# Patient Record
Sex: FEMALE | Race: BLACK OR AFRICAN AMERICAN | NOT HISPANIC OR LATINO | URBAN - METROPOLITAN AREA
[De-identification: names, ages, dates, MRNs, and addresses within clinical notes are randomized per-mention and may not be internally consistent; named-entity substitution may affect disease eponyms.]

---

## 2017-04-01 ENCOUNTER — INPATIENT (INPATIENT)
Facility: HOSPITAL | Age: 57
LOS: 4 days | Discharge: HOME | DRG: 192 | End: 2017-04-06
Attending: INTERNAL MEDICINE | Admitting: INTERNAL MEDICINE
Payer: COMMERCIAL

## 2017-04-01 VITALS
TEMPERATURE: 98 F | DIASTOLIC BLOOD PRESSURE: 74 MMHG | HEART RATE: 108 BPM | RESPIRATION RATE: 24 BRPM | OXYGEN SATURATION: 98 % | WEIGHT: 201.94 LBS | SYSTOLIC BLOOD PRESSURE: 114 MMHG

## 2017-04-01 DIAGNOSIS — Z41.8 ENCOUNTER FOR OTHER PROCEDURES FOR PURPOSES OTHER THAN REMEDYING HEALTH STATE: ICD-10-CM

## 2017-04-01 DIAGNOSIS — R63.8 OTHER SYMPTOMS AND SIGNS CONCERNING FOOD AND FLUID INTAKE: ICD-10-CM

## 2017-04-01 DIAGNOSIS — E04.1 NONTOXIC SINGLE THYROID NODULE: ICD-10-CM

## 2017-04-01 DIAGNOSIS — Z90.49 ACQUIRED ABSENCE OF OTHER SPECIFIED PARTS OF DIGESTIVE TRACT: Chronic | ICD-10-CM

## 2017-04-01 DIAGNOSIS — C34.90 MALIGNANT NEOPLASM OF UNSPECIFIED PART OF UNSPECIFIED BRONCHUS OR LUNG: ICD-10-CM

## 2017-04-01 DIAGNOSIS — Z41.9 ENCOUNTER FOR PROCEDURE FOR PURPOSES OTHER THAN REMEDYING HEALTH STATE, UNSPECIFIED: Chronic | ICD-10-CM

## 2017-04-01 DIAGNOSIS — J44.1 CHRONIC OBSTRUCTIVE PULMONARY DISEASE WITH (ACUTE) EXACERBATION: ICD-10-CM

## 2017-04-01 LAB
ALBUMIN SERPL ELPH-MCNC: 3.9 G/DL — SIGNIFICANT CHANGE UP (ref 3.4–5)
ALP SERPL-CCNC: 86 U/L — SIGNIFICANT CHANGE UP (ref 40–120)
ALT FLD-CCNC: 26 U/L — SIGNIFICANT CHANGE UP (ref 12–42)
ANION GAP SERPL CALC-SCNC: 8 MMOL/L — LOW (ref 9–16)
APTT BLD: 28 SEC — SIGNIFICANT CHANGE UP (ref 27.5–37.4)
AST SERPL-CCNC: 10 U/L — LOW (ref 15–37)
BASE EXCESS BLDV CALC-SCNC: 0.5 MMOL/L — SIGNIFICANT CHANGE UP
BASOPHILS NFR BLD AUTO: 0.1 % — SIGNIFICANT CHANGE UP (ref 0–2)
BILIRUB SERPL-MCNC: 0.2 MG/DL — SIGNIFICANT CHANGE UP (ref 0.2–1.2)
BUN SERPL-MCNC: 19 MG/DL — SIGNIFICANT CHANGE UP (ref 7–23)
CALCIUM SERPL-MCNC: 9 MG/DL — SIGNIFICANT CHANGE UP (ref 8.5–10.5)
CHLORIDE SERPL-SCNC: 106 MMOL/L — SIGNIFICANT CHANGE UP (ref 96–108)
CK MB CFR SERPL CALC: <1 NG/ML — SIGNIFICANT CHANGE UP (ref 0.5–3.6)
CK SERPL-CCNC: 79 U/L — SIGNIFICANT CHANGE UP (ref 26–192)
CO2 SERPL-SCNC: 26 MMOL/L — SIGNIFICANT CHANGE UP (ref 22–31)
CREAT SERPL-MCNC: 1.1 MG/DL — SIGNIFICANT CHANGE UP (ref 0.5–1.3)
D DIMER BLD IA.RAPID-MCNC: 646 NG/ML DDU — HIGH
EOSINOPHIL NFR BLD AUTO: 0.1 % — SIGNIFICANT CHANGE UP (ref 0–6)
GAS PNL BLDV: SIGNIFICANT CHANGE UP
GLUCOSE SERPL-MCNC: 90 MG/DL — SIGNIFICANT CHANGE UP (ref 70–99)
HCO3 BLDV-SCNC: 26 MMOL/L — SIGNIFICANT CHANGE UP (ref 20–27)
HCT VFR BLD CALC: 36.8 % — SIGNIFICANT CHANGE UP (ref 34.5–45)
HGB BLD-MCNC: 12.9 G/DL — SIGNIFICANT CHANGE UP (ref 11.5–15.5)
INR BLD: 0.98 — SIGNIFICANT CHANGE UP (ref 0.88–1.16)
LACTATE SERPL-SCNC: 1.1 MMOL/L — SIGNIFICANT CHANGE UP (ref 0.5–2)
LYMPHOCYTES # BLD AUTO: 17.3 % — SIGNIFICANT CHANGE UP (ref 13–44)
MCHC RBC-ENTMCNC: 30.8 PG — SIGNIFICANT CHANGE UP (ref 27–34)
MCHC RBC-ENTMCNC: 35.1 G/DL — SIGNIFICANT CHANGE UP (ref 32–36)
MCV RBC AUTO: 87.8 FL — SIGNIFICANT CHANGE UP (ref 80–100)
MONOCYTES NFR BLD AUTO: 7.2 % — SIGNIFICANT CHANGE UP (ref 2–14)
NEUTROPHILS NFR BLD AUTO: 75.3 % — SIGNIFICANT CHANGE UP (ref 43–77)
PCO2 BLDV: 43 MMHG — SIGNIFICANT CHANGE UP (ref 41–51)
PH BLDV: 7.39 — SIGNIFICANT CHANGE UP (ref 7.32–7.43)
PLATELET # BLD AUTO: 279 K/UL — SIGNIFICANT CHANGE UP (ref 150–400)
PO2 BLDV: 28 MMHG — SIGNIFICANT CHANGE UP
POTASSIUM SERPL-MCNC: 3.8 MMOL/L — SIGNIFICANT CHANGE UP (ref 3.5–5.3)
POTASSIUM SERPL-SCNC: 3.8 MMOL/L — SIGNIFICANT CHANGE UP (ref 3.5–5.3)
PROT SERPL-MCNC: 8.2 G/DL — SIGNIFICANT CHANGE UP (ref 6.4–8.2)
PROTHROM AB SERPL-ACNC: 10.9 SEC — SIGNIFICANT CHANGE UP (ref 9.8–12.7)
RAPID RVP RESULT: SIGNIFICANT CHANGE UP
RBC # BLD: 4.19 M/UL — SIGNIFICANT CHANGE UP (ref 3.8–5.2)
RBC # FLD: 17.6 % — HIGH (ref 10.3–16.9)
SAO2 % BLDV: 50 % — SIGNIFICANT CHANGE UP
SODIUM SERPL-SCNC: 140 MMOL/L — SIGNIFICANT CHANGE UP (ref 135–145)
TROPONIN I SERPL-MCNC: <0.015 NG/ML — SIGNIFICANT CHANGE UP (ref 0.01–0.04)
WBC # BLD: 8 K/UL — SIGNIFICANT CHANGE UP (ref 3.8–10.5)
WBC # FLD AUTO: 8 K/UL — SIGNIFICANT CHANGE UP (ref 3.8–10.5)

## 2017-04-01 PROCEDURE — 93010 ELECTROCARDIOGRAM REPORT: CPT

## 2017-04-01 PROCEDURE — 99285 EMERGENCY DEPT VISIT HI MDM: CPT | Mod: 25

## 2017-04-01 PROCEDURE — 71010: CPT | Mod: 26

## 2017-04-01 PROCEDURE — 71275 CT ANGIOGRAPHY CHEST: CPT | Mod: 26

## 2017-04-01 RX ORDER — ACETAMINOPHEN 500 MG
650 TABLET ORAL EVERY 6 HOURS
Qty: 0 | Refills: 0 | Status: DISCONTINUED | OUTPATIENT
Start: 2017-04-01 | End: 2017-04-06

## 2017-04-01 RX ORDER — IPRATROPIUM/ALBUTEROL SULFATE 18-103MCG
3 AEROSOL WITH ADAPTER (GRAM) INHALATION ONCE
Qty: 0 | Refills: 0 | Status: COMPLETED | OUTPATIENT
Start: 2017-04-01 | End: 2017-04-01

## 2017-04-01 RX ORDER — FLUTICASONE PROPIONATE AND SALMETEROL 50; 250 UG/1; UG/1
1 POWDER ORAL; RESPIRATORY (INHALATION)
Qty: 0 | Refills: 0 | Status: DISCONTINUED | OUTPATIENT
Start: 2017-04-01 | End: 2017-04-06

## 2017-04-01 RX ORDER — IPRATROPIUM/ALBUTEROL SULFATE 18-103MCG
3 AEROSOL WITH ADAPTER (GRAM) INHALATION EVERY 6 HOURS
Qty: 0 | Refills: 0 | Status: DISCONTINUED | OUTPATIENT
Start: 2017-04-01 | End: 2017-04-06

## 2017-04-01 RX ORDER — IPRATROPIUM/ALBUTEROL SULFATE 18-103MCG
3 AEROSOL WITH ADAPTER (GRAM) INHALATION EVERY 4 HOURS
Qty: 0 | Refills: 0 | Status: DISCONTINUED | OUTPATIENT
Start: 2017-04-01 | End: 2017-04-01

## 2017-04-01 RX ORDER — SODIUM CHLORIDE 9 MG/ML
1000 INJECTION INTRAMUSCULAR; INTRAVENOUS; SUBCUTANEOUS ONCE
Qty: 0 | Refills: 0 | Status: COMPLETED | OUTPATIENT
Start: 2017-04-01 | End: 2017-04-01

## 2017-04-01 RX ORDER — INSULIN LISPRO 100/ML
VIAL (ML) SUBCUTANEOUS
Qty: 0 | Refills: 0 | Status: DISCONTINUED | OUTPATIENT
Start: 2017-04-01 | End: 2017-04-06

## 2017-04-01 RX ORDER — AZITHROMYCIN 500 MG/1
250 TABLET, FILM COATED ORAL DAILY
Qty: 0 | Refills: 0 | Status: COMPLETED | OUTPATIENT
Start: 2017-04-02 | End: 2017-04-03

## 2017-04-01 RX ORDER — DOCUSATE SODIUM 100 MG
100 CAPSULE ORAL THREE TIMES A DAY
Qty: 0 | Refills: 0 | Status: DISCONTINUED | OUTPATIENT
Start: 2017-04-01 | End: 2017-04-06

## 2017-04-01 RX ORDER — ALBUTEROL 90 UG/1
2.5 AEROSOL, METERED ORAL ONCE
Qty: 0 | Refills: 0 | Status: COMPLETED | OUTPATIENT
Start: 2017-04-01 | End: 2017-04-01

## 2017-04-01 RX ORDER — HEPARIN SODIUM 5000 [USP'U]/ML
5000 INJECTION INTRAVENOUS; SUBCUTANEOUS EVERY 8 HOURS
Qty: 0 | Refills: 0 | Status: DISCONTINUED | OUTPATIENT
Start: 2017-04-01 | End: 2017-04-06

## 2017-04-01 RX ADMIN — Medication 2: at 21:48

## 2017-04-01 RX ADMIN — SODIUM CHLORIDE 1000 MILLILITER(S): 9 INJECTION INTRAMUSCULAR; INTRAVENOUS; SUBCUTANEOUS at 15:02

## 2017-04-01 RX ADMIN — HEPARIN SODIUM 5000 UNIT(S): 5000 INJECTION INTRAVENOUS; SUBCUTANEOUS at 21:48

## 2017-04-01 RX ADMIN — Medication 3 MILLILITER(S): at 13:23

## 2017-04-01 RX ADMIN — Medication 100 MILLIGRAM(S): at 21:48

## 2017-04-01 RX ADMIN — ALBUTEROL 2.5 MILLIGRAM(S): 90 AEROSOL, METERED ORAL at 16:05

## 2017-04-01 RX ADMIN — Medication 3 MILLILITER(S): at 21:48

## 2017-04-01 RX ADMIN — Medication 125 MILLIGRAM(S): at 13:22

## 2017-04-01 NOTE — H&P ADULT - PROBLEM SELECTOR PLAN 5
no IVF  Regular diet   Monitor / replete lytes PRN    Full code      Dispo: pending clinical improvement

## 2017-04-01 NOTE — H&P ADULT - PROBLEM SELECTOR PLAN 3
CTA chest with incidental finding of multinodular thyroid. Endocrine ROS negative    - TSH  - thyroid US

## 2017-04-01 NOTE — ED PROVIDER NOTE - PROGRESS NOTE DETAILS
discussed w Dr. Wallace, who advised Dr. Mckinnon covering, discussed final results w Dr. Mckinnon who will admit, advised continuing solumedrol 60 q 6 ,

## 2017-04-01 NOTE — ED PROVIDER NOTE - OBJECTIVE STATEMENT
shortness of breath  57 yo with shortness of breath, Patient with ho of lobectomy for rt lung cancer and recently ended chemo. pt reports several days of worsening SOB, cough, productive of yellow sputum, no fevers, yesterday placed on z pack and pred, took 20mg of pred prior to arrival, ho of COPD as well, former smoker, no ho of thrombosis, denies leg edema or leg pain.  SOB worse with exertion, new CP under L breast.

## 2017-04-01 NOTE — H&P ADULT - NSHPLABSRESULTS_GEN_ALL_CORE
EXAM:  CT ANGIO CHEST PE PROTOCOL IC                          PROCEDURE DATE:  04/01/2017    Quantity of Contrast in Vial in ml: 120 Contrast Used: Optiray 350  Quantity of Contrast Wasted in ml: 0           INTERPRETATION:  CTA (CT angiography) ofthe CHEST dated 4/1/2017 2:57 PM    INDICATION: Shortness of breath. History of right lung resection.   Elevated d-dimer. History of lung cancer. Assess for pulmonary embolism.    TECHNIQUE: CT angiography of the chest was performed during bolus   injection of intravenous contrast.  Post-processing including the   production of axial, coronal and sagittal multiplanar reformatted images   and axial and coronal maximum intensity projections (MIPs) was performed.    PRIOR STUDY: PET/CT from 10/17/2016. CTA chest from 10/14/2016. Multiple   additional imaging studies dating back to 6/22/2016.    FINDINGS: No pulmonary embolism.     The heart is normal in size.  No pericardial effusion is seen. Trivial   calcified plaque aorta.    No mediastinal, hilar or axillary lymphadenopathy is seen.    There is again a multinodular thyroid, with dominant 2.4 cm nodule right   lobe.    There has been interval right lower lobectomy and removal of the   previously seen right lower lobe mass. There is again advanced   centrilobular emphysema. No change 0.4 cm pleural-based nodule posterior   right upper lobe. No pleural effusion.    Limited evaluation of the upper abdomen demonstrates no abnormality.     Evaluation of the osseous structures demonstrates mild degenerative   changes.      IMPRESSION:   1. Since 10/17/2016, there has been right lower lobectomy.    2. No evidence pulmonary embolism.    3. Advanced emphysema.    4. Multinodular thyroid with dominant 2.4 cm nodule right lobe. Recommend   ultrasound follow-up.    5. No change 0.4 cm nodule right upper lobe.

## 2017-04-01 NOTE — ED ADULT TRIAGE NOTE - CHIEF COMPLAINT QUOTE
c/o of sudden shortness of breath while walking, pt has hx of copd, lobectomy to right side, reoprts pain to right chest wall

## 2017-04-01 NOTE — H&P ADULT - PROBLEM SELECTOR PLAN 1
SOB likely 2/2 COPD exacerbation. Patient a former smoker (quit in Oct when diagnosed with Lung Ca). Unclear trigger, however, patient on 5 day Z-Pack for productive cough that was given to her outpatient by Dr. Wallace. SOB, cough exacerbated this AM while walking > 4 blocks. s/p 3 Duoneb, 1 Albuterol, Solumedrol 125 in ED with improvement in symptoms. Patient not requiring supplemental O2, speaking in full sentences, able to ambulate. Peak flow in .   - c/w IV Solumedrol 60mg q6h, and consider transition to PO tomorrow   - c/w home Advair  - Duonebs q6h standing   - complete 2 more days of azithromycin   - daily peak flows SOB likely 2/2 COPD exacerbation. Patient a former smoker (quit in Oct when diagnosed with Lung Ca). Unclear trigger, however, patient on 5 day Z-Pack for productive cough that was given to her outpatient by Dr. Wallace. SOB, cough exacerbated this AM while walking > 4 blocks. s/p 3 Duoneb, 1 Albuterol, Solumedrol 125 in ED with improvement in symptoms. Patient not requiring supplemental O2, speaking in full sentences, able to ambulate. Peak flow in .   - c/w IV Solumedrol 60mg q6h, and consider transition to PO tomorrow   - c/w home Advair  - Duonebs q6h standing   - f/u RVP    - complete 2 more days of azithromycin   - daily peak flows

## 2017-04-01 NOTE — ED ADULT NURSE NOTE - PMH
Adenocarcinoma of lung    COPD (chronic obstructive pulmonary disease)    Dyspnea    MRSA (methicillin resistant Staphylococcus aureus)  history of

## 2017-04-01 NOTE — H&P ADULT - HISTORY OF PRESENT ILLNESS
56 year old women, former smoker, with a pmhx of COPD, non-small cell Lung Ca s/p partial right lung resection (Oct 2016) currently on chemo (last session in March) presents with shortness of breath since this morning. Patient states that she was previously in an ED in [place] with "breathing problems" on wednesday (03/39) and discharged on a prednisone taper. The following day patient went to see Dr. Wallace in his office for complaints of a productive cough for which he started her on a Z-pack. Patient felt well up until this morning when she was walking approximately 4 blocks to [place] when she had a sudden exacerbation of coughing episodes. Patient states this episode started suddenly, associated with difficulty breathing, for which she took a cab to [facility] ED. Patient states her cough is productive, expectorating yellow sputum. She states she has had episodes like these in the past and the symptoms were similar. Patient is compliant with her medications at home which include Advair, and Duonebs as needed. Patient believes this episode was triggered by her sons 2 dogs and cats, which she's been around most recently. Patient denies any recent fevers, chills, travel, sick contacts, headaches, chest pain, abdominal pain, N/V/D, bowel or urinary changes.     In the ED, VS: temp: 97.8, HR: 108, /74, RR: 24, O2: 98%. Patient given 3 Duoneb treatments, 1 Albuterol treatment, and IV Solumedrol 125mg. Labs unremarkable except for a minimally elevated D-Dimer. CTA chest negative for PE, however, multinodular thyroid seen.     Patient to be admitted to [facility] 56 year old women, former smoker, with a pmhx of COPD, non-small cell Lung Ca s/p partial right lung resection (Oct 2016) currently on chemo (last session in March) presents with shortness of breath since this morning. Patient states that she was previously in an ED in [place] with "breathing problems" on wednesday (03/39) and discharged on a prednisone taper. The following day patient went to see Dr. Wallace in his office for complaints of a productive cough for which he started her on a Z-pack. Patient felt well up until this morning when she was walking approximately 4 blocks to [place] when she had a sudden exacerbation of coughing episodes. Patient states this episode started suddenly, associated with difficulty breathing, for which she took a cab to [facility] ED. Patient states her cough is productive, expectorating yellow sputum. She states she has had episodes like these in the past and the symptoms were similar. Patient is compliant with her medications at home which include Advair, and Duonebs as needed. Patient believes this episode was triggered by her sons 2 dogs and cats, which she's been around most recently. Patient has a peak flow monitor at home, says its usually around the 250 range. Patient denies any recent fevers, chills, travel, sick contacts, headaches, chest pain, abdominal pain, N/V/D, bowel or urinary changes.     In the ED, VS: temp: 97.8, HR: 108, /74, RR: 24, O2: 98%. Patient given 3 Duoneb treatments, 1 Albuterol treatment, and IV Solumedrol 125mg. Labs unremarkable except for a minimally elevated D-Dimer. CTA chest negative for PE, however, multinodular thyroid seen.     Patient to be admitted to [facility]

## 2017-04-01 NOTE — ED ADULT NURSE NOTE - OBJECTIVE STATEMENT
pt to ER w/ report of episode of SOB today while walking and experience of R-sided CP.  Hx lung CA w/ R lobectomy Oct. 2016.  Pt reports being sick for one week w/ URI, productive cough w/ yellow phlegm, denies f/c/n/v/abd pain. 12 lead ekg done and shown to ER attending physician.  Pt maintained on CM. Breathing unlabored, skin warm and dry. Will continue to monitor. pt to ER w/ report of episode of SOB today while walking and experience of R-sided CP.  Hx lung CA w/ R lobectomy Oct. 2016.  Pt reports being sick for one week w/ URI, productive cough w/ yellow phlegm, denies f/c/n/v/abd pain. 12 lead ekg done and shown to ER attending physician.  Some work of breathing noted. Wheezing noted albino on auscultation.  Pt maintained on CM.  IV access established, labs drawn and sent. Skin warm and dry. Will continue to monitor.

## 2017-04-01 NOTE — H&P ADULT - ASSESSMENT
56 year old F, former smoker, with a pmhx of COPD, non-small cell Lung Ca s/p partial right lung resection (OCt 2016) currently on chemo (last session in March) presents with shortness of breath since this morning, found to be in COPD exacerbation

## 2017-04-01 NOTE — H&P ADULT - NSHPPHYSICALEXAM_GEN_ALL_CORE
PHYSICAL EXAM:    Constitutional: No respiratory distress, speaking in full sentences   Head: NC/AT  Eyes: PERRL, EOMI, clear conjunctiva  ENT: no nasal discharge; uvula midline, no oropharyngeal erythema or exudates; MMM  Neck: supple; no JVD or thyromegaly  Respiratory: Poor air movement with minimal expiratory wheezing   Cardiac: +S1/S2; Tachycardic; no M/R/G  Gastrointestinal: soft, NT/ND; no rebound or guarding; +BSx4  Genitourinary: normal external genitalia  Back: spine midline, no bony tenderness or step-offs; no CVAT B/L  Extremities: WWP, no clubbing or cyanosis; no peripheral edema  Musculoskeletal: NROM x4; no joint swelling, tenderness or erythema  Vascular: 2+ radial, femoral, DP/PT pulses B/L  Dermatologic: skin warm, dry and intact; no rashes, wounds, or scars  Lymphatic: no submandibular or cervical LAD  Neurologic: AAOx3; CNII-XII grossly intact; no focal deficits

## 2017-04-01 NOTE — ED PROVIDER NOTE - MUSCULOSKELETAL, MLM
Spine appears normal, range of motion is not limited, no muscle or joint tenderness, no leg edema, no calf tenderness

## 2017-04-01 NOTE — H&P ADULT - PROBLEM SELECTOR PLAN 2
Non-small cell Lung CA. Diagnosed in Oct 2016. s/p partial right lung resection. Follows with Dr. Gonsalves for chemotherapy treatments. Last one in March 2016. Received 4 treatments total.   - Collateral with Dr. Gonsalves. ensure patient gets appointment at discharge  - Patient still with pain from surgery, takes oxycodone 30 PRN at home.   - c/w Tylenol PRN for moderate pain, Percocet for severe pain

## 2017-04-01 NOTE — ED PROVIDER NOTE - MEDICAL DECISION MAKING DETAILS
57 yo with lung CA w resection, now SOB / cough, wheezing on exam, tachypnea and SOB, + small effusion Rt side, possible mild infiltrate, elevated d dimer even w age adjustment, Plan to CT to r/o PE, also to eval for pna, will need admission. will discuss w Dr. Wallace, paged at this time.

## 2017-04-02 DIAGNOSIS — R10.11 RIGHT UPPER QUADRANT PAIN: ICD-10-CM

## 2017-04-02 DIAGNOSIS — A49.02 METHICILLIN RESISTANT STAPHYLOCOCCUS AUREUS INFECTION, UNSPECIFIED SITE: ICD-10-CM

## 2017-04-02 DIAGNOSIS — R05 COUGH: ICD-10-CM

## 2017-04-02 DIAGNOSIS — G43.809 OTHER MIGRAINE, NOT INTRACTABLE, WITHOUT STATUS MIGRAINOSUS: ICD-10-CM

## 2017-04-02 DIAGNOSIS — C34.91 MALIGNANT NEOPLASM OF UNSPECIFIED PART OF RIGHT BRONCHUS OR LUNG: ICD-10-CM

## 2017-04-02 DIAGNOSIS — G43.009 MIGRAINE WITHOUT AURA, NOT INTRACTABLE, WITHOUT STATUS MIGRAINOSUS: ICD-10-CM

## 2017-04-02 DIAGNOSIS — R91.1 SOLITARY PULMONARY NODULE: ICD-10-CM

## 2017-04-02 LAB
ANION GAP SERPL CALC-SCNC: 9 MMOL/L — SIGNIFICANT CHANGE UP (ref 9–16)
BUN SERPL-MCNC: 17 MG/DL — SIGNIFICANT CHANGE UP (ref 7–23)
CALCIUM SERPL-MCNC: 8.7 MG/DL — SIGNIFICANT CHANGE UP (ref 8.5–10.5)
CHLORIDE SERPL-SCNC: 104 MMOL/L — SIGNIFICANT CHANGE UP (ref 96–108)
CO2 SERPL-SCNC: 24 MMOL/L — SIGNIFICANT CHANGE UP (ref 22–31)
CREAT SERPL-MCNC: 0.94 MG/DL — SIGNIFICANT CHANGE UP (ref 0.5–1.3)
GLUCOSE SERPL-MCNC: 149 MG/DL — HIGH (ref 70–99)
HBA1C BLD-MCNC: 6.6 % — HIGH (ref 4.8–5.6)
HCT VFR BLD CALC: 35.2 % — SIGNIFICANT CHANGE UP (ref 34.5–45)
HGB BLD-MCNC: 12 G/DL — SIGNIFICANT CHANGE UP (ref 11.5–15.5)
MAGNESIUM SERPL-MCNC: 2 MG/DL — SIGNIFICANT CHANGE UP (ref 1.6–2.4)
MCHC RBC-ENTMCNC: 30.2 PG — SIGNIFICANT CHANGE UP (ref 27–34)
MCHC RBC-ENTMCNC: 34.1 G/DL — SIGNIFICANT CHANGE UP (ref 32–36)
MCV RBC AUTO: 88.7 FL — SIGNIFICANT CHANGE UP (ref 80–100)
PLATELET # BLD AUTO: 283 K/UL — SIGNIFICANT CHANGE UP (ref 150–400)
POTASSIUM SERPL-MCNC: 4.1 MMOL/L — SIGNIFICANT CHANGE UP (ref 3.5–5.3)
POTASSIUM SERPL-SCNC: 4.1 MMOL/L — SIGNIFICANT CHANGE UP (ref 3.5–5.3)
RBC # BLD: 3.97 M/UL — SIGNIFICANT CHANGE UP (ref 3.8–5.2)
RBC # FLD: 17.6 % — HIGH (ref 10.3–16.9)
SODIUM SERPL-SCNC: 137 MMOL/L — SIGNIFICANT CHANGE UP (ref 135–145)
T4 FREE SERPL-MCNC: 0.8 NG/DL — SIGNIFICANT CHANGE UP (ref 0.7–1.48)
TSH SERPL-MCNC: 0.17 UIU/ML — LOW (ref 0.35–4.94)
WBC # BLD: 7.6 K/UL — SIGNIFICANT CHANGE UP (ref 3.8–10.5)
WBC # FLD AUTO: 7.6 K/UL — SIGNIFICANT CHANGE UP (ref 3.8–10.5)

## 2017-04-02 PROCEDURE — 76536 US EXAM OF HEAD AND NECK: CPT | Mod: 26

## 2017-04-02 RX ORDER — PANTOPRAZOLE SODIUM 20 MG/1
40 TABLET, DELAYED RELEASE ORAL
Qty: 0 | Refills: 0 | Status: DISCONTINUED | OUTPATIENT
Start: 2017-04-03 | End: 2017-04-06

## 2017-04-02 RX ORDER — PANTOPRAZOLE SODIUM 20 MG/1
40 TABLET, DELAYED RELEASE ORAL ONCE
Qty: 0 | Refills: 0 | Status: COMPLETED | OUTPATIENT
Start: 2017-04-02 | End: 2017-04-02

## 2017-04-02 RX ORDER — FOLIC ACID 0.8 MG
1 TABLET ORAL DAILY
Qty: 0 | Refills: 0 | Status: DISCONTINUED | OUTPATIENT
Start: 2017-04-02 | End: 2017-04-06

## 2017-04-02 RX ORDER — FOLIC ACID 0.8 MG
0.4 TABLET ORAL DAILY
Qty: 0 | Refills: 0 | Status: DISCONTINUED | OUTPATIENT
Start: 2017-04-02 | End: 2017-04-02

## 2017-04-02 RX ORDER — METOCLOPRAMIDE HCL 10 MG
10 TABLET ORAL ONCE
Qty: 0 | Refills: 0 | Status: DISCONTINUED | OUTPATIENT
Start: 2017-04-02 | End: 2017-04-02

## 2017-04-02 RX ADMIN — FLUTICASONE PROPIONATE AND SALMETEROL 1 DOSE(S): 50; 250 POWDER ORAL; RESPIRATORY (INHALATION) at 10:01

## 2017-04-02 RX ADMIN — Medication 3 MILLILITER(S): at 05:48

## 2017-04-02 RX ADMIN — Medication 3 MILLILITER(S): at 17:18

## 2017-04-02 RX ADMIN — Medication 60 MILLIGRAM(S): at 11:16

## 2017-04-02 RX ADMIN — Medication 1 MILLIGRAM(S): at 15:18

## 2017-04-02 RX ADMIN — HEPARIN SODIUM 5000 UNIT(S): 5000 INJECTION INTRAVENOUS; SUBCUTANEOUS at 05:48

## 2017-04-02 RX ADMIN — AZITHROMYCIN 250 MILLIGRAM(S): 500 TABLET, FILM COATED ORAL at 11:15

## 2017-04-02 RX ADMIN — Medication 650 MILLIGRAM(S): at 15:17

## 2017-04-02 RX ADMIN — Medication 100 MILLIGRAM(S): at 05:48

## 2017-04-02 RX ADMIN — Medication 100 MILLIGRAM(S): at 14:21

## 2017-04-02 RX ADMIN — Medication 100 MILLIGRAM(S): at 21:36

## 2017-04-02 RX ADMIN — FLUTICASONE PROPIONATE AND SALMETEROL 1 DOSE(S): 50; 250 POWDER ORAL; RESPIRATORY (INHALATION) at 21:36

## 2017-04-02 RX ADMIN — Medication 60 MILLIGRAM(S): at 00:55

## 2017-04-02 RX ADMIN — Medication 3 MILLILITER(S): at 10:00

## 2017-04-02 RX ADMIN — Medication 60 MILLIGRAM(S): at 05:50

## 2017-04-02 RX ADMIN — HEPARIN SODIUM 5000 UNIT(S): 5000 INJECTION INTRAVENOUS; SUBCUTANEOUS at 14:21

## 2017-04-02 RX ADMIN — FLUTICASONE PROPIONATE AND SALMETEROL 1 DOSE(S): 50; 250 POWDER ORAL; RESPIRATORY (INHALATION) at 00:34

## 2017-04-02 RX ADMIN — HEPARIN SODIUM 5000 UNIT(S): 5000 INJECTION INTRAVENOUS; SUBCUTANEOUS at 21:36

## 2017-04-02 RX ADMIN — Medication 3 MILLILITER(S): at 21:36

## 2017-04-02 RX ADMIN — Medication 650 MILLIGRAM(S): at 16:17

## 2017-04-02 RX ADMIN — Medication 4: at 17:59

## 2017-04-02 RX ADMIN — Medication 60 MILLIGRAM(S): at 17:18

## 2017-04-02 NOTE — PROGRESS NOTE ADULT - PROBLEM SELECTOR PLAN 4
Pt c/o headache this AM, no red flags, likely migraine. Pt had not tried any tylenol.  - c/w tylenol, percocet prn. If she tries these and they don’t work, can order IV Reglan 10mg x 1 (QTc 447 on 4/1)

## 2017-04-02 NOTE — PROGRESS NOTE ADULT - SUBJECTIVE AND OBJECTIVE BOX
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 Developed severe cough attacks and took a cab to the ER.  Yellowish expectoration for several days. Started azithromycin on Thursday.    PAST MEDICAL & SURGICAL HISTORY:  Dyspnea  MRSA (methicillin resistant Staphylococcus aureus): history of  Adenocarcinoma of lung  COPD (chronic obstructive pulmonary disease)  Asthma  Pulmonary nodules  Surgery, elective: lung biopsy  History of appendectomy  No significant past surgical history    FAMILY HISTORY:  fa  lung cancer, 70s  mo  80, liver cancer,     SOCIAL HISTORY: lives alone, quit smoking 2016    REVIEW OF SYSTEMS:  Constitutional: +weight change, -weight loss, -fever, -chills, -fatigue, +night sweats  Eyes: -discharge, -eye pain, -visual change  DRY EYES  ENT:  -hearing difficulty, +tinnitus, -vertigo, -sinus pain, -throat pain, -epistaxis, -dysphagia, -hoarseness  Neck: + LEFT pain FROM FALL, -stiffness, -neck swelling  Respiratory: +cough, +wheezing, -hemoptysis      Cardiovascular: -chest pain, -dysrhythmia, -palpitations, -dizziness   Gastrointestinal: -abdominal pain, -nausea, -vomiting, -hematemesis, -diarrhea, -constipation, -melena  Genitourinary: -dysuria, -frequency, -hematuria, -incontinence      Neurologic: +headache, -memory loss, -loss of strength, -numbness, -tremor   MIGRAINES  Skin: -itching, -burning, -rash, -lesions   Lymphatic: -enlarged lymph nodes  Endocrine: -hair loss, +temperature intolerance         Musculoskeletal: +back pain, -joint pain, -extremity pain  Psychiatric: -visual change, -auditory change, -depression, -anxiety, -suicidal  Sleep: +disorder, +insomnia, -sleep deprivation  Heme/Lymph: -easy bruising, -bleeding gums            Allergy and Immunologic: -hives, -eczema    Vital Signs Last 24 Hrs  T(C): 36.5, Max: 36.7 ( @ 19:46)  T(F): 97.7, Max: 98 ( @ 19:46)  HR: 93 (80 - 108)  BP: 113/75 (102/67 - 136/80)  BP(mean): --  RR: 18 (17 - 24)  SpO2: 98% (95% - 100%)    I&O's Detail    PHYSICAL EXAM:  In bed, headache, cough and dyspnea  Well nourished, well developed, uncomfortable, - acute distress; vital signs are monitored  Eyes: PERRLA, EOMI, -conjunctivitis, -scleritis   Head: no focal deficit, normocephalic,  no trauma  ENMT: moist tongue, no thrush, -nasal discharge, -hoarseness, normal hearing, +cough, -hemoptysis, trachea midline  Neck: supple, - lymphadenopathy,  -masses, -JVD  Respiratory: bilateral diminished breath sounds, + EXPIRATORY wheezing, +rhonchi, -rales, -crackles  Chest: -accessory muscle use, -paradoxical breathing  Cardiovascular: regular rate and sinus rhythm, S1 S2 normal, -S3, -S4, -murmur, -gallop, -rub  Gastrointestinal: RIGHT SIDED PAIN AND BURNING, soft, nontender, nondistended, normal bowel sounds, no hepatosplenomegaly  Genitourinary: -flank pain, -dysuria  Extremities: +clubbing, -cyanosis, -edema    Vascular: peripheral pulses palpable 2+ bilaterally  Neurological: alert, oriented x 3, no focal deficit, -tremor   Skin: warm, dry, -erythema, iv site intact  Lymph nodes; no cervical, supraclavicular or axillary adenopathy  Psychiatric: cooperative, appropriate mood    MEDICATIONS  (STANDING):  docusate sodium 100milliGRAM(s) Oral three times a day  fluticasone / salmeterol 250-50 MICROgram(s) Diskus 1Dose(s) Inhalation two times a day  heparin  Injectable 5000Unit(s) SubCutaneous every 8 hours  insulin lispro (HumaLOG) corrective regimen sliding scale  SubCutaneous Before meals and at bedtime  methylPREDNISolone sodium succinate Injectable 60milliGRAM(s) IV Push every 6 hours  azithromycin   Tablet 250milliGRAM(s) Oral daily  ALBUTerol/ipratropium for Nebulization 3milliLiter(s) Nebulizer every 6 hours    MEDICATIONS  (PRN):  oxyCODONE  5 mG/acetaminophen 325 mG 1Tablet(s) Oral every 6 hours PRN Severe Pain (7 - 10)  acetaminophen   Tablet. 650milliGRAM(s) Oral every 6 hours PRN Moderate Pain (4 - 6)    Allergies  penicillin (Angioedema)  Intolerances    LABS:                        12.0   7.6   )-----------( 283      ( 2017 06:31 )             35.2     2017 06:30    137    |  104    |  17     ----------------------------<  149    4.1     |  24     |  0.94     Ca    8.7        2017 06:30  Mg     2.0       2017 06:30  TPro  8.2    /  Alb  3.9    /  TBili  0.2    /  DBili  x      /  AST  10     /  ALT  26     /  AlkPhos  86     2017 13:26  PT/INR - ( 2017 13:26 )   PT: 10.9 sec;   INR: 0.98     PTT - ( 2017 13:26 )  PTT:28.0 sec    +DVT prophylaxis  5000 q8  +Sleep  I COULD NOT SLEEP  +Nutrition goals ORAL  -Pain RIGHT ABDOMINAL  -Decubital ulcer  +GI prophylaxis (PPV, coagulopathy, Hx)  +Aspiration prophylaxis (45 degrees)  +Sedation/analgesia stopped   +ID (phos, CH, mupi, SB)  -Delirium  +Cardiac  +Prevention  +Education  +Medication reviewed (drug-drug interactions, PDA)  Medical devices    Discussed with ER MD, PGY, RN    RADIOLOGY & ADDITIONAL STUDIES:      EXAM:  CT ANGIO CHEST PE PROTOCOL IC                          PROCEDURE DATE:  2017    Quantity of Contrast in Vial in ml: 120 Contrast Used: Optiray 350  Quantity of Contrast Wasted in ml: 0           INTERPRETATION:  CTA (CT angiography) ofthe CHEST dated 2017 2:57 PM    INDICATION: Shortness of breath. History of right lung resection.   Elevated d-dimer. History of lung cancer. Assess for pulmonary embolism.    TECHNIQUE: CT angiography of the chest was performed during bolus   injection of intravenous contrast.  Post-processing including the   production of axial, coronal and sagittal multiplanar reformatted images   and axial and coronal maximum intensity projections (MIPs) was performed.    PRIOR STUDY: PET/CT from 10/17/2016. CTA chest from 10/14/2016. Multiple   additional imaging studies dating back to 2016.    FINDINGS: No pulmonary embolism.     The heart is normal in size.  No pericardial effusion is seen. Trivial   calcified plaque aorta.    No mediastinal, hilar or axillary lymphadenopathy is seen.    There is again a multinodular thyroid, with dominant 2.4 cm nodule right   lobe.    There has been interval right lower lobectomy and removal of the   previously seen right lower lobe mass. There is again advanced   centrilobular emphysema. No change 0.4 cm pleural-based nodule posterior   right upper lobe. No pleural effusion.    Limited evaluation of the upper abdomen demonstrates no abnormality.     Evaluation of the osseous structures demonstrates mild degenerative   changes.      IMPRESSION:   1. Since 10/17/2016, there has been right lower lobectomy.    2. No evidence pulmonary embolism.    3. Advanced emphysema.    4. Multinodular thyroid with dominant 2.4 cm nodule right lobe. Recommend   ultrasound follow-up.    5. No change 0.4 cm nodule right upper lobe.

## 2017-04-02 NOTE — PROGRESS NOTE ADULT - PROBLEM SELECTOR PLAN 3
CTA chest with incidental finding of R multinodular thyroid of 2.4cm. Endocrine ROS negative    - TSH low (0.167) but free T4 wnl (0.8)  - went for thyroid US today, f/up results  - Monitor for thyroid storm and consider endo c/s if this occurs

## 2017-04-02 NOTE — PROGRESS NOTE ADULT - SUBJECTIVE AND OBJECTIVE BOX
PGY-1 Medicine Progress Note    Overnight events: No acute events.    Subjective: Pt seen and examined at bedside this morning. Pt states her cough and SOB is improving. Denies fever. Admits to 6/10 frontal headache, though denies waking up at night due to headache. Endorses occasional sweats in last few weeks, occasional palpitations but denies wt loss (has had wt gain). Also c/o nonspecific abd cramping.    T(C): 36.7, Max: 36.7 (04-02 @ 16:35)  HR: 95 (80 - 95)  BP: 117/79 (102/67 - 117/79)  RR: 17 (17 - 18)  SpO2: 96% (96% - 98%)  Wt(kg): --  CAPILLARY BLOOD GLUCOSE  209 (02 Apr 2017 17:08)  126 (02 Apr 2017 14:19)  162 (02 Apr 2017 11:35)  139 (02 Apr 2017 08:05)  192 (01 Apr 2017 21:35)    Daily     Daily   I&O's Summary    EXAM:  Constitutional: Appears comfortable, not in respiratory distress, speaking in full sentences   Head: NC/AT  Eyes: PERRL, EOMI, clear conjunctiva  ENT: no nasal discharge; uvula midline, no oropharyngeal erythema or exudates; MMM  Neck: supple; no JVD, + Right thyroid nodule (nontender, firm, ~2cm)  Respiratory: Mild b/l expiratory wheezes, good air movement, no accessory musc use  Cardiac: +S1/S2; Tachycardic; no M/R/G  Gastrointestinal: soft, NT/ND; no rebound or guarding; +BSx4  Extremities: WWP, no clubbing or cyanosis; no peripheral edema  Vascular: 2+ radial, femoral, DP/PT pulses B/L  Dermatologic: skin warm, dry and intact; no rashes, wounds, or scars  Lymphatic: no submandibular or cervical LAD  Neurologic: AAOx3; CNII-XII grossly intact; no focal deficits    MEDICATIONS  (STANDING):  docusate sodium 100milliGRAM(s) Oral three times a day  fluticasone / salmeterol 250-50 MICROgram(s) Diskus 1Dose(s) Inhalation two times a day  heparin  Injectable 5000Unit(s) SubCutaneous every 8 hours  insulin lispro (HumaLOG) corrective regimen sliding scale  SubCutaneous Before meals and at bedtime  methylPREDNISolone sodium succinate Injectable 60milliGRAM(s) IV Push every 6 hours  azithromycin   Tablet 250milliGRAM(s) Oral daily  ALBUTerol/ipratropium for Nebulization 3milliLiter(s) Nebulizer every 6 hours  folic acid 1milliGRAM(s) Oral daily    MEDICATIONS  (PRN):  oxyCODONE  5 mG/acetaminophen 325 mG 1Tablet(s) Oral every 6 hours PRN Severe Pain (7 - 10)  acetaminophen   Tablet. 650milliGRAM(s) Oral every 6 hours PRN Moderate Pain (4 - 6)    Allergies    penicillin (Angioedema)    Intolerances    Labs: reviewed.                          12.0   7.6   )-----------( 283      ( 02 Apr 2017 06:31 )             35.2     02 Apr 2017 06:30    137    |  104    |  17     ----------------------------<  149    4.1     |  24     |  0.94     Ca    8.7        02 Apr 2017 06:30  Mg     2.0       02 Apr 2017 06:30    TPro  8.2    /  Alb  3.9    /  TBili  0.2    /  DBili  x      /  AST  10     /  ALT  26     /  AlkPhos  86     01 Apr 2017 13:26    PT/INR - ( 01 Apr 2017 13:26 )   PT: 10.9 sec;   INR: 0.98          PTT - ( 01 Apr 2017 13:26 )  PTT:28.0 sec      Radiology and additional tests: reviewed.

## 2017-04-02 NOTE — PROGRESS NOTE ADULT - PROBLEM SELECTOR PLAN 1
SOB likely 2/2 COPD exacerbation. Patient a former smoker (quit in Oct when diagnosed with Lung Ca). Unclear trigger, however, patient on 5 day Z-Pack for productive cough that was given to her outpatient by Dr. Wallace. SOB, cough exacerbated this AM while walking > 4 blocks. s/p 3 Duoneb, 1 Albuterol, Solumedrol 125 in ED with improvement in symptoms. Patient not requiring supplemental O2, speaking in full sentences, able to ambulate. Peak flow in . Today 220. Baseline 300 at home.  - c/w IV Solumedrol 60mg q6h, and consider transition to PO tomorrow   - c/w ISS and added PPI for stress ulcer ppx  - c/w home Advair  - Duonebs q6h standing   - f/u RVP    - complete 1 more day of azithromycin   - daily peak flows - today 220, baseline 300

## 2017-04-03 LAB
ANION GAP SERPL CALC-SCNC: 6 MMOL/L — LOW (ref 9–16)
BUN SERPL-MCNC: 22 MG/DL — SIGNIFICANT CHANGE UP (ref 7–23)
CALCIUM SERPL-MCNC: 9.2 MG/DL — SIGNIFICANT CHANGE UP (ref 8.5–10.5)
CHLORIDE SERPL-SCNC: 106 MMOL/L — SIGNIFICANT CHANGE UP (ref 96–108)
CO2 SERPL-SCNC: 28 MMOL/L — SIGNIFICANT CHANGE UP (ref 22–31)
CREAT SERPL-MCNC: 0.91 MG/DL — SIGNIFICANT CHANGE UP (ref 0.5–1.3)
GLUCOSE SERPL-MCNC: 164 MG/DL — HIGH (ref 70–99)
MAGNESIUM SERPL-MCNC: 2.2 MG/DL — SIGNIFICANT CHANGE UP (ref 1.6–2.4)
POTASSIUM SERPL-MCNC: 4.3 MMOL/L — SIGNIFICANT CHANGE UP (ref 3.5–5.3)
POTASSIUM SERPL-SCNC: 4.3 MMOL/L — SIGNIFICANT CHANGE UP (ref 3.5–5.3)
SODIUM SERPL-SCNC: 140 MMOL/L — SIGNIFICANT CHANGE UP (ref 135–145)

## 2017-04-03 RX ADMIN — HEPARIN SODIUM 5000 UNIT(S): 5000 INJECTION INTRAVENOUS; SUBCUTANEOUS at 05:21

## 2017-04-03 RX ADMIN — Medication 650 MILLIGRAM(S): at 13:01

## 2017-04-03 RX ADMIN — Medication 100 MILLIGRAM(S): at 21:33

## 2017-04-03 RX ADMIN — Medication 60 MILLIGRAM(S): at 21:35

## 2017-04-03 RX ADMIN — FLUTICASONE PROPIONATE AND SALMETEROL 1 DOSE(S): 50; 250 POWDER ORAL; RESPIRATORY (INHALATION) at 10:19

## 2017-04-03 RX ADMIN — Medication 60 MILLIGRAM(S): at 05:21

## 2017-04-03 RX ADMIN — HEPARIN SODIUM 5000 UNIT(S): 5000 INJECTION INTRAVENOUS; SUBCUTANEOUS at 21:44

## 2017-04-03 RX ADMIN — Medication 3 MILLILITER(S): at 21:32

## 2017-04-03 RX ADMIN — HEPARIN SODIUM 5000 UNIT(S): 5000 INJECTION INTRAVENOUS; SUBCUTANEOUS at 14:32

## 2017-04-03 RX ADMIN — Medication 60 MILLIGRAM(S): at 00:10

## 2017-04-03 RX ADMIN — PANTOPRAZOLE SODIUM 40 MILLIGRAM(S): 20 TABLET, DELAYED RELEASE ORAL at 07:11

## 2017-04-03 RX ADMIN — Medication 650 MILLIGRAM(S): at 00:10

## 2017-04-03 RX ADMIN — Medication 650 MILLIGRAM(S): at 14:00

## 2017-04-03 RX ADMIN — Medication 100 MILLIGRAM(S): at 05:22

## 2017-04-03 RX ADMIN — Medication 3 MILLILITER(S): at 05:21

## 2017-04-03 RX ADMIN — Medication 3 MILLILITER(S): at 16:49

## 2017-04-03 RX ADMIN — Medication 1 MILLIGRAM(S): at 11:50

## 2017-04-03 RX ADMIN — Medication 3 MILLILITER(S): at 10:19

## 2017-04-03 RX ADMIN — Medication 100 MILLIGRAM(S): at 14:33

## 2017-04-03 RX ADMIN — Medication 650 MILLIGRAM(S): at 01:30

## 2017-04-03 RX ADMIN — AZITHROMYCIN 250 MILLIGRAM(S): 500 TABLET, FILM COATED ORAL at 11:50

## 2017-04-03 RX ADMIN — FLUTICASONE PROPIONATE AND SALMETEROL 1 DOSE(S): 50; 250 POWDER ORAL; RESPIRATORY (INHALATION) at 21:32

## 2017-04-03 RX ADMIN — Medication 60 MILLIGRAM(S): at 14:33

## 2017-04-03 NOTE — PROGRESS NOTE ADULT - PROBLEM SELECTOR PLAN 1
SOB likely 2/2 COPD exacerbation. Patient a former smoker (quit in Oct when diagnosed with Lung Ca). Unclear trigger, however, patient on 5 day Z-Pack for productive cough that was given to her outpatient by Dr. Wallace. SOB, cough exacerbated this AM while walking > 4 blocks. s/p 3 Duoneb, 1 Albuterol, Solumedrol 125 in ED with improvement in symptoms. Patient not requiring supplemental O2, speaking in full sentences, able to ambulate. Peak flow in . Today 280. Baseline 300 at home.  - tapered IV Solumedrol 60mg q6h to q8h, and consider transition to PO tomorrow   - c/w ISS and added PPI for stress ulcer ppx  - c/w home Advair  - Duonebs q6h standing   - f/u RVP    - complete 1 more day of azithromycin   - daily peak flows - today 280, baseline 300

## 2017-04-03 NOTE — PROGRESS NOTE ADULT - SUBJECTIVE AND OBJECTIVE BOX
PGY-1 Medicine Progress Note    Overnight events: No acute events.    Subjective: Pt seen and examined at bedside this morning. Pt states she no longer has SOB or cough, but c/o migraine, though denies waking up at night due to headache. ROS otherwise negative.    Vital Signs Last 24 Hrs  T(C): 36.6, Max: 36.7 (04-03 @ 05:46)  T(F): 97.8, Max: 98.1 (04-03 @ 05:46)  HR: 82 (80 - 86)  BP: 112/70 (112/70 - 124/87)  BP(mean): --  RR: 18 (18 - 19)  SpO2: 95% (95% - 99%)    CAPILLARY BLOOD GLUCOSE  123 (03 Apr 2017 22:13)    EXAM:  Constitutional: Appears comfortable, not in respiratory distress, speaking in full sentences   Head: NC/AT  Eyes: PERRL, EOMI, clear conjunctiva  ENT: no nasal discharge; uvula midline, no oropharyngeal erythema or exudates; MMM  Neck: supple; no JVD, + Right thyroid nodule (nontender, firm, ~2cm)  Respiratory: Mild b/l expiratory wheezes, good air movement, no accessory musc use  Cardiac: +S1/S2; Tachycardic; no M/R/G  Gastrointestinal: soft, NT/ND; no rebound or guarding; +BSx4  Extremities: WWP, no clubbing or cyanosis; no peripheral edema  Vascular: 2+ radial, femoral, DP/PT pulses B/L  Dermatologic: skin warm, dry and intact; no rashes, wounds, or scars  Lymphatic: no submandibular or cervical LAD  Neurologic: AAOx3; CNII-XII grossly intact; no focal deficits    MEDICATIONS  (STANDING):  docusate sodium 100milliGRAM(s) Oral three times a day  fluticasone / salmeterol 250-50 MICROgram(s) Diskus 1Dose(s) Inhalation two times a day  heparin  Injectable 5000Unit(s) SubCutaneous every 8 hours  insulin lispro (HumaLOG) corrective regimen sliding scale  SubCutaneous Before meals and at bedtime  ALBUTerol/ipratropium for Nebulization 3milliLiter(s) Nebulizer every 6 hours  folic acid 1milliGRAM(s) Oral daily  pantoprazole    Tablet 40milliGRAM(s) Oral before breakfast  methylPREDNISolone sodium succinate Injectable 60milliGRAM(s) IV Push every 8 hours    MEDICATIONS  (PRN):  oxyCODONE  5 mG/acetaminophen 325 mG 1Tablet(s) Oral every 6 hours PRN Severe Pain (7 - 10)  acetaminophen   Tablet. 650milliGRAM(s) Oral every 6 hours PRN Moderate Pain (4 - 6)    Allergies    penicillin (Angioedema)    Intolerances    Labs: reviewed.                          12.0   7.6   )-----------( 283      ( 02 Apr 2017 06:31 )             35.2     03 Apr 2017 06:23    140    |  106    |  22     ----------------------------<  164    4.3     |  28     |  0.91     Ca    9.2        03 Apr 2017 06:23  Mg     2.2       03 Apr 2017 06:23            Radiology and additional tests: reviewed.

## 2017-04-03 NOTE — PROGRESS NOTE ADULT - PROBLEM SELECTOR PLAN 3
CTA chest with incidental finding of R multinodular thyroid of 2.4cm. Endocrine ROS negative    - TSH low (0.167) but free T4 wnl (0.8)  - went for thyroid US today, one 2.8cm nodule to be followed with FNA, 3 other nodules to be followed in 12 months w/ repeat U/S  - Monitor for thyroid storm and consider endo c/s if this occurs

## 2017-04-04 ENCOUNTER — TRANSCRIPTION ENCOUNTER (OUTPATIENT)
Age: 57
End: 2017-04-04

## 2017-04-04 LAB
ANION GAP SERPL CALC-SCNC: 7 MMOL/L — LOW (ref 9–16)
BUN SERPL-MCNC: 23 MG/DL — SIGNIFICANT CHANGE UP (ref 7–23)
CALCIUM SERPL-MCNC: 8.7 MG/DL — SIGNIFICANT CHANGE UP (ref 8.5–10.5)
CHLORIDE SERPL-SCNC: 104 MMOL/L — SIGNIFICANT CHANGE UP (ref 96–108)
CO2 SERPL-SCNC: 26 MMOL/L — SIGNIFICANT CHANGE UP (ref 22–31)
CREAT SERPL-MCNC: 0.83 MG/DL — SIGNIFICANT CHANGE UP (ref 0.5–1.3)
GLUCOSE SERPL-MCNC: 145 MG/DL — HIGH (ref 70–99)
MAGNESIUM SERPL-MCNC: 2.3 MG/DL — SIGNIFICANT CHANGE UP (ref 1.6–2.4)
POTASSIUM SERPL-MCNC: 4.3 MMOL/L — SIGNIFICANT CHANGE UP (ref 3.5–5.3)
POTASSIUM SERPL-SCNC: 4.3 MMOL/L — SIGNIFICANT CHANGE UP (ref 3.5–5.3)
SODIUM SERPL-SCNC: 137 MMOL/L — SIGNIFICANT CHANGE UP (ref 135–145)

## 2017-04-04 PROCEDURE — 93010 ELECTROCARDIOGRAM REPORT: CPT

## 2017-04-04 RX ORDER — POLYETHYLENE GLYCOL 3350 17 G/17G
17 POWDER, FOR SOLUTION ORAL DAILY
Qty: 0 | Refills: 0 | Status: DISCONTINUED | OUTPATIENT
Start: 2017-04-04 | End: 2017-04-06

## 2017-04-04 RX ORDER — METOCLOPRAMIDE HCL 10 MG
10 TABLET ORAL DAILY
Qty: 0 | Refills: 0 | Status: DISCONTINUED | OUTPATIENT
Start: 2017-04-04 | End: 2017-04-06

## 2017-04-04 RX ADMIN — FLUTICASONE PROPIONATE AND SALMETEROL 1 DOSE(S): 50; 250 POWDER ORAL; RESPIRATORY (INHALATION) at 10:39

## 2017-04-04 RX ADMIN — PANTOPRAZOLE SODIUM 40 MILLIGRAM(S): 20 TABLET, DELAYED RELEASE ORAL at 06:33

## 2017-04-04 RX ADMIN — Medication 60 MILLIGRAM(S): at 21:48

## 2017-04-04 RX ADMIN — Medication 2: at 23:21

## 2017-04-04 RX ADMIN — Medication 60 MILLIGRAM(S): at 05:32

## 2017-04-04 RX ADMIN — FLUTICASONE PROPIONATE AND SALMETEROL 1 DOSE(S): 50; 250 POWDER ORAL; RESPIRATORY (INHALATION) at 21:49

## 2017-04-04 RX ADMIN — Medication 1 MILLIGRAM(S): at 12:27

## 2017-04-04 RX ADMIN — POLYETHYLENE GLYCOL 3350 17 GRAM(S): 17 POWDER, FOR SOLUTION ORAL at 19:15

## 2017-04-04 RX ADMIN — Medication 3 MILLILITER(S): at 10:39

## 2017-04-04 RX ADMIN — Medication 3 MILLILITER(S): at 21:48

## 2017-04-04 RX ADMIN — Medication 60 MILLIGRAM(S): at 13:58

## 2017-04-04 RX ADMIN — Medication 100 MILLIGRAM(S): at 13:58

## 2017-04-04 RX ADMIN — Medication 3 MILLILITER(S): at 17:30

## 2017-04-04 RX ADMIN — HEPARIN SODIUM 5000 UNIT(S): 5000 INJECTION INTRAVENOUS; SUBCUTANEOUS at 05:33

## 2017-04-04 RX ADMIN — Medication 100 MILLIGRAM(S): at 05:32

## 2017-04-04 RX ADMIN — Medication 3 MILLILITER(S): at 04:40

## 2017-04-04 RX ADMIN — HEPARIN SODIUM 5000 UNIT(S): 5000 INJECTION INTRAVENOUS; SUBCUTANEOUS at 21:48

## 2017-04-04 RX ADMIN — Medication 100 MILLIGRAM(S): at 21:47

## 2017-04-04 RX ADMIN — HEPARIN SODIUM 5000 UNIT(S): 5000 INJECTION INTRAVENOUS; SUBCUTANEOUS at 13:58

## 2017-04-04 NOTE — DISCHARGE NOTE ADULT - CARE PLAN
Principal Discharge DX:	COPD exacerbation  Goal:	Discharge home.  Instructions for follow-up, activity and diet:	You were treated with nebulizers and intravenous steroids which improved your breathing. You likely had a worsening of your COPD. Please continue to abstain from smoking, as you quit last June in 2016 which is impressive. Please keep it up. You are already significantly decreasing your risk of worsening your COPD or developing a new lung cancer in addition to the one that is being treated. You are also lowering your risk of developing heart disease and many other medical conditions.  Secondary Diagnosis:	Thyroid nodule  Instructions for follow-up, activity and diet:	You had an ultrasound of your thyroid that showed no hyperactive nodules, but did show a 2.8cm nodule that needs to be followed up with FNA (fine needle aspiration). The ultrasound also showed 3 other nodules that need to be followed up in 12 months with a repeat thyroid ultrasound.  Secondary Diagnosis:	Adenocarcinoma of right lung  Instructions for follow-up, activity and diet:	You completed adjuvant chemotherapy with Dr. Peterson in 3/2017. You had partial right lung resection with Dr. Gil in 10/2016. Please follow up with these doctors for further management. Principal Discharge DX:	COPD exacerbation  Goal:	Discharge home.  Instructions for follow-up, activity and diet:	You were treated with nebulizers and intravenous steroids which improved your breathing. You likely had a worsening of your COPD. Please continue to abstain from smoking, as you quit last June in 2016 which is impressive. Please keep it up. You are already significantly decreasing your risk of worsening your COPD or developing a new lung cancer in addition to the one that is being treated. You are also lowering your risk of developing heart disease and many other medical conditions.  Secondary Diagnosis:	Thyroid nodule  Instructions for follow-up, activity and diet:	You had an ultrasound of your thyroid that showed no hyperactive nodules, but did show a 2.8cm nodule that needs to be followed up with FNA (fine needle aspiration). The ultrasound also showed 3 other nodules that need to be followed up in 12 months with a repeat thyroid ultrasound. Please followup with Endocrinology within a month. [phone]  [address] [place] [address]  Secondary Diagnosis:	Adenocarcinoma of right lung  Instructions for follow-up, activity and diet:	You completed adjuvant chemotherapy with Dr. Peterson in 3/2017. You had partial right lung resection with Dr. Gil in 10/2016. Please follow up with these doctors for further management. Principal Discharge DX:	COPD exacerbation  Goal:	Discharge home.  Instructions for follow-up, activity and diet:	You were treated with nebulizers and intravenous steroids which improved your breathing. You likely had a worsening of your COPD. Please continue to abstain from smoking, as you quit last June in 2016 which is impressive. Please keep it up. You are already significantly decreasing your risk of worsening your COPD or developing a new lung cancer in addition to the one that is being treated. You are also lowering your risk of developing heart disease and many other medical conditions.  Secondary Diagnosis:	Thyroid nodule  Instructions for follow-up, activity and diet:	You had an ultrasound of your thyroid that showed no hyperactive nodules, but did show a 2.8cm nodule that needs to be followed up with FNA (fine needle aspiration). The ultrasound also showed 3 other nodules that need to be followed up in 12 months with a repeat thyroid ultrasound. Please followup with Endocrinology within a month. [phone]  [address] [place] [address]  Secondary Diagnosis:	Adenocarcinoma of right lung  Instructions for follow-up, activity and diet:	You completed adjuvant chemotherapy with Dr. Peterson in 3/2017. You had partial right lung resection with Dr. Gil in 10/2016. Please follow up with these doctors for further management.

## 2017-04-04 NOTE — PROGRESS NOTE ADULT - SUBJECTIVE AND OBJECTIVE BOX
CC/ HPI Patient is a 56y old  Female with chronic obstructive pulmonary disease, non-small cell lung CA status post right lower lobectomy Oct 2016, some improvement in dyspnea, cough and wheezing.    PAST MEDICAL & SURGICAL HISTORY:  MRSA (methicillin resistant Staphylococcus aureus)  COPD (chronic obstructive pulmonary disease)  Pulmonary nodules  Surgery, elective: lung biopsy  History of appendectomy    SOCHX:  + tobacco,  -  alcohol    FMHX: FA/MO  - contributory     ROS reviewed below with positive findings marked (+) :  GEN:  fever, chills ENT: tracheostomy,   epistaxis,  sinusitis COR: CAD, CHF,  HTN, dysrhythmia PUL: +COPD, ILD, asthma, pneumonia GI: PEG, dysphagia, hemorrhage, other DOUGLAS: kidney disease, electrolyte disorder HEM:  anemia, thrombus, coagulopathy, +cancer ENDO:  thyroid disease, diabetes mellitus CNS:  dementia, stroke, seizure, PSY:  depression, anxiety, other     MEDICATIONS  (STANDING):  docusate sodium 100milliGRAM(s) Oral three times a day  fluticasone / salmeterol 250-50 MICROgram(s) Diskus 1Dose(s) Inhalation two times a day  heparin  Injectable 5000Unit(s) SubCutaneous every 8 hours  insulin lispro (HumaLOG) corrective regimen sliding scale  SubCutaneous Before meals and at bedtime  ALBUTerol/ipratropium for Nebulization 3milliLiter(s) Nebulizer every 6 hours  folic acid 1milliGRAM(s) Oral daily  pantoprazole    Tablet 40milliGRAM(s) Oral before breakfast  methylPREDNISolone sodium succinate Injectable 60milliGRAM(s) IV Push every 8 hours    MEDICATIONS  (PRN):  oxyCODONE  5 mG/acetaminophen 325 mG 1Tablet(s) Oral every 6 hours PRN Severe Pain (7 - 10)  acetaminophen   Tablet. 650milliGRAM(s) Oral every 6 hours PRN Moderate Pain (4 - 6)  metoclopramide Injectable 10milliGRAM(s) IV Push daily PRN severe migraine (7-10)      Vital Signs Last 24 Hrs  T(C): 36.2, Max: 36.6 (04-03 @ 21:30)  T(F): 97.1, Max: 97.8 (04-03 @ 21:30)  HR: 76 (76 - 86)  BP: 111/64 (111/64 - 124/87)  BP(mean): --  RR: 16 (16 - 19)  SpO2: 94% (94% - 99%)    GENERAL:         comfortable,  - distress.  HEENT:            - trauma,  - icterus,  - injection,  - nasal discharge.  NECK:              - jugular venous distention, - thyromegaly.  LYMPH:           - lymphadenopathy, - masses.  RESP:               - rales,   - rhonchi,  + wheezes.   COR:                S1S2 RRR  - murmurs,  - gallops,  - rubs.  ABD:                bowel sounds,   soft, - tender, - distended, - organomegaly.  EXT/MSC:         - cyanosis,   - edema.    NEURO:             alert,   responds to stimuli.    04 Apr 2017 05:56    137    |  104    |  23     ----------------------------<  145    4.3     |  26     |  0.83     Ca    8.7        04 Apr 2017 05:56  Mg     2.3       04 Apr 2017 05:56      CT Chest (4/1)  1. Since 10/17/2016, there has been right lower lobectomy.  2. No evidence pulmonary embolism.  3. Advanced emphysema.  4. Multinodular thyroid with dominant 2.4 cm nodule right lobe. Recommend ultrasound follow-up.  5. No change 0.4 cm nodule right upper lobe.      ASSESSMENT/PLAN      1) Chronic obstructive pulmonary disease  2) Lung cancer status post right lower lobectomy  3) Pulmonary nodules  4) Thyroid nodule  4) Atelectasis  b  Bedside spirometry  Bronchodilators:  Atrovent/ albuterol q 4 – 6 hours as needed  Corticosteroids: Soulmedrol  ID/Antibiotics: Zithromax  Cardiac/HTN: BP stable  GI: Rx/ prophylaxis c PPI/H2B  Heme: Rx/VT prophylaxis c SQH  Discussed with medical team.

## 2017-04-04 NOTE — DISCHARGE NOTE ADULT - CARE PROVIDER_API CALL
Vignesh Wallace), Critical Care Medicine; Pulmonary Disease  [address]  [place] [address]  Phone: [phone]  Fax: [phone]    Jean Gil), Surgery; Thoracic Surgery  [address] [address]  Phone: [phone]  Fax: [phone]    Calos Peterson), Hematology; Medical Oncology  [address]  [place] [address]  Phone: [phone]  Fax: [phone] Vignesh Wallace), Critical Care Medicine; Pulmonary Disease  [address]  [place] [address]  Phone: [phone]  Fax: [phone]    Jean Gil), Surgery; Thoracic Surgery  [address] [address]  Phone: [phone]  Fax: [phone]    Calos Peterson), Hematology; Medical Oncology  [address]  [place] [address]  Phone: [phone]  Fax: [phone]

## 2017-04-04 NOTE — DISCHARGE NOTE ADULT - NS AS ACTIVITY OBS
Walking-Outdoors allowed/Stairs allowed/Sex allowed/Walking-Indoors allowed/Bathing allowed/Return to Work/School allowed/Showering allowed/Driving allowed

## 2017-04-04 NOTE — DISCHARGE NOTE ADULT - MEDICATION SUMMARY - MEDICATIONS TO STOP TAKING
I will STOP taking the medications listed below when I get home from the hospital:    Deltasone 10 mg oral tablet  -- 3  tab(s) by mouth once a day for 2 days, 2 tabs once a day for days, 1 tab onces a day for 2 days. start 10/28 -11/2/16

## 2017-04-04 NOTE — DISCHARGE NOTE ADULT - PATIENT PORTAL LINK FT
“You can access the Follow[employer] Patient Portal, offered by [facility], by registering with the following website: [email or web address]”

## 2017-04-04 NOTE — CHART NOTE - NSCHARTNOTEFT_GEN_A_CORE
57 y/o woman, former smoker (quit 6/2016 – 30 pack years), w/ pmhx of COPD, non-small cell Lung Ca s/p partial right lung resection (Oct 2016 with Dr. Gil) s/p completion of adjuvant chemotx in 3/2017 (with Dr. Peterson), presented on 4/1 to [facility] after experiencing SOB during a 4-block walk to [employer] w/ intractable coughing w/ yellow sputum (no cough at baseline), nonresponsive to albuterol rescue inhaler. Had recent COPD exacerbation on 3/29 at an OSH ED when she was discharged on a prednisone taper, and prescribed Z-Mike by Dr. Wallace the day afterwards. Baseline , was about 200 when she arrived to the ED with tachycardia, tachypnea, but saturated well on room air. Treated with duonebs and IV solumedrol, RVP negative, CT PE protocol negative for PE but showed incidental multinodular thyroid. Went for thyroid u/s which showed no hyperactive nodules, but a 2.8cm nodule that warrants FNA, 3 other nodules that need f/up in 12 mos. PEF slowly improved throughout hospital course, IV steroids tapered slowly. Treated for migraines with IV reglan as needed, and constipation treated with Miralax. Once patient st able for discharge, appointments to be made for close follow up with Dr. Wallace (pulmonary), Dr. Gil (CT surgery), and Dr. Peterson (heme/onc).

## 2017-04-04 NOTE — DISCHARGE NOTE ADULT - CARE PROVIDERS DIRECT ADDRESSES
,DirectAddress_Unknown,[email or web address],DirectAddress_Unknown,DirectAddress_Unknown

## 2017-04-04 NOTE — DISCHARGE NOTE ADULT - MEDICATION SUMMARY - MEDICATIONS TO TAKE
I will START or STAY ON the medications listed below when I get home from the hospital:    Tylenol 325 mg oral tablet  -- 2 tab(s) by mouth every 6 hours, As needed, Mild Pain (1 - 3)  -- Indication: For Pain    oxyCODONE 30 mg oral tablet  -- 1 tab(s) by mouth every 8 hours, As needed, Severe Pain (7 - 10) MDD:3  -- Indication: For Pain    Advair Diskus 500 mcg-50 mcg inhalation powder  -- 1 puff(s) inhaled 2 times a day  -- Indication: For COPD exacerbation    Spiriva 18 mcg inhalation capsule  -- 1 cap(s) inhaled once a day  -- Indication: For COPD exacerbation    docusate sodium 100 mg oral capsule  -- 1 cap(s) by mouth 3 times a day  -- Indication: For COnstipation    senna oral tablet  -- 2 tab(s) by mouth once a day (at bedtime)  -- Indication: For COnstipation    ipratropium 42 mcg/inh (0.06%) nasal spray  -- 2 spray(s) into nose 4 times a day  -- Indication: For Nasal congestion    folic acid 1 mg oral tablet  -- 1 tab(s) by mouth once a day  -- Indication: For health maintenance    Vitamin D3 2000 intl units oral capsule  -- 1 cap(s) by mouth once a day  -- Indication: For low vitamin D I will START or STAY ON the medications listed below when I get home from the hospital:    predniSONE 10 mg oral tablet  -- Taper  4 tab daily (40mg) x3d  3 tab daily x 3d  2 tab daily x 3d  1 tab daily x 3d  -- It is very important that you take or use this exactly as directed.  Do not skip doses or discontinue unless directed by your doctor.  Obtain medical advice before taking any non-prescription drugs as some may affect the action of this medication.  Take with food or milk.    -- Indication: For COPD exacerbation    oxyCODONE 30 mg oral tablet  -- 1 tab(s) by mouth every 8 hours, As needed, Severe Pain (7 - 10) MDD:3  -- Indication: For Pain    Tylenol 325 mg oral tablet  -- 2 tab(s) by mouth every 6 hours, As needed, Mild Pain (1 - 3)  -- Indication: For Pain    Advair Diskus 500 mcg-50 mcg inhalation powder  -- 1 puff(s) inhaled 2 times a day  -- Indication: For COPD exacerbation    Spiriva 18 mcg inhalation capsule  -- 1 cap(s) inhaled once a day  -- Indication: For COPD exacerbation    docusate sodium 100 mg oral capsule  -- 1 cap(s) by mouth 3 times a day  -- Indication: For COnstipation    senna oral tablet  -- 2 tab(s) by mouth once a day (at bedtime)  -- Indication: For COnstipation    ipratropium 42 mcg/inh (0.06%) nasal spray  -- 2 spray(s) into nose 4 times a day  -- Indication: For Nasal congestion    folic acid 1 mg oral tablet  -- 1 tab(s) by mouth once a day  -- Indication: For health maintenance    Vitamin D3 2000 intl units oral capsule  -- 1 cap(s) by mouth once a day  -- Indication: For low vitamin D

## 2017-04-04 NOTE — PROGRESS NOTE ADULT - SUBJECTIVE AND OBJECTIVE BOX
Progress note    VICTOR HUGO KEITH  MRN-[id number]    Interval history: 56 y.o woman, well known to me, diagnosed with stage IIIA (T3,N1) non small cell carcinoma (adenocarcinoma in 10/2016. we treated her with adjuvant chemotherapy-Cisplatin/Alimta protocol. She completed adjuvant chemotherapy in 3/2017. Now admitted with COPD exacerbation.    ROS:  No chest pain, moderate SOB.  Intermittent cough. No nausea/vomiting. Mild fatigue, no  headaches/dizziness.  No without weight loss.  No abdominal pain/diarrhea/constipation.  No melena or hematochezia.    No dysuria/hematuria.  No history of easy bruising/bleeding.  No gingival bleeding or epistaxis.  No leg pain or leg swelling.    ROS is otherwise negative.    PMH/PSH:  PAST MEDICAL & SURGICAL HISTORY:  Dyspnea  MRSA (methicillin resistant Staphylococcus aureus): history of  Adenocarcinoma of lung  COPD (chronic obstructive pulmonary disease)  Asthma  Pulmonary nodules  Surgery, elective: lung biopsy  History of appendectomy  No significant past surgical history      Medications:  MEDICATIONS  (STANDING):  docusate sodium 100milliGRAM(s) Oral three times a day  fluticasone / salmeterol 250-50 MICROgram(s) Diskus 1Dose(s) Inhalation two times a day  heparin  Injectable 5000Unit(s) SubCutaneous every 8 hours  insulin lispro (HumaLOG) corrective regimen sliding scale  SubCutaneous Before meals and at bedtime  ALBUTerol/ipratropium for Nebulization 3milliLiter(s) Nebulizer every 6 hours  folic acid 1milliGRAM(s) Oral daily  pantoprazole    Tablet 40milliGRAM(s) Oral before breakfast  methylPREDNISolone sodium succinate Injectable 60milliGRAM(s) IV Push every 8 hours    MEDICATIONS  (PRN):  oxyCODONE  5 mG/acetaminophen 325 mG 1Tablet(s) Oral every 6 hours PRN Severe Pain (7 - 10)  acetaminophen   Tablet. 650milliGRAM(s) Oral every 6 hours PRN Moderate Pain (4 - 6)  metoclopramide Injectable 10milliGRAM(s) IV Push daily PRN severe migraine (7-10)    heparin  Injectable 5000Unit(s) SubCutaneous every 8 hours    Allergies    penicillin (Angioedema)    Exam:  Vital Signs Last 24 Hrs  T(C): 36.2, Max: 36.6 (04-03 @ 21:30)  T(F): 97.1, Max: 97.8 (04-03 @ 21:30)  HR: 76 (76 - 86)  BP: 111/64 (111/64 - 124/87)  BP(mean): --  RR: 16 (16 - 19)  SpO2: 94% (94% - 99%)  HEENT: pink mucosae, anicteric sclerae  No palpable peripheral lymphadenopathy  COR: regular rhytm, rate, no murmurs, rubs or gallops  PULMO: prolonged expiratory phase  ABD: soft, no palpable masses, no splenomegaly  EXT: no edema  NEURO: intact  SKIN: no lesions on visible skin    Labs:        04 Apr 2017 05:56    137    |  104    |  23     ----------------------------<  145    4.3     |  26     |  0.83     Ca    8.7        04 Apr 2017 05:56  Mg     2.3       04 Apr 2017 05:56        Pertinent imaging studies: reviewed    Assessment:  Stage IIIA (T3,N1) NSCLC (adenocarcinoma)    Plan:  Clinically better  On tx for COPD exacerbation  Completed adjuvant chemotherapy in 3/2017  Will schedule routine fup as outpatient    Thank you    Calos Peterson MD  [phone]

## 2017-04-04 NOTE — PROGRESS NOTE ADULT - PROBLEM SELECTOR PLAN 2
Non-small cell Lung CA. Diagnosed in Oct 2016. s/p partial right lung resection w/ Dr. Gil. Follows with Dr. Gonsalves for chemotherapy treatments. Last one in March 2016 - completed adjuvant tx. Received 4 treatments total.   - F/up collateral with Dr. Gonsalves - saw pt today. ensure patient gets appointment at discharge  - F/up Dr. Gil to see the patient (pt had outpatient appt on 4/4) in-house  - Patient still with pain from surgery, takes oxycodone 30 PRN at home.   - c/w Tylenol PRN for moderate pain, Percocet for severe pain

## 2017-04-04 NOTE — PROGRESS NOTE ADULT - PROBLEM SELECTOR PLAN 4
Pt c/o headache lasting > 4 hours, no red flags, likely migraine. Not improving with Tylenol or Percocet.  - c/w tylenol, percocet prn. Started IV Reglan 10mg daily PRN (QTc 447 on 4/1) and trend QTc

## 2017-04-04 NOTE — PROGRESS NOTE ADULT - PROBLEM SELECTOR PLAN 3
CTA chest with incidental finding of R multinodular thyroid of 2.4cm. Endocrine ROS negative    - TSH low (0.167) but free T4 wnl (0.8)  - thyroid US showed one 2.8cm nodule to be followed with FNA, 3 other nodules to be followed in 12 months w/ repeat U/S  - Monitor for thyroid storm and consider endo c/s if this occurs

## 2017-04-04 NOTE — DISCHARGE NOTE ADULT - ADDITIONAL INSTRUCTIONS
Dr. Wallace - Appointment set for Wednesday, April 12, 2017 at 2p  Dr. AYO Peterson - Appointment set for Friday, April 14, 2017 at 1:15p  Dr. Hamilton/Clayton - Appointment set for Thursday, April 13, 2017 at 11am (Dr. Hamitlon is out next week, but Dr. Arnold is available, if pt. wishes to see Dr. Hamilton instead, please call the office to reschedule.

## 2017-04-04 NOTE — PROGRESS NOTE ADULT - PROBLEM SELECTOR PLAN 1
SOB likely 2/2 COPD exacerbation. Patient a former smoker (quit in 6/2016). Unclear trigger, however, patient on 5 day Z-Pack for productive cough that was given to her outpatient by Dr. Wallace.  - Peak flow in . Yesterday 280, today 225. Baseline 300 at home.  - Continue IV Solumedrol 60mg q8h, and consider transition to PO tomorrow   - c/w ISS and added PPI for stress ulcer ppx  - c/w home Advair  - Duonebs q6h standing   - f/u RVP    - daily peak flows - today 225, baseline 300

## 2017-04-04 NOTE — DISCHARGE NOTE ADULT - HOSPITAL COURSE
57 y/o woman, former smoker (quit 6/2016 – 30 pack years), w/ pmhx of COPD, non-small cell Lung Ca s/p partial right lung resection (Oct 2016 with Dr. Gil) s/p completion of adjuvant chemotx in 3/2017 (with Dr. Peterson), presented on 4/1 to [facility] after experiencing SOB during a 4-block walk to [place] w/ intractable coughing w/ yellow sputum (no cough at baseline), nonresponsive to albuterol rescue inhaler. Had recent COPD exacerbation on 3/29 at an OSH ED when she was discharged on a prednisone taper, and prescribed Z-Mike by Dr. Wallace the day afterwards. Baseline , was about 200 when she arrived to the ED with tachycardia, tachypnea, but saturated well on room air. Treated with duonebs and IV solumedrol, RVP negative, CT PE protocol negative for PE but showed incidental multinodular thyroid. Went for thyroid u/s which showed no hyperactive nodules, but a 2.8cm nodule that warrants FNA, 3 other nodules that need f/up in 12 mos. PEF slowly improved throughout hospital course, IV steroids tapered slowly. Treated for migraines with IV reglan as needed, and constipation treated with Miralax. On ___, patient clinically improved and stable for discharge home, to follow up with Dr. Wallace (pulmonary), Dr. Gil (CT surgery), and Dr. Peterson (heme/onc). 57 y/o woman, former smoker (quit 6/2016 – 30 pack years), w/ pmhx of COPD, non-small cell Lung Ca s/p partial right lung resection (Oct 2016 with Dr. Gil) s/p completion of adjuvant chemotx in 3/2017 (with Dr. Peterson), presented on 4/1 to [facility] after experiencing SOB during a 4-block walk to [place] w/ intractable coughing w/ yellow sputum (no cough at baseline), nonresponsive to albuterol rescue inhaler. Had recent COPD exacerbation on 3/29 at an OSH ED when she was discharged on a prednisone taper, and prescribed Z-Mike by Dr. Wallace the day afterwards. Baseline , was about 200 when she arrived to the ED with tachycardia, tachypnea, but saturated well on room air. Treated with duonebs and IV solumedrol, RVP negative, CT PE protocol negative for PE but showed incidental multinodular thyroid. Went for thyroid u/s which showed no hyperactive nodules, but a 2.8cm nodule that warrants FNA, 3 other nodules that need f/up in 12 mos. PEF slowly improved throughout hospital course, IV steroids tapered slowly. Treated for migraines with IV reglan as needed, and constipation treated with Miralax. The patient clinically improved and is stable for discharge home, to follow up with Dr. Wallace (pulmonary), Dr. Gil (CT surgery), and Dr. Peterson (heme/onc).

## 2017-04-04 NOTE — DISCHARGE NOTE ADULT - PLAN OF CARE
Discharge home. You completed adjuvant chemotherapy with Dr. Peterson in 3/2017. You had partial right lung resection with Dr. Gil in 10/2016. Please follow up with these doctors for further management. You had an ultrasound of your thyroid that showed no hyperactive nodules, but did show a 2.8cm nodule that needs to be followed up with FNA (fine needle aspiration). The ultrasound also showed 3 other nodules that need to be followed up in 12 months with a repeat thyroid ultrasound. You were treated with nebulizers and intravenous steroids which improved your breathing. You likely had a worsening of your COPD. Please continue to abstain from smoking, as you quit last June in 2016 which is impressive. Please keep it up. You are already significantly decreasing your risk of worsening your COPD or developing a new lung cancer in addition to the one that is being treated. You are also lowering your risk of developing heart disease and many other medical conditions. You had an ultrasound of your thyroid that showed no hyperactive nodules, but did show a 2.8cm nodule that needs to be followed up with FNA (fine needle aspiration). The ultrasound also showed 3 other nodules that need to be followed up in 12 months with a repeat thyroid ultrasound. Please followup with Endocrinology within a month. [phone]  [address] [place] [id number]

## 2017-04-04 NOTE — PROGRESS NOTE ADULT - SUBJECTIVE AND OBJECTIVE BOX
PGY-1 Medicine Progress Note    Overnight events: No acute events.    Subjective: Pt seen and examined at bedside this morning. Pt states she has no cough right now, but has more SOB than yesterday. States she has been constipated for several days. Still c/o migraine, though denies associated nausea/vomiting or nighttime awakenings. Denies fever, chest pain, rest of ROS negative.    Vital Signs Last 24 Hrs  T(C): 35.6, Max: 36.2 (04-04 @ 04:52)  T(F): 96, Max: 97.1 (04-04 @ 04:52)  HR: 87 (76 - 87)  BP: 118/71 (106/67 - 118/71)  BP(mean): --  RR: 18 (16 - 19)  SpO2: 96% (94% - 97%)    CAPILLARY BLOOD GLUCOSE  130 (04 Apr 2017 17:31)  115 (04 Apr 2017 12:13)  141 (04 Apr 2017 07:20)  123 (03 Apr 2017 22:13)    EXAM:  Constitutional: Appears comfortable, not in respiratory distress, speaking in full sentences   Head: NC/AT  Eyes: PERRL, EOMI, clear conjunctiva  ENT: no nasal discharge; uvula midline, no oropharyngeal erythema or exudates; MMM  Neck: supple; no JVD, + Right thyroid nodule (nontender, firm, ~2cm)  Respiratory: Mild b/l expiratory wheezes, good air movement, no accessory musc use  Cardiac: +S1/S2; Tachycardic; no M/R/G  Gastrointestinal: soft, NT/ND; no rebound or guarding; +BSx4  Extremities: WWP, no clubbing or cyanosis; no peripheral edema  Vascular: 2+ radial, femoral, DP/PT pulses B/L  Dermatologic: skin warm, dry and intact; no rashes, wounds, or scars  Lymphatic: no submandibular or cervical LAD  Neurologic: AAOx3; CNII-XII grossly intact; no focal deficits    MEDICATIONS  (STANDING):  docusate sodium 100milliGRAM(s) Oral three times a day  fluticasone / salmeterol 250-50 MICROgram(s) Diskus 1Dose(s) Inhalation two times a day  heparin  Injectable 5000Unit(s) SubCutaneous every 8 hours  insulin lispro (HumaLOG) corrective regimen sliding scale  SubCutaneous Before meals and at bedtime  ALBUTerol/ipratropium for Nebulization 3milliLiter(s) Nebulizer every 6 hours  folic acid 1milliGRAM(s) Oral daily  pantoprazole    Tablet 40milliGRAM(s) Oral before breakfast  methylPREDNISolone sodium succinate Injectable 60milliGRAM(s) IV Push every 8 hours  polyethylene glycol 3350 17Gram(s) Oral daily    MEDICATIONS  (PRN):  oxyCODONE  5 mG/acetaminophen 325 mG 1Tablet(s) Oral every 6 hours PRN Severe Pain (7 - 10)  acetaminophen   Tablet. 650milliGRAM(s) Oral every 6 hours PRN Moderate Pain (4 - 6)  metoclopramide Injectable 10milliGRAM(s) IV Push daily PRN severe migraine (7-10)    Allergies    penicillin (Angioedema)    Intolerances    Labs: reviewed.      04 Apr 2017 05:56    137    |  104    |  23     ----------------------------<  145    4.3     |  26     |  0.83     Ca    8.7        04 Apr 2017 05:56  Mg     2.3       04 Apr 2017 05:56            Radiology and additional tests: reviewed.

## 2017-04-05 LAB
ANION GAP SERPL CALC-SCNC: 6 MMOL/L — LOW (ref 9–16)
BUN SERPL-MCNC: 19 MG/DL — SIGNIFICANT CHANGE UP (ref 7–23)
CALCIUM SERPL-MCNC: 8.8 MG/DL — SIGNIFICANT CHANGE UP (ref 8.5–10.5)
CHLORIDE SERPL-SCNC: 103 MMOL/L — SIGNIFICANT CHANGE UP (ref 96–108)
CO2 SERPL-SCNC: 28 MMOL/L — SIGNIFICANT CHANGE UP (ref 22–31)
CREAT SERPL-MCNC: 0.8 MG/DL — SIGNIFICANT CHANGE UP (ref 0.5–1.3)
GLUCOSE SERPL-MCNC: 125 MG/DL — HIGH (ref 70–99)
HCT VFR BLD CALC: 37.6 % — SIGNIFICANT CHANGE UP (ref 34.5–45)
HGB BLD-MCNC: 12.5 G/DL — SIGNIFICANT CHANGE UP (ref 11.5–15.5)
MAGNESIUM SERPL-MCNC: 2.5 MG/DL — HIGH (ref 1.6–2.4)
MCHC RBC-ENTMCNC: 29.7 PG — SIGNIFICANT CHANGE UP (ref 27–34)
MCHC RBC-ENTMCNC: 33.2 G/DL — SIGNIFICANT CHANGE UP (ref 32–36)
MCV RBC AUTO: 89.3 FL — SIGNIFICANT CHANGE UP (ref 80–100)
PLATELET # BLD AUTO: 299 K/UL — SIGNIFICANT CHANGE UP (ref 150–400)
POTASSIUM SERPL-MCNC: 4.2 MMOL/L — SIGNIFICANT CHANGE UP (ref 3.5–5.3)
POTASSIUM SERPL-SCNC: 4.2 MMOL/L — SIGNIFICANT CHANGE UP (ref 3.5–5.3)
RBC # BLD: 4.21 M/UL — SIGNIFICANT CHANGE UP (ref 3.8–5.2)
RBC # FLD: 18.3 % — HIGH (ref 10.3–16.9)
SODIUM SERPL-SCNC: 137 MMOL/L — SIGNIFICANT CHANGE UP (ref 135–145)
WBC # BLD: 10.5 K/UL — SIGNIFICANT CHANGE UP (ref 3.8–10.5)
WBC # FLD AUTO: 10.5 K/UL — SIGNIFICANT CHANGE UP (ref 3.8–10.5)

## 2017-04-05 PROCEDURE — 93010 ELECTROCARDIOGRAM REPORT: CPT

## 2017-04-05 RX ORDER — FOLIC ACID 0.8 MG
1 TABLET ORAL
Qty: 0 | Refills: 0 | COMMUNITY
Start: 2017-04-05

## 2017-04-05 RX ADMIN — HEPARIN SODIUM 5000 UNIT(S): 5000 INJECTION INTRAVENOUS; SUBCUTANEOUS at 05:41

## 2017-04-05 RX ADMIN — Medication 100 MILLIGRAM(S): at 21:29

## 2017-04-05 RX ADMIN — Medication 650 MILLIGRAM(S): at 16:50

## 2017-04-05 RX ADMIN — Medication 100 MILLIGRAM(S): at 15:04

## 2017-04-05 RX ADMIN — POLYETHYLENE GLYCOL 3350 17 GRAM(S): 17 POWDER, FOR SOLUTION ORAL at 11:42

## 2017-04-05 RX ADMIN — Medication 60 MILLIGRAM(S): at 21:29

## 2017-04-05 RX ADMIN — Medication 3 MILLILITER(S): at 05:40

## 2017-04-05 RX ADMIN — Medication 650 MILLIGRAM(S): at 15:04

## 2017-04-05 RX ADMIN — Medication 100 MILLIGRAM(S): at 05:41

## 2017-04-05 RX ADMIN — HEPARIN SODIUM 5000 UNIT(S): 5000 INJECTION INTRAVENOUS; SUBCUTANEOUS at 15:04

## 2017-04-05 RX ADMIN — Medication 1 MILLIGRAM(S): at 11:42

## 2017-04-05 RX ADMIN — Medication 60 MILLIGRAM(S): at 15:03

## 2017-04-05 RX ADMIN — FLUTICASONE PROPIONATE AND SALMETEROL 1 DOSE(S): 50; 250 POWDER ORAL; RESPIRATORY (INHALATION) at 09:35

## 2017-04-05 RX ADMIN — Medication 60 MILLIGRAM(S): at 05:40

## 2017-04-05 RX ADMIN — FLUTICASONE PROPIONATE AND SALMETEROL 1 DOSE(S): 50; 250 POWDER ORAL; RESPIRATORY (INHALATION) at 21:30

## 2017-04-05 RX ADMIN — Medication 3 MILLILITER(S): at 09:35

## 2017-04-05 RX ADMIN — PANTOPRAZOLE SODIUM 40 MILLIGRAM(S): 20 TABLET, DELAYED RELEASE ORAL at 05:40

## 2017-04-05 RX ADMIN — Medication 3 MILLILITER(S): at 21:29

## 2017-04-05 NOTE — PROGRESS NOTE ADULT - PROBLEM SELECTOR PLAN 4
Pt c/o headache lasting > 4 hours, no red flags, likely migraine. Not improving with Tylenol or Percocet    - tylenol, percocet prn. Started IV Reglan 10mg daily PRN (QTc 447 on 4/1) and trend QTc

## 2017-04-05 NOTE — PROGRESS NOTE ADULT - PROBLEM SELECTOR PLAN 3
CTA chest with incidental finding of R multinodular thyroid of 2.4cm. Endocrine ROS negative; TSH low (0.167) but free T4 wnl (0.8); thyroid US showed one 2.8cm nodule to be followed with FNA, 3 other nodules to be followed in 12 months w/ repeat U/S    - f/u outpatient for FNA

## 2017-04-05 NOTE — PROGRESS NOTE ADULT - PROBLEM SELECTOR PLAN 1
SOB likely 2/2 COPD exacerbation. Patient a former smoker (quit in 6/2016). Unclear trigger, however, patient on 5 day Z-Pack for productive cough that was given to her outpatient by Dr. Wallace; Peak flow in . Baseline 300 at home    - Continue IV Solumedrol 60mg q8h, and consider transition to PO today  - c/w ISS and added PPI for stress ulcer ppx  - home Advair  - Duonebs q6h standing   - daily peak flows - baseline 300

## 2017-04-05 NOTE — PROGRESS NOTE ADULT - SUBJECTIVE AND OBJECTIVE BOX
CC/ HPI Patient is a 56y old  Female with chronic obstructive pulmonary disease, non-small cell lung CA status post right lower lobectomy Oct 2016, still with dypnea, cough and wheezing.    PAST MEDICAL & SURGICAL HISTORY:  MRSA (methicillin resistant Staphylococcus aureus)  COPD (chronic obstructive pulmonary disease)  Pulmonary nodules  Surgery, elective: lung biopsy  History of appendectomy    SOCHX:  + tobacco,  -  alcohol    FMHX: FA/MO  - contributory     ROS reviewed below with positive findings marked (+) :  GEN:  fever, chills ENT: tracheostomy,   epistaxis,  sinusitis COR: CAD, CHF,  HTN, dysrhythmia PUL: +COPD, ILD, asthma, pneumonia GI: PEG, dysphagia, hemorrhage, other DOUGLAS: kidney disease, electrolyte disorder HEM:  anemia, thrombus, coagulopathy, +cancer ENDO:  thyroid disease, diabetes mellitus CNS:  dementia, stroke, seizure, PSY:  depression, anxiety, other        MEDICATIONS  (STANDING):  docusate sodium 100milliGRAM(s) Oral three times a day  fluticasone / salmeterol 250-50 MICROgram(s) Diskus 1Dose(s) Inhalation two times a day  heparin  Injectable 5000Unit(s) SubCutaneous every 8 hours  insulin lispro (HumaLOG) corrective regimen sliding scale  SubCutaneous Before meals and at bedtime  ALBUTerol/ipratropium for Nebulization 3milliLiter(s) Nebulizer every 6 hours  folic acid 1milliGRAM(s) Oral daily  pantoprazole    Tablet 40milliGRAM(s) Oral before breakfast  methylPREDNISolone sodium succinate Injectable 60milliGRAM(s) IV Push every 8 hours  polyethylene glycol 3350 17Gram(s) Oral daily    MEDICATIONS  (PRN):  oxyCODONE  5 mG/acetaminophen 325 mG 1Tablet(s) Oral every 6 hours PRN Severe Pain (7 - 10)  acetaminophen   Tablet. 650milliGRAM(s) Oral every 6 hours PRN Moderate Pain (4 - 6)  metoclopramide Injectable 10milliGRAM(s) IV Push daily PRN severe migraine (7-10)      Vital Signs Last 24 Hrs  T(C): 36.3, Max: 36.3 (04-05 @ 06:43)  T(F): 97.4, Max: 97.4 (04-05 @ 06:43)  HR: 84 (84 - 87)  BP: 105/74 (105/74 - 118/71)  BP(mean): --  RR: 17 (17 - 18)  SpO2: 96% (95% - 96%)    GENERAL:         comfortable,  - distress.  HEENT:            - trauma,  - icterus,  - injection,  - nasal discharge.  NECK:              - jugular venous distention, - thyromegaly.  LYMPH:           - lymphadenopathy, - masses.  RESP:              - rales,   - rhonchi,   + wheezes.   COR:                S1S2 RRR  - murmurs,  - gallops,  - rubs.  ABD:                 bowel sounds,   soft, - tender, - distended, - organomegaly.  EXT/MSC:         - cyanosis,  + edema.    NEURO:             alert,   responds to stimuli.                          12.5   10.5  )-----------( 299      ( 05 Apr 2017 06:45 )             37.6     04-05    137  |  103  |  19  ----------------------------<  125<H>  4.2   |  28  |  0.80    Ca    8.8      05 Apr 2017 06:43  Mg     2.5     04-05        CT Chest (4/1)  1. Since 10/17/2016, there has been right lower lobectomy.  2. No evidence pulmonary embolism.  3. Advanced emphysema.  4. Multinodular thyroid with dominant 2.4 cm nodule right lobe. Recommend ultrasound follow-up.  5. No change 0.4 cm nodule right upper lobe.      ASSESSMENT/PLAN      1) Chronic obstructive pulmonary disease  2) Lung cancer status post right lower lobectomy  3) Pulmonary nodules  4) Thyroid nodule  4) Atelectasis  b  Bedside spirometry  Bronchodilators:  Atrovent/ albuterol q 4 – 6 hours as needed  Corticosteroids: Soulmedrol  ID/Antibiotics: Zithromax  Cardiac/HTN: BP stable  GI: Rx/ prophylaxis c PPI/H2B  Heme: Rx/VT prophylaxis c SQH  Discussed with medical team.

## 2017-04-05 NOTE — PROGRESS NOTE ADULT - SUBJECTIVE AND OBJECTIVE BOX
INTERVAL HPI/OVERNIGHT EVENTS:    VITAL SIGNS:  Vital Signs Last 24 Hrs  T(C): 36.3, Max: 36.3 (04-05 @ 06:43)  T(F): 97.4, Max: 97.4 (04-05 @ 06:43)  HR: 84 (78 - 87)  BP: 105/74 (105/74 - 118/71)  BP(mean): --  RR: 17 (17 - 19)  SpO2: 96% (95% - 97%)    PHYSICAL EXAM:  Constitutional: Appears comfortable, not in respiratory distress, speaking in full sentences   Head: NC/AT  Eyes: PERRL, EOMI, clear conjunctiva  ENT: no nasal discharge; uvula midline, no oropharyngeal erythema or exudates; MMM  Neck: supple; no JVD, + Right thyroid nodule (nontender, firm, ~2cm)  Respiratory: Mild b/l expiratory wheezes, good air movement, no accessory musc use  Cardiac: +S1/S2; Tachycardic; no M/R/G  Gastrointestinal: soft, NT/ND; no rebound or guarding; +BSx4  Extremities: WWP, no clubbing or cyanosis; no peripheral edema  Vascular: 2+ radial, femoral, DP/PT pulses B/L  Dermatologic: skin warm, dry and intact; no rashes, wounds, or scars  Lymphatic: no submandibular or cervical LAD  Neurologic: AAOx3; CNII-XII grossly intact; no focal deficits    MEDICATIONS  (STANDING):  docusate sodium 100milliGRAM(s) Oral three times a day  fluticasone / salmeterol 250-50 MICROgram(s) Diskus 1Dose(s) Inhalation two times a day  heparin  Injectable 5000Unit(s) SubCutaneous every 8 hours  insulin lispro (HumaLOG) corrective regimen sliding scale  SubCutaneous Before meals and at bedtime  ALBUTerol/ipratropium for Nebulization 3milliLiter(s) Nebulizer every 6 hours  folic acid 1milliGRAM(s) Oral daily  pantoprazole    Tablet 40milliGRAM(s) Oral before breakfast  methylPREDNISolone sodium succinate Injectable 60milliGRAM(s) IV Push every 8 hours  polyethylene glycol 3350 17Gram(s) Oral daily    MEDICATIONS  (PRN):  oxyCODONE  5 mG/acetaminophen 325 mG 1Tablet(s) Oral every 6 hours PRN Severe Pain (7 - 10)  acetaminophen   Tablet. 650milliGRAM(s) Oral every 6 hours PRN Moderate Pain (4 - 6)  metoclopramide Injectable 10milliGRAM(s) IV Push daily PRN severe migraine (7-10)      Allergies    penicillin (Angioedema)    Intolerances        LABS:                        12.5   10.5  )-----------( 299      ( 05 Apr 2017 06:45 )             37.6     05 Apr 2017 06:43    137    |  103    |  19     ----------------------------<  125    4.2     |  28     |  0.80     Ca    8.8        05 Apr 2017 06:43  Mg     2.5       05 Apr 2017 06:43            RADIOLOGY & ADDITIONAL TESTS: INTERVAL HPI/OVERNIGHT EVENTS:    VITAL SIGNS:  Vital Signs Last 24 Hrs  T(C): 36.3, Max: 36.3 (04-05 @ 06:43)  T(F): 97.4, Max: 97.4 (04-05 @ 06:43)  HR: 84 (78 - 87)  BP: 105/74 (105/74 - 118/71)  BP(mean): --  RR: 17 (17 - 19)  SpO2: 96% (95% - 97%), 94% on room air while ambulating multiple laps    PHYSICAL EXAM:  Constitutional: Appears comfortable, not in respiratory distress, speaking in full sentences   Head: NC/AT  Eyes: PERRL, EOMI, clear conjunctiva  ENT: no nasal discharge; uvula midline, no oropharyngeal erythema or exudates; MMM  Neck: supple; no JVD, + Right thyroid nodule (nontender, firm, ~2cm)  Respiratory: Mild b/l expiratory wheezes, good air movement, no accessory musc use  Cardiac: +S1/S2; Tachycardic; no M/R/G  Gastrointestinal: soft, NT/ND; no rebound or guarding; +BSx4  Extremities: WWP, no clubbing or cyanosis; no peripheral edema  Vascular: 2+ radial, femoral, DP/PT pulses B/L  Dermatologic: skin warm, dry and intact; no rashes, wounds, or scars  Lymphatic: no submandibular or cervical LAD  Neurologic: AAOx3; CNII-XII grossly intact; no focal deficits    MEDICATIONS  (STANDING):  docusate sodium 100milliGRAM(s) Oral three times a day  fluticasone / salmeterol 250-50 MICROgram(s) Diskus 1Dose(s) Inhalation two times a day  heparin  Injectable 5000Unit(s) SubCutaneous every 8 hours  insulin lispro (HumaLOG) corrective regimen sliding scale  SubCutaneous Before meals and at bedtime  ALBUTerol/ipratropium for Nebulization 3milliLiter(s) Nebulizer every 6 hours  folic acid 1milliGRAM(s) Oral daily  pantoprazole    Tablet 40milliGRAM(s) Oral before breakfast  methylPREDNISolone sodium succinate Injectable 60milliGRAM(s) IV Push every 8 hours  polyethylene glycol 3350 17Gram(s) Oral daily    MEDICATIONS  (PRN):  oxyCODONE  5 mG/acetaminophen 325 mG 1Tablet(s) Oral every 6 hours PRN Severe Pain (7 - 10)  acetaminophen   Tablet. 650milliGRAM(s) Oral every 6 hours PRN Moderate Pain (4 - 6)  metoclopramide Injectable 10milliGRAM(s) IV Push daily PRN severe migraine (7-10)      Allergies    penicillin (Angioedema)    Intolerances        LABS:                        12.5   10.5  )-----------( 299      ( 05 Apr 2017 06:45 )             37.6     05 Apr 2017 06:43    137    |  103    |  19     ----------------------------<  125    4.2     |  28     |  0.80     Ca    8.8        05 Apr 2017 06:43  Mg     2.5       05 Apr 2017 06:43            RADIOLOGY & ADDITIONAL TESTS:

## 2017-04-05 NOTE — PROGRESS NOTE ADULT - PROBLEM SELECTOR PLAN 2
Non-small cell Lung CA. Diagnosed in Oct 2016. s/p partial right lung resection w/ Dr. Gil. Follows with Dr. Gonsalves for chemotherapy treatments. Last one in March 2016 - completed adjuvant tx. Received 4 treatments total    - F/up Dr. Gonsalves - outpatient followup  - F/up Dr. Gil  (pt had outpatient appt on 4/4)  - Tylenol PRN for moderate pain, Percocet for severe pain

## 2017-04-06 ENCOUNTER — APPOINTMENT (OUTPATIENT)
Dept: THORACIC SURGERY | Facility: CLINIC | Age: 57
End: 2017-04-06

## 2017-04-06 VITALS
DIASTOLIC BLOOD PRESSURE: 75 MMHG | SYSTOLIC BLOOD PRESSURE: 107 MMHG | RESPIRATION RATE: 18 BRPM | TEMPERATURE: 98 F | HEART RATE: 85 BPM | OXYGEN SATURATION: 98 %

## 2017-04-06 LAB
ANION GAP SERPL CALC-SCNC: 7 MMOL/L — LOW (ref 9–16)
BUN SERPL-MCNC: 27 MG/DL — HIGH (ref 7–23)
CALCIUM SERPL-MCNC: 9 MG/DL — SIGNIFICANT CHANGE UP (ref 8.5–10.5)
CHLORIDE SERPL-SCNC: 103 MMOL/L — SIGNIFICANT CHANGE UP (ref 96–108)
CO2 SERPL-SCNC: 28 MMOL/L — SIGNIFICANT CHANGE UP (ref 22–31)
CREAT SERPL-MCNC: 0.98 MG/DL — SIGNIFICANT CHANGE UP (ref 0.5–1.3)
CULTURE RESULTS: SIGNIFICANT CHANGE UP
CULTURE RESULTS: SIGNIFICANT CHANGE UP
GLUCOSE SERPL-MCNC: 120 MG/DL — HIGH (ref 70–99)
HCT VFR BLD CALC: 37.2 % — SIGNIFICANT CHANGE UP (ref 34.5–45)
HGB BLD-MCNC: 12.6 G/DL — SIGNIFICANT CHANGE UP (ref 11.5–15.5)
MAGNESIUM SERPL-MCNC: 2.4 MG/DL — SIGNIFICANT CHANGE UP (ref 1.6–2.4)
MCHC RBC-ENTMCNC: 30.4 PG — SIGNIFICANT CHANGE UP (ref 27–34)
MCHC RBC-ENTMCNC: 33.9 G/DL — SIGNIFICANT CHANGE UP (ref 32–36)
MCV RBC AUTO: 89.6 FL — SIGNIFICANT CHANGE UP (ref 80–100)
PHOSPHATE SERPL-MCNC: 3.4 MG/DL — SIGNIFICANT CHANGE UP (ref 2.5–4.5)
PLATELET # BLD AUTO: 301 K/UL — SIGNIFICANT CHANGE UP (ref 150–400)
POTASSIUM SERPL-MCNC: 4.6 MMOL/L — SIGNIFICANT CHANGE UP (ref 3.5–5.3)
POTASSIUM SERPL-SCNC: 4.6 MMOL/L — SIGNIFICANT CHANGE UP (ref 3.5–5.3)
RBC # BLD: 4.15 M/UL — SIGNIFICANT CHANGE UP (ref 3.8–5.2)
RBC # FLD: 18.1 % — HIGH (ref 10.3–16.9)
SODIUM SERPL-SCNC: 138 MMOL/L — SIGNIFICANT CHANGE UP (ref 135–145)
SPECIMEN SOURCE: SIGNIFICANT CHANGE UP
SPECIMEN SOURCE: SIGNIFICANT CHANGE UP
WBC # BLD: 12.5 K/UL — HIGH (ref 3.8–10.5)
WBC # FLD AUTO: 12.5 K/UL — HIGH (ref 3.8–10.5)

## 2017-04-06 PROCEDURE — 83036 HEMOGLOBIN GLYCOSYLATED A1C: CPT

## 2017-04-06 PROCEDURE — 94640 AIRWAY INHALATION TREATMENT: CPT

## 2017-04-06 PROCEDURE — 87581 M.PNEUMON DNA AMP PROBE: CPT

## 2017-04-06 PROCEDURE — 99285 EMERGENCY DEPT VISIT HI MDM: CPT | Mod: 25

## 2017-04-06 PROCEDURE — 82553 CREATINE MB FRACTION: CPT

## 2017-04-06 PROCEDURE — 80053 COMPREHEN METABOLIC PANEL: CPT

## 2017-04-06 PROCEDURE — 85379 FIBRIN DEGRADATION QUANT: CPT

## 2017-04-06 PROCEDURE — 76536 US EXAM OF HEAD AND NECK: CPT

## 2017-04-06 PROCEDURE — 71045 X-RAY EXAM CHEST 1 VIEW: CPT

## 2017-04-06 PROCEDURE — 84439 ASSAY OF FREE THYROXINE: CPT

## 2017-04-06 PROCEDURE — 87040 BLOOD CULTURE FOR BACTERIA: CPT

## 2017-04-06 PROCEDURE — 82550 ASSAY OF CK (CPK): CPT

## 2017-04-06 PROCEDURE — 83735 ASSAY OF MAGNESIUM: CPT

## 2017-04-06 PROCEDURE — 87798 DETECT AGENT NOS DNA AMP: CPT

## 2017-04-06 PROCEDURE — 85610 PROTHROMBIN TIME: CPT

## 2017-04-06 PROCEDURE — 80048 BASIC METABOLIC PNL TOTAL CA: CPT

## 2017-04-06 PROCEDURE — 96374 THER/PROPH/DIAG INJ IV PUSH: CPT | Mod: XU

## 2017-04-06 PROCEDURE — 84443 ASSAY THYROID STIM HORMONE: CPT

## 2017-04-06 PROCEDURE — 87633 RESP VIRUS 12-25 TARGETS: CPT

## 2017-04-06 PROCEDURE — 87486 CHLMYD PNEUM DNA AMP PROBE: CPT

## 2017-04-06 PROCEDURE — 85027 COMPLETE CBC AUTOMATED: CPT

## 2017-04-06 PROCEDURE — 85025 COMPLETE CBC W/AUTO DIFF WBC: CPT

## 2017-04-06 PROCEDURE — 93005 ELECTROCARDIOGRAM TRACING: CPT

## 2017-04-06 PROCEDURE — 84484 ASSAY OF TROPONIN QUANT: CPT

## 2017-04-06 PROCEDURE — 85730 THROMBOPLASTIN TIME PARTIAL: CPT

## 2017-04-06 PROCEDURE — 36415 COLL VENOUS BLD VENIPUNCTURE: CPT

## 2017-04-06 PROCEDURE — 82803 BLOOD GASES ANY COMBINATION: CPT

## 2017-04-06 PROCEDURE — 83605 ASSAY OF LACTIC ACID: CPT

## 2017-04-06 PROCEDURE — 84100 ASSAY OF PHOSPHORUS: CPT

## 2017-04-06 PROCEDURE — 71275 CT ANGIOGRAPHY CHEST: CPT

## 2017-04-06 RX ADMIN — Medication 100 MILLIGRAM(S): at 13:10

## 2017-04-06 RX ADMIN — Medication 60 MILLIGRAM(S): at 05:20

## 2017-04-06 RX ADMIN — Medication 60 MILLIGRAM(S): at 13:10

## 2017-04-06 RX ADMIN — Medication 3 MILLILITER(S): at 05:21

## 2017-04-06 RX ADMIN — Medication 3 MILLILITER(S): at 11:41

## 2017-04-06 RX ADMIN — Medication 1 MILLIGRAM(S): at 11:41

## 2017-04-06 RX ADMIN — POLYETHYLENE GLYCOL 3350 17 GRAM(S): 17 POWDER, FOR SOLUTION ORAL at 11:41

## 2017-04-06 RX ADMIN — FLUTICASONE PROPIONATE AND SALMETEROL 1 DOSE(S): 50; 250 POWDER ORAL; RESPIRATORY (INHALATION) at 11:42

## 2017-04-06 NOTE — PROGRESS NOTE ADULT - SUBJECTIVE AND OBJECTIVE BOX
INTERVAL HPI/OVERNIGHT EVENTS: no events, patient breathing well today    SUBJECTIVE: Patient seen and examined at bedside.     ROS:  CV: Denies chest pain  Resp: Denies SOB  GI: Denies abdominal pain, constipation, diarrhea, nausea, vomiting  : Denies dysuria  ID: Denies fevers, chills  MSK: Denies joint pain     OBJECTIVE:    VITAL SIGNS:  ICU Vital Signs Last 24 Hrs  T(C): 36.5, Max: 37.1 (04-05 @ 16:29)  T(F): 97.7, Max: 98.8 (04-05 @ 16:29)  HR: 85 (85 - 94)  BP: 113/79 (109/63 - 121/85)  BP(mean): --  ABP: --  ABP(mean): --  RR: 17 (16 - 18)  SpO2: 98% (94% - 99%)        CAPILLARY BLOOD GLUCOSE  111 (06 Apr 2017 07:28)      PHYSICAL EXAM:  Constitutional: Appears comfortable, not in respiratory distress, speaking in full sentences   Head: NC/AT  Eyes: PERRL, EOMI, clear conjunctiva  ENT: no nasal discharge; uvula midline, no oropharyngeal erythema or exudates; MMM  Neck: supple; no JVD, + Right thyroid nodule (nontender, firm, ~2cm)  Respiratory: no wheezes, good air movement, no accessory musc use  Cardiac: +S1/S2; Tachycardic; no M/R/G  Gastrointestinal: soft, NT/ND; no rebound or guarding; +BSx4  Extremities: WWP, no clubbing or cyanosis; no peripheral edema  Vascular: 2+ radial, femoral, DP/PT pulses B/L  Dermatologic: skin warm, dry and intact; no rashes, wounds, or scars  Lymphatic: no submandibular or cervical LAD  Neurologic: AAOx3; CNII-XII grossly intact; no focal deficits    MEDICATIONS:  MEDICATIONS  (STANDING):  docusate sodium 100milliGRAM(s) Oral three times a day  fluticasone / salmeterol 250-50 MICROgram(s) Diskus 1Dose(s) Inhalation two times a day  heparin  Injectable 5000Unit(s) SubCutaneous every 8 hours  insulin lispro (HumaLOG) corrective regimen sliding scale  SubCutaneous Before meals and at bedtime  ALBUTerol/ipratropium for Nebulization 3milliLiter(s) Nebulizer every 6 hours  folic acid 1milliGRAM(s) Oral daily  pantoprazole    Tablet 40milliGRAM(s) Oral before breakfast  methylPREDNISolone sodium succinate Injectable 60milliGRAM(s) IV Push every 8 hours  polyethylene glycol 3350 17Gram(s) Oral daily    MEDICATIONS  (PRN):  oxyCODONE  5 mG/acetaminophen 325 mG 1Tablet(s) Oral every 6 hours PRN Severe Pain (7 - 10)  acetaminophen   Tablet. 650milliGRAM(s) Oral every 6 hours PRN Moderate Pain (4 - 6)  metoclopramide Injectable 10milliGRAM(s) IV Push daily PRN severe migraine (7-10)      ALLERGIES:  Allergies    penicillin (Angioedema)    Intolerances        LABS:                        12.6   12.5  )-----------( 301      ( 06 Apr 2017 06:13 )             37.2     04-06    138  |  103  |  27<H>  ----------------------------<  120<H>  4.6   |  28  |  0.98    Ca    9.0      06 Apr 2017 06:13  Phos  3.4     04-06  Mg     2.4     04-06            RADIOLOGY & ADDITIONAL TESTS: Reviewed.

## 2017-04-06 NOTE — PROGRESS NOTE ADULT - PROBLEM SELECTOR PLAN 3
CTA chest with incidental finding of R multinodular thyroid of 2.4cm. Endocrine ROS negative; TSH low (0.167) but free T4 wnl (0.8); thyroid US showed one 2.8cm nodule to be followed with FNA, 3 other nodules to be followed in 12 months w/ repeat U/S    - f/u outpatient for FNA results

## 2017-04-06 NOTE — PROGRESS NOTE ADULT - ASSESSMENT
55 y/o woman, former smoker (quit 6/2016 – 30 pack years), w/ pmhx of COPD, non-small cell Lung Ca s/p partial right lung resection (Oct 2016 with Dr. Gil) s/p completion of adjuvant chemotx in 3/2017 (with Dr. Peterson), presents with shortness of breath since AM of admission while walking outside, found to be in COPD exacerbation, found to have incidental thyroid nodule requiring further workup, currently not in respiratory distress, improving on IV steroids, HD stable.
56 year old F, former smoker (quit 6/2016, 30 PYs), with a pmhx of COPD, non-small cell Lung Ca s/p partial right lung resection (Oct 2016) currently on chemo (last session in 3/2016) presents with shortness of breath since AM of admission while walking outside, found to be in COPD exacerbation, currently not in respiratory distress but still w/ wheezing, HD stable.
56 year old F, former smoker (quit 6/2016, 30 PYs), with a pmhx of COPD, non-small cell Lung Ca s/p partial right lung resection (Oct 2016) currently on chemo (last session in 3/2016) presents with shortness of breath since AM of admission while walking outside, found to be in COPD exacerbation, currently not in respiratory distress, improving on IV steroids, HD stable.
57 y/o woman, former smoker (quit 6/2016 – 30 pack years), w/ pmhx of COPD, non-small cell Lung Ca s/p partial right lung resection (Oct 2016 with Dr. Gil) s/p completion of adjuvant chemotx in 3/2017 (with Dr. Peterson), presents with shortness of breath since AM of admission while walking outside, found to be in COPD exacerbation, found to have incidental thyroid nodule requiring further workup, currently not in respiratory distress, improving on IV steroids, HD stable.
57 y/o woman, former smoker (quit 6/2016 – 30 pack years), w/ pmhx of COPD, non-small cell Lung Ca s/p partial right lung resection (Oct 2016 with Dr. Gil) s/p completion of adjuvant chemotx in 3/2017 (with Dr. Peterson), presents with shortness of breath since AM of admission while walking outside, found to be in COPD exacerbation, found to have incidental thyroid nodule requiring further workup, currently not in respiratory distress, improving on IV steroids, HD stable.
iv steroids

## 2017-04-06 NOTE — PROGRESS NOTE ADULT - PROBLEM SELECTOR PROBLEM 6
Nutrition, metabolism, and development symptoms
Cough

## 2017-04-06 NOTE — PROGRESS NOTE ADULT - PROBLEM SELECTOR PROBLEM 5
Need for prophylactic measure
Other type of nonintractable migraine

## 2017-04-06 NOTE — PROGRESS NOTE ADULT - PROBLEM SELECTOR PROBLEM 1
COPD exacerbation

## 2017-04-06 NOTE — PROGRESS NOTE ADULT - PROBLEM SELECTOR PLAN 6
no IVF  Regular diet, folic acid daily  Monitor / replete lytes PRN    Dispo: Monitor on RMF pending clinical improvement.  Full code
probably from COPD exacerbation
no IVF  Regular diet, folic acid daily  Monitor / replete lytes PRN    Dispo: Monitor on RMF pending clinical improvement.  Full code

## 2017-04-06 NOTE — PROGRESS NOTE ADULT - PROBLEM SELECTOR PROBLEM 4
Migraine without aura and without status migrainosus, not intractable
MRSA (methicillin resistant Staphylococcus aureus)

## 2017-04-06 NOTE — PROGRESS NOTE ADULT - PROBLEM SELECTOR PLAN 1
SOB likely 2/2 COPD exacerbation. Patient a former smoker (quit in 6/2016). Unclear trigger, however, patient on 5 day Z-Pack for productive cough that was given to her outpatient by Dr. Wallace; Peak flow in . Baseline 300 at home    - Continue IV Solumedrol 60mg q8h, and consider transition to PO today  - c/w ISS and added PPI for stress ulcer ppx  - home Advair  - Duonebs q6h standing   - daily peak flows - baseline 300, 290 today

## 2017-04-06 NOTE — PROGRESS NOTE ADULT - PROBLEM SELECTOR PROBLEM 2
Adenocarcinoma of lung
Pulmonary nodule

## 2017-04-06 NOTE — PROGRESS NOTE ADULT - PROVIDER SPECIALTY LIST ADULT
Heme/Onc
Internal Medicine
Pulmonology

## 2017-04-06 NOTE — PROGRESS NOTE ADULT - PROBLEM SELECTOR PROBLEM 3
Thyroid nodule
Adenocarcinoma of right lung

## 2017-04-06 NOTE — PROGRESS NOTE ADULT - SUBJECTIVE AND OBJECTIVE BOX
Progress Note Adult-Pulmonology Attending [Charted Location: [facility] 07UR [id number] 02] [Authored: 05-Apr-2017 10:20] - for Visit: [id number], Complete, Entered, Signed in Full, General    Progress Note:   · Provider Specialty	Pulmonology	      · Subjective and Objective: 	  CC/ HPI Patient is a 56y old  Female with chronic obstructive pulmonary disease, non-small cell lung CA status post right lower lobectomy Oct 2016, still with dypnea, cough and wheezing.    PAST MEDICAL & SURGICAL HISTORY:  MRSA (methicillin resistant Staphylococcus aureus)  COPD (chronic obstructive pulmonary disease)  Pulmonary nodules  Surgery, elective: lung biopsy  History of appendectomy    SOCHX:  + tobacco,  -  alcohol    FMHX: FA/MO  - contributory     ROS reviewed below with positive findings marked (+) :  GEN:  fever, chills ENT: tracheostomy,   epistaxis,  sinusitis COR: CAD, CHF,  HTN, dysrhythmia PUL: +COPD, ILD, asthma, pneumonia GI: PEG, dysphagia, hemorrhage, other DOUGLAS: kidney disease, electrolyte disorder HEM:  anemia, thrombus, coagulopathy, +cancer ENDO:  thyroid disease, diabetes mellitus CNS:  dementia, stroke, seizure, PSY:  depression, anxiety, other        MEDICATIONS  (STANDING):  docusate sodium 100milliGRAM(s) Oral three times a day  fluticasone / salmeterol 250-50 MICROgram(s) Diskus 1Dose(s) Inhalation two times a day  heparin  Injectable 5000Unit(s) SubCutaneous every 8 hours  insulin lispro (HumaLOG) corrective regimen sliding scale  SubCutaneous Before meals and at bedtime  ALBUTerol/ipratropium for Nebulization 3milliLiter(s) Nebulizer every 6 hours  folic acid 1milliGRAM(s) Oral daily  pantoprazole    Tablet 40milliGRAM(s) Oral before breakfast  methylPREDNISolone sodium succinate Injectable 60milliGRAM(s) IV Push every 8 hours  polyethylene glycol 3350 17Gram(s) Oral daily    MEDICATIONS  (PRN):  oxyCODONE  5 mG/acetaminophen 325 mG 1Tablet(s) Oral every 6 hours PRN Severe Pain (7 - 10)  acetaminophen   Tablet. 650milliGRAM(s) Oral every 6 hours PRN Moderate Pain (4 - 6)  metoclopramide Injectable 10milliGRAM(s) IV Push daily PRN severe migraine (7-10)      Vital Signs Last 24 Hrs  T(C): 36.5, Max: 37.1 (04-05 @ 16:29)  T(F): 97.7, Max: 98.8 (04-05 @ 16:29)  HR: 85 (85 - 94)  BP: 113/79 (109/63 - 121/85)  BP(mean): --  RR: 17 (16 - 18)  SpO2: 98% (94% - 99%)    GENERAL:         comfortable,  - distress.  HEENT:            - trauma,  - icterus,  - injection,  - nasal discharge.  NECK:              - jugular venous distention, - thyromegaly.  LYMPH:           - lymphadenopathy, - masses.  RESP:              + clear,   - rales,   - rhonchi,   - wheezes.   COR:                S1S2 RRR  - murmurs,  - gallops,  - rubs.  ABD:                bowel sounds,   soft, - tender, - distended, - organomegaly.  EXT/MSC:         - cyanosis,  - clubbing,  - edema.    NEURO:             alert,   responds to stimuli.                          12.6   12.5  )-----------( 301      ( 06 Apr 2017 06:13 )             37.2     04-06    138  |  103  |  27<H>  ----------------------------<  120<H>  4.6   |  28  |  0.98    Ca    9.0      06 Apr 2017 06:13  Phos  3.4     04-06  Mg     2.4     04-06        CT Chest (4/1)  1. Since 10/17/2016, there has been right lower lobectomy.  2. No evidence pulmonary embolism.  3. Advanced emphysema.  4. Multinodular thyroid with dominant 2.4 cm nodule right lobe. Recommend ultrasound follow-up.  5. No change 0.4 cm nodule right upper lobe.      ASSESSMENT/PLAN      1) Chronic obstructive pulmonary disease  2) Lung cancer status post right lower lobectomy  3) Pulmonary nodules  4) Thyroid nodule  4) Atelectasis  b  FNA of  thyroid nodule  Bronchodilators:  Atrovent/ albuterol q 4 – 6 hours as needed  Advair/Spiriva  Corticosteroids: prednisone taper  ID/Antibiotics: status post Zithromax  Cardiac/HTN: BP stable  GI: Rx/ prophylaxis c PPI/H2B  Discussed with medical team.

## 2017-04-10 DIAGNOSIS — J44.1 CHRONIC OBSTRUCTIVE PULMONARY DISEASE WITH (ACUTE) EXACERBATION: ICD-10-CM

## 2017-04-10 DIAGNOSIS — G43.909 MIGRAINE, UNSPECIFIED, NOT INTRACTABLE, WITHOUT STATUS MIGRAINOSUS: ICD-10-CM

## 2017-04-10 DIAGNOSIS — Z88.0 ALLERGY STATUS TO PENICILLIN: ICD-10-CM

## 2017-04-10 DIAGNOSIS — Z85.118 PERSONAL HISTORY OF OTHER MALIGNANT NEOPLASM OF BRONCHUS AND LUNG: ICD-10-CM

## 2017-04-10 DIAGNOSIS — K59.00 CONSTIPATION, UNSPECIFIED: ICD-10-CM

## 2017-04-10 DIAGNOSIS — E04.1 NONTOXIC SINGLE THYROID NODULE: ICD-10-CM

## 2017-04-10 DIAGNOSIS — Z87.891 PERSONAL HISTORY OF NICOTINE DEPENDENCE: ICD-10-CM

## 2017-04-11 DIAGNOSIS — Z79.52 LONG TERM (CURRENT) USE OF SYSTEMIC STEROIDS: ICD-10-CM

## 2017-04-11 DIAGNOSIS — Z79.51 LONG TERM (CURRENT) USE OF INHALED STEROIDS: ICD-10-CM

## 2017-04-13 ENCOUNTER — APPOINTMENT (OUTPATIENT)
Dept: THORACIC SURGERY | Facility: CLINIC | Age: 57
End: 2017-04-13

## 2017-04-18 ENCOUNTER — RESULT REVIEW (OUTPATIENT)
Age: 57
End: 2017-04-18

## 2017-04-19 ENCOUNTER — OUTPATIENT (OUTPATIENT)
Dept: OUTPATIENT SERVICES | Facility: HOSPITAL | Age: 57
LOS: 1 days | End: 2017-04-19
Payer: COMMERCIAL

## 2017-04-19 DIAGNOSIS — Z90.49 ACQUIRED ABSENCE OF OTHER SPECIFIED PARTS OF DIGESTIVE TRACT: Chronic | ICD-10-CM

## 2017-04-19 DIAGNOSIS — Z41.9 ENCOUNTER FOR PROCEDURE FOR PURPOSES OTHER THAN REMEDYING HEALTH STATE, UNSPECIFIED: Chronic | ICD-10-CM

## 2017-04-19 PROCEDURE — 76942 ECHO GUIDE FOR BIOPSY: CPT | Mod: 26

## 2017-04-19 PROCEDURE — 10022: CPT

## 2017-04-19 PROCEDURE — 76942 ECHO GUIDE FOR BIOPSY: CPT

## 2017-04-19 PROCEDURE — 88173 CYTOPATH EVAL FNA REPORT: CPT

## 2017-04-20 LAB — NON-GYNECOLOGICAL CYTOLOGY STUDY: SIGNIFICANT CHANGE UP

## 2017-05-11 ENCOUNTER — APPOINTMENT (OUTPATIENT)
Dept: THORACIC SURGERY | Facility: CLINIC | Age: 57
End: 2017-05-11

## 2017-05-11 ENCOUNTER — NON-APPOINTMENT (OUTPATIENT)
Age: 57
End: 2017-05-11

## 2017-05-11 VITALS
OXYGEN SATURATION: 98 % | TEMPERATURE: 98 F | HEART RATE: 110 BPM | SYSTOLIC BLOOD PRESSURE: 104 MMHG | DIASTOLIC BLOOD PRESSURE: 69 MMHG | BODY MASS INDEX: 35.36 KG/M2 | RESPIRATION RATE: 22 BRPM | WEIGHT: 206 LBS

## 2017-05-29 ENCOUNTER — FORM ENCOUNTER (OUTPATIENT)
Age: 57
End: 2017-05-29

## 2017-05-30 ENCOUNTER — OUTPATIENT (OUTPATIENT)
Dept: OUTPATIENT SERVICES | Facility: HOSPITAL | Age: 57
LOS: 1 days | End: 2017-05-30
Payer: COMMERCIAL

## 2017-05-30 DIAGNOSIS — Z41.9 ENCOUNTER FOR PROCEDURE FOR PURPOSES OTHER THAN REMEDYING HEALTH STATE, UNSPECIFIED: Chronic | ICD-10-CM

## 2017-05-30 DIAGNOSIS — Z90.49 ACQUIRED ABSENCE OF OTHER SPECIFIED PARTS OF DIGESTIVE TRACT: Chronic | ICD-10-CM

## 2017-05-30 PROCEDURE — 71250 CT THORAX DX C-: CPT

## 2017-05-30 PROCEDURE — 71250 CT THORAX DX C-: CPT | Mod: 26

## 2017-06-06 ENCOUNTER — APPOINTMENT (OUTPATIENT)
Dept: ENDOCRINOLOGY | Facility: CLINIC | Age: 57
End: 2017-06-06

## 2017-06-06 VITALS
WEIGHT: 208.25 LBS | DIASTOLIC BLOOD PRESSURE: 76 MMHG | BODY MASS INDEX: 35.12 KG/M2 | HEIGHT: 64.5 IN | HEART RATE: 104 BPM | SYSTOLIC BLOOD PRESSURE: 106 MMHG

## 2017-06-08 ENCOUNTER — APPOINTMENT (OUTPATIENT)
Dept: THORACIC SURGERY | Facility: CLINIC | Age: 57
End: 2017-06-08

## 2017-06-08 ENCOUNTER — APPOINTMENT (OUTPATIENT)
Dept: HEART AND VASCULAR | Facility: CLINIC | Age: 57
End: 2017-06-08

## 2017-06-08 VITALS
OXYGEN SATURATION: 98 % | BODY MASS INDEX: 35.15 KG/M2 | TEMPERATURE: 98 F | DIASTOLIC BLOOD PRESSURE: 71 MMHG | RESPIRATION RATE: 20 BRPM | HEART RATE: 99 BPM | WEIGHT: 208 LBS | SYSTOLIC BLOOD PRESSURE: 109 MMHG

## 2017-06-08 VITALS
TEMPERATURE: 98.5 F | RESPIRATION RATE: 12 BRPM | HEART RATE: 85 BPM | BODY MASS INDEX: 35.15 KG/M2 | OXYGEN SATURATION: 96 % | WEIGHT: 208 LBS | SYSTOLIC BLOOD PRESSURE: 110 MMHG | DIASTOLIC BLOOD PRESSURE: 70 MMHG

## 2017-06-08 DIAGNOSIS — Z00.00 ENCOUNTER FOR GENERAL ADULT MEDICAL EXAMINATION W/OUT ABNORMAL FINDINGS: ICD-10-CM

## 2017-06-08 RX ORDER — CLARITHROMYCIN 500 MG/1
500 TABLET, FILM COATED ORAL
Refills: 0 | Status: DISCONTINUED | COMMUNITY
Start: 2017-05-11 | End: 2017-06-08

## 2017-06-12 ENCOUNTER — APPOINTMENT (OUTPATIENT)
Dept: OTOLARYNGOLOGY | Facility: CLINIC | Age: 57
End: 2017-06-12

## 2017-06-13 LAB
ALBUMIN SERPL ELPH-MCNC: 4.7 G/DL
ALP BLD-CCNC: 97 U/L
ALT SERPL-CCNC: 15 U/L
ANION GAP SERPL CALC-SCNC: 19 MMOL/L
APTT BLD: 34 SEC
AST SERPL-CCNC: 18 U/L
BASOPHILS # BLD AUTO: 0.01 K/UL
BASOPHILS NFR BLD AUTO: 0.2 %
BILIRUB SERPL-MCNC: 0.2 MG/DL
BUN SERPL-MCNC: 14 MG/DL
CALCIUM SERPL-MCNC: 10.2 MG/DL
CHLORIDE SERPL-SCNC: 100 MMOL/L
CO2 SERPL-SCNC: 22 MMOL/L
CREAT SERPL-MCNC: 1.04 MG/DL
EOSINOPHIL # BLD AUTO: 0.26 K/UL
EOSINOPHIL NFR BLD AUTO: 4.2 %
GLUCOSE SERPL-MCNC: 101 MG/DL
HCT VFR BLD CALC: 45.4 %
HGB BLD-MCNC: 14.2 G/DL
IMM GRANULOCYTES NFR BLD AUTO: 0 %
INR PPP: 1.01 RATIO
LYMPHOCYTES # BLD AUTO: 1.88 K/UL
LYMPHOCYTES NFR BLD AUTO: 30 %
MAN DIFF?: NORMAL
MCHC RBC-ENTMCNC: 30.9 PG
MCHC RBC-ENTMCNC: 31.3 GM/DL
MCV RBC AUTO: 98.9 FL
MONOCYTES # BLD AUTO: 0.31 K/UL
MONOCYTES NFR BLD AUTO: 5 %
NEUTROPHILS # BLD AUTO: 3.8 K/UL
NEUTROPHILS NFR BLD AUTO: 60.6 %
PLATELET # BLD AUTO: 281 K/UL
POTASSIUM SERPL-SCNC: 4.6 MMOL/L
PROT SERPL-MCNC: 7.9 G/DL
PT BLD: 11.4 SEC
RBC # BLD: 4.59 M/UL
RBC # FLD: 12.7 %
SODIUM SERPL-SCNC: 141 MMOL/L
WBC # FLD AUTO: 6.26 K/UL

## 2017-07-03 ENCOUNTER — APPOINTMENT (OUTPATIENT)
Dept: HEART AND VASCULAR | Facility: CLINIC | Age: 57
End: 2017-07-03

## 2017-07-03 VITALS
HEART RATE: 101 BPM | TEMPERATURE: 97.8 F | HEIGHT: 64.5 IN | SYSTOLIC BLOOD PRESSURE: 100 MMHG | WEIGHT: 212.04 LBS | BODY MASS INDEX: 35.76 KG/M2 | DIASTOLIC BLOOD PRESSURE: 74 MMHG | OXYGEN SATURATION: 97 % | RESPIRATION RATE: 12 BRPM

## 2017-07-14 LAB
T4 FREE SERPL-MCNC: 1.2 NG/DL
TSH SERPL-ACNC: 1.23 UIU/ML

## 2017-07-17 ENCOUNTER — APPOINTMENT (OUTPATIENT)
Dept: OTOLARYNGOLOGY | Facility: CLINIC | Age: 57
End: 2017-07-17

## 2017-07-31 ENCOUNTER — APPOINTMENT (OUTPATIENT)
Dept: ENDOCRINOLOGY | Facility: CLINIC | Age: 57
End: 2017-07-31
Payer: COMMERCIAL

## 2017-07-31 VITALS
HEART RATE: 90 BPM | SYSTOLIC BLOOD PRESSURE: 102 MMHG | HEIGHT: 64 IN | BODY MASS INDEX: 36.37 KG/M2 | DIASTOLIC BLOOD PRESSURE: 73 MMHG | WEIGHT: 213 LBS

## 2017-07-31 PROCEDURE — 99214 OFFICE O/P EST MOD 30 MIN: CPT

## 2017-08-14 NOTE — PROGRESS NOTE ADULT - SUBJECTIVE AND OBJECTIVE BOX
CC/ HPI Patient is a 56y old  Female with chronic obstructive pulmonary disease, non-small cell lung CA status post right lower lobectomy Oct 2016, admitted with progressive dyspnea, cough and wheezing.    PAST MEDICAL & SURGICAL HISTORY:  MRSA (methicillin resistant Staphylococcus aureus)  COPD (chronic obstructive pulmonary disease)  Pulmonary nodules  Surgery, elective: lung biopsy  History of appendectomy    SOCHX:  + tobacco,  -  alcohol    FMHX: FA/MO  - contributory     ROS reviewed below with positive findings marked (+) :  GEN:  fever, chills ENT: tracheostomy,   epistaxis,  sinusitis COR: CAD, CHF,  HTN, dysrhythmia PUL: +COPD, ILD, asthma, pneumonia GI: PEG, dysphagia, hemorrhage, other DOUGLAS: kidney disease, electrolyte disorder HEM:  anemia, thrombus, coagulopathy, +cancer ENDO:  thyroid disease, diabetes mellitus CNS:  dementia, stroke, seizure, PSY:  depression, anxiety, other     MEDICATIONS  (STANDING):  docusate sodium 100milliGRAM(s) Oral three times a day  fluticasone / salmeterol 250-50 MICROgram(s) Diskus 1Dose(s) Inhalation two times a day  heparin  Injectable 5000Unit(s) SubCutaneous every 8 hours  insulin lispro (HumaLOG) corrective regimen sliding scale  SubCutaneous Before meals and at bedtime  methylPREDNISolone sodium succinate Injectable 60milliGRAM(s) IV Push every 6 hours  azithromycin   Tablet 250milliGRAM(s) Oral daily  ALBUTerol/ipratropium for Nebulization 3milliLiter(s) Nebulizer every 6 hours  folic acid 1milliGRAM(s) Oral daily  pantoprazole    Tablet 40milliGRAM(s) Oral before breakfast    MEDICATIONS  (PRN):  oxyCODONE  5 mG/acetaminophen 325 mG 1Tablet(s) Oral every 6 hours PRN Severe Pain (7 - 10)  acetaminophen   Tablet. 650milliGRAM(s) Oral every 6 hours PRN Moderate Pain (4 - 6)      Vital Signs Last 24 Hrs  T(C): 36.7, Max: 36.7 (04-02 @ 16:35)  T(F): 98.1, Max: 98.1 (04-03 @ 05:46)  HR: 84 (84 - 95)  BP: 117/78 (117/78 - 118/78)  BP(mean): --  RR: 18 (17 - 18)  SpO2: 99% (96% - 99%)    GENERAL:         comfortable,  - distress.  HEENT:            - trauma,  - icterus,  - injection,  - nasal discharge.  NECK:              - jugular venous distention, - thyromegaly.  LYMPH:           - lymphadenopathy, - masses.  RESP:              - rales,   - rhonchi,   + wheezes.   COR:                S1S2 RRR  - murmurs,  - gallops,  - rubs.  ABD:                bowel sounds,   soft, - tender, - distended, - organomegaly.  EXT/MSC:         - cyanosis,  + clubbing,  - edema.    NEURO:             alert,   responds to stimuli.                        12.0   7.6   )-----------( 283      ( 02 Apr 2017 06:31 )             35.2       CT Chest (4/1)  1. Since 10/17/2016, there has been right lower lobectomy.  2. No evidence pulmonary embolism.  3. Advanced emphysema.  4. Multinodular thyroid with dominant 2.4 cm nodule right lobe. Recommend ultrasound follow-up.  5. No change 0.4 cm nodule right upper lobe.      ASSESSMENT/PLAN      1) Chronic obstructive pulmonary disease  2) Lung cancer status post right lower lobectomy  3) Pulmonary nodules  4) Thyroid nodule  4) Atelectasis  b  Bedside spirometry  Bronchodilators:  Atrovent/ albuterol q 4 – 6 hours as needed  Corticosteroids: Soulmedrol  ID/Antibiotics: Zithromax  Cardiac/HTN: BP stable  GI: Rx/ prophylaxis c PPI/H2B  Heme: Rx/VT prophylaxis c SQH  Discuss with medical team. Depression

## 2017-09-06 ENCOUNTER — FORM ENCOUNTER (OUTPATIENT)
Age: 57
End: 2017-09-06

## 2017-09-07 ENCOUNTER — OUTPATIENT (OUTPATIENT)
Dept: OUTPATIENT SERVICES | Facility: HOSPITAL | Age: 57
LOS: 1 days | End: 2017-09-07
Payer: COMMERCIAL

## 2017-09-07 ENCOUNTER — APPOINTMENT (OUTPATIENT)
Dept: THORACIC SURGERY | Facility: CLINIC | Age: 57
End: 2017-09-07
Payer: COMMERCIAL

## 2017-09-07 VITALS
RESPIRATION RATE: 18 BRPM | BODY MASS INDEX: 36.73 KG/M2 | HEART RATE: 87 BPM | WEIGHT: 214 LBS | SYSTOLIC BLOOD PRESSURE: 112 MMHG | DIASTOLIC BLOOD PRESSURE: 69 MMHG | OXYGEN SATURATION: 98 %

## 2017-09-07 DIAGNOSIS — Z90.49 ACQUIRED ABSENCE OF OTHER SPECIFIED PARTS OF DIGESTIVE TRACT: Chronic | ICD-10-CM

## 2017-09-07 DIAGNOSIS — R06.02 SHORTNESS OF BREATH: ICD-10-CM

## 2017-09-07 DIAGNOSIS — J45.909 UNSPECIFIED ASTHMA, UNCOMPLICATED: ICD-10-CM

## 2017-09-07 DIAGNOSIS — Z41.9 ENCOUNTER FOR PROCEDURE FOR PURPOSES OTHER THAN REMEDYING HEALTH STATE, UNSPECIFIED: Chronic | ICD-10-CM

## 2017-09-07 DIAGNOSIS — Z09 ENCOUNTER FOR FOLLOW-UP EXAMINATION AFTER COMPLETED TREATMENT FOR CONDITIONS OTHER THAN MALIGNANT NEOPLASM: ICD-10-CM

## 2017-09-07 PROCEDURE — 99214 OFFICE O/P EST MOD 30 MIN: CPT

## 2017-09-07 PROCEDURE — 71020: CPT | Mod: 26

## 2017-09-07 PROCEDURE — 71046 X-RAY EXAM CHEST 2 VIEWS: CPT

## 2017-10-18 VITALS
TEMPERATURE: 98 F | HEART RATE: 82 BPM | DIASTOLIC BLOOD PRESSURE: 70 MMHG | OXYGEN SATURATION: 100 % | SYSTOLIC BLOOD PRESSURE: 134 MMHG | RESPIRATION RATE: 20 BRPM

## 2017-10-18 PROCEDURE — 99285 EMERGENCY DEPT VISIT HI MDM: CPT | Mod: 25

## 2017-10-18 PROCEDURE — 93010 ELECTROCARDIOGRAM REPORT: CPT

## 2017-10-18 RX ORDER — SODIUM CHLORIDE 9 MG/ML
1000 INJECTION INTRAMUSCULAR; INTRAVENOUS; SUBCUTANEOUS ONCE
Qty: 0 | Refills: 0 | Status: COMPLETED | OUTPATIENT
Start: 2017-10-18 | End: 2017-10-18

## 2017-10-18 RX ORDER — METOCLOPRAMIDE HCL 10 MG
10 TABLET ORAL ONCE
Qty: 0 | Refills: 0 | Status: COMPLETED | OUTPATIENT
Start: 2017-10-18 | End: 2017-10-18

## 2017-10-18 RX ORDER — MORPHINE SULFATE 50 MG/1
4 CAPSULE, EXTENDED RELEASE ORAL ONCE
Qty: 0 | Refills: 0 | Status: DISCONTINUED | OUTPATIENT
Start: 2017-10-18 | End: 2017-10-18

## 2017-10-18 RX ORDER — LEVETIRACETAM 250 MG/1
750 TABLET, FILM COATED ORAL ONCE
Qty: 0 | Refills: 0 | Status: COMPLETED | OUTPATIENT
Start: 2017-10-18 | End: 2017-10-18

## 2017-10-18 RX ADMIN — MORPHINE SULFATE 4 MILLIGRAM(S): 50 CAPSULE, EXTENDED RELEASE ORAL at 23:49

## 2017-10-18 RX ADMIN — SODIUM CHLORIDE 1000 MILLILITER(S): 9 INJECTION INTRAMUSCULAR; INTRAVENOUS; SUBCUTANEOUS at 23:49

## 2017-10-18 RX ADMIN — LEVETIRACETAM 750 MILLIGRAM(S): 250 TABLET, FILM COATED ORAL at 23:49

## 2017-10-18 RX ADMIN — Medication 104 MILLIGRAM(S): at 23:49

## 2017-10-18 NOTE — ED PROVIDER NOTE - MEDICAL DECISION MAKING DETAILS
L sided HA, s/p recent metastatic lesion removal, no focal neuro deficits, nausea  -check labs, ivf, reglan, morphine, night dose of keppra, CT head

## 2017-10-18 NOTE — ED PROVIDER NOTE - PROGRESS NOTE DETAILS
HA slightly improved. no bleeding on CT scan.  pt's family states pt is to see Dr. Owen for radiation.  family and pt states [facility] states they felt she was not being appropriately treated and cared for.  will admit for  and SW intervention

## 2017-10-18 NOTE — ED PROVIDER NOTE - OBJECTIVE STATEMENT
57F hx lung cancer (s/p lung resection), now with brain met (s/p L parietooccipital mass resection 10/4), COPD, seizures, c/o L sided HA. pt states headache started tonight.  +nausea. no vomiting. pt was discharged earlier today from [facility] to [facility].  pt states she did not receive her night medication.  had c/o SOB to ambulance and was given one nebulizer treatment. pt states she is now not SOB. no chest pain, no cough. no fevers.

## 2017-10-18 NOTE — ED ADULT TRIAGE NOTE - CHIEF COMPLAINT QUOTE
Patient complaining of SOB and wheezing after being discharged from [facility] and transferred to [facility].  Patient reports she did not want to be there. A,A,0X3 Received 1 albuterol in route no acute distress noted.

## 2017-10-18 NOTE — ED PROVIDER NOTE - CARE PLAN
Principal Discharge DX:	Acute intractable headache, unspecified headache type  Secondary Diagnosis:	Primary malignant neoplasm of lung metastatic to other site, unspecified laterality

## 2017-10-19 ENCOUNTER — TRANSCRIPTION ENCOUNTER (OUTPATIENT)
Age: 57
End: 2017-10-19

## 2017-10-19 ENCOUNTER — INPATIENT (INPATIENT)
Facility: HOSPITAL | Age: 57
LOS: 0 days | Discharge: HOME | DRG: 54 | End: 2017-10-20
Attending: INTERNAL MEDICINE | Admitting: INTERNAL MEDICINE
Payer: COMMERCIAL
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
## 2017-10-19 DIAGNOSIS — R51 HEADACHE: ICD-10-CM

## 2017-10-19 DIAGNOSIS — Z29.9 ENCOUNTER FOR PROPHYLACTIC MEASURES, UNSPECIFIED: ICD-10-CM

## 2017-10-19 DIAGNOSIS — R63.8 OTHER SYMPTOMS AND SIGNS CONCERNING FOOD AND FLUID INTAKE: ICD-10-CM

## 2017-10-19 DIAGNOSIS — G40.909 EPILEPSY, UNSPECIFIED, NOT INTRACTABLE, WITHOUT STATUS EPILEPTICUS: ICD-10-CM

## 2017-10-19 DIAGNOSIS — G93.6 CEREBRAL EDEMA: ICD-10-CM

## 2017-10-19 DIAGNOSIS — J44.9 CHRONIC OBSTRUCTIVE PULMONARY DISEASE, UNSPECIFIED: ICD-10-CM

## 2017-10-19 DIAGNOSIS — Z98.890 OTHER SPECIFIED POSTPROCEDURAL STATES: Chronic | ICD-10-CM

## 2017-10-19 DIAGNOSIS — Z41.9 ENCOUNTER FOR PROCEDURE FOR PURPOSES OTHER THAN REMEDYING HEALTH STATE, UNSPECIFIED: Chronic | ICD-10-CM

## 2017-10-19 DIAGNOSIS — D72.829 ELEVATED WHITE BLOOD CELL COUNT, UNSPECIFIED: ICD-10-CM

## 2017-10-19 DIAGNOSIS — Z90.49 ACQUIRED ABSENCE OF OTHER SPECIFIED PARTS OF DIGESTIVE TRACT: Chronic | ICD-10-CM

## 2017-10-19 DIAGNOSIS — C78.00 SECONDARY MALIGNANT NEOPLASM OF UNSPECIFIED LUNG: ICD-10-CM

## 2017-10-19 LAB
ALBUMIN SERPL ELPH-MCNC: 3.9 G/DL — SIGNIFICANT CHANGE UP (ref 3.3–5)
ALP SERPL-CCNC: 71 U/L — SIGNIFICANT CHANGE UP (ref 40–120)
ALT FLD-CCNC: 34 U/L — SIGNIFICANT CHANGE UP (ref 10–45)
ANION GAP SERPL CALC-SCNC: 12 MMOL/L — SIGNIFICANT CHANGE UP (ref 5–17)
APTT BLD: 24.1 SEC — LOW (ref 27.5–37.4)
AST SERPL-CCNC: 27 U/L — SIGNIFICANT CHANGE UP (ref 10–40)
BASOPHILS NFR BLD AUTO: 0 % — SIGNIFICANT CHANGE UP (ref 0–2)
BILIRUB SERPL-MCNC: 0.2 MG/DL — SIGNIFICANT CHANGE UP (ref 0.2–1.2)
BUN SERPL-MCNC: 18 MG/DL — SIGNIFICANT CHANGE UP (ref 7–23)
CALCIUM SERPL-MCNC: 9.6 MG/DL — SIGNIFICANT CHANGE UP (ref 8.4–10.5)
CHLORIDE SERPL-SCNC: 95 MMOL/L — LOW (ref 96–108)
CO2 SERPL-SCNC: 30 MMOL/L — SIGNIFICANT CHANGE UP (ref 22–31)
CREAT SERPL-MCNC: 0.86 MG/DL — SIGNIFICANT CHANGE UP (ref 0.5–1.3)
EOSINOPHIL NFR BLD AUTO: 0.1 % — SIGNIFICANT CHANGE UP (ref 0–6)
GLUCOSE SERPL-MCNC: 107 MG/DL — HIGH (ref 70–99)
HCT VFR BLD CALC: 40.7 % — SIGNIFICANT CHANGE UP (ref 34.5–45)
HGB BLD-MCNC: 13.7 G/DL — SIGNIFICANT CHANGE UP (ref 11.5–15.5)
INR BLD: 0.99 — SIGNIFICANT CHANGE UP (ref 0.88–1.16)
LYMPHOCYTES # BLD AUTO: 15.8 % — SIGNIFICANT CHANGE UP (ref 13–44)
MAGNESIUM SERPL-MCNC: 2.3 MG/DL — SIGNIFICANT CHANGE UP (ref 1.6–2.6)
MCHC RBC-ENTMCNC: 29.3 PG — SIGNIFICANT CHANGE UP (ref 27–34)
MCHC RBC-ENTMCNC: 33.7 G/DL — SIGNIFICANT CHANGE UP (ref 32–36)
MCV RBC AUTO: 87 FL — SIGNIFICANT CHANGE UP (ref 80–100)
MONOCYTES NFR BLD AUTO: 5.3 % — SIGNIFICANT CHANGE UP (ref 2–14)
NEUTROPHILS NFR BLD AUTO: 78.8 % — HIGH (ref 43–77)
PLATELET # BLD AUTO: 286 K/UL — SIGNIFICANT CHANGE UP (ref 150–400)
POTASSIUM SERPL-MCNC: 5.3 MMOL/L — SIGNIFICANT CHANGE UP (ref 3.5–5.3)
POTASSIUM SERPL-SCNC: 5.3 MMOL/L — SIGNIFICANT CHANGE UP (ref 3.5–5.3)
PROT SERPL-MCNC: 7.6 G/DL — SIGNIFICANT CHANGE UP (ref 6–8.3)
PROTHROM AB SERPL-ACNC: 11 SEC — SIGNIFICANT CHANGE UP (ref 9.8–12.7)
RBC # BLD: 4.68 M/UL — SIGNIFICANT CHANGE UP (ref 3.8–5.2)
RBC # FLD: 14.7 % — SIGNIFICANT CHANGE UP (ref 10.3–16.9)
SODIUM SERPL-SCNC: 137 MMOL/L — SIGNIFICANT CHANGE UP (ref 135–145)
WBC # BLD: 13.3 K/UL — HIGH (ref 3.8–10.5)
WBC # FLD AUTO: 13.3 K/UL — HIGH (ref 3.8–10.5)

## 2017-10-19 PROCEDURE — 70450 CT HEAD/BRAIN W/O DYE: CPT | Mod: 26

## 2017-10-19 PROCEDURE — 99222 1ST HOSP IP/OBS MODERATE 55: CPT

## 2017-10-19 PROCEDURE — 70552 MRI BRAIN STEM W/DYE: CPT | Mod: 26

## 2017-10-19 RX ORDER — OXYCODONE AND ACETAMINOPHEN 5; 325 MG/1; MG/1
2 TABLET ORAL EVERY 4 HOURS
Qty: 0 | Refills: 0 | Status: DISCONTINUED | OUTPATIENT
Start: 2017-10-19 | End: 2017-10-20

## 2017-10-19 RX ORDER — ENOXAPARIN SODIUM 100 MG/ML
40 INJECTION SUBCUTANEOUS EVERY 24 HOURS
Qty: 0 | Refills: 0 | Status: DISCONTINUED | OUTPATIENT
Start: 2017-10-19 | End: 2017-10-20

## 2017-10-19 RX ORDER — BUDESONIDE AND FORMOTEROL FUMARATE DIHYDRATE 160; 4.5 UG/1; UG/1
2 AEROSOL RESPIRATORY (INHALATION)
Qty: 0 | Refills: 0 | Status: DISCONTINUED | OUTPATIENT
Start: 2017-10-19 | End: 2017-10-20

## 2017-10-19 RX ORDER — PANTOPRAZOLE SODIUM 20 MG/1
40 TABLET, DELAYED RELEASE ORAL ONCE
Qty: 0 | Refills: 0 | Status: COMPLETED | OUTPATIENT
Start: 2017-10-19 | End: 2017-10-19

## 2017-10-19 RX ORDER — FOLIC ACID 0.8 MG
1 TABLET ORAL DAILY
Qty: 0 | Refills: 0 | Status: DISCONTINUED | OUTPATIENT
Start: 2017-10-19 | End: 2017-10-20

## 2017-10-19 RX ORDER — IPRATROPIUM/ALBUTEROL SULFATE 18-103MCG
3 AEROSOL WITH ADAPTER (GRAM) INHALATION EVERY 4 HOURS
Qty: 0 | Refills: 0 | Status: DISCONTINUED | OUTPATIENT
Start: 2017-10-19 | End: 2017-10-20

## 2017-10-19 RX ORDER — LEVETIRACETAM 250 MG/1
750 TABLET, FILM COATED ORAL
Qty: 0 | Refills: 0 | Status: DISCONTINUED | OUTPATIENT
Start: 2017-10-19 | End: 2017-10-20

## 2017-10-19 RX ORDER — DOCUSATE SODIUM 100 MG
100 CAPSULE ORAL DAILY
Qty: 0 | Refills: 0 | Status: DISCONTINUED | OUTPATIENT
Start: 2017-10-19 | End: 2017-10-20

## 2017-10-19 RX ORDER — DEXAMETHASONE 0.5 MG/5ML
1 ELIXIR ORAL DAILY
Qty: 0 | Refills: 0 | Status: DISCONTINUED | OUTPATIENT
Start: 2017-10-19 | End: 2017-10-20

## 2017-10-19 RX ORDER — MORPHINE SULFATE 50 MG/1
4 CAPSULE, EXTENDED RELEASE ORAL ONCE
Qty: 0 | Refills: 0 | Status: DISCONTINUED | OUTPATIENT
Start: 2017-10-19 | End: 2017-10-19

## 2017-10-19 RX ORDER — KETOROLAC TROMETHAMINE 30 MG/ML
30 SYRINGE (ML) INJECTION EVERY 6 HOURS
Qty: 0 | Refills: 0 | Status: DISCONTINUED | OUTPATIENT
Start: 2017-10-19 | End: 2017-10-20

## 2017-10-19 RX ADMIN — BUDESONIDE AND FORMOTEROL FUMARATE DIHYDRATE 2 PUFF(S): 160; 4.5 AEROSOL RESPIRATORY (INHALATION) at 22:34

## 2017-10-19 RX ADMIN — LEVETIRACETAM 750 MILLIGRAM(S): 250 TABLET, FILM COATED ORAL at 09:41

## 2017-10-19 RX ADMIN — Medication 30 MILLIGRAM(S): at 16:27

## 2017-10-19 RX ADMIN — Medication 30 MILLIGRAM(S): at 22:48

## 2017-10-19 RX ADMIN — OXYCODONE AND ACETAMINOPHEN 2 TABLET(S): 5; 325 TABLET ORAL at 20:28

## 2017-10-19 RX ADMIN — Medication 3 MILLILITER(S): at 09:41

## 2017-10-19 RX ADMIN — BUDESONIDE AND FORMOTEROL FUMARATE DIHYDRATE 2 PUFF(S): 160; 4.5 AEROSOL RESPIRATORY (INHALATION) at 09:42

## 2017-10-19 RX ADMIN — Medication 3 MILLILITER(S): at 06:30

## 2017-10-19 RX ADMIN — OXYCODONE AND ACETAMINOPHEN 2 TABLET(S): 5; 325 TABLET ORAL at 15:45

## 2017-10-19 RX ADMIN — Medication 30 MILLIGRAM(S): at 16:42

## 2017-10-19 RX ADMIN — OXYCODONE AND ACETAMINOPHEN 2 TABLET(S): 5; 325 TABLET ORAL at 14:45

## 2017-10-19 RX ADMIN — PANTOPRAZOLE SODIUM 40 MILLIGRAM(S): 20 TABLET, DELAYED RELEASE ORAL at 16:27

## 2017-10-19 RX ADMIN — Medication 1 MILLIGRAM(S): at 09:42

## 2017-10-19 RX ADMIN — Medication 30 MILLIGRAM(S): at 22:33

## 2017-10-19 RX ADMIN — ENOXAPARIN SODIUM 40 MILLIGRAM(S): 100 INJECTION SUBCUTANEOUS at 09:41

## 2017-10-19 RX ADMIN — OXYCODONE AND ACETAMINOPHEN 2 TABLET(S): 5; 325 TABLET ORAL at 19:58

## 2017-10-19 RX ADMIN — Medication 1 MILLIGRAM(S): at 12:45

## 2017-10-19 RX ADMIN — MORPHINE SULFATE 4 MILLIGRAM(S): 50 CAPSULE, EXTENDED RELEASE ORAL at 00:19

## 2017-10-19 RX ADMIN — LEVETIRACETAM 750 MILLIGRAM(S): 250 TABLET, FILM COATED ORAL at 18:06

## 2017-10-19 RX ADMIN — MORPHINE SULFATE 4 MILLIGRAM(S): 50 CAPSULE, EXTENDED RELEASE ORAL at 01:36

## 2017-10-19 NOTE — H&P ADULT - PROBLEM SELECTOR PLAN 5
Likely 2/2 brain metastases; reconciled for keppra on discharge from [facility]  - home keppra 750mg PO BID; already received dose overnight in ED  - seizure precautions  - consider neuroSX consult in AM pending pt disposition

## 2017-10-19 NOTE — H&P ADULT - ASSESSMENT
This is a 58yo F w/ PMH NSCLC (s/p RLL lobectomy, RML wedge resection by Dr. Pisano 1yr ago) w/ recently dx brain mets (s/p L parietooccipital mass resection 10/4/17 at [facility]), COPD, recent discharge to rehab presenting with intractable HA.

## 2017-10-19 NOTE — ED ADULT NURSE NOTE - CHIEF COMPLAINT QUOTE
Patient complaining of SOB and wheezing after being discharged from [facility] and transferred to [facility].  Patient reports she did not want to be there. A,A,0X3 Received 1 albuterol in route no acute distress noted.

## 2017-10-19 NOTE — PROGRESS NOTE ADULT - PROBLEM SELECTOR PLAN 3
Likely reactive in the setting of decadron usage at home (13.3)  - f/u 10AM CBC, continue to monitor  - low suspicion for acute infection and not meeting other SIRS criteria but will continue to monitor

## 2017-10-19 NOTE — H&P ADULT - PROBLEM SELECTOR PLAN 3
Likely reactive in the setting of decadron usage at home  - monitor CBC (next @ 1000hrs)  - low suspicion for acute infection and not meeting other SIRS criteria but will continue to monitor

## 2017-10-19 NOTE — H&P ADULT - NSHPLABSRESULTS_GEN_ALL_CORE
.  LABS:                         13.7   13.3  )-----------( 286      ( 19 Oct 2017 00:01 )             40.7     10-19    137  |  95<L>  |  18  ----------------------------<  107<H>  5.3   |  30  |  0.86    Ca    9.6      19 Oct 2017 00:04  Mg     2.3     10-19    TPro  7.6  /  Alb  3.9  /  TBili  0.2  /  DBili  x   /  AST  27  /  ALT  34  /  AlkPhos  71  10-19    PT/INR - ( 19 Oct 2017 00:01 )   PT: 11.0 sec;   INR: 0.99          PTT - ( 19 Oct 2017 00:01 )  PTT:24.1 sec    EKG: NSR - no ST/T changes; rate 69    RADIOLOGY & ADDITIONAL TESTS: Reviewed.    < from: CT Head No Cont (10.19.17 @ 00:13) >    EXAM:  CT BRAIN                          PROCEDURE DATE:  10/19/2017      INTERPRETATION:    HISTORY: 57-year-old female with headache status post left mass removal.     TECHNIQUE: Head CT was performed without intravenous contrast. Axial,   sagittal, and coronal images were obtained.    COMPARISON: CT head 5/18/2011    FINDINGS:   There is a high left parietal craniotomy, new since 2011. There is a 1.4   x 1.2 x 1.7 cm hypodense structure in the left parieto-occipital with   surrounding vasogenic edema. There is effacement of the overlying sulci.   No midline shift or herniation is appreciated.    The size and configuration of the ventricles and sulci are otherwise   within normal limits for a person of this age. No acute transcortical   infarction or hemorrhage is identified. No extra-axial fluid collection,   herniation, or subfalcine shift is visualized.     The visualized paranasal sinuses and mastoid air cells are clear.     IMPRESSION:   1.  Status post left parietal craniotomy. 1.7 cm hypodense structure in   the underlying left parieto-occipital region with surrounding vasogenic   edema but without midline shift or herniation. This could represent a   resection cavity with associated postsurgical changes versus   residual/recurrent tumor. Comparison with more recent outside imaging is   recommended to assess for change.  2.  No acute intracranial hemorrhage or acute transcortical infarction.    "Thank you for the opportunity to participate in the care of this   patient."    SERGIO TURCIOS M.D., RADIOLOGY RESIDENT  This document has been electronically signed.  VANI LONG M.D., ATTENDING RADIOLOGIST  This document has been electronically signed. Oct 19 2017  2:27AM

## 2017-10-19 NOTE — PROGRESS NOTE ADULT - PROBLEM SELECTOR PLAN 5
Likely 2/2 brain metastases; reconciled for keppra on discharge from [facility]  - home keppra 750mg PO BID  - seizure precautions  - neurosurgery on consult; appreciate recs  - pt is denying history of seizures this AM

## 2017-10-19 NOTE — H&P ADULT - PROBLEM SELECTOR PLAN 4
Not in acute exacerbation  - home advair interchange to symbicort 160/4.5mcg inhaled BID  - duonebs q4h  - incentive spirometry  - PT, OOB to chair

## 2017-10-19 NOTE — DISCHARGE NOTE ADULT - ADDITIONAL INSTRUCTIONS
Please follow-up with your neurosurgery team at [facility] regarding further management of your headache, and your primary care physician, Dr. De Dios within 1-2 weeks. Please follow-up with your neurosurgery team at [facility] regarding further management of your headache, and your primary care physician, Dr. De Dios within 1-2 weeks.  You have an appointment with your Pain management Doctor on wednesday.   You have an appointment with Dr. Owen to schedule gammaknife as well.

## 2017-10-19 NOTE — H&P ADULT - NSHPPHYSICALEXAM_GEN_ALL_CORE
.  VITAL SIGNS:  T(C): 37.2 (10-19-17 @ 00:21), Max: 37.2 (10-19-17 @ 00:21)  T(F): 98.9 (10-19-17 @ 00:21), Max: 98.9 (10-19-17 @ 00:21)  HR: 64 (10-19-17 @ 00:21) (64 - 82)  BP: 111/75 (10-19-17 @ 00:21) (111/75 - 134/70)  BP(mean): --  RR: 19 (10-19-17 @ 00:21) (19 - 20)  SpO2: 98% (10-19-17 @ 00:21) (98% - 100%)  Wt(kg): --    PHYSICAL EXAM:    Constitutional: WDWN resting comfortably in bed; NAD  Head: NC/AT  Eyes: PERRL, EOMI, anicteric sclera  ENT: no nasal discharge; uvula midline, no oropharyngeal erythema or exudates; MMM  Neck: supple; no JVD or thyromegaly  Respiratory: CTA B/L; no W/R/R, no retractions  Cardiac: +S1/S2; RRR; no M/R/G; PMI non-displaced  Gastrointestinal: abdomen soft, NT/ND; no rebound or guarding; +BSx4  Genitourinary: normal external genitalia  Back: spine midline, no bony tenderness or step-offs; no CVAT B/L  Extremities: WWP, no clubbing or cyanosis; no peripheral edema  Musculoskeletal: NROM x4; no joint swelling, tenderness or erythema  Vascular: 2+ radial, femoral, DP/PT pulses B/L  Dermatologic: skin warm, dry and intact; no rashes, wounds, or scars  Lymphatic: no submandibular or cervical LAD  Neurologic: AAOx3; CNII-XII grossly intact; no focal deficits  Psychiatric: affect and characteristics of appearance, verbalizations, behaviors are appropriate .  VITAL SIGNS:  T(C): 37.2 (10-19-17 @ 00:21), Max: 37.2 (10-19-17 @ 00:21)  T(F): 98.9 (10-19-17 @ 00:21), Max: 98.9 (10-19-17 @ 00:21)  HR: 64 (10-19-17 @ 00:21) (64 - 82)  BP: 111/75 (10-19-17 @ 00:21) (111/75 - 134/70)  BP(mean): --  RR: 19 (10-19-17 @ 00:21) (19 - 20)  SpO2: 98% (10-19-17 @ 00:21) (98% - 100%)  Wt(kg): --    PHYSICAL EXAM:    Constitutional: obese WDWN female resting comfortably in bed; NAD  Head: L craniotomy scar C/D/I with mild/appropriate post-surgical TTP overlying  Eyes: PERRL, EOMI, anicteric sclera  ENT: no nasal discharge; uvula midline, no oropharyngeal erythema or exudates; MMM  Neck: supple; no JVD  Respiratory: decreased BS on RML/RLL w/ scant anterior wheezes; no retractions or increased WOB, conversive of full sentences  Cardiac: +S1/S2; RRR; no M/R/G  Gastrointestinal: abdomen obese and soft, NT/ND; no rebound or guarding; +BSx4  Back: no CVAT B/L  Extremities: WWP, no clubbing or cyanosis; no peripheral edema  Musculoskeletal: moving all 4 extremities; no joint swelling, tenderness or erythema  Vascular: 2+ radial, DP/PT pulses B/L  Dermatologic: skin warm, dry and intact; craniotomy scar as above, well healed scars overlying R chest wall  Neurologic: AAOx2; no focal deficits  Psychiatric: somnolent but pleasant and conversive, somewhat tangental during interview

## 2017-10-19 NOTE — DISCHARGE NOTE ADULT - HOSPITAL COURSE
58yo F w/ PMH NSCLC (s/p RLL lobectomy, RML wedge resection by Dr. Pisano 1yr ago) w/ recently dx brain mets (s/p L parietooccipital mass resection 10/4/17 at [facility]), COPD, recent discharge to rehab presenting with intractable HA. Patient received morphine and percocet for pain control, and neurosurgery was contacted to r/o parietoccipital mass resection complications and for pain management. Neurology was consulted, recommended brain MRI which revealed***. On the day of discharge, the patient was seen and examined. Symptoms improved. Vital signs are stable. Labs and imaging reviewed. Patient is medically optimized and hemodynamically stable. Return precautions discussed, medication teach back done, and importance of physician followup emphasized. The patient verbalized understanding. 56yo F w/ PMH NSCLC (s/p RLL lobectomy, RML wedge resection by Dr. Pisano 1yr ago) w/ recently dx brain mets (s/p L parietooccipital mass resection 10[place]17 [place] [facility] COPD, recent discharge to rehab presenting with intractable HA. Patient received morphine and percocet for pain control, and neurosurgery was contacted to r/o parietoccipital mass resection complications and for pain management. Neurology was consulted, recommended brain MRI which did not reveal any new/acute pathologic findings. On the day of discharge, the patient was seen and examined. Symptoms improved. Vital signs are stable. Labs and imaging reviewed. Patient is medically optimized and hemodynamically stable. Return precautions discussed, medication teach back done, and importance of physician followup emphasized. The patient verbalized understanding. 58yo F w/ PMH NSCLC (s/p RLL lobectomy, RML wedge resection by Dr. Pisano 1yr ago) w/ recently dx brain mets (s/p L parietooccipital mass resection 10/4/17 at [facility] COPD, recent discharge to rehab presenting with intractable HA. Patient received morphine and percocet for pain control, and neurosurgery was contacted to r/o parietoccipital mass resection complications and for pain management. Neurology was consulted, recommended brain MRI which did not reveal any new/acute pathologic findings. On the day of discharge, the patient was seen and examined. Symptoms improved. Vital signs are stable. Labs and imaging reviewed. Patient is medically optimized and hemodynamically stable. Return precautions discussed, medication teach back done, and importance of physician followup emphasized. The patient verbalized understanding. COMPLETES-NOW completed by nursing and senior resident.

## 2017-10-19 NOTE — DISCHARGE NOTE ADULT - PATIENT PORTAL LINK FT
“You can access the Follow[employer] Patient Portal, offered by [facility], by registering with the following website: [email or web address]”

## 2017-10-19 NOTE — PROGRESS NOTE ADULT - PROBLEM SELECTOR PLAN 2
NSCLC dx 6/2016 s/p RLL lobectomy, RML wedge resection w/ Dr. Pisano; known to Srikanth Hathaway + Rodrigo as well and pt requesting to see them  - s/p adjuvant chemotherapy completion in 3/2017  - c/w home decadron 1mg PO daily  - c/w home keppra 750mg PO BID for seizure ppx  - consider palliative care consult

## 2017-10-19 NOTE — PROGRESS NOTE ADULT - PROBLEM SELECTOR PLAN 1
Pt w/ similar complaints 2 weeks ago when presenting to [facility] and is now recently s/p L craniotomy and parietooccipital mass resection which, in conjunction with her metastases is likely the etiology of her pain; HA pain was improved after morphine but patient does not want more morphine and just wants to rest  - percocet 10/650 PO q4h PRN severe pain (pt reconciled for oxy ER 30mg TID PRN at home)  - CT head reassuring and neuro exam reassuring thus far  - neurosurgery on consult for pain recs, evaluation of headache. Appreciate recs  - pending MRI head w/ contrast

## 2017-10-19 NOTE — H&P ADULT - NSHPSOCIALHISTORY_GEN_ALL_CORE
.  Tobacco use: former 30py smoker quit in 2016  EtOH use: denies  Illicit drug use: denies    Living situation: arrives from [place]

## 2017-10-19 NOTE — H&P ADULT - HISTORY OF PRESENT ILLNESS
This is a 58yo F limited historian w/ PMH NSCLC (s/p RLL lobectomy, RML wedge resection by Dr. Pisano 1yr ago) w/ recently dx brain mets (s/p L parietooccipital mass resection 10/4/17 at [facility]), COPD, seizures (likely 2/2 brain mets) who is presenting with L sided HA. Some of hx obtained from outside records, prior charts, and ED providers given pt is limited historian and tangential. She was recently admitted to [facility] after developing headaches and generally feeling unwell w/ vomiting, AMS two weeks ago where she underwent craniotomy and L parietooccipital mass resection for metastasis presumably from her NSCLC. Her post-op course was uneventful per [facility] records and was subsequently discharged to [facility] about 1 week ago. There is discrepancy b/t records and pt account as she says she was discharged yesterday to rehab. Nevertheless, she developed pain at rehab and felt she was not receiving adequate care, said she wanted to go back to the hospital and was BIBEMS to [facility] ED. EMS records reveal pt c/o SOB and was given nebs in transit however both ED provider and patient deny having SOB at any time.    Currently her only complaint is intractable HA localized to left parietal region. Denies F/C, photophobia, blurry vision, dizziness, hearing changes, seizures, CP, palpitations, SOB, cough, abd pain, N/V/D, dysuria, or weakness. She says she cannot go home with pain like this and wants her doctors here to see her.    ED VS: T afebrile -134/70-75 P 64-82 R 19-20 SpO2 % on RA  ED adm: 1L NS bolus x1, morphine 4+4 = 8mg total IVP, reglan 10mg IVP x1, keppra 750mg IVPB x1

## 2017-10-19 NOTE — DISCHARGE NOTE ADULT - PLAN OF CARE
Please continue to take your inhalers as prescribed. Continue pain management. Please continue to take your steroid medication as prescribed, and percocet for pain as needed. Please follow up with your primary care physician and neurosurgery team for further management of your headaches. Please continue to follow up with your outpatient oncologist regarding further management of your lung cancer.

## 2017-10-19 NOTE — PHYSICAL THERAPY INITIAL EVALUATION ADULT - ADDITIONAL COMMENTS
Patient lives in elevator apartment, no steps to enter. Patient denies DME, home health assistance, or history of falls.

## 2017-10-19 NOTE — PHYSICAL THERAPY INITIAL EVALUATION ADULT - PERTINENT HX OF CURRENT PROBLEM, REHAB EVAL
58yo F limited historian w/ PMH NSCLC (s/p RLL lobectomy, RML wedge resection by Dr. Pisano 1yr ago) w/ recently dx brain mets (s/p L parietooccipital mass resection 10/4/17 at [facility] [place], [place]), COPD, seizures )who is presenting with L sided HA. She was recently admitted to [facility] [place] [place] after developing headaches and vomiting, AMS two weeks ago where she underwent craniotomy and L parietooccipital mass resection for metastasis.

## 2017-10-19 NOTE — H&P ADULT - PMH
Adenocarcinoma of lung    COPD (chronic obstructive pulmonary disease)    MRSA (methicillin resistant Staphylococcus aureus)  history of

## 2017-10-19 NOTE — H&P ADULT - PROBLEM SELECTOR PLAN 1
Pt w/ similar complaints 2 weeks ago when presenting to [facility] [place] and is now recently s/p L craniotomy and parietooccipital mass resection which, in conjunction with her metastases is likely the etiology of her pain; HA pain was improved after morphine but patient does not want more morphine and just wants to rest  - percocet 10/650 PO q4h PRN severe pain (pt reconciled for oxy ER 30mg TID PRN at home)  - will consider toradol + reglan + IV tylenol cocktail if pt does not want morphine or her home oxy for pain control  - CT head reassuring and neuro exam reassuring thus far

## 2017-10-19 NOTE — ED ADULT NURSE NOTE - OBJECTIVE STATEMENT
58 y/o female with hx of Lung CA, COPD, s/p brain tumor removal (oct 5, 2017) arrived to [facility] ER reporting headache and difficulty breathing starting today. Pt was recently transferred to [facility] and verbalized that proper care was not provided. Upon assessment, abdomen soft, lung fields WNL. breathing is equal and unlabored, pulses palpable, pupils are equal and reactive to light. Pt denies chest pain, dizziness, blurred vision, slurred speech, nausea, vomiting, diarrhea, fever, chills, LOC, recent travel, recent injury, and palpitations. As per family, patient has been recently more confused. Care in progress.

## 2017-10-19 NOTE — H&P ADULT - PROBLEM SELECTOR PLAN 6
.  F: no IVF indicated at current time  E: replete lytes PRN to maintain K>4, Mg>2  N: regular diet  - folate 1mg PO daily

## 2017-10-19 NOTE — CONSULT NOTE ADULT - ASSESSMENT
56yo F with PMHx NSCLC (s/p RLL lobectomy, RML wedge resection by Dr. Pisano 1yr ago) w/ recently dx brain mets (s/p L parietooccipital mass resection 10/4/17 at [facility] [place]), COPD, recent discharge to rehab, presenting with headache, neurologically stable    PLAN:  -Neuro checks  -No neurosurgical intervention indicated   -Continue home keppra and decadron (f/u with neurosurgeon at [facility] [place])  -Pain management  -Continue care per medicine team  -D/w Dr. Rico

## 2017-10-19 NOTE — CONSULT NOTE ADULT - ATTENDING COMMENTS
Patient seen and examined with housestaff. MRI reviewed and has post operative residual versus recurrence of metastatic tumor. Patient will need post-operative adjuvant Gamma Knife radiation electively. Dr. Owen from radiation oncology and I will arrange for this to be performed as an outpatient.

## 2017-10-19 NOTE — H&P ADULT - NSHPOUTPATIENTPROVIDERS_GEN_ALL_CORE
Dr. Maritza De Dios (PMD), Dr. Hathaway/Rodrigo (Pulm), Dr. Gonsalves (Heme/Onc), Dr. Owen (Rad/Onc), Dr. Pisano (CTSx)

## 2017-10-19 NOTE — PROGRESS NOTE ADULT - PROBLEM SELECTOR PLAN 7
DVT PPx: SQL    GI PPx: colace 100mg PO daily    CODE: FULL    DISPO: admit to regional medicine  - SW/CM consult in AM for discharge planning  - pt left [facility] AMA last night after discharge from [facility] [place], per case management

## 2017-10-19 NOTE — DISCHARGE NOTE ADULT - MEDICATION SUMMARY - MEDICATIONS TO TAKE
I will START or STAY ON the medications listed below when I get home from the hospital:    Decadron 0.5 mg oral tablet  -- 2 tab(s) by mouth once a day  -- Indication: For Metastatic lung carcinoma to brain    oxyCODONE 30 mg oral tablet  -- 1 tab(s) by mouth every 8 hours, As needed, Severe Pain (7 - 10) MDD:3  -- Indication: For Headache, pain due to surgery    Keppra 750 mg oral tablet  -- 1 tab(s) by mouth 2 times a day  -- Indication: For Seizure disorder    Advair Diskus 500 mcg-50 mcg inhalation powder  -- 1 puff(s) inhaled 2 times a day  -- Indication: For COPD (chronic obstructive pulmonary disease)    albuterol 2.5 mg/3 mL (0.083%) inhalation solution  -- 3 milliliter(s) inhaled every 6 hours  -- Indication: For COPD (chronic obstructive pulmonary disease)    docusate sodium 100 mg oral tablet  -- 1 tab(s) by mouth once a day  -- Indication: For COnstipation    folic acid 1 mg oral tablet  -- 1 tab(s) by mouth once a day  -- Indication: For Seizure disorder I will START or STAY ON the medications listed below when I get home from the hospital:    Decadron 0.5 mg oral tablet  -- 2 tab(s) by mouth once a day  -- Indication: For ACUTE INTRACTABLE HEADACHE, UNSPECIFIED HEADACHE    oxyCODONE 30 mg oral tablet  -- 1 tab(s) by mouth every 8 hours, As needed, Severe Pain (7 - 10) MDD:3  -- Indication: For Headache, pain due to surgery    Keppra 750 mg oral tablet  -- 1 tab(s) by mouth 2 times a day  -- Indication: For Seizure disorder    albuterol 2.5 mg/3 mL (0.083%) inhalation solution  -- 3 milliliter(s) inhaled every 6 hours  -- Indication: For COPD (chronic obstructive pulmonary disease)    Advair Diskus 500 mcg-50 mcg inhalation powder  -- 1 puff(s) inhaled 2 times a day  -- Indication: For COPD (chronic obstructive pulmonary disease)    docusate sodium 100 mg oral tablet  -- 1 tab(s) by mouth once a day  -- Indication: For Nutrition, metabolism, and development symptoms    folic acid 1 mg oral tablet  -- 1 tab(s) by mouth once a day  -- Indication: For Nutrition, metabolism, and development symptoms

## 2017-10-19 NOTE — DISCHARGE NOTE ADULT - CARE PROVIDER_API CALL
Vignesh Wallace), Critical Care Medicine; Pulmonary Disease  [address]  [place] [address]  Phone: [phone]  Fax: [phone]    Calos Peterson), Hematology; Medical Oncology  [address]  [address]  [place] [address]  Phone: [phone]  Fax: [phone]    Jean Gil (MD), Surgery; Thoracic Surgery  [address]  [address]  [place] [address]  Phone: [phone]  Fax: [phone]    Juice Owen), Radiation Oncology  [address] [address]  Phone: [phone]  Fax: [phone]

## 2017-10-19 NOTE — H&P ADULT - PROBLEM SELECTOR PLAN 2
NSCLC dx 6/2016 s/p RLL lobectomy, RML wedge resection w/ Dr. Pisano; known to Srikanth Hathaway + Rodrigo as well and pt requesting to see them  - consult Dr. Wallace or Rivas in AM  - home decadron 1mg PO daily  - home keppra 750mg PO BID  - consider palliative care consult in AM

## 2017-10-19 NOTE — PROGRESS NOTE ADULT - PROBLEM SELECTOR PLAN 6
F: no IVF indicated at current time  E: replete lytes PRN to maintain K>4, Mg>2  N: regular diet  - folate 1mg PO daily

## 2017-10-19 NOTE — DISCHARGE NOTE ADULT - CARE PROVIDERS DIRECT ADDRESSES
,DirectAddress_Unknown,DirectAddress_Unknown,[email or web address]

## 2017-10-19 NOTE — H&P ADULT - PROBLEM SELECTOR PLAN 7
.  DVT PPx: SQL    GI PPx: colace 100mg PO daily    CODE: FULL    DISPO: admit to regional medicine  - SW/CM consult in AM for discharge planning

## 2017-10-20 VITALS
HEART RATE: 76 BPM | SYSTOLIC BLOOD PRESSURE: 101 MMHG | TEMPERATURE: 98 F | RESPIRATION RATE: 19 BRPM | DIASTOLIC BLOOD PRESSURE: 70 MMHG | OXYGEN SATURATION: 99 %

## 2017-10-20 LAB
ANION GAP SERPL CALC-SCNC: 11 MMOL/L — SIGNIFICANT CHANGE UP (ref 5–17)
BUN SERPL-MCNC: 20 MG/DL — SIGNIFICANT CHANGE UP (ref 7–23)
CALCIUM SERPL-MCNC: 8.6 MG/DL — SIGNIFICANT CHANGE UP (ref 8.4–10.5)
CHLORIDE SERPL-SCNC: 103 MMOL/L — SIGNIFICANT CHANGE UP (ref 96–108)
CO2 SERPL-SCNC: 25 MMOL/L — SIGNIFICANT CHANGE UP (ref 22–31)
CREAT SERPL-MCNC: 0.82 MG/DL — SIGNIFICANT CHANGE UP (ref 0.5–1.3)
GLUCOSE SERPL-MCNC: 99 MG/DL — SIGNIFICANT CHANGE UP (ref 70–99)
HCT VFR BLD CALC: 37.7 % — SIGNIFICANT CHANGE UP (ref 34.5–45)
HGB BLD-MCNC: 12.4 G/DL — SIGNIFICANT CHANGE UP (ref 11.5–15.5)
MAGNESIUM SERPL-MCNC: 2.2 MG/DL — SIGNIFICANT CHANGE UP (ref 1.6–2.6)
MCHC RBC-ENTMCNC: 28.7 PG — SIGNIFICANT CHANGE UP (ref 27–34)
MCHC RBC-ENTMCNC: 32.9 G/DL — SIGNIFICANT CHANGE UP (ref 32–36)
MCV RBC AUTO: 87.3 FL — SIGNIFICANT CHANGE UP (ref 80–100)
PLATELET # BLD AUTO: 221 K/UL — SIGNIFICANT CHANGE UP (ref 150–400)
POTASSIUM SERPL-MCNC: 4.1 MMOL/L — SIGNIFICANT CHANGE UP (ref 3.5–5.3)
POTASSIUM SERPL-SCNC: 4.1 MMOL/L — SIGNIFICANT CHANGE UP (ref 3.5–5.3)
RBC # BLD: 4.32 M/UL — SIGNIFICANT CHANGE UP (ref 3.8–5.2)
RBC # FLD: 15.1 % — SIGNIFICANT CHANGE UP (ref 10.3–16.9)
SODIUM SERPL-SCNC: 139 MMOL/L — SIGNIFICANT CHANGE UP (ref 135–145)
WBC # BLD: 9.1 K/UL — SIGNIFICANT CHANGE UP (ref 3.8–10.5)
WBC # FLD AUTO: 9.1 K/UL — SIGNIFICANT CHANGE UP (ref 3.8–10.5)

## 2017-10-20 PROCEDURE — 80053 COMPREHEN METABOLIC PANEL: CPT

## 2017-10-20 PROCEDURE — 70552 MRI BRAIN STEM W/DYE: CPT

## 2017-10-20 PROCEDURE — 83735 ASSAY OF MAGNESIUM: CPT

## 2017-10-20 PROCEDURE — 97161 PT EVAL LOW COMPLEX 20 MIN: CPT

## 2017-10-20 PROCEDURE — 96375 TX/PRO/DX INJ NEW DRUG ADDON: CPT

## 2017-10-20 PROCEDURE — G0378: CPT

## 2017-10-20 PROCEDURE — 85730 THROMBOPLASTIN TIME PARTIAL: CPT

## 2017-10-20 PROCEDURE — 85610 PROTHROMBIN TIME: CPT

## 2017-10-20 PROCEDURE — 85027 COMPLETE CBC AUTOMATED: CPT

## 2017-10-20 PROCEDURE — 96376 TX/PRO/DX INJ SAME DRUG ADON: CPT

## 2017-10-20 PROCEDURE — 94640 AIRWAY INHALATION TREATMENT: CPT

## 2017-10-20 PROCEDURE — 80048 BASIC METABOLIC PNL TOTAL CA: CPT

## 2017-10-20 PROCEDURE — A9585: CPT

## 2017-10-20 PROCEDURE — 36415 COLL VENOUS BLD VENIPUNCTURE: CPT

## 2017-10-20 PROCEDURE — 93005 ELECTROCARDIOGRAM TRACING: CPT

## 2017-10-20 PROCEDURE — 96374 THER/PROPH/DIAG INJ IV PUSH: CPT

## 2017-10-20 PROCEDURE — 70450 CT HEAD/BRAIN W/O DYE: CPT

## 2017-10-20 PROCEDURE — 99285 EMERGENCY DEPT VISIT HI MDM: CPT | Mod: 25

## 2017-10-20 PROCEDURE — 85025 COMPLETE CBC W/AUTO DIFF WBC: CPT

## 2017-10-20 RX ORDER — FOLIC ACID 0.8 MG
1 TABLET ORAL
Qty: 30 | Refills: 0 | OUTPATIENT
Start: 2017-10-20 | End: 2017-11-19

## 2017-10-20 RX ORDER — DOCUSATE SODIUM 100 MG
1 CAPSULE ORAL
Qty: 30 | Refills: 0 | OUTPATIENT
Start: 2017-10-20 | End: 2017-11-19

## 2017-10-20 RX ORDER — DEXAMETHASONE 0.5 MG/5ML
2 ELIXIR ORAL
Qty: 0 | Refills: 0 | COMMUNITY

## 2017-10-20 RX ORDER — ALBUTEROL 90 UG/1
3 AEROSOL, METERED ORAL
Qty: 0 | Refills: 0 | COMMUNITY

## 2017-10-20 RX ORDER — LEVETIRACETAM 250 MG/1
1 TABLET, FILM COATED ORAL
Qty: 0 | Refills: 0 | COMMUNITY

## 2017-10-20 RX ORDER — DEXAMETHASONE 0.5 MG/5ML
2 ELIXIR ORAL
Qty: 60 | Refills: 0 | OUTPATIENT
Start: 2017-10-20 | End: 2017-11-19

## 2017-10-20 RX ORDER — LEVETIRACETAM 250 MG/1
1 TABLET, FILM COATED ORAL
Qty: 60 | Refills: 0 | OUTPATIENT
Start: 2017-10-20 | End: 2017-11-19

## 2017-10-20 RX ORDER — SIMETHICONE 80 MG/1
80 TABLET, CHEWABLE ORAL
Qty: 0 | Refills: 0 | Status: DISCONTINUED | OUTPATIENT
Start: 2017-10-20 | End: 2017-10-20

## 2017-10-20 RX ORDER — DOCUSATE SODIUM 100 MG
1 CAPSULE ORAL
Qty: 0 | Refills: 0 | COMMUNITY

## 2017-10-20 RX ORDER — ALBUTEROL 90 UG/1
3 AEROSOL, METERED ORAL
Qty: 90 | Refills: 0 | OUTPATIENT
Start: 2017-10-20 | End: 2017-11-19

## 2017-10-20 RX ORDER — FLUTICASONE PROPIONATE AND SALMETEROL 50; 250 UG/1; UG/1
1 POWDER ORAL; RESPIRATORY (INHALATION)
Qty: 2 | Refills: 0 | OUTPATIENT
Start: 2017-10-20 | End: 2017-11-19

## 2017-10-20 RX ADMIN — ENOXAPARIN SODIUM 40 MILLIGRAM(S): 100 INJECTION SUBCUTANEOUS at 09:29

## 2017-10-20 RX ADMIN — BUDESONIDE AND FORMOTEROL FUMARATE DIHYDRATE 2 PUFF(S): 160; 4.5 AEROSOL RESPIRATORY (INHALATION) at 09:29

## 2017-10-20 RX ADMIN — Medication 1 MILLIGRAM(S): at 07:24

## 2017-10-20 RX ADMIN — Medication 1 MILLIGRAM(S): at 11:22

## 2017-10-20 RX ADMIN — SIMETHICONE 80 MILLIGRAM(S): 80 TABLET, CHEWABLE ORAL at 06:22

## 2017-10-20 RX ADMIN — LEVETIRACETAM 750 MILLIGRAM(S): 250 TABLET, FILM COATED ORAL at 07:25

## 2017-10-20 RX ADMIN — Medication 3 MILLILITER(S): at 09:28

## 2017-10-20 RX ADMIN — Medication 3 MILLILITER(S): at 06:22

## 2017-10-20 RX ADMIN — Medication 100 MILLIGRAM(S): at 11:22

## 2017-10-20 NOTE — PROGRESS NOTE ADULT - SUBJECTIVE AND OBJECTIVE BOX
Hem/Onc Progress Note for Dr. Peterson    HISTORY OF PRESENT ILLNESS:   56yo F w/PMHx NSCLC (s/p RLL lobectomy, RML wedge resection by Dr. Pisano 1yr ago) w/ recently dx brain mets (s/p L parietooccipital mass resection 10/4/17 at [facility]), COPD, seizures (likely 2/2 brain mets) who is presenting with L sided, incision site headache persistent for 2 weeks. .     PAST MEDICAL & SURGICAL HISTORY:  MRSA (methicillin resistant Staphylococcus aureus): history of  Adenocarcinoma of lung  COPD (chronic obstructive pulmonary disease)  H/O brain surgery  Surgery, elective: lung biopsy  History of appendectomy    FAMILY HISTORY:  No pertinent family history in first degree relatives    SOCIAL HISTORY:  Tobacco use: former 30py smoker quit in 2016  EtOH use: denies  Illicit drug use: denies    Allergies    penicillin (Angioedema)    Intolerances    REVIEW OF SYSTEMS  See HPI    MEDICATIONS:    ketorolac   Injectable 30 milliGRAM(s) IV Push every 6 hours PRN  levETIRAcetam 750 milliGRAM(s) Oral two times a day  oxyCODONE    5 mG/acetaminophen 325 mG 2 Tablet(s) Oral every 4 hours PRN    enoxaparin Injectable 40 milliGRAM(s) SubCutaneous every 24 hours      ALBUTerol/ipratropium for Nebulization 3 milliLiter(s) Nebulizer every 4 hours  buDESOnide 160 MICROgram(s)/formoterol 4.5 MICROgram(s) Inhaler 2 Puff(s) Inhalation two times a day  dexamethasone     Tablet 1 milliGRAM(s) Oral daily  docusate sodium 100 milliGRAM(s) Oral daily      folic acid 1 milliGRAM(s) Oral daily      Vital Signs Last 24 Hrs  T(C): 36.1 (19 Oct 2017 14:45), Max: 37.2 (19 Oct 2017 00:21)  T(F): 97 (19 Oct 2017 14:45), Max: 98.9 (19 Oct 2017 00:21)  HR: 70 (19 Oct 2017 14:45) (64 - 82)  BP: 102/65 (19 Oct 2017 14:45) (99/68 - 134/70)  BP(mean): --  RR: 18 (19 Oct 2017 14:45) (18 - 20)  SpO2: 98% (19 Oct 2017 14:45) (98% - 100%)    PHYSICAL EXAM:  Constitutional: NAD, resting comfortably  Gastrointestinal: Abdomen soft NT/ND  Neurological: AAOX3, FC, speech coherent  CNII-XII: EOM intact, PERRL, face symmetric, tongue midline  Scalp: Left parietal incision site C/D/I, healing well with absorbable sutures in place    LABS:                        13.7   13.3  )-----------( 286      ( 19 Oct 2017 00:01 )             40.7     10-19    137  |  95<L>  |  18  ----------------------------<  107<H>  5.3   |  30  |  0.86    Ca    9.6      19 Oct 2017 00:04  Mg     2.3     10-19    TPro  7.6  /  Alb  3.9  /  TBili  0.2  /  DBili  x   /  AST  27  /  ALT  34  /  AlkPhos  71  10-19    PT/INR - ( 19 Oct 2017 00:01 )   PT: 11.0 sec;   INR: 0.99          PTT - ( 19 Oct 2017 00:01 )  PTT:24.1 sec    CULTURES:    RADIOLOGY & ADDITIONAL STUDIES:  [facility] 10/19/2017:  1.  Status post left parietal craniotomy. 1.7 cm hypodense structure in   the underlying left parieto-occipital region with surrounding vasogenic   edema but without midline shift or herniation. This could represent a   resection cavity with associated postsurgical changes versus   residual/recurrent tumor. Comparison with more recent outside imaging is   recommended to assess for change.  2.  No acute intracranial hemorrhage or acute transcortical infarction.
This is a 58yo F limited historian w/ PMH NSCLC (s/p RLL lobectomy, RML wedge resection by Dr. Pisano 1yr ago) w/ recently dx brain mets (s/p L parietooccipital mass resection 10/4/17 at [facility]), COPD, seizures (likely 2/2 brain mets) who is presenting with L sided HA. Some of hx obtained from outside records, prior charts, and ED providers given pt is limited historian and tangential. She was recently admitted to [facility] after developing headaches and generally feeling unwell w/ vomiting, AMS two weeks ago where she underwent craniotomy and L parietooccipital mass resection for metastasis presumably from her NSCLC. Her post-op course was uneventful per [facility] records and was subsequently discharged to [facility] about 1 week ago. There is discrepancy b/t records and pt account as she says she was discharged yesterday to rehab. Nevertheless, she developed pain at rehab and felt she was not receiving adequate care, said she wanted to go back to the hospital and was BIBEMS to [facility] ED. EMS records reveal pt c/o SOB and was given nebs in transit however both ED provider and patient deny having SOB at any time.    Currently her only complaint is intractable HA localized to left parietal region. Denies F/C, photophobia, blurry vision, dizziness, hearing changes, seizures, CP, palpitations, SOB, cough, abd pain, N/V/D, dysuria, or weakness. She says she cannot go home with pain like this and wants her doctors here to see her.	  MEDICATIONS:      ALBUTerol/ipratropium for Nebulization 3 milliLiter(s) Nebulizer every 4 hours  buDESOnide 160 MICROgram(s)/formoterol 4.5 MICROgram(s) Inhaler 2 Puff(s) Inhalation two times a day    ketorolac   Injectable 30 milliGRAM(s) IV Push every 6 hours PRN  levETIRAcetam 750 milliGRAM(s) Oral two times a day  oxyCODONE    5 mG/acetaminophen 325 mG 2 Tablet(s) Oral every 4 hours PRN    docusate sodium 100 milliGRAM(s) Oral daily  simethicone 80 milliGRAM(s) Chew two times a day PRN    dexamethasone     Tablet 1 milliGRAM(s) Oral daily    enoxaparin Injectable 40 milliGRAM(s) SubCutaneous every 24 hours  folic acid 1 milliGRAM(s) Oral daily      Complaint:     Otherwise 12 point review of systems is normal.    ICU Vital Signs Last 24 Hrs  T(C): 36.8 (20 Oct 2017 08:49), Max: 37.2 (19 Oct 2017 20:45)  T(F): 98.3 (20 Oct 2017 08:49), Max: 98.9 (19 Oct 2017 20:45)  HR: 76 (20 Oct 2017 08:49) (66 - 80)  BP: 101/70 (20 Oct 2017 08:49) (96/61 - 106/68)  BP(mean): --  ABP: --  ABP(mean): --  RR: 19 (20 Oct 2017 08:49) (17 - 19)  SpO2: 99% (20 Oct 2017 08:49) (96% - 99%)        ECG:      CHEST X RAY    CT    MRI    MRA    CT ANGIO    CAROTID DUPLEX    DUPLEX     Echocardiogram    Catheterization:    Stress Test:     LABS:	 	  CARDIAC MARKERS:                              12.4   9.1   )-----------( 221      ( 20 Oct 2017 08:09 )             37.7     10-20    139  |  103  |  20  ----------------------------<  99  4.1   |  25  |  0.82    Ca    8.6      20 Oct 2017 08:09  Mg     2.2     10-20    TPro  7.6  /  Alb  3.9  /  TBili  0.2  /  DBili  x   /  AST  27  /  ALT  34  /  AlkPhos  71  10-19        ASSESSMENT/PLAN: 	    	Please follow-up with your neurosurgery team at [facility] regarding further management of your headache, and your primary care physician, Dr. De Dios within 1-2 weeks. You have an appointment with your Pain management Doctor on wednesday.  You have an appointment with Dr. Owen to schedule gammaknife as well.	    Principal Discharge DX:	Acute intractable headache, unspecified headache type  Goal:	Continue pain management.  Instructions for follow-up, activity and diet:	Please continue to take your steroid medication as prescribed, and percocet for pain as needed. Please follow up with your primary care physician and neurosurgery team for further management of your headaches.  Secondary Diagnosis:	Metastatic lung carcinoma  Instructions for follow-up, activity and diet:	Please continue to follow up with your outpatient oncologist regarding further management of your lung cancer.  Secondary Diagnosis:	COPD (chronic obstructive pulmonary disease)  Instructions for follow-up, activity and diet:	Please continue to take your inhalers as prescribed.
CC/ HPI Patient is a 57 y old female with chronic obstructive pulmonary disease, non-small cell lung CA status post right lower lobectomy Oct 2016, L parietooccipital mass resection 10/4/17, recent discharge to rehab presenting with intractable HA, denies respiratory complaint.      PAST MEDICAL & SURGICAL HISTORY:  MRSA (methicillin resistant Staphylococcus aureus)  COPD (chronic obstructive pulmonary disease)  Pulmonary nodules  Surgery, elective: lung biopsy  History of appendectomy    SOCHX:  + tobacco,  -  alcohol    FMHX: FA/MO  - contributory     ROS reviewed below with positive findings marked (+) :  GEN:  fever, chills ENT: tracheostomy,   epistaxis,  sinusitis COR: CAD, CHF,  HTN, dysrhythmia PUL: +COPD, ILD, asthma, pneumonia GI: PEG, dysphagia, hemorrhage, other DOUGLAS: kidney disease, electrolyte disorder HEM:  anemia, thrombus, coagulopathy, +cancer ENDO:  thyroid disease, diabetes mellitus CNS:  dementia, stroke, seizure, PSY:  depression, anxiety, other        MEDICATIONS  (STANDING):  ALBUTerol/ipratropium for Nebulization 3 milliLiter(s) Nebulizer every 4 hours  buDESOnide 160 MICROgram(s)/formoterol 4.5 MICROgram(s) Inhaler 2 Puff(s) Inhalation two times a day  dexamethasone     Tablet 1 milliGRAM(s) Oral daily  docusate sodium 100 milliGRAM(s) Oral daily  enoxaparin Injectable 40 milliGRAM(s) SubCutaneous every 24 hours  folic acid 1 milliGRAM(s) Oral daily  levETIRAcetam 750 milliGRAM(s) Oral two times a day    MEDICATIONS  (PRN):  ketorolac   Injectable 30 milliGRAM(s) IV Push every 6 hours PRN Severe Pain (7 - 10)  oxyCODONE    5 mG/acetaminophen 325 mG 2 Tablet(s) Oral every 4 hours PRN Severe Pain (7 - 10)  simethicone 80 milliGRAM(s) Chew two times a day PRN Gas      Vital Signs Last 24 Hrs  T(C): 36.8 (20 Oct 2017 08:49), Max: 37.2 (19 Oct 2017 20:45)  T(F): 98.3 (20 Oct 2017 08:49), Max: 98.9 (19 Oct 2017 20:45)  HR: 76 (20 Oct 2017 08:49) (66 - 80)  BP: 101/70 (20 Oct 2017 08:49) (96/61 - 141/90)  BP(mean): --  RR: 19 (20 Oct 2017 08:49) (17 - 19)  SpO2: 99% (20 Oct 2017 08:49) (96% - 99%)    GENERAL:         comfortable,  - distress.  HEENT:            - icterus,  - injection,  - nasal discharge.  NECK:              - jugular venous distention, - thyromegaly.  LYMPH:           - lymphadenopathy, - masses.  RESP:              + clear,   - rales,   - rhonchi,   - wheezes.   COR:                S1S2 RRR  - murmurs,  - gallops,  - rubs.  ABD:                bowel sounds,   soft, - tender, - distended, - organomegaly.  EXT/MSC:         - cyanosis    NEURO:             alert,   responds to stimuli.                          12.4   9.1   )-----------( 221      ( 20 Oct 2017 08:09 )             37.7       X ray Chest (09.07.17)   Postoperative changes in the right lung with blunting of the costophrenic angle.      ASSESSMENT/PLAN      1) Headache post brain metastasectomy  2) Lung cancer status post right lower lobectomy  3) Chronic obstructive pulmonary disease      Pain management  Seizure prophylaxis  Neuro consultation  Bronchodilators:  Atrovent/ albuterol q 4 – 6 hours as needed  Budesonide/formoterol BID  Corticosteroids: decadron  Cardiac/HTN: BP stable  GI: Rx/ prophylaxis c PPI/H2B  Discussed with medical team.
INTERVAL HPI/OVERNIGHT EVENTS:  No acute events overnight.    SUBJECTIVE: Patient seen and examined at bedside. Continues to have left sided headache radiating down the neck, described as a soreness, with photophobia. Denies dizziness, nausea, vomiting. Denies history of seizures.     ROS:  CV: Denies chest pain  Resp: Denies SOB  GI: Denies abdominal pain, constipation, diarrhea, nausea, vomiting  : Denies dysuria  ID: Denies fevers, chills  MSK: Denies joint pain     OBJECTIVE:    VITAL SIGNS:  ICU Vital Signs Last 24 Hrs  T(C): 36.4 (19 Oct 2017 09:50), Max: 37.2 (19 Oct 2017 00:21)  T(F): 97.6 (19 Oct 2017 09:50), Max: 98.9 (19 Oct 2017 00:21)  HR: 76 (19 Oct 2017 09:50) (64 - 82)  BP: 113/68 (19 Oct 2017 09:50) (99/68 - 134/70)  BP(mean): --  ABP: --  ABP(mean): --  RR: 18 (19 Oct 2017 09:50) (18 - 20)  SpO2: 99% (19 Oct 2017 09:50) (98% - 100%)      PHYSICAL EXAM:  General: NAD, sleeping in bed  HEENT: NC/AT; PERRL, clear conjunctiva, EOMI, MMM. L parietal scalp with dressing in place, some hemorrhagic crust  Neck: supple, no JVD or LAD  Respiratory: CTAB, no rales/rhonchi/crackles, wheezing  Cardiovascular: +S1/S2; RRR, no murmurs/rubs/clicks/gallops  Abdomen: soft, NT/ND; +BS x4, no HSM, no rebound/guarding  Extremities: WWP, 2+ peripheral pulses b/l; no LE edema  Neurological: AOX3, CN 2-12 intact, strength equal and 5/5 b/l UE and LE    MEDICATIONS:  MEDICATIONS  (STANDING):  ALBUTerol/ipratropium for Nebulization 3 milliLiter(s) Nebulizer every 4 hours  buDESOnide 160 MICROgram(s)/formoterol 4.5 MICROgram(s) Inhaler 2 Puff(s) Inhalation two times a day  dexamethasone     Tablet 1 milliGRAM(s) Oral daily  docusate sodium 100 milliGRAM(s) Oral daily  enoxaparin Injectable 40 milliGRAM(s) SubCutaneous every 24 hours  folic acid 1 milliGRAM(s) Oral daily  levETIRAcetam 750 milliGRAM(s) Oral two times a day    MEDICATIONS  (PRN):  oxyCODONE    5 mG/acetaminophen 325 mG 2 Tablet(s) Oral every 4 hours PRN Severe Pain (7 - 10)      ALLERGIES:  Allergies    penicillin (Angioedema)    Intolerances        LABS:                        13.7   13.3  )-----------( 286      ( 19 Oct 2017 00:01 )             40.7     10-19    137  |  95<L>  |  18  ----------------------------<  107<H>  5.3   |  30  |  0.86    Ca    9.6      19 Oct 2017 00:04  Mg     2.3     10-19    TPro  7.6  /  Alb  3.9  /  TBili  0.2  /  DBili  x   /  AST  27  /  ALT  34  /  AlkPhos  71  10-19    PT/INR - ( 19 Oct 2017 00:01 )   PT: 11.0 sec;   INR: 0.99          PTT - ( 19 Oct 2017 00:01 )  PTT:24.1 sec      RADIOLOGY & ADDITIONAL TESTS: Reviewed.
Interventional, Pulmonary, Critical, Chest Special Procedures.    Pt was seen and fully examined by myself.     Time spent with patient in minutes:77    Patient is a 57y old  Female who presents with a chief complaint of severe HA (19 Oct 2017 15:45)The patient requested that I see her.  Events leading to this admission reviewed. The patient no pain free, comfortable and eupneic on RA. Good inspiratory effort.    HPI:  This is a 56yo F limited historian w/ PMH NSCLC (s/p RLL lobectomy, RML wedge resection by Dr. Pisano 1yr ago) w/ recently dx brain mets (s/p L parietooccipital mass resection 10/4/17 at [facility]), COPD, seizures (likely 2/2 brain mets) who is presenting with L sided HA. Some of hx obtained from outside records, prior charts, and ED providers given pt is limited historian and tangential. She was recently admitted to [facility] after developing headaches and generally feeling unwell w/ vomiting, AMS two weeks ago where she underwent craniotomy and L parietooccipital mass resection for metastasis presumably from her NSCLC. Her post-op course was uneventful per [facility] records and was subsequently discharged to [facility] about 1 week ago. There is discrepancy b/t records and pt account as she says she was discharged yesterday to rehab. Nevertheless, she developed pain at rehab and felt she was not receiving adequate care, said she wanted to go back to the hospital and was BIBEMS to [facility] ED. EMS records reveal pt c/o SOB and was given nebs in transit however both ED provider and patient deny having SOB at any time.    Currently her only complaint is intractable HA localized to left parietal region. Denies F/C, photophobia, blurry vision, dizziness, hearing changes, seizures, CP, palpitations, SOB, cough, abd pain, N/V/D, dysuria, or weakness. She says she cannot go home with pain like this and wants her doctors here to see her.    ED VS: T afebrile -134/70-75 P 64-82 R 19-20 SpO2 % on RA  ED adm: 1L NS bolus x1, morphine 4+4 = 8mg total IVP, reglan 10mg IVP x1, keppra 750mg IVPB x1 (19 Oct 2017 03:30)    REVIEW OF SYSTEMS:  Constitutional: No fever, weight loss, chills + fatigue  Eyes: No eye pain, visual disturbances, or discharge  ENMT:  No difficulty hearing, tinnitus, vertigo; No sinus or throat pain. No epistaxis, dysphagia, dysphonia, hoarseness or odynophagia  Neck: No pain, stiffness or neck swelling.  No masses or deformities. Post surgical changes noted.  Respiratory: + cough, non wheezing, chills or hemoptysis  + COPD  - ILD   - PE   - ASTHMA     - PNEUMONIA  Cardiovascular: No chest pain, dysrhythmia, palpitations, dizziness or edema   + COPD     - CAD   - CHF   - HTN  Gastrointestinal: No abdominal or epigastric pain. No nausea, vomiting or hematemesis; No diarrhea or constipation. No melena or hematochezia. No dysphagia or Icterus.          Genitourinary: No dysuria, frequency, hematuria or incontinence   - CKD/RAINE      - ESRD  Neurological: Severe headaches, now resolved headaches, some memory loss, no loss of strength, numbness or tremors      -DEMENTIA     - STROKE    - SEIZURE  Skin: No itching, burning, rashes or lesions   Lymph Nodes: No enlarged glands  Endocrine: No heat or cold intolerance; No hair loss       - DM     - THYROID DISORDER  Musculoskeletal: No joint pain or swelling; No muscle, back or extremity pain  Psychiatric: No depression, anxiety, mood swings or difficulty sleeping  Heme/Lymph: No easy bruising or bleeding gums         - ANEMIA     + lung CANCER   -COAGULOPATHY  Allergy and Immunologic: No hives or eczema    PAST MEDICAL & SURGICAL HISTORY:  MRSA (methicillin resistant Staphylococcus aureus): history of  Adenocarcinoma of lung  COPD (chronic obstructive pulmonary disease)  H/O brain surgery  Surgery, elective: lung biopsy  History of appendectomy    FAMILY HISTORY:  No pertinent family history in first degree relatives    SOCIAL HISTORY:      + Tobacco     - ETOH    Allergies    penicillin (Angioedema)    Intolerances      Vital Signs Last 24 Hrs  T(C): 36.8 (20 Oct 2017 08:49), Max: 37.2 (19 Oct 2017 20:45)  T(F): 98.3 (20 Oct 2017 08:49), Max: 98.9 (19 Oct 2017 20:45)  HR: 76 (20 Oct 2017 08:49) (66 - 80)  BP: 101/70 (20 Oct 2017 08:49) (96/61 - 141/90)  BP(mean): --  RR: 19 (20 Oct 2017 08:49) (17 - 19)  SpO2: 99% (20 Oct 2017 08:49) (96% - 99%)      PHYSICAL EXAM:  Comfortable, no distress  Eyes: PERRL, EOM intact; conjunctiva and sclera clear  Head: Normocephalic; s/p neurosurgery   ENMT: No nasal discharge, hoarseness, +cough   Neck: Supple; non tender; no masses or deformities.    No JVD  Respiratory: CTA,  - WHEEZING   - RHONCHI  - RALES  + CRACKLES.  Diminished breath sounds  BILATERAL  RIGHT > LEFT base  Cardiovascular: Regular rate and rhythm. S1 and S2 Normal; No murmurs, gallops or rubs     - PPM/AICD  Gastrointestinal: Soft non-tender, non-distended; Normal bowel sounds; No hepatosplenomegaly.     -PEG    -  GT   - MILLAN  Genitourinary: No costovertebral angle tenderness. No dysuria  Extremities: AROM, No clubbing, cyanosis or edema    Vascular: Peripheral pulses palpable 2+ bilaterally  Neurological: Alert and anxious   Skin: Warm and dry. No obvious rash  Lymph Nodes: No acute cervical or supraclavicular adenopathy  Psychiatric: Cooperative and appropriate mood    DEVICES:  - DENTURES   +IV R / L     - ETUBE   -TRACH   -CTUBE  R / L      LABS:                          12.4   9.1   )-----------( 221      ( 20 Oct 2017 08:09 )             37.7     10-20    139  |  103  |  20  ----------------------------<  99  4.1   |  25  |  0.82    Ca    8.6      20 Oct 2017 08:09  Mg     2.2     10-20    TPro  7.6  /  Alb  3.9  /  TBili  0.2  /  DBili  x   /  AST  27  /  ALT  34  /  AlkPhos  71  10-19    PT/INR - ( 19 Oct 2017 00:01 )   PT: 11.0 sec;   INR: 0.99          PTT - ( 19 Oct 2017 00:01 )  PTT:24.1 sec  < from: Xray Chest 2 Views PA/Lat (09.07.17 @ 11:39) >    EXAM:  XR CHEST PA - LAT                          PROCEDURE DATE:  09/07/2017                     INTERPRETATION:  PA/Lateral Chest X-Ray dated 9/7/2017.    Indication: Surveillance.    Prior: Frontal chest x-ray dated 4/1/2017.    Findings:  PA and lateral views of the chest are submitted. Cardiac size is normal.    The pulmonary vasculature is within normal limits. There is volume loss   in the right lung compatible with previous lobectomy. Mild blunting the   costophrenic angle is present.. There is no focal opacity or   consolidation. There is no left pleural effusion.  There is no   pneumothorax. Degenerative changes of the spine are noted.    Impression: Postoperative changes in the right lung with blunting of the   costophrenic angle    < end of copied text >  < from: MRI Head w/Cont (10.19.17 @ 13:48) >    EXAM:  MR BRAIN WCONTRAST                          PROCEDURE DATE:  10/19/2017    Quantity of Contrast in Vial in ml: 7.5 Contrast Used: Gadovist  Quantity of Contrast Wasted in ml: 0           INTERPRETATION:    Clinical history: Status post left parietal occipital mass resection with   headache.    Technique: MRI of the brain was performed utilizing sagittal and axial   T1, axial T2, axial gradient echo, axial and coronal FLAIR and diffusion   imaging. Following the ministration of intravenous gadolinium, sagittal   axial and coronal T1 MP rage reformatted images were obtained.    Comparison is made to a prior MRI performed on 8/16/2016.    Findings: The patient is status post left posterior parietal craniotomy.   There is an approximately 2 cm wide x 2 cm AP x 2.5 cm craniocaudal   acute/subacute hemorrhage within the left posterior parietal lobe at the   resection site. Following the ministration of contrast there is   enhancement surrounding this hematoma and extending along the posterior   resection tract site. Medially this lesion is abutting the superior   interhemispheric fissure. Anteriorly and laterally there is nodular   extension of enhancement into the lateral aspect of the left splenium of   the corpus callosum measuring approximately 1.9 cm x 1 cm (sagittal T1   post MP rage image #[id number] series 801). There is moderate surrounding T2 and   FLAIR signal hyperintensity. There is peripheral diffusion hyperintensity   with ADC map dropout consistent with mild devitalization surrounding the   left posterior parietal surgical site.       There is a left pontine chronic lacunar infarction. There are several   scattered punctate foci of T2 and Flair signal hyperintensities within   the white matter that are nonspecific. There is no evidence of   mass-effect or midline shift. There is no evidence of  extra-axial fluid   collection. The ventricles are of normal size and caliber.  Again noted   is a filling defect within the left transverse and sigmoid sinus,   unchanged. Evaluation of the intracranial vascular flow-voids appear   within normal limits.    There is a 2 mm punctate focus of FLAIR signal hyperintensity in the left   dionicio consistent with a chronic lacunar infarction, unchanged from the   prior study.    There is enlargement of the sella measuring approximately 1.6 cm in AP   diameter with flattening of the pituitary gland consistent with an empty   or partially empty sella.    There is a tiny right and a small left maxillary polyp versus retention   cyst. Visualized paranasal sinuses and bilateral mastoid air cells are   clear.    Impression: Status post left posterior parietal craniotomy with a 2.5 cm   subacute hematoma at the resection site within the left posterior   parietal lobe. There is peripheral and nodular enhancement surrounding   the hematoma and extending along the tract site. Recommend follow-up MRI   with contrast to assess for underlying lesion.    Nonspecific several scattered punctate foci of T2 and Flair signal   hyperintensities within the white matter; findings may be seen in patient   with migraine headaches, vasculitis, microvascular disease, demyelinating   disease, Lyme disease and viral illness.  No evidence of acute infarction.    Chronic filling defect seen in the left sigmoid and transverse sinus,   unchanged.    < end of copied text >  RADIOLOGY & ADDITIONAL STUDIES (The following images were personally reviewed):
This is a 56yo F limited historian w/ PMH NSCLC (s/p RLL lobectomy, RML wedge resection by Dr. Pisano 1yr ago) w/ recently dx brain mets (s/p L parietooccipital mass resection 10/4/17 at [facility]), COPD, seizures (likely 2/2 brain mets) who is presenting with L sided HA. Some of hx obtained from outside records, prior charts, and ED providers given pt is limited historian and tangential. She was recently admitted to [facility] after developing headaches and generally feeling unwell w/ vomiting, AMS two weeks ago where she underwent craniotomy and L parietooccipital mass resection for metastasis presumably from her NSCLC. Her post-op course was uneventful per [facility] records and was subsequently discharged to [facility] about 1 week ago. There is discrepancy b/t records and pt account as she says she was discharged yesterday to rehab. Nevertheless, she developed pain at rehab and felt she was not receiving adequate care, said she wanted to go back to the hospital and was BIBEMS to [facility] ED. EMS records reveal pt c/o SOB and was given nebs in transit however both ED provider and patient deny having SOB at any time.    Currently her only complaint is intractable HA localized to left parietal region. Denies F/C, photophobia, blurry vision, dizziness, hearing changes, seizures, CP, palpitations, SOB, cough, abd pain, N/V/D, dysuria, or weakness. She says she cannot go home with pain like this and wants her doctors here to see her.    ED VS: T afebrile -134/70-75 P 64-82 R 19-20 SpO2 % on RA  ED adm: 1L NS bolus x1, morphine 4+4 = 8mg total IVP, reglan 10mg IVP x1, keppra 750mg IVPB x1This is a 56yo F limited historian w/ PMH NSCLC (s/p RLL lobectomy, RML wedge resection by Dr. Pisano 1yr ago) w/ recently dx brain mets (s/p L parietooccipital mass resection 10/4/17 at [facility]), COPD, seizures (likely 2/2 brain mets) who is presenting with L sided HA. Some of hx obtained from outside records, prior charts, and ED providers given pt is limited historian and tangential. She was recently admitted to [facility] after developing headaches and generally feeling unwell w/ vomiting, AMS two weeks ago where she underwent craniotomy and L parietooccipital mass resection for metastasis presumably from her NSCLC. Her post-op course was uneventful per [facility] records and was subsequently discharged to [facility] about 1 week ago. There is discrepancy b/t records and pt account as she says she was discharged yesterday to rehab. Nevertheless, she developed pain at rehab and felt she was not receiving adequate care, said she wanted to go back to the hospital and was BIBEMS to [facility] ED. EMS records reveal pt c/o SOB and was given nebs in transit however both ED provider and patient deny having SOB at any time.    Currently her only complaint is intractable HA localized to left parietal region. Denies F/C, photophobia, blurry vision, dizziness, hearing changes, seizures, CP, palpitations, SOB, cough, abd pain, N/V/D, dysuria, or weakness. She says she cannot go home with pain like this and wants her doctors here to see her.    ED VS: T afebrile -134/70-75 P 64-82 R 19-20 SpO2 % on RA  ED adm: 1L NS bolus x1, morphine 4+4 = 8mg total IVP, reglan 10mg IVP x1, keppra 750mg IVPB x1        	  MEDICATIONS:      ALBUTerol/ipratropium for Nebulization 3 milliLiter(s) Nebulizer every 4 hours  buDESOnide 160 MICROgram(s)/formoterol 4.5 MICROgram(s) Inhaler 2 Puff(s) Inhalation two times a day    ketorolac   Injectable 30 milliGRAM(s) IV Push every 6 hours PRN  levETIRAcetam 750 milliGRAM(s) Oral two times a day  oxyCODONE    5 mG/acetaminophen 325 mG 2 Tablet(s) Oral every 4 hours PRN    docusate sodium 100 milliGRAM(s) Oral daily    dexamethasone     Tablet 1 milliGRAM(s) Oral daily    enoxaparin Injectable 40 milliGRAM(s) SubCutaneous every 24 hours  folic acid 1 milliGRAM(s) Oral daily  ICU Vital Signs Last 24 Hrs T(C): 36.8 (19 Oct 2017 17:04), Max: 37.2 (19 Oct 2017 00:21) T(F): 98.2 (19 Oct 2017 17:04), Max: 98.9 (19 Oct 2017 00:21) HR: 73 (19 Oct 2017 17:04) (64 - 82) BP: 141/90 (19 Oct 2017 17:04) (99/68 - 141/90) BP(mean): -- ABP: -- ABP(mean): -- RR: 18 (19 Oct 2017 17:04) (18 - 20) SpO2: 97% (19 Oct 2017 17:04) (97% - 100%)   PHYSICAL EXAM:   Constitutional: obese WDWN female resting comfortably in bed; NAD  Head: L craniotomy scar C/D/I with mild/appropriate post-surgical TTP overlying  Eyes: PERRL, EOMI, anicteric sclera  ENT: no nasal discharge; uvula midline, no oropharyngeal erythema or exudates; MMM  Neck: supple; no JVD  Respiratory: decreased BS on RML/RLL w/ scant anterior wheezes; no retractions or increased WOB, conversive of full sentences  Cardiac: +S1/S2; RRR; no M/R/G  Gastrointestinal: abdomen obese and soft, NT/ND; no rebound or guarding; +BSx4  Back: no CVAT B/L  Extremities: WWP, no clubbing or cyanosis; no peripheral edema  Musculoskeletal: moving all 4 extremities; no joint swelling, tenderness or erythema  Vascular: 2+ radial, DP/PT pulses B/L  Dermatologic: skin warm, dry and intact; craniotomy scar as above, well healed scars overlying R chest wall  Neurologic: AAOx2; no focal deficits Psychiatric: somnolent but pleasant and conversive, somewhat tangental during interview	      Complaint:     Otherwise 12 point review of systems is normal.  ECG:      CHEST X RAY    CT  < from: CT Head No Cont (10.19.17 @ 00:13) >        INTERPRETATION:    HISTORY: 57-year-old female with headache status post left mass removal.     TECHNIQUE: Head CT was performed without intravenous contrast. Axial,   sagittal, and coronal images were obtained.    COMPARISON: CT head 5/18/2011    FINDINGS:   There is a high left parietal craniotomy, new since 2011. There is a 1.4   x 1.2 x 1.7 cm hypodense structure in the left parieto-occipital with   surrounding vasogenic edema. There is effacement of the overlying sulci.   No midline shift or herniation is appreciated.    The size and configuration of the ventricles and sulci are otherwise   within normal limits for a person of this age. No acute transcortical   infarction or hemorrhage is identified. No extra-axial fluid collection,   herniation, or subfalcine shift is visualized.     The visualized paranasal sinuses and mastoid air cells are clear.       IMPRESSION:   1.  Status post left parietal craniotomy. 1.7 cm hypodense structure in   the underlying left parieto-occipital region with surrounding vasogenic   edema but without midline shift or herniation. This could represent a   resection cavity with associated postsurgical changes versus   residual/recurrent tumor. Comparison with more recent outside imaging is   recommended to assess for change.  2.  No acute intracranial hemorrhage or acute transcortical infarction.          MRI    MRA    CT ANGIO    CAROTID DUPLEX    DUPLEX     Echocardiogram    Catheterization:    Stress Test:     LABS:	 	  CARDIAC MARKERS:                              13.7   13.3  )-----------( 286      ( 19 Oct 2017 00:01 )             40.7     10-19    137  |  95<L>  |  18  ----------------------------<  107<H>  5.3   |  30  |  0.86    Ca    9.6      19 Oct 2017 00:04  Mg     2.3     10-19    TPro  7.6  /  Alb  3.9  /  TBili  0.2  /  DBili  x   /  AST  27  /  ALT  34  /  AlkPhos  71  10-19              ASSESSMENT/PLAN: 	  This is a 56yo F w/ PMH NSCLC (s/p RLL lobectomy, RML wedge resection by Dr. Pisano 1yr ago) w/ recently dx brain mets (s/p L parietooccipital mass resection 10/4/17 at [facility] [place] [facility]), COPD, recent discharge to rehab presenting with intractable HA.    Problem/Plan - 1:  ·  Problem: Acute intractable headache, unspecified headache type.  Plan: Plan:  Pt w/ similar complaints 2 weeks ago when presenting to [facility] and is now recently s/p L craniotomy and parietooccipital mass resection which, in conjunction with her metastases is likely the etiology of her pain; HA pain was improved after morphine but patient does not want more morphine and just wants to rest  - percocet 10/650 PO q4h PRN severe pain (pt reconciled for oxy ER 30mg TID PRN at home)  - will consider toradol + reglan + IV tylenol cocktail if pt does not want morphine or her home oxy for pain control  - CT head reassuring and neuro exam reassuring thus far.     Problem/Plan - 2:  ·  Problem: Metastatic lung carcinoma.  Plan: Plan:  NSCLC dx 6/2016 s/p RLL lobectomy, RML wedge resection w/ Dr. Pisano; known to Srikanth Hathaway + Rodrigo as well and pt requesting to see them  - consult Dr. Wallace or Rivas in AM  - home decadron 1mg PO daily  - home keppra 750mg PO BID  Problem/Plan - 3:  ·  Problem: Leukocytosis. COPD (chronic obstructive pulmonary disease)  Plan: Plan:  Likely reactive in the setting of decadron usage at home  - monitor CBC (next @ 1000hrs)  - low suspicion for acute infection and not meeting other SIRS criteria but will continue to monitor.     Problem/Plan - 4:  ·  Problem: COPD (chronic obstructive pulmonary disease). Seizure disorder  Plan: Plan:  Not in acute exacerbation  - home advair interchange to symbicort 160/4.5mcg inhaled BID  - duonebs q4h  - incentive spirometry  - PT, OOB to chair.     Problem/Plan - 5:  ·  Problem: Seizure disorder. Nutrition, metabolism, and development symptoms  Plan: Plan:  Likely 2/2 brain metastases; reconciled for keppra on discharge from [facility]  - home keppra 750mg PO BID; already received dose overnight in ED  - seizure precautions  - consider neuro consult in AM pending pt disposition.
Time seen: 8:00 am    CC/ HPI Patient is a 57 y old female with chronic obstructive pulmonary disease, non-small cell lung CA status post right lower lobectomy Oct 2016, L parietooccipital mass resection 10/4/17, recent discharge to rehab presenting with intractable HA, denies respiratory complaint.      PAST MEDICAL & SURGICAL HISTORY:  MRSA (methicillin resistant Staphylococcus aureus)  COPD (chronic obstructive pulmonary disease)  Pulmonary nodules  Surgery, elective: lung biopsy  History of appendectomy    SOCHX:  + tobacco,  -  alcohol    FMHX: FA/MO  - contributory     ROS reviewed below with positive findings marked (+) :  GEN:  fever, chills ENT: tracheostomy,   epistaxis,  sinusitis COR: CAD, CHF,  HTN, dysrhythmia PUL: +COPD, ILD, asthma, pneumonia GI: PEG, dysphagia, hemorrhage, other DOUGLAS: kidney disease, electrolyte disorder HEM:  anemia, thrombus, coagulopathy, +cancer ENDO:  thyroid disease, diabetes mellitus CNS:  dementia, stroke, seizure, PSY:  depression, anxiety, other       MEDICATIONS  (STANDING):  ALBUTerol/ipratropium for Nebulization 3 milliLiter(s) Nebulizer every 4 hours  buDESOnide 160 MICROgram(s)/formoterol 4.5 MICROgram(s) Inhaler 2 Puff(s) Inhalation two times a day  dexamethasone     Tablet 1 milliGRAM(s) Oral daily  docusate sodium 100 milliGRAM(s) Oral daily  enoxaparin Injectable 40 milliGRAM(s) SubCutaneous every 24 hours  folic acid 1 milliGRAM(s) Oral daily  levETIRAcetam 750 milliGRAM(s) Oral two times a day    MEDICATIONS  (PRN):  ketorolac   Injectable 30 milliGRAM(s) IV Push every 6 hours PRN Severe Pain (7 - 10)  oxyCODONE    5 mG/acetaminophen 325 mG 2 Tablet(s) Oral every 4 hours PRN Severe Pain (7 - 10)      Vital Signs Last 24 Hrs  T(C): 37.2 (19 Oct 2017 20:45), Max: 37.2 (19 Oct 2017 00:21)  T(F): 98.9 (19 Oct 2017 20:45), Max: 98.9 (19 Oct 2017 00:21)  HR: 76 (19 Oct 2017 20:45) (64 - 82)  BP: 96/61 (19 Oct 2017 20:45) (96/61 - 141/90)  RR: 17 (19 Oct 2017 20:45) (17 - 20)  SpO2: 97% (19 Oct 2017 20:45) (97% - 100%)    GENERAL:         comfortable,  - distress.  HEENT:             - icterus,  - injection,  - nasal discharge.  NECK:              - jugular venous distention, - thyromegaly.  LYMPH:           - lymphadenopathy, - masses.  RESP:              + clear,   - rales,   - rhonchi,   - wheezes.   COR:                S1S2 RRR  - murmurs,  - gallops,  - rubs.  ABD:                bowel sounds,   soft, - tender, - distended, - organomegaly.  EXT/MSC:         - cyanosis,  - edema.    NEURO:             alert,   responds to stimuli.                          13.7   13.3  )-----------( 286      ( 19 Oct 2017 00:01 )             40.7       X ray Chest (09.07.17)   Postoperative changes in the right lung with blunting of the costophrenic angle.      ASSESSMENT/PLAN      1) Headache post brain metastasectomy  2) Lung cancer status post right lower lobectomy  3) Chronic obstructive pulmonary disease      Pain management  Seizure prophylaxis  Neuro consultation  Bronchodilators:  Atrovent/ albuterol q 4 – 6 hours as needed  Budesonide/formoterol BID  Corticosteroids: decadron  Cardiac/HTN: BP stable  GI: Rx/ prophylaxis c PPI/H2B  Discussed with medical team.

## 2017-10-20 NOTE — PROGRESS NOTE ADULT - ASSESSMENT
Stage IV NSCLC NSCLC (s/p RLL lobectomy, RML wedge resection 2016 w/ recently dx brain mets (s/p L parietooccipital mass resection 10/4/17 [place] [facility]  presenting with L sided, incision site headache       -MRI with 2.5cm subacute hematoma in the surgical bed  -continue seizure medications  -await neuro and neurosurg input  -consider holding lovenox and using mechanical compression devices for DVT prophylaxis  -may need rad onc consultation  -pain management    thank you!    Rosa Golden MD for Dr. Calos Peterson
ASSESSMENT/PLAN  1) Headache post brain metastasectomy  2) Lung cancer status post right lower partial RML  lobectomy  3) Chronic obstructive pulmonary disease stable  Pain management  Seizure prophylaxis  Neuro follow up  Bronchodilators:  Atrovent/ albuterol q 4 – 6 hours as needed  Budesonide/formoterol BID  Corticosteroids: decadron to continue  Cardiac/HTN: BP stable  GI: Rx/ prophylaxis c PPI/H2B  Discussed with radiation oncology regarding timing of radiation, pt to follow as o/p   Oncology feedback  appreciated.  Discussed with managing team, follow up o/p c 
This is a 56yo F w/ PMH NSCLC (s/p RLL lobectomy, RML wedge resection by Dr. Pisano 1yr ago) w/ recently dx brain mets (s/p L parietooccipital mass resection 10/4/17 at [facility] [place] COPD, recent discharge to rehab presenting with intractable HA.

## 2017-10-20 NOTE — CONSULT NOTE ADULT - SUBJECTIVE AND OBJECTIVE BOX
NEUROSURGERY PAIN MANAGEMENT CONSULT NOTE   OVERNIGHT EVENTS:  - Ed admit for HA residual     ASSESSMENT/RECOMMENDATIONS:   S[place] L parietooccipital [place] [facility] [place]  - Pt actually reporting less frequent use of pain medications in rehab, states that pain is sig. diminished since op. Gives inconsistent reports. Reporting burining pain in L temporoparietal region. Tender to touch. Associated isolated photophobia in L eye since surgery. Otherwise states pain from surgery has decreased daily, was well alleviated in rehab with Oxycodone. Denies recollection of dosing    RECOMMEND:  - No neuropathics for now, pt denies severity of pain increasing, daily it has decreased, minimize pill load and iatrogenic risk for AMS (given pt hx), no muscle relaxants indicated for now.   - Would recommend continuing pain regimen during rehab stay, Percocet 5mg q4h PRN for Mod Pain, Percocet 10mg q6h PRN for Severe Pain. No need for uptitration or IVP medications.  - IV Tylenol if pt c/o throbbing pulsating quality of HA, reported intermittent throbbing in eye.  - Recommend neurology for planning on decreasing Keppra dosing outpatient. Pt denies seizure like activity    HPI:  This is a 58yo F limited historian w/ PMH NSCLC (s/p RLL lobectomy, RML wedge resection by Dr. Pisano 1yr ago) w/ recently dx brain mets (s/p L parietooccipital [place] [facility] COPD, seizures (likely 2/2 brain mets) who is presenting with L sided HA. Some of hx obtained from outside records, prior charts, and ED providers given pt is limited historian and tangential. She was recently [place] [facility] [place] developing headaches and generally feeling unwell w/ vomiting, AMS two weeks ago where she underwent craniotomy and L parietooccipital mass resection for metastasis presumably from her NSCLC. Her post-op course was uneventful [place] [facility] [place] and was subsequently discharged to [facility] about 1 week ago. There is discrepancy b/t records and pt account as she says she was discharged yesterday to rehab. Nevertheless, she developed pain at rehab and felt she was not receiving adequate care, said she wanted to go back to the hospital and was BIBEMS to [facility] ED. EMS records reveal pt c/o SOB and was given nebs in transit however both ED provider and patient deny having SOB at any time.    Currently her only complaint is intractable HA localized to left parietal region. Denies F/C, photophobia, blurry vision, dizziness, hearing changes, seizures, CP, palpitations, SOB, cough, abd pain, N/V/D, dysuria, or weakness. She says she cannot go home with pain like this and wants her doctors here to see her.    ED VS: T afebrile -134/70-75 P 64-82 R 19-20 SpO2 % on RA  ED adm: 1L NS bolus x1, morphine 4+4 = 8mg total IVP, reglan 10mg IVP x1, keppra 750mg IVPB x1 (19 Oct 2017 03:30)      PAST MEDICAL & SURGICAL HISTORY:  MRSA (methicillin resistant Staphylococcus aureus): history of  Adenocarcinoma of lung  COPD (chronic obstructive pulmonary disease)  H/O brain surgery  Surgery, elective: lung biopsy  History of appendectomy      FAMILY HISTORY:  No pertinent family history in first degree relatives      Allergies    penicillin (Angioedema)    Intolerances        PAIN MEDICATIONS:  levETIRAcetam 750 milliGRAM(s) Oral two times a day  oxyCODONE    5 mG/acetaminophen 325 mG 2 Tablet(s) Oral every 4 hours PRN    Heme:  enoxaparin Injectable 40 milliGRAM(s) SubCutaneous every 24 hours    Antibiotics:    Cardiovascular:    GI:  docusate sodium 100 milliGRAM(s) Oral daily    Endocrine:  dexamethasone     Tablet 1 milliGRAM(s) Oral daily    All Other Medications:  folic acid 1 milliGRAM(s) Oral daily      Vital Signs Last 24 Hrs  T(C): 36.1 (19 Oct 2017 14:45), Max: 37.2 (19 Oct 2017 00:21)  T(F): 97 (19 Oct 2017 14:45), Max: 98.9 (19 Oct 2017 00:21)  HR: 70 (19 Oct 2017 14:45) (64 - 82)  BP: 102/65 (19 Oct 2017 14:45) (99/68 - 134/70)  BP(mean): --  RR: 18 (19 Oct 2017 14:45) (18 - 20)  SpO2: 98% (19 Oct 2017 14:45) (98% - 100%)    LABS:                        13.7   13.3  )-----------( 286      ( 19 Oct 2017 00:01 )             40.7     10-19    137  |  95<L>  |  18  ----------------------------<  107<H>  5.3   |  30  |  0.86    Ca    9.6      19 Oct 2017 00:04  Mg     2.3     10-19    TPro  7.6  /  Alb  3.9  /  TBili  0.2  /  DBili  x   /  AST  27  /  ALT  34  /  AlkPhos  71  10-19    PT/INR - ( 19 Oct 2017 00:01 )   PT: 11.0 sec;   INR: 0.99          PTT - ( 19 Oct 2017 00:01 )  PTT:24.1 sec      REVIEW OF SYSTEMS:  CONSTITUTIONAL: No fever or fatigue O/N. Denies chills  EYES: Reports pinpoint eye pain. Reports some throbbing.   ENMT:  No difficulty hearing. No throat pain  NECK: No pinpoint neck pain or stiffness. Neg Brudzinski.   RESPIRATORY: No cough, wheezing; No shortness of breath  CARDIOVASCULAR: No chest pain, palpitations.   GASTROINTESTINAL: Pt reports passing gas. No bowel movements or SE of constipation from pain med use. No abdominal or epigastric pain. No nausea, vomiting.   GENITOURINARY: No dysuria, frequency, or incontinence.   NEUROLOGICAL: Regaining strength. Residual, but improving headaches, Alt sensation in L side of head.. No dizziness or lightheadedness with pain medications.     FUNCTIONAL ASSESSMENT:  PAIN SCORE AT REST:    4/10     SCALE USED: (1-10 VNRS)  PAIN SCORE WITH ACTIVITY:    5/10     SCALE USED: (1-10 VNRS)    PAIN ASSESSMENT:  Pt c/o mod HA at site of incision L sided occipital. Pt reports associated eye pain, deep and throbbing with associated photophobia since surgery. States pain has burning sensation/quality. States that pain is mod-well alleviated with Percocet. States daily improving, much less than since surgery.    PHYSICAL EXAM  GENERAL: NAD  HEAD:  Atraumatic, Normocephalic  NECK: Supple  NERVOUS SYSTEM:    Alert & Oriented X3, Good concentration;   Cranial nerves grossly intact, PERRL,   Motor exam:         [X] Upper extremity            Bi(c5)  WE(c6)  EE(c7)   FF(c8)                                                R         5/5        5/5        5/5       5/5                                               L          5/5        5/5        5/5       5/5         [X] Lower extremity          HF(l2)   KE(l3)    TA(l4)   EHL(l5)  GS(s1)                                                 R        5/5        5/5        5/5       5/5         5/5                                               L         5/5        5/5       5/5       5/5          5/5                                                      [X] warm well perfused; capillary refill <3 seconds   Sensation intact to LT in UE/LE in 3 dermatomes  Cervical: No facet tenderness. Negative Spurlings sign. Negative Fischer's sign.   EXTREMITIES:  2+ Peripheral Pulses, No clubbing, cyanosis, or edema  SKIN: No rashes or lesions. Incision C/D/I sutures BOB.
Patient is a 57y old  Female who presents with a chief complaint of severe HA (19 Oct 2017 15:45)        HPI:  This is a 56yo F limited historian w/ PMH NSCLC (s/p RLL lobectomy, RML wedge resection by Dr. Pisano 1yr ago) w/ recently dx brain mets (s/p L parietooccipital mass [place] 10/4/17 [place] [facility] [place] COPD, seizures (likely 2/2 brain mets) who is presenting with L sided HA. Some of hx obtained from outside records, prior charts, and ED providers given pt is limited historian and tangential. She was recently admitted to [facility] after developing headaches and generally feeling unwell w/ vomiting, AMS two weeks ago where she underwent craniotomy and L parietooccipital mass resection for metastasis presumably from her NSCLC. Her post-op course was uneventful per [facility] records and was subsequently discharged to [facility] about 1 week ago. There is discrepancy b/t records and pt account as she says she was discharged yesterday to rehab. Nevertheless, she developed pain at rehab and felt she was not receiving adequate care, said she wanted to go back to the hospital and was BIBEMS to [facility] ED. EMS records reveal pt c/o SOB and was given nebs in transit however both ED provider and patient deny having SOB at any time.    Currently her only complaint is intractable HA localized to left parietal region. Denies F/C, photophobia, blurry vision, dizziness, hearing changes, seizures, CP, palpitations, SOB, cough, abd pain, N/V/D, dysuria, or weakness. She says she cannot go home with pain like this and wants her doctors here to see her.    ED VS: T afebrile -134/70-75 P 64-82 R 19-20 SpO2 % on RA  ED adm: 1L NS bolus x1, morphine 4+4 = 8mg total IVP, reglan 10mg IVP x1, keppra 750mg IVPB x1 (19 Oct 2017 03:30)    Mild left sided headaches - no new numbness in the UE/ LE   No visual symptoms - vertigo- no history of seizures          Allergies  penicillin (Angioedema)      Health Issues  ACUTE INTRACTABLE HEADACHE, UNSPECIFIED HEADACHE  No h/o HF  No pertinent family history in first degree relatives  Handoff  MEWS Score  Dyspnea  MRSA (methicillin resistant Staphylococcus aureus)  Adenocarcinoma of lung  COPD (chronic obstructive pulmonary disease)  Asthma  Pulmonary nodules  Acute intractable headache, unspecified headache type  Acute intractable headache, unspecified headache type  Leukocytosis  Prophylactic measure  Nutrition, metabolism, and development symptoms  Seizure disorder  COPD (chronic obstructive pulmonary disease)  Metastatic lung carcinoma  Acute intractable headache, unspecified headache type  H/O brain surgery  Surgery, elective  History of appendectomy  No significant past surgical history  SHORTNESS OF BREATH  90+  COPD (chronic obstructive pulmonary disease)  Metastatic lung carcinoma  Primary malignant neoplasm of lung metastatic to other site, unspecified laterality        FAMILY HISTORY:  No pertinent family history in first degree relatives      MEDICATIONS  (STANDING):  ALBUTerol/ipratropium for Nebulization 3 milliLiter(s) Nebulizer every 4 hours  buDESOnide 160 MICROgram(s)/formoterol 4.5 MICROgram(s) Inhaler 2 Puff(s) Inhalation two times a day  dexamethasone     Tablet 1 milliGRAM(s) Oral daily  docusate sodium 100 milliGRAM(s) Oral daily  enoxaparin Injectable 40 milliGRAM(s) SubCutaneous every 24 hours  folic acid 1 milliGRAM(s) Oral daily  levETIRAcetam 750 milliGRAM(s) Oral two times a day    MEDICATIONS  (PRN):  ketorolac   Injectable 30 milliGRAM(s) IV Push every 6 hours PRN Severe Pain (7 - 10)  oxyCODONE    5 mG/acetaminophen 325 mG 2 Tablet(s) Oral every 4 hours PRN Severe Pain (7 - 10)  simethicone 80 milliGRAM(s) Chew two times a day PRN Gas      PAST MEDICAL & SURGICAL HISTORY:  MRSA (methicillin resistant Staphylococcus aureus): history of  Adenocarcinoma of lung  COPD (chronic obstructive pulmonary disease)  H/O brain surgery  Surgery, elective: lung biopsy  History of appendectomy      Labs                          12.4   9.1   )-----------( 221      ( 20 Oct 2017 08:09 )             37.7     10-20    139  |  103  |  20  ----------------------------<  99  4.1   |  25  |  0.82    Ca    8.6      20 Oct 2017 08:09  Mg     2.2     10-20    TPro  7.6  /  Alb  3.9  /  TBili  0.2  /  DBili  x   /  AST  27  /  ALT  34  /  AlkPhos  71  10-19      Radiology:    Physical Exam    MENTAL STATUS  -Level of Consciousness- awake    Orientation- person, place time  Language- aphasia/ dysarthria  Memory- recent and remote      Cranial Nerve 1- 12  Pupils- equal and reactive  Eye movements- full EOm  Facial - no asymmetry   Lower CN-nl    Gait and Station- no foot drop    MOTOR  Upper- no drift  Lower- no foot drop    Reflexes- decreased    Sensation- no sensory level    Cerebellar- no tremors    vascular -    Assessment- Left brain mets and lacunes     Plan Keppra, Steroids, Neuro surg - Dr Rico- Spoke to Dr Wallace
Consult Note     Patient is a 57y old female with a past medical and surgical history of adenocarcinoma of the lung( she completed radiation treatment for NSCLC October 2016). She underwent craniotomy with left parietooccipital mass resection 10/4/17  for metastasis presumably from her NSCLC at [facility].  Her post-op course was uneventful and she was subsequently discharged to [facility] about one week ago. She also has a history seizures (possibly secondary to brain mets), COPD and  appendectomy.    Her relevant history dates back to October 3 when she reports not "feeling right," lightheadedness and vomiting. She called her son and told him that she "was not feeling right" who then called the ambulance. She reports passing out and waking up in the emergency room complaining of left sided headache. Ct head10/19/17 revealed 1.7 cm hypodense structure in the underlying left parieto-occipital region with surrounding vasogenic edema without midline shift, herniation, hemorrhage or herniation,   MRI of the brain w/contrast 10/19/17 showed 2.5 cm subacute hematoma at the resection site within the left posterior parietal lobe.      Today she feels well without complaints of headache.
HISTORY OF PRESENT ILLNESS:   56yo F w/PMHx NSCLC (s/p RLL lobectomy, RML wedge resection by Dr. Pisano 1yr ago) w/ recently dx brain mets (s/p L parietooccipital mass resection 10[place]4/17 at [facility] [place] COPD, seizures (likely 2/2 brain mets) who is presenting with L sided, incision site headache persistent for 2 weeks. Some of hx obtained from outside records, prior charts, and ED providers given Pt is limited historian and tangential. She was recently admitted to [facility] [place] after developing headaches and generally feeling unwell with vomiting. Pt states she was AMS two weeks ago where an MRI revealed a left parietal mass  and Pt reports she underwent craniotomy and L parietooccipital mass resection for metastasis presumably from her NSCLC. Her post-op course was uneventful per [facility] records and was subsequently discharged to [facility] about 1 week ago. Pt states she developed a headache at rehab and felt she was not receiving adequate care, said she wanted to go back to the hospital and was BIBEMS to [facility] ED. EMS records reveal Pt c/o SOB and was given nebs in transit however both ED provider and patient denies Pt having SOB at any time. Pt currently denies fevers, chills, SOB, CP, acute changes in vision, photophobia, hearing changes, seizures, N/V/D, dysuria, or acute weakness/loss of sensation of extremities.     PAST MEDICAL & SURGICAL HISTORY:  MRSA (methicillin resistant Staphylococcus aureus): history of  Adenocarcinoma of lung  COPD (chronic obstructive pulmonary disease)  H/O brain surgery  Surgery, elective: lung biopsy  History of appendectomy    FAMILY HISTORY:  No pertinent family history in first degree relatives    SOCIAL HISTORY:  Tobacco use: former 30py smoker quit in 2016  EtOH use: denies  Illicit drug use: denies    Allergies    penicillin (Angioedema)    Intolerances    REVIEW OF SYSTEMS  See HPI    MEDICATIONS:  Antibiotics:    Neuro:  ketorolac   Injectable 30 milliGRAM(s) IV Push every 6 hours PRN  levETIRAcetam 750 milliGRAM(s) Oral two times a day  oxyCODONE    5 mG/acetaminophen 325 mG 2 Tablet(s) Oral every 4 hours PRN    Anticoagulation:  enoxaparin Injectable 40 milliGRAM(s) SubCutaneous every 24 hours    OTHER:  ALBUTerol/ipratropium for Nebulization 3 milliLiter(s) Nebulizer every 4 hours  buDESOnide 160 MICROgram(s)/formoterol 4.5 MICROgram(s) Inhaler 2 Puff(s) Inhalation two times a day  dexamethasone     Tablet 1 milliGRAM(s) Oral daily  docusate sodium 100 milliGRAM(s) Oral daily    IVF:  folic acid 1 milliGRAM(s) Oral daily      Vital Signs Last 24 Hrs  T(C): 36.1 (19 Oct 2017 14:45), Max: 37.2 (19 Oct 2017 00:21)  T(F): 97 (19 Oct 2017 14:45), Max: 98.9 (19 Oct 2017 00:21)  HR: 70 (19 Oct 2017 14:45) (64 - 82)  BP: 102/65 (19 Oct 2017 14:45) (99/68 - 134/70)  BP(mean): --  RR: 18 (19 Oct 2017 14:45) (18 - 20)  SpO2: 98% (19 Oct 2017 14:45) (98% - 100%)    PHYSICAL EXAM:  Constitutional: NAD, resting comfortably  Neck: supple, no JVD  Back: no CVAT B/L  Respiratory: decreased BS on RML/RLL w/ scant anterior wheezes  Cardiovascular: +S1, S2, RRR  Gastrointestinal: Abdomen soft NT/ND  Neurological: AAOX3, FC, speech coherent  CNII-XII: EOM intact, PERRL, face symmetric, tongue midline  Motor: MAEx4 5/5 UE and LE B/L, neg protonator drift, neg dysmetria  Scalp: Left parietal incision site C/D/I, healing well with absorbable sutures in place    LABS:                        13.7   13.3  )-----------( 286      ( 19 Oct 2017 00:01 )             40.7     10-19    137  |  95<L>  |  18  ----------------------------<  107<H>  5.3   |  30  |  0.86    Ca    9.6      19 Oct 2017 00:04  Mg     2.3     10-19    TPro  7.6  /  Alb  3.9  /  TBili  0.2  /  DBili  x   /  AST  27  /  ALT  34  /  AlkPhos  71  10-19    PT/INR - ( 19 Oct 2017 00:01 )   PT: 11.0 sec;   INR: 0.99          PTT - ( 19 Oct 2017 00:01 )  PTT:24.1 sec    CULTURES:    RADIOLOGY & ADDITIONAL STUDIES:  [facility] 10/19/2017:  1.  Status post left parietal craniotomy. 1.7 cm hypodense structure in   the underlying left parieto-occipital region with surrounding vasogenic   edema but without midline shift or herniation. This could represent a   resection cavity with associated postsurgical changes versus   residual/recurrent tumor. Comparison with more recent outside imaging is   recommended to assess for change.  2.  No acute intracranial hemorrhage or acute transcortical infarction.

## 2017-10-20 NOTE — CONSULT NOTE ADULT - ASSESSMENT
Patient is a 57y old female with a past medical and surgical history of adenocarcinoma of the lung( she completed radiation treatment for NSCLC October 2016).   S/P craniotomy with left parietooccipital mass resection 10/4/17      Plan  Patient will f/u for an outpatient consult to Radiation Medicine Dept. on 10/26/2017

## 2017-10-20 NOTE — CONSULT NOTE ADULT - CONSULT REASON
Curative Radiation Therapy  post left parietooccipital mass resection 10/4/17
Headache, s/p craniotomy
Brain mets
S/p L parietooccipital mass resection 10/4/17 [place] [facility], c/o HAs

## 2017-10-23 RX ORDER — LEVETIRACETAM 250 MG/1
1 TABLET, FILM COATED ORAL
Qty: 60 | Refills: 0 | OUTPATIENT
Start: 2017-10-23 | End: 2017-11-22

## 2017-10-25 DIAGNOSIS — Z87.891 PERSONAL HISTORY OF NICOTINE DEPENDENCE: ICD-10-CM

## 2017-10-25 DIAGNOSIS — Z88.0 ALLERGY STATUS TO PENICILLIN: ICD-10-CM

## 2017-10-25 DIAGNOSIS — G40.909 EPILEPSY, UNSPECIFIED, NOT INTRACTABLE, WITHOUT STATUS EPILEPTICUS: ICD-10-CM

## 2017-10-25 DIAGNOSIS — G44.89 OTHER HEADACHE SYNDROME: ICD-10-CM

## 2017-10-25 DIAGNOSIS — Z86.14 PERSONAL HISTORY OF METHICILLIN RESISTANT STAPHYLOCOCCUS AUREUS INFECTION: ICD-10-CM

## 2017-10-25 DIAGNOSIS — C79.31 SECONDARY MALIGNANT NEOPLASM OF BRAIN: ICD-10-CM

## 2017-10-25 DIAGNOSIS — Z85.118 PERSONAL HISTORY OF OTHER MALIGNANT NEOPLASM OF BRONCHUS AND LUNG: ICD-10-CM

## 2017-10-25 DIAGNOSIS — D72.829 ELEVATED WHITE BLOOD CELL COUNT, UNSPECIFIED: ICD-10-CM

## 2017-10-25 DIAGNOSIS — J44.9 CHRONIC OBSTRUCTIVE PULMONARY DISEASE, UNSPECIFIED: ICD-10-CM

## 2017-10-25 DIAGNOSIS — I61.1 NONTRAUMATIC INTRACEREBRAL HEMORRHAGE IN HEMISPHERE, CORTICAL: ICD-10-CM

## 2017-10-26 ENCOUNTER — APPOINTMENT (OUTPATIENT)
Dept: RADIATION ONCOLOGY | Facility: CLINIC | Age: 57
End: 2017-10-26
Payer: COMMERCIAL

## 2017-10-26 VITALS
BODY MASS INDEX: 36.31 KG/M2 | OXYGEN SATURATION: 98 % | WEIGHT: 211.56 LBS | DIASTOLIC BLOOD PRESSURE: 72 MMHG | SYSTOLIC BLOOD PRESSURE: 104 MMHG | HEART RATE: 92 BPM

## 2017-10-26 DIAGNOSIS — G97.61 POSTPROCEDURAL HEMATOMA OF A NERVOUS SYSTEM ORGAN OR STRUCTURE FOLLOWING A NERVOUS SYSTEM PROCEDURE: ICD-10-CM

## 2017-10-26 DIAGNOSIS — Z79.52 LONG TERM (CURRENT) USE OF SYSTEMIC STEROIDS: ICD-10-CM
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
## 2017-10-26 DIAGNOSIS — Z79.51 LONG TERM (CURRENT) USE OF INHALED STEROIDS: ICD-10-CM

## 2017-10-26 DIAGNOSIS — Z90.2 ACQUIRED ABSENCE OF LUNG [PART OF]: ICD-10-CM

## 2017-10-26 DIAGNOSIS — T38.0X5A ADVERSE EFFECT OF GLUCOCORTICOIDS AND SYNTHETIC ANALOGUES, INITIAL ENCOUNTER: ICD-10-CM

## 2017-10-26 DIAGNOSIS — G40.509 EPILEPTIC SEIZURES RELATED TO EXTERNAL CAUSES, NOT INTRACTABLE, WITHOUT STATUS EPILEPTICUS: ICD-10-CM

## 2017-10-26 DIAGNOSIS — R40.2411 GLASGOW COMA SCALE SCORE 13-15, IN THE FIELD [EMT OR AMBULANCE]: ICD-10-CM

## 2017-10-26 DIAGNOSIS — Z79.891 LONG TERM (CURRENT) USE OF OPIATE ANALGESIC: ICD-10-CM

## 2017-10-26 PROCEDURE — 77263 THER RADIOLOGY TX PLNG CPLX: CPT

## 2017-10-26 PROCEDURE — 99245 OFF/OP CONSLTJ NEW/EST HI 55: CPT | Mod: GC,25

## 2017-10-26 RX ORDER — LEVETIRACETAM 500 MG/1
500 TABLET, FILM COATED ORAL
Refills: 0 | Status: DISCONTINUED | COMMUNITY
Start: 2017-10-26 | End: 2017-10-26

## 2017-11-01 ENCOUNTER — APPOINTMENT (OUTPATIENT)
Dept: NEUROLOGY | Facility: CLINIC | Age: 57
End: 2017-11-01
Payer: COMMERCIAL

## 2017-11-01 VITALS
OXYGEN SATURATION: 97 % | SYSTOLIC BLOOD PRESSURE: 110 MMHG | BODY MASS INDEX: 35.85 KG/M2 | DIASTOLIC BLOOD PRESSURE: 78 MMHG | WEIGHT: 210 LBS | HEART RATE: 89 BPM | HEIGHT: 64 IN | TEMPERATURE: 97.7 F

## 2017-11-01 PROCEDURE — 99205 OFFICE O/P NEW HI 60 MIN: CPT

## 2017-11-01 PROCEDURE — 77334 RADIATION TREATMENT AID(S): CPT | Mod: 26

## 2017-11-01 PROCEDURE — 77290 THER RAD SIMULAJ FIELD CPLX: CPT | Mod: 26

## 2017-11-06 PROCEDURE — 77295 3-D RADIOTHERAPY PLAN: CPT | Mod: 26

## 2017-11-06 PROCEDURE — 77334 RADIATION TREATMENT AID(S): CPT | Mod: 26

## 2017-11-06 PROCEDURE — 77300 RADIATION THERAPY DOSE PLAN: CPT | Mod: 26

## 2017-11-14 PROCEDURE — 77280 THER RAD SIMULAJ FIELD SMPL: CPT | Mod: 26

## 2017-11-20 ENCOUNTER — RX RENEWAL (OUTPATIENT)
Age: 57
End: 2017-11-20

## 2017-11-20 VITALS — OXYGEN SATURATION: 94 % | SYSTOLIC BLOOD PRESSURE: 118 MMHG | HEART RATE: 83 BPM | DIASTOLIC BLOOD PRESSURE: 79 MMHG

## 2017-11-20 PROCEDURE — 77435 SBRT MANAGEMENT: CPT

## 2017-11-29 ENCOUNTER — APPOINTMENT (OUTPATIENT)
Dept: NEUROLOGY | Facility: CLINIC | Age: 57
End: 2017-11-29
Payer: COMMERCIAL

## 2017-11-29 VITALS
WEIGHT: 215 LBS | OXYGEN SATURATION: 93 % | DIASTOLIC BLOOD PRESSURE: 85 MMHG | SYSTOLIC BLOOD PRESSURE: 121 MMHG | HEIGHT: 64 IN | BODY MASS INDEX: 36.7 KG/M2 | HEART RATE: 81 BPM

## 2017-11-29 PROCEDURE — 99214 OFFICE O/P EST MOD 30 MIN: CPT

## 2017-11-29 RX ORDER — DEXAMETHASONE 2 MG/1
2 TABLET ORAL TWICE DAILY
Qty: 14 | Refills: 0 | Status: DISCONTINUED | COMMUNITY
Start: 2017-11-20 | End: 2017-11-29

## 2017-11-30 ENCOUNTER — OUTPATIENT (OUTPATIENT)
Dept: OUTPATIENT SERVICES | Facility: HOSPITAL | Age: 57
LOS: 1 days | End: 2017-11-30
Payer: COMMERCIAL

## 2017-11-30 DIAGNOSIS — Z98.890 OTHER SPECIFIED POSTPROCEDURAL STATES: Chronic | ICD-10-CM

## 2017-11-30 DIAGNOSIS — Z90.49 ACQUIRED ABSENCE OF OTHER SPECIFIED PARTS OF DIGESTIVE TRACT: Chronic | ICD-10-CM

## 2017-11-30 DIAGNOSIS — Z41.9 ENCOUNTER FOR PROCEDURE FOR PURPOSES OTHER THAN REMEDYING HEALTH STATE, UNSPECIFIED: Chronic | ICD-10-CM

## 2017-11-30 LAB — GLUCOSE BLDC GLUCOMTR-MCNC: 89 MG/DL — SIGNIFICANT CHANGE UP (ref 70–99)

## 2017-11-30 PROCEDURE — A9552: CPT

## 2017-11-30 PROCEDURE — 82962 GLUCOSE BLOOD TEST: CPT

## 2017-11-30 PROCEDURE — 78815 PET IMAGE W/CT SKULL-THIGH: CPT

## 2017-11-30 PROCEDURE — 78815 PET IMAGE W/CT SKULL-THIGH: CPT | Mod: 26

## 2017-12-05 ENCOUNTER — APPOINTMENT (OUTPATIENT)
Dept: NEUROLOGY | Facility: CLINIC | Age: 57
End: 2017-12-05
Payer: COMMERCIAL

## 2017-12-05 PROCEDURE — 95816 EEG AWAKE AND DROWSY: CPT

## 2017-12-06 PROCEDURE — 95953: CPT

## 2017-12-07 ENCOUNTER — APPOINTMENT (OUTPATIENT)
Dept: NEUROLOGY | Facility: CLINIC | Age: 57
End: 2017-12-07

## 2017-12-07 PROCEDURE — 95953: CPT

## 2017-12-20 ENCOUNTER — APPOINTMENT (OUTPATIENT)
Dept: RADIATION ONCOLOGY | Facility: CLINIC | Age: 57
End: 2017-12-20

## 2017-12-21 ENCOUNTER — OTHER (OUTPATIENT)
Age: 57
End: 2017-12-21

## 2018-01-02 ENCOUNTER — FORM ENCOUNTER (OUTPATIENT)
Age: 58
End: 2018-01-02

## 2018-01-03 ENCOUNTER — APPOINTMENT (OUTPATIENT)
Dept: RADIATION ONCOLOGY | Facility: CLINIC | Age: 58
End: 2018-01-03
Payer: COMMERCIAL

## 2018-01-03 ENCOUNTER — OUTPATIENT (OUTPATIENT)
Dept: OUTPATIENT SERVICES | Facility: HOSPITAL | Age: 58
LOS: 1 days | End: 2018-01-03
Payer: COMMERCIAL

## 2018-01-03 VITALS
HEART RATE: 117 BPM | DIASTOLIC BLOOD PRESSURE: 78 MMHG | BODY MASS INDEX: 39.21 KG/M2 | SYSTOLIC BLOOD PRESSURE: 114 MMHG | OXYGEN SATURATION: 96 % | WEIGHT: 228.44 LBS

## 2018-01-03 DIAGNOSIS — Z90.49 ACQUIRED ABSENCE OF OTHER SPECIFIED PARTS OF DIGESTIVE TRACT: Chronic | ICD-10-CM

## 2018-01-03 DIAGNOSIS — Z41.9 ENCOUNTER FOR PROCEDURE FOR PURPOSES OTHER THAN REMEDYING HEALTH STATE, UNSPECIFIED: Chronic | ICD-10-CM

## 2018-01-03 DIAGNOSIS — Z98.890 OTHER SPECIFIED POSTPROCEDURAL STATES: Chronic | ICD-10-CM

## 2018-01-03 PROBLEM — G93.9 BRAIN MASS: Status: RESOLVED | Noted: 2017-10-26 | Resolved: 2018-01-03

## 2018-01-03 PROCEDURE — 70553 MRI BRAIN STEM W/O & W/DYE: CPT

## 2018-01-03 PROCEDURE — A9585: CPT

## 2018-01-03 PROCEDURE — 99024 POSTOP FOLLOW-UP VISIT: CPT

## 2018-01-03 PROCEDURE — 70553 MRI BRAIN STEM W/O & W/DYE: CPT | Mod: 26

## 2018-01-03 RX ORDER — LEVETIRACETAM 750 MG/1
750 TABLET, FILM COATED ORAL TWICE DAILY
Qty: 60 | Refills: 2 | Status: DISCONTINUED | COMMUNITY
Start: 2017-10-26 | End: 2018-01-03

## 2018-01-03 RX ORDER — DEXAMETHASONE 1 MG/1
1 TABLET ORAL DAILY
Refills: 0 | Status: DISCONTINUED | COMMUNITY
Start: 2017-10-26 | End: 2018-01-03

## 2018-01-03 RX ORDER — DEXAMETHASONE 2 MG/1
2 TABLET ORAL TWICE DAILY
Qty: 14 | Refills: 0 | Status: DISCONTINUED | COMMUNITY
Start: 2017-10-26 | End: 2018-01-03

## 2018-01-04 ENCOUNTER — APPOINTMENT (OUTPATIENT)
Dept: THORACIC SURGERY | Facility: CLINIC | Age: 58
End: 2018-01-04

## 2018-01-04 DIAGNOSIS — G93.9 DISORDER OF BRAIN, UNSPECIFIED: ICD-10-CM

## 2018-01-05 PROCEDURE — 77263 THER RADIOLOGY TX PLNG CPLX: CPT

## 2018-01-08 ENCOUNTER — APPOINTMENT (OUTPATIENT)
Dept: NEUROSURGERY | Facility: CLINIC | Age: 58
End: 2018-01-08
Payer: COMMERCIAL

## 2018-01-08 VITALS
HEART RATE: 97 BPM | WEIGHT: 224 LBS | SYSTOLIC BLOOD PRESSURE: 109 MMHG | DIASTOLIC BLOOD PRESSURE: 78 MMHG | BODY MASS INDEX: 38.24 KG/M2 | HEIGHT: 64 IN

## 2018-01-08 PROCEDURE — 99214 OFFICE O/P EST MOD 30 MIN: CPT

## 2018-01-09 LAB
ALBUMIN SERPL ELPH-MCNC: 4 G/DL
ALP BLD-CCNC: 70 U/L
ALT SERPL-CCNC: 17 U/L
ANION GAP SERPL CALC-SCNC: 15 MMOL/L
AST SERPL-CCNC: 20 U/L
BILIRUB SERPL-MCNC: 0.2 MG/DL
BUN SERPL-MCNC: 11 MG/DL
CALCIUM SERPL-MCNC: 9.8 MG/DL
CHLORIDE SERPL-SCNC: 100 MMOL/L
CO2 SERPL-SCNC: 27 MMOL/L
CREAT SERPL-MCNC: 0.87 MG/DL
GLUCOSE SERPL-MCNC: 96 MG/DL
POTASSIUM SERPL-SCNC: 4 MMOL/L
PROT SERPL-MCNC: 7 G/DL
SODIUM SERPL-SCNC: 142 MMOL/L

## 2018-01-17 ENCOUNTER — OUTPATIENT (OUTPATIENT)
Dept: OUTPATIENT SERVICES | Facility: HOSPITAL | Age: 58
LOS: 1 days | Discharge: HOME | End: 2018-01-17
Payer: COMMERCIAL

## 2018-01-17 DIAGNOSIS — Z98.890 OTHER SPECIFIED POSTPROCEDURAL STATES: Chronic | ICD-10-CM

## 2018-01-17 DIAGNOSIS — Z90.49 ACQUIRED ABSENCE OF OTHER SPECIFIED PARTS OF DIGESTIVE TRACT: Chronic | ICD-10-CM

## 2018-01-17 DIAGNOSIS — Z41.9 ENCOUNTER FOR PROCEDURE FOR PURPOSES OTHER THAN REMEDYING HEALTH STATE, UNSPECIFIED: Chronic | ICD-10-CM

## 2018-01-18 ENCOUNTER — OUTPATIENT (OUTPATIENT)
Dept: OUTPATIENT SERVICES | Facility: HOSPITAL | Age: 58
LOS: 1 days | End: 2018-01-18
Payer: COMMERCIAL

## 2018-01-18 ENCOUNTER — APPOINTMENT (OUTPATIENT)
Dept: MRI IMAGING | Facility: IMAGING CENTER | Age: 58
End: 2018-01-18

## 2018-01-18 ENCOUNTER — APPOINTMENT (OUTPATIENT)
Dept: NEUROSURGERY | Facility: HOSPITAL | Age: 58
End: 2018-01-18
Payer: COMMERCIAL

## 2018-01-18 DIAGNOSIS — C34.32 MALIGNANT NEOPLASM OF LOWER LOBE, LEFT BRONCHUS OR LUNG: ICD-10-CM

## 2018-01-18 DIAGNOSIS — Z41.9 ENCOUNTER FOR PROCEDURE FOR PURPOSES OTHER THAN REMEDYING HEALTH STATE, UNSPECIFIED: Chronic | ICD-10-CM

## 2018-01-18 DIAGNOSIS — Z98.890 OTHER SPECIFIED POSTPROCEDURAL STATES: Chronic | ICD-10-CM

## 2018-01-18 DIAGNOSIS — C79.31 SECONDARY MALIGNANT NEOPLASM OF BRAIN: ICD-10-CM

## 2018-01-18 DIAGNOSIS — Z90.49 ACQUIRED ABSENCE OF OTHER SPECIFIED PARTS OF DIGESTIVE TRACT: Chronic | ICD-10-CM

## 2018-01-18 PROCEDURE — 77334 RADIATION TREATMENT AID(S): CPT | Mod: 26

## 2018-01-18 PROCEDURE — 61800 APPLY SRS HEADFRAME ADD-ON: CPT

## 2018-01-18 PROCEDURE — 61796 SRS CRANIAL LESION SIMPLE: CPT

## 2018-01-18 PROCEDURE — 76498 UNLISTED MR PROCEDURE: CPT

## 2018-01-18 PROCEDURE — 77300 RADIATION THERAPY DOSE PLAN: CPT | Mod: 26

## 2018-01-18 PROCEDURE — 77295 3-D RADIOTHERAPY PLAN: CPT | Mod: 26

## 2018-01-18 PROCEDURE — A9585: CPT

## 2018-01-18 PROCEDURE — 77432 STEREOTACTIC RADIATION TRMT: CPT

## 2018-01-25 ENCOUNTER — APPOINTMENT (OUTPATIENT)
Dept: THORACIC SURGERY | Facility: CLINIC | Age: 58
End: 2018-01-25

## 2018-01-25 ENCOUNTER — APPOINTMENT (OUTPATIENT)
Dept: THORACIC SURGERY | Facility: CLINIC | Age: 58
End: 2018-01-25
Payer: COMMERCIAL

## 2018-01-25 VITALS
OXYGEN SATURATION: 99 % | RESPIRATION RATE: 22 BRPM | DIASTOLIC BLOOD PRESSURE: 76 MMHG | SYSTOLIC BLOOD PRESSURE: 116 MMHG | HEART RATE: 102 BPM | TEMPERATURE: 98.1 F

## 2018-01-25 DIAGNOSIS — C34.90 MALIGNANT NEOPLASM OF UNSPECIFIED PART OF UNSPECIFIED BRONCHUS OR LUNG: ICD-10-CM

## 2018-01-25 PROCEDURE — 99214 OFFICE O/P EST MOD 30 MIN: CPT

## 2018-01-30 ENCOUNTER — APPOINTMENT (OUTPATIENT)
Dept: ENDOCRINOLOGY | Facility: CLINIC | Age: 58
End: 2018-01-30
Payer: COMMERCIAL

## 2018-01-30 VITALS
SYSTOLIC BLOOD PRESSURE: 112 MMHG | DIASTOLIC BLOOD PRESSURE: 78 MMHG | HEIGHT: 64 IN | HEART RATE: 111 BPM | BODY MASS INDEX: 36.7 KG/M2 | WEIGHT: 215 LBS

## 2018-01-30 PROCEDURE — 99214 OFFICE O/P EST MOD 30 MIN: CPT | Mod: 25

## 2018-01-30 PROCEDURE — 36415 COLL VENOUS BLD VENIPUNCTURE: CPT

## 2018-01-31 ENCOUNTER — APPOINTMENT (OUTPATIENT)
Dept: NEUROLOGY | Facility: CLINIC | Age: 58
End: 2018-01-31
Payer: COMMERCIAL

## 2018-01-31 VITALS
TEMPERATURE: 98.5 F | SYSTOLIC BLOOD PRESSURE: 111 MMHG | DIASTOLIC BLOOD PRESSURE: 78 MMHG | OXYGEN SATURATION: 90 % | WEIGHT: 225 LBS | HEIGHT: 65 IN | HEART RATE: 67 BPM | BODY MASS INDEX: 37.49 KG/M2

## 2018-01-31 PROCEDURE — 99214 OFFICE O/P EST MOD 30 MIN: CPT

## 2018-02-12 ENCOUNTER — APPOINTMENT (OUTPATIENT)
Dept: NEUROSURGERY | Facility: CLINIC | Age: 58
End: 2018-02-12
Payer: COMMERCIAL

## 2018-02-12 ENCOUNTER — FORM ENCOUNTER (OUTPATIENT)
Age: 58
End: 2018-02-12

## 2018-02-12 VITALS
SYSTOLIC BLOOD PRESSURE: 98 MMHG | HEART RATE: 91 BPM | OXYGEN SATURATION: 94 % | WEIGHT: 227 LBS | DIASTOLIC BLOOD PRESSURE: 67 MMHG | BODY MASS INDEX: 37.82 KG/M2 | HEIGHT: 65 IN

## 2018-02-12 PROCEDURE — 99024 POSTOP FOLLOW-UP VISIT: CPT

## 2018-02-13 ENCOUNTER — APPOINTMENT (OUTPATIENT)
Dept: ULTRASOUND IMAGING | Facility: HOSPITAL | Age: 58
End: 2018-02-13

## 2018-02-13 ENCOUNTER — APPOINTMENT (OUTPATIENT)
Dept: RADIATION ONCOLOGY | Facility: CLINIC | Age: 58
End: 2018-02-13
Payer: COMMERCIAL

## 2018-02-13 ENCOUNTER — OUTPATIENT (OUTPATIENT)
Dept: OUTPATIENT SERVICES | Facility: HOSPITAL | Age: 58
LOS: 1 days | End: 2018-02-13
Payer: COMMERCIAL

## 2018-02-13 VITALS
OXYGEN SATURATION: 96 % | BODY MASS INDEX: 38.35 KG/M2 | SYSTOLIC BLOOD PRESSURE: 101 MMHG | DIASTOLIC BLOOD PRESSURE: 74 MMHG | WEIGHT: 230.44 LBS | HEART RATE: 98 BPM

## 2018-02-13 DIAGNOSIS — Z41.9 ENCOUNTER FOR PROCEDURE FOR PURPOSES OTHER THAN REMEDYING HEALTH STATE, UNSPECIFIED: Chronic | ICD-10-CM

## 2018-02-13 DIAGNOSIS — Z90.49 ACQUIRED ABSENCE OF OTHER SPECIFIED PARTS OF DIGESTIVE TRACT: Chronic | ICD-10-CM

## 2018-02-13 DIAGNOSIS — Z98.890 OTHER SPECIFIED POSTPROCEDURAL STATES: Chronic | ICD-10-CM

## 2018-02-13 PROCEDURE — 99024 POSTOP FOLLOW-UP VISIT: CPT

## 2018-02-13 PROCEDURE — 76536 US EXAM OF HEAD AND NECK: CPT | Mod: 26

## 2018-02-13 PROCEDURE — 76536 US EXAM OF HEAD AND NECK: CPT

## 2018-02-13 RX ORDER — GABAPENTIN 300 MG/1
300 CAPSULE ORAL
Qty: 30 | Refills: 3 | Status: DISCONTINUED | COMMUNITY
Start: 2018-01-31 | End: 2018-02-13

## 2018-02-13 RX ORDER — LORAZEPAM 1 MG/1
1 TABLET ORAL DAILY
Qty: 15 | Refills: 0 | Status: DISCONTINUED | COMMUNITY
Start: 2017-10-26 | End: 2018-02-13

## 2018-02-16 ENCOUNTER — APPOINTMENT (OUTPATIENT)
Dept: OPHTHALMOLOGY | Facility: CLINIC | Age: 58
End: 2018-02-16
Payer: COMMERCIAL

## 2018-02-16 DIAGNOSIS — H43.393 OTHER VITREOUS OPACITIES, BILATERAL: ICD-10-CM

## 2018-02-16 PROCEDURE — 99204 OFFICE O/P NEW MOD 45 MIN: CPT

## 2018-02-16 PROCEDURE — 92134 CPTRZ OPH DX IMG PST SGM RTA: CPT

## 2018-02-16 PROCEDURE — 92083 EXTENDED VISUAL FIELD XM: CPT

## 2018-02-27 ENCOUNTER — OUTPATIENT (OUTPATIENT)
Dept: OUTPATIENT SERVICES | Facility: HOSPITAL | Age: 58
LOS: 1 days | End: 2018-02-27
Payer: COMMERCIAL

## 2018-02-27 DIAGNOSIS — Z98.890 OTHER SPECIFIED POSTPROCEDURAL STATES: Chronic | ICD-10-CM

## 2018-02-27 DIAGNOSIS — Z90.49 ACQUIRED ABSENCE OF OTHER SPECIFIED PARTS OF DIGESTIVE TRACT: Chronic | ICD-10-CM

## 2018-02-27 DIAGNOSIS — Z41.9 ENCOUNTER FOR PROCEDURE FOR PURPOSES OTHER THAN REMEDYING HEALTH STATE, UNSPECIFIED: Chronic | ICD-10-CM

## 2018-02-27 LAB — GLUCOSE BLDC GLUCOMTR-MCNC: 95 MG/DL — SIGNIFICANT CHANGE UP (ref 70–99)

## 2018-02-27 PROCEDURE — 82962 GLUCOSE BLOOD TEST: CPT

## 2018-02-27 PROCEDURE — A9552: CPT

## 2018-02-27 PROCEDURE — 78815 PET IMAGE W/CT SKULL-THIGH: CPT

## 2018-02-27 PROCEDURE — 78815 PET IMAGE W/CT SKULL-THIGH: CPT | Mod: 26

## 2018-03-13 PROBLEM — Z08 ENCOUNTER FOR FOLLOW-UP SURVEILLANCE OF LUNG CANCER: Status: ACTIVE | Noted: 2018-03-13

## 2018-03-15 ENCOUNTER — APPOINTMENT (OUTPATIENT)
Dept: THORACIC SURGERY | Facility: CLINIC | Age: 58
End: 2018-03-15

## 2018-03-15 ENCOUNTER — APPOINTMENT (OUTPATIENT)
Dept: THORACIC SURGERY | Facility: CLINIC | Age: 58
End: 2018-03-15
Payer: COMMERCIAL

## 2018-03-15 VITALS
HEART RATE: 107 BPM | WEIGHT: 226 LBS | RESPIRATION RATE: 22 BRPM | TEMPERATURE: 98.6 F | DIASTOLIC BLOOD PRESSURE: 76 MMHG | OXYGEN SATURATION: 97 % | SYSTOLIC BLOOD PRESSURE: 116 MMHG | BODY MASS INDEX: 37.61 KG/M2

## 2018-03-15 DIAGNOSIS — Z08 ENCOUNTER FOR FOLLOW-UP EXAMINATION AFTER COMPLETED TREATMENT FOR MALIGNANT NEOPLASM: ICD-10-CM

## 2018-03-15 DIAGNOSIS — Z98.890 OTHER SPECIFIED POSTPROCEDURAL STATES: ICD-10-CM

## 2018-03-15 DIAGNOSIS — Z85.118 ENCOUNTER FOR FOLLOW-UP EXAMINATION AFTER COMPLETED TREATMENT FOR MALIGNANT NEOPLASM: ICD-10-CM

## 2018-03-15 PROCEDURE — 99214 OFFICE O/P EST MOD 30 MIN: CPT

## 2018-03-21 ENCOUNTER — APPOINTMENT (OUTPATIENT)
Dept: MRI IMAGING | Facility: HOSPITAL | Age: 58
End: 2018-03-21

## 2018-03-22 ENCOUNTER — APPOINTMENT (OUTPATIENT)
Dept: RADIATION ONCOLOGY | Facility: CLINIC | Age: 58
End: 2018-03-22

## 2018-04-02 ENCOUNTER — APPOINTMENT (OUTPATIENT)
Dept: MRI IMAGING | Facility: HOSPITAL | Age: 58
End: 2018-04-02

## 2018-04-07 ENCOUNTER — INPATIENT (INPATIENT)
Facility: HOSPITAL | Age: 58
LOS: 2 days | Discharge: HOME | DRG: 194 | End: 2018-04-10
Attending: INTERNAL MEDICINE | Admitting: INTERNAL MEDICINE
Payer: COMMERCIAL

## 2018-04-07 VITALS
SYSTOLIC BLOOD PRESSURE: 147 MMHG | OXYGEN SATURATION: 94 % | RESPIRATION RATE: 22 BRPM | HEART RATE: 140 BPM | WEIGHT: 225.09 LBS | DIASTOLIC BLOOD PRESSURE: 89 MMHG | TEMPERATURE: 100 F

## 2018-04-07 DIAGNOSIS — Z98.890 OTHER SPECIFIED POSTPROCEDURAL STATES: Chronic | ICD-10-CM

## 2018-04-07 DIAGNOSIS — Z41.9 ENCOUNTER FOR PROCEDURE FOR PURPOSES OTHER THAN REMEDYING HEALTH STATE, UNSPECIFIED: Chronic | ICD-10-CM

## 2018-04-07 DIAGNOSIS — Z90.49 ACQUIRED ABSENCE OF OTHER SPECIFIED PARTS OF DIGESTIVE TRACT: Chronic | ICD-10-CM

## 2018-04-07 LAB
ALBUMIN SERPL ELPH-MCNC: 4.6 G/DL — SIGNIFICANT CHANGE UP (ref 3.3–5)
ALP SERPL-CCNC: 84 U/L — SIGNIFICANT CHANGE UP (ref 40–120)
ALT FLD-CCNC: 12 U/L — SIGNIFICANT CHANGE UP (ref 10–45)
ANION GAP SERPL CALC-SCNC: 12 MMOL/L — SIGNIFICANT CHANGE UP (ref 5–17)
AST SERPL-CCNC: 15 U/L — SIGNIFICANT CHANGE UP (ref 10–40)
BASOPHILS NFR BLD AUTO: 0.1 % — SIGNIFICANT CHANGE UP (ref 0–2)
BILIRUB SERPL-MCNC: 0.2 MG/DL — SIGNIFICANT CHANGE UP (ref 0.2–1.2)
BUN SERPL-MCNC: 16 MG/DL — SIGNIFICANT CHANGE UP (ref 7–23)
CALCIUM SERPL-MCNC: 9.8 MG/DL — SIGNIFICANT CHANGE UP (ref 8.4–10.5)
CHLORIDE SERPL-SCNC: 97 MMOL/L — SIGNIFICANT CHANGE UP (ref 96–108)
CK MB CFR SERPL CALC: <1 NG/ML — SIGNIFICANT CHANGE UP (ref 0–6.7)
CO2 SERPL-SCNC: 28 MMOL/L — SIGNIFICANT CHANGE UP (ref 22–31)
CREAT SERPL-MCNC: 0.95 MG/DL — SIGNIFICANT CHANGE UP (ref 0.5–1.3)
D DIMER BLD IA.RAPID-MCNC: 630 NG/ML DDU — HIGH
EOSINOPHIL NFR BLD AUTO: 0.1 % — SIGNIFICANT CHANGE UP (ref 0–6)
FLUAV H1 2009 PAND RNA SPEC QL NAA+PROBE: DETECTED
GLUCOSE SERPL-MCNC: 101 MG/DL — HIGH (ref 70–99)
HCT VFR BLD CALC: 45.2 % — HIGH (ref 34.5–45)
HGB BLD-MCNC: 14.3 G/DL — SIGNIFICANT CHANGE UP (ref 11.5–15.5)
LYMPHOCYTES # BLD AUTO: 14.5 % — SIGNIFICANT CHANGE UP (ref 13–44)
MCHC RBC-ENTMCNC: 27.6 PG — SIGNIFICANT CHANGE UP (ref 27–34)
MCHC RBC-ENTMCNC: 31.6 G/DL — LOW (ref 32–36)
MCV RBC AUTO: 87.1 FL — SIGNIFICANT CHANGE UP (ref 80–100)
MONOCYTES NFR BLD AUTO: 12.2 % — SIGNIFICANT CHANGE UP (ref 2–14)
NEUTROPHILS NFR BLD AUTO: 73.1 % — SIGNIFICANT CHANGE UP (ref 43–77)
PLATELET # BLD AUTO: 323 K/UL — SIGNIFICANT CHANGE UP (ref 150–400)
POTASSIUM SERPL-MCNC: 4 MMOL/L — SIGNIFICANT CHANGE UP (ref 3.5–5.3)
POTASSIUM SERPL-SCNC: 4 MMOL/L — SIGNIFICANT CHANGE UP (ref 3.5–5.3)
PROT SERPL-MCNC: 8.4 G/DL — HIGH (ref 6–8.3)
RAPID RVP RESULT: DETECTED
RBC # BLD: 5.19 M/UL — SIGNIFICANT CHANGE UP (ref 3.8–5.2)
RBC # FLD: 14.6 % — SIGNIFICANT CHANGE UP (ref 10.3–16.9)
SODIUM SERPL-SCNC: 137 MMOL/L — SIGNIFICANT CHANGE UP (ref 135–145)
TROPONIN T SERPL-MCNC: <0.01 NG/ML — SIGNIFICANT CHANGE UP (ref 0–0.01)
WBC # BLD: 6.9 K/UL — SIGNIFICANT CHANGE UP (ref 3.8–10.5)
WBC # FLD AUTO: 6.9 K/UL — SIGNIFICANT CHANGE UP (ref 3.8–10.5)

## 2018-04-07 PROCEDURE — 71046 X-RAY EXAM CHEST 2 VIEWS: CPT | Mod: 26

## 2018-04-07 PROCEDURE — 99223 1ST HOSP IP/OBS HIGH 75: CPT | Mod: GC

## 2018-04-07 PROCEDURE — 93010 ELECTROCARDIOGRAM REPORT: CPT | Mod: 76

## 2018-04-07 PROCEDURE — 99285 EMERGENCY DEPT VISIT HI MDM: CPT | Mod: 25

## 2018-04-07 PROCEDURE — 71275 CT ANGIOGRAPHY CHEST: CPT | Mod: 26

## 2018-04-07 RX ORDER — ACETAMINOPHEN 500 MG
1000 TABLET ORAL ONCE
Qty: 0 | Refills: 0 | Status: COMPLETED | OUTPATIENT
Start: 2018-04-07 | End: 2018-04-07

## 2018-04-07 RX ORDER — OXYCODONE HYDROCHLORIDE 5 MG/1
10 TABLET ORAL EVERY 8 HOURS
Qty: 0 | Refills: 0 | Status: DISCONTINUED | OUTPATIENT
Start: 2018-04-07 | End: 2018-04-07

## 2018-04-07 RX ORDER — ACETAMINOPHEN 500 MG
650 TABLET ORAL EVERY 6 HOURS
Qty: 0 | Refills: 0 | Status: DISCONTINUED | OUTPATIENT
Start: 2018-04-07 | End: 2018-04-07

## 2018-04-07 RX ORDER — MAGNESIUM SULFATE 500 MG/ML
1 VIAL (ML) INJECTION ONCE
Qty: 0 | Refills: 0 | Status: COMPLETED | OUTPATIENT
Start: 2018-04-07 | End: 2018-04-07

## 2018-04-07 RX ORDER — BUDESONIDE AND FORMOTEROL FUMARATE DIHYDRATE 160; 4.5 UG/1; UG/1
2 AEROSOL RESPIRATORY (INHALATION)
Qty: 0 | Refills: 0 | Status: DISCONTINUED | OUTPATIENT
Start: 2018-04-07 | End: 2018-04-10

## 2018-04-07 RX ORDER — SODIUM CHLORIDE 9 MG/ML
1000 INJECTION INTRAMUSCULAR; INTRAVENOUS; SUBCUTANEOUS ONCE
Qty: 0 | Refills: 0 | Status: COMPLETED | OUTPATIENT
Start: 2018-04-07 | End: 2018-04-07

## 2018-04-07 RX ORDER — ACETAMINOPHEN 500 MG
650 TABLET ORAL EVERY 6 HOURS
Qty: 0 | Refills: 0 | Status: DISCONTINUED | OUTPATIENT
Start: 2018-04-08 | End: 2018-04-10

## 2018-04-07 RX ORDER — IPRATROPIUM/ALBUTEROL SULFATE 18-103MCG
3 AEROSOL WITH ADAPTER (GRAM) INHALATION EVERY 4 HOURS
Qty: 0 | Refills: 0 | Status: DISCONTINUED | OUTPATIENT
Start: 2018-04-07 | End: 2018-04-10

## 2018-04-07 RX ORDER — TRAMADOL HYDROCHLORIDE 50 MG/1
50 TABLET ORAL ONCE
Qty: 0 | Refills: 0 | Status: DISCONTINUED | OUTPATIENT
Start: 2018-04-07 | End: 2018-04-07

## 2018-04-07 RX ORDER — HEPARIN SODIUM 5000 [USP'U]/ML
5000 INJECTION INTRAVENOUS; SUBCUTANEOUS EVERY 8 HOURS
Qty: 0 | Refills: 0 | Status: DISCONTINUED | OUTPATIENT
Start: 2018-04-07 | End: 2018-04-09

## 2018-04-07 RX ORDER — SIMETHICONE 80 MG/1
80 TABLET, CHEWABLE ORAL
Qty: 0 | Refills: 0 | Status: DISCONTINUED | OUTPATIENT
Start: 2018-04-07 | End: 2018-04-07

## 2018-04-07 RX ORDER — OXYCODONE HYDROCHLORIDE 5 MG/1
10 TABLET ORAL EVERY 8 HOURS
Qty: 0 | Refills: 0 | Status: DISCONTINUED | OUTPATIENT
Start: 2018-04-08 | End: 2018-04-10

## 2018-04-07 RX ORDER — ALBUTEROL 90 UG/1
2.5 AEROSOL, METERED ORAL ONCE
Qty: 0 | Refills: 0 | Status: COMPLETED | OUTPATIENT
Start: 2018-04-07 | End: 2018-04-07

## 2018-04-07 RX ORDER — IPRATROPIUM/ALBUTEROL SULFATE 18-103MCG
3 AEROSOL WITH ADAPTER (GRAM) INHALATION
Qty: 0 | Refills: 0 | Status: COMPLETED | OUTPATIENT
Start: 2018-04-07 | End: 2018-04-07

## 2018-04-07 RX ORDER — KETOROLAC TROMETHAMINE 30 MG/ML
30 SYRINGE (ML) INJECTION ONCE
Qty: 0 | Refills: 0 | Status: DISCONTINUED | OUTPATIENT
Start: 2018-04-07 | End: 2018-04-07

## 2018-04-07 RX ADMIN — Medication 200 MILLIGRAM(S): at 21:22

## 2018-04-07 RX ADMIN — ALBUTEROL 2.5 MILLIGRAM(S): 90 AEROSOL, METERED ORAL at 09:15

## 2018-04-07 RX ADMIN — Medication 125 MILLIGRAM(S): at 07:52

## 2018-04-07 RX ADMIN — Medication 100 GRAM(S): at 09:15

## 2018-04-07 RX ADMIN — Medication 3 MILLILITER(S): at 21:23

## 2018-04-07 RX ADMIN — SODIUM CHLORIDE 1000 MILLILITER(S): 9 INJECTION INTRAMUSCULAR; INTRAVENOUS; SUBCUTANEOUS at 07:52

## 2018-04-07 RX ADMIN — Medication 1000 MILLIGRAM(S): at 12:32

## 2018-04-07 RX ADMIN — SODIUM CHLORIDE 1000 MILLILITER(S): 9 INJECTION INTRAMUSCULAR; INTRAVENOUS; SUBCUTANEOUS at 13:22

## 2018-04-07 RX ADMIN — Medication 30 MILLIGRAM(S): at 07:52

## 2018-04-07 RX ADMIN — Medication 3 MILLILITER(S): at 17:57

## 2018-04-07 RX ADMIN — Medication 650 MILLIGRAM(S): at 18:30

## 2018-04-07 RX ADMIN — Medication 3 MILLILITER(S): at 07:53

## 2018-04-07 RX ADMIN — Medication 400 MILLIGRAM(S): at 12:16

## 2018-04-07 RX ADMIN — Medication 3 MILLILITER(S): at 08:00

## 2018-04-07 RX ADMIN — Medication 650 MILLIGRAM(S): at 19:21

## 2018-04-07 RX ADMIN — Medication 100 MILLIGRAM(S): at 09:15

## 2018-04-07 RX ADMIN — Medication 75 MILLIGRAM(S): at 23:23

## 2018-04-07 RX ADMIN — TRAMADOL HYDROCHLORIDE 50 MILLIGRAM(S): 50 TABLET ORAL at 22:00

## 2018-04-07 RX ADMIN — Medication 3 MILLILITER(S): at 07:50

## 2018-04-07 RX ADMIN — TRAMADOL HYDROCHLORIDE 50 MILLIGRAM(S): 50 TABLET ORAL at 21:24

## 2018-04-07 RX ADMIN — Medication 30 MILLIGRAM(S): at 07:55

## 2018-04-07 NOTE — H&P ADULT - ASSESSMENT
58 y/o F PMHx ex-smoker (10 pack years, quit 2 years ago), COPD (no home O2, never intubated), R lung adenocarcinoma (s/p chemo/rads [id number]) s/p resection (10/24/2016), brain met w/ gamma knife (resected Jan 2018)- with mild difficulty with recall memory, presents with complaints of right sided chest pain, shortness of breath, cough found to be in COPD exacerbation 2/2 influenza. 56 y/o F PMHx ex-smoker (10 pack years, quit 2 years ago), COPD (no home O2, never intubated), lung adenocarcinoma w/ brain met (s/p right lower lobectomy with en bloc middle lobe resection on 10/24/2016 and craniotomy with left parietooccipital mass resection 10/4/17)- with mild difficulty with recall memory, presents with complaints of right sided chest pain, shortness of breath, cough, found to be in COPD exacerbation 2/2 influenza.

## 2018-04-07 NOTE — ED PROVIDER NOTE - ATTENDING CONTRIBUTION TO CARE
56yo F hx of lung ca s/p lobectomy, w/ mets to brain, here w/ cough, SOB and wheezing x3 days. seen at [facility] for asthma, is on steroids. +CP only with cough. No dizzy, lightheadedness. HDS, +expiratory wheezing in all lung fields. cxr clear. d dimer elevated, will rule out PE.

## 2018-04-07 NOTE — H&P ADULT - PROBLEM SELECTOR PLAN 1
Initially with wheezing and tachypnea, did not require BiPAP. Satting 91% RA, 97% on 2L NC. Improving, no wheezing on my exam.  - c/w duonebs q4hrs  - c/w symbicort (takes advair at home) 500/50 bid  - Pt did not remember she was taking Spiriva, ordered for it as found it on outpatient labs- c/w spiriva daily  - Prednisone 40mg daily; Low dose insulin sliding scale while on steroids - Tamiflu 75mg bid, stop after 5 days  - droplet isolation precautions

## 2018-04-07 NOTE — ED PROVIDER NOTE - CARE PLAN
Principal Discharge DX:	Asthma exacerbation with COPD (chronic obstructive pulmonary disease)  Secondary Diagnosis:	COPD (chronic obstructive pulmonary disease) Principal Discharge DX:	COPD (chronic obstructive pulmonary disease)  Secondary Diagnosis:	SOB (shortness of breath)

## 2018-04-07 NOTE — H&P ADULT - NSHPPHYSICALEXAM_GEN_ALL_CORE
VITAL SIGNS:  T(F): 99.1 (04-08-18 @ 02:24)  HR: 90 (04-08-18 @ 02:24)  BP: 105/72 (04-08-18 @ 02:24)  RR: 20 (04-08-18 @ 02:24)  SpO2: 95% (04-08-18 @ 02:24)  Wt(kg): --    PHYSICAL EXAM:    Constitutional: obese, well-appearing, well-developed, pleasant  Head: no swelling no lesions, difficult to assess location of brain surgery 2/2 hair  Eyes: PERRL, EOMI  ENMT: MMM, no oral lesions, no JVD  Respiratory: CTAb/l, mild end expiratory wheezes at upper lobes, good air movement, normal respiratory rate  Cardiovascular: RRR, normal S1/S2, no murmurs/rubs/gallops; tenderness to palpation to under right breast  Gastrointestinal: soft, NTND, bs normal  Extremities: no edema or cyanosis  Vascular: DP 2+ b/l  Neurological: AAOx3, CNII-XII grossly intact. Mild cognitive slowing in terms of recall (takes patient long time to remember names/dates but she usually and eventually can give an accurate one)  Musculoskeletal: no joint swelling VITAL SIGNS:  T(F): 99.1 (04-08-18 @ 02:24)  HR: 90 (04-08-18 @ 02:24)  BP: 105/72 (04-08-18 @ 02:24)  RR: 20 (04-08-18 @ 02:24)  SpO2: 95% (04-08-18 @ 02:24)  Wt(kg): --    PHYSICAL EXAM:    Constitutional: obese, well-appearing, well-developed, pleasant  Head: no swelling no lesions, difficult to assess location of brain surgery 2/2 hair  Eyes: PERRL, EOMI  ENMT: MMM, no oral lesions, no JVD  Respiratory: CTAb/l, mild end expiratory wheezes at upper lobes, good air movement, normal respiratory rate  Cardiovascular: RRR, normal S1/S2, no murmurs/rubs/gallops; tenderness to palpation to under right breast  Gastrointestinal: soft, NTND, bs normal; left groin fullness with palpable lymphadenopathy  Extremities: no edema or cyanosis  Vascular: DP 2+ b/l  Neurological: AAOx3, CNII-XII grossly intact. Mild cognitive slowing in terms of recall (takes patient long time to remember names/dates but she usually and eventually can give an accurate one)  Musculoskeletal: no joint swelling

## 2018-04-07 NOTE — H&P ADULT - HISTORY OF PRESENT ILLNESS
HPI: 58 y/o F PMHx ex-smoker (10 pack years, quit 2 years ago), COPD (no home O2, never intubated), R lung adenocarcinoma (s/p chemo/rads [id number]) s/p resection (10/24/2016), brain met w/ gamma knife (resected Jan 2018)- with mild difficulty with recall memory, presents with complaints of right sided chest pain, shortness of breath, cough. Patient states she hasn't been feeling well for the past week, over the past 3 days developed cough productive of mild amount of yellow phlegm. She also complains about right sided pleuritic chest pain exacerbated by coughing. Pain is sharp, at worse 8/10, and wraps under her right breast towards her back. She denies abdominal pain including epigastric pain. Pt also complains about shortness of breath and has been using her inhaler more at home. Pt also complains about headache, chronic (even before brain met), 9/10, dull/throbbing, wrapping around forehead, not associated with visual symptoms, aura, nausea/vomiting, photophobia/phonophobia. She also sometimes gets a swooshing sound in her ears exacerbated by lying down with her headaches, but she denies that currently. Denies tinnitus. No sick contacts. Pt denies fever/chills, has had poor PO, some nausea, no vomiting. Pt went to Urgent care for her symptoms and was prescribed cough syrup, loratadine prednisone 20mg PO daily. Per patient, she received a course of azithromycin 500mg x7 days from Dr. Wallace. a few weeks ago for similar symptoms.    ED vitals:  Tm 99,  -> 120 -> 116 -> 99, /89 -> /62-71, RR 22, SaO2 94% on RA. Per ED physician exam, pt was in respiratory distress on arrival, anxious. Lungs with diffuse wheezes. Labs with no leukocytosis, normal diff. No cultures or RVP sent. CT angio chest without PE, also without effusions/consolidation.  s/p Hycodan, toradol, tessalon peerles, 2L NS, solumedrol 125, duonebs x3    Patient admitted to COPD exacerbation (now known to be 2/2 Influenza). HPI: 56 y/o F PMHx ex-smoker (10 pack years, quit 2 years ago), COPD (no home O2, never intubated), lung adenocarcinoma w/ brain met (s/p right lower lobectomy with en bloc middle lobe resection on 10/24/2016 and craniotomy with left parietooccipital mass resection 10/4/17)- with mild difficulty with recall memory, presents with complaints of right sided chest pain, shortness of breath, cough. Patient states she hasn't been feeling well for the past week, over the past 3 days developed cough productive of mild amount of yellow phlegm. She also complains about right sided pleuritic chest pain exacerbated by coughing. Pain is sharp, at worse 8/10, and wraps under her right breast towards her back. She denies abdominal pain including epigastric pain. Pt also complains about shortness of breath and has been using her inhaler more at home. Pt also complains about headache, chronic (even before brain met), 9/10, dull/throbbing, wrapping around forehead, not associated with visual symptoms, aura, nausea/vomiting, photophobia/phonophobia. She also sometimes gets a swooshing sound in her ears exacerbated by lying down with her headaches, but she denies that currently. Denies tinnitus. No sick contacts. Pt denies fever/chills, has had poor PO, some nausea, no vomiting. Pt went to Urgent care for her symptoms and was prescribed cough syrup, loratadine prednisone 20mg PO daily. Per patient, she received a course of azithromycin 500mg x7 days from Dr. Wallace. a few weeks ago for similar symptoms.    ED vitals:  Tm 99,  -> 120 -> 116 -> 99, /89 -> /62-71, RR 22, SaO2 94% on RA. Per ED physician exam, pt was in respiratory distress on arrival, anxious. Lungs with diffuse wheezes. Labs with no leukocytosis, normal diff. No cultures or RVP sent. CT angio chest without PE, also without effusions/consolidation.  s/p Hycodan, toradol, tessalon peerles, 2L NS, solumedrol 125, duonebs x3    Patient admitted to COPD exacerbation (now known to be 2/2 Influenza). Per ED sign out, Dr. Rodrigo JONES with hospitalist admission.

## 2018-04-07 NOTE — H&P ADULT - PROBLEM SELECTOR PLAN 5
Chronic, likely tension headache vs. related to hx of brain met and surgical resection. Chronic, likely tension headache vs. related to hx of brain met and surgical resection. reports seeing a neurologist, rxd gabapentin, which pt deferred taking given concern for indication of medication (given for seizure) and to avoid somnolence especially in setting of high dose oxycodone use; c/w tylenol for now, follow up w/ outpatient neurology

## 2018-04-07 NOTE — ED PROVIDER NOTE - PSYCHIATRIC, MLM
Alert and oriented to person, place, time/situation. normal mood and affect. Alert and oriented to person, place, time/situation. anxious mood and affect. hyperventilating then crying - stops when asked to , poorly cooperative

## 2018-04-07 NOTE — H&P ADULT - ATTENDING COMMENTS
patient seen and examined; on med rec pt denies being on keppra or decadron any more     reviewed vs, labs, available radiological reports/ studies, ekg     agree w/ PE findings as above w/ additions/ exceptions/ pertinent findings: awake, alert NAD, mild wheezing on lung exam b/l     1. flu/ COPD exacerbation: on tamiflu; copd treatment as above (prednisone, duonebs, symbicort, spiriva)    rest of plan as above

## 2018-04-07 NOTE — H&P ADULT - PROBLEM SELECTOR PLAN 8
Regular diet.  Euvolemic no need for IVF  Replete lytes prn    Admit to [facility].    Code status discussed. Pt currently FULL CODE. If condition worsens and irreversible she would not want to be intubated or resuscitated. HCP Son Edgar. HSQ q8hrs given Cancer hx improve 2

## 2018-04-07 NOTE — H&P ADULT - PROBLEM SELECTOR PLAN 4
Mildly productive- non-productive sounding on my exam. Coughing fits frequent.  - c/w Robitussin q6hrs prn  - can try Hycodan if not improving much

## 2018-04-07 NOTE — ED ADULT NURSE REASSESSMENT NOTE - NS ED NURSE REASSESS COMMENT FT1
Received patient at the change of shift, patient complaining of increased cough, and right sided chest pain. CHRISTIAN leonard made aware. Repeat EKG in progress.

## 2018-04-07 NOTE — ED PROVIDER NOTE - PRINCIPAL DIAGNOSIS
Asthma exacerbation with COPD (chronic obstructive pulmonary disease) COPD (chronic obstructive pulmonary disease)

## 2018-04-07 NOTE — ED PROVIDER NOTE - RESPIRATORY, MLM
+ expiratory wheezes b/l, + frequent coughing fits, no rales b/l, no rhonchi b/l, speaking in full sentences

## 2018-04-07 NOTE — H&P ADULT - PSH
H/O brain surgery    History of appendectomy    History of lung surgery  right wedge resection  Surgery, elective  lung biopsy

## 2018-04-07 NOTE — ED PROVIDER NOTE - OBJECTIVE STATEMENT
The pt is a 58 y/o F, who presents to ED c/o cough and cp x 3 d. Was seen at [facility] 2 d ago, was given prednisone and told to f/u w/her pmd. Today cough is worse, having coughing fits, cough is dry - occasionally productive w/yellow sputum, having midsternal cp w/cough, 7/10, has not taken any pain meds or cough meds. Hx of lung ca w/mets to brain, tx yr ago; COPD. Denies fevers, chills, sore throat, earache, n/v, dizziness, diaphoresis, leg pain or swelling, recent prolonged travel

## 2018-04-07 NOTE — H&P ADULT - PROBLEM SELECTOR PLAN 2
Musculoskeletal given continuing cough and reproducibility. Also on right side where she had her lung resection- possible inflammation due to surgical changes. Improved s/p Tylenol.  - c/w Tylenol q6hrs prn cough; avoid NSAIDs pt on steroids Initially with wheezing and tachypnea, did not require BiPAP. Satting 91% RA, 97% on 2L NC. Improving, no wheezing on my exam.  - c/w duonebs q4hrs  - c/w symbicort (takes advair at home) 500/50 bid  - Pt did not remember she was taking Spiriva, ordered for it as found it on outpatient labs- c/w spiriva daily  - Prednisone 40mg daily; Low dose insulin sliding scale while on steroids

## 2018-04-07 NOTE — H&P ADULT - PROBLEM SELECTOR PLAN 7
HSQ q8hrs given Cancer hx improve 2 reports work related injury , has been on pain medications, current dose is oxycodone 30mg, rxd 170 pills on 3/6/2018 (istop). pt does not take medications daily and will take at differing intervals; given lethargy from flu will rxd lower dose of oxycodone 10mg q 8 hours prn during hospitalization

## 2018-04-07 NOTE — H&P ADULT - NSHPLABSRESULTS_GEN_ALL_CORE
LABS:                        14.3   6.9   )-----------( 323      ( 07 Apr 2018 07:37 )             45.2     04-07    137  |  97  |  16  ----------------------------<  101<H>  4.0   |  28  |  0.95    Ca    9.8      07 Apr 2018 07:38    TPro  8.4<H>  /  Alb  4.6  /  TBili  0.2  /  DBili  x   /  AST  15  /  ALT  12  /  AlkPhos  84  04-07          RADIOLOGY & ADDITIONAL TESTS:  < from: CT Angio Chest PE Protocol w/ IV Cont (04.07.18 @ 09:43) >    FINDINGS:    Lower neck: Partially visualized thyroid nodules, measuring up to 1.9 cm   right lobe. No lower cervical lymphadenopathy.    Vascular: No central pulmonary embolism. Please note the study is   somewhat limited due to suboptimal intra-arterial pulmonary artery bolus   Mildly dilated main pulmonary, measuring up to 3.4 cm. No aortic aneurysm   or dissection.     Mediastinum: Normal heart size. No pericardial effusion.    Trachea/Central airways: Patent.    Lungs/Pleura: Status post right lower lobectomy. No evidence of local   disease recurrence. No change 4 mm pleural-based right upper lobe nodule   (image 130 series 6). Mild centrilobular emphysema. No change   parenchymal-pleural scarring right middle lobe. No focal consolidation.    Lymph nodes: No lymphadenopathy.    Upper abdomen: Mildly dilated main pancreatic duct, measuring up to 5 mm..    Bones/Soft tissues: No acute abnormality.        < end of copied text >

## 2018-04-07 NOTE — ED ADULT NURSE NOTE - CHPI ED SYMPTOMS NEG
no chills/no dizziness/no vomiting/no decreased eating/drinking/no tingling/no nausea/no numbness/no weakness/no fever

## 2018-04-07 NOTE — ED PROVIDER NOTE - PROGRESS NOTE DETAILS
breath sounds much improved, scant wheezes remain, pt requesting to be admitted - claims "I don't feel safe going home", explained that needs prn cough meds and prn nsaids and nebs but states that is unable to do this at home spoke to radiology - verbal read on cta is neg for pe/dissection; spoke to dr medina - recommending admission under hospitalist (he is not available)

## 2018-04-07 NOTE — ED PROVIDER NOTE - MUSCULOSKELETAL, MLM
Spine appears normal, range of motion is not limited, no muscle or joint tenderness; no LE swelling b/l, neg abhijeet's sign b/l

## 2018-04-07 NOTE — H&P ADULT - PROBLEM SELECTOR PROBLEM 7
Need for prophylactic measure Chronic back pain, unspecified back location, unspecified back pain laterality

## 2018-04-07 NOTE — H&P ADULT - PMH
Adenocarcinoma of lung    Brain metastasis  brain tumor s/p resection  COPD (chronic obstructive pulmonary disease)    MRSA (methicillin resistant Staphylococcus aureus)  history of

## 2018-04-07 NOTE — H&P ADULT - PROBLEM SELECTOR PLAN 9
Regular diet.  Euvolemic no need for IVF  Replete lytes prn    Admit to [facility].    Code status discussed. Pt currently FULL CODE. If condition worsens and irreversible she would not want to be intubated or resuscitated. HCP Son Edgar.

## 2018-04-07 NOTE — H&P ADULT - PROBLEM SELECTOR PLAN 6
With brain met. s/p right lower lobectomy with en bloc middle lobe resection on 10/24/2016 and craniotomy with left parietooccipital mass resection 10/4/17.  - f/u w/ Dr. Barrios recs  - PET scan on 2/27/18 showed that since 11/30/17 there was a subcentimeter hypermetabolic lymph node in the left inguinal region of uncertain significance concern for recurrent of ca

## 2018-04-07 NOTE — H&P ADULT - NSHPSOCIALHISTORY_GEN_ALL_CORE
Lives alone. Has brothers/sisters, has 1 son who is HCP Edgar. Ex smoker 20 years quit 2 years ago, occasional alcohol, no drug use

## 2018-04-07 NOTE — ED PROVIDER NOTE - MEDICAL DECISION MAKING DETAILS
pt w/copd / asthma exacerbation, no resp distress but mild emotional distress (highly anxious - crying - screaming, does stop when asked to but then regresses to same behavior), given nebs and iv steroids and mag+ w/good response, toradol for pain although pt con't to request more pain meds, labs done and elevated ddimer - given hx of ca - cta done and neg pe/dissection, cxr clear, pt refusing discharge, spoke to pmd pt w/copd exacerbation, no resp distress but mild emotional distress (highly anxious - crying - screaming, does stop when asked to but then regresses to same behavior), given nebs and iv steroids and mag+ w/good response, toradol for pain although pt con't to request more pain meds, labs done and elevated ddimer - given hx of ca - cta done and neg pe/dissection, cxr clear, pt refusing discharge, spoke to pmd who is not available this weekend hence suggested that pt be admitted to med service

## 2018-04-07 NOTE — H&P ADULT - PROBLEM SELECTOR PLAN 3
- Tamiflu 75mg bid, stop after 5 days  - droplet isolation precautions Musculoskeletal given continuing cough and reproducibility. Also on right side where she had her lung resection- possible inflammation due to surgical changes. Improved s/p Tylenol.  - c/w Tylenol q6hrs prn cough; avoid NSAIDs pt on steroids

## 2018-04-08 ENCOUNTER — TRANSCRIPTION ENCOUNTER (OUTPATIENT)
Age: 58
End: 2018-04-08

## 2018-04-08 DIAGNOSIS — J11.1 INFLUENZA DUE TO UNIDENTIFIED INFLUENZA VIRUS WITH OTHER RESPIRATORY MANIFESTATIONS: ICD-10-CM

## 2018-04-08 DIAGNOSIS — J44.1 CHRONIC OBSTRUCTIVE PULMONARY DISEASE WITH (ACUTE) EXACERBATION: ICD-10-CM

## 2018-04-08 DIAGNOSIS — Z29.9 ENCOUNTER FOR PROPHYLACTIC MEASURES, UNSPECIFIED: ICD-10-CM

## 2018-04-08 DIAGNOSIS — M54.9 DORSALGIA, UNSPECIFIED: ICD-10-CM

## 2018-04-08 DIAGNOSIS — C34.90 MALIGNANT NEOPLASM OF UNSPECIFIED PART OF UNSPECIFIED BRONCHUS OR LUNG: ICD-10-CM

## 2018-04-08 DIAGNOSIS — R05 COUGH: ICD-10-CM

## 2018-04-08 DIAGNOSIS — R63.8 OTHER SYMPTOMS AND SIGNS CONCERNING FOOD AND FLUID INTAKE: ICD-10-CM

## 2018-04-08 DIAGNOSIS — R51 HEADACHE: ICD-10-CM

## 2018-04-08 DIAGNOSIS — R07.81 PLEURODYNIA: ICD-10-CM

## 2018-04-08 DIAGNOSIS — Z98.890 OTHER SPECIFIED POSTPROCEDURAL STATES: Chronic | ICD-10-CM

## 2018-04-08 DIAGNOSIS — J44.9 CHRONIC OBSTRUCTIVE PULMONARY DISEASE, UNSPECIFIED: ICD-10-CM

## 2018-04-08 LAB
ANION GAP SERPL CALC-SCNC: 14 MMOL/L — SIGNIFICANT CHANGE UP (ref 5–17)
BUN SERPL-MCNC: 18 MG/DL — SIGNIFICANT CHANGE UP (ref 7–23)
CALCIUM SERPL-MCNC: 8.7 MG/DL — SIGNIFICANT CHANGE UP (ref 8.4–10.5)
CHLORIDE SERPL-SCNC: 102 MMOL/L — SIGNIFICANT CHANGE UP (ref 96–108)
CO2 SERPL-SCNC: 24 MMOL/L — SIGNIFICANT CHANGE UP (ref 22–31)
CREAT SERPL-MCNC: 0.89 MG/DL — SIGNIFICANT CHANGE UP (ref 0.5–1.3)
GLUCOSE BLDC GLUCOMTR-MCNC: 110 MG/DL — HIGH (ref 70–99)
GLUCOSE BLDC GLUCOMTR-MCNC: 123 MG/DL — HIGH (ref 70–99)
GLUCOSE BLDC GLUCOMTR-MCNC: 131 MG/DL — HIGH (ref 70–99)
GLUCOSE BLDC GLUCOMTR-MCNC: 153 MG/DL — HIGH (ref 70–99)
GLUCOSE SERPL-MCNC: 107 MG/DL — HIGH (ref 70–99)
HCT VFR BLD CALC: 39.5 % — SIGNIFICANT CHANGE UP (ref 34.5–45)
HCV AB S/CO SERPL IA: 0.04 S/CO — SIGNIFICANT CHANGE UP
HCV AB SERPL-IMP: SIGNIFICANT CHANGE UP
HGB BLD-MCNC: 12.2 G/DL — SIGNIFICANT CHANGE UP (ref 11.5–15.5)
HIV 1+2 AB+HIV1 P24 AG SERPL QL IA: SIGNIFICANT CHANGE UP
MCHC RBC-ENTMCNC: 27.5 PG — SIGNIFICANT CHANGE UP (ref 27–34)
MCHC RBC-ENTMCNC: 30.9 G/DL — LOW (ref 32–36)
MCV RBC AUTO: 89 FL — SIGNIFICANT CHANGE UP (ref 80–100)
PLATELET # BLD AUTO: 275 K/UL — SIGNIFICANT CHANGE UP (ref 150–400)
POTASSIUM SERPL-MCNC: 4.1 MMOL/L — SIGNIFICANT CHANGE UP (ref 3.5–5.3)
POTASSIUM SERPL-SCNC: 4.1 MMOL/L — SIGNIFICANT CHANGE UP (ref 3.5–5.3)
RBC # BLD: 4.44 M/UL — SIGNIFICANT CHANGE UP (ref 3.8–5.2)
RBC # FLD: 14.9 % — SIGNIFICANT CHANGE UP (ref 10.3–16.9)
SODIUM SERPL-SCNC: 140 MMOL/L — SIGNIFICANT CHANGE UP (ref 135–145)
WBC # BLD: 4 K/UL — SIGNIFICANT CHANGE UP (ref 3.8–10.5)
WBC # FLD AUTO: 4 K/UL — SIGNIFICANT CHANGE UP (ref 3.8–10.5)

## 2018-04-08 PROCEDURE — 99233 SBSQ HOSP IP/OBS HIGH 50: CPT

## 2018-04-08 RX ORDER — DEXTROSE 50 % IN WATER 50 %
25 SYRINGE (ML) INTRAVENOUS ONCE
Qty: 0 | Refills: 0 | Status: DISCONTINUED | OUTPATIENT
Start: 2018-04-08 | End: 2018-04-10

## 2018-04-08 RX ORDER — POLYETHYLENE GLYCOL 3350 17 G/17G
17 POWDER, FOR SOLUTION ORAL DAILY
Qty: 0 | Refills: 0 | Status: COMPLETED | OUTPATIENT
Start: 2018-04-08 | End: 2018-04-08

## 2018-04-08 RX ORDER — DEXTROSE 50 % IN WATER 50 %
12.5 SYRINGE (ML) INTRAVENOUS ONCE
Qty: 0 | Refills: 0 | Status: DISCONTINUED | OUTPATIENT
Start: 2018-04-08 | End: 2018-04-10

## 2018-04-08 RX ORDER — INSULIN LISPRO 100/ML
VIAL (ML) SUBCUTANEOUS
Qty: 0 | Refills: 0 | Status: DISCONTINUED | OUTPATIENT
Start: 2018-04-08 | End: 2018-04-10

## 2018-04-08 RX ORDER — GLUCAGON INJECTION, SOLUTION 0.5 MG/.1ML
1 INJECTION, SOLUTION SUBCUTANEOUS ONCE
Qty: 0 | Refills: 0 | Status: DISCONTINUED | OUTPATIENT
Start: 2018-04-08 | End: 2018-04-10

## 2018-04-08 RX ORDER — IPRATROPIUM/ALBUTEROL SULFATE 18-103MCG
3 AEROSOL WITH ADAPTER (GRAM) INHALATION ONCE
Qty: 0 | Refills: 0 | Status: COMPLETED | OUTPATIENT
Start: 2018-04-08 | End: 2018-04-08

## 2018-04-08 RX ORDER — SODIUM CHLORIDE 9 MG/ML
1000 INJECTION, SOLUTION INTRAVENOUS
Qty: 0 | Refills: 0 | Status: DISCONTINUED | OUTPATIENT
Start: 2018-04-08 | End: 2018-04-10

## 2018-04-08 RX ORDER — TIOTROPIUM BROMIDE 18 UG/1
1 CAPSULE ORAL; RESPIRATORY (INHALATION) DAILY
Qty: 0 | Refills: 0 | Status: DISCONTINUED | OUTPATIENT
Start: 2018-04-08 | End: 2018-04-10

## 2018-04-08 RX ORDER — DEXTROSE 50 % IN WATER 50 %
1 SYRINGE (ML) INTRAVENOUS ONCE
Qty: 0 | Refills: 0 | Status: DISCONTINUED | OUTPATIENT
Start: 2018-04-08 | End: 2018-04-10

## 2018-04-08 RX ADMIN — OXYCODONE HYDROCHLORIDE 10 MILLIGRAM(S): 5 TABLET ORAL at 19:40

## 2018-04-08 RX ADMIN — Medication 75 MILLIGRAM(S): at 18:41

## 2018-04-08 RX ADMIN — Medication 3 MILLILITER(S): at 12:39

## 2018-04-08 RX ADMIN — OXYCODONE HYDROCHLORIDE 10 MILLIGRAM(S): 5 TABLET ORAL at 03:00

## 2018-04-08 RX ADMIN — Medication 200 MILLIGRAM(S): at 06:32

## 2018-04-08 RX ADMIN — Medication 3 MILLILITER(S): at 10:36

## 2018-04-08 RX ADMIN — Medication 3 MILLILITER(S): at 06:26

## 2018-04-08 RX ADMIN — OXYCODONE HYDROCHLORIDE 10 MILLIGRAM(S): 5 TABLET ORAL at 11:06

## 2018-04-08 RX ADMIN — Medication 3 MILLILITER(S): at 18:41

## 2018-04-08 RX ADMIN — OXYCODONE HYDROCHLORIDE 10 MILLIGRAM(S): 5 TABLET ORAL at 02:27

## 2018-04-08 RX ADMIN — Medication 3 MILLILITER(S): at 22:55

## 2018-04-08 RX ADMIN — Medication 3 MILLILITER(S): at 15:33

## 2018-04-08 RX ADMIN — BUDESONIDE AND FORMOTEROL FUMARATE DIHYDRATE 2 PUFF(S): 160; 4.5 AEROSOL RESPIRATORY (INHALATION) at 10:36

## 2018-04-08 RX ADMIN — Medication 3 MILLILITER(S): at 02:27

## 2018-04-08 RX ADMIN — Medication 40 MILLIGRAM(S): at 06:26

## 2018-04-08 RX ADMIN — OXYCODONE HYDROCHLORIDE 10 MILLIGRAM(S): 5 TABLET ORAL at 10:36

## 2018-04-08 RX ADMIN — TIOTROPIUM BROMIDE 1 CAPSULE(S): 18 CAPSULE ORAL; RESPIRATORY (INHALATION) at 12:38

## 2018-04-08 RX ADMIN — BUDESONIDE AND FORMOTEROL FUMARATE DIHYDRATE 2 PUFF(S): 160; 4.5 AEROSOL RESPIRATORY (INHALATION) at 18:42

## 2018-04-08 RX ADMIN — OXYCODONE HYDROCHLORIDE 10 MILLIGRAM(S): 5 TABLET ORAL at 20:10

## 2018-04-08 RX ADMIN — HEPARIN SODIUM 5000 UNIT(S): 5000 INJECTION INTRAVENOUS; SUBCUTANEOUS at 22:55

## 2018-04-08 RX ADMIN — Medication 75 MILLIGRAM(S): at 10:36

## 2018-04-08 RX ADMIN — Medication 200 MILLIGRAM(S): at 12:39

## 2018-04-08 NOTE — DISCHARGE NOTE ADULT - CARE PLAN
Principal Discharge DX:	COPD exacerbation  Secondary Diagnosis:	Influenza Principal Discharge DX:	COPD exacerbation  Goal:	follow up within 7 days  Assessment and plan of treatment:	You came in with shortness of breath and were found to have COPD exacerbation due to the flu. You were started on steroids. You should continue steroids until complete. You should continue your inhalers that you use at home. You should continue tamiflu until complete. You should drink plenty of fluids and rest. If you have worsening shortness of breath or new symptoms, return to the ER.   You should follow up with Dr. Wallace within 7 days.  Secondary Diagnosis:	Influenza  Assessment and plan of treatment:	You were found to be flu +. You should continue your course of tamiflu until complete. You should drink plenty of fluids and rest.  Secondary Diagnosis:	Chronic back pain, unspecified back location, unspecified back pain laterality  Assessment and plan of treatment:	continue with home pain regime. Principal Discharge DX:	COPD exacerbation  Goal:	follow up within 7 days  Assessment and plan of treatment:	You came in with shortness of breath and were found to have COPD exacerbation due to the flu. You were started on steroids. You should continue steroids until complete. You should continue your inhalers that you use at home. You should continue tamiflu until complete. You should drink plenty of fluids and rest. If you have worsening shortness of breath or new symptoms, return to the ER.   You should follow up with Dr. Wallace within 7 days.  Secondary Diagnosis:	Influenza  Assessment and plan of treatment:	You were found to be flu +. You should continue your course of tamiflu until complete. You should drink plenty of fluids and rest.  Secondary Diagnosis:	Chronic back pain, unspecified back location, unspecified back pain laterality  Assessment and plan of treatment:	continue with home pain regimen Principal Discharge DX:	COPD exacerbation  Goal:	follow up within 7 days  Assessment and plan of treatment:	You came in with shortness of breath and were found to have COPD exacerbation due to the flu. You were started on steroids. You should continue steroids until complete. You should continue your inhalers that you use at home. You should continue tamiflu until complete. You should drink plenty of fluids and rest. If you have worsening shortness of breath or new symptoms, return to the ER.   You should follow up with Dr. Wallace within 7 days.  Secondary Diagnosis:	Influenza  Assessment and plan of treatment:	You were found to be flu +. You should continue your course of tamiflu until complete. You should drink plenty of fluids and rest.  Secondary Diagnosis:	Chronic back pain, unspecified back location, unspecified back pain laterality  Assessment and plan of treatment:	continue with home pain regimen  Secondary Diagnosis:	Thyroid nodule  Assessment and plan of treatment:	You have a thyroid nodule on your CT scan. You should follow-up as an outpatient.

## 2018-04-08 NOTE — DISCHARGE NOTE ADULT - MEDICATION SUMMARY - MEDICATIONS TO TAKE
I will START or STAY ON the medications listed below when I get home from the hospital:    Nebulizer machine  -- Please provide the patient with a nebulizer machine for inhalational treatments at home  -- Indication: For COPD (chronic obstructive pulmonary disease)    predniSONE 20 mg oral tablet  -- 2 tab(s) by mouth once a day  -- Indication: For COPD (chronic obstructive pulmonary disease)    oxyCODONE 30 mg oral tablet  -- 1 tab(s) by mouth every 8 hours, As needed, Severe Pain (7 - 10) MDD:3  -- Indication: For Pain    oseltamivir 75 mg oral capsule  -- 1 cap(s) by mouth 2 times a day  -- Indication: For Influenza    Advair Diskus 500 mcg-50 mcg inhalation powder  -- 1 puff(s) inhaled 2 times a day  -- Indication: For COPD (chronic obstructive pulmonary disease)    albuterol 90 mcg/inh inhalation aerosol  -- 2 puff(s) inhaled 4 times a day  -- Indication: For COPD (chronic obstructive pulmonary disease)    tiotropium 18 mcg inhalation capsule  -- 1 cap(s) inhaled once a day  -- Indication: For COPD (chronic obstructive pulmonary disease)

## 2018-04-08 NOTE — DISCHARGE NOTE ADULT - CARE PROVIDER_API CALL
Vignesh Wallace), Critical Care Medicine; Pulmonary Disease  [address]  [place] [address]  Phone: [phone]  Fax: [phone]

## 2018-04-08 NOTE — PHYSICAL THERAPY INITIAL EVALUATION ADULT - CRITERIA FOR SKILLED THERAPEUTIC INTERVENTIONS
functional limitations in following categories/impairments found/therapy frequency/anticipated discharge recommendation/rehab potential

## 2018-04-08 NOTE — PROGRESS NOTE ADULT - SUBJECTIVE AND OBJECTIVE BOX
Patient is a 57y old  Female who presents with a chief complaint of COPD exacerbation (08 Apr 2018 09:55)      INTERVAL HPI/OVERNIGHT EVENTS:  Seen by me this morning. Feeling better. +Cough, some SOB.    Review of Systems: 12 point review of systems otherwise negative    MEDICATIONS  (STANDING):  ALBUTerol/ipratropium for Nebulization 3 milliLiter(s) Nebulizer every 4 hours  buDESOnide 160 MICROgram(s)/formoterol 4.5 MICROgram(s) Inhaler 2 Puff(s) Inhalation two times a day  dextrose 5%. 1000 milliLiter(s) (50 mL/Hr) IV Continuous <Continuous>  dextrose 50% Injectable 12.5 Gram(s) IV Push once  dextrose 50% Injectable 25 Gram(s) IV Push once  dextrose 50% Injectable 25 Gram(s) IV Push once  heparin  Injectable 5000 Unit(s) SubCutaneous every 8 hours  insulin lispro (HumaLOG) corrective regimen sliding scale   SubCutaneous Before meals and at bedtime  oseltamivir 75 milliGRAM(s) Oral two times a day  predniSONE   Tablet 40 milliGRAM(s) Oral daily  tiotropium 18 MICROgram(s) Capsule 1 Capsule(s) Inhalation daily    MEDICATIONS  (PRN):  acetaminophen   Tablet. 650 milliGRAM(s) Oral every 6 hours PRN Moderate Pain (4 - 6)  dextrose Gel 1 Dose(s) Oral once PRN Blood Glucose LESS THAN 70 milliGRAM(s)/deciliter  glucagon  Injectable 1 milliGRAM(s) IntraMuscular once PRN Glucose LESS THAN 70 milligrams/deciliter  guaiFENesin    Syrup 200 milliGRAM(s) Oral every 6 hours PRN Cough  oxyCODONE    IR 10 milliGRAM(s) Oral every 8 hours PRN Severe Pain (7 - 10)      Allergies    penicillin (Angioedema)    Intolerances          Vital Signs Last 24 Hrs  T(C): 37 (08 Apr 2018 17:16), Max: 37.3 (08 Apr 2018 02:24)  T(F): 98.6 (08 Apr 2018 17:16), Max: 99.1 (08 Apr 2018 02:24)  HR: 89 (08 Apr 2018 17:16) (83 - 90)  BP: 134/81 (08 Apr 2018 17:16) (105/71 - 134/81)  BP(mean): 99 (08 Apr 2018 17:16) (99 - 99)  RR: 19 (08 Apr 2018 17:16) (19 - 20)  SpO2: 94% (08 Apr 2018 17:16) (93% - 97%)  CAPILLARY BLOOD GLUCOSE      POCT Blood Glucose.: 131 mg/dL (08 Apr 2018 17:40)  POCT Blood Glucose.: 153 mg/dL (08 Apr 2018 12:20)  POCT Blood Glucose.: 123 mg/dL (08 Apr 2018 08:35)        Physical Exam:    Daily     Daily   General:  Well appearing, NAD, not cachetic  HEENT:  Nonicteric, PERRLA  CV:  RRR, no murmur, no JVD  Lungs:  +Rhonchi and +wheezes b/l  Abdomen:  Soft, non-tender, no distended, positive BS, no hepatosplenomegaly  Extremities:  2+ pulses, no c/c, no edema  Skin:  Warm and dry, no rashes  :  No white  Neuro:  AAOx3, non-focal, CN II-XII grossly intact  No Restraints    LABS:                        12.2   4.0   )-----------( 275      ( 08 Apr 2018 08:04 )             39.5     04-08    140  |  102  |  18  ----------------------------<  107<H>  4.1   |  24  |  0.89    Ca    8.7      08 Apr 2018 08:04    TPro  8.4<H>  /  Alb  4.6  /  TBili  0.2  /  DBili  x   /  AST  15  /  ALT  12  /  AlkPhos  84  04-07            RADIOLOGY & ADDITIONAL TESTS:  ------

## 2018-04-08 NOTE — DISCHARGE NOTE ADULT - OTHER SIGNIFICANT FINDINGS
< from: CT Angio Chest PE Protocol w/ IV Cont (04.07.18 @ 09:43) >  IMPRESSION:     No central pulmonary embolism. The study is limited by suboptimal   intra-arterial pulmonary artery bolus.    Status post right lower lower lobectomy without change since 5/30/2017.   No evidence of local disease recurrence or metastases.    Mild dilatation of the pulmonary artery unchanged.    Mildly dilated main pancreatic duct. Clinical and laboratory correlation   and further imaging may be of value to include a pancreatic MRI    1.9 cm right thyroid lobe nodule and correlation with thyroid ultrasound   examination dated 4/2/2017.            < end of copied text >

## 2018-04-08 NOTE — PHYSICAL THERAPY INITIAL EVALUATION ADULT - ASSISTIVE DEVICE FOR STAIR TRANSFER, REHAB EVAL
left rail down/complaints of chest burning during stair negotiation, coughing, SOB, wheezing/right rail up

## 2018-04-08 NOTE — DISCHARGE NOTE ADULT - PLAN OF CARE
follow up within 7 days You came in with shortness of breath and were found to have COPD exacerbation due to the flu. You were started on steroids. You should continue steroids until complete. You should continue your inhalers that you use at home. You should continue tamiflu until complete. You should drink plenty of fluids and rest. If you have worsening shortness of breath or new symptoms, return to the ER.   You should follow up with Dr. Wallace within 7 days. You were found to be flu +. You should continue your course of tamiflu until complete. You should drink plenty of fluids and rest. continue with home pain regime. continue with home pain regimen You have a thyroid nodule on your CT scan. You should follow-up as an outpatient.

## 2018-04-08 NOTE — PHYSICAL THERAPY INITIAL EVALUATION ADULT - GAIT DEVIATIONS NOTED, PT EVAL
increased lateral sway, SOB, wheezing, desat to 88% on room air, tachcyardic up to 118 bpm, slightly unsteady gait/decreased zina/decreased step length

## 2018-04-08 NOTE — PROGRESS NOTE ADULT - PROBLEM SELECTOR PLAN 2
Mild COPD exacerbation due to influenza.  C/w prednisone, nebs ATC and symbicort/spiriva.  Pt desaturated this afternoon with PT to 88% and was tachycardic, will monitor overnight. Oxygen prn.  COPD team to be notified of admission

## 2018-04-08 NOTE — DISCHARGE NOTE ADULT - SECONDARY DIAGNOSIS.
Influenza Chronic back pain, unspecified back location, unspecified back pain laterality Thyroid nodule

## 2018-04-08 NOTE — DISCHARGE NOTE ADULT - PATIENT PORTAL LINK FT
You can access the [facility], offered by [facility], by registering with the following website: [email or web address]

## 2018-04-08 NOTE — DISCHARGE NOTE ADULT - HOSPITAL COURSE
58 y/o F PMHx ex-smoker (10 pack years, quit 2 years ago), COPD (no home O2, never intubated), lung adenocarcinoma w/ brain met (s/p right lower lobectomy with en bloc middle lobe resection on 10/24/2016 and craniotomy with left parietooccipital mass resection 10/4/17)- with mild difficulty with recall memory, presents with complaints of right sided chest pain, shortness of breath, cough, found to be in COPD exacerbation 2/2 influenza. The patient was initiated on tamiflu. The patient was given solumedrol 125mg IV in the ED and was started on prednisone 40mg daily. She was started on symbicort in the place of her home advair. She was started on spiriva and albuterol standing. She required 2L NC, but was weaned off to room air and is saturating well. the patient's respiratory status is improved.     The patient is stable and ready for discharge and should follow-up with Dr. Wallace within 7 days. 58 y/o F PMHx ex-smoker (10 pack years, quit 2 years ago), COPD (no home O2, never intubated), lung adenocarcinoma w/ brain met (s/p right lower lobectomy with en bloc middle lobe resection on 10/24/2016 and craniotomy with left parietooccipital mass resection 10/4/17)- with mild difficulty with recall memory, presents with complaints of right sided chest pain, shortness of breath, cough, found to be in COPD exacerbation 2/2 influenza. The patient underwent CTA which was negative for PE. Troponins were negative. The patient was initiated on tamiflu for RVP + influenza AH3. The patient was given solumedrol 125mg IV in the ED and was started on prednisone 40mg daily. She was started on symbicort in the place of her home advair. She was started on spiriva and albuterol standing. She required 2L NC, but was weaned off to room air and is saturating well. the patient's respiratory status is improved. The patient is able to walk without SOB.     The patient is stable and ready for discharge and should follow-up with Dr. Wallace within 7 days.

## 2018-04-09 LAB
GLUCOSE BLDC GLUCOMTR-MCNC: 110 MG/DL — HIGH (ref 70–99)
GLUCOSE BLDC GLUCOMTR-MCNC: 139 MG/DL — HIGH (ref 70–99)
GLUCOSE BLDC GLUCOMTR-MCNC: 89 MG/DL — SIGNIFICANT CHANGE UP (ref 70–99)
GLUCOSE BLDC GLUCOMTR-MCNC: 97 MG/DL — SIGNIFICANT CHANGE UP (ref 70–99)
HBA1C BLD-MCNC: 6.2 % — HIGH (ref 4–5.6)

## 2018-04-09 PROCEDURE — 94010 BREATHING CAPACITY TEST: CPT | Mod: 26

## 2018-04-09 PROCEDURE — 99233 SBSQ HOSP IP/OBS HIGH 50: CPT | Mod: GC

## 2018-04-09 RX ORDER — ENOXAPARIN SODIUM 100 MG/ML
40 INJECTION SUBCUTANEOUS EVERY 24 HOURS
Qty: 0 | Refills: 0 | Status: DISCONTINUED | OUTPATIENT
Start: 2018-04-09 | End: 2018-04-10

## 2018-04-09 RX ORDER — IPRATROPIUM/ALBUTEROL SULFATE 18-103MCG
3 AEROSOL WITH ADAPTER (GRAM) INHALATION ONCE
Qty: 0 | Refills: 0 | Status: COMPLETED | OUTPATIENT
Start: 2018-04-09 | End: 2018-04-09

## 2018-04-09 RX ORDER — IPRATROPIUM/ALBUTEROL SULFATE 18-103MCG
3 AEROSOL WITH ADAPTER (GRAM) INHALATION ONCE
Qty: 0 | Refills: 0 | Status: DISCONTINUED | OUTPATIENT
Start: 2018-04-09 | End: 2018-04-09

## 2018-04-09 RX ADMIN — OXYCODONE HYDROCHLORIDE 10 MILLIGRAM(S): 5 TABLET ORAL at 19:59

## 2018-04-09 RX ADMIN — ENOXAPARIN SODIUM 40 MILLIGRAM(S): 100 INJECTION SUBCUTANEOUS at 13:30

## 2018-04-09 RX ADMIN — OXYCODONE HYDROCHLORIDE 10 MILLIGRAM(S): 5 TABLET ORAL at 10:31

## 2018-04-09 RX ADMIN — Medication 3 MILLILITER(S): at 18:46

## 2018-04-09 RX ADMIN — HEPARIN SODIUM 5000 UNIT(S): 5000 INJECTION INTRAVENOUS; SUBCUTANEOUS at 06:17

## 2018-04-09 RX ADMIN — OXYCODONE HYDROCHLORIDE 10 MILLIGRAM(S): 5 TABLET ORAL at 19:56

## 2018-04-09 RX ADMIN — Medication 3 MILLILITER(S): at 06:17

## 2018-04-09 RX ADMIN — Medication 75 MILLIGRAM(S): at 06:17

## 2018-04-09 RX ADMIN — OXYCODONE HYDROCHLORIDE 10 MILLIGRAM(S): 5 TABLET ORAL at 11:42

## 2018-04-09 RX ADMIN — Medication 3 MILLILITER(S): at 10:13

## 2018-04-09 RX ADMIN — Medication 3 MILLILITER(S): at 21:56

## 2018-04-09 RX ADMIN — BUDESONIDE AND FORMOTEROL FUMARATE DIHYDRATE 2 PUFF(S): 160; 4.5 AEROSOL RESPIRATORY (INHALATION) at 18:46

## 2018-04-09 RX ADMIN — BUDESONIDE AND FORMOTEROL FUMARATE DIHYDRATE 2 PUFF(S): 160; 4.5 AEROSOL RESPIRATORY (INHALATION) at 06:21

## 2018-04-09 RX ADMIN — Medication 75 MILLIGRAM(S): at 18:46

## 2018-04-09 RX ADMIN — Medication 3 MILLILITER(S): at 13:30

## 2018-04-09 RX ADMIN — Medication 40 MILLIGRAM(S): at 06:17

## 2018-04-09 RX ADMIN — TIOTROPIUM BROMIDE 1 CAPSULE(S): 18 CAPSULE ORAL; RESPIRATORY (INHALATION) at 18:47

## 2018-04-09 NOTE — PROGRESS NOTE ADULT - PROBLEM SELECTOR PLAN 2
Initially with wheezing and tachypnea, did not require BiPAP. Satting 91% RA, 97% on 2L NC. Improving, no wheezing on my exam.  - c/w duonebs q4hrs  - c/w symbicort (takes advair at home) 500/50 bid  - Pt did not remember she was taking Spiriva, ordered for it as found it on outpatient labs- c/w spiriva daily  - Prednisone 40mg daily; Low dose insulin sliding scale while on steroids Influenza + on RVP  - Tamiflu 75mg bid, stop after 5 days  - droplet isolation precautions

## 2018-04-09 NOTE — PROGRESS NOTE ADULT - SUBJECTIVE AND OBJECTIVE BOX
CC/ HPI Patient is a 57 year old female, ex-smoker with chronic obstructive pulmonary disease, status post right lower lobectomy, 2016, craniotomy with left parietooccipital mass resection, 2017, for lung cancer with bran metastasis, admitted with influenza complicated by shortness of breath, cough, and wheezing, chest discomfort, this morning feeling improved.      PAST MEDICAL & SURGICAL HISTORY:  Brain metastasis: brain tumor s/p resection  MRSA (methicillin resistant Staphylococcus aureus): history of  Adenocarcinoma of lung  COPD (chronic obstructive pulmonary disease)  History of lung surgery: right wedge resection  H/O brain surgery  Surgery, elective: lung biopsy  History of appendectomy    SOCHX:   tobacco,  -  alcohol    FMHX: FA/MO  - contributory     ROS reviewed below with positive findings marked (+) :  GEN:  fever, chills ENT: tracheostomy,   epistaxis,  sinusitis COR: CAD, CHF,  HTN, dysrhythmia PUL: +COPD, ILD, asthma, +pneumonia GI: PEG, dysphagia, hemorrhage, other DOUGLAS: kidney disease, electrolyte disorder HEM:  anemia, thrombus, coagulopathy, +cancer ENDO:  thyroid disease, diabetes mellitus CNS:  dementia, stroke, seizure, PSY:  depression, anxiety, other      MEDICATIONS  (STANDING):  ALBUTerol/ipratropium for Nebulization 3 milliLiter(s) Nebulizer every 4 hours  buDESOnide 160 MICROgram(s)/formoterol 4.5 MICROgram(s) Inhaler 2 Puff(s) Inhalation two times a day  heparin  Injectable 5000 Unit(s) SubCutaneous every 8 hours  insulin lispro (HumaLOG) corrective regimen sliding scale   SubCutaneous Before meals and at bedtime  oseltamivir 75 milliGRAM(s) Oral two times a day  predniSONE   Tablet 40 milliGRAM(s) Oral daily  tiotropium 18 MICROgram(s) Capsule 1 Capsule(s) Inhalation daily    MEDICATIONS  (PRN):  acetaminophen   Tablet. 650 milliGRAM(s) Oral every 6 hours PRN Moderate Pain (4 - 6)  dextrose Gel 1 Dose(s) Oral once PRN Blood Glucose LESS THAN 70 milliGRAM(s)/deciliter  glucagon  Injectable 1 milliGRAM(s) IntraMuscular once PRN Glucose LESS THAN 70 milligrams/deciliter  guaiFENesin    Syrup 200 milliGRAM(s) Oral every 6 hours PRN Cough  oxyCODONE    IR 10 milliGRAM(s) Oral every 8 hours PRN Severe Pain (7 - 10)      Vital Signs Last 24 Hrs  T(C): 36.9 (09 Apr 2018 09:02), Max: 37 (08 Apr 2018 17:16)  T(F): 98.4 (09 Apr 2018 09:02), Max: 98.6 (08 Apr 2018 17:16)  HR: 83 (09 Apr 2018 09:02) (81 - 89)  BP: 121/82 (09 Apr 2018 09:02) (108/75 - 134/81)  BP(mean): 99 (08 Apr 2018 17:16) (99 - 99)  RR: 18 (09 Apr 2018 09:02) (18 - 20)  SpO2: 96% (09 Apr 2018 09:02) (93% - 98%)    GENERAL:         comfortable,  - distress.  HEENT:            - trauma,  - icterus,  - injection,  - nasal discharge.  NECK:              - jugular venous distention, - thyromegaly.  LYMPH:           - lymphadenopathy, - masses.  RESP:               + wheezes,   - rales,   - rhonchi.   COR:                S1S2   - gallops,  - rubs.  ABD:                bowel sounds,   soft, - tender, - distended, - organomegaly.  EXT/MSC:         - cyanosis,  - edema.    NEURO:             alert,   responds to stimuli.                          12.2   4.0   )-----------( 275      ( 08 Apr 2018 08:04 )             39.5     04-08    140  |  102  |  18  ----------------------------<  107<H>  4.1   |  24  |  0.89        CT Angio Chest PE Protocol w/ IV Cont (04.07.18)  Status post right lower lobectomy. No evidence of local disease recurrence. No change 4 mm pleural-based right upper lobe nodule. Mild centrilobular emphysema. No change parenchymal-pleural scarring right middle lobe. No focal consolidation.  No central pulmonary embolism.       ASSESSMENT/PLAN    1) Chronic obstructive pulmonary disease  2) Influenza  3) Lung cancer history    Bedside spirometry  Please check SpO2 on ambulation  Bronchodilators:  Atrovent/ albuterol q 4 – 6 hours as needed  Budesonide/formoterol, tiotropium  Corticosteroids: prednisone  ID/Antibiotics: oseltamivir  Cardiac/HTN: satisfactory  GI: Rx/ prophylaxis c PPI/H2B  Heme: Rx/VT prophylaxis  Discussed with medical team

## 2018-04-09 NOTE — PROGRESS NOTE ADULT - ASSESSMENT
PHYSICAL THERAPY - DAILY TREATMENT NOTE    Patient Name: Patricia Weaver        Date: 2017  : 1993   YES Patient  Verified  Visit #:     Insurance: Payor: Pritesh Love / Plan: [address] [place] [address] / Product Type: PPO /      In time: 1110 Out time: 1150   Total Treatment Time: 40     Medicare Time Tracking (below)   Total Timed Codes (min):  40 1:1 Treatment Time:       TREATMENT AREA =  Lumbar spine pain [M54.5]    SUBJECTIVE  Pain Level (on 0 to 10 scale):  6  / 10   Medication Changes/New allergies or changes in medical history, any new surgeries or procedures? NO    If yes, update Summary List   Subjective Functional Status/Changes:  []  No changes reported     Sore in the lower back from the needles earlier this week, but no pain. More pain in the upper back today from ms spasms. OBJECTIVE  25 min Therapeutic Exercise:  [x]  See flow sheet   Rationale:      increase ROM, increase strength and improve coordination to improve the patients ability to perform pain free ADLs. 15 Min Manual Therapy: TPR (B) ts/ls paraspinals. T/S pa mobs. Rationale:      decrease pain, increase ROM, increase tissue extensibility and decrease trigger points to improve patient's ability to perform pain free ADLs. min Patient Education:  YES  Reviewed HEP   []  Progressed/Changed HEP based on: Other Objective/Functional Measures: Therex per flow sheet. Multiple trigger points along (B) ts/ls paraspinals. Post Treatment Pain Level (on 0 to 10) scale:   5  / 10     ASSESSMENT  Assessment/Changes in Function:     Slight relief in symptoms following session.        []  See Progress Note/Recertification   Patient will continue to benefit from skilled PT services to modify and progress therapeutic interventions, address functional mobility deficits, address ROM deficits, address strength deficits, analyze and address soft tissue restrictions, analyze and cue movement
57F with,
patterns, analyze and modify body mechanics/ergonomics and assess and modify postural abnormalities to attain remaining goals. Progress toward goals / Updated goals:    Minimal change in progress toward LTG's with today's session.       PLAN  [x]  Upgrade activities as tolerated YES Continue plan of care   []  Discharge due to :    []  Other:      Therapist: Luis M Deleon PTA    Date: 2/2/2017 Time: 11:48 AM     Future Appointments  Date Time Provider Janey Yates   2/15/2017 11:30 AM Luis M Deleon PTA [facility]   2/16/2017 11:30 AM Mily Marie PT [facility]
56 y/o F PMHx ex-smoker (10 pack years, quit 2 years ago), COPD (no home O2, never intubated), lung adenocarcinoma w/ brain met (s/p right lower lobectomy with en bloc middle lobe resection on 10/24/2016 and craniotomy with left parietooccipital mass resection 10/4/17)- with mild difficulty with recall memory, presents with complaints of right sided chest pain, shortness of breath, cough, found to be in COPD exacerbation 2/2 influenza.
58 y/o woman with COPD exacerbation from influenza.

## 2018-04-09 NOTE — PROGRESS NOTE ADULT - PROBLEM SELECTOR PLAN 1
- Tamiflu 75mg bid, stop after 5 days  - droplet isolation precautions Initially with wheezing and tachypnea, did not require BiPAP. Satting 91% RA, 97% on 2L NC. Now on room air since yesterday. Yesterday desaturated to 88 and became tachycardic to 110s with pleuritic CP when walking with PT. Diffuse wheezes on exam today.   - c/w duonebs q4hrs  - c/w symbicort (takes advair at home) 500/50 bid  - Pt did not remember she was taking Spiriva, ordered for it as found it on outpatient labs- c/w spiriva daily  - Prednisone 40mg daily x 5 days; Low dose insulin sliding scale while on steroids  -CTA negative for PE in ED Initially with wheezing and tachypnea, did not require BiPAP. Satting 91% RA, 97% on 2L NC. Now on room air since yesterday. Yesterday desaturated to 88 and became tachycardic to 110s with pleuritic CP when walking with PT. Diffuse wheezes on exam today.   -SOB could be compounded by lung resection given history  - c/w duonebs q4hrs  - c/w symbicort (takes advair at home) 500/50 bid  - Pt did not remember she was taking Spiriva, ordered for it as found it on outpatient labs- c/w spiriva daily  - Prednisone 40mg daily x 5 days; Low dose insulin sliding scale while on steroids  -CTA negative for PE in ED

## 2018-04-09 NOTE — PROGRESS NOTE ADULT - PROBLEM SELECTOR PLAN 4
c/w Robitussin q6hrs prn
Mildly productive- non-productive sounding on my exam. Coughing fits frequent.  - c/w Robitussin q6hrs prn  - can try Hycodan if not improving much

## 2018-04-09 NOTE — PROGRESS NOTE ADULT - PROBLEM SELECTOR PLAN 6
reports work related injury, has been on pain medications, current dose is oxycodone 30mg, rxd 170 pills on 3/6/2018 (istop)  c/w pain meds prn
With brain met. s/p right lower lobectomy with en bloc middle lobe resection on 10/24/2016 and craniotomy with left parietooccipital mass resection 10/4/17.  - f/u w/ Dr. Barrios recs  - PET scan on 2/27/18 showed that since 11/30/17 there was a subcentimeter hypermetabolic lymph node in the left inguinal region of uncertain significance concern for recurrent of ca

## 2018-04-09 NOTE — PROGRESS NOTE ADULT - SUBJECTIVE AND OBJECTIVE BOX
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 Found to be flu+. She saw Dr Wallace about 10days ago for this same problem and was prescribed a course of prednisone but did not take the medication.       Additional COPD history:    BMI:   Is patient a 30day re-admission? : No   How many exacerbations in past 1year? : 1  Admission Spo2: 91        mode of oxyen:  RA       Fio2:             Smoking status: Former. 10pack years quit 2years ago.  pack years: 10  Vaccines: uptodate with influenza and pneumococcal.  Home oxygen: no  [facility]  Home COPD medications: Advair, albuterol   Participating in pulmonary rehab: Never  Outpatient pulmonologist: Dr Wallace.  mMRC: 2  CAT:   cough:  0 phlegm:0   chest tightness:0   walking 1 flight of stairs:3    activity at home:1  confidence leaving home: 3   sleep: energy: 2  Total CAT: 9      Home Medications:  advair/albuterol      PAST MEDICAL & SURGICAL HISTORY:  Brain metastasis: brain tumor s/p resection  MRSA (methicillin resistant Staphylococcus aureus): history of  Adenocarcinoma of lung  COPD (chronic obstructive pulmonary disease)  History of lung surgery: right wedge resection  H/O brain surgery  Surgery, elective: lung biopsy  History of appendectomy      Family history:  No pertinent family history in first degree relatives      Social history:  former smoker      ROS:  Constitutional: No malaise, fevers, weight loss  ENT: No nasal congestion,   Cardiology: as above  Pulmonary: as above  GI: No nausea, vomiting, diarrhea, abdominal pain  : No dyuria, frequency, hematuria or incontinence  Endo: No diabetes or thyroid disease.   Rheum: No joint pain, no rash  Ext: No leg swelling  Neuro: No headache, some forgetfulness.    VITALS:  T(C): , Max: 37 (04-08-18 @ 17:16)  HR:  (81 - 89)  BP:  (108/75 - 134/81)  ABP: --  RR:  (18 - 20)  SpO2:  (93% - 98%)  Wt(kg): --      04-08-18 @ 07:01  -  04-09-18 @ 07:00  --------------------------------------------------------  IN: 0 mL / OUT: 400 mL / NET: -400 mL      LABS:  Na: 140 (04-08 @ 08:04), 137 (04-07 @ 07:38)  K: 4.1 (04-08 @ 08:04), 4.0 (04-07 @ 07:38)  Cl: 102 (04-08 @ 08:04), 97 (04-07 @ 07:38)  CO2: 24 (04-08 @ 08:04), 28 (04-07 @ 07:38)  BUN: 18 (04-08 @ 08:04), 16 (04-07 @ 07:38)  Cr: 0.89 (04-08 @ 08:04), 0.95 (04-07 @ 07:38)  Glu: 107(04-08 @ 08:04), 101(04-07 @ 07:38)    Hgb: 12.2 (04-08 @ 08:04), 14.3 (04-07 @ 07:37)  Hct: 39.5 (04-08 @ 08:04), 45.2 (04-07 @ 07:37)  WBC: 4.0 (04-08 @ 08:04), 6.9 (04-07 @ 07:37)  Plt: 275 (04-08 @ 08:04), 323 (04-07 @ 07:37)    INR:   PTT:     IMAGING:   Recent imaging studies were reviewed.    MEDICATIONS:  acetaminophen   Tablet. 650 milliGRAM(s) Oral every 6 hours PRN  ALBUTerol/ipratropium for Nebulization 3 milliLiter(s) Nebulizer every 4 hours  buDESOnide 160 MICROgram(s)/formoterol 4.5 MICROgram(s) Inhaler 2 Puff(s) Inhalation two times a day  dextrose 5%. 1000 milliLiter(s) IV Continuous <Continuous>  dextrose 50% Injectable 12.5 Gram(s) IV Push once  dextrose 50% Injectable 25 Gram(s) IV Push once  dextrose 50% Injectable 25 Gram(s) IV Push once  dextrose Gel 1 Dose(s) Oral once PRN  glucagon  Injectable 1 milliGRAM(s) IntraMuscular once PRN  guaiFENesin    Syrup 200 milliGRAM(s) Oral every 6 hours PRN  heparin  Injectable 5000 Unit(s) SubCutaneous every 8 hours  insulin lispro (HumaLOG) corrective regimen sliding scale   SubCutaneous Before meals and at bedtime  oseltamivir 75 milliGRAM(s) Oral two times a day  oxyCODONE    IR 10 milliGRAM(s) Oral every 8 hours PRN  predniSONE   Tablet 40 milliGRAM(s) Oral daily  tiotropium 18 MICROgram(s) Capsule 1 Capsule(s) Inhalation daily      EXAMINATION:  General:  NAD  HEENT: MP: Neck is supple, No JVD  Cardio: s1s2 RRR. No murmurs.   Respiratory: Inspiratory and expiratory wheezing b/l  Abdomen:  soft, NT  Extremities:  no edema  Skin:  warm/dry  Neuro:  awake, alert, oriented x 3, follows commands, moves all extremities

## 2018-04-09 NOTE — PROGRESS NOTE ADULT - PROBLEM SELECTOR PLAN 1
- Continue care as per Dr Wallace.  - COPD bundle initiated, PT ordered.    - probable d/c tomorrow if clinically improved.

## 2018-04-09 NOTE — PROGRESS NOTE ADULT - PROBLEM SELECTOR PLAN 9
Regular diet.  Euvolemic no need for IVF  Replete lytes prn    Admit to [facility].    Code status discussed. Pt currently FULL CODE. If condition worsens and irreversible she would not want to be intubated or resuscitated. HCP Son Edgar. Regular diet.  Euvolemic no need for IVF  Replete lytes prn    Admit to [facility].    Code status discussed. Pt currently FULL CODE. Based on discussion in ED If condition worsens and irreversible she would not want to be intubated or resuscitated. HCP Son Edgar.

## 2018-04-09 NOTE — PROGRESS NOTE ADULT - SUBJECTIVE AND OBJECTIVE BOX
***INCOMPLETE    INTERVAL HPI/OVERNIGHT EVENTS: the patient had no acute events overnight.     VITAL SIGNS:  T(F): 98.4 (04-09-18 @ 09:02)  HR: 83 (04-09-18 @ 09:02)  BP: 121/82 (04-09-18 @ 09:02)  RR: 18 (04-09-18 @ 09:02)  SpO2: 96% (04-09-18 @ 09:02)  Wt(kg): --    PHYSICAL EXAM:    Constitutional: Well developed, obese  General: laying comfortably  ENMT: moist mucous membranes, no mucosal pallor, clear throat, uvula midline  Neck: supple  Respiratory: diffuse wheezing  Cardiovascular: +S1, +S2, no murmurs, rubs or gallops, regular rate and rhythm  Gastrointestinal: abdomen soft, non distended, non tender, +BS  Extremities: no edema, no calf pain to palpation  Vascular: cap refill <3s in all extremities, radial and DP pulses 2+  Neurological: AAO x3  Skin: no rashes    MEDICATIONS  (STANDING):  ALBUTerol/ipratropium for Nebulization 3 milliLiter(s) Nebulizer every 4 hours  ALBUTerol/ipratropium for Nebulization. 3 milliLiter(s) Nebulizer once  buDESOnide 160 MICROgram(s)/formoterol 4.5 MICROgram(s) Inhaler 2 Puff(s) Inhalation two times a day  dextrose 5%. 1000 milliLiter(s) (50 mL/Hr) IV Continuous <Continuous>  dextrose 50% Injectable 12.5 Gram(s) IV Push once  dextrose 50% Injectable 25 Gram(s) IV Push once  dextrose 50% Injectable 25 Gram(s) IV Push once  heparin  Injectable 5000 Unit(s) SubCutaneous every 8 hours  insulin lispro (HumaLOG) corrective regimen sliding scale   SubCutaneous Before meals and at bedtime  oseltamivir 75 milliGRAM(s) Oral two times a day  predniSONE   Tablet 40 milliGRAM(s) Oral daily  tiotropium 18 MICROgram(s) Capsule 1 Capsule(s) Inhalation daily    MEDICATIONS  (PRN):  acetaminophen   Tablet. 650 milliGRAM(s) Oral every 6 hours PRN Moderate Pain (4 - 6)  dextrose Gel 1 Dose(s) Oral once PRN Blood Glucose LESS THAN 70 milliGRAM(s)/deciliter  glucagon  Injectable 1 milliGRAM(s) IntraMuscular once PRN Glucose LESS THAN 70 milligrams/deciliter  guaiFENesin    Syrup 200 milliGRAM(s) Oral every 6 hours PRN Cough  oxyCODONE    IR 10 milliGRAM(s) Oral every 8 hours PRN Severe Pain (7 - 10)      Allergies    penicillin (Angioedema)    Intolerances        LABS:                        12.2   4.0   )-----------( 275      ( 08 Apr 2018 08:04 )             39.5     04-08    140  |  102  |  18  ----------------------------<  107<H>  4.1   |  24  |  0.89    Ca    8.7      08 Apr 2018 08:04            RADIOLOGY & ADDITIONAL TESTS: Reviewed. ***INCOMPLETE    INTERVAL HPI/OVERNIGHT EVENTS: the patient had no acute events overnight.  The patient does not complain of SOB or chest pain    VITAL SIGNS:  T(F): 98.4 (04-09-18 @ 09:02)  HR: 83 (04-09-18 @ 09:02)  BP: 121/82 (04-09-18 @ 09:02)  RR: 18 (04-09-18 @ 09:02)  SpO2: 96% (04-09-18 @ 09:02)  Wt(kg): --    PHYSICAL EXAM:    Constitutional: Well developed, obese  General: laying comfortably  ENMT: moist mucous membranes, no mucosal pallor, clear throat, uvula midline  Neck: supple  Respiratory: diffuse wheezing  Cardiovascular: +S1, +S2, no murmurs, rubs or gallops, regular rate and rhythm  Gastrointestinal: abdomen soft, non distended, non tender, +BS  Extremities: no edema, no calf pain to palpation  Vascular: cap refill <3s in all extremities, radial and DP pulses 2+  Neurological: AAO x3  Skin: no rashes    MEDICATIONS  (STANDING):  ALBUTerol/ipratropium for Nebulization 3 milliLiter(s) Nebulizer every 4 hours  ALBUTerol/ipratropium for Nebulization. 3 milliLiter(s) Nebulizer once  buDESOnide 160 MICROgram(s)/formoterol 4.5 MICROgram(s) Inhaler 2 Puff(s) Inhalation two times a day  dextrose 5%. 1000 milliLiter(s) (50 mL/Hr) IV Continuous <Continuous>  dextrose 50% Injectable 12.5 Gram(s) IV Push once  dextrose 50% Injectable 25 Gram(s) IV Push once  dextrose 50% Injectable 25 Gram(s) IV Push once  heparin  Injectable 5000 Unit(s) SubCutaneous every 8 hours  insulin lispro (HumaLOG) corrective regimen sliding scale   SubCutaneous Before meals and at bedtime  oseltamivir 75 milliGRAM(s) Oral two times a day  predniSONE   Tablet 40 milliGRAM(s) Oral daily  tiotropium 18 MICROgram(s) Capsule 1 Capsule(s) Inhalation daily    MEDICATIONS  (PRN):  acetaminophen   Tablet. 650 milliGRAM(s) Oral every 6 hours PRN Moderate Pain (4 - 6)  dextrose Gel 1 Dose(s) Oral once PRN Blood Glucose LESS THAN 70 milliGRAM(s)/deciliter  glucagon  Injectable 1 milliGRAM(s) IntraMuscular once PRN Glucose LESS THAN 70 milligrams/deciliter  guaiFENesin    Syrup 200 milliGRAM(s) Oral every 6 hours PRN Cough  oxyCODONE    IR 10 milliGRAM(s) Oral every 8 hours PRN Severe Pain (7 - 10)      Allergies    penicillin (Angioedema)    Intolerances        LABS:                        12.2   4.0   )-----------( 275      ( 08 Apr 2018 08:04 )             39.5     04-08    140  |  102  |  18  ----------------------------<  107<H>  4.1   |  24  |  0.89    Ca    8.7      08 Apr 2018 08:04            RADIOLOGY & ADDITIONAL TESTS: Reviewed. INTERVAL HPI/OVERNIGHT EVENTS: the patient had no acute events overnight.  The patient does not complain of SOB or chest pain    VITAL SIGNS:  T(F): 98.4 (04-09-18 @ 09:02)  HR: 83 (04-09-18 @ 09:02)  BP: 121/82 (04-09-18 @ 09:02)  RR: 18 (04-09-18 @ 09:02)  SpO2: 96% (04-09-18 @ 09:02)  Wt(kg): --    PHYSICAL EXAM:    Constitutional: Well developed, obese  General: laying comfortably  ENMT: moist mucous membranes, no mucosal pallor, clear throat, uvula midline  Neck: supple  Respiratory: diffuse wheezing  Cardiovascular: +S1, +S2, no murmurs, rubs or gallops, regular rate and rhythm  Gastrointestinal: abdomen soft, non distended, non tender, +BS  Extremities: no edema, no calf pain to palpation  Vascular: cap refill <3s in all extremities, radial and DP pulses 2+  Neurological: AAO x3  Skin: no rashes    MEDICATIONS  (STANDING):  ALBUTerol/ipratropium for Nebulization 3 milliLiter(s) Nebulizer every 4 hours  ALBUTerol/ipratropium for Nebulization. 3 milliLiter(s) Nebulizer once  buDESOnide 160 MICROgram(s)/formoterol 4.5 MICROgram(s) Inhaler 2 Puff(s) Inhalation two times a day  dextrose 5%. 1000 milliLiter(s) (50 mL/Hr) IV Continuous <Continuous>  dextrose 50% Injectable 12.5 Gram(s) IV Push once  dextrose 50% Injectable 25 Gram(s) IV Push once  dextrose 50% Injectable 25 Gram(s) IV Push once  heparin  Injectable 5000 Unit(s) SubCutaneous every 8 hours  insulin lispro (HumaLOG) corrective regimen sliding scale   SubCutaneous Before meals and at bedtime  oseltamivir 75 milliGRAM(s) Oral two times a day  predniSONE   Tablet 40 milliGRAM(s) Oral daily  tiotropium 18 MICROgram(s) Capsule 1 Capsule(s) Inhalation daily    MEDICATIONS  (PRN):  acetaminophen   Tablet. 650 milliGRAM(s) Oral every 6 hours PRN Moderate Pain (4 - 6)  dextrose Gel 1 Dose(s) Oral once PRN Blood Glucose LESS THAN 70 milliGRAM(s)/deciliter  glucagon  Injectable 1 milliGRAM(s) IntraMuscular once PRN Glucose LESS THAN 70 milligrams/deciliter  guaiFENesin    Syrup 200 milliGRAM(s) Oral every 6 hours PRN Cough  oxyCODONE    IR 10 milliGRAM(s) Oral every 8 hours PRN Severe Pain (7 - 10)      Allergies    penicillin (Angioedema)    Intolerances        LABS:                        12.2   4.0   )-----------( 275      ( 08 Apr 2018 08:04 )             39.5     04-08    140  |  102  |  18  ----------------------------<  107<H>  4.1   |  24  |  0.89    Ca    8.7      08 Apr 2018 08:04            RADIOLOGY & ADDITIONAL TESTS: Reviewed.

## 2018-04-09 NOTE — PROGRESS NOTE ADULT - PROBLEM SELECTOR PLAN 5
With brain met. s/p right lower lobectomy with en bloc middle lobe resection on 10/24/2016 and craniotomy with left parietooccipital mass resection 10/4/17.  - f/u w/ Dr. Barrios recs  - PET scan on 2/27/18 showed that since 11/30/17 there was a subcentimeter hypermetabolic lymph node in the left inguinal region of uncertain significance concern for recurrent of ca
Chronic, likely tension headache vs. related to hx of brain met and surgical resection. reports seeing a neurologist, rxd gabapentin, which pt deferred taking given concern for indication of medication (given for seizure) and to avoid somnolence especially in setting of high dose oxycodone use; c/w tylenol for now, follow up w/ outpatient neurology

## 2018-04-09 NOTE — PROGRESS NOTE ADULT - ATTENDING COMMENTS
Pt of Dr Wallace, seen for COPD bundle.
Anticipate discharge home tomorrow with Pulmonary follow up (Rodrigo).

## 2018-04-09 NOTE — PROGRESS NOTE ADULT - PROBLEM SELECTOR PLAN 7
reports work related injury , has been on pain medications, current dose is oxycodone 30mg, rxd 170 pills on 3/6/2018 (istop). pt does not take medications daily and will take at differing intervals; given lethargy from flu will rxd lower dose of oxycodone 10mg q 8 hours prn during hospitalization

## 2018-04-09 NOTE — PROGRESS NOTE ADULT - PROBLEM SELECTOR PLAN 3
Musculoskeletal given continuing cough and reproducibility. Also on right side where she had her lung resection- possible inflammation due to surgical changes. Improved s/p Tylenol.  - c/w Tylenol q6hrs prn cough; avoid NSAIDs pt on steroids Musculoskeletal given continuing cough and reproducibility. Also on right side where she had her lung resection- possible inflammation due to surgical changes. Improved s/p Tylenol.  - c/w Tylenol q6hrs prn cough; avoid NSAIDs pt on steroids  -PE was ruled out as CTA negative in ED

## 2018-04-10 VITALS
TEMPERATURE: 98 F | DIASTOLIC BLOOD PRESSURE: 70 MMHG | HEART RATE: 89 BPM | SYSTOLIC BLOOD PRESSURE: 102 MMHG | OXYGEN SATURATION: 96 % | RESPIRATION RATE: 18 BRPM

## 2018-04-10 LAB — GLUCOSE BLDC GLUCOMTR-MCNC: 108 MG/DL — HIGH (ref 70–99)

## 2018-04-10 PROCEDURE — 83036 HEMOGLOBIN GLYCOSYLATED A1C: CPT

## 2018-04-10 PROCEDURE — 36415 COLL VENOUS BLD VENIPUNCTURE: CPT

## 2018-04-10 PROCEDURE — 87633 RESP VIRUS 12-25 TARGETS: CPT

## 2018-04-10 PROCEDURE — 85027 COMPLETE CBC AUTOMATED: CPT

## 2018-04-10 PROCEDURE — 82553 CREATINE MB FRACTION: CPT

## 2018-04-10 PROCEDURE — 99239 HOSP IP/OBS DSCHRG MGMT >30: CPT

## 2018-04-10 PROCEDURE — 99285 EMERGENCY DEPT VISIT HI MDM: CPT | Mod: 25

## 2018-04-10 PROCEDURE — 86803 HEPATITIS C AB TEST: CPT

## 2018-04-10 PROCEDURE — 97116 GAIT TRAINING THERAPY: CPT

## 2018-04-10 PROCEDURE — 80053 COMPREHEN METABOLIC PANEL: CPT

## 2018-04-10 PROCEDURE — 97161 PT EVAL LOW COMPLEX 20 MIN: CPT

## 2018-04-10 PROCEDURE — 84484 ASSAY OF TROPONIN QUANT: CPT

## 2018-04-10 PROCEDURE — 87486 CHLMYD PNEUM DNA AMP PROBE: CPT

## 2018-04-10 PROCEDURE — 80048 BASIC METABOLIC PNL TOTAL CA: CPT

## 2018-04-10 PROCEDURE — 94150 VITAL CAPACITY TEST: CPT

## 2018-04-10 PROCEDURE — 96374 THER/PROPH/DIAG INJ IV PUSH: CPT | Mod: XU

## 2018-04-10 PROCEDURE — 85025 COMPLETE CBC W/AUTO DIFF WBC: CPT

## 2018-04-10 PROCEDURE — 71275 CT ANGIOGRAPHY CHEST: CPT

## 2018-04-10 PROCEDURE — 93005 ELECTROCARDIOGRAM TRACING: CPT

## 2018-04-10 PROCEDURE — 87581 M.PNEUMON DNA AMP PROBE: CPT

## 2018-04-10 PROCEDURE — 87798 DETECT AGENT NOS DNA AMP: CPT

## 2018-04-10 PROCEDURE — 71046 X-RAY EXAM CHEST 2 VIEWS: CPT

## 2018-04-10 PROCEDURE — 94640 AIRWAY INHALATION TREATMENT: CPT

## 2018-04-10 PROCEDURE — 87389 HIV-1 AG W/HIV-1&-2 AB AG IA: CPT

## 2018-04-10 PROCEDURE — 82962 GLUCOSE BLOOD TEST: CPT

## 2018-04-10 PROCEDURE — 96375 TX/PRO/DX INJ NEW DRUG ADDON: CPT | Mod: XU

## 2018-04-10 PROCEDURE — 85379 FIBRIN DEGRADATION QUANT: CPT

## 2018-04-10 PROCEDURE — 82550 ASSAY OF CK (CPK): CPT

## 2018-04-10 RX ORDER — ALBUTEROL 90 UG/1
2 AEROSOL, METERED ORAL
Qty: 1 | Refills: 0 | OUTPATIENT
Start: 2018-04-10 | End: 2018-05-09

## 2018-04-10 RX ORDER — TIOTROPIUM BROMIDE 18 UG/1
1 CAPSULE ORAL; RESPIRATORY (INHALATION)
Qty: 1 | Refills: 0 | OUTPATIENT
Start: 2018-04-10 | End: 2018-05-09

## 2018-04-10 RX ORDER — FLUTICASONE PROPIONATE AND SALMETEROL 50; 250 UG/1; UG/1
1 POWDER ORAL; RESPIRATORY (INHALATION)
Qty: 1 | Refills: 0 | OUTPATIENT
Start: 2018-04-10 | End: 2018-05-09

## 2018-04-10 RX ORDER — TIOTROPIUM BROMIDE 18 UG/1
1 CAPSULE ORAL; RESPIRATORY (INHALATION)
Qty: 1 | Refills: 0 | OUTPATIENT
Start: 2018-04-10 | End: 2018-04-23

## 2018-04-10 RX ADMIN — OXYCODONE HYDROCHLORIDE 10 MILLIGRAM(S): 5 TABLET ORAL at 07:40

## 2018-04-10 RX ADMIN — OXYCODONE HYDROCHLORIDE 10 MILLIGRAM(S): 5 TABLET ORAL at 07:18

## 2018-04-10 RX ADMIN — Medication 3 MILLILITER(S): at 06:30

## 2018-04-10 RX ADMIN — Medication 40 MILLIGRAM(S): at 06:31

## 2018-04-10 RX ADMIN — Medication 75 MILLIGRAM(S): at 06:31

## 2018-04-10 RX ADMIN — BUDESONIDE AND FORMOTEROL FUMARATE DIHYDRATE 2 PUFF(S): 160; 4.5 AEROSOL RESPIRATORY (INHALATION) at 07:17

## 2018-04-10 NOTE — PROGRESS NOTE ADULT - SUBJECTIVE AND OBJECTIVE BOX
CC/ HPI Patient is a 57 year old female, ex-smoker with chronic obstructive pulmonary disease, status post right lower lobectomy, 2016, craniotomy with left parietooccipital mass resection, 2017, for lung cancer with bran metastasis, admitted with influenza complicated by shortness of breath, cough, and wheezing, chest discomfort, this morning feeling well for discharge home.      PAST MEDICAL & SURGICAL HISTORY:  Brain metastasis: brain tumor s/p resection  MRSA (methicillin resistant Staphylococcus aureus): history of  Adenocarcinoma of lung  COPD (chronic obstructive pulmonary disease)  History of lung surgery: right wedge resection  H/O brain surgery  Surgery, elective: lung biopsy  History of appendectomy    SOCHX:   tobacco,  -  alcohol    FMHX: FA/MO  - contributory     ROS reviewed below with positive findings marked (+) :  GEN:  fever, chills ENT: tracheostomy,   epistaxis,  sinusitis COR: CAD, CHF,  HTN, dysrhythmia PUL: +COPD, ILD, asthma, +pneumonia GI: PEG, dysphagia, hemorrhage, other DOUGLAS: kidney disease, electrolyte disorder HEM:  anemia, thrombus, coagulopathy, +cancer ENDO:  thyroid disease, diabetes mellitus CNS:  dementia, stroke, seizure, PSY:  depression, anxiety, other        MEDICATIONS  (STANDING):  ALBUTerol/ipratropium for Nebulization 3 milliLiter(s) Nebulizer every 4 hours  buDESOnide 160 MICROgram(s)/formoterol 4.5 MICROgram(s) Inhaler 2 Puff(s) Inhalation two times a day  dextrose 5%. 1000 milliLiter(s) (50 mL/Hr) IV Continuous <Continuous>  dextrose 50% Injectable 12.5 Gram(s) IV Push once  dextrose 50% Injectable 25 Gram(s) IV Push once  dextrose 50% Injectable 25 Gram(s) IV Push once  enoxaparin Injectable 40 milliGRAM(s) SubCutaneous every 24 hours  insulin lispro (HumaLOG) corrective regimen sliding scale   SubCutaneous Before meals and at bedtime  oseltamivir 75 milliGRAM(s) Oral two times a day  predniSONE   Tablet 40 milliGRAM(s) Oral daily  tiotropium 18 MICROgram(s) Capsule 1 Capsule(s) Inhalation daily    MEDICATIONS  (PRN):  acetaminophen   Tablet. 650 milliGRAM(s) Oral every 6 hours PRN Moderate Pain (4 - 6)  dextrose Gel 1 Dose(s) Oral once PRN Blood Glucose LESS THAN 70 milliGRAM(s)/deciliter  glucagon  Injectable 1 milliGRAM(s) IntraMuscular once PRN Glucose LESS THAN 70 milligrams/deciliter  guaiFENesin    Syrup 200 milliGRAM(s) Oral every 6 hours PRN Cough  oxyCODONE    IR 10 milliGRAM(s) Oral every 8 hours PRN Severe Pain (7 - 10)      Vital Signs Last 24 Hrs  T(C): 36.6 (10 Apr 2018 07:12), Max: 37 (09 Apr 2018 20:52)  T(F): 97.8 (10 Apr 2018 07:12), Max: 98.6 (09 Apr 2018 20:52)  HR: 89 (10 Apr 2018 07:12) (83 - 91)  BP: 102/70 (10 Apr 2018 07:12) (102/70 - 128/83)  BP(mean): 98 (09 Apr 2018 16:11) (98 - 98)  RR: 18 (10 Apr 2018 07:12) (18 - 19)  SpO2: 96% (10 Apr 2018 07:12) (95% - 97%)    GENERAL:         comfortable,  - distress.  HEENT:            - trauma,  - icterus,  - injection,  - nasal discharge.  NECK:              - jugular venous distention, - thyromegaly.  LYMPH:           - lymphadenopathy, - masses.  RESP:               + wheezes   - rales,   - rhonch.   COR:                S1S2   - gallops,  - rubs.  ABD:                bowel sounds,   soft, - tender, - distended, - organomegaly.  EXT/MSC:         - cyanosis,  + clubbing.    NEURO:             alert,   responds to stimuli.          CT Angio Chest PE Protocol w/ IV Cont (04.07.18)  Status post right lower lobectomy. No evidence of local disease recurrence. No change 4 mm pleural-based right upper lobe nodule. Mild centrilobular emphysema. No change parenchymal-pleural scarring right middle lobe. No focal consolidation.  No central pulmonary embolism.       ASSESSMENT/PLAN    1) Chronic obstructive pulmonary disease  2) Influenza  3) Lung cancer history      Bronchodilators:  Atrovent/ albuterol q 4 – 6 hours as needed  Budesonide/formoterol, tiotropium  Corticosteroids: prednisone taper  ID/Antibiotics: oseltamivir  Cardiac/HTN: satisfactory  GI: Rx/ prophylaxis c PPI/H2B  Heme: Rx/VT prophylaxis  Discussed with medical team

## 2018-04-12 ENCOUNTER — APPOINTMENT (OUTPATIENT)
Dept: RADIATION ONCOLOGY | Facility: CLINIC | Age: 58
End: 2018-04-12

## 2018-04-13 DIAGNOSIS — Z87.891 PERSONAL HISTORY OF NICOTINE DEPENDENCE: ICD-10-CM

## 2018-04-13 DIAGNOSIS — G89.29 OTHER CHRONIC PAIN: ICD-10-CM

## 2018-04-13 DIAGNOSIS — J10.1 INFLUENZA DUE TO OTHER IDENTIFIED INFLUENZA VIRUS WITH OTHER RESPIRATORY MANIFESTATIONS: ICD-10-CM

## 2018-04-13 DIAGNOSIS — Z85.841 PERSONAL HISTORY OF MALIGNANT NEOPLASM OF BRAIN: ICD-10-CM

## 2018-04-13 DIAGNOSIS — Z79.51 LONG TERM (CURRENT) USE OF INHALED STEROIDS: ICD-10-CM

## 2018-04-13 DIAGNOSIS — Z85.118 PERSONAL HISTORY OF OTHER MALIGNANT NEOPLASM OF BRONCHUS AND LUNG: ICD-10-CM

## 2018-04-13 DIAGNOSIS — G44.229 CHRONIC TENSION-TYPE HEADACHE, NOT INTRACTABLE: ICD-10-CM

## 2018-04-13 DIAGNOSIS — E04.1 NONTOXIC SINGLE THYROID NODULE: ICD-10-CM

## 2018-04-13 DIAGNOSIS — M54.9 DORSALGIA, UNSPECIFIED: ICD-10-CM

## 2018-04-13 DIAGNOSIS — Z79.891 LONG TERM (CURRENT) USE OF OPIATE ANALGESIC: ICD-10-CM

## 2018-04-13 DIAGNOSIS — Z86.14 PERSONAL HISTORY OF METHICILLIN RESISTANT STAPHYLOCOCCUS AUREUS INFECTION: ICD-10-CM

## 2018-04-13 DIAGNOSIS — Z90.2 ACQUIRED ABSENCE OF LUNG [PART OF]: ICD-10-CM

## 2018-04-13 DIAGNOSIS — E66.9 OBESITY, UNSPECIFIED: ICD-10-CM

## 2018-04-13 DIAGNOSIS — J44.1 CHRONIC OBSTRUCTIVE PULMONARY DISEASE WITH (ACUTE) EXACERBATION: ICD-10-CM

## 2018-04-13 DIAGNOSIS — Z88.0 ALLERGY STATUS TO PENICILLIN: ICD-10-CM

## 2018-04-16 ENCOUNTER — APPOINTMENT (OUTPATIENT)
Dept: ENDOCRINOLOGY | Facility: CLINIC | Age: 58
End: 2018-04-16
Payer: COMMERCIAL

## 2018-04-16 VITALS
BODY MASS INDEX: 38.07 KG/M2 | HEART RATE: 89 BPM | WEIGHT: 223 LBS | HEIGHT: 64 IN | SYSTOLIC BLOOD PRESSURE: 104 MMHG | DIASTOLIC BLOOD PRESSURE: 75 MMHG

## 2018-04-16 PROCEDURE — 99214 OFFICE O/P EST MOD 30 MIN: CPT

## 2018-04-19 ENCOUNTER — APPOINTMENT (OUTPATIENT)
Dept: RADIATION ONCOLOGY | Facility: CLINIC | Age: 58
End: 2018-04-19
Payer: COMMERCIAL

## 2018-04-19 VITALS
WEIGHT: 223.4 LBS | DIASTOLIC BLOOD PRESSURE: 77 MMHG | OXYGEN SATURATION: 97 % | SYSTOLIC BLOOD PRESSURE: 113 MMHG | BODY MASS INDEX: 38.35 KG/M2 | HEART RATE: 94 BPM | RESPIRATION RATE: 18 BRPM

## 2018-04-19 PROCEDURE — 99214 OFFICE O/P EST MOD 30 MIN: CPT

## 2018-04-19 RX ORDER — TIOTROPIUM BROMIDE 18 UG/1
18 CAPSULE ORAL; RESPIRATORY (INHALATION)
Refills: 0 | Status: DISCONTINUED | COMMUNITY
Start: 2018-01-03 | End: 2018-04-19

## 2018-04-30 ENCOUNTER — APPOINTMENT (OUTPATIENT)
Dept: NEUROLOGY | Facility: CLINIC | Age: 58
End: 2018-04-30
Payer: COMMERCIAL

## 2018-04-30 PROCEDURE — 96118: CPT

## 2018-04-30 PROCEDURE — 90791 PSYCH DIAGNOSTIC EVALUATION: CPT

## 2018-05-23 ENCOUNTER — APPOINTMENT (OUTPATIENT)
Dept: NEUROLOGY | Facility: CLINIC | Age: 58
End: 2018-05-23
Payer: COMMERCIAL

## 2018-05-23 VITALS
DIASTOLIC BLOOD PRESSURE: 77 MMHG | HEART RATE: 92 BPM | WEIGHT: 220 LBS | OXYGEN SATURATION: 95 % | TEMPERATURE: 98 F | HEIGHT: 64 IN | BODY MASS INDEX: 37.56 KG/M2 | SYSTOLIC BLOOD PRESSURE: 109 MMHG

## 2018-05-23 PROCEDURE — 99214 OFFICE O/P EST MOD 30 MIN: CPT

## 2018-05-23 PROCEDURE — 90837 PSYTX W PT 60 MINUTES: CPT

## 2018-06-12 ENCOUNTER — FORM ENCOUNTER (OUTPATIENT)
Age: 58
End: 2018-06-12

## 2018-06-13 ENCOUNTER — APPOINTMENT (OUTPATIENT)
Dept: CT IMAGING | Facility: HOSPITAL | Age: 58
End: 2018-06-13
Payer: COMMERCIAL

## 2018-06-13 ENCOUNTER — OUTPATIENT (OUTPATIENT)
Dept: OUTPATIENT SERVICES | Facility: HOSPITAL | Age: 58
LOS: 1 days | End: 2018-06-13
Payer: COMMERCIAL

## 2018-06-13 ENCOUNTER — APPOINTMENT (OUTPATIENT)
Dept: MRI IMAGING | Facility: HOSPITAL | Age: 58
End: 2018-06-13
Payer: COMMERCIAL

## 2018-06-13 DIAGNOSIS — Z41.9 ENCOUNTER FOR PROCEDURE FOR PURPOSES OTHER THAN REMEDYING HEALTH STATE, UNSPECIFIED: Chronic | ICD-10-CM

## 2018-06-13 DIAGNOSIS — Z98.890 OTHER SPECIFIED POSTPROCEDURAL STATES: Chronic | ICD-10-CM

## 2018-06-13 DIAGNOSIS — Z90.49 ACQUIRED ABSENCE OF OTHER SPECIFIED PARTS OF DIGESTIVE TRACT: Chronic | ICD-10-CM

## 2018-06-13 PROCEDURE — 71250 CT THORAX DX C-: CPT

## 2018-06-13 PROCEDURE — A9585: CPT

## 2018-06-13 PROCEDURE — 71250 CT THORAX DX C-: CPT | Mod: 26

## 2018-06-13 PROCEDURE — 70553 MRI BRAIN STEM W/O & W/DYE: CPT | Mod: 26

## 2018-06-13 PROCEDURE — 70553 MRI BRAIN STEM W/O & W/DYE: CPT

## 2018-06-19 ENCOUNTER — INPATIENT (INPATIENT)
Facility: HOSPITAL | Age: 58
LOS: 1 days | Discharge: HOME | DRG: 191 | End: 2018-06-21
Attending: INTERNAL MEDICINE | Admitting: INTERNAL MEDICINE
Payer: COMMERCIAL

## 2018-06-19 VITALS
RESPIRATION RATE: 24 BRPM | WEIGHT: 220.02 LBS | SYSTOLIC BLOOD PRESSURE: 134 MMHG | OXYGEN SATURATION: 97 % | TEMPERATURE: 98 F | HEART RATE: 99 BPM | HEIGHT: 64 IN | DIASTOLIC BLOOD PRESSURE: 70 MMHG

## 2018-06-19 DIAGNOSIS — Z90.49 ACQUIRED ABSENCE OF OTHER SPECIFIED PARTS OF DIGESTIVE TRACT: Chronic | ICD-10-CM

## 2018-06-19 DIAGNOSIS — Z98.890 OTHER SPECIFIED POSTPROCEDURAL STATES: Chronic | ICD-10-CM

## 2018-06-19 DIAGNOSIS — R63.8 OTHER SYMPTOMS AND SIGNS CONCERNING FOOD AND FLUID INTAKE: ICD-10-CM

## 2018-06-19 DIAGNOSIS — M54.9 DORSALGIA, UNSPECIFIED: ICD-10-CM

## 2018-06-19 DIAGNOSIS — R07.9 CHEST PAIN, UNSPECIFIED: ICD-10-CM

## 2018-06-19 DIAGNOSIS — J44.1 CHRONIC OBSTRUCTIVE PULMONARY DISEASE WITH (ACUTE) EXACERBATION: ICD-10-CM

## 2018-06-19 DIAGNOSIS — Z29.9 ENCOUNTER FOR PROPHYLACTIC MEASURES, UNSPECIFIED: ICD-10-CM

## 2018-06-19 DIAGNOSIS — C34.90 MALIGNANT NEOPLASM OF UNSPECIFIED PART OF UNSPECIFIED BRONCHUS OR LUNG: ICD-10-CM

## 2018-06-19 DIAGNOSIS — Z41.9 ENCOUNTER FOR PROCEDURE FOR PURPOSES OTHER THAN REMEDYING HEALTH STATE, UNSPECIFIED: Chronic | ICD-10-CM

## 2018-06-19 LAB — RAPID RVP RESULT: SIGNIFICANT CHANGE UP

## 2018-06-19 PROCEDURE — 99285 EMERGENCY DEPT VISIT HI MDM: CPT | Mod: 25

## 2018-06-19 PROCEDURE — 71275 CT ANGIOGRAPHY CHEST: CPT | Mod: 26

## 2018-06-19 PROCEDURE — 93010 ELECTROCARDIOGRAM REPORT: CPT

## 2018-06-19 RX ORDER — KETOROLAC TROMETHAMINE 30 MG/ML
30 SYRINGE (ML) INJECTION ONCE
Qty: 0 | Refills: 0 | Status: DISCONTINUED | OUTPATIENT
Start: 2018-06-19 | End: 2018-06-19

## 2018-06-19 RX ORDER — DEXTROSE 50 % IN WATER 50 %
25 SYRINGE (ML) INTRAVENOUS ONCE
Qty: 0 | Refills: 0 | Status: DISCONTINUED | OUTPATIENT
Start: 2018-06-19 | End: 2018-06-21

## 2018-06-19 RX ORDER — GLUCAGON INJECTION, SOLUTION 0.5 MG/.1ML
1 INJECTION, SOLUTION SUBCUTANEOUS ONCE
Qty: 0 | Refills: 0 | Status: DISCONTINUED | OUTPATIENT
Start: 2018-06-19 | End: 2018-06-21

## 2018-06-19 RX ORDER — DEXTROSE 50 % IN WATER 50 %
15 SYRINGE (ML) INTRAVENOUS ONCE
Qty: 0 | Refills: 0 | Status: DISCONTINUED | OUTPATIENT
Start: 2018-06-19 | End: 2018-06-21

## 2018-06-19 RX ORDER — OXYCODONE HYDROCHLORIDE 5 MG/1
10 TABLET ORAL EVERY 8 HOURS
Qty: 0 | Refills: 0 | Status: DISCONTINUED | OUTPATIENT
Start: 2018-06-19 | End: 2018-06-21

## 2018-06-19 RX ORDER — MAGNESIUM SULFATE 500 MG/ML
2 VIAL (ML) INJECTION ONCE
Qty: 0 | Refills: 0 | Status: COMPLETED | OUTPATIENT
Start: 2018-06-19 | End: 2018-06-19

## 2018-06-19 RX ORDER — DEXTROSE 50 % IN WATER 50 %
12.5 SYRINGE (ML) INTRAVENOUS ONCE
Qty: 0 | Refills: 0 | Status: DISCONTINUED | OUTPATIENT
Start: 2018-06-19 | End: 2018-06-21

## 2018-06-19 RX ORDER — ACETAMINOPHEN 500 MG
975 TABLET ORAL ONCE
Qty: 0 | Refills: 0 | Status: COMPLETED | OUTPATIENT
Start: 2018-06-19 | End: 2018-06-19

## 2018-06-19 RX ORDER — INSULIN LISPRO 100/ML
VIAL (ML) SUBCUTANEOUS
Qty: 0 | Refills: 0 | Status: DISCONTINUED | OUTPATIENT
Start: 2018-06-19 | End: 2018-06-21

## 2018-06-19 RX ORDER — SODIUM CHLORIDE 9 MG/ML
1000 INJECTION, SOLUTION INTRAVENOUS
Qty: 0 | Refills: 0 | Status: DISCONTINUED | OUTPATIENT
Start: 2018-06-19 | End: 2018-06-21

## 2018-06-19 RX ORDER — IPRATROPIUM/ALBUTEROL SULFATE 18-103MCG
3 AEROSOL WITH ADAPTER (GRAM) INHALATION EVERY 4 HOURS
Qty: 0 | Refills: 0 | Status: DISCONTINUED | OUTPATIENT
Start: 2018-06-19 | End: 2018-06-21

## 2018-06-19 RX ORDER — HEPARIN SODIUM 5000 [USP'U]/ML
7500 INJECTION INTRAVENOUS; SUBCUTANEOUS EVERY 8 HOURS
Qty: 0 | Refills: 0 | Status: DISCONTINUED | OUTPATIENT
Start: 2018-06-19 | End: 2018-06-21

## 2018-06-19 RX ORDER — IPRATROPIUM/ALBUTEROL SULFATE 18-103MCG
3 AEROSOL WITH ADAPTER (GRAM) INHALATION
Qty: 0 | Refills: 0 | Status: COMPLETED | OUTPATIENT
Start: 2018-06-19 | End: 2018-06-19

## 2018-06-19 RX ORDER — BUDESONIDE AND FORMOTEROL FUMARATE DIHYDRATE 160; 4.5 UG/1; UG/1
2 AEROSOL RESPIRATORY (INHALATION)
Qty: 0 | Refills: 0 | Status: DISCONTINUED | OUTPATIENT
Start: 2018-06-19 | End: 2018-06-21

## 2018-06-19 RX ADMIN — Medication 125 MILLIGRAM(S): at 16:24

## 2018-06-19 RX ADMIN — Medication 3 MILLILITER(S): at 21:52

## 2018-06-19 RX ADMIN — Medication 975 MILLIGRAM(S): at 18:33

## 2018-06-19 RX ADMIN — Medication 3 MILLILITER(S): at 16:32

## 2018-06-19 RX ADMIN — Medication 3 MILLILITER(S): at 16:24

## 2018-06-19 RX ADMIN — OXYCODONE HYDROCHLORIDE 10 MILLIGRAM(S): 5 TABLET ORAL at 21:52

## 2018-06-19 RX ADMIN — Medication 30 MILLIGRAM(S): at 16:35

## 2018-06-19 RX ADMIN — Medication 3 MILLILITER(S): at 16:25

## 2018-06-19 RX ADMIN — Medication 50 GRAM(S): at 16:24

## 2018-06-19 NOTE — ED ADULT NURSE NOTE - CHIEF COMPLAINT QUOTE
Mammogram order signed   pt c/o left sided chest pain since yesterday. pt also reports sob and cough for about 1 week. denies fever. hx of asthma, took advair with no relief. peak flow= 100.

## 2018-06-19 NOTE — ED ADULT NURSE NOTE - OBJECTIVE STATEMENT
PT came to ED complaining of left sided chest pain x2 days, with SOB, dyspnea and wheezing bilaterally. Pt is currently SOB, PFM at 100.  PT given 1 Duoneb emergently.  PT also has cough.  Pt is alert and oriented x3, complains of left sided chest pain. PT came to ED complaining of left sided chest pain x7days, with SOB, dyspnea and wheezing bilaterally. Pt is currently SOB, PFM at 100.  PT given 1 Duoneb emergently.  PT also has cough.  Pt is alert and oriented x3, complains of left sided chest pain.  Pt complains of pleuritic chest pain and occasional abd pain upon coughing. Denies fever, chills, D/N/V, /GI symptoms.

## 2018-06-19 NOTE — H&P ADULT - HISTORY OF PRESENT ILLNESS
58 y/o F PMHx ex-smoker (10 pack years, quit 2 years ago), COPD (no home O2, never intubated), lung adenocarcinoma w/ brain met (s/p right lower lobectomy with en bloc middle lobe resection on 10/24/2016 and craniotomy with left parietooccipital mass resection 10/4/17) presents with.  Of note patient had similar admission 2 months ago.  In the ED vital signs 134/70, HR 99 RR 24, Temp 98.2, saturating 97% on room air.  She was given 3 rounds of duonebs, magnesium sulfate and a dose of solumedrol 125mg IV push.  Labs were unremarkable and CT PE protocol negative for PE. 58 y/o F PMHx ex-smoker (10 pack years, quit 2 years ago), COPD (no home O2, never intubated), lung adenocarcinoma w/ brain met (s/p right lower lobectomy with en bloc middle lobe resection on 10/24/2016 and craniotomy with left parietooccipital mass resection 10/4/17) presents with chest pain and shortness of breath for about 1 week in duration.  The pain and SOB started on ----------.  Patient went to her PCP and gave her prednisone and a z pack.  She took prednisone 40mg for 4 days and 30mg the final day and completed her cuorse.  The patient did not improve so she decided to come to the ED.  Of note patient had similar admission 2 months ago.  In the ED vital signs 134/70, HR 99 RR 24, Temp 98.2, saturating 97% on room air.  She was given 3 rounds of duonebs, magnesium sulfate and a dose of solumedrol 125mg IV push.  Labs were unremarkable and CT PE protocol negative for PE and EKG was NSR with no ST changes. 57 year old female former smoker (10 pack years, quit 2 years ago), COPD (no home O2, never intubated), lung adenocarcinoma w/ brain met (s/p right lower lobectomy with en bloc middle lobe resection on 10/24/2016 and craniotomy with left parietooccipital mass resection 10/4/17) who presents with one week of coughing, shortness of breath, rhinorrhea for about 1 week in duration.  These symptoms started last Tuesday and did not improve by Friday so she called her pulmonologist Dr. Wallace who started the patient on azithromycin and told her to take prednisone which the patient had leftover from previous illness.  The patient took 40mg for 3 days and 30mg for 1 day and completed the azithromycin and did not improve.  Today the patient started to experience left sided chest pain that is worse with cough and reproducible.  She states that it is sore and 7/10 and constant.  She called Dr. Wallace again who was unavailable to an emergency so she came to the ED.  Of note patient had similar admission 2 months ago.  In the ED vital signs 134/70, HR 99 RR 24, Temp 98.2, saturating 97% on room air.  She was given 3 rounds of duonebs, magnesium sulfate and a dose of solumedrol 125mg IV push.  She received toradol and tylenol for the chest pain.  Labs were unremarkable and CT PE protocol negative for PE and EKG was NSR with no ST changes.  Patient was admitted for COPD exacerbation.

## 2018-06-19 NOTE — H&P ADULT - PROBLEM SELECTOR PLAN 4
not applicable (Male)
-Patient with chronic back pain after falling out of a chair.  Takes oxycodone at home as needed.  Was I stopped in March which showed 170 30mg pills given at that time.  Back pain not severe at this point, will give 10mg q8 PRN.

## 2018-06-19 NOTE — ED PROVIDER NOTE - ATTENDING CONTRIBUTION TO CARE
I have seen the pt and reviewed all pertinent clinical data, and I agree with the documentation/care/plan executed by CHRISTIAN López.

## 2018-06-19 NOTE — H&P ADULT - PROBLEM SELECTOR PLAN 1
-Patient with history of COPD with previous admissions for exacerbations most recently in April.  Patient has 1 week of coughing, shortness of breath, sneezing, rhinorrhea with increased beige sputum production.  Received magnesium sulfate, 125 solumderol and 3 rounds of duonebs in the ED.  CT PE protocol negative.  -Duonebs q 4  -Prednisone 60mg q 6 (sliding scale while on steroids)  -Levaquin 500mg daily (patient has apparent angioedema with PCN)  -Symbicort 2 puffs BID

## 2018-06-19 NOTE — H&P ADULT - NSHPPHYSICALEXAM_GEN_ALL_CORE
.  VITAL SIGNS:  T(C): 36.8 (06-19-18 @ 15:29), Max: 36.8 (06-19-18 @ 15:29)  T(F): 98.2 (06-19-18 @ 15:29), Max: 98.2 (06-19-18 @ 15:29)  HR: 90 (06-19-18 @ 16:39) (90 - 99)  BP: 100/67 (06-19-18 @ 16:39) (100/67 - 134/70)  BP(mean): --  RR: 20 (06-19-18 @ 16:39) (20 - 24)  SpO2: 98% (06-19-18 @ 16:39) (97% - 98%)  Wt(kg): --    PHYSICAL EXAM:    Constitutional: WDWN resting comfortably in bed; NAD  Head: NC/AT  Eyes: PERRL, EOMI, anicteric sclera  ENT: no nasal discharge; uvula midline, no oropharyngeal erythema or exudates; MMM  Neck: supple; no JVD or thyromegaly  Respiratory: CTA B/L; no W/R/R, no retractions  Cardiac: +S1/S2; RRR; no M/R/G; PMI non-displaced  Gastrointestinal: soft, NT/ND; no rebound or guarding; +BSx4  Genitourinary: normal external genitalia  Back: spine midline, no bony tenderness or step-offs; no CVAT B/L  Extremities: WWP, no clubbing or cyanosis; no peripheral edema  Musculoskeletal: NROM x4; no joint swelling, tenderness or erythema  Vascular: 2+ radial, femoral, DP/PT pulses B/L  Dermatologic: skin warm, dry and intact; no rashes, wounds, or scars  Lymphatic: no submandibular or cervical LAD  Neurologic: AAOx3; CNII-XII grossly intact; no focal deficits  Psychiatric: affect and characteristics of appearance, verbalizations, behaviors are appropriate Constitutional: WDWN resting comfortably in bed; NAD  Head: NC/AT  Eyes: PERRL, EOMI, anicteric sclera  ENT: no nasal discharge; uvula midline, no oropharyngeal erythema or exudates; MMM  Neck: supple; no JVD or thyromegaly  Respiratory: Wheezing bilaterally, predominantly in the upper lobes  Cardiac: +S1/S2; RRR; no M/R/G; PMI non-displaced  Gastrointestinal: soft, NT/ND; no rebound or guarding; +BSx4  Extremities: WWP, no clubbing or cyanosis; no peripheral edema  Musculoskeletal: NROM x4; no joint swelling, tenderness or erythema  Vascular: 2+ radial, femoral, DP/PT pulses B/L  Dermatologic: skin warm, dry and intact; no rashes, scar on right lower ribcage  from previous lung surgery  Neurologic: AAOx3; CNII-XII grossly intact; no focal deficits

## 2018-06-19 NOTE — H&P ADULT - NSHPSOCIALHISTORY_GEN_ALL_CORE
Former smoker (10 pack years quit 2 years ago) Former smoker (10 pack years quit 2 years ago), denies alcohol and drug use

## 2018-06-19 NOTE — ED ADULT TRIAGE NOTE - CHIEF COMPLAINT QUOTE
pt c/o left sided chest pain since yesterday. pt also reports sob and cough for about 1 week. denies fever. hx of asthma, took advair with no relief. pt c/o left sided chest pain since yesterday. pt also reports sob and cough for about 1 week. denies fever. hx of asthma, took advair with no relief. peak flow= 100.

## 2018-06-19 NOTE — H&P ADULT - NSHPLABSRESULTS_GEN_ALL_CORE
.  LABS:                         13.7   10.9  )-----------( 338      ( 19 Jun 2018 16:21 )             42.8     06-19    141  |  101  |  16  ----------------------------<  100<H>  4.5   |  28  |  1.09    Ca    9.5      19 Jun 2018 16:21    TPro  7.5  /  Alb  4.2  /  TBili  <0.2  /  DBili  x   /  AST  16  /  ALT  15  /  AlkPhos  68  06-19    PT/INR - ( 19 Jun 2018 16:21 )   PT: 10.9 sec;   INR: 0.98          PTT - ( 19 Jun 2018 16:21 )  PTT:27.9 sec    CARDIAC MARKERS ( 19 Jun 2018 16:21 )  x     / <0.01 ng/mL / 80 U/L / x     / <1.0 ng/mL            RADIOLOGY, EKG & ADDITIONAL TESTS: Reviewed.

## 2018-06-19 NOTE — H&P ADULT - NSHPREVIEWOFSYSTEMS_GEN_ALL_CORE
REVIEW OF SYSTEMS:    CONSTITUTIONAL: No weakness, fevers or chills  EYES/ENT: No visual changes;  No vertigo or throat pain   NECK: No pain or stiffness  RESPIRATORY: No cough, wheezing, hemoptysis; No shortness of breath  CARDIOVASCULAR: No chest pain or palpitations  GASTROINTESTINAL: No abdominal or epigastric pain. No nausea, vomiting, or hematemesis; No diarrhea or constipation. No melena or hematochezia.  GENITOURINARY: No dysuria, frequency or hematuria  NEUROLOGICAL: No numbness or weakness  SKIN: No itching, burning, rashes, or lesions   All other review of systems is negative unless indicated above. CONSTITUTIONAL: No weakness, fevers or chills  EYES/ENT: No visual changes;  No vertigo or throat pain   NECK: No pain or stiffness  RESPIRATORY: No cough, wheezing, hemoptysis; No shortness of breath  CARDIOVASCULAR: No chest pain or palpitations  GASTROINTESTINAL: No abdominal or epigastric pain. No nausea, vomiting, or hematemesis; No diarrhea or constipation. No melena or hematochezia.  GENITOURINARY: No dysuria, frequency or hematuria  NEUROLOGICAL: No numbness or weakness  SKIN: No itching, burning, rashes, or lesions   All other review of systems is negative unless indicated above. CONSTITUTIONAL: No weakness, fevers or chills  EYES/ENT: No visual changes;  No vertigo or throat pain, endorses rhinorrhea and sneezing  NECK: No pain or stiffness  RESPIRATORY: Endorses cough, wheezing, shortness of breath  CARDIOVASCULAR: Endorses L sided chest pain  GASTROINTESTINAL: No abdominal or epigastric pain. No nausea, vomiting, or hematemesis; No diarrhea or constipation. No melena or hematochezia.  GENITOURINARY: No dysuria, frequency or hematuria  NEUROLOGICAL: No numbness or weakness, endorses short term memory loss  SKIN: No itching, burning, rashes, or lesions   All other review of systems is negative unless indicated above.

## 2018-06-19 NOTE — ED PROVIDER NOTE - OBJECTIVE STATEMENT
56 y/o f hx COPD, lung CA with brain mets presents c/o shortness of breath, cough, chest tightness for the past week.  Pt stating she finished 5 days of prednisone and a zpack today, but still feeling tight.  Pt also reporting chest pain which started today on both sides, stating "in my ribs."  Denies fever, chills, n/v/d, recent travel, sick contacts, all other ROS negative.

## 2018-06-19 NOTE — H&P ADULT - ASSESSMENT
6 y/o F PMHx ex-smoker (10 pack years, quit 2 years ago), COPD (no home O2, never intubated), lung adenocarcinoma w/ brain met (s/p right lower lobectomy with en bloc middle lobe resection on 10/24/2016 and craniotomy with left parietooccipital mass resection 10/4/17) presents with.  In the ED vital signs 134/70, HR 99 RR 24, Temp 98.2, saturating 97% on room air.  She was given 3 rounds of duonebs, magnesium sulfate and a dose of solumedrol 125mg IV push.  Labs were unremarkable and CT PE protocol negative for PE. 57 year old female former smoker (10 pack years, quit 2 years ago), COPD (no home O2, never intubated), lung adenocarcinoma w/ brain met (s/p right lower lobectomy with en bloc middle lobe resection on 10/24/2016 and craniotomy with left parietooccipital mass resection 10/4/17) who presents with one week of coughing, shortness of breath, rhinorrhea for about 1 week in duration admitted for COPD exacerbation.

## 2018-06-19 NOTE — ED PROVIDER NOTE - MEDICAL DECISION MAKING DETAILS
56 y/o f hx COPD presents c/o chest tightness, wheezing for the past week; pt finished 5 days of prednisone and zpack as outpatient, with persistent wheezing in ED.  VSS, afebrile, no leukocytosis, cxr neg, given nebs and steroids with slight improvement although pt still feeling sob.  CTA r/o PE given hx lung ca negative.  Will admit for further management.

## 2018-06-19 NOTE — PROGRESS NOTE ADULT - SUBJECTIVE AND OBJECTIVE BOX
Interventional, Pulmonary, Critical, Chest Special Procedures.    Pt was seen and fully examined by myself.     Time spent with patient in minutes:77    Patient is a 57y old  Female who presents with a chief complaint of worsening sob and wheezing sensation for 3 days. The patient ill appearing, appears congested, reporting wheezing sensation, pain free when seen. Pt denies aspiration, denies trauma, denies hemoptysis.  HPI: The patient c h/o to meastatic to brain lung adenoca, h/o Influenza 04/18    REVIEW OF SYSTEMS:  Constitutional: No fever, weight loss, chills + fatigue  Eyes: No eye pain, visual disturbances, or discharge  ENMT:  No difficulty hearing, tinnitus, vertigo; No sinus or throat pain. No epistaxis, dysphagia, +dysphonia, hoarseness   Neck: No pain, stiffness or neck swelling.  No masses or deformities  Respiratory: +cough, wheezing, chills or hemoptysis  + COPD  - ILD   - PE   + ASTHMA     + PNEUMONIA  Cardiovascular: No chest pain, dysrhythmia, palpitations, dizziness or edema   - COPD     - CAD   - CHF   - HTN  Gastrointestinal: No abdominal or epigastric pain. No nausea, vomiting or hematemesis; No diarrhea or constipation. No melena or hematochezia. No dysphagia or Icterus.          Genitourinary: No dysuria, frequency, hematuria or incontinence   - CKD/RAINE      - ESRD  Neurological: No headaches, memory loss, loss of strength, numbness or tremors      -DEMENTIA     - STROKE    - SEIZURE  Skin: No itching, burning, rashes or lesions   Lymph Nodes: No enlarged glands  Endocrine: No heat or cold intolerance; No hair loss       - DM     - THYROID DISORDER  Musculoskeletal: No joint pain or swelling; No muscle, back or extremity pain  Psychiatric: No depression, anxiety, mood swings or difficulty sleeping  Heme/Lymph: No easy bruising or bleeding gums         + ANEMIA      + CANCER   -COAGULOPATHY  Allergy and Immunologic: No hives or eczema    PAST MEDICAL & SURGICAL HISTORY:  Brain metastasis: brain tumor s/p resection  MRSA (methicillin resistant Staphylococcus aureus): history of  Adenocarcinoma of lung  COPD (chronic obstructive pulmonary disease)  History of lung surgery: right wedge resection  H/O brain surgery  Surgery, elective: lung biopsy  History of appendectomy    FAMILY HISTORY:  No pertinent family history in first degree relatives    SOCIAL HISTORY:      - Tobacco     - ETOH    Allergies    penicillin (Angioedema)    Intolerances      Vital Signs Last 24 Hrs  T(C): 36.8 (19 Jun 2018 15:29), Max: 36.8 (19 Jun 2018 15:29)  T(F): 98.2 (19 Jun 2018 15:29), Max: 98.2 (19 Jun 2018 15:29)  HR: 99 (19 Jun 2018 15:29) (99 - 99)  BP: 134/70 (19 Jun 2018 15:29) (134/70 - 134/70)  BP(mean): --  RR: 24 (19 Jun 2018 15:29) (24 - 24)  SpO2: 97% (19 Jun 2018 15:29) (97% - 97%)      PHYSICAL EXAM:  Un Comfortable, no immediate distress  Eyes: PERRL, EOM intact; conjunctiva and sclera clear  Head: Normocephalic;  No Trauma  ENMT: +nasal discharge, +hoarseness, +cough   Neck: Supple; non tender; no masses or deformities.    No JVD  Respiratory: + scattered WHEEZING   + RHONCHI  - RALES  + CRACKLES.  Diminished breath sounds  BILATERAL  RIGHT > LEFT bases  Cardiovascular: Regular rate and rhythm. S1 and S2 Normal; No murmurs, gallops or rubs     - PPM/AICD  Gastrointestinal: Soft non-tender, non-distended; Normal bowel sounds; No hepatosplenomegaly.     -PEG    -  GT   - MILLAN  Genitourinary: No costovertebral angle tenderness. No dysuria  Extremities: AROM, +clubbing, cyanosis or edema    Vascular: Peripheral pulses palpable 2+ bilaterally  Neurological: Alert and responisve to stimuli   Skin: Warm and dry. No obvious rash  Lymph Nodes: No acute cervical or supraclavicular adenopathy  Psychiatric: Cooperative and appropriate mood    DEVICES:  - DENTURES   +IV R / L     - ETUBE   -TRACH   -CTUBE  R / L      LABS:            < from: CT Chest No Cont (06.13.18 @ 18:02) >    EXAM:  CT CHEST                          PROCEDURE DATE:  06/13/2018          INTERPRETATION:  CT of the CHEST without intravenous contrast dated   6/13/2018 6:02 PM    CLINICAL STATEMENT: Surveillance. History of lung cancer.    TECHNIQUE: CT of the chest was performed without intravenous contrast.   Axial, sagittal, and coronal reformatted images as well as axial maximum   intensity projection images were produced and reviewed.     COMPARISON: Chest CTAs from 4/7/2018 and 4/1/2017 (first CT after   surgery). Outside chest CT from 6/22/2016.    FINDINGS:    Lower Neck: Redemonstration of multinodular thyroid; correlate with prior   thyroid ultrasound findings. No lower cervical lymphadenopathy.    Lungs/Pleura/Airways: Status post right lower lobectomy as before. No   pulmonary mass or suspicious nodule. Centrilobular emphysema. No pleural   effusion or pneumothorax. Patent central airways.    Mediastinum: Heart is normal in size. No pericardial effusion. No aortic   aneurysm. Stable mild enlargement of the pulmonary trunk measuring up to   3.4 cm.    Lymph nodes: No lymphadenopathy, although evaluation of the bilateral   srikanth is limited without intravenous contrast.    Upper Abdomen: No significant abnormality.    Bones/Soft tissues: No suspicious osseous lesion.      IMPRESSION:     No evidence of local disease recurrence or metastases. No substantial   change since 4/1/2017.    < end of copied text >    RADIOLOGY & ADDITIONAL STUDIES (The following images were personally reviewed):

## 2018-06-19 NOTE — H&P ADULT - PROBLEM SELECTOR PLAN 3
-Patient with a history of right lung adenocarcinoma w/ brain met (s/p right lower lobectomy with en bloc middle lobe resection on 10/24/2016 and craniotomy with left parietooccipital mass resection 10/4/17).  Follows with Dr. Barrios but not currently receiving chemotherapy or radiation.  Stable, no further workup indicated at this time.

## 2018-06-20 ENCOUNTER — TRANSCRIPTION ENCOUNTER (OUTPATIENT)
Age: 58
End: 2018-06-20

## 2018-06-20 DIAGNOSIS — J45.901 UNSPECIFIED ASTHMA WITH (ACUTE) EXACERBATION: ICD-10-CM

## 2018-06-20 DIAGNOSIS — J43.9 EMPHYSEMA, UNSPECIFIED: ICD-10-CM

## 2018-06-20 DIAGNOSIS — R59.0 LOCALIZED ENLARGED LYMPH NODES: ICD-10-CM

## 2018-06-20 DIAGNOSIS — R05 COUGH: ICD-10-CM

## 2018-06-20 DIAGNOSIS — C34.90 MALIGNANT NEOPLASM OF UNSPECIFIED PART OF UNSPECIFIED BRONCHUS OR LUNG: ICD-10-CM

## 2018-06-20 DIAGNOSIS — C79.31 SECONDARY MALIGNANT NEOPLASM OF BRAIN: ICD-10-CM

## 2018-06-20 DIAGNOSIS — R10.9 UNSPECIFIED ABDOMINAL PAIN: ICD-10-CM

## 2018-06-20 LAB
ANION GAP SERPL CALC-SCNC: 12 MMOL/L — SIGNIFICANT CHANGE UP (ref 5–17)
BUN SERPL-MCNC: 17 MG/DL — SIGNIFICANT CHANGE UP (ref 7–23)
CALCIUM SERPL-MCNC: 9.5 MG/DL — SIGNIFICANT CHANGE UP (ref 8.4–10.5)
CHLORIDE SERPL-SCNC: 99 MMOL/L — SIGNIFICANT CHANGE UP (ref 96–108)
CO2 SERPL-SCNC: 27 MMOL/L — SIGNIFICANT CHANGE UP (ref 22–31)
CREAT SERPL-MCNC: 0.83 MG/DL — SIGNIFICANT CHANGE UP (ref 0.5–1.3)
GLUCOSE BLDC GLUCOMTR-MCNC: 144 MG/DL — HIGH (ref 70–99)
GLUCOSE BLDC GLUCOMTR-MCNC: 181 MG/DL — HIGH (ref 70–99)
GLUCOSE BLDC GLUCOMTR-MCNC: 208 MG/DL — HIGH (ref 70–99)
GLUCOSE BLDC GLUCOMTR-MCNC: 216 MG/DL — HIGH (ref 70–99)
GLUCOSE BLDC GLUCOMTR-MCNC: 218 MG/DL — HIGH (ref 70–99)
GLUCOSE BLDC GLUCOMTR-MCNC: 238 MG/DL — HIGH (ref 70–99)
GLUCOSE SERPL-MCNC: 152 MG/DL — HIGH (ref 70–99)
HCT VFR BLD CALC: 42 % — SIGNIFICANT CHANGE UP (ref 34.5–45)
HGB BLD-MCNC: 13.7 G/DL — SIGNIFICANT CHANGE UP (ref 11.5–15.5)
MAGNESIUM SERPL-MCNC: 2.4 MG/DL — SIGNIFICANT CHANGE UP (ref 1.6–2.6)
MCHC RBC-ENTMCNC: 28.4 PG — SIGNIFICANT CHANGE UP (ref 27–34)
MCHC RBC-ENTMCNC: 32.6 G/DL — SIGNIFICANT CHANGE UP (ref 32–36)
MCV RBC AUTO: 87 FL — SIGNIFICANT CHANGE UP (ref 80–100)
PLATELET # BLD AUTO: 326 K/UL — SIGNIFICANT CHANGE UP (ref 150–400)
POTASSIUM SERPL-MCNC: 4.3 MMOL/L — SIGNIFICANT CHANGE UP (ref 3.5–5.3)
POTASSIUM SERPL-SCNC: 4.3 MMOL/L — SIGNIFICANT CHANGE UP (ref 3.5–5.3)
RBC # BLD: 4.83 M/UL — SIGNIFICANT CHANGE UP (ref 3.8–5.2)
RBC # FLD: 15 % — SIGNIFICANT CHANGE UP (ref 10.3–16.9)
SODIUM SERPL-SCNC: 138 MMOL/L — SIGNIFICANT CHANGE UP (ref 135–145)
WBC # BLD: 10.2 K/UL — SIGNIFICANT CHANGE UP (ref 3.8–10.5)
WBC # FLD AUTO: 10.2 K/UL — SIGNIFICANT CHANGE UP (ref 3.8–10.5)

## 2018-06-20 RX ORDER — HYDROCORTISONE 20 MG
80 TABLET ORAL EVERY 6 HOURS
Qty: 0 | Refills: 0 | Status: DISCONTINUED | OUTPATIENT
Start: 2018-06-20 | End: 2018-06-20

## 2018-06-20 RX ORDER — OXYCODONE HYDROCHLORIDE 5 MG/1
10 TABLET ORAL ONCE
Qty: 0 | Refills: 0 | Status: DISCONTINUED | OUTPATIENT
Start: 2018-06-20 | End: 2018-06-20

## 2018-06-20 RX ORDER — PANTOPRAZOLE SODIUM 20 MG/1
40 TABLET, DELAYED RELEASE ORAL
Qty: 0 | Refills: 0 | Status: DISCONTINUED | OUTPATIENT
Start: 2018-06-20 | End: 2018-06-21

## 2018-06-20 RX ADMIN — Medication 60 MILLIGRAM(S): at 00:37

## 2018-06-20 RX ADMIN — Medication 2: at 22:00

## 2018-06-20 RX ADMIN — PANTOPRAZOLE SODIUM 40 MILLIGRAM(S): 20 TABLET, DELAYED RELEASE ORAL at 07:13

## 2018-06-20 RX ADMIN — HEPARIN SODIUM 7500 UNIT(S): 5000 INJECTION INTRAVENOUS; SUBCUTANEOUS at 00:37

## 2018-06-20 RX ADMIN — HEPARIN SODIUM 7500 UNIT(S): 5000 INJECTION INTRAVENOUS; SUBCUTANEOUS at 22:03

## 2018-06-20 RX ADMIN — Medication 80 MILLIGRAM(S): at 23:23

## 2018-06-20 RX ADMIN — OXYCODONE HYDROCHLORIDE 10 MILLIGRAM(S): 5 TABLET ORAL at 02:11

## 2018-06-20 RX ADMIN — Medication 80 MILLIGRAM(S): at 12:03

## 2018-06-20 RX ADMIN — BUDESONIDE AND FORMOTEROL FUMARATE DIHYDRATE 2 PUFF(S): 160; 4.5 AEROSOL RESPIRATORY (INHALATION) at 00:36

## 2018-06-20 RX ADMIN — Medication 2: at 00:36

## 2018-06-20 RX ADMIN — Medication 1: at 18:34

## 2018-06-20 RX ADMIN — OXYCODONE HYDROCHLORIDE 10 MILLIGRAM(S): 5 TABLET ORAL at 15:45

## 2018-06-20 RX ADMIN — OXYCODONE HYDROCHLORIDE 10 MILLIGRAM(S): 5 TABLET ORAL at 03:00

## 2018-06-20 RX ADMIN — Medication 3 MILLILITER(S): at 00:37

## 2018-06-20 RX ADMIN — Medication 3 MILLILITER(S): at 09:18

## 2018-06-20 RX ADMIN — Medication 3 MILLILITER(S): at 18:29

## 2018-06-20 RX ADMIN — Medication 3 MILLILITER(S): at 21:59

## 2018-06-20 RX ADMIN — OXYCODONE HYDROCHLORIDE 10 MILLIGRAM(S): 5 TABLET ORAL at 15:00

## 2018-06-20 RX ADMIN — Medication 60 MILLIGRAM(S): at 06:22

## 2018-06-20 RX ADMIN — Medication 2: at 12:03

## 2018-06-20 RX ADMIN — OXYCODONE HYDROCHLORIDE 10 MILLIGRAM(S): 5 TABLET ORAL at 23:19

## 2018-06-20 RX ADMIN — BUDESONIDE AND FORMOTEROL FUMARATE DIHYDRATE 2 PUFF(S): 160; 4.5 AEROSOL RESPIRATORY (INHALATION) at 22:03

## 2018-06-20 RX ADMIN — OXYCODONE HYDROCHLORIDE 10 MILLIGRAM(S): 5 TABLET ORAL at 00:02

## 2018-06-20 RX ADMIN — HEPARIN SODIUM 7500 UNIT(S): 5000 INJECTION INTRAVENOUS; SUBCUTANEOUS at 06:23

## 2018-06-20 RX ADMIN — Medication 3 MILLILITER(S): at 06:23

## 2018-06-20 RX ADMIN — Medication 3 MILLILITER(S): at 12:58

## 2018-06-20 RX ADMIN — HEPARIN SODIUM 7500 UNIT(S): 5000 INJECTION INTRAVENOUS; SUBCUTANEOUS at 14:44

## 2018-06-20 RX ADMIN — Medication 80 MILLIGRAM(S): at 15:42

## 2018-06-20 RX ADMIN — BUDESONIDE AND FORMOTEROL FUMARATE DIHYDRATE 2 PUFF(S): 160; 4.5 AEROSOL RESPIRATORY (INHALATION) at 09:18

## 2018-06-20 NOTE — PATIENT PROFILE ADULT. - ARE SIGNIFICANT INDICATORS COMPLETE.
Daljit Rocio [id number] pmd dr Montes De Oca.   81 y/o F w/ hx afib, chf, dm, htn, hld,  empyema s/o decortication/wound vac -right side,LUE dvt who was initially placed in a.c, but then developed a vag bleeding. s/p d&c, no malignancy, Who was dc on a ahumada due to ux retention, pt didnt f/u w/urologist for trial of void. Comes back to the ED w/ pyuria on ux ahumada bag. C/o of suprapubic pain and right sided pain, sharp, intensity 9/10.. Associated w/ nausea and one episode of vomiting. Denies fever, chills,. diaphoresis, cp, sob,. cough, or any further complaints, exam; alert, awake, r-cva tend. also site of decortication and supr p tend, abd soft. lact elev, wbc elev, tachy, admitted sepsis 2/2 uti/chronic indwelling catheter.. started on zosyn. f/u cultures..
Yes

## 2018-06-20 NOTE — DISCHARGE NOTE ADULT - CARE PLAN
Principal Discharge DX:	COPD (chronic obstructive pulmonary disease)  Goal:	Follow up with Dr. Wallace  Secondary Diagnosis:	Chest pain, unspecified type  Assessment and plan of treatment:	On the day you presented to the hospital, you had chest pain  Secondary Diagnosis:	Adenocarcinoma of lung  Secondary Diagnosis:	Back pain  Assessment and plan of treatment:	You have a history of chronic back pain for which you take oxycodone.  Please continue to take oxycodone 30mg every 8 hours as needed and follow up with your primary care physician within one week of discharge. Principal Discharge DX:	COPD (chronic obstructive pulmonary disease)  Goal:	Follow up with Dr. Wallace  Assessment and plan of treatment:	You have a lung condition called chronic obstructive pulmonary disease that makes it hard for you to breathe. It is usually caused by lung damage from years of irritation and inflammation. COPD is a serious condition that gets worse over time. However, there are measure you can take to help you feel better and prevent exacerbations. These measure include smoking cessation, avoiding others who smoke, exercising for at least 20 minutes a day, and getting a flu vaccine every year. When you feel short of breath, take a deep breath through your nose and slowly breath out through your mouth with your lips pursed for twice as long as you inhaled. It is important for you to take your medication as directed since it can prevent future attacks. Please contact 911 or go to the emergency room if you are confused, dizzy, feel faint, feel short of breath, have chest pain, or cough up blood.  Secondary Diagnosis:	Chest pain, unspecified type  Assessment and plan of treatment:	On the day you presented to the hospital, you had chest pain which resolved as it was likely due to COPD exacerbation. Your labs did not show cardiac enzyme leakage and your EKG was not suggestive of an underlying cardiac event.  Secondary Diagnosis:	Adenocarcinoma of lung  Secondary Diagnosis:	Back pain  Assessment and plan of treatment:	You have a history of chronic back pain for which you take oxycodone.  Please continue to take oxycodone 30mg every 8 hours as needed and follow up with your primary care physician within one week of discharge. Principal Discharge DX:	COPD (chronic obstructive pulmonary disease)  Goal:	Follow up with Dr. Wallace. Steroid taper as follows. Starting tomorrow you will take prednisone 50mg (6/22/18), prednisone 40mg (6/23/18), prednisone 30mg (6/24/18), prednisone 20mg (6/25/18), and prednisone 10mg (6/26/18).  Assessment and plan of treatment:	You have a lung condition called chronic obstructive pulmonary disease that makes it hard for you to breathe. It is usually caused by lung damage from years of irritation and inflammation. COPD is a serious condition that gets worse over time. However, there are measure you can take to help you feel better and prevent exacerbations. These measure include smoking cessation, avoiding others who smoke, exercising for at least 20 minutes a day, and getting a flu vaccine every year. When you feel short of breath, take a deep breath through your nose and slowly breath out through your mouth with your lips pursed for twice as long as you inhaled. It is important for you to take your medication as directed since it can prevent future attacks. Please contact 911 or go to the emergency room if you are confused, dizzy, feel faint, feel short of breath, have chest pain, or cough up blood.  Secondary Diagnosis:	Chest pain, unspecified type  Assessment and plan of treatment:	On the day you presented to the hospital, you had chest pain which resolved as it was likely due to COPD exacerbation. Your labs did not show cardiac enzyme leakage and your EKG was not suggestive of an underlying cardiac event.  Secondary Diagnosis:	Adenocarcinoma of lung  Secondary Diagnosis:	Back pain  Assessment and plan of treatment:	You have a history of chronic back pain for which you take oxycodone.  Please continue to take oxycodone 30mg every 8 hours as needed and follow up with your primary care physician within one week of discharge.

## 2018-06-20 NOTE — DISCHARGE NOTE ADULT - OTHER SIGNIFICANT FINDINGS
CT Abdomen and Pelvis w/ Oral Cont and w/ IV Cont (06.21.18 @ 17:25)  The liver is normal in appearance.  There is no dilatation of the intra   or extrahepatic biliary system. Layering high density material within the   gallbladder, possibly sludge or small stones.  The pancreas is normal in   appearance.  No splenic abnormalities are seen.    The adrenal glands are unremarkable. The kidneys are normal in   appearance.        No abdominal aortic aneurysm is seen. No lymphadenopathy is seen.     Evaluation of the bowel demonstrates oral contrast to the level of the   proximal ascending colon. No ascites is seen. Appendix is normal.   Periampullary duodenal diverticulum.    Images of the pelvis demonstrate the uterus and adnexae to be normal in   appearance. The urinary bladder is unremarkable.      Evaluation of the osseous structures demonstrates no suspicious   osteolytic or osteoblastic lesions.. Abdominal wall injection sites.      IMPRESSION:  1.  No evidence of metastatic disease in the abdomen or pelvis.  2.  Layering high density material within the gallbladder, possibly small   stones or sludge.

## 2018-06-20 NOTE — PROGRESS NOTE ADULT - SUBJECTIVE AND OBJECTIVE BOX
CC/ HPI Patient is a 57 year old female, ex-smoker with chronic obstructive pulmonary disease, status post right lower lobectomy, 2016, craniotomy with left parietooccipital mass resection, 2017, for lung cancer with bran metastasis, admitted with shortness of breath, cough, and wheezing, despite outpatient therapy with prednisone, this morning feeling somewhat improved.    PAST MEDICAL & SURGICAL HISTORY:  Brain metastasis: brain tumor s/p resection  MRSA (methicillin resistant Staphylococcus aureus): history of  Adenocarcinoma of lung  COPD (chronic obstructive pulmonary disease)  History of lung surgery: right wedge resection  H/O brain surgery  Surgery, elective: lung biopsy  History of appendectomy    SOCHX:  + tobacco,  -  alcohol    FMHX: FA/MO  - contributory     ROS reviewed below with positive findings marked (+) :  GEN:  fever, chills ENT: tracheostomy,   epistaxis,  sinusitis COR: CAD, CHF,  HTN, dysrhythmia PUL: +COPD, ILD, asthma, +pneumonia GI: PEG, dysphagia, hemorrhage, other DOUGLAS: kidney disease, electrolyte disorder HEM:  anemia, thrombus, coagulopathy, +cancer ENDO:  thyroid disease, diabetes mellitus CNS:  dementia, stroke, seizure, PSY:  depression, anxiety, other      MEDICATIONS  (STANDING):  ALBUTerol/ipratropium for Nebulization 3 milliLiter(s) Nebulizer every 4 hours  buDESOnide 160 MICROgram(s)/formoterol 4.5 MICROgram(s) Inhaler 2 Puff(s) Inhalation two times a day  dextrose 5%. 1000 milliLiter(s) (50 mL/Hr) IV Continuous <Continuous>  dextrose 50% Injectable 12.5 Gram(s) IV Push once  dextrose 50% Injectable 25 Gram(s) IV Push once  dextrose 50% Injectable 25 Gram(s) IV Push once  heparin  Injectable 7500 Unit(s) SubCutaneous every 8 hours  hydrocortisone sodium succinate Injectable 80 milliGRAM(s) IV Push every 6 hours  insulin lispro (HumaLOG) corrective regimen sliding scale   SubCutaneous Before meals and at bedtime  pantoprazole    Tablet 40 milliGRAM(s) Oral before breakfast    MEDICATIONS  (PRN):  dextrose 40% Gel 15 Gram(s) Oral once PRN Blood Glucose LESS THAN 70 milliGRAM(s)/deciliter  glucagon  Injectable 1 milliGRAM(s) IntraMuscular once PRN Glucose LESS THAN 70 milligrams/deciliter  oxyCODONE    IR 10 milliGRAM(s) Oral every 8 hours PRN Severe Pain (7 - 10)      Vital Signs Last 24 Hrs  T(C): 36.3 (20 Jun 2018 14:15), Max: 36.8 (19 Jun 2018 15:29)  T(F): 97.3 (20 Jun 2018 14:15), Max: 98.2 (19 Jun 2018 15:29)  HR: 89 (20 Jun 2018 14:15) (80 - 99)  BP: 120/69 (20 Jun 2018 14:15) (100/67 - 134/70)  BP(mean): --  RR: 18 (20 Jun 2018 14:15) (17 - 24)  SpO2: 97% (20 Jun 2018 14:15) (94% - 98%)    GENERAL:         comfortable,  - distress.  HEENT:            - trauma,  - icterus,  - injection,  - nasal discharge.  NECK:              - jugular venous distention, - thyromegaly.  LYMPH:           - lymphadenopathy, - masses.  RESP:               + wheezes,   - rales,   - rhonchi.   COR:                S1S2   - gallops,  - rubs.  ABD:                bowel sounds,   soft, - tender, - distended, - organomegaly.  EXT/MSC:         - cyanosis,  + clubbing,  - edema.    NEURO:             alert,   responds to stimuli.                          13.7   10.2  )-----------( 326      ( 20 Jun 2018 06:09 )             42.0     06-20    138  |  99  |  17  ----------------------------<  152<H>  4.3   |  27  |  0.83        < from: CT Angio Chest PE Protocol w/ IV Cont (06.19.18 @ 17:36) >   No acute pulmonary embolism is seen. The main pulmonary artery   is again dilated, measuring 3.4  cm.     No thoracic aortic aneurysm. The heart is normal in size. No pericardial   effusion is seen. No mediastinal, hilar or axillary lymphadenopathy is   seen.    Enlarged, multinodular thyroid gland again noted. Largest nodule right   lobe measures 2.0 cm.    No pleural effusions are seen. Again post right lower lobectomy. Moderate   centrilobular emphysema. Scarring right middle lobe. Linear atelectasis   in the lingula. Peripherally located cystic areas again present left   lower lobe.    < end of copied text >        ASSESSMENT/PLAN    1) Chronic obstructive pulmonary disease  2) Lung cancer  3) Atelectasis    Oxygen as needed  Bedside spirometry  Bronchodilators:  Atrovent/ albuterol q 4 – 6 hours as needed  Budesonide/formoterol, tiotropium  Corticosteroids: solumedrol  ID/Antibiotics: Zithromax  Cardiac/HTN: satisfactory  GI: Rx/ prophylaxis c PPI/H2B  Heme: Rx/VT prophylaxis  Discuss with medical team

## 2018-06-20 NOTE — DISCHARGE NOTE ADULT - CARE PROVIDER_API CALL
Vignesh Wallace), Critical Care Medicine; Pulmonary Disease  [address]  [place] [address]  Phone: [phone]  Fax: [phone]    Juan Barrios), Radiation Oncology  [address] [address]  Phone: [phone]  Fax: [phone]

## 2018-06-20 NOTE — DISCHARGE NOTE ADULT - MEDICATION SUMMARY - MEDICATIONS TO TAKE
I will START or STAY ON the medications listed below when I get home from the hospital:    Nebulizer machine  -- Please provide the patient with a nebulizer machine for inhalational treatments at home  -- Indication: For COPD (chronic obstructive pulmonary disease)    predniSONE 50 mg oral tablet  -- 1 tab(s) by mouth once a day   -- It is very important that you take or use this exactly as directed.  Do not skip doses or discontinue unless directed by your doctor.  Obtain medical advice before taking any non-prescription drugs as some may affect the action of this medication.  Take with food or milk.    -- Indication: For COPD (chronic obstructive pulmonary disease)    predniSONE 20 mg oral tablet  -- 4 tab(s) by mouth once a day   -- It is very important that you take or use this exactly as directed.  Do not skip doses or discontinue unless directed by your doctor.  Obtain medical advice before taking any non-prescription drugs as some may affect the action of this medication.  Take with food or milk.    -- Indication: For COPD (chronic obstructive pulmonary disease)    predniSONE 10 mg oral tablet  -- 2 tab(s) by mouth once a day   -- It is very important that you take or use this exactly as directed.  Do not skip doses or discontinue unless directed by your doctor.  Obtain medical advice before taking any non-prescription drugs as some may affect the action of this medication.  Take with food or milk.    -- Indication: For COPD (chronic obstructive pulmonary disease)    oxyCODONE 30 mg oral tablet  -- 1 tab(s) by mouth every 8 hours, As needed, Severe Pain (7 - 10) MDD:3  -- Indication: For pain    Claritin 10 mg oral tablet  -- 1 tab(s) by mouth once a day  -- Indication: For prophylactic measure    Advair Diskus 500 mcg-50 mcg inhalation powder  -- 1 puff(s) inhaled 2 times a day  -- Indication: For COPD (chronic obstructive pulmonary disease)    albuterol 90 mcg/inh inhalation aerosol  -- 2 puff(s) inhaled 4 times a day  -- Indication: For COPD (chronic obstructive pulmonary disease)

## 2018-06-20 NOTE — DISCHARGE NOTE ADULT - PLAN OF CARE
Follow up with Dr. Wallace On the day you presented to the hospital, you had chest pain You have a history of chronic back pain for which you take oxycodone.  Please continue to take oxycodone 30mg every 8 hours as needed and follow up with your primary care physician within one week of discharge. You have a lung condition called chronic obstructive pulmonary disease that makes it hard for you to breathe. It is usually caused by lung damage from years of irritation and inflammation. COPD is a serious condition that gets worse over time. However, there are measure you can take to help you feel better and prevent exacerbations. These measure include smoking cessation, avoiding others who smoke, exercising for at least 20 minutes a day, and getting a flu vaccine every year. When you feel short of breath, take a deep breath through your nose and slowly breath out through your mouth with your lips pursed for twice as long as you inhaled. It is important for you to take your medication as directed since it can prevent future attacks. Please contact 911 or go to the emergency room if you are confused, dizzy, feel faint, feel short of breath, have chest pain, or cough up blood. On the day you presented to the hospital, you had chest pain which resolved as it was likely due to COPD exacerbation. Your labs did not show cardiac enzyme leakage and your EKG was not suggestive of an underlying cardiac event. Follow up with Dr. Wallace. Steroid taper as follows. Starting tomorrow you will take prednisone 50mg (6/22/18), prednisone 40mg (6/23/18), prednisone 30mg (6/24/18), prednisone 20mg (6/25/18), and prednisone 10mg (6/26/18).

## 2018-06-20 NOTE — DISCHARGE NOTE ADULT - HOSPITAL COURSE
57 year old female former smoker (10 pack years, quit 2 years ago), COPD (no home O2, never intubated), lung adenocarcinoma w/ brain met (s/p right lower lobectomy with en bloc middle lobe resection on 10/24/2016 and craniotomy with left parietooccipital mass resection 10/4/17) who presents with one week of coughing, shortness of breath, rhinorrhea for about 1 week in duration admitted fro COPD exacerbation.  She was given 3 rounds of duonebs, magnesium sulfate and a dose of solumedrol 125mg IV push.  She received toradol and tylenol for the chest pain.  Labs were unremarkable and CT PE protocol negative for PE and EKG was NSR with no ST changes.  Patient was initially started on prednisone 60mg q 6 and levaquin as patient endorses angioedema with PCN.  The antibiotics were discontinued the following day and her steroids were switched to solumedrol 80mg q6 as the patient was still wheezing and not moving air well. 57 year old female former smoker (10 pack years, quit 2 years ago), COPD (no home O2, never intubated), lung adenocarcinoma w/ brain met (s/p right lower lobectomy with en bloc middle lobe resection on 10/24/2016 and craniotomy with left parietooccipital mass resection 10/4/17) who presents with one week of coughing, shortness of breath, rhinorrhea for about 1 week in duration admitted fro COPD exacerbation.  She was given 3 rounds of duonebs, magnesium sulfate and a dose of solumedrol 125mg IV push.  She received toradol and tylenol for the chest pain.  Labs were unremarkable and CT PE protocol negative for PE and EKG was NSR with no ST changes.  Patient was initially started on prednisone 60mg q 6 and levaquin as patient endorses angioedema with PCN.  The antibiotics were discontinued the following day and her steroids were switched to solumedrol 80mg q6 as the patient was still wheezing and not moving air well. Overnight on IV Solumedrol, pt's symptoms improved with good air movement of lungs bilaterally. Pt is to be discharged on PO prednisone 40mg daily . CT abd/pelvis performed for abdominal discomfort and for further evaluation of mets. 57 year old female former smoker (10 pack years, quit 2 years ago), COPD (no home O2, never intubated), lung adenocarcinoma w/ brain met (s/p right lower lobectomy with en bloc middle lobe resection on 10/24/2016 and craniotomy with left parietooccipital mass resection 10/4/17) who presents with one week of coughing, shortness of breath, rhinorrhea for about 1 week in duration admitted fro COPD exacerbation.  She was given 3 rounds of duonebs, magnesium sulfate and a dose of solumedrol 125mg IV push.  She received toradol and tylenol for the chest pain.  Labs were unremarkable and CT PE protocol negative for PE and EKG was NSR with no ST changes.  Patient was initially started on prednisone 60mg q 6 and levaquin as patient endorses angioedema with PCN.  The antibiotics were discontinued the following day and her steroids were switched to solumedrol 80mg q6 as the patient was still wheezing and not moving air well. Overnight on IV Solumedrol, pt's symptoms improved with good air movement of lungs bilaterally. Pt is to be discharged on PO prednisone taper: 50 mg, 40mg, 30mg, 20mg, 10mg. CT abd/pelvis performed for abdominal discomfort and for further evaluation of mets; preliminary read showed no mets in the abd/pelvis and sludge in the gallbladder. 57 year old female former smoker (10 pack years, quit 2 years ago), COPD (no home O2, never intubated), lung adenocarcinoma w/ brain met (s/p right lower lobectomy with en bloc middle lobe resection on 10/24/2016 and craniotomy with left parietooccipital mass resection 10/4/17) who presents with one week of coughing, shortness of breath, rhinorrhea for about 1 week in duration admitted fro COPD exacerbation.  She was given 3 rounds of duonebs, magnesium sulfate and a dose of solumedrol 125mg IV push.  She received toradol and tylenol for the chest pain.  Labs were unremarkable and CT PE protocol negative for PE and EKG was NSR with no ST changes.  Patient was initially started on prednisone 60mg q 6 and levaquin as patient endorses angioedema with PCN.  The antibiotics were discontinued the following day and her steroids were switched to solumedrol 80mg q6 as the patient was still wheezing and not moving air well. Overnight on IV Solumedrol, pt's symptoms improved with good air movement of lungs bilaterally. Pt is to be discharged on PO prednisone taper: 50 mg, 40mg, 30mg, 20mg, 10mg. CT abd/pelvis performed for abdominal discomfort and for further evaluation of mets; CT abd/pelvis read showed no mets in the abd/pelvis and sludge in the gallbladder.

## 2018-06-20 NOTE — DISCHARGE NOTE ADULT - MEDICATION SUMMARY - MEDICATIONS TO STOP TAKING
I will STOP taking the medications listed below when I get home from the hospital:    oseltamivir 75 mg oral capsule  -- 1 cap(s) by mouth 2 times a day

## 2018-06-20 NOTE — DISCHARGE NOTE ADULT - PATIENT PORTAL LINK FT
You can access the [facility] Patient Portal, offered by [facility], by registering with the following website: [email or web address]

## 2018-06-20 NOTE — PROGRESS NOTE ADULT - SUBJECTIVE AND OBJECTIVE BOX
[facility]    57 AA retired  with history of lung adenocarcinoma, metastatic to the brain.  RLL lobectomy.  Became very SOB and increasing cough with increasing expectoration.  Whitish phlegm.  Received 125 MP last evening and improved her peak flow to 250.  In the early afternoon decrease to 200.  Right upper quadrant pain is worsening.    PAST MEDICAL & SURGICAL HISTORY:  Brain metastasis: brain tumor s/p resection  MRSA (methicillin resistant Staphylococcus aureus): history of  Adenocarcinoma of lung  COPD (chronic obstructive pulmonary disease)  History of lung surgery: right wedge resection  H/O brain surgery  Surgery, elective: lung biopsy  History of appendectomy    FAMILY HISTORY:  Family history of diabetes mellitus (Mother, Sibling)  Family history of essential hypertension (Mother, Sibling)    SOCIAL HISTORY:  lives at home alone; quit smoking 2 years ago.    REVIEW OF SYSTEMS:  Constitutional: (+) GAINED weight change, (-) fever,  (-) chills, (+) fatigue, (-) night sweats  Eyes: (-) discharge, () eye pain, () visual change  ENT:  (-) hearing difficulty, () vertigo, + TINNITUS, () sinus pain,  () throat pain, () epistaxis, () dysphagia, () hoarseness  Neck: (-) pain, () stiffness, () swelling  Respiratory: (+) cough, () wheezing, () hemoptysis      Cardiovascular: (-) chest pain, ()palpitations, () dizziness   Gastrointestinal: (+) abdominal pain, (-) nausea, (-) vomiting, () hematemesis, (-) diarrhea,  (-) constipation, () melena  Genitourinary:  (-) dysuria, () frequency, () hematuria, () incontinence      Neurologic: (-) headache, () memory loss, () loss of strength, () numbness, () tremor     Skin: (-) itching, () burning, () rash, () lesions   Lymphatic: () enlarged lymph nodes  Endocrine: () hair loss, () temperature intolerance         Musculoskeletal: (+) back pain, (+)  SHOULDER joint pain,  () extremity pain  Psychiatric: () visual change, () auditory change, (-) depression, () anxiety, () suicidal  Sleep: (+) disorder, () insomnia, () sleep deprivation  Heme/Lymph: () easy bruising, () bleeding gums            Allergy and Immunologic: () hives, () eczema    Vital Signs Last 24 Hrs  T(C): 36.3 (20 Jun 2018 14:15), Max: 36.8 (19 Jun 2018 15:29)  T(F): 97.3 (20 Jun 2018 14:15), Max: 98.2 (19 Jun 2018 15:29)  HR: 89 (20 Jun 2018 14:15) (80 - 99)  BP: 120/69 (20 Jun 2018 14:15) (100/67 - 134/70)  BP(mean): --  RR: 18 (20 Jun 2018 14:15) (17 - 24)  SpO2: 97% (20 Jun 2018 14:15) (94% - 98%)    I&O's Detail    PHYSICAL EXAM:  Obese, in bed; pain  Uncomfortable, - acute distress; vital signs are monitored   Eyes: PERRLA, EOMI, -conjunctivitis, -scleritis   Head: no focal deficit, normocephalic,  no trauma  ENMT: moist tongue, no thrush, -nasal discharge, -hoarseness, normal hearing, + cough, -hemoptysis, trachea midline  Neck: supple, - lymphadenopathy,  -masses, -JVD  Respiratory: bilateral diminished breath sounds, + EXP wheezing, + rhonchi, -rales, -crackles  Chest: -accessory muscle use, -paradoxical breathing  Cardiovascular: irregular rate, S1 S2 normal, -S3, -S4, -murmur, -gallop, -rub  Gastrointestinal: soft, nontender, distended, normal bowel sounds, no hepatosplenomegaly  Genitourinary: -flank pain, -dysuria  Extremities: -clubbing, -cyanosis, -edema    Vascular: peripheral pulses palpable 2+ bilaterally  Neurological: alert, oriented x 3, no focal deficit, -tremor   Skin: warm, dry, -erythema, iv site intact  Lymph nodes; no cervical, supraclavicular or axillary adenopathy  Psychiatric: cooperative, appropriate mood    MEDICATIONS  (STANDING):  ALBUTerol/ipratropium for Nebulization 3 milliLiter(s) Nebulizer every 4 hours  buDESOnide 160 MICROgram(s)/formoterol 4.5 MICROgram(s) Inhaler 2 Puff(s) Inhalation two times a day  dextrose 5%. 1000 milliLiter(s) (50 mL/Hr) IV Continuous <Continuous>  dextrose 50% Injectable 12.5 Gram(s) IV Push once  dextrose 50% Injectable 25 Gram(s) IV Push once  dextrose 50% Injectable 25 Gram(s) IV Push once  heparin  Injectable 7500 Unit(s) SubCutaneous every 8 hours  insulin lispro (HumaLOG) corrective regimen sliding scale   SubCutaneous Before meals and at bedtime  methylPREDNISolone sodium succinate Injectable 80 milliGRAM(s) IV Push every 6 hours  pantoprazole    Tablet 40 milliGRAM(s) Oral before breakfast    MEDICATIONS  (PRN):  dextrose 40% Gel 15 Gram(s) Oral once PRN Blood Glucose LESS THAN 70 milliGRAM(s)/deciliter  glucagon  Injectable 1 milliGRAM(s) IntraMuscular once PRN Glucose LESS THAN 70 milligrams/deciliter  oxyCODONE    IR 10 milliGRAM(s) Oral every 8 hours PRN Severe Pain (7 - 10)      Allergies    penicillin (Angioedema)    Intolerances        LABS:                        13.7   10.2  )-----------( 326      ( 20 Jun 2018 06:09 )             42.0     06-20    138  |  99  |  17  ----------------------------<  152<H>  4.3   |  27  |  0.83    Ca    9.5      20 Jun 2018 06:08  Mg     2.4     06-20    TPro  7.5  /  Alb  4.2  /  TBili  <0.2  /  DBili  x   /  AST  16  /  ALT  15  /  AlkPhos  68  06-19    PT/INR - ( 19 Jun 2018 16:21 )   PT: 10.9 sec;   INR: 0.98     PTT - ( 19 Jun 2018 16:21 )  PTT:27.9 sec    +DVT prophylaxis  7500 q8  +Sleep  POOR  +Nutrition goals DIET  -Pain  8/10  -Decubital ulcer  +GI prophylaxis (PPV, coagulopathy, Hx) PPI  +Aspiration prophylaxis (45 degrees)  +Sedation/analgesia stopped once  +ID (phos, CH, mupi, SB)  -Delirium  +Cardiac   +Prevention  +Education  +Medication reviewed (drug-drug interactions, PDA)  Medical devices  Discussed with PGYs,  ER RN, brother, Dr Wallace    RADIOLOGY & ADDITIONAL STUDIES:    reviewed    EXAM:  CT ANGIO CHEST PE PROTOCOL IC                          PROCEDURE DATE:  06/19/2018          INTERPRETATION:  CTA (CT angiography) of the CHEST dated 6/19/2018 5:36 PM    INDICATION: Chest pain. History of lung cancer. Assess for pulmonary   embolism.    TECHNIQUE: CT angiography of the chest was performed during bolus   injection of intravenous contrast.  Post-processing including the   production of axial, coronal and sagittal multiplanar reformatted images   and axial and coronal maximum intensity projections (MIPs) was   performed.100 mls of Optiray 350 was administered intravenously without   complication and 10 mls were discarded.    PRIOR STUDY: CT chest 6/13/2018.    FINDINGS: No acute pulmonary embolism is seen. The main pulmonary artery   is again dilated, measuring 3.4  cm.     No thoracic aortic aneurysm. The heart is normal in size. No pericardial   effusion is seen. No mediastinal, hilar or axillary lymphadenopathy is   seen.    Enlarged, multinodular thyroid gland again noted. Largest nodule right   lobe measures 2.0 cm.    No pleural effusions are seen. Again post right lower lobectomy. Moderate   centrilobular emphysema. Scarring right middle lobe. Linear atelectasis   in the lingula. Peripherally located cystic areas again present left   lower lobe.    Limited evaluation of the upper abdomen demonstrates no abnormality.     Evaluation of the osseous structures demonstrates minimal degenerative   changes of the spine.      IMPRESSION:   1. Since 6/13/2018,there is no evidence of acute pulmonary embolism.    2. Main pulmonary artery again dilated, consistent with pulmonary   hypertension.    3. Multinodular thyroid again noted.    4. Emphysema.    PROCEDURE DATE:  02/27/2018                     INTERPRETATION:  PET-CT Scan    History: Right lung cancer. Restaging to help determine subsequent   treatment strategy.      Procedure: Following at least 4 hour fasting, the patient's blood glucose   was 95 mg/dl.  One hour following the injection of 12 mCi of F-18-FDG,   noncontrast CT images were obtained from the skull base through the   pelvis.  Then, emission PET images were obtained through the same region.    Attenuation corrected images were constructed using the CT scan.  Fused   images of PET and CT were reviewed.  The standard uptake values (SUV)   reported below are maximum values within the region of interest,   expressed in gm/mL.    Comparison: Most recent PET/CT from 11/30/2017 and multiple additional   prior imaging studies dating back to 8/16/2004.    Findings: Images of the lower neck again demonstrate a nodular right lobe   of thyroid. No increased activity within the thyroid. No lymphadenopathy   in the lower neck.    Again status post right lower lobectomy. Mild centrilobular emphysema. No   change discoid atelectasis or scar right middle lobe. No new lung nodule.    No pleural or pericardial effusion.    No thoracic lymphadenopathy.    Liver, gallbladder, spleen, pancreas, adrenal glands, and kidneys   unremarkable.    No ascites in abdomen or pelvis. There is a 0.7 cm left inguinal lymph   node which is hypermetabolic, with SUV of 6.8.    Examination of gastrointestinal tract limited without oral contrast   material. There are colonic diverticula.    Small fat-containing umbilical hernia.    Postoperative changes right ribs. No suspicious bone lesion.    Impression: Since 11/30/2017, there is a subcentimeter hypermetabolic   lymph node in the left inguinal region, of uncertain significance. [facility]    57 AA retired  with history of lung adenocarcinoma, metastatic to the brain.  RLL lobectomy.  Became very SOB and increasing cough with increasing expectoration.  Whitish phlegm.  Received 125 MP last evening and improved her peak flow to 250.  In the early afternoon decrease to 200.  Right upper quadrant pain is worsening.    PAST MEDICAL & SURGICAL HISTORY:  Brain metastasis: brain tumor s/p resection  MRSA (methicillin resistant Staphylococcus aureus): history of  Adenocarcinoma of lung  COPD (chronic obstructive pulmonary disease)  History of lung surgery: right wedge resection  H/O brain surgery  Surgery, elective: lung biopsy  History of appendectomy    FAMILY HISTORY:  Family history of diabetes mellitus (Mother, Sibling)  Family history of essential hypertension (Mother, Sibling)    SOCIAL HISTORY:  lives at home alone; quit smoking 2 years ago.    REVIEW OF SYSTEMS:  Constitutional: (+) GAINED weight change, (-) fever,  (-) chills, (+) fatigue, (-) night sweats  Eyes: (-) discharge, () eye pain, () visual change  ENT:  (-) hearing difficulty, () vertigo, + TINNITUS, () sinus pain,  () throat pain, () epistaxis, () dysphagia, () hoarseness  Neck: (-) pain, () stiffness, () swelling  Respiratory: (+) cough, () wheezing, () hemoptysis      Cardiovascular: (+) chest pain, (+)palpitations, (-) dizziness   Gastrointestinal: (+) abdominal pain, (-) nausea, (-) vomiting, () hematemesis, (-) diarrhea,  (-) constipation, () melena  Genitourinary:  (-) dysuria, () frequency, () hematuria, () incontinence      Neurologic: (-) headache, () memory loss, () loss of strength, () numbness, () tremor     Skin: (-) itching, () burning, () rash, () lesions   Lymphatic: () enlarged lymph nodes  Endocrine: () hair loss, () temperature intolerance         Musculoskeletal: (+) back pain, (+)  SHOULDER joint pain,  () extremity pain  Psychiatric: () visual change, () auditory change, (-) depression, () anxiety, () suicidal  Sleep: (+) disorder, () insomnia, () sleep deprivation  Heme/Lymph: () easy bruising, () bleeding gums            Allergy and Immunologic: () hives, () eczema    Vital Signs Last 24 Hrs  T(C): 36.3 (20 Jun 2018 14:15), Max: 36.8 (19 Jun 2018 15:29)  T(F): 97.3 (20 Jun 2018 14:15), Max: 98.2 (19 Jun 2018 15:29)  HR: 89 (20 Jun 2018 14:15) (80 - 99)  BP: 120/69 (20 Jun 2018 14:15) (100/67 - 134/70)  BP(mean): --  RR: 18 (20 Jun 2018 14:15) (17 - 24)  SpO2: 97% (20 Jun 2018 14:15) (94% - 98%)    I&O's Detail    PHYSICAL EXAM:  Obese, in bed; pain  Uncomfortable, - acute distress; vital signs are monitored   Eyes: PERRLA, EOMI, -conjunctivitis, -scleritis   Head: no focal deficit, normocephalic,  no trauma  ENMT: moist tongue, no thrush, -nasal discharge, -hoarseness, normal hearing, + cough, -hemoptysis, trachea midline  Neck: supple, - lymphadenopathy,  -masses, -JVD  Respiratory: bilateral diminished breath sounds, + EXP wheezing, + rhonchi, -rales, -crackles  Chest: -accessory muscle use, -paradoxical breathing  Cardiovascular: irregular rate, S1 S2 normal, -S3, -S4, -murmur, -gallop, -rub  Gastrointestinal: soft, nontender, distended, normal bowel sounds, no hepatosplenomegaly  Genitourinary: -flank pain, -dysuria  Extremities: -clubbing, -cyanosis, -edema    Vascular: peripheral pulses palpable 2+ bilaterally  Neurological: alert, oriented x 3, no focal deficit, -tremor   Skin: warm, dry, -erythema, iv sites intact  Lymph nodes; no cervical, supraclavicular or axillary adenopathy  Psychiatric: cooperative, appropriate mood    MEDICATIONS  (STANDING):  ALBUTerol/ipratropium for Nebulization 3 milliLiter(s) Nebulizer every 4 hours  buDESOnide 160 MICROgram(s)/formoterol 4.5 MICROgram(s) Inhaler 2 Puff(s) Inhalation two times a day  dextrose 5%. 1000 milliLiter(s) (50 mL/Hr) IV Continuous <Continuous>  dextrose 50% Injectable 12.5 Gram(s) IV Push once  dextrose 50% Injectable 25 Gram(s) IV Push once  dextrose 50% Injectable 25 Gram(s) IV Push once  heparin  Injectable 7500 Unit(s) SubCutaneous every 8 hours  insulin lispro (HumaLOG) corrective regimen sliding scale   SubCutaneous Before meals and at bedtime  methylPREDNISolone sodium succinate Injectable 80 milliGRAM(s) IV Push every 6 hours  pantoprazole    Tablet 40 milliGRAM(s) Oral before breakfast    MEDICATIONS  (PRN):  dextrose 40% Gel 15 Gram(s) Oral once PRN Blood Glucose LESS THAN 70 milliGRAM(s)/deciliter  glucagon  Injectable 1 milliGRAM(s) IntraMuscular once PRN Glucose LESS THAN 70 milligrams/deciliter  oxyCODONE    IR 10 milliGRAM(s) Oral every 8 hours PRN Severe Pain (7 - 10)      Allergies    penicillin (Angioedema)    Intolerances        LABS:                        13.7   10.2  )-----------( 326      ( 20 Jun 2018 06:09 )             42.0     06-20    138  |  99  |  17  ----------------------------<  152<H>  4.3   |  27  |  0.83    Ca    9.5      20 Jun 2018 06:08  Mg     2.4     06-20    TPro  7.5  /  Alb  4.2  /  TBili  <0.2  /  DBili  x   /  AST  16  /  ALT  15  /  AlkPhos  68  06-19    PT/INR - ( 19 Jun 2018 16:21 )   PT: 10.9 sec;   INR: 0.98     PTT - ( 19 Jun 2018 16:21 )  PTT:27.9 sec    +DVT prophylaxis  7500 q8  +Sleep  POOR  +Nutrition goals DIET  -Pain  8/10  -Decubital ulcer  +GI prophylaxis (PPV, coagulopathy, Hx) PPI  +Aspiration prophylaxis (45 degrees)  +Sedation/analgesia stopped once  +ID (phos, CH, mupi, SB)  -Delirium  +Cardiac   +Prevention  +Education  +Medication reviewed (drug-drug interactions, PDA)  Medical devices  Discussed with PGYs,  ER RN, brother, Dr Wallace    RADIOLOGY & ADDITIONAL STUDIES:    reviewed    EXAM:  CT ANGIO CHEST PE PROTOCOL IC                          PROCEDURE DATE:  06/19/2018          INTERPRETATION:  CTA (CT angiography) of the CHEST dated 6/19/2018 5:36 PM    INDICATION: Chest pain. History of lung cancer. Assess for pulmonary   embolism.    TECHNIQUE: CT angiography of the chest was performed during bolus   injection of intravenous contrast.  Post-processing including the   production of axial, coronal and sagittal multiplanar reformatted images   and axial and coronal maximum intensity projections (MIPs) was   performed.100 mls of Optiray 350 was administered intravenously without   complication and 10 mls were discarded.    PRIOR STUDY: CT chest 6/13/2018.    FINDINGS: No acute pulmonary embolism is seen. The main pulmonary artery   is again dilated, measuring 3.4  cm.     No thoracic aortic aneurysm. The heart is normal in size. No pericardial   effusion is seen. No mediastinal, hilar or axillary lymphadenopathy is   seen.    Enlarged, multinodular thyroid gland again noted. Largest nodule right   lobe measures 2.0 cm.    No pleural effusions are seen. Again post right lower lobectomy. Moderate   centrilobular emphysema. Scarring right middle lobe. Linear atelectasis   in the lingula. Peripherally located cystic areas again present left   lower lobe.    Limited evaluation of the upper abdomen demonstrates no abnormality.     Evaluation of the osseous structures demonstrates minimal degenerative   changes of the spine.      IMPRESSION:   1. Since 6/13/2018,there is no evidence of acute pulmonary embolism.    2. Main pulmonary artery again dilated, consistent with pulmonary   hypertension.    3. Multinodular thyroid again noted.    4. Emphysema.    PROCEDURE DATE:  02/27/2018                     INTERPRETATION:  PET-CT Scan    History: Right lung cancer. Restaging to help determine subsequent   treatment strategy.      Procedure: Following at least 4 hour fasting, the patient's blood glucose   was 95 mg/dl.  One hour following the injection of 12 mCi of F-18-FDG,   noncontrast CT images were obtained from the skull base through the   pelvis.  Then, emission PET images were obtained through the same region.    Attenuation corrected images were constructed using the CT scan.  Fused   images of PET and CT were reviewed.  The standard uptake values (SUV)   reported below are maximum values within the region of interest,   expressed in gm/mL.    Comparison: Most recent PET/CT from 11/30/2017 and multiple additional   prior imaging studies dating back to 8/16/2004.    Findings: Images of the lower neck again demonstrate a nodular right lobe   of thyroid. No increased activity within the thyroid. No lymphadenopathy   in the lower neck.    Again status post right lower lobectomy. Mild centrilobular emphysema. No   change discoid atelectasis or scar right middle lobe. No new lung nodule.    No pleural or pericardial effusion.    No thoracic lymphadenopathy.    Liver, gallbladder, spleen, pancreas, adrenal glands, and kidneys   unremarkable.    No ascites in abdomen or pelvis. There is a 0.7 cm left inguinal lymph   node which is hypermetabolic, with SUV of 6.8.    Examination of gastrointestinal tract limited without oral contrast   material. There are colonic diverticula.    Small fat-containing umbilical hernia.    Postoperative changes right ribs. No suspicious bone lesion.    Impression: Since 11/30/2017, there is a subcentimeter hypermetabolic   lymph node in the left inguinal region, of uncertain significance.

## 2018-06-20 NOTE — PROGRESS NOTE ADULT - SUBJECTIVE AND OBJECTIVE BOX
O/N Events:  Patient received one extra dose of oxycodone 10mg and received protonix 40mg daily.  Subjective:  Patient seen and examined at bedside this morning.  Endorses improved respiratory status, less coughing and shortness of breath than when she was admitted.    VITALS  Vital Signs Last 24 Hrs  T(C): 36.3 (20 Jun 2018 08:55), Max: 36.8 (19 Jun 2018 15:29)  T(F): 97.3 (20 Jun 2018 08:55), Max: 98.2 (19 Jun 2018 15:29)  HR: 89 (20 Jun 2018 08:55) (80 - 99)  BP: 113/74 (20 Jun 2018 08:55) (100/67 - 134/70)  BP(mean): --  RR: 18 (20 Jun 2018 08:55) (17 - 24)  SpO2: 95% (20 Jun 2018 08:55) (94% - 98%)    I&O's Summary      CAPILLARY BLOOD GLUCOSE      POCT Blood Glucose.: 216 mg/dL (20 Jun 2018 11:35)  POCT Blood Glucose.: 144 mg/dL (20 Jun 2018 07:36)  POCT Blood Glucose.: 238 mg/dL (20 Jun 2018 00:06)      PHYSICAL EXAM  General: A&Ox 3; NAD  Head: NC/AT;   Eyes: PERRL; EOMI; anicteric sclera  Neck: Supple; no JVD  Respiratory: Diffuse wheeze bilaterally, more in upper lobes  Cardiovascular: Regular rhythm/rate; S1/S2; no gallops or murmurs auscultated  Gastrointestinal: Soft; NTND w/out rebound tenderness or guarding; bowel sounds normal  Extremities: WWP; no edema or cyanosis; radial/pedal pulses palpable  Neurological:  CNII-XII grossly intact; no obvious focal deficits    MEDICATIONS  (STANDING):  ALBUTerol/ipratropium for Nebulization 3 milliLiter(s) Nebulizer every 4 hours  buDESOnide 160 MICROgram(s)/formoterol 4.5 MICROgram(s) Inhaler 2 Puff(s) Inhalation two times a day  dextrose 5%. 1000 milliLiter(s) (50 mL/Hr) IV Continuous <Continuous>  dextrose 50% Injectable 12.5 Gram(s) IV Push once  dextrose 50% Injectable 25 Gram(s) IV Push once  dextrose 50% Injectable 25 Gram(s) IV Push once  heparin  Injectable 7500 Unit(s) SubCutaneous every 8 hours  hydrocortisone sodium succinate Injectable 80 milliGRAM(s) IV Push every 6 hours  insulin lispro (HumaLOG) corrective regimen sliding scale   SubCutaneous Before meals and at bedtime  pantoprazole    Tablet 40 milliGRAM(s) Oral before breakfast    MEDICATIONS  (PRN):  dextrose 40% Gel 15 Gram(s) Oral once PRN Blood Glucose LESS THAN 70 milliGRAM(s)/deciliter  glucagon  Injectable 1 milliGRAM(s) IntraMuscular once PRN Glucose LESS THAN 70 milligrams/deciliter  oxyCODONE    IR 10 milliGRAM(s) Oral every 8 hours PRN Severe Pain (7 - 10)      LABS                        13.7   10.2  )-----------( 326      ( 20 Jun 2018 06:09 )             42.0     06-20    138  |  99  |  17  ----------------------------<  152<H>  4.3   |  27  |  0.83    Ca    9.5      20 Jun 2018 06:08  Mg     2.4     06-20    TPro  7.5  /  Alb  4.2  /  TBili  <0.2  /  DBili  x   /  AST  16  /  ALT  15  /  AlkPhos  68  06-19    LIVER FUNCTIONS - ( 19 Jun 2018 16:21 )  Alb: 4.2 g/dL / Pro: 7.5 g/dL / ALK PHOS: 68 U/L / ALT: 15 U/L / AST: 16 U/L / GGT: x           PT/INR - ( 19 Jun 2018 16:21 )   PT: 10.9 sec;   INR: 0.98          PTT - ( 19 Jun 2018 16:21 )  PTT:27.9 sec    CARDIAC MARKERS ( 19 Jun 2018 16:21 )  x     / <0.01 ng/mL / 80 U/L / x     / <1.0 ng/mL

## 2018-06-21 ENCOUNTER — APPOINTMENT (OUTPATIENT)
Dept: RADIATION ONCOLOGY | Facility: CLINIC | Age: 58
End: 2018-06-21

## 2018-06-21 VITALS
SYSTOLIC BLOOD PRESSURE: 123 MMHG | OXYGEN SATURATION: 96 % | TEMPERATURE: 98 F | RESPIRATION RATE: 18 BRPM | HEART RATE: 82 BPM | DIASTOLIC BLOOD PRESSURE: 71 MMHG

## 2018-06-21 DIAGNOSIS — M25.512 PAIN IN LEFT SHOULDER: ICD-10-CM

## 2018-06-21 LAB
GLUCOSE BLDC GLUCOMTR-MCNC: 136 MG/DL — HIGH (ref 70–99)
GLUCOSE BLDC GLUCOMTR-MCNC: 143 MG/DL — HIGH (ref 70–99)
GLUCOSE BLDC GLUCOMTR-MCNC: 166 MG/DL — HIGH (ref 70–99)

## 2018-06-21 PROCEDURE — 36415 COLL VENOUS BLD VENIPUNCTURE: CPT

## 2018-06-21 PROCEDURE — 87633 RESP VIRUS 12-25 TARGETS: CPT

## 2018-06-21 PROCEDURE — 96375 TX/PRO/DX INJ NEW DRUG ADDON: CPT | Mod: XU

## 2018-06-21 PROCEDURE — 84484 ASSAY OF TROPONIN QUANT: CPT

## 2018-06-21 PROCEDURE — 82553 CREATINE MB FRACTION: CPT

## 2018-06-21 PROCEDURE — 85610 PROTHROMBIN TIME: CPT

## 2018-06-21 PROCEDURE — 74177 CT ABD & PELVIS W/CONTRAST: CPT | Mod: 26

## 2018-06-21 PROCEDURE — 96374 THER/PROPH/DIAG INJ IV PUSH: CPT | Mod: XU

## 2018-06-21 PROCEDURE — 85027 COMPLETE CBC AUTOMATED: CPT

## 2018-06-21 PROCEDURE — 87798 DETECT AGENT NOS DNA AMP: CPT

## 2018-06-21 PROCEDURE — 82962 GLUCOSE BLOOD TEST: CPT

## 2018-06-21 PROCEDURE — 83735 ASSAY OF MAGNESIUM: CPT

## 2018-06-21 PROCEDURE — 85730 THROMBOPLASTIN TIME PARTIAL: CPT

## 2018-06-21 PROCEDURE — 93005 ELECTROCARDIOGRAM TRACING: CPT

## 2018-06-21 PROCEDURE — 85025 COMPLETE CBC W/AUTO DIFF WBC: CPT

## 2018-06-21 PROCEDURE — 80053 COMPREHEN METABOLIC PANEL: CPT

## 2018-06-21 PROCEDURE — 82550 ASSAY OF CK (CPK): CPT

## 2018-06-21 PROCEDURE — 99285 EMERGENCY DEPT VISIT HI MDM: CPT | Mod: 25

## 2018-06-21 PROCEDURE — 87581 M.PNEUMON DNA AMP PROBE: CPT

## 2018-06-21 PROCEDURE — 94640 AIRWAY INHALATION TREATMENT: CPT

## 2018-06-21 PROCEDURE — 87486 CHLMYD PNEUM DNA AMP PROBE: CPT

## 2018-06-21 PROCEDURE — 80048 BASIC METABOLIC PNL TOTAL CA: CPT

## 2018-06-21 PROCEDURE — 71275 CT ANGIOGRAPHY CHEST: CPT

## 2018-06-21 PROCEDURE — 74177 CT ABD & PELVIS W/CONTRAST: CPT

## 2018-06-21 RX ORDER — DIATRIZOATE MEGLUMINE 180 MG/ML
30 INJECTION, SOLUTION INTRAVESICAL ONCE
Qty: 0 | Refills: 0 | Status: COMPLETED | OUTPATIENT
Start: 2018-06-21 | End: 2018-06-21

## 2018-06-21 RX ADMIN — Medication 80 MILLIGRAM(S): at 10:33

## 2018-06-21 RX ADMIN — Medication 3 MILLILITER(S): at 13:57

## 2018-06-21 RX ADMIN — DIATRIZOATE MEGLUMINE 30 MILLILITER(S): 180 INJECTION, SOLUTION INTRAVESICAL at 12:07

## 2018-06-21 RX ADMIN — Medication 3 MILLILITER(S): at 06:43

## 2018-06-21 RX ADMIN — PANTOPRAZOLE SODIUM 40 MILLIGRAM(S): 20 TABLET, DELAYED RELEASE ORAL at 06:39

## 2018-06-21 RX ADMIN — Medication 3 MILLILITER(S): at 08:47

## 2018-06-21 RX ADMIN — Medication 80 MILLIGRAM(S): at 18:05

## 2018-06-21 RX ADMIN — Medication 3 MILLILITER(S): at 00:58

## 2018-06-21 RX ADMIN — OXYCODONE HYDROCHLORIDE 10 MILLIGRAM(S): 5 TABLET ORAL at 07:29

## 2018-06-21 RX ADMIN — Medication 3 MILLILITER(S): at 16:59

## 2018-06-21 RX ADMIN — Medication 80 MILLIGRAM(S): at 06:40

## 2018-06-21 RX ADMIN — BUDESONIDE AND FORMOTEROL FUMARATE DIHYDRATE 2 PUFF(S): 160; 4.5 AEROSOL RESPIRATORY (INHALATION) at 08:47

## 2018-06-21 RX ADMIN — OXYCODONE HYDROCHLORIDE 10 MILLIGRAM(S): 5 TABLET ORAL at 06:42

## 2018-06-21 RX ADMIN — OXYCODONE HYDROCHLORIDE 10 MILLIGRAM(S): 5 TABLET ORAL at 07:27

## 2018-06-21 NOTE — PROGRESS NOTE ADULT - PROBLEM SELECTOR PROBLEM 5
Inguinal lymphadenopathy
Inguinal lymphadenopathy
Need for prophylactic measure
Need for prophylactic measure

## 2018-06-21 NOTE — PROGRESS NOTE ADULT - PROBLEM SELECTOR PLAN 6
resolved
-No IVF indicated  -Will replete to K>4 and Mg>2  -Regular diet  -Full Code  -Dispo RMF
-No IVF indicated  -Will replete to K>4 and Mg>2  -Regular diet  -Full Code  -Dispo RMF

## 2018-06-21 NOTE — PROGRESS NOTE ADULT - PROBLEM SELECTOR PLAN 5
left; in February
left; in February
-HSQ  -No GI prophylaxis indicated
-HSQ  -No GI prophylaxis indicated

## 2018-06-21 NOTE — PROGRESS NOTE ADULT - SUBJECTIVE AND OBJECTIVE BOX
CC: Patient is a 57y old  Female who presents with a chief complaint of COPD exacerbation (20 Jun 2018 16:34)    OVERNIGHT EVENTS: NAEO.    SUBJECTIVE / INTERVAL HPI: Patient seen and examined at bedside. Pt reports improvements in breathing. Denies chest pain or SOB at this time. Endorses persistent abdominal discomfort.    VITAL SIGNS:  Vital Signs Last 24 Hrs  T(C): 36.8 (21 Jun 2018 10:10), Max: 36.8 (21 Jun 2018 10:10)  T(F): 98.2 (21 Jun 2018 10:10), Max: 98.2 (21 Jun 2018 10:10)  HR: 67 (21 Jun 2018 10:10) (67 - 89)  BP: 115/74 (21 Jun 2018 10:10) (113/76 - 125/91)  BP(mean): --  RR: 20 (21 Jun 2018 10:10) (15 - 20)  SpO2: 94% (21 Jun 2018 10:10) (94% - 97%)    PHYSICAL EXAM:    General: pleasant middle-aged female lying in bed, in NAD, on RA  HEENT: NC/AT; EOMI, anicteric sclera; MMM  Neck: supple  Cardiovascular: +S1/S2; RRR  Respiratory: good air movement throughout bilaterally; no W/R/R  Gastrointestinal: soft, NT/ND; +BSx4  Extremities: WWP; no edema, clubbing or cyanosis  Vascular: 2+ radial, DP/PT pulses B/L  Neurological: alert awake oriented; no focal deficits    MEDICATIONS:  MEDICATIONS  (STANDING):  ALBUTerol/ipratropium for Nebulization 3 milliLiter(s) Nebulizer every 4 hours  buDESOnide 160 MICROgram(s)/formoterol 4.5 MICROgram(s) Inhaler 2 Puff(s) Inhalation two times a day  dextrose 5%. 1000 milliLiter(s) (50 mL/Hr) IV Continuous <Continuous>  dextrose 50% Injectable 12.5 Gram(s) IV Push once  dextrose 50% Injectable 25 Gram(s) IV Push once  dextrose 50% Injectable 25 Gram(s) IV Push once  diatrizoate meglumine/diatrizoate sodium. 30 milliLiter(s) Oral once  heparin  Injectable 7500 Unit(s) SubCutaneous every 8 hours  insulin lispro (HumaLOG) corrective regimen sliding scale   SubCutaneous Before meals and at bedtime  methylPREDNISolone sodium succinate Injectable 80 milliGRAM(s) IV Push every 6 hours  pantoprazole    Tablet 40 milliGRAM(s) Oral before breakfast    MEDICATIONS  (PRN):  dextrose 40% Gel 15 Gram(s) Oral once PRN Blood Glucose LESS THAN 70 milliGRAM(s)/deciliter  glucagon  Injectable 1 milliGRAM(s) IntraMuscular once PRN Glucose LESS THAN 70 milligrams/deciliter  oxyCODONE    IR 10 milliGRAM(s) Oral every 8 hours PRN Severe Pain (7 - 10)      ALLERGIES:  Allergies    penicillin (Angioedema)    Intolerances        LABS:                        13.7   10.2  )-----------( 326      ( 20 Jun 2018 06:09 )             42.0     06-20    138  |  99  |  17  ----------------------------<  152<H>  4.3   |  27  |  0.83    Ca    9.5      20 Jun 2018 06:08  Mg     2.4     06-20    TPro  7.5  /  Alb  4.2  /  TBili  <0.2  /  DBili  x   /  AST  16  /  ALT  15  /  AlkPhos  68  06-19    PT/INR - ( 19 Jun 2018 16:21 )   PT: 10.9 sec;   INR: 0.98          PTT - ( 19 Jun 2018 16:21 )  PTT:27.9 sec    CAPILLARY BLOOD GLUCOSE      POCT Blood Glucose.: 143 mg/dL (21 Jun 2018 08:14)      RADIOLOGY & ADDITIONAL TESTS: Reviewed.

## 2018-06-21 NOTE — PROGRESS NOTE ADULT - SUBJECTIVE AND OBJECTIVE BOX
CCM PUD    INTERVAL HPI/OVERNIGHT EVENTS:    peakflow at 220  moderately to severely decreased breathsounds  she failed prednisone on the outside    PAST MEDICAL & SURGICAL HISTORY:  Brain metastasis: brain tumor s/p resection  MRSA (methicillin resistant Staphylococcus aureus): history of  Adenocarcinoma of lung  COPD (chronic obstructive pulmonary disease)  History of lung surgery: right wedge resection  H/O brain surgery  Surgery, elective: lung biopsy  History of appendectomy    FAMILY HISTORY:  Family history of diabetes mellitus (Mother, Sibling)  Family history of essential hypertension (Mother, Sibling)    SOCIAL HISTORY:  10 pack years; lives     REVIEW OF SYSTEMS:  Constitutional: (+) weight change 20 POUNDS, (-) fever,  (-) chills, () fatigue, (-) night sweats  Eyes: (-) discharge, () eye pain, () visual change  ENT:  (-) hearing difficulty, () vertigo, () sinus pain,  () throat pain, () epistaxis, () dysphagia, () hoarseness  Neck: (-) pain, () stiffness, () swelling  Respiratory: (+) cough, (+) wheezing, () hemoptysis      Cardiovascular: (-) chest pain, ()palpitations, () dizziness   Gastrointestinal: (+) abdominal pain, (-) nausea, (-) vomiting, () hematemesis, (-) diarrhea,  (-) constipation, () melena  Genitourinary:  (-) dysuria, () frequency, () hematuria, () incontinence      Neurologic: (+) headache, () memory loss, () loss of strength, () numbness, () tremor     Skin: (-) itching, () burning, (+) rash, () lesions   Lymphatic: () enlarged lymph nodes  Endocrine: () hair loss, () temperature intolerance         Musculoskeletal: () back pain, () joint pain,  () extremity pain  Psychiatric: () visual change, () auditory change, () depression, () anxiety, () suicidal  Sleep: () disorder, () insomnia, () sleep deprivation  Heme/Lymph: () easy bruising, () bleeding gums            Allergy and Immunologic: () hives, () eczema    Vital Signs Last 24 Hrs  T(C): 36.8 (21 Jun 2018 10:10), Max: 36.8 (21 Jun 2018 10:10)  T(F): 98.2 (21 Jun 2018 10:10), Max: 98.2 (21 Jun 2018 10:10)  HR: 67 (21 Jun 2018 10:10) (67 - 89)  BP: 115/74 (21 Jun 2018 10:10) (113/76 - 125/91)  BP(mean): --  RR: 20 (21 Jun 2018 10:10) (15 - 20)  SpO2: 94% (21 Jun 2018 10:10) (94% - 97%) ON RA AT REST    I&O's Detail      PHYSICAL EXAM:  Obese, peak flow 220  uncomfortable, - acute distress; vital signs are monitored  Eyes: PERRLA, EOMI, -conjunctivitis, -scleritis   Head: no focal deficit, normocephalic,  no trauma  ENMT: moist tongue, no thrush, -nasal discharge, -hoarseness, normal hearing, -cough, -hemoptysis, trachea midline  Neck: supple, - lymphadenopathy,  -masses, -JVD  Respiratory: bilateral diminished breath sounds, + wheezing, + rhonchi, -rales, -crackles  Chest: -accessory muscle use, -paradoxical breathing  Cardiovascular: regular rate and sinus rhythm, S1 S2 normal, -S3, -S4, -murmur, -gallop, -rub  Gastrointestinal: soft, nontender, nondistended, normal bowel sounds, no hepatosplenomegaly  Genitourinary: -flank pain, -dysuria  Extremities: -clubbing, -cyanosis, -edema    Vascular: peripheral pulses palpable 2+ bilaterally  Neurological: alert, oriented x 3, o focal deficit, -tremor   Skin: warm, dry, -erythema, iv sites intact  Lymph nodes; no cervical, supraclavicular or axillary adenopathy  Psychiatric: cooperative, appropriate mood    MEDICATIONS  (STANDING):  ALBUTerol/ipratropium for Nebulization 3 milliLiter(s) Nebulizer every 4 hours  buDESOnide 160 MICROgram(s)/formoterol 4.5 MICROgram(s) Inhaler 2 Puff(s) Inhalation two times a day  dextrose 5%. 1000 milliLiter(s) (50 mL/Hr) IV Continuous <Continuous>  dextrose 50% Injectable 12.5 Gram(s) IV Push once  dextrose 50% Injectable 25 Gram(s) IV Push once  dextrose 50% Injectable 25 Gram(s) IV Push once  heparin  Injectable 7500 Unit(s) SubCutaneous every 8 hours  insulin lispro (HumaLOG) corrective regimen sliding scale   SubCutaneous Before meals and at bedtime  methylPREDNISolone sodium succinate Injectable 80 milliGRAM(s) IV Push every 6 hours  pantoprazole    Tablet 40 milliGRAM(s) Oral before breakfast    MEDICATIONS  (PRN):  dextrose 40% Gel 15 Gram(s) Oral once PRN Blood Glucose LESS THAN 70 milliGRAM(s)/deciliter  glucagon  Injectable 1 milliGRAM(s) IntraMuscular once PRN Glucose LESS THAN 70 milligrams/deciliter  oxyCODONE    IR 10 milliGRAM(s) Oral every 8 hours PRN Severe Pain (7 - 10)    Allergies    penicillin (Angioedema)    Intolerances        LABS:                        13.7   10.2  )-----------( 326      ( 20 Jun 2018 06:09 )             42.0     06-20    138  |  99  |  17  ----------------------------<  152<H>  4.3   |  27  |  0.83    Ca    9.5      20 Jun 2018 06:08  Mg     2.4     06-20    TPro  7.5  /  Alb  4.2  /  TBili  <0.2  /  DBili  x   /  AST  16  /  ALT  15  /  AlkPhos  68  06-19  PT/INR - ( 19 Jun 2018 16:21 )   PT: 10.9 sec;   INR: 0.98     PTT - ( 19 Jun 2018 16:21 )  PTT:27.9 sec    +DVT prophylaxis  7500 q8  +Sleep  PRETTY GOOD  +Nutrition goals  ORAL  -Pain  7-8/10 abdominal pain  -Decubital ulcer  +GI prophylaxis (PPV, coagulopathy, Hx)  +Aspiration prophylaxis (45 degrees)  +Sedation/analgesia stopped once  +ID (phos, CH, mupi, SB)  -Delirium  +Cardiac   +Prevention  +Education  +Medication reviewed (drug-drug interactions, PDA)  Medical devices  Discussed with ICU, PGY, CCRN, family    RADIOLOGY & ADDITIONAL STUDIES:        INTERPRETATION:  CTA (CT angiography) of the CHEST dated 6/19/2018 5:36 PM    INDICATION: Chest pain. History of lung cancer. Assess for pulmonary   embolism.    TECHNIQUE: CT angiography of the chest was performed during bolus   injection of intravenous contrast.  Post-processing including the   production of axial, coronal and sagittal multiplanar reformatted images   and axial and coronal maximum intensity projections (MIPs) was   performed.100 mls of Optiray 350 was administered intravenously without   complication and 10 mls were discarded.    PRIOR STUDY: CT chest 6/13/2018.    FINDINGS: No acute pulmonary embolism is seen. The main pulmonary artery   is again dilated, measuring 3.4  cm.     No thoracic aortic aneurysm. The heart is normal in size. No pericardial   effusion is seen. No mediastinal, hilar or axillary lymphadenopathy is   seen.    Enlarged, multinodular thyroid gland again noted. Largest nodule right   lobe measures 2.0 cm.    No pleural effusions are seen. Again post right lower lobectomy. Moderate   centrilobular emphysema. Scarring right middle lobe. Linear atelectasis   in the lingula. Peripherally located cystic areas again present left   lower lobe.    Limited evaluation of the upper abdomen demonstrates no abnormality.     Evaluation of the osseous structures demonstrates minimal degenerative   changes of the spine.      IMPRESSION:   1. Since 6/13/2018,there is no evidence of acute pulmonary embolism.    2. Main pulmonary artery again dilated, consistent with pulmonary   hypertension.    3. Multinodular thyroid again noted.    4. Emphysema.

## 2018-06-21 NOTE — PROGRESS NOTE ADULT - PROBLEM SELECTOR PLAN 1
obtain CT abdomen with iv/oral contrast
obtain CT abdomen with iv/oral contrast
-Patient with history of COPD with previous admissions for exacerbations most recently in April.  Patient has 1 week of coughing, shortness of breath, sneezing, rhinorrhea with increased beige sputum production.  Received magnesium sulfate, 125 solumderol and 3 rounds of duonebs in the ED.  CT PE protocol negative.  -Duonebs q 4  -Solumedrol 80mg q 6 (sliding scale while on steroids)  -D/C abx, not indicated at this time  -Symbicort 2 puffs BID
-Patient with history of COPD with previous admissions for exacerbations most recently in April.  Patient has 1 week of coughing, shortness of breath, sneezing, rhinorrhea with increased beige sputum production.  Received magnesium sulfate, 125 solumderol and 3 rounds of duonebs in the ED.  CT PE protocol negative.  -Duonebs q 4  -Solumedrol 80mg q 6 (sliding scale while on steroids), with plans to transition back to PO on discharge  -D/C abx, not indicated at this time  -Symbicort 2 puffs BID

## 2018-06-21 NOTE — PROGRESS NOTE ADULT - PROBLEM SELECTOR PLAN 2
responsive to high dose short term MP. Failed prednisone. Continue iv
responsive to high dose short term MP. Failed prednisone
-Patient with chest pain today, 7/10, left sided, worse with coughing and reproducible.  Troponin negative and no ischemic changes on EKG.  CT PE protocol negative.  Likely musculoskeletal.  Improved since tylenol and toradol in the ED and patient currently on oxycodone 10mg q 8 for back pain.  Will continue to monitor need for further treatment.
Chest pain resolved.  Troponin negative and no ischemic changes on EKG.  CT PE protocol negative.  Likely musculoskeletal.  Improved since tylenol and toradol in the ED and patient currently on oxycodone 10mg q 8 for back pain.

## 2018-06-21 NOTE — PROGRESS NOTE ADULT - PROBLEM SELECTOR PLAN 3
resected
-Patient with a history of right lung adenocarcinoma w/ brain met (s/p right lower lobectomy with en bloc middle lobe resection on 10/24/2016 and craniotomy with left parietooccipital mass resection 10/4/17).  Follows with Dr. Barrios but not currently receiving chemotherapy or radiation.  Stable, no further workup indicated at this time.
-Patient with a history of right lung adenocarcinoma w/ brain met (s/p right lower lobectomy with en bloc middle lobe resection on 10/24/2016 and craniotomy with left parietooccipital mass resection 10/4/17).  Follows with Dr. Barrios but not currently receiving chemotherapy or radiation.  Stable, no further workup indicated at this time.    #abdominal pain  - CT abd/pelvis to assess for progression of disease

## 2018-06-21 NOTE — PROGRESS NOTE ADULT - PROBLEM SELECTOR PLAN 4
nebulizers
nebulizers
-Patient with chronic back pain after falling out of a chair.  Takes oxycodone at home as needed.  Was I stopped in March which showed 170 30mg pills given at that time.  Back pain not severe at this point, will give 10mg q8 PRN.
-Patient with chronic back pain after falling out of a chair.  Takes oxycodone at home as needed.  Was I stopped in March which showed 170 30mg pills given at that time.  Back pain not severe at this point, will give 10mg q8 PRN.

## 2018-06-21 NOTE — PROGRESS NOTE ADULT - PROBLEM SELECTOR PROBLEM 6
Chest pain, unspecified type
Chest pain, unspecified type
Nutrition, metabolism, and development symptoms
Nutrition, metabolism, and development symptoms

## 2018-06-21 NOTE — PROGRESS NOTE ADULT - ASSESSMENT
ASSESSMENT/PLAN 58y/o female pt c meastatic to brain lung adenoca s/p RLLLobectomy, brain metastatectomy, COPD. Impression of acute tracheobronchitis triggering COPD exacerbation.    1. O2 2LNC humidified at this time  2. Bronchodilators:  Atrovent/ albuterol q 4 – 6 hours as needed  3. Corticosteroids: recommend IV SoluMedrol 40 mg q 8hrs  4. ID/Antibiotics: Recommend starting ABX, obtain RVP, sputum CX  5. Cardiac/HTN: optimize BP  6. GI: Rx/ prophylaxis c PPI/H2B  7. Heme: Rx/VT prophylaxis c SQH/SCD  8. Aspiration precautions  0.Obtain CXR PA/LAT  Discussed with managing team  /Ino taking over mngmnt of this pt from now on
57 year old female former smoker (10 pack years, quit 2 years ago), COPD (no home O2, never intubated), lung adenocarcinoma w/ brain met (s/p right lower lobectomy with en bloc middle lobe resection on 10/24/2016 and craniotomy with left parietooccipital mass resection 10/4/17) who presents with one week of coughing, shortness of breath, rhinorrhea for about 1 week in duration admitted for COPD exacerbation.
57 year old female former smoker (10 pack years, quit 2 years ago), COPD (no home O2, never intubated), lung adenocarcinoma w/ brain met (s/p right lower lobectomy with en bloc middle lobe resection on 10/24/2016 and craniotomy with left parietooccipital mass resection 10/4/17) who presents with one week of coughing, shortness of breath, rhinorrhea for about 1 week in duration admitted for COPD exacerbation.

## 2018-06-21 NOTE — PROGRESS NOTE ADULT - SUBJECTIVE AND OBJECTIVE BOX
CC/ HPI Patient is a 57 year old female, ex-smoker with chronic obstructive pulmonary disease, status post right lower lobectomy, 2016, craniotomy with left parietooccipital mass resection, 2017, for lung cancer with bran metastasis, admitted with shortness of breath, cough, and wheezing, despite outpatient therapy with prednisone, this morning feeling better.    PAST MEDICAL & SURGICAL HISTORY:  Brain metastasis: brain tumor s/p resection  MRSA (methicillin resistant Staphylococcus aureus): history of  Adenocarcinoma of lung  COPD (chronic obstructive pulmonary disease)  History of lung surgery: right wedge resection  H/O brain surgery  Surgery, elective: lung biopsy  History of appendectomy    SOCHX:  + tobacco,  -  alcohol    FMHX: FA/MO  - contributory     ROS reviewed below with positive findings marked (+) :  GEN:  fever, chills ENT: tracheostomy,   epistaxis,  sinusitis COR: CAD, CHF,  HTN, dysrhythmia PUL: +COPD, ILD, asthma, +pneumonia GI: PEG, dysphagia, hemorrhage, other DOUGLAS: kidney disease, electrolyte disorder HEM:  anemia, thrombus, coagulopathy, +cancer ENDO:  thyroid disease, diabetes mellitus CNS:  dementia, stroke, seizure, PSY:  depression, anxiety, other      MEDICATIONS  (STANDING):  ALBUTerol/ipratropium for Nebulization 3 milliLiter(s) Nebulizer every 4 hours  buDESOnide 160 MICROgram(s)/formoterol 4.5 MICROgram(s) Inhaler 2 Puff(s) Inhalation two times a day  dextrose 5%. 1000 milliLiter(s) (50 mL/Hr) IV Continuous <Continuous>  dextrose 50% Injectable 12.5 Gram(s) IV Push once  dextrose 50% Injectable 25 Gram(s) IV Push once  dextrose 50% Injectable 25 Gram(s) IV Push once  heparin  Injectable 7500 Unit(s) SubCutaneous every 8 hours  insulin lispro (HumaLOG) corrective regimen sliding scale   SubCutaneous Before meals and at bedtime  methylPREDNISolone sodium succinate Injectable 80 milliGRAM(s) IV Push every 6 hours  pantoprazole    Tablet 40 milliGRAM(s) Oral before breakfast    MEDICATIONS  (PRN):  dextrose 40% Gel 15 Gram(s) Oral once PRN Blood Glucose LESS THAN 70 milliGRAM(s)/deciliter  glucagon  Injectable 1 milliGRAM(s) IntraMuscular once PRN Glucose LESS THAN 70 milligrams/deciliter  oxyCODONE    IR 10 milliGRAM(s) Oral every 8 hours PRN Severe Pain (7 - 10)      Vital Signs Last 24 Hrs  T(C): 36.7 (21 Jun 2018 16:08), Max: 36.8 (21 Jun 2018 10:10)  T(F): 98.1 (21 Jun 2018 16:08), Max: 98.2 (21 Jun 2018 10:10)  HR: 82 (21 Jun 2018 16:08) (67 - 82)  BP: 123/71 (21 Jun 2018 16:08) (114/78 - 125/91)  BP(mean): --  RR: 18 (21 Jun 2018 16:08) (15 - 20)  SpO2: 96% (21 Jun 2018 16:08) (94% - 97%)    GENERAL:         comfortable,  - distress.  HEENT:            - trauma,  - icterus,  - injection,  - nasal discharge.  NECK:              - jugular venous distention, - thyromegaly.  LYMPH:           - lymphadenopathy, - masses.  RESP:               + clear,   - rales,   - rhonchi,   - wheezes.   COR:                S1S2   - gallops,  - rubs.  ABD:                bowel sounds,   soft, - tender, - distended, - organomegaly.  EXT/MSC:         - cyanosis,  + clubbing,  - edema.    NEURO:             alert,   responds to stimuli.                          13.7   10.2  )-----------( 326      ( 20 Jun 2018 06:09 )             42.0     06-20    138  |  99  |  17  ----------------------------<  152<H>  4.3   |  27  |  0.83    Ca    9.5      20 Jun 2018 06:08  Mg     2.4     06-20          CT Angio Chest PE Protocol w/ IV Cont (06.19.18)  No acute pulmonary embolism is seen. The main pulmonary artery is again dilated, measuring 3.4  cm.   No thoracic aortic aneurysm. The heart is normal in size. No pericardial effusion is seen. No mediastinal, hilar or axillary lymphadenopathy is seen.  Enlarged, multinodular thyroid gland again noted. Largest nodule right lobe measures 2.0 cm.  No pleural effusions are seen. Again post right lower lobectomy. Moderate centrilobular emphysema. Scarring right middle lobe. Linear atelectasis in the lingula. Peripherally located cystic areas again present left lower lobe.          ASSESSMENT/PLAN    1) Chronic obstructive pulmonary disease  2) Lung cancer  3) Atelectasis    Oxygen as needed  Bedside spirometry  Bronchodilators:  Atrovent/ albuterol q 4 – 6 hours as needed  Budesonide/formoterol, tiotropium  Corticosteroids: solumedrol -> prednisone   ID/Antibiotics: status post Zithromax  Cardiac/HTN: satisfactory  GI: Rx/ prophylaxis c PPI/H2B  Heme: Rx/VT prophylaxis  Discussed with Dr. Mckinnon, GI work up ordered.

## 2018-06-26 DIAGNOSIS — J45.901 UNSPECIFIED ASTHMA WITH (ACUTE) EXACERBATION: ICD-10-CM

## 2018-06-26 DIAGNOSIS — Z87.891 PERSONAL HISTORY OF NICOTINE DEPENDENCE: ICD-10-CM

## 2018-06-26 DIAGNOSIS — Z88.0 ALLERGY STATUS TO PENICILLIN: ICD-10-CM

## 2018-06-26 DIAGNOSIS — R59.0 LOCALIZED ENLARGED LYMPH NODES: ICD-10-CM

## 2018-06-26 DIAGNOSIS — Z85.118 PERSONAL HISTORY OF OTHER MALIGNANT NEOPLASM OF BRONCHUS AND LUNG: ICD-10-CM

## 2018-06-26 DIAGNOSIS — Z86.14 PERSONAL HISTORY OF METHICILLIN RESISTANT STAPHYLOCOCCUS AUREUS INFECTION: ICD-10-CM

## 2018-06-26 DIAGNOSIS — R10.11 RIGHT UPPER QUADRANT PAIN: ICD-10-CM

## 2018-06-26 DIAGNOSIS — Z85.841 PERSONAL HISTORY OF MALIGNANT NEOPLASM OF BRAIN: ICD-10-CM

## 2018-06-26 DIAGNOSIS — G89.29 OTHER CHRONIC PAIN: ICD-10-CM

## 2018-06-26 DIAGNOSIS — R07.9 CHEST PAIN, UNSPECIFIED: ICD-10-CM

## 2018-06-26 DIAGNOSIS — J44.1 CHRONIC OBSTRUCTIVE PULMONARY DISEASE WITH (ACUTE) EXACERBATION: ICD-10-CM

## 2018-06-26 DIAGNOSIS — M25.512 PAIN IN LEFT SHOULDER: ICD-10-CM

## 2018-06-26 DIAGNOSIS — J44.0 CHRONIC OBSTRUCTIVE PULMONARY DISEASE WITH (ACUTE) LOWER RESPIRATORY INFECTION: ICD-10-CM

## 2018-06-26 DIAGNOSIS — M54.9 DORSALGIA, UNSPECIFIED: ICD-10-CM

## 2018-06-26 DIAGNOSIS — J20.9 ACUTE BRONCHITIS, UNSPECIFIED: ICD-10-CM

## 2018-06-26 DIAGNOSIS — J98.11 ATELECTASIS: ICD-10-CM

## 2018-06-26 DIAGNOSIS — Z90.2 ACQUIRED ABSENCE OF LUNG [PART OF]: ICD-10-CM

## 2018-07-11 ENCOUNTER — EMERGENCY (EMERGENCY)
Facility: HOSPITAL | Age: 58
LOS: 1 days | Discharge: HOME | End: 2018-07-11
Attending: EMERGENCY MEDICINE | Admitting: EMERGENCY MEDICINE
Payer: COMMERCIAL

## 2018-07-11 VITALS
DIASTOLIC BLOOD PRESSURE: 142 MMHG | OXYGEN SATURATION: 96 % | WEIGHT: 225.09 LBS | RESPIRATION RATE: 18 BRPM | HEART RATE: 110 BPM | SYSTOLIC BLOOD PRESSURE: 171 MMHG | TEMPERATURE: 98 F | HEIGHT: 64 IN

## 2018-07-11 VITALS
RESPIRATION RATE: 17 BRPM | DIASTOLIC BLOOD PRESSURE: 87 MMHG | OXYGEN SATURATION: 99 % | SYSTOLIC BLOOD PRESSURE: 116 MMHG | HEART RATE: 87 BPM | TEMPERATURE: 98 F

## 2018-07-11 DIAGNOSIS — R51 HEADACHE: ICD-10-CM

## 2018-07-11 DIAGNOSIS — J43.9 EMPHYSEMA, UNSPECIFIED: ICD-10-CM

## 2018-07-11 DIAGNOSIS — Z88.0 ALLERGY STATUS TO PENICILLIN: ICD-10-CM

## 2018-07-11 DIAGNOSIS — Z90.49 ACQUIRED ABSENCE OF OTHER SPECIFIED PARTS OF DIGESTIVE TRACT: Chronic | ICD-10-CM

## 2018-07-11 DIAGNOSIS — J44.9 CHRONIC OBSTRUCTIVE PULMONARY DISEASE, UNSPECIFIED: ICD-10-CM

## 2018-07-11 DIAGNOSIS — Z41.9 ENCOUNTER FOR PROCEDURE FOR PURPOSES OTHER THAN REMEDYING HEALTH STATE, UNSPECIFIED: Chronic | ICD-10-CM

## 2018-07-11 DIAGNOSIS — Z79.899 OTHER LONG TERM (CURRENT) DRUG THERAPY: ICD-10-CM

## 2018-07-11 DIAGNOSIS — C34.90 MALIGNANT NEOPLASM OF UNSPECIFIED PART OF UNSPECIFIED BRONCHUS OR LUNG: ICD-10-CM

## 2018-07-11 DIAGNOSIS — Z79.52 LONG TERM (CURRENT) USE OF SYSTEMIC STEROIDS: ICD-10-CM

## 2018-07-11 DIAGNOSIS — C79.31 SECONDARY MALIGNANT NEOPLASM OF BRAIN: ICD-10-CM

## 2018-07-11 DIAGNOSIS — Z98.890 OTHER SPECIFIED POSTPROCEDURAL STATES: Chronic | ICD-10-CM

## 2018-07-11 LAB
ALBUMIN SERPL ELPH-MCNC: 4 G/DL — SIGNIFICANT CHANGE UP (ref 3.3–5)
ALP SERPL-CCNC: 67 U/L — SIGNIFICANT CHANGE UP (ref 40–120)
ALT FLD-CCNC: 20 U/L — SIGNIFICANT CHANGE UP (ref 10–45)
ANION GAP SERPL CALC-SCNC: 13 MMOL/L — SIGNIFICANT CHANGE UP (ref 5–17)
AST SERPL-CCNC: 18 U/L — SIGNIFICANT CHANGE UP (ref 10–40)
BASOPHILS NFR BLD AUTO: 0.3 % — SIGNIFICANT CHANGE UP (ref 0–2)
BILIRUB SERPL-MCNC: 0.3 MG/DL — SIGNIFICANT CHANGE UP (ref 0.2–1.2)
BLD GP AB SCN SERPL QL: NEGATIVE — SIGNIFICANT CHANGE UP
BUN SERPL-MCNC: 12 MG/DL — SIGNIFICANT CHANGE UP (ref 7–23)
CALCIUM SERPL-MCNC: 9.4 MG/DL — SIGNIFICANT CHANGE UP (ref 8.4–10.5)
CHLORIDE SERPL-SCNC: 99 MMOL/L — SIGNIFICANT CHANGE UP (ref 96–108)
CO2 SERPL-SCNC: 26 MMOL/L — SIGNIFICANT CHANGE UP (ref 22–31)
CREAT SERPL-MCNC: 0.82 MG/DL — SIGNIFICANT CHANGE UP (ref 0.5–1.3)
EOSINOPHIL NFR BLD AUTO: 1.9 % — SIGNIFICANT CHANGE UP (ref 0–6)
EXTRA BLUE TOP TUBE: SIGNIFICANT CHANGE UP
EXTRA SST TUBE: SIGNIFICANT CHANGE UP
GLUCOSE SERPL-MCNC: 121 MG/DL — HIGH (ref 70–99)
HCT VFR BLD CALC: 44.2 % — SIGNIFICANT CHANGE UP (ref 34.5–45)
HGB BLD-MCNC: 14.2 G/DL — SIGNIFICANT CHANGE UP (ref 11.5–15.5)
LYMPHOCYTES # BLD AUTO: 30.6 % — SIGNIFICANT CHANGE UP (ref 13–44)
MCHC RBC-ENTMCNC: 28.3 PG — SIGNIFICANT CHANGE UP (ref 27–34)
MCHC RBC-ENTMCNC: 32.1 G/DL — SIGNIFICANT CHANGE UP (ref 32–36)
MCV RBC AUTO: 88 FL — SIGNIFICANT CHANGE UP (ref 80–100)
MONOCYTES NFR BLD AUTO: 7.6 % — SIGNIFICANT CHANGE UP (ref 2–14)
NEUTROPHILS NFR BLD AUTO: 59.6 % — SIGNIFICANT CHANGE UP (ref 43–77)
PLATELET # BLD AUTO: 242 K/UL — SIGNIFICANT CHANGE UP (ref 150–400)
POTASSIUM SERPL-MCNC: 3.7 MMOL/L — SIGNIFICANT CHANGE UP (ref 3.5–5.3)
POTASSIUM SERPL-SCNC: 3.7 MMOL/L — SIGNIFICANT CHANGE UP (ref 3.5–5.3)
PROT SERPL-MCNC: 7.7 G/DL — SIGNIFICANT CHANGE UP (ref 6–8.3)
RBC # BLD: 5.02 M/UL — SIGNIFICANT CHANGE UP (ref 3.8–5.2)
RBC # FLD: 15 % — SIGNIFICANT CHANGE UP (ref 10.3–16.9)
RH IG SCN BLD-IMP: POSITIVE — SIGNIFICANT CHANGE UP
SODIUM SERPL-SCNC: 138 MMOL/L — SIGNIFICANT CHANGE UP (ref 135–145)
WBC # BLD: 7.4 K/UL — SIGNIFICANT CHANGE UP (ref 3.8–10.5)
WBC # FLD AUTO: 7.4 K/UL — SIGNIFICANT CHANGE UP (ref 3.8–10.5)

## 2018-07-11 PROCEDURE — 80053 COMPREHEN METABOLIC PANEL: CPT

## 2018-07-11 PROCEDURE — 70450 CT HEAD/BRAIN W/O DYE: CPT

## 2018-07-11 PROCEDURE — 99285 EMERGENCY DEPT VISIT HI MDM: CPT

## 2018-07-11 PROCEDURE — 85025 COMPLETE CBC W/AUTO DIFF WBC: CPT

## 2018-07-11 PROCEDURE — 96374 THER/PROPH/DIAG INJ IV PUSH: CPT

## 2018-07-11 PROCEDURE — 86901 BLOOD TYPING SEROLOGIC RH(D): CPT

## 2018-07-11 PROCEDURE — 99284 EMERGENCY DEPT VISIT MOD MDM: CPT | Mod: 25

## 2018-07-11 PROCEDURE — 86850 RBC ANTIBODY SCREEN: CPT

## 2018-07-11 PROCEDURE — 70450 CT HEAD/BRAIN W/O DYE: CPT | Mod: 26

## 2018-07-11 PROCEDURE — 96375 TX/PRO/DX INJ NEW DRUG ADDON: CPT

## 2018-07-11 PROCEDURE — 36415 COLL VENOUS BLD VENIPUNCTURE: CPT

## 2018-07-11 PROCEDURE — 86900 BLOOD TYPING SEROLOGIC ABO: CPT

## 2018-07-11 RX ORDER — ONDANSETRON 8 MG/1
4 TABLET, FILM COATED ORAL ONCE
Qty: 0 | Refills: 0 | Status: COMPLETED | OUTPATIENT
Start: 2018-07-11 | End: 2018-07-11

## 2018-07-11 RX ORDER — DIPHENHYDRAMINE HCL 50 MG
25 CAPSULE ORAL ONCE
Qty: 0 | Refills: 0 | Status: COMPLETED | OUTPATIENT
Start: 2018-07-11 | End: 2018-07-11

## 2018-07-11 RX ORDER — SODIUM CHLORIDE 9 MG/ML
1000 INJECTION INTRAMUSCULAR; INTRAVENOUS; SUBCUTANEOUS ONCE
Qty: 0 | Refills: 0 | Status: COMPLETED | OUTPATIENT
Start: 2018-07-11 | End: 2018-07-11

## 2018-07-11 RX ORDER — MORPHINE SULFATE 50 MG/1
4 CAPSULE, EXTENDED RELEASE ORAL ONCE
Qty: 0 | Refills: 0 | Status: DISCONTINUED | OUTPATIENT
Start: 2018-07-11 | End: 2018-07-11

## 2018-07-11 RX ORDER — METOCLOPRAMIDE HCL 10 MG
10 TABLET ORAL ONCE
Qty: 0 | Refills: 0 | Status: COMPLETED | OUTPATIENT
Start: 2018-07-11 | End: 2018-07-11

## 2018-07-11 RX ADMIN — MORPHINE SULFATE 4 MILLIGRAM(S): 50 CAPSULE, EXTENDED RELEASE ORAL at 17:04

## 2018-07-11 RX ADMIN — ONDANSETRON 4 MILLIGRAM(S): 8 TABLET, FILM COATED ORAL at 15:51

## 2018-07-11 RX ADMIN — SODIUM CHLORIDE 1000 MILLILITER(S): 9 INJECTION INTRAMUSCULAR; INTRAVENOUS; SUBCUTANEOUS at 15:51

## 2018-07-11 RX ADMIN — MORPHINE SULFATE 4 MILLIGRAM(S): 50 CAPSULE, EXTENDED RELEASE ORAL at 15:50

## 2018-07-11 RX ADMIN — Medication 10 MILLIGRAM(S): at 16:45

## 2018-07-11 RX ADMIN — Medication 25 MILLIGRAM(S): at 16:45

## 2018-07-11 NOTE — ED ADULT TRIAGE NOTE - CHIEF COMPLAINT QUOTE
Patient c/o severe headache , photophobia , vomiting and nausea since yesterday . Had brain surgery last 02/2018 . Took percocet at 11am today .

## 2018-07-11 NOTE — ED PROVIDER NOTE - OBJECTIVE STATEMENT
56 yo F with hx of met adencanrcinoma of lung to brain stage 4 - s/p gamma knife and chemo 1 year ago --now with clinical cure- with gradual  onset of left sided facial / frontal headache 4 hrs ago  no N/V no neck stiffness, no visual changes no fevers or chills- no weakness of upper or lower extremities- no photophobia- pt states she also has a hx of migraines or tension headaches in the past

## 2018-07-11 NOTE — ED ADULT NURSE NOTE - OBJECTIVE STATEMENT
pt to ER w/ report of sudden onset of HA last night.  Hx gamma knife brain sx in Jan '18.  Pt reports nausea, denies vomiting/cp/f/c/sob.  IV access established, labs drawn and sent. Breathing unlabored, skin warm and dry. No neuro deficits noted.  Will continue to monitor.

## 2018-07-11 NOTE — ED PROVIDER NOTE - MEDICAL DECISION MAKING DETAILS
headache left sided  x 1 day  no nuchal rigidity- no prior hx of migraines  responded to morphine benedryl and reglan  CT  no SAH  -post surgical changes smiling alert and awake - ok for dc 56 yo F with left sided headache over the past 4 hrs-  no nuchal rigidity- no photophobia-  hx of tension migraines/ prior gamma knife surgery/  no SAH on CT  low susp fr SAH or aneurysm - hold on CTA head. neck based on sig clinical improvement in ED-- responded to morphine benedryl and reglan  CT  no SAH  -post surgical changes smiling alert and awake - ok for dc  return prec dw pt including worsening headache N/V or fevers or any neck discomfort

## 2018-07-11 NOTE — PROGRESS NOTE ADULT - SUBJECTIVE AND OBJECTIVE BOX
INTERVAL HPI/OVERNIGHT EVENTS:    PAST MEDICAL & SURGICAL HISTORY:  Brain metastasis: brain tumor s/p resection  MRSA (methicillin resistant Staphylococcus aureus): history of  Adenocarcinoma of lung  COPD (chronic obstructive pulmonary disease)  History of lung surgery: right wedge resection  H/O brain surgery  Surgery, elective: lung biopsy  History of appendectomy      FAMILY HISTORY:  Family history of diabetes mellitus (Mother, Sibling)  Family history of essential hypertension (Mother, Sibling)      SOCIAL HISTORY:    REVIEW OF SYSTEMS:  Constitutional: () weight change, () fever,  () chills, () fatigue, () night sweats  Eyes: () discharge, () eye pain, () visual change  ENT:  () hearing difficulty, () vertigo, () sinus pain,  () throat pain, () epistaxis, () dysphagia, () hoarseness  Neck: () pain, () stiffness, () swelling  Respiratory: () cough, () wheezing, () hemoptysis      Cardiovascular: () chest pain, ()palpitations, () dizziness   Gastrointestinal: () abdominal pain, () nausea, () vomiting, () hematemesis, () diarrhea,  () constipation, () melena  Genitourinary:  () dysuria, () frequency, () hematuria, () incontinence      Neurologic: () headache, () memory loss, () loss of strength, () numbness, () tremor     Skin: () itching, () burning, () rash, () lesions   Lymphatic: () enlarged lymph nodes  Endocrine: () hair loss, () temperature intolerance         Musculoskeletal: () back pain, () joint pain,  () extremity pain  Psychiatric: () visual change, () auditory change, () depression, () anxiety, () suicidal  Sleep: () disorder, () insomnia, () sleep deprivation  Heme/Lymph: () easy bruising, () bleeding gums            Allergy and Immunologic: () hives, () eczema    Vital Signs Last 24 Hrs  T(C): 36.7 (11 Jul 2018 15:11), Max: 36.7 (11 Jul 2018 15:11)  T(F): 98 (11 Jul 2018 15:11), Max: 98 (11 Jul 2018 15:11)  HR: 110 (11 Jul 2018 15:11) (110 - 110)  BP: 171/142 (11 Jul 2018 15:11) (171/142 - 171/142)  BP(mean): --  RR: 18 (11 Jul 2018 15:11) (18 - 18)  SpO2: 96% (11 Jul 2018 15:11) (96% - 96%)            I&O's Detail      PHYSICAL EXAM:    Well nourished, well developed, comfortable, - acute distress; vital signs are monitored continuously  Eyes: PERRLA, EOMI, -conjunctivitis, -scleritis   Head: no focal deficit, normocephalic,  no trauma  ENMT: moist tongue, no thrush, -nasal discharge, -hoarseness, normal hearing, -cough, -hemoptysis, trachea midline  Neck: supple, - lymphadenopathy,  -masses, -JVD  Respiratory: bilateral diminished breath sounds, -wheezing, -rhonchi, -rales, -crackles  Chest: -accessory muscle use, -paradoxical breathing  Cardiovascular: regular rate and sinus rhythm, S1 S2 normal, -S3, -S4, -murmur, -gallop, -rub  Gastrointestinal: soft, nontender, nondistended, normal bowel sounds, no hepatosplenomegaly  Genitourinary: -flank pain, -dysuria  Extremities: -clubbing, -cyanosis, -edema    Vascular: peripheral pulses palpable 2+ bilaterally  Neurological: alert, oriented x 3, no focal deficit, -tremor   Skin: warm, dry, -erythema, iv sites intact  Lymph nodes; no cervical, supraclavicular or axillary adenopathy  Psychiatric: cooperative, appropriate mood      MEDICATIONS  (STANDING):    MEDICATIONS  (PRN):      Allergies    penicillin (Angioedema)    Intolerances        LABS:              +DVT prophylaxis  +Sleep  +Nutrition goals  -Pain  -Decubital ulcer  +GI prophylaxis (PPV, coagulopathy, Hx)  +Aspiration prophylaxis (45 degrees)    Ventilator synchronized  Tracheal cuff pressure    +Sedation/analgesia stopped once  +ID (phos, CH, mupi, SB)  -Delirium  +Cardiac Beta/ACEI-ARB/ASA/statin  +Prevention  +Education  +Medication reviewed (drug-drug interactions, PDA)  Medical devices    Discussed with ICU, PGY, CCRN, family    RADIOLOGY & ADDITIONAL STUDIES: PUD CCM    well known 57 year old AA retired  developed severe headache today.  Left side 9/10.  Not able to tolerate light, covering her eyes  CT     PAST MEDICAL & SURGICAL HISTORY:  Brain metastasis: brain tumor s/p resection  MRSA (methicillin resistant Staphylococcus aureus): history of  Adenocarcinoma of lung  COPD (chronic obstructive pulmonary disease)  History of lung surgery: right wedge resection  H/O brain surgery  Surgery, elective: lung biopsy  History of appendectomy      FAMILY HISTORY:  Family history of diabetes mellitus (Mother, Sibling)  Family history of essential hypertension (Mother, Sibling)      SOCIAL HISTORY:    REVIEW OF SYSTEMS:   Constitutional: () weight change, () fever,  () chills, () fatigue, () night sweats  Eyes: (-) discharge, () eye pain, () visual change  ENT:  (-) hearing difficulty, () vertigo, () sinus pain,  () throat pain, () epistaxis, () dysphagia, () hoarseness  Neck: (-) pain, () stiffness, () swelling  Respiratory: (-) cough, () wheezing, () hemoptysis      Cardiovascular: (-) chest pain, ()palpitations, () dizziness   Gastrointestinal: (-) abdominal pain, () nausea, () vomiting, () hematemesis, () diarrhea,  () constipation, () melena  Genitourinary:  (-) dysuria, () frequency, () hematuria, () incontinence      Neurologic: (-) headache, () memory loss, () loss of strength, () numbness, () tremor     Skin: (-) itching, () burning, () rash, () lesions   Lymphatic: () enlarged lymph nodes  Endocrine: () hair loss, () temperature intolerance         Musculoskeletal: (-) back pain, () joint pain,  () extremity pain  Psychiatric: () visual change, () auditory change, () depression, () anxiety, () suicidal  Sleep: () disorder, () insomnia, () sleep deprivation  Heme/Lymph: () easy bruising, () bleeding gums            Allergy and Immunologic: () hives, () eczema    Vital Signs Last 24 Hrs  T(C): 36.7 (11 Jul 2018 15:11), Max: 36.7 (11 Jul 2018 15:11)  T(F): 98 (11 Jul 2018 15:11), Max: 98 (11 Jul 2018 15:11)  HR: 110 (11 Jul 2018 15:11) (110 - 110)  BP: 171/142 (11 Jul 2018 15:11) (171/142 - 171/142)  BP(mean): --  RR: 18 (11 Jul 2018 15:11) (18 - 18)  SpO2: 96% (11 Jul 2018 15:11) (96% - 96%)    I&O's Detail    PHYSICAL EXAM:  covering her eyes  Well nourished, well developed, comfortable, - acute distress; vital signs are monitored continuously  Eyes: PERRLA, EOMI, -conjunctivitis, -scleritis   Head: no focal deficit, normocephalic,  no trauma  ENMT: moist tongue, no thrush, -nasal discharge, -hoarseness, normal hearing, -cough, -hemoptysis, trachea midline  Neck: supple, - lymphadenopathy,  -masses, -JVD  Respiratory: bilateral diminished breath sounds, -wheezing, -rhonchi, -rales, -crackles  Chest: -accessory muscle use, -paradoxical breathing  Cardiovascular: regular rate and sinus rhythm, S1 S2 normal, -S3, -S4, -murmur, -gallop, -rub  Gastrointestinal: soft, nontender, nondistended, normal bowel sounds, no hepatosplenomegaly  Genitourinary: -flank pain, -dysuria  Extremities: -clubbing, -cyanosis, -edema    Vascular: peripheral pulses palpable 2+ bilaterally  Neurological: alert, oriented x 3, no focal deficit, -tremor   Skin: warm, dry, -erythema, iv sites intact  Lymph nodes; no cervical, supraclavicular or axillary adenopathy  Psychiatric: cooperative, appropriate mood      MEDICATIONS  (STANDING):    MEDICATIONS  (PRN):      Allergies    penicillin (Angioedema)    Intolerances        LABS:  reviewed            +DVT prophylaxis  +Sleep  +Nutrition goals  -Pain  -Decubital ulcer  +GI prophylaxis (PPV, coagulopathy, Hx)  +Aspiration prophylaxis (45 degrees)  +Sedation/analgesia stopped once  +ID (phos, CH, mupi, SB)  -Delirium  +Cardiac   +Prevention  +Education  +Medication reviewed (drug-drug interactions, PDA)  Medical devices    Discussed with ICU, PGY, CCRN, family    RADIOLOGY & ADDITIONAL STUDIES:      PROCEDURE DATE:  07/11/2018          INTERPRETATION:  Axial images were obtained from the skull base to the   cranial vertex without intravenous contrast. Soft tissue and bone   algorithm images were evaluated.    Clinical information: Headache for one day. History of lung cancer with   brain metastases.    The current study is interpreted in conjunction with images from most   recent prior brain imaging study, which is an MRI dated 6/13/2018. Also   reviewed is a prior noncontrast brain CT dated 10/19/2017.    Again evident is left parietal craniotomy with low density changes in the   underlying left parietal and occipital lobes demonstrated to represent a   combination of postsurgical and post radiation changes on the 6/2018 MRI.   No mass effect is evident related to this region. No new areas of   abnormal density are identified within the brain parenchyma. There is no   evidence of acute intracranial hemorrhage, midline shift, hydrocephalus   or extracerebral collection. There is no additional abnormality of the   skull or skull base. Mucosal thickening is noted within bilateral   ethmoidal air cells. The mastoids are clear bilaterally.    IMPRESSION:    Posttreatment changes are identified in the left parieto-occipital   region. No acute findings are evident.

## 2018-07-11 NOTE — ED PROVIDER NOTE - CRANIAL NERVE AND PUPILLARY EXAM
peripheral vision intact/tongue is midline/cranial nerves 2-12 intact/central and peripheral vision intact/extra-ocular movements intact/gag reflex intact/corneal reflex intact/central vision intact

## 2018-08-17 ENCOUNTER — APPOINTMENT (OUTPATIENT)
Dept: HEART AND VASCULAR | Facility: CLINIC | Age: 58
End: 2018-08-17
Payer: COMMERCIAL

## 2018-08-17 VITALS
DIASTOLIC BLOOD PRESSURE: 80 MMHG | BODY MASS INDEX: 38.42 KG/M2 | TEMPERATURE: 97.3 F | HEART RATE: 92 BPM | WEIGHT: 225.02 LBS | SYSTOLIC BLOOD PRESSURE: 96 MMHG | OXYGEN SATURATION: 97 % | RESPIRATION RATE: 12 BRPM | HEIGHT: 64 IN

## 2018-08-17 DIAGNOSIS — I07.1 RHEUMATIC TRICUSPID INSUFFICIENCY: ICD-10-CM

## 2018-08-17 DIAGNOSIS — Z01.810 ENCOUNTER FOR PREPROCEDURAL CARDIOVASCULAR EXAMINATION: ICD-10-CM

## 2018-08-17 DIAGNOSIS — M25.519 PAIN IN UNSPECIFIED SHOULDER: ICD-10-CM

## 2018-08-17 PROCEDURE — 99214 OFFICE O/P EST MOD 30 MIN: CPT | Mod: 25

## 2018-08-17 PROCEDURE — 93306 TTE W/DOPPLER COMPLETE: CPT | Mod: XE

## 2018-08-17 PROCEDURE — 93000 ELECTROCARDIOGRAM COMPLETE: CPT

## 2018-08-17 RX ORDER — FLUTICASONE PROPIONATE AND SALMETEROL 50; 500 UG/1; UG/1
500-50 POWDER RESPIRATORY (INHALATION) TWICE DAILY
Qty: 1 | Refills: 2 | Status: ACTIVE | COMMUNITY
Start: 2018-08-17

## 2018-08-17 RX ORDER — LORAZEPAM 1 MG/1
1 TABLET ORAL
Qty: 1 | Refills: 0 | Status: DISCONTINUED | COMMUNITY
Start: 2018-02-13 | End: 2018-08-17

## 2018-08-17 RX ORDER — GABAPENTIN 300 MG/1
300 CAPSULE ORAL TWICE DAILY
Qty: 60 | Refills: 0 | Status: DISCONTINUED | COMMUNITY
Start: 2018-05-23 | End: 2018-08-17

## 2018-10-04 ENCOUNTER — APPOINTMENT (OUTPATIENT)
Dept: ENDOCRINOLOGY | Facility: CLINIC | Age: 58
End: 2018-10-04
Payer: COMMERCIAL

## 2018-10-04 VITALS
SYSTOLIC BLOOD PRESSURE: 109 MMHG | BODY MASS INDEX: 37.76 KG/M2 | HEART RATE: 100 BPM | WEIGHT: 220 LBS | DIASTOLIC BLOOD PRESSURE: 73 MMHG

## 2018-10-04 PROCEDURE — 99214 OFFICE O/P EST MOD 30 MIN: CPT | Mod: 25

## 2018-10-04 PROCEDURE — 36415 COLL VENOUS BLD VENIPUNCTURE: CPT

## 2018-10-17 ENCOUNTER — FORM ENCOUNTER (OUTPATIENT)
Age: 58
End: 2018-10-17

## 2018-10-18 ENCOUNTER — APPOINTMENT (OUTPATIENT)
Dept: CT IMAGING | Facility: HOSPITAL | Age: 58
End: 2018-10-18
Payer: MEDICARE

## 2018-10-18 ENCOUNTER — OUTPATIENT (OUTPATIENT)
Dept: OUTPATIENT SERVICES | Facility: HOSPITAL | Age: 58
LOS: 1 days | End: 2018-10-18
Payer: COMMERCIAL

## 2018-10-18 ENCOUNTER — APPOINTMENT (OUTPATIENT)
Dept: ULTRASOUND IMAGING | Facility: HOSPITAL | Age: 58
End: 2018-10-18
Payer: MEDICARE

## 2018-10-18 DIAGNOSIS — Z41.9 ENCOUNTER FOR PROCEDURE FOR PURPOSES OTHER THAN REMEDYING HEALTH STATE, UNSPECIFIED: Chronic | ICD-10-CM

## 2018-10-18 DIAGNOSIS — Z98.890 OTHER SPECIFIED POSTPROCEDURAL STATES: Chronic | ICD-10-CM

## 2018-10-18 DIAGNOSIS — Z90.49 ACQUIRED ABSENCE OF OTHER SPECIFIED PARTS OF DIGESTIVE TRACT: Chronic | ICD-10-CM

## 2018-10-18 PROCEDURE — 71250 CT THORAX DX C-: CPT | Mod: 26

## 2018-10-18 PROCEDURE — 76536 US EXAM OF HEAD AND NECK: CPT

## 2018-10-18 PROCEDURE — 76536 US EXAM OF HEAD AND NECK: CPT | Mod: 26

## 2018-10-18 PROCEDURE — 71250 CT THORAX DX C-: CPT

## 2018-10-22 DIAGNOSIS — Z85.118 PERSONAL HISTORY OF OTHER MALIGNANT NEOPLASM OF BRONCHUS AND LUNG: ICD-10-CM

## 2018-10-22 DIAGNOSIS — J43.9 EMPHYSEMA, UNSPECIFIED: ICD-10-CM

## 2018-10-22 DIAGNOSIS — E04.2 NONTOXIC MULTINODULAR GOITER: ICD-10-CM

## 2018-10-22 DIAGNOSIS — R91.1 SOLITARY PULMONARY NODULE: ICD-10-CM

## 2018-10-24 ENCOUNTER — APPOINTMENT (OUTPATIENT)
Dept: NEUROLOGY | Facility: CLINIC | Age: 58
End: 2018-10-24

## 2018-10-29 LAB
25(OH)D3 SERPL-MCNC: 17.5 NG/ML
T4 FREE SERPL-MCNC: 1.3 NG/DL
TSH SERPL-ACNC: 1.36 UIU/ML
TSH SERPL-ACNC: 1.67 UIU/ML

## 2018-10-29 RX ORDER — ERGOCALCIFEROL 1.25 MG/1
1.25 MG CAPSULE ORAL
Qty: 4 | Refills: 2 | Status: ACTIVE | COMMUNITY
Start: 2018-10-29 | End: 1900-01-01

## 2019-01-02 NOTE — DISCHARGE NOTE ADULT - LAUNCH MEDICATION RECONCILIATION
----- Message from Maame Knott at this phone number      [phone]    Medication #1 BLISOVI FE 1/20, 28, 1 mg-20 mcg (21)/75 mg (7) per tablet  Medication #2    Preferred Pharmacy:  [facility]/PHARMACY #[id number] - [address]   <<-----Click here for Discharge Medication Review

## 2019-01-27 ENCOUNTER — INPATIENT (INPATIENT)
Facility: HOSPITAL | Age: 59
LOS: 1 days | Discharge: HOME | DRG: 191 | End: 2019-01-29
Attending: INTERNAL MEDICINE | Admitting: INTERNAL MEDICINE
Payer: MEDICARE

## 2019-01-27 VITALS
HEART RATE: 109 BPM | DIASTOLIC BLOOD PRESSURE: 75 MMHG | RESPIRATION RATE: 22 BRPM | HEIGHT: 65 IN | SYSTOLIC BLOOD PRESSURE: 121 MMHG | OXYGEN SATURATION: 96 % | TEMPERATURE: 98 F | WEIGHT: 220.02 LBS

## 2019-01-27 DIAGNOSIS — Z41.9 ENCOUNTER FOR PROCEDURE FOR PURPOSES OTHER THAN REMEDYING HEALTH STATE, UNSPECIFIED: Chronic | ICD-10-CM

## 2019-01-27 DIAGNOSIS — Z29.9 ENCOUNTER FOR PROPHYLACTIC MEASURES, UNSPECIFIED: ICD-10-CM

## 2019-01-27 DIAGNOSIS — J15.9 UNSPECIFIED BACTERIAL PNEUMONIA: ICD-10-CM

## 2019-01-27 DIAGNOSIS — M54.9 DORSALGIA, UNSPECIFIED: ICD-10-CM

## 2019-01-27 DIAGNOSIS — Z98.890 OTHER SPECIFIED POSTPROCEDURAL STATES: Chronic | ICD-10-CM

## 2019-01-27 DIAGNOSIS — J44.1 CHRONIC OBSTRUCTIVE PULMONARY DISEASE WITH (ACUTE) EXACERBATION: ICD-10-CM

## 2019-01-27 DIAGNOSIS — Z90.49 ACQUIRED ABSENCE OF OTHER SPECIFIED PARTS OF DIGESTIVE TRACT: Chronic | ICD-10-CM

## 2019-01-27 DIAGNOSIS — B97.4 RESPIRATORY SYNCYTIAL VIRUS AS THE CAUSE OF DISEASES CLASSIFIED ELSEWHERE: ICD-10-CM

## 2019-01-27 DIAGNOSIS — R63.8 OTHER SYMPTOMS AND SIGNS CONCERNING FOOD AND FLUID INTAKE: ICD-10-CM

## 2019-01-27 DIAGNOSIS — Z91.89 OTHER SPECIFIED PERSONAL RISK FACTORS, NOT ELSEWHERE CLASSIFIED: ICD-10-CM

## 2019-01-27 LAB
ALBUMIN SERPL ELPH-MCNC: 4.5 G/DL — SIGNIFICANT CHANGE UP (ref 3.3–5)
ALP SERPL-CCNC: 97 U/L — SIGNIFICANT CHANGE UP (ref 40–120)
ALT FLD-CCNC: 13 U/L — SIGNIFICANT CHANGE UP (ref 10–45)
ANION GAP SERPL CALC-SCNC: 13 MMOL/L — SIGNIFICANT CHANGE UP (ref 5–17)
APTT BLD: 33.7 SEC — SIGNIFICANT CHANGE UP (ref 27.5–36.3)
AST SERPL-CCNC: 16 U/L — SIGNIFICANT CHANGE UP (ref 10–40)
BASOPHILS NFR BLD AUTO: 0.2 % — SIGNIFICANT CHANGE UP (ref 0–2)
BILIRUB SERPL-MCNC: 0.3 MG/DL — SIGNIFICANT CHANGE UP (ref 0.2–1.2)
BUN SERPL-MCNC: 14 MG/DL — SIGNIFICANT CHANGE UP (ref 7–23)
CALCIUM SERPL-MCNC: 9.8 MG/DL — SIGNIFICANT CHANGE UP (ref 8.4–10.5)
CHLORIDE SERPL-SCNC: 103 MMOL/L — SIGNIFICANT CHANGE UP (ref 96–108)
CK MB CFR SERPL CALC: <1 NG/ML — SIGNIFICANT CHANGE UP (ref 0–6.7)
CK SERPL-CCNC: 188 U/L — HIGH (ref 25–170)
CO2 SERPL-SCNC: 24 MMOL/L — SIGNIFICANT CHANGE UP (ref 22–31)
CREAT SERPL-MCNC: 0.85 MG/DL — SIGNIFICANT CHANGE UP (ref 0.5–1.3)
D DIMER BLD IA.RAPID-MCNC: 567 NG/ML DDU — HIGH
EOSINOPHIL NFR BLD AUTO: 0.9 % — SIGNIFICANT CHANGE UP (ref 0–6)
GLUCOSE SERPL-MCNC: 139 MG/DL — HIGH (ref 70–99)
HCT VFR BLD CALC: 44.4 % — SIGNIFICANT CHANGE UP (ref 34.5–45)
HGB BLD-MCNC: 14.6 G/DL — SIGNIFICANT CHANGE UP (ref 11.5–15.5)
INR BLD: 1.07 — SIGNIFICANT CHANGE UP (ref 0.88–1.16)
LACTATE SERPL-SCNC: 1.1 MMOL/L — SIGNIFICANT CHANGE UP (ref 0.5–2)
LYMPHOCYTES # BLD AUTO: 8.4 % — LOW (ref 13–44)
MCHC RBC-ENTMCNC: 28.4 PG — SIGNIFICANT CHANGE UP (ref 27–34)
MCHC RBC-ENTMCNC: 32.9 G/DL — SIGNIFICANT CHANGE UP (ref 32–36)
MCV RBC AUTO: 86.4 FL — SIGNIFICANT CHANGE UP (ref 80–100)
MONOCYTES NFR BLD AUTO: 1.6 % — LOW (ref 2–14)
NEUTROPHILS NFR BLD AUTO: 88.9 % — HIGH (ref 43–77)
NT-PROBNP SERPL-SCNC: 22 PG/ML — SIGNIFICANT CHANGE UP (ref 0–300)
PLATELET # BLD AUTO: 267 K/UL — SIGNIFICANT CHANGE UP (ref 150–400)
POTASSIUM SERPL-MCNC: 4.4 MMOL/L — SIGNIFICANT CHANGE UP (ref 3.5–5.3)
POTASSIUM SERPL-SCNC: 4.4 MMOL/L — SIGNIFICANT CHANGE UP (ref 3.5–5.3)
PROT SERPL-MCNC: 8.3 G/DL — SIGNIFICANT CHANGE UP (ref 6–8.3)
PROTHROM AB SERPL-ACNC: 12.1 SEC — SIGNIFICANT CHANGE UP (ref 10–12.9)
RAPID RVP RESULT: DETECTED
RBC # BLD: 5.14 M/UL — SIGNIFICANT CHANGE UP (ref 3.8–5.2)
RBC # FLD: 14.5 % — SIGNIFICANT CHANGE UP (ref 10.3–16.9)
RSV RNA SPEC QL NAA+PROBE: DETECTED
SODIUM SERPL-SCNC: 140 MMOL/L — SIGNIFICANT CHANGE UP (ref 135–145)
TROPONIN T SERPL-MCNC: <0.01 NG/ML — SIGNIFICANT CHANGE UP (ref 0–0.01)
WBC # BLD: 8.9 K/UL — SIGNIFICANT CHANGE UP (ref 3.8–10.5)
WBC # FLD AUTO: 8.9 K/UL — SIGNIFICANT CHANGE UP (ref 3.8–10.5)

## 2019-01-27 PROCEDURE — 99223 1ST HOSP IP/OBS HIGH 75: CPT | Mod: GC

## 2019-01-27 PROCEDURE — 99285 EMERGENCY DEPT VISIT HI MDM: CPT | Mod: 25

## 2019-01-27 PROCEDURE — 99221 1ST HOSP IP/OBS SF/LOW 40: CPT | Mod: GC

## 2019-01-27 PROCEDURE — 71045 X-RAY EXAM CHEST 1 VIEW: CPT | Mod: 26

## 2019-01-27 PROCEDURE — 71275 CT ANGIOGRAPHY CHEST: CPT | Mod: 26

## 2019-01-27 PROCEDURE — 93010 ELECTROCARDIOGRAM REPORT: CPT

## 2019-01-27 RX ORDER — IPRATROPIUM/ALBUTEROL SULFATE 18-103MCG
3 AEROSOL WITH ADAPTER (GRAM) INHALATION ONCE
Qty: 0 | Refills: 0 | Status: COMPLETED | OUTPATIENT
Start: 2019-01-27 | End: 2019-01-27

## 2019-01-27 RX ORDER — OXYCODONE AND ACETAMINOPHEN 5; 325 MG/1; MG/1
1 TABLET ORAL ONCE
Qty: 0 | Refills: 0 | Status: DISCONTINUED | OUTPATIENT
Start: 2019-01-27 | End: 2019-01-27

## 2019-01-27 RX ORDER — SODIUM CHLORIDE 9 MG/ML
1000 INJECTION INTRAMUSCULAR; INTRAVENOUS; SUBCUTANEOUS
Qty: 0 | Refills: 0 | Status: DISCONTINUED | OUTPATIENT
Start: 2019-01-27 | End: 2019-01-27

## 2019-01-27 RX ORDER — ACETAMINOPHEN 500 MG
650 TABLET ORAL ONCE
Qty: 0 | Refills: 0 | Status: COMPLETED | OUTPATIENT
Start: 2019-01-27 | End: 2019-01-27

## 2019-01-27 RX ORDER — HEPARIN SODIUM 5000 [USP'U]/ML
7500 INJECTION INTRAVENOUS; SUBCUTANEOUS EVERY 8 HOURS
Qty: 0 | Refills: 0 | Status: DISCONTINUED | OUTPATIENT
Start: 2019-01-27 | End: 2019-01-29

## 2019-01-27 RX ORDER — KETOROLAC TROMETHAMINE 30 MG/ML
15 SYRINGE (ML) INJECTION ONCE
Qty: 0 | Refills: 0 | Status: DISCONTINUED | OUTPATIENT
Start: 2019-01-28 | End: 2019-01-27

## 2019-01-27 RX ORDER — MAGNESIUM SULFATE 500 MG/ML
2 VIAL (ML) INJECTION ONCE
Qty: 0 | Refills: 0 | Status: COMPLETED | OUTPATIENT
Start: 2019-01-27 | End: 2019-01-27

## 2019-01-27 RX ORDER — AZITHROMYCIN 500 MG/1
250 TABLET, FILM COATED ORAL DAILY
Qty: 0 | Refills: 0 | Status: COMPLETED | OUTPATIENT
Start: 2019-01-28 | End: 2019-01-29

## 2019-01-27 RX ORDER — IPRATROPIUM/ALBUTEROL SULFATE 18-103MCG
3 AEROSOL WITH ADAPTER (GRAM) INHALATION EVERY 4 HOURS
Qty: 0 | Refills: 0 | Status: DISCONTINUED | OUTPATIENT
Start: 2019-01-27 | End: 2019-01-29

## 2019-01-27 RX ORDER — KETOROLAC TROMETHAMINE 30 MG/ML
15 SYRINGE (ML) INJECTION ONCE
Qty: 0 | Refills: 0 | Status: DISCONTINUED | OUTPATIENT
Start: 2019-01-27 | End: 2019-01-27

## 2019-01-27 RX ORDER — IPRATROPIUM/ALBUTEROL SULFATE 18-103MCG
3 AEROSOL WITH ADAPTER (GRAM) INHALATION EVERY 4 HOURS
Qty: 0 | Refills: 0 | Status: DISCONTINUED | OUTPATIENT
Start: 2019-01-27 | End: 2019-01-27

## 2019-01-27 RX ADMIN — OXYCODONE AND ACETAMINOPHEN 1 TABLET(S): 5; 325 TABLET ORAL at 12:45

## 2019-01-27 RX ADMIN — Medication 15 MILLIGRAM(S): at 23:35

## 2019-01-27 RX ADMIN — OXYCODONE AND ACETAMINOPHEN 1 TABLET(S): 5; 325 TABLET ORAL at 14:38

## 2019-01-27 RX ADMIN — Medication 3 MILLILITER(S): at 14:08

## 2019-01-27 RX ADMIN — SODIUM CHLORIDE 125 MILLILITER(S): 9 INJECTION INTRAMUSCULAR; INTRAVENOUS; SUBCUTANEOUS at 14:45

## 2019-01-27 RX ADMIN — Medication 15 MILLIGRAM(S): at 19:39

## 2019-01-27 RX ADMIN — OXYCODONE AND ACETAMINOPHEN 1 TABLET(S): 5; 325 TABLET ORAL at 12:15

## 2019-01-27 RX ADMIN — Medication 3 MILLILITER(S): at 12:20

## 2019-01-27 RX ADMIN — Medication 15 MILLIGRAM(S): at 19:29

## 2019-01-27 RX ADMIN — Medication 3 MILLILITER(S): at 22:18

## 2019-01-27 RX ADMIN — SODIUM CHLORIDE 125 MILLILITER(S): 9 INJECTION INTRAMUSCULAR; INTRAVENOUS; SUBCUTANEOUS at 15:02

## 2019-01-27 RX ADMIN — HEPARIN SODIUM 7500 UNIT(S): 5000 INJECTION INTRAVENOUS; SUBCUTANEOUS at 22:18

## 2019-01-27 RX ADMIN — Medication 3 MILLILITER(S): at 15:24

## 2019-01-27 RX ADMIN — Medication 125 MILLIGRAM(S): at 10:47

## 2019-01-27 RX ADMIN — Medication 60 MILLIGRAM(S): at 14:08

## 2019-01-27 RX ADMIN — Medication 3 MILLILITER(S): at 10:47

## 2019-01-27 RX ADMIN — OXYCODONE AND ACETAMINOPHEN 1 TABLET(S): 5; 325 TABLET ORAL at 14:08

## 2019-01-27 RX ADMIN — Medication 50 GRAM(S): at 10:47

## 2019-01-27 NOTE — H&P ADULT - ATTENDING COMMENTS
patient seen and examined    reviewed data including:  labs, available radiological reports/ studies, ekg, previous admission chart notes and/or imaging      agree w/ PE findings as above w/ additions/ exceptions/ pertinent findings: awake, alert, NAD, on high flow, MMM, perrla, s1s2, decreased breath sounds b/l, but no wheezing b/l ; abdomen soft/ nt/nd; no lower ext edema/ tenderness bl    1. COPD exacerbation in setting of RSV infection: on duonebs and prednisone ; wean off o2 as tolerated. completing azithromycin rxd by pulmonologist prior to admission   2.   3.       rest of plan as above patient seen and examined    reviewed data including:  labs, available radiological reports/ studies, ekg, previous admission chart notes and/or imaging      agree w/ PE findings as above w/ additions/ exceptions/ pertinent findings: awake, alert, NAD, on high flow, MMM, perrla, s1s2, decreased breath sounds b/l, but no wheezing b/l ; abdomen soft/ nt/nd; no lower ext edema/ tenderness bl    1. COPD exacerbation in setting of RSV infection: on duonebs and prednisone ; wean off o2 as tolerated. completing azithromycin rxd by pulmonologist prior to admission       rest of plan as above

## 2019-01-27 NOTE — H&P ADULT - PROBLEM SELECTOR PLAN 3
On oxycodone in past, recently on toradol  - Med recs from Dr. De Dios in the AM  - Toradol 15 mg IV PRN tonight, will re-eval tomorrow On oxycodone in past, recently on toradol  - Med recs from Dr. De Dios in the AM  - Toradol 15 mg IV x1 tonight, will re-eval tomorrow in remission, followup w/ oncology as outpt

## 2019-01-27 NOTE — ED PROVIDER NOTE - MEDICAL DECISION MAKING DETAILS
copd  exacc - no infilt  - await RVP-  low susp for influenza - will admit for nebs / steroids  failed oupt management with zpak, MDI - and steroids

## 2019-01-27 NOTE — H&P ADULT - NSHPSOCIALHISTORY_GEN_ALL_CORE
10 pack year ex-smoker quit 3 years ago, denies alcohol consumption, denies illicit drug use. Not sexually active, but on the occasion she is, uses protection. 1 son (age 40), no longer with . Disabled, used to work at [employer] as a .

## 2019-01-27 NOTE — H&P ADULT - NSHPREVIEWOFSYSTEMS_GEN_ALL_CORE
REVIEW OF SYSTEMS:    CONSTITUTIONAL: Patient denies weakness, fevers or chills  EYES/ENT: Patient denies visual changes;  denies vertigo or throat pain   NECK: Patient denies pain or stiffness  RESPIRATORY: +cough, SOB, tightness, denies hemoptysis  CARDIOVASCULAR: +chest pain, denies palpitations  GASTROINTESTINAL: Patient denies abdominal or epigastric pain, nausea, vomiting, or hematemesis, diarrhea or constipation, melena or hematochezia.  GENITOURINARY: Patient denies dysuria, frequency or hematuria  NEUROLOGICAL: Patient denies numbness or weakness  SKIN: Patient denies itching, burning, rashes, or lesions   All other review of systems is negative unless indicated above.

## 2019-01-27 NOTE — H&P ADULT - PROBLEM SELECTOR PROBLEM 4
Nutrition, metabolism, and development symptoms Obesity with serious comorbidity, unspecified classification, unspecified obesity type

## 2019-01-27 NOTE — CONSULT NOTE ADULT - ATTENDING COMMENTS
Reviewed with Dr. Hinson and Dr. Cantu. Her symptoms improved markedly with bronchodilator treatment (inhaled and steroids). She feels much better and her vital signs are very stable. At this point no need for telemetry.

## 2019-01-27 NOTE — PROGRESS NOTE ADULT - ASSESSMENT
ASSESSMENT/PLAN 59y/o F pt c lung adenocarcinoma c brain mets, s/p resections/. asthma, copd, now c impending respiratory failure, asthma exacerbation, URI, probably forming RLL pneumonia. Concern for Influenza.    1. O2 use 4LNC humidified, progress to HF nasal prongs if desaturates  2. Bronchodilators:  Atrovent/ albuterol q 4 – 6 hours as needed  3. Corticosteroids: SoluMedrol 125mg X 1, follow with 40mg Q 6 hrs.  4. ID/Antibiotics: Levaquin started, ID consult , RVP, sputum CX  5. Cardiac/HTN: optimize BP  6. GI: Rx/ prophylaxis c PPI/H2B  7. Heme: Rx/VT prophylaxis c SQH/SCD/AC  8. Aspiration precautions  9. Obtain CT chest  Discussed with managing team

## 2019-01-27 NOTE — ED ADULT NURSE NOTE - OBJECTIVE STATEMENT
Patient alert and oriented x 3 with history of lung cancer had lobectomy , came in c/o productive cough , headache , back pain , body ache , chest tightness and sob for 4 days , went to other hosp EDR yesterday , zithromax , albuterol neb and prednisone was prescribed with no relief . Denies any fever nor chills .  Seen and examined by Dr. Cantu , medicated as ordered .

## 2019-01-27 NOTE — H&P ADULT - PROBLEM SELECTOR PLAN 4
F: no IVF  E: K>4 Mg>2, monitor and replete as needed  N: DASH Diet  Ppx: Hep SubQ  D: RMF  FULL CODE  on diet and exercise

## 2019-01-27 NOTE — H&P ADULT - PROBLEM SELECTOR PLAN 9
1) PCP Contacted on Admission: (Y/N) --> Name & Phone #: PCP: Maritza De Dios [phone]  Pulmonologist: Vignesh Wallace [phone]  2) Date of Contact with PCP:  3) PCP Contacted at Discharge: (Y/N, N/A): N/A  4) Summary of Handoff Given to PCP: N/A  5) Post-Discharge Appointment Date and Location: N/A

## 2019-01-27 NOTE — H&P ADULT - PROBLEM SELECTOR PLAN 6
1) PCP Contacted on Admission: (Y/N) --> Name & Phone #: PCP: Maritza De Dios [phone]  Pulmonologist: Vignesh Wallace [phone]  2) Date of Contact with PCP:  3) PCP Contacted at Discharge: (Y/N, N/A): N/A  4) Summary of Handoff Given to PCP: N/A  5) Post-Discharge Appointment Date and Location: N/A On oxycodone in past, recently on toradol  - Med recs from Dr. De Dios in the AM  - Toradol 15 mg IV x1 tonight, will re-eval tomorrow

## 2019-01-27 NOTE — ED PROVIDER NOTE - OBJECTIVE STATEMENT
57 yo F with hx of COPD /lung cancer with prior mets to brain, treated in past- no intubations-(now resolved) c/o of 5 day hx of dry cough wheezing on z bri x 4 days without improvement -also mild chest pain this am  no back pain or radiation - no leg swelling or calf tenderness--  no hx of DVT or PE  non -smoker

## 2019-01-27 NOTE — ED PROVIDER NOTE - ENMT, MLM
Airway patent, Nasal mucosa clear. Mouth with normal mucosa. Throat has no vesicles, no oropharyngeal exudates and uvula is midline.
[id number] Comprehensive

## 2019-01-27 NOTE — CONSULT NOTE ADULT - ASSESSMENT
differential diagnosis includes flu, bacterial pneumonia and pulmonary embolism    patient states she gets hives and swelling to penicillin about 5 years ago
57yo F former smoker w/ COPD, lung adenocarcinoma (s/p RLL lobectomy, CTX w/ met to brain s/p RTX-gamma knife via [facility]) presenting with 4 days of cough, SOB, wheezing in the setting of URI-like associated symptoms and costochondritic pain on R that has not improved on azithromycin and low dose prednisone prescribed by outpatient pulmonologist Dr. Wallace; found in ED to be RSV+ on RVP. ICU consulted for disposition recommendations.    #Acute exacerbation of COPD - suspect 2/2 concomitant RSV infection  - respiratory complaints improved w/ HFNC; however would wean/titrate supplemental O2 as patient tolerates  - continue duonebs q4h standing  - s/p solumedrol in ED  - suggest toradol 30mg x1 for costochondritic pain; please have radiology review CT angio results to ensure no pathologic rib fractures given h/o malignancy -- otherwise suggest obtaining rib series to r/o FX    #Disposition - [facility]F    Case d/w CCM attending Dr. Grace and ED attending Dr. Cantu    Thank you for consulting [facility] and allowing us to participate in the care of this patient; please call with any questions.    Grey Hinson, DO PGY-3

## 2019-01-27 NOTE — H&P ADULT - ASSESSMENT
58F w/ PMHx lung adenocarcinoma w/ brain met (s/p lobe resection and parietooccipital resection), lower back pain, and COPD, presents with COPD exacerbation. 58F w/ PMHx lung adenocarcinoma w/ brain met (s/p lobe resection and parietooccipital resection), lower back pain, and COPD, presents with COPD exacerbation from RSV infection

## 2019-01-27 NOTE — CONSULT NOTE ADULT - SUBJECTIVE AND OBJECTIVE BOX
[facility] SERVICE CONSULTATION NOTE**INCOMPLETE    CC:  SOB, chest tightness    HPI:  Patient is a 57yo F former smoker (10 pack years, quit ~3 years ago), COPD (not on home O2; never intubated), lung adenoCA w/ brain met (s/p right lower lobectomy with en bloc middle lobe resection on 10/24/2016 and craniotomy with left parietooccipital mass resection 10/4/17; s/p CTX and gamma-knife radiation to brain met via [facility] 1/2018) who is presenting with about 3-4d of productive cough, SOB, and chest tightness a/w URI sx of rhinorrhea vs. nasal congestion, watery eyes, sore throat that has not been improving on low dose of prednisone and azithromycin in that interval. Patient says her sx began about 4 days ago, was coughing profusely productive of greyish sputum and called her pulmonologist Dr. Wallace who Rxed her azithromycin (3 doses taken thus far) and instructed her to take some of her home prednisone (pt believes 20mg tabs). She says she has not been able to sleep much at all 2/2 sx, namely chest tightness, coughing, and nasal congestion. Her sputum changed to yellow-green about 1-2 days ago and also has been wheezing more which she endorses being characteristic of her prior COPD exacerbations. She also endorses possible sick contact as she was riding [employer] train last weekend and was next to someone who had a cold and was coughing.     She denies F/C, dizziness, HA, palpitations,     ROS:  Otherwise negative, except as specified in HPI.    PAST MEDICAL & SURGICAL HISTORY:  Brain metastasis: brain tumor s/p resection  MRSA (methicillin resistant Staphylococcus aureus): history of  Adenocarcinoma of lung  COPD (chronic obstructive pulmonary disease)  History of lung surgery: right wedge resection  H/O brain surgery  Surgery, elective: lung biopsy  History of appendectomy    FAMILY HISTORY:  Family history of diabetes mellitus (Mother, Sibling)  Family history of essential hypertension (Mother, Sibling)    SH:    ALLERGIES:    MEDICATIONS:    VITAL SIGNS:  ICU Vital Signs Last 24 Hrs  T(C): 36.8 (27 Jan 2019 14:38), Max: 36.9 (27 Jan 2019 10:24)  T(F): 98.2 (27 Jan 2019 14:38), Max: 98.5 (27 Jan 2019 10:24)  HR: 111 (27 Jan 2019 14:38) (104 - 111)  BP: 117/74 (27 Jan 2019 14:38) (117/74 - 124/81)  BP(mean): --  ABP: --  ABP(mean): --  RR: 20 (27 Jan 2019 14:38) (20 - 22)  SpO2: 95% (27 Jan 2019 14:38) (95% - 96%)    CAPILLARY BLOOD GLUCOSE    PHYSICAL EXAM:  Constitutional: resting comfortably in bed, NAD  HEENT: NC/AT; PERRL, anicteric sclera; no oropharyngeal erythema or exudates; MMM  Neck: supple, no appreciable JVD  Respiratory: CTA B/L, no W/R/R; respirations appear non-labored, conversive in full sentences  Cardiovascular: +S1/S2, RRR  Gastrointestinal: abdomen soft, NT/ND  Extremities: WWP; no clubbing, cyanosis or edema  Vascular: 2+ radial, femoral, and DP/PT pulses B/L  Dermatologic: skin normal color and turgor; no visible rashes  Neurological:     LABS:                        14.6   8.9   )-----------( 267      ( 27 Jan 2019 11:03 )             44.4     01-27    140  |  103  |  14  ----------------------------<  139<H>  4.4   |  24  |  0.85    Ca    9.8      27 Jan 2019 11:03    TPro  8.3  /  Alb  4.5  /  TBili  0.3  /  DBili  x   /  AST  16  /  ALT  13  /  AlkPhos  97  01-27    PT/INR - ( 27 Jan 2019 11:03 )   PT: 12.1 sec;   INR: 1.07          PTT - ( 27 Jan 2019 11:03 )  PTT:33.7 sec  Lactate, Blood: 1.1 mmoL/L (01-27-19 @ 11:12)    CARDIAC MARKERS ( 27 Jan 2019 11:03 )  x     / <0.01 ng/mL / 188 U/L / x     / <1.0 ng/mL    EKG: Reviewed.    RADIOLOGY & ADDITIONAL TESTS: Reviewed. [facility] SERVICE CONSULTATION NOTE**INCOMPLETE    CC:  SOB, chest tightness    HPI:  Patient is a 57yo F former smoker (10 pack years, quit ~3 years ago), COPD (not on home O2; never intubated), lung adenoCA w/ brain met (s/p right lower lobectomy with en bloc middle lobe resection on 10/24/2016 and craniotomy with left parietooccipital mass resection 10/4/17; s/p CTX and gamma-knife radiation to brain met via [facility] 1/2018) who is presenting with about 3-4d of productive cough, SOB, and chest tightness a/w URI sx of rhinorrhea vs. nasal congestion, watery eyes, sore throat that has not been improving on low dose of prednisone and azithromycin in that interval. Patient says her sx began about 4 days ago, was coughing profusely productive of greyish sputum and called her pulmonologist Dr. Wallace who Rxed her azithromycin (3 doses taken thus far) and instructed her to take some of her home prednisone (pt believes 20mg tabs). She says she has not been able to sleep much at all 2/2 sx, namely chest tightness, coughing, and nasal congestion. Her sputum changed to yellow-green about 1-2 days ago and also has been wheezing more which she endorses being characteristic of her prior COPD exacerbations. She also endorses possible sick contact as she was riding [employer] last weekend and was next to someone who had a cold and was coughing.     She denies F/C, dizziness, HA, palpitations, abd pain, N/V/D/C, dysuria, hematuria, recent travel, or focal weakness.    ROS:  Otherwise negative, except as specified in HPI.    PAST MEDICAL & SURGICAL HISTORY:  Brain metastasis: brain tumor s/p resection  MRSA (methicillin resistant Staphylococcus aureus): history of  Adenocarcinoma of lung  COPD (chronic obstructive pulmonary disease)  History of lung surgery: right wedge resection  H/O brain surgery  Surgery, elective: lung biopsy  History of appendectomy    FAMILY HISTORY:  Family history of diabetes mellitus (Mother, Sibling)  Family history of essential hypertension (Mother, Sibling)    SH:  Former smoker (quit 3yrs ago; 10py)  Denies drug use    ALLERGIES:  NKDA    MEDICATIONS:  Spiriva  Advair  Albuterol  Oxycodone PRN back pain    VITAL SIGNS:  ICU Vital Signs Last 24 Hrs  T(C): 36.8 (27 Jan 2019 14:38), Max: 36.9 (27 Jan 2019 10:24)  T(F): 98.2 (27 Jan 2019 14:38), Max: 98.5 (27 Jan 2019 10:24)  HR: 111 (27 Jan 2019 14:38) (104 - 111)  BP: 117/74 (27 Jan 2019 14:38) (117/74 - 124/81)  BP(mean): --  ABP: --  ABP(mean): --  RR: 20 (27 Jan 2019 14:38) (20 - 22)  SpO2: 95% (27 Jan 2019 14:38) (95% - 96%)    CAPILLARY BLOOD GLUCOSE    PHYSICAL EXAM:  Constitutional: non-toxic female resting comfortably in bed on HFNC, not appearing in distress  HEENT: NC/AT; PERRL, anicteric sclera; no oropharyngeal erythema or exudates; MMM  Neck: supple, no appreciable JVD  Respiratory: on HFNC; lungs w/ diffuse wheezing best appreciated posteriorly in mid-upper lung fields; diminished bibasilar breath sounds; +cough w/ audible wheezing during exam  Cardiovascular: +S1/S2, tachycardic and regular rhythm  Gastrointestinal: abdomen obese and soft, non-tender; intact BS throughout  Extremities: WWP; no clubbing, cyanosis or edema  Vascular: 2+ radial, DP/PT pulses B/L  Dermatologic: skin normal color and turgor; no visible rashes  Neurological: AAOx3; follows commands, moves all extremities spontaneously    LABS:                        14.6   8.9   )-----------( 267      ( 27 Jan 2019 11:03 )             44.4     01-27    140  |  103  |  14  ----------------------------<  139<H>  4.4   |  24  |  0.85    Ca    9.8      27 Jan 2019 11:03    TPro  8.3  /  Alb  4.5  /  TBili  0.3  /  DBili  x   /  AST  16  /  ALT  13  /  AlkPhos  97  01-27    PT/INR - ( 27 Jan 2019 11:03 )   PT: 12.1 sec;   INR: 1.07          PTT - ( 27 Jan 2019 11:03 )  PTT:33.7 sec  Lactate, Blood: 1.1 mmoL/L (01-27-19 @ 11:12)    CARDIAC MARKERS ( 27 Jan 2019 11:03 )  x     / <0.01 ng/mL / 188 U/L / x     / <1.0 ng/mL    EKG: sinus tachycardia    RADIOLOGY & ADDITIONAL TESTS:  < from: Xray Chest 1 View-PORTABLE IMMEDIATE (01.27.19 @ 11:21) >  EXAM:  XR CHEST PORTABLE IMMED 1V                          PROCEDURE DATE:  01/27/2019          INTERPRETATION:  Clinical History: Asthma    Frontal examination the chest demonstrates the heart to be within normal   limits in transverse diameter. No acute infiltrates. Mild degenerative   changes thoracic spine. Calcification aortic knob.    Impression: No acute infiltrates    "Thank you for the opportunity to participate in the care of this   patient."        DEIRDRE STACK M.D., ATTENDING RADIOLOGIST  This document has been electronically signed. Jan 27 2019 12:21PM        < end of copied text > [facility] SERVICE CONSULTATION NOTE    CC:  SOB, chest tightness    HPI:  Patient is a 57yo F former smoker (10 pack years, quit ~3 years ago), COPD (not on home O2; never intubated), lung adenoCA w/ brain met (s/p right lower lobectomy with en bloc middle lobe resection on 10/24/2016 and craniotomy with left parietooccipital mass resection 10/4/17; s/p CTX and gamma-knife radiation to brain met via [facility] 1/2018) who is presenting with about 3-4d of productive cough, SOB, and chest tightness a/w URI sx of rhinorrhea vs. nasal congestion, watery eyes, sore throat that has not been improving on low dose of prednisone and azithromycin in that interval. Patient says her sx began about 4 days ago, was coughing profusely productive of greyish sputum and called her pulmonologist Dr. Wallace who Rxed her azithromycin (3 doses taken thus far) and instructed her to take some of her home prednisone (pt believes 20mg tabs). She says she has not been able to sleep much at all 2/2 sx, namely chest tightness, coughing, and nasal congestion. Her sputum changed to yellow-green about 1-2 days ago and also has been wheezing more which she endorses being characteristic of her prior COPD exacerbations. She also endorses possible sick contact as she was riding [employer] train last weekend and was next to someone who had a cold and was coughing.     She denies F/C, dizziness, HA, palpitations, abd pain, N/V/D/C, dysuria, hematuria, recent travel, or focal weakness.    ROS:  Otherwise negative, except as specified in HPI.    PAST MEDICAL & SURGICAL HISTORY:  Brain metastasis: brain tumor s/p resection  MRSA (methicillin resistant Staphylococcus aureus): history of  Adenocarcinoma of lung  COPD (chronic obstructive pulmonary disease)  History of lung surgery: right wedge resection  H/O brain surgery  Surgery, elective: lung biopsy  History of appendectomy    FAMILY HISTORY:  Family history of diabetes mellitus (Mother, Sibling)  Family history of essential hypertension (Mother, Sibling)    SH:  Former smoker (quit 3yrs ago; 10py)  Denies drug use    ALLERGIES:  NKDA    MEDICATIONS:  Spiriva  Advair  Albuterol  Oxycodone PRN back pain    VITAL SIGNS:  ICU Vital Signs Last 24 Hrs  T(C): 36.8 (27 Jan 2019 14:38), Max: 36.9 (27 Jan 2019 10:24)  T(F): 98.2 (27 Jan 2019 14:38), Max: 98.5 (27 Jan 2019 10:24)  HR: 111 (27 Jan 2019 14:38) (104 - 111)  BP: 117/74 (27 Jan 2019 14:38) (117/74 - 124/81)  BP(mean): --  ABP: --  ABP(mean): --  RR: 20 (27 Jan 2019 14:38) (20 - 22)  SpO2: 95% (27 Jan 2019 14:38) (95% - 96%)    CAPILLARY BLOOD GLUCOSE    PHYSICAL EXAM:  Constitutional: non-toxic female resting comfortably in bed on HFNC, not appearing in distress  HEENT: NC/AT; PERRL, anicteric sclera; no oropharyngeal erythema or exudates; MMM  Neck: supple, no appreciable JVD  Respiratory: on HFNC; lungs w/ diffuse wheezing best appreciated posteriorly in mid-upper lung fields; diminished bibasilar breath sounds; +cough w/ audible wheezing during exam  Cardiovascular: +S1/S2, tachycardic and regular rhythm  Gastrointestinal: abdomen obese and soft, non-tender; intact BS throughout  Extremities: WWP; no clubbing, cyanosis or edema  Vascular: 2+ radial, DP/PT pulses B/L  Dermatologic: skin normal color and turgor; no visible rashes  Neurological: AAOx3; follows commands, moves all extremities spontaneously    LABS:                        14.6   8.9   )-----------( 267      ( 27 Jan 2019 11:03 )             44.4     01-27    140  |  103  |  14  ----------------------------<  139<H>  4.4   |  24  |  0.85    Ca    9.8      27 Jan 2019 11:03    TPro  8.3  /  Alb  4.5  /  TBili  0.3  /  DBili  x   /  AST  16  /  ALT  13  /  AlkPhos  97  01-27    PT/INR - ( 27 Jan 2019 11:03 )   PT: 12.1 sec;   INR: 1.07          PTT - ( 27 Jan 2019 11:03 )  PTT:33.7 sec  Lactate, Blood: 1.1 mmoL/L (01-27-19 @ 11:12)    CARDIAC MARKERS ( 27 Jan 2019 11:03 )  x     / <0.01 ng/mL / 188 U/L / x     / <1.0 ng/mL    EKG: sinus tachycardia    RADIOLOGY & ADDITIONAL TESTS:  < from: Xray Chest 1 View-PORTABLE IMMEDIATE (01.27.19 @ 11:21) >  EXAM:  XR CHEST PORTABLE IMMED 1V                          PROCEDURE DATE:  01/27/2019          INTERPRETATION:  Clinical History: Asthma    Frontal examination the chest demonstrates the heart to be within normal   limits in transverse diameter. No acute infiltrates. Mild degenerative   changes thoracic spine. Calcification aortic knob.    Impression: No acute infiltrates    "Thank you for the opportunity to participate in the care of this   patient."        DEIRDRE STACK M.D., ATTENDING RADIOLOGIST  This document has been electronically signed. Jan 27 2019 12:21PM        < end of copied text >

## 2019-01-27 NOTE — H&P ADULT - PROBLEM SELECTOR PLAN 7
F: no IVF  E: K>4 Mg>2, monitor and replete as needed  N: DASH Diet  Ppx: Hep SubQ  D: RMF  FULL CODE

## 2019-01-27 NOTE — H&P ADULT - HISTORY OF PRESENT ILLNESS
58F history of lung adenocarcinoma (RLL en bloc middle lobe resection 10/2017; w/ brain met s/p craniotomy w/ L parietooccipital mass resection 10/2017, GKRS at [facility] [place] [facility] 1/2018), former smoker (10 pack years, quit 3 years ago), chronic lower back pain (on percocet 5/325 qdailyPRN), COPD (followed by Dr. Wallace, never intubated, no home O2), presents with 4 days of SOB, "chest tightness", associated w/ productive cough (greyish to yellowish sputum), nasal congestion w/ rhinorrhea and post nasal drip, sore throat. Patient was prescribed azithromycin and prednisone by Dr. Wallace and patient has been taking for past 3 days (3 doses) to minimal improvement. ROS: positive for insomnia, diaphoresis, hot flashes (all 3 attributed to prednisone as per patient), +chest tightness, denies any f/c/n/v, abd pain. Possible sick contact on [employer], adjacent passenger was coughing.    ED vitals: T98.5, , /75, RR22, O2% 97 on RA  ED labs: WBC 8.9 with neutrophil 88.9%, D-dimer 567, glucose 139, , trop <0.01.  ED studies: RSV positive, CXR no acute infiltrates, EKG NSR, CTA PE protocol performed read pending  ED course: solumedrol 60 mg IV, solumedrol 125 mg IV, percocet x2, levaquin 750mg IVPB, magnesium 2 g IVPB, 4 duonebs. ICU consulted, patient deemed appropriate for management of COPD + RSV on regional medical floors. 58F history of lung adenocarcinoma (RLL en bloc middle lobe resection 10/2017; w/ brain met s/p craniotomy w/ L parietooccipital mass resection 10/2017, [facility] at [facility] [place] 1/2018), former smoker (10 pack years, quit 3 years ago), chronic lower back pain (on percocet 5/325 qdailyPRN), COPD (followed by Dr. Wallace, never intubated, no home O2), presents with 4 days of SOB, "chest tightness", associated w/ productive cough (greyish to yellowish sputum), nasal congestion w/ rhinorrhea and post nasal drip, sore throat. Patient was prescribed azithromycin and prednisone by Dr. Wallace and patient has been taking for past 3 days (3 doses) to minimal improvement. ROS: positive for insomnia, diaphoresis, hot flashes (all 3 attributed to prednisone as per patient), +chest tightness, denies any f/c/n/v, abd pain. Possible sick contact on [employer], adjacent passenger was coughing.    ED vitals: T98.5, , /75, RR22, O2% 97 on RA  ED labs: WBC 8.9 with neutrophil 88.9%, D-dimer 567, glucose 139, , trop <0.01.  ED studies: RSV positive, CXR no acute infiltrates, EKG sinus tach, CTA PE protocol performed read pending  ED course: solumedrol 60 mg IV, solumedrol 125 mg IV, percocet x2, levaquin 750mg IVPB, magnesium 2 g IVPB, 4 duonebs. ICU consulted, patient deemed appropriate for management of COPD + RSV on regional medical floors. 58F history of lung adenocarcinoma (RLL en bloc middle lobe resection 10/2016; w/ brain met s/p craniotomy w/ L parietooccipital mass resection 10/2017, GKRS at [facility] 1/2018), former smoker (10 pack years, quit 3 years ago), chronic lower back pain (on percocet 5/325 qdailyPRN), COPD (followed by Dr. Wallace, never intubated, no home O2), presents with 4 days of SOB, "chest tightness", associated w/ productive cough (greyish to yellowish sputum), nasal congestion w/ rhinorrhea and post nasal drip, sore throat. Patient was prescribed azithromycin and prednisone by Dr. Wallace and patient has been taking for past 3 days (3 doses) to minimal improvement. ROS: positive for insomnia, diaphoresis, hot flashes (all 3 attributed to prednisone as per patient), +chest tightness, denies any f/c/n/v, abd pain. Possible sick contact on [employer], adjacent passenger was coughing.    ED vitals: T98.5, , /75, RR22, O2% 97 on RA  ED labs: WBC 8.9 with neutrophil 88.9%, D-dimer 567, glucose 139, , trop <0.01.  ED studies: RSV positive, CXR no acute infiltrates, EKG sinus tach, CTA PE protocol performed read pending  ED course: solumedrol 60 mg IV, solumedrol 125 mg IV, percocet x2, levaquin 750mg IVPB, magnesium 2 g IVPB, 4 duonebs. ICU consulted, patient deemed appropriate for management of COPD + RSV on regional medical floors. 58F history of lung adenocarcinoma (RLL en bloc middle lobe resection 10/2016; w/ brain met s/p craniotomy w/ L parietooccipital mass resection 10/2017, [place] [facility] [place]), former smoker (10 pack years, quit 3 years ago), chronic lower back pain (on percocet 5/325 qdailyPRN), COPD (followed by Dr. Wallace, never intubated, no home O2), presents with 4 days of SOB, "chest tightness", associated w/ productive cough (greyish to yellowish sputum), nasal congestion w/ rhinorrhea and post nasal drip, sore throat. Patient was prescribed azithromycin and prednisone by Dr. Wallace and patient has been taking for past 3 days (3 doses) to minimal improvement. ROS: positive for insomnia, diaphoresis, hot flashes (all 3 attributed to prednisone as per patient), +chest tightness/pain underneath R breast, denies any f/c/n/v, abd pain. Possible sick contact on [employer], adjacent passenger was coughing.    ED vitals: T98.5, , /75, RR22, O2% 97 on RA  ED labs: WBC 8.9 with neutrophil 88.9%, D-dimer 567, glucose 139, , trop <0.01.  ED studies: RSV positive, CXR no acute infiltrates, EKG sinus tach, CTA PE protocol performed read pending  ED course: solumedrol 60 mg IV, solumedrol 125 mg IV, percocet x2, levaquin 750mg IVPB, magnesium 2 g IVPB, 4 duonebs. ICU consulted, patient deemed appropriate for management of COPD + RSV on regional medical floors. 58F history of lung adenocarcinoma (RLL en bloc middle lobe resection 10/2016; w/ brain met s/p craniotomy w/ L parietooccipital mass resection 10/2017, G[facility]RS at [facility] [place] 1/2018), former smoker (10 pack years, quit 3 years ago), chronic lower back pain (on oxycodone 30 mg qdailyPRN), COPD (followed by Dr. Wallace, never intubated, no home O2), presents with 4 days of SOB, "chest tightness", associated w/ productive cough (greyish to yellowish sputum), nasal congestion w/ rhinorrhea and post nasal drip, sore throat. Patient was prescribed azithromycin and prednisone by Dr. Wallace and patient has been taking for past 3 days (3 doses) to minimal improvement. ROS: positive for insomnia, diaphoresis, hot flashes (all 3 attributed to prednisone as per patient), +chest tightness/pain underneath R breast, denies any f/c/n/v, abd pain. Possible sick contact on [employer], adjacent passenger was coughing.    ED vitals: T98.5, , /75, RR22, O2% 97 on RA  ED labs: WBC 8.9 with neutrophil 88.9%, D-dimer 567, glucose 139, , trop <0.01.  ED studies: RSV positive, CXR no acute infiltrates, EKG sinus tach, CTA PE protocol performed read pending  ED course: solumedrol 60 mg IV, solumedrol 125 mg IV, percocet x2, levaquin 750mg IVPB, magnesium 2 g IVPB, 4 duonebs. ICU consulted, patient deemed appropriate for management of COPD + RSV on regional medical floors.

## 2019-01-27 NOTE — H&P ADULT - FAMILY HISTORY
Mother  Still living? Unknown  Family history of essential hypertension, Age at diagnosis: Age Unknown  Family history of diabetes mellitus, Age at diagnosis: Age Unknown     Sibling  Still living? Unknown  Family history of essential hypertension, Age at diagnosis: Age Unknown  Family history of diabetes mellitus, Age at diagnosis: Age Unknown  Family history of uterine cancer, Age at diagnosis: Age Unknown Mother  Still living? Unknown  Family history of essential hypertension, Age at diagnosis: Age Unknown  Family history of diabetes mellitus, Age at diagnosis: Age Unknown  Family history of cancer in mother, Age at diagnosis: Age Unknown     Sibling  Still living? Unknown  Family history of essential hypertension, Age at diagnosis: Age Unknown  Family history of diabetes mellitus, Age at diagnosis: Age Unknown  Family history of uterine cancer, Age at diagnosis: Age Unknown     Father  Still living? Unknown  No family history of cardiovascular disease, Age at diagnosis: Age Unknown

## 2019-01-27 NOTE — ED ADULT TRIAGE NOTE - CHIEF COMPLAINT QUOTE
pt c/o SOB & chest tightness x 4 days. pt has hx of asthma, placed on antibiotics with no relief. pt c/o SOB & chest tightness x 4 days. pt has hx of asthma & lung ca with lobectomy 2 years ago, placed on antibiotics with no relief.

## 2019-01-27 NOTE — ED ADULT NURSE NOTE - CHIEF COMPLAINT QUOTE
pt c/o SOB & chest tightness x 4 days. pt has hx of asthma & lung ca with lobectomy 2 years ago, placed on antibiotics with no relief.

## 2019-01-27 NOTE — CONSULT NOTE ADULT - SUBJECTIVE AND OBJECTIVE BOX
HPI: patient with cancer. Now with cough and right sided chest pain       PAST MEDICAL & SURGICAL HISTORY:  Brain metastasis: brain tumor s/p resection  MRSA (methicillin resistant Staphylococcus aureus): history of  Adenocarcinoma of lung  COPD (chronic obstructive pulmonary disease)  History of lung surgery: right wedge resection  H/O brain surgery  Surgery, elective: lung biopsy  History of appendectomy      REVIEW OF SYSTEMS:    Constitutional: No fever, weight loss or fatigue  Eyes: No eye pain, visual disturbances, or discharge  ENMT:  No difficulty hearing, tinnitus, vertigo; No sinus or throat pain  Neck: No pain or stiffness  Respiratory: cough and right sided chest pain  Cardiovascular: No chest pain, palpitations, shortness of breath, dizziness or leg swelling  Gastrointestinal: No abdominal or epigastric pain. No nausea, vomiting or hematemesis; No diarrhea or constipation. No melena or hematochezia.  Genitourinary: No dysuria, frequency, hematuria or incontinence  Neurological: No headaches, memory loss, loss of strength, numbness or tremors  Skin: No itching, burning, rashes or lesions   Lymph Nodes: No enlarged glands  Endocrine: No heat or cold intolerance; No hair loss  Musculoskeletal: No joint pain or swelling; No muscle, back or extremity pain  Heme/Lymph: No easy bruising or bleeding gums  Allergy and Immunologic: No hives or eczema    MEDICATIONS  (STANDING):    MEDICATIONS  (PRN):          Allergies    penicillin (Angioedema)    Intolerances        SOCIAL HISTORY:    FAMILY HISTORY:  Family history of diabetes mellitus (Mother, Sibling)  Family history of essential hypertension (Mother, Sibling)      Vital Signs Last 24 Hrs  T(C): 36.9 (27 Jan 2019 12:15), Max: 36.9 (27 Jan 2019 10:24)  T(F): 98.4 (27 Jan 2019 12:15), Max: 98.5 (27 Jan 2019 10:24)  HR: 104 (27 Jan 2019 12:15) (104 - 109)  BP: 124/81 (27 Jan 2019 12:15) (121/75 - 124/81)  BP(mean): --  RR: 20 (27 Jan 2019 12:15) (20 - 22)  SpO2: 95% (27 Jan 2019 12:15) (95% - 96%)    PHYSICAL EXAM:    General: Well developed; well nourished; in no acute distress  Eyes: PERRL, EOM intact; conjunctiva and sclera clear  Head: Normocephalic; atraumatic  ENMT: No nasal discharge; airway clear  Neck: Supple; non tender; no masses  Respiratory: coughing  Cardiovascular: Regular rate and rhythm. S1 and S2 Normal; No murmurs, gallops or rubs  Gastrointestinal: Soft non-tender non-distended; Normal bowel sounds; No hepatosplenomegaly  Genitourinary: No costovertebral angle tenderness  Extremities: Normal range of motion, No clubbing, cyanosis or edema  Vascular: Peripheral pulses palpable 2+ bilaterally  Neurological: Alert and oriented x3  Skin: Warm and dry. No acute rash  Lymph Nodes: No acute cervical adenopathy  Musculoskeletal: Normal gait, tone, without deformities    LABS:                        14.6   8.9   )-----------( 267      ( 27 Jan 2019 11:03 )             44.4     01-27    140  |  103  |  14  ----------------------------<  139<H>  4.4   |  24  |  0.85    Ca    9.8      27 Jan 2019 11:03    TPro  8.3  /  Alb  4.5  /  TBili  0.3  /  DBili  x   /  AST  16  /  ALT  13  /  AlkPhos  97  01-27    PT/INR - ( 27 Jan 2019 11:03 )   PT: 12.1 sec;   INR: 1.07          PTT - ( 27 Jan 2019 11:03 )  PTT:33.7 sec      RADIOLOGY & ADDITIONAL STUDIES:

## 2019-01-27 NOTE — H&P ADULT - NSHPLABSRESULTS_GEN_ALL_CORE
LABS:                         14.6   8.9   )-----------( 267      ( 27 Jan 2019 11:03 )             44.4     01-27    140  |  103  |  14  ----------------------------<  139<H>  4.4   |  24  |  0.85    Ca    9.8      27 Jan 2019 11:03    TPro  8.3  /  Alb  4.5  /  TBili  0.3  /  DBili  x   /  AST  16  /  ALT  13  /  AlkPhos  97  01-27    PT/INR - ( 27 Jan 2019 11:03 )   PT: 12.1 sec;   INR: 1.07          PTT - ( 27 Jan 2019 11:03 )  PTT:33.7 sec    CARDIAC MARKERS ( 27 Jan 2019 11:03 )  x     / <0.01 ng/mL / 188 U/L / x     / <1.0 ng/mL      Serum Pro-Brain Natriuretic Peptide: 22 pg/mL (01-27 @ 11:03)    Lactate, Blood: 1.1 mmoL/L (01-27 @ 11:12)      RADIOLOGY, EKG & ADDITIONAL TESTS: CXR no acute infiltrates, EKG NSR LABS:                         14.6   8.9   )-----------( 267      ( 27 Jan 2019 11:03 )             44.4     01-27    140  |  103  |  14  ----------------------------<  139<H>  4.4   |  24  |  0.85    Ca    9.8      27 Jan 2019 11:03    TPro  8.3  /  Alb  4.5  /  TBili  0.3  /  DBili  x   /  AST  16  /  ALT  13  /  AlkPhos  97  01-27    PT/INR - ( 27 Jan 2019 11:03 )   PT: 12.1 sec;   INR: 1.07          PTT - ( 27 Jan 2019 11:03 )  PTT:33.7 sec    CARDIAC MARKERS ( 27 Jan 2019 11:03 )  x     / <0.01 ng/mL / 188 U/L / x     / <1.0 ng/mL      Serum Pro-Brain Natriuretic Peptide: 22 pg/mL (01-27 @ 11:03)    Lactate, Blood: 1.1 mmoL/L (01-27 @ 11:12)      RADIOLOGY, EKG & ADDITIONAL TESTS: CXR no acute infiltrates, EKG sinus tach, CT PE performed read pending

## 2019-01-27 NOTE — ED ADULT NURSE NOTE - NSIMPLEMENTINTERV_GEN_ALL_ED
Implemented All Universal Safety Interventions:  [place] to call system. Call bell, personal items and telephone within reach. Instruct patient to call for assistance. Room bathroom lighting operational. Non-slip footwear when patient is off stretcher. Physically safe environment: no spills, clutter or unnecessary equipment. Stretcher in lowest position, wheels locked, appropriate side rails in place.

## 2019-01-27 NOTE — PROGRESS NOTE ADULT - SUBJECTIVE AND OBJECTIVE BOX
Interventional, Pulmonary, Critical, Chest Special Procedures.Initial ER    Pt was seen and fully examined by myself.     Time spent with patient in minutes:77    Patient is a 58y old  Female who presents with a chief complaint of severe and worsening cough, initially dry, now more productive yellowish sputum, malaise, wheezing sensation. The patient reports worsening on O/P meds including Zithromax. The patient denies any sick contacts, denies truma, denies aspiration.  "It all started as runny nose and sore throat". The patient now ill appearing, bouts of cough triggering anterior R CP. Audible wheezing.  Multiple interventions in ER    REVIEW OF SYSTEMS:  Constitutional: + intermittent  fever, weight loss, chills + fatigue  Eyes: No eye pain, visual disturbances, or discharge  ENMT:  No difficulty hearing, tinnitus, vertigo; + sinus or throat pain. No epistaxis, dysphagia, + dysphonia, hoarseness no odynophagia  Neck: No pain, stiffness or neck swelling.  No masses or deformities  Respiratory: +++cough, wheezing, chills no  hemoptysis  + COPD  - ILD   - PE   + ASTHMA     + PNEUMONIA  Cardiovascular: + anterior CW cough triggered  chest pain, no dysrhythmia, palpitations, dizziness or edema   + COPD     - CAD   - CHF   + HTN  Gastrointestinal: No abdominal or epigastric pain. No nausea, vomiting or hematemesis; No diarrhea or constipation. No melena or hematochezia. No dysphagia or Icterus.          Genitourinary: No dysuria, frequency, hematuria or incontinence   - CKD/RAINE      - ESRD  Neurological: No headaches, memory loss, loss of strength, numbness or tremors      -DEMENTIA     - STROKE    + SEIZURE  Skin: No itching, burning, rashes or lesions   Lymph Nodes: No enlarged glands  Endocrine: No heat or cold intolerance; No hair loss       - DM     - THYROID DISORDER  Musculoskeletal: No joint pain or swelling; No muscle, back or extremity pain  Psychiatric: No depression, anxiety, mood swings or difficulty sleeping  Heme/Lymph: No easy bruising or bleeding gums         + ANEMIA      + CANCER   -COAGULOPATHY  Allergy and Immunologic: No hives or eczema    PAST MEDICAL & SURGICAL HISTORY:  Brain metastasis: brain tumor s/p resection  MRSA (methicillin resistant Staphylococcus aureus): history of  Adenocarcinoma of lung  COPD (chronic obstructive pulmonary disease)  History of lung surgery: right wedge resection  H/O brain surgery  Surgery, elective: lung biopsy  History of appendectomy    FAMILY HISTORY:  Family history of diabetes mellitus (Mother, Sibling)  Family history of essential hypertension (Mother, Sibling)    SOCIAL HISTORY:      - Tobacco     - ETOH    Allergies    penicillin (Angioedema)    Intolerances      Vital Signs Last 24 Hrs  T(C): 36.9 (27 Jan 2019 10:24), Max: 36.9 (27 Jan 2019 10:24)  T(F): 98.5 (27 Jan 2019 10:24), Max: 98.5 (27 Jan 2019 10:24)  HR: 109 (27 Jan 2019 10:24) (109 - 109)  BP: 121/75 (27 Jan 2019 10:24) (121/75 - 121/75)  BP(mean): --  RR: 22 (27 Jan 2019 10:24) (22 - 22)  SpO2: 96% (27 Jan 2019 10:24) (96% - 96%)      PHYSICAL EXAM:  Un Comfortable, cough induced  distress  Eyes: PERRL, EOM intact; conjunctiva and sclera clear  Head: Normocephalic;  No Trauma  ENMT: + nasal discharge, +hoarseness, cough no  hemoptysis.    Neck: Supple; non tender; no masses or deformities.    No JVD  Respiratory: + WHEEZING   + RHONCHI  - RALES  - CRACKLES.  Diminished breath sounds  BILATERAL  RIGHT > LEFT bases  Cardiovascular: Regular rate and rhythm. S1 and S2 Normal; No murmurs, gallops or rubs     - PPM/AICD  Gastrointestinal: Soft non-tender, non-distended; Normal bowel sounds; No hepatosplenomegaly.     -PEG    -  GT   - MILLAN  Genitourinary: No costovertebral angle tenderness. No dysuria  Extremities: AROM, No clubbing, cyanosis or edema    Vascular: Peripheral pulses palpable 2+ bilaterally  Neurological: Alert and anxious, suffering look  Skin: Warm and dry. No obvious rash  Lymph Nodes: No acute cervical or supraclavicular adenopathy  Psychiatric: Cooperative and appropriate mood    DEVICES:  - DENTURES   +IV R / L     - ETUBE   -TRACH   -CTUBE  R / L      LABS:                          14.6   8.9   )-----------( 267      ( 27 Jan 2019 11:03 )             44.4           PT/INR - ( 27 Jan 2019 11:03 )   PT: 12.1 sec;   INR: 1.07          PTT - ( 27 Jan 2019 11:03 )  PTT:33.7 sec  RADIOLOGY & ADDITIONAL STUDIES (The following images were personally reviewed): CXR/CT series

## 2019-01-27 NOTE — H&P ADULT - NSHPPHYSICALEXAM_GEN_ALL_CORE
VITAL SIGNS:  T(C): 36.3 (01-27-19 @ 18:22), Max: 36.9 (01-27-19 @ 10:24)  T(F): 97.3 (01-27-19 @ 18:22), Max: 98.5 (01-27-19 @ 10:24)  HR: 106 (01-27-19 @ 18:22) (101 - 111)  BP: 118/76 (01-27-19 @ 18:22) (114/74 - 124/81)  BP(mean): --  RR: 20 (01-27-19 @ 18:22) (18 - 22)  SpO2: 94% (01-27-19 @ 18:22) (94% - 98%)  Wt(kg): --    PHYSICAL EXAM:    Constitutional: WDWN, lying comfortably in bed, NAD, appears comfortable and non-toxic  HEENT: Nc/At, PERRL, EOMI, clear conjunctiva, MMM  Neck: supple; no JVD  Respiratory: On high flow nasal cannula, diffuse b/l expiratory wheezes, decreased BS at bases b/l, +intermittent cough  Cardiac: +S1/S2, tachycardic, no m/r/g  Gastrointestinal: obese soft, non-tender, non-distended, no rebound/guarding, normoactive bowel sounds x4  Back: spine midline, no bony tenderness or step-offs; no CVAT B/L  Extremities: WWP, no clubbing or cyanosis, no peripheral edema  Vascular: 2+ radial and DP pulses b/l  Dermatologic: skin warm, dry and intact, no rashes, wounds, or scars  Neurologic: AAOx3, CNII-XII grossly intact, no focal deficits, no finger nose dysmetria, 5/5 strength b/l UE and LE, ambulates w/o equipment

## 2019-01-27 NOTE — H&P ADULT - PROBLEM SELECTOR PLAN 1
S/p 3 days of azithromycin and prednisone. Received solumedrol total 185 mg IV.  - C/w duonebs q4h  - Prednisone 40 mg PO q24h  - On HFNC, continue to wean/titrate as patient tolerates  - F/u CTA PE S/p 3 days of azithromycin and prednisone. Received solumedrol total 185 mg IV.  - C/w duonebs q4h  - Prednisone 40 mg PO q24h  - On HFNC, continue to wean/titrate as patient tolerates  - F/u CTA PE  - F/u pulm recs S/p 3 days of azithromycin and prednisone. Received solumedrol total 185 mg IV.  - C/w duonebs q4h  - Prednisone 40 mg PO q24h  - On HFNC, continue to wean/titrate as patient tolerates  - F/u CTA PE  - F/u pulm recs  - C/w Azithromycin 250 mg (2 more doses to complete 5, last dose 1/28)

## 2019-01-28 DIAGNOSIS — I27.20 PULMONARY HYPERTENSION, UNSPECIFIED: ICD-10-CM

## 2019-01-28 DIAGNOSIS — R73.9 HYPERGLYCEMIA, UNSPECIFIED: ICD-10-CM

## 2019-01-28 DIAGNOSIS — C34.91 MALIGNANT NEOPLASM OF UNSPECIFIED PART OF RIGHT BRONCHUS OR LUNG: ICD-10-CM

## 2019-01-28 DIAGNOSIS — R73.03 PREDIABETES: ICD-10-CM

## 2019-01-28 DIAGNOSIS — J44.9 CHRONIC OBSTRUCTIVE PULMONARY DISEASE, UNSPECIFIED: ICD-10-CM

## 2019-01-28 DIAGNOSIS — E66.9 OBESITY, UNSPECIFIED: ICD-10-CM

## 2019-01-28 LAB
ANION GAP SERPL CALC-SCNC: 14 MMOL/L — SIGNIFICANT CHANGE UP (ref 5–17)
BUN SERPL-MCNC: 15 MG/DL — SIGNIFICANT CHANGE UP (ref 7–23)
CALCIUM SERPL-MCNC: 9.3 MG/DL — SIGNIFICANT CHANGE UP (ref 8.4–10.5)
CHLORIDE SERPL-SCNC: 104 MMOL/L — SIGNIFICANT CHANGE UP (ref 96–108)
CO2 SERPL-SCNC: 22 MMOL/L — SIGNIFICANT CHANGE UP (ref 22–31)
CREAT SERPL-MCNC: 0.78 MG/DL — SIGNIFICANT CHANGE UP (ref 0.5–1.3)
GLUCOSE BLDC GLUCOMTR-MCNC: 114 MG/DL — HIGH (ref 70–99)
GLUCOSE BLDC GLUCOMTR-MCNC: 148 MG/DL — HIGH (ref 70–99)
GLUCOSE BLDC GLUCOMTR-MCNC: 168 MG/DL — HIGH (ref 70–99)
GLUCOSE SERPL-MCNC: 186 MG/DL — HIGH (ref 70–99)
HBA1C BLD-MCNC: 6.2 % — HIGH (ref 4–5.6)
HCT VFR BLD CALC: 39.3 % — SIGNIFICANT CHANGE UP (ref 34.5–45)
HGB BLD-MCNC: 12.8 G/DL — SIGNIFICANT CHANGE UP (ref 11.5–15.5)
HIV 1+2 AB+HIV1 P24 AG SERPL QL IA: SIGNIFICANT CHANGE UP
MAGNESIUM SERPL-MCNC: 2.2 MG/DL — SIGNIFICANT CHANGE UP (ref 1.6–2.6)
MCHC RBC-ENTMCNC: 28 PG — SIGNIFICANT CHANGE UP (ref 27–34)
MCHC RBC-ENTMCNC: 32.6 G/DL — SIGNIFICANT CHANGE UP (ref 32–36)
MCV RBC AUTO: 86 FL — SIGNIFICANT CHANGE UP (ref 80–100)
PLATELET # BLD AUTO: 258 K/UL — SIGNIFICANT CHANGE UP (ref 150–400)
POTASSIUM SERPL-MCNC: 4.4 MMOL/L — SIGNIFICANT CHANGE UP (ref 3.5–5.3)
POTASSIUM SERPL-SCNC: 4.4 MMOL/L — SIGNIFICANT CHANGE UP (ref 3.5–5.3)
RBC # BLD: 4.57 M/UL — SIGNIFICANT CHANGE UP (ref 3.8–5.2)
RBC # FLD: 14.6 % — SIGNIFICANT CHANGE UP (ref 10.3–16.9)
SODIUM SERPL-SCNC: 140 MMOL/L — SIGNIFICANT CHANGE UP (ref 135–145)
WBC # BLD: 8.5 K/UL — SIGNIFICANT CHANGE UP (ref 3.8–10.5)
WBC # FLD AUTO: 8.5 K/UL — SIGNIFICANT CHANGE UP (ref 3.8–10.5)

## 2019-01-28 PROCEDURE — 93306 TTE W/DOPPLER COMPLETE: CPT | Mod: 26

## 2019-01-28 PROCEDURE — 99233 SBSQ HOSP IP/OBS HIGH 50: CPT | Mod: GC

## 2019-01-28 RX ORDER — DEXTROSE 50 % IN WATER 50 %
12.5 SYRINGE (ML) INTRAVENOUS ONCE
Qty: 0 | Refills: 0 | Status: DISCONTINUED | OUTPATIENT
Start: 2019-01-28 | End: 2019-01-29

## 2019-01-28 RX ORDER — INSULIN LISPRO 100/ML
VIAL (ML) SUBCUTANEOUS
Qty: 0 | Refills: 0 | Status: DISCONTINUED | OUTPATIENT
Start: 2019-01-28 | End: 2019-01-29

## 2019-01-28 RX ORDER — DEXTROSE 50 % IN WATER 50 %
15 SYRINGE (ML) INTRAVENOUS ONCE
Qty: 0 | Refills: 0 | Status: DISCONTINUED | OUTPATIENT
Start: 2019-01-28 | End: 2019-01-29

## 2019-01-28 RX ORDER — PANTOPRAZOLE SODIUM 20 MG/1
40 TABLET, DELAYED RELEASE ORAL
Qty: 0 | Refills: 0 | Status: DISCONTINUED | OUTPATIENT
Start: 2019-01-28 | End: 2019-01-29

## 2019-01-28 RX ORDER — DEXTROSE 50 % IN WATER 50 %
25 SYRINGE (ML) INTRAVENOUS ONCE
Qty: 0 | Refills: 0 | Status: DISCONTINUED | OUTPATIENT
Start: 2019-01-28 | End: 2019-01-29

## 2019-01-28 RX ORDER — GLUCAGON INJECTION, SOLUTION 0.5 MG/.1ML
1 INJECTION, SOLUTION SUBCUTANEOUS ONCE
Qty: 0 | Refills: 0 | Status: DISCONTINUED | OUTPATIENT
Start: 2019-01-28 | End: 2019-01-29

## 2019-01-28 RX ORDER — BUDESONIDE AND FORMOTEROL FUMARATE DIHYDRATE 160; 4.5 UG/1; UG/1
2 AEROSOL RESPIRATORY (INHALATION)
Qty: 0 | Refills: 0 | Status: DISCONTINUED | OUTPATIENT
Start: 2019-01-28 | End: 2019-01-29

## 2019-01-28 RX ORDER — SODIUM CHLORIDE 9 MG/ML
1000 INJECTION, SOLUTION INTRAVENOUS
Qty: 0 | Refills: 0 | Status: DISCONTINUED | OUTPATIENT
Start: 2019-01-28 | End: 2019-01-29

## 2019-01-28 RX ORDER — ACETAMINOPHEN 500 MG
650 TABLET ORAL EVERY 6 HOURS
Qty: 0 | Refills: 0 | Status: DISCONTINUED | OUTPATIENT
Start: 2019-01-28 | End: 2019-01-29

## 2019-01-28 RX ORDER — LORATADINE 10 MG/1
1 TABLET ORAL
Qty: 0 | Refills: 0 | COMMUNITY

## 2019-01-28 RX ORDER — LIDOCAINE 4 G/100G
1 CREAM TOPICAL DAILY
Qty: 0 | Refills: 0 | Status: DISCONTINUED | OUTPATIENT
Start: 2019-01-28 | End: 2019-01-29

## 2019-01-28 RX ORDER — OXYCODONE HYDROCHLORIDE 5 MG/1
30 TABLET ORAL DAILY
Qty: 0 | Refills: 0 | Status: DISCONTINUED | OUTPATIENT
Start: 2019-01-28 | End: 2019-01-29

## 2019-01-28 RX ADMIN — Medication 3 MILLILITER(S): at 02:14

## 2019-01-28 RX ADMIN — Medication 3 MILLILITER(S): at 12:02

## 2019-01-28 RX ADMIN — PANTOPRAZOLE SODIUM 40 MILLIGRAM(S): 20 TABLET, DELAYED RELEASE ORAL at 10:04

## 2019-01-28 RX ADMIN — Medication 650 MILLIGRAM(S): at 11:51

## 2019-01-28 RX ADMIN — BUDESONIDE AND FORMOTEROL FUMARATE DIHYDRATE 2 PUFF(S): 160; 4.5 AEROSOL RESPIRATORY (INHALATION) at 22:57

## 2019-01-28 RX ADMIN — Medication 3 MILLILITER(S): at 07:05

## 2019-01-28 RX ADMIN — Medication 15 MILLIGRAM(S): at 00:00

## 2019-01-28 RX ADMIN — Medication 650 MILLIGRAM(S): at 22:58

## 2019-01-28 RX ADMIN — HEPARIN SODIUM 7500 UNIT(S): 5000 INJECTION INTRAVENOUS; SUBCUTANEOUS at 07:05

## 2019-01-28 RX ADMIN — LIDOCAINE 1 PATCH: 4 CREAM TOPICAL at 19:52

## 2019-01-28 RX ADMIN — HEPARIN SODIUM 7500 UNIT(S): 5000 INJECTION INTRAVENOUS; SUBCUTANEOUS at 13:49

## 2019-01-28 RX ADMIN — AZITHROMYCIN 250 MILLIGRAM(S): 500 TABLET, FILM COATED ORAL at 12:02

## 2019-01-28 RX ADMIN — Medication 3 MILLILITER(S): at 21:39

## 2019-01-28 RX ADMIN — Medication 40 MILLIGRAM(S): at 07:05

## 2019-01-28 RX ADMIN — Medication 3 MILLILITER(S): at 18:25

## 2019-01-28 RX ADMIN — LIDOCAINE 1 PATCH: 4 CREAM TOPICAL at 18:25

## 2019-01-28 RX ADMIN — Medication 650 MILLIGRAM(S): at 12:50

## 2019-01-28 RX ADMIN — OXYCODONE HYDROCHLORIDE 30 MILLIGRAM(S): 5 TABLET ORAL at 12:02

## 2019-01-28 RX ADMIN — Medication 2: at 13:48

## 2019-01-28 RX ADMIN — OXYCODONE HYDROCHLORIDE 30 MILLIGRAM(S): 5 TABLET ORAL at 12:50

## 2019-01-28 NOTE — PROGRESS NOTE ADULT - PROBLEM SELECTOR PLAN 1
S/p 3 days of azithromycin and prednisone. Received solumedrol total 185 mg IV.  - C/w duonebs q4h  - Prednisone 40 mg PO q24h  - On HFNC, continue to wean/titrate as patient tolerates  - F/u CTA PE  - F/u pulm recs  - C/w Azithromycin 250 mg (2 more doses to complete 5, last dose 1/28) S/p 3 days of azithromycin and prednisone. Received solumedrol total 185 mg IV.  - C/w duonebs q4h  - Prednisone 40 mg PO q24h (last dose 1/29)  - On HFNC, continue to wean/titrate as patient tolerates  - F/u CTA PE  - F/u pulm recs  - C/w Azithromycin 250 mg (2 more doses to complete 5, last dose 1/28) S/p 3 days of azithromycin and prednisone. Received solumedrol total 185 mg IV.  - C/w duonebs q4h  - Prednisone 40 mg PO q24h (last dose 1/31)  - On HFNC, continue to wean/titrate as patient tolerates  - F/u CTA PE  - F/u pulm recs  - C/w Azithromycin 250 mg (2 more doses to complete 5, last dose 1/28)

## 2019-01-28 NOTE — PROGRESS NOTE ADULT - SUBJECTIVE AND OBJECTIVE BOX
CC/ HPI Patient is a 58 year old female, ex-smoker with chronic obstructive pulmonary disease, status post right lower lobectomy, 2016, craniotomy with left parietooccipital mass resection, 2017, for lung cancer with bran metastasis, admitted with shortness of breath, cough, and wheezing, despite outpatient therapy with prednisone, Respiratory Syncitial Virus tracheobronchitis, this morning feeling better.    PAST MEDICAL & SURGICAL HISTORY:  Brain metastasis: brain tumor s/p resection  MRSA (methicillin resistant Staphylococcus aureus): history of  Adenocarcinoma of lung  COPD (chronic obstructive pulmonary disease)  History of lung surgery: right wedge resection  H/O brain surgery  Surgery, elective: lung biopsy  History of appendectomy    SOCHX:  + tobacco,  -  alcohol    FMHX: FA/MO  - contributory     ROS reviewed below with positive findings marked (+) :  GEN:  fever, chills ENT: tracheostomy,   epistaxis,  sinusitis COR: CAD, CHF,  HTN, dysrhythmia PUL: +COPD, ILD, asthma, +pneumonia GI: PEG, dysphagia, hemorrhage, other DOUGLAS: kidney disease, electrolyte disorder HEM:  anemia, thrombus, coagulopathy, +cancer ENDO:  thyroid disease, diabetes mellitus CNS:  dementia, stroke, seizure, PSY:  depression, anxiety, other    MEDICATIONS  (STANDING):  ALBUTerol/ipratropium for Nebulization 3 milliLiter(s) Nebulizer every 4 hours  azithromycin   Tablet 250 milliGRAM(s) Oral daily  dextrose 5%. 1000 milliLiter(s) (50 mL/Hr) IV Continuous <Continuous>  dextrose 50% Injectable 12.5 Gram(s) IV Push once  dextrose 50% Injectable 25 Gram(s) IV Push once  dextrose 50% Injectable 25 Gram(s) IV Push once  heparin  Injectable 7500 Unit(s) SubCutaneous every 8 hours  insulin lispro (HumaLOG) corrective regimen sliding scale   SubCutaneous Before meals and at bedtime  pantoprazole    Tablet 40 milliGRAM(s) Oral before breakfast  predniSONE   Tablet 40 milliGRAM(s) Oral every 24 hours    MEDICATIONS  (PRN):  dextrose 40% Gel 15 Gram(s) Oral once PRN Blood Glucose LESS THAN 70 milliGRAM(s)/deciliter  glucagon  Injectable 1 milliGRAM(s) IntraMuscular once PRN Glucose LESS THAN 70 milligrams/deciliter  guaiFENesin   Syrup  (Sugar-Free) 100 milliGRAM(s) Oral every 6 hours PRN Cough      Vital Signs Last 24 Hrs  T(C): 36.5 (28 Jan 2019 05:49), Max: 36.9 (27 Jan 2019 10:24)  T(F): 97.7 (28 Jan 2019 05:49), Max: 98.5 (27 Jan 2019 10:24)  HR: 92 (28 Jan 2019 05:49) (92 - 111)  BP: 111/74 (28 Jan 2019 05:49) (111/74 - 124/81)  RR: 18 (28 Jan 2019 05:49) (16 - 22)  SpO2: 98% (28 Jan 2019 05:49) (94% - 98%)    GENERAL:         comfortable,  - distress.  HEENT:            - trauma,  - icterus,  - injection,  - nasal discharge.  NECK:              - jugular venous distention, - thyromegaly.  LYMPH:           - lymphadenopathy, - masses.  RESP:              + wheezes,   - rales,   - rhonchi.   COR:                S1S2   - gallops,  - rubs.  ABD:                bowel sounds,   soft, - tender, - distended.  EXT/MSC:       - cyanosis, + clubbing,  - edema.    NEURO:             alert,   responds to stimuli.                          12.8   8.5   )-----------( 258      ( 28 Jan 2019 06:09 )             39.3     01-28    140  |  104  |  15  ----------------------------<  186<H>  4.4   |  22  |  0.78        X-ray Chest  (01.27.19 ) No acute infiltrates    Rapid Respiratory Viral Panel (01.27.19 @ 11:03)    Rapid RVP Result: Detected:     Resp Syncytial Virus (RapRVP): Detected:         ASSESSMENT/PLAN    1) Respiratory Syncitial Virus Infection  2) Chronic obstructive pulmonary disease  3) Atelectasis/ rule out pneumonia    Oxygen as needed  Bedside spirometry  Follow up CT scan of the chest  Bronchodilators:  Atrovent/ albuterol q 4 hours as needed  Corticosteroids: prednisone , consider solumedrol  ID: receiving Zithromax, however, follow up ID recommendations  Cardiac/HTN: satisfactory  GI: Rx/ prophylaxis c PPI/H2B  Heme: Rx/VT prophylaxis  Discussed with Dr. Hathaway

## 2019-01-28 NOTE — PROGRESS NOTE ADULT - PROBLEM SELECTOR PROBLEM 5
Hyperglycemia Prediabetes Obesity with serious comorbidity, unspecified classification, unspecified obesity type

## 2019-01-28 NOTE — PROGRESS NOTE ADULT - PROBLEM SELECTOR PLAN 8
DVT ppx: Heparin 7500u subq8 F: no IVF  E: K>4 Mg>2, monitor and replete as needed  N: DASH Diet + consistent carb  Ppx: Hep SubQ  D: RMF  FULL CODE

## 2019-01-28 NOTE — PROGRESS NOTE ADULT - PROBLEM SELECTOR PLAN 10
1) PCP Contacted on Admission: (Y/N) --> Name & Phone #: PCP: Maritza De Dios [phone]  Pulmonologist: Vignesh Wallace [phone]  2) Date of Contact with PCP:  3) PCP Contacted at Discharge: (Y/N, N/A): Y  4) Summary of Handoff Given to PCP: Y  5) Post-Discharge Appointment Date and Location: Appointment not made because PCP office closed (spoke only w/ PCP cell phone)

## 2019-01-28 NOTE — PROGRESS NOTE ADULT - PROBLEM SELECTOR PLAN 5
mild glucose elevation, will check A1c HbA1c 6.2  - Counseled on diet and exercise  - Consistent carb diet - Counseled on diet and exercise

## 2019-01-28 NOTE — PROGRESS NOTE ADULT - PROBLEM SELECTOR PLAN 7
F: no IVF  E: K>4 Mg>2, monitor and replete as needed  N: DASH Diet  Ppx: Hep SubQ  D: RMF  FULL CODE F: no IVF  E: K>4 Mg>2, monitor and replete as needed  N: DASH Diet + consistent carb  Ppx: Hep SubQ  D: RMF  FULL CODE On chronic opioids, currently be weaned off by PCP (was on a higher dose in the past 180+ oxycodone). S/p 1 dose of IV toradol 15 mg.  - C/w oxycodone 30 mg PO q24hrn

## 2019-01-28 NOTE — PHYSICAL THERAPY INITIAL EVALUATION ADULT - PERTINENT HX OF CURRENT PROBLEM, REHAB EVAL
Pt. is a  58 y.o. female presenting with 4 days of increased SOB and productive cough; currently treated for COPD exacerbation secondary RSV infection.

## 2019-01-28 NOTE — PROGRESS NOTE ADULT - PROBLEM SELECTOR PLAN 9
1) PCP Contacted on Admission: (Y/N) --> Name & Phone #: PCP: Maritza De Dios [phone]  Pulmonologist: iVgnesh Wallace [phone]  2) Date of Contact with PCP:  3) PCP Contacted at Discharge: (Y/N, N/A): N/A  4) Summary of Handoff Given to PCP: N/A  5) Post-Discharge Appointment Date and Location: N/A 1) PCP Contacted on Admission: (Y/N) --> Name & Phone #: PCP: Maritza De Dios [phone]  Pulmonologist: Vignesh Wallace [phone]  2) Date of Contact with PCP:  3) PCP Contacted at Discharge: (Y/N, N/A): Y  4) Summary of Handoff Given to PCP: Y  5) Post-Discharge Appointment Date and Location: Appointment not made because PCP office closed (spoke only w/ PCP cell phone) DVT ppx: Heparin 7500u subq8

## 2019-01-28 NOTE — PROGRESS NOTE ADULT - SUBJECTIVE AND OBJECTIVE BOX
INTERVAL HPI/OVERNIGHT EVENTS:    ANTIBIOTICS    MEDICATIONS  (STANDING):  ALBUTerol/ipratropium for Nebulization 3 milliLiter(s) Nebulizer every 4 hours  azithromycin   Tablet 250 milliGRAM(s) Oral daily  buDESOnide  80 MICROgram(s)/formoterol 4.5 MICROgram(s) Inhaler 2 Puff(s) Inhalation two times a day  dextrose 5%. 1000 milliLiter(s) (50 mL/Hr) IV Continuous <Continuous>  dextrose 50% Injectable 12.5 Gram(s) IV Push once  dextrose 50% Injectable 25 Gram(s) IV Push once  dextrose 50% Injectable 25 Gram(s) IV Push once  heparin  Injectable 7500 Unit(s) SubCutaneous every 8 hours  insulin lispro (HumaLOG) corrective regimen sliding scale   SubCutaneous Before meals and at bedtime  lidocaine   Patch 1 Patch Transdermal daily  pantoprazole    Tablet 40 milliGRAM(s) Oral before breakfast  predniSONE   Tablet 40 milliGRAM(s) Oral every 24 hours    MEDICATIONS  (PRN):  acetaminophen   Tablet .. 650 milliGRAM(s) Oral every 6 hours PRN Moderate Pain (4 - 6)  dextrose 40% Gel 15 Gram(s) Oral once PRN Blood Glucose LESS THAN 70 milliGRAM(s)/deciliter  glucagon  Injectable 1 milliGRAM(s) IntraMuscular once PRN Glucose LESS THAN 70 milligrams/deciliter  guaiFENesin   Syrup  (Sugar-Free) 100 milliGRAM(s) Oral every 6 hours PRN Cough  oxyCODONE    IR 30 milliGRAM(s) Oral daily PRN Severe Pain (7 - 10)      Allergies    penicillin (Angioedema)    Intolerances        REVIEW OF SYSTEMS:    Constitutional: No fever, weight loss or fatigue  Eyes: No eye pain, visual disturbances, or discharge  ENMT:  No difficulty hearing, tinnitus, vertigo; No sinus or throat pain  Neck: No pain or stiffness  Respiratory: less cough and wheezing  Cardiovascular: No chest pain, palpitations, shortness of breath, dizziness or leg swelling  Gastrointestinal: No abdominal or epigastric pain. No nausea, vomiting or hematemesis; No diarrhea or constipation. No melena or hematochezia.  Genitourinary: No dysuria, frequency, hematuria or incontinence  Rectal: No pain, hemorrhoids or incontinence  Neurological: No headaches, memory loss, loss of strength, numbness or tremors  Skin: No itching, burning, rashes or lesions   Lymph Nodes: No enlarged glands  Endocrine: No heat or cold intolerance; No hair loss  Musculoskeletal: No joint pain or swelling; No muscle, back or extremity pain  Heme/Lymph: No easy bruising or bleeding gums  Allergy and Immunologic: No hives or eczema    Vital Signs Last 24 Hrs  T(C): 36.4 (28 Jan 2019 16:01), Max: 36.5 (28 Jan 2019 05:49)  T(F): 97.6 (28 Jan 2019 16:01), Max: 97.7 (28 Jan 2019 05:49)  HR: 84 (28 Jan 2019 16:14) (84 - 94)  BP: 124/77 (28 Jan 2019 16:01) (111/74 - 124/77)  BP(mean): --  RR: 18 (28 Jan 2019 16:01) (16 - 18)  SpO2: 96% (28 Jan 2019 16:14) (96% - 99%)    PHYSICAL EXAM:    General: Well developed; well nourished; in no acute distress  Eyes: PERRL, EOM intact; conjunctiva and sclera clear  Head: Normocephalic; atraumatic  ENMT: No nasal discharge; airway clear  Neck: Supple; non tender; no masses  Respiratory: decreased ronchi  Cardiovascular: Regular rate and rhythm. S1 and S2 Normal; No murmurs, gallops or rubs  Gastrointestinal: Soft non-tender non-distended; Normal bowel sounds; No hepatosplenomegaly  Genitourinary: No costovertebral angle tenderness  Extremities: Normal range of motion, No clubbing, cyanosis or edema  Vascular: Peripheral pulses palpable 2+ bilaterally  Neurological: Alert and oriented x3  Skin: Warm and dry. No acute rash  Lymph Nodes: No acute cervical adenopathy  Musculoskeletal: Normal gait, tone, without deformities    LABS:                        12.8   8.5   )-----------( 258      ( 28 Jan 2019 06:09 )             39.3     01-28    140  |  104  |  15  ----------------------------<  186<H>  4.4   |  22  |  0.78    Ca    9.3      28 Jan 2019 06:09  Mg     2.2     01-28    TPro  8.3  /  Alb  4.5  /  TBili  0.3  /  DBili  x   /  AST  16  /  ALT  13  /  AlkPhos  97  01-27    PT/INR - ( 27 Jan 2019 11:03 )   PT: 12.1 sec;   INR: 1.07          PTT - ( 27 Jan 2019 11:03 )  PTT:33.7 sec        MICROBIOLOGY:  Culture Results:   No growth at 1 day. (01-27 @ 12:51)  Culture Results:   No growth at 1 day. (01-27 @ 12:51)      RADIOLOGY & ADDITIONAL STUDIES:

## 2019-01-28 NOTE — PROGRESS NOTE ADULT - PROBLEM SELECTOR PLAN 3
in remission, followup w/ oncology as outpt In remission, followup w/ oncology as outpt. Seen on CT PE  - F/u TTE Seen on CT PE  - TTE 1/28: LVEF 60-65, LV wall motion normal. Mitral inflow pattern ~impaired LV relaxation, mildly elevated LA pressure. Trace TR. Mild pHTN.

## 2019-01-28 NOTE — PHYSICAL THERAPY INITIAL EVALUATION ADULT - ADDITIONAL COMMENTS
Pt. reports living in an elevator building, with 3-4 steps to enter; denies prior use of assistive device.

## 2019-01-28 NOTE — PROGRESS NOTE ADULT - ASSESSMENT
58F w/ PMHx lung adenocarcinoma w/ brain met (s/p lobe resection and parietooccipital resection), lower back pain, and COPD, presents with COPD exacerbation from RSV infection 58F w/ PMHx lung adenocarcinoma w/ brain met (s/p lobe resection and parietooccipital resection), lower back pain, and COPD, presents with COPD exacerbation from RSV infection.    As for patient's R rib pain below R breast, radiology read of CT PE stated no acute fracture.

## 2019-01-28 NOTE — PROGRESS NOTE ADULT - PROBLEM SELECTOR PROBLEM 4
Obesity with serious comorbidity, unspecified classification, unspecified obesity type Adenocarcinoma of right lung

## 2019-01-28 NOTE — PROGRESS NOTE ADULT - PROBLEM SELECTOR PLAN 6
On oxycodone in past, recently on toradol  - Med recs from Dr. De Dios in the AM  - Toradol 15 mg IV x1 tonight, will re-eval tomorrow On chronic opioids, currently be weaned off by PCP (was on a higher dose in the past 180+ oxycodone). S/p 1 dose of IV toradol 15 mg.  - C/w oxycodone 30 mg PO q24hrn HbA1c 6.2  - Counseled on diet and exercise  - Consistent carb diet

## 2019-01-28 NOTE — PROGRESS NOTE ADULT - SUBJECTIVE AND OBJECTIVE BOX
INTERVAL HPI/OVERNIGHT EVENTS:  Patient was seen and examined at bedside. As per nurse and patient, no o/n events, patient resting comfortably. No complaints at this time. Patient denies: fever, chills, dizziness, weakness, HA, Changes in vision, CP, palpitations, SOB, cough, N/V/D/C, dysuria, changes in bowel movements, LE edema. ROS otherwise negative.    VITAL SIGNS:  T(F): 97.7 (01-28-19 @ 05:49)  HR: 92 (01-28-19 @ 05:49)  BP: 111/74 (01-28-19 @ 05:49)  RR: 18 (01-28-19 @ 05:49)  SpO2: 98% (01-28-19 @ 05:49)  Wt(kg): --    PHYSICAL EXAM:    Constitutional: WDWN, NAD  HEENT: PERRL, EOMI, sclera non-icteric, neck supple, trachea midline, no masses, no JVD, MMM, good dentition  Respiratory: CTA b/l, good air entry b/l, no wheezing, no rhonchi, no rales, without accessory muscle use and no intercostal retractions  Cardiovascular: RRR, normal S1S2, no M/R/G  Gastrointestinal: soft, NTND, no masses palpable, BS normal  Extremities: Warm, well perfused, pulses equal bilateral upper and lower extremities, no edema, no clubbing  Neurological: AAOx3, CN Grossly intact  Skin: Normal temperature, warm, dry    MEDICATIONS  (STANDING):  ALBUTerol/ipratropium for Nebulization 3 milliLiter(s) Nebulizer every 4 hours  azithromycin   Tablet 250 milliGRAM(s) Oral daily  dextrose 5%. 1000 milliLiter(s) (50 mL/Hr) IV Continuous <Continuous>  dextrose 50% Injectable 12.5 Gram(s) IV Push once  dextrose 50% Injectable 25 Gram(s) IV Push once  dextrose 50% Injectable 25 Gram(s) IV Push once  heparin  Injectable 7500 Unit(s) SubCutaneous every 8 hours  insulin lispro (HumaLOG) corrective regimen sliding scale   SubCutaneous Before meals and at bedtime  pantoprazole    Tablet 40 milliGRAM(s) Oral before breakfast  predniSONE   Tablet 40 milliGRAM(s) Oral every 24 hours    MEDICATIONS  (PRN):  dextrose 40% Gel 15 Gram(s) Oral once PRN Blood Glucose LESS THAN 70 milliGRAM(s)/deciliter  glucagon  Injectable 1 milliGRAM(s) IntraMuscular once PRN Glucose LESS THAN 70 milligrams/deciliter  guaiFENesin   Syrup  (Sugar-Free) 100 milliGRAM(s) Oral every 6 hours PRN Cough      Allergies    penicillin (Angioedema)    Intolerances        LABS:                        12.8   8.5   )-----------( 258      ( 28 Jan 2019 06:09 )             39.3     01-28    140  |  104  |  15  ----------------------------<  186<H>  4.4   |  22  |  0.78    Ca    9.3      28 Jan 2019 06:09  Mg     2.2     01-28    TPro  8.3  /  Alb  4.5  /  TBili  0.3  /  DBili  x   /  AST  16  /  ALT  13  /  AlkPhos  97  01-27    PT/INR - ( 27 Jan 2019 11:03 )   PT: 12.1 sec;   INR: 1.07          PTT - ( 27 Jan 2019 11:03 )  PTT:33.7 sec      RADIOLOGY & ADDITIONAL TESTS:  Reviewed INTERVAL HPI/OVERNIGHT EVENTS:  Patient was seen and examined at bedside. Patient feels better this AM, switched patient from HFNC to NC at 6LPM. Complaining of pain below R breast, unchanged from yesterday    VITAL SIGNS:  T(F): 97.7 (01-28-19 @ 05:49)  HR: 92 (01-28-19 @ 05:49)  BP: 111/74 (01-28-19 @ 05:49)  RR: 18 (01-28-19 @ 05:49)  SpO2: 98% (01-28-19 @ 05:49)  Wt(kg): --    PHYSICAL EXAM:  Constitutional: Obese female, resting comfortably in bed  HEENT: PERRL, EOMI, MMM  Respiratory: Diffuse expiratory wheezes (had just finished duoneb tx), no rales/rhonchi  Cardiovascular: RRR, normal S1S2, no M/R/G  Gastrointestinal: soft, NTND, no masses palpable, BS normal   Extremities: Warm, well perfused, pulses equal bilateral upper and lower extremities, no edema, no clubbing  Neurological: AAOx3, CN Grossly intact  Skin: Normal temperature, warm, dry    MEDICATIONS  (STANDING):  ALBUTerol/ipratropium for Nebulization 3 milliLiter(s) Nebulizer every 4 hours  azithromycin   Tablet 250 milliGRAM(s) Oral daily  dextrose 5%. 1000 milliLiter(s) (50 mL/Hr) IV Continuous <Continuous>  dextrose 50% Injectable 12.5 Gram(s) IV Push once  dextrose 50% Injectable 25 Gram(s) IV Push once  dextrose 50% Injectable 25 Gram(s) IV Push once  heparin  Injectable 7500 Unit(s) SubCutaneous every 8 hours  insulin lispro (HumaLOG) corrective regimen sliding scale   SubCutaneous Before meals and at bedtime  pantoprazole    Tablet 40 milliGRAM(s) Oral before breakfast  predniSONE   Tablet 40 milliGRAM(s) Oral every 24 hours    MEDICATIONS  (PRN):  dextrose 40% Gel 15 Gram(s) Oral once PRN Blood Glucose LESS THAN 70 milliGRAM(s)/deciliter  glucagon  Injectable 1 milliGRAM(s) IntraMuscular once PRN Glucose LESS THAN 70 milligrams/deciliter  guaiFENesin   Syrup  (Sugar-Free) 100 milliGRAM(s) Oral every 6 hours PRN Cough      Allergies    penicillin (Angioedema)    Intolerances        LABS:                        12.8   8.5   )-----------( 258      ( 28 Jan 2019 06:09 )             39.3     01-28    140  |  104  |  15  ----------------------------<  186<H>  4.4   |  22  |  0.78    Ca    9.3      28 Jan 2019 06:09  Mg     2.2     01-28    TPro  8.3  /  Alb  4.5  /  TBili  0.3  /  DBili  x   /  AST  16  /  ALT  13  /  AlkPhos  97  01-27    PT/INR - ( 27 Jan 2019 11:03 )   PT: 12.1 sec;   INR: 1.07          PTT - ( 27 Jan 2019 11:03 )  PTT:33.7 sec      RADIOLOGY & ADDITIONAL TESTS: < from: CT Angio Chest PE Protocol w/ IV Cont (01.27.19 @ 17:09) >  IMPRESSION:    Pulmonary arterial hypertension. No tavo central or segmental pulmonary   embolism.    Large ill-defined nodule of the right lobe of thyroid gland for which   sonographic correlation is advised. INTERVAL HPI/OVERNIGHT EVENTS:  Patient was seen and examined at bedside. Patient feels better this AM, switched patient from HFNC to NC at 6LPM. Complaining of pain below R breast, unchanged from yesterday. Remains sharp 7/10 intensity, radiating down R flank to back.    VITAL SIGNS:  T(F): 97.7 (01-28-19 @ 05:49)  HR: 92 (01-28-19 @ 05:49)  BP: 111/74 (01-28-19 @ 05:49)  RR: 18 (01-28-19 @ 05:49)  SpO2: 98% (01-28-19 @ 05:49)  Wt(kg): --    PHYSICAL EXAM:  Constitutional: Obese female, resting comfortably in bed  HEENT: PERRL, EOMI, MMM  Respiratory: Diffuse expiratory wheezes (had just finished duoneb tx), no rales/rhonchi  Cardiovascular: RRR, normal S1S2, no M/R/G  Gastrointestinal: soft, NTND, no masses palpable, BS normal   Extremities: Warm, well perfused, pulses equal bilateral upper and lower extremities, no edema, no clubbing  Neurological: AAOx3, CN Grossly intact  Skin: Normal temperature, warm, dry    MEDICATIONS  (STANDING):  ALBUTerol/ipratropium for Nebulization 3 milliLiter(s) Nebulizer every 4 hours  azithromycin   Tablet 250 milliGRAM(s) Oral daily  dextrose 5%. 1000 milliLiter(s) (50 mL/Hr) IV Continuous <Continuous>  dextrose 50% Injectable 12.5 Gram(s) IV Push once  dextrose 50% Injectable 25 Gram(s) IV Push once  dextrose 50% Injectable 25 Gram(s) IV Push once  heparin  Injectable 7500 Unit(s) SubCutaneous every 8 hours  insulin lispro (HumaLOG) corrective regimen sliding scale   SubCutaneous Before meals and at bedtime  pantoprazole    Tablet 40 milliGRAM(s) Oral before breakfast  predniSONE   Tablet 40 milliGRAM(s) Oral every 24 hours    MEDICATIONS  (PRN):  dextrose 40% Gel 15 Gram(s) Oral once PRN Blood Glucose LESS THAN 70 milliGRAM(s)/deciliter  glucagon  Injectable 1 milliGRAM(s) IntraMuscular once PRN Glucose LESS THAN 70 milligrams/deciliter  guaiFENesin   Syrup  (Sugar-Free) 100 milliGRAM(s) Oral every 6 hours PRN Cough      Allergies    penicillin (Angioedema)    Intolerances        LABS:                        12.8   8.5   )-----------( 258      ( 28 Jan 2019 06:09 )             39.3     01-28    140  |  104  |  15  ----------------------------<  186<H>  4.4   |  22  |  0.78    Ca    9.3      28 Jan 2019 06:09  Mg     2.2     01-28    TPro  8.3  /  Alb  4.5  /  TBili  0.3  /  DBili  x   /  AST  16  /  ALT  13  /  AlkPhos  97  01-27    PT/INR - ( 27 Jan 2019 11:03 )   PT: 12.1 sec;   INR: 1.07          PTT - ( 27 Jan 2019 11:03 )  PTT:33.7 sec      RADIOLOGY & ADDITIONAL TESTS: < from: CT Angio Chest PE Protocol w/ IV Cont (01.27.19 @ 17:09) >  IMPRESSION:    Pulmonary arterial hypertension. No tavo central or segmental pulmonary   embolism.    Large ill-defined nodule of the right lobe of thyroid gland for which   sonographic correlation is advised.

## 2019-01-29 ENCOUNTER — TRANSCRIPTION ENCOUNTER (OUTPATIENT)
Age: 59
End: 2019-01-29

## 2019-01-29 ENCOUNTER — INBOUND DOCUMENT (OUTPATIENT)
Age: 59
End: 2019-01-29

## 2019-01-29 VITALS
SYSTOLIC BLOOD PRESSURE: 115 MMHG | DIASTOLIC BLOOD PRESSURE: 83 MMHG | OXYGEN SATURATION: 95 % | HEART RATE: 85 BPM | RESPIRATION RATE: 16 BRPM | TEMPERATURE: 98 F

## 2019-01-29 DIAGNOSIS — B34.9 VIRAL INFECTION, UNSPECIFIED: ICD-10-CM

## 2019-01-29 LAB
ANION GAP SERPL CALC-SCNC: 12 MMOL/L — SIGNIFICANT CHANGE UP (ref 5–17)
BUN SERPL-MCNC: 20 MG/DL — SIGNIFICANT CHANGE UP (ref 7–23)
CALCIUM SERPL-MCNC: 9.1 MG/DL — SIGNIFICANT CHANGE UP (ref 8.4–10.5)
CHLORIDE SERPL-SCNC: 106 MMOL/L — SIGNIFICANT CHANGE UP (ref 96–108)
CO2 SERPL-SCNC: 25 MMOL/L — SIGNIFICANT CHANGE UP (ref 22–31)
CREAT SERPL-MCNC: 0.95 MG/DL — SIGNIFICANT CHANGE UP (ref 0.5–1.3)
GLUCOSE BLDC GLUCOMTR-MCNC: 103 MG/DL — HIGH (ref 70–99)
GLUCOSE SERPL-MCNC: 101 MG/DL — HIGH (ref 70–99)
HCT VFR BLD CALC: 39.1 % — SIGNIFICANT CHANGE UP (ref 34.5–45)
HGB BLD-MCNC: 12.4 G/DL — SIGNIFICANT CHANGE UP (ref 11.5–15.5)
MAGNESIUM SERPL-MCNC: 2.2 MG/DL — SIGNIFICANT CHANGE UP (ref 1.6–2.6)
MCHC RBC-ENTMCNC: 27.7 PG — SIGNIFICANT CHANGE UP (ref 27–34)
MCHC RBC-ENTMCNC: 31.7 G/DL — LOW (ref 32–36)
MCV RBC AUTO: 87.5 FL — SIGNIFICANT CHANGE UP (ref 80–100)
PLATELET # BLD AUTO: 270 K/UL — SIGNIFICANT CHANGE UP (ref 150–400)
POTASSIUM SERPL-MCNC: 4.3 MMOL/L — SIGNIFICANT CHANGE UP (ref 3.5–5.3)
POTASSIUM SERPL-SCNC: 4.3 MMOL/L — SIGNIFICANT CHANGE UP (ref 3.5–5.3)
RBC # BLD: 4.47 M/UL — SIGNIFICANT CHANGE UP (ref 3.8–5.2)
RBC # FLD: 14.9 % — SIGNIFICANT CHANGE UP (ref 10.3–16.9)
SODIUM SERPL-SCNC: 143 MMOL/L — SIGNIFICANT CHANGE UP (ref 135–145)
WBC # BLD: 10 K/UL — SIGNIFICANT CHANGE UP (ref 3.8–10.5)
WBC # FLD AUTO: 10 K/UL — SIGNIFICANT CHANGE UP (ref 3.8–10.5)

## 2019-01-29 PROCEDURE — 99239 HOSP IP/OBS DSCHRG MGMT >30: CPT

## 2019-01-29 RX ORDER — TIOTROPIUM BROMIDE 18 UG/1
1 CAPSULE ORAL; RESPIRATORY (INHALATION)
Qty: 30 | Refills: 0
Start: 2019-01-29 | End: 2019-02-27

## 2019-01-29 RX ORDER — ALBUTEROL 90 UG/1
2 AEROSOL, METERED ORAL
Qty: 1 | Refills: 0
Start: 2019-01-29 | End: 2019-02-27

## 2019-01-29 RX ORDER — TIOTROPIUM BROMIDE 18 UG/1
1 CAPSULE ORAL; RESPIRATORY (INHALATION)
Qty: 0 | Refills: 0 | DISCHARGE
Start: 2019-01-29 | End: 2019-02-27

## 2019-01-29 RX ORDER — OXYCODONE HYDROCHLORIDE 5 MG/1
1 TABLET ORAL
Qty: 0 | Refills: 0 | DISCHARGE
Start: 2019-01-29

## 2019-01-29 RX ORDER — FLUTICASONE PROPIONATE AND SALMETEROL 50; 250 UG/1; UG/1
1 POWDER ORAL; RESPIRATORY (INHALATION)
Qty: 1 | Refills: 0
Start: 2019-01-29 | End: 2019-02-27

## 2019-01-29 RX ORDER — TIOTROPIUM BROMIDE 18 UG/1
1 CAPSULE ORAL; RESPIRATORY (INHALATION)
Qty: 0 | Refills: 0 | COMMUNITY

## 2019-01-29 RX ADMIN — AZITHROMYCIN 250 MILLIGRAM(S): 500 TABLET, FILM COATED ORAL at 11:48

## 2019-01-29 RX ADMIN — Medication 3 MILLILITER(S): at 08:59

## 2019-01-29 RX ADMIN — OXYCODONE HYDROCHLORIDE 30 MILLIGRAM(S): 5 TABLET ORAL at 09:00

## 2019-01-29 RX ADMIN — PANTOPRAZOLE SODIUM 40 MILLIGRAM(S): 20 TABLET, DELAYED RELEASE ORAL at 06:32

## 2019-01-29 RX ADMIN — Medication 40 MILLIGRAM(S): at 06:32

## 2019-01-29 RX ADMIN — BUDESONIDE AND FORMOTEROL FUMARATE DIHYDRATE 2 PUFF(S): 160; 4.5 AEROSOL RESPIRATORY (INHALATION) at 06:32

## 2019-01-29 RX ADMIN — Medication 3 MILLILITER(S): at 06:32

## 2019-01-29 RX ADMIN — OXYCODONE HYDROCHLORIDE 30 MILLIGRAM(S): 5 TABLET ORAL at 10:00

## 2019-01-29 NOTE — DISCHARGE NOTE ADULT - INSTRUCTIONS
Please continue a healthy and balanced diet that is low in sodium (salt) and cholesterol. Please keep your intake of carbohydrates (breads, pasta, rice) consistent. We recommend healthy or generous servings of fruits/vegetables (high-fiber containing foods).

## 2019-01-29 NOTE — DISCHARGE NOTE ADULT - ADDITIONAL INSTRUCTIONS
Please follow-up with Dr. Wallace on 2/15 at 1:20 PM. Please follow-up with Dr. Nichols, Dr. Reveles, on your pre-scheduled appointments, and follow-up with Dr. De Dios at your convenience.

## 2019-01-29 NOTE — DISCHARGE NOTE ADULT - NS AS ACTIVITY OBS
Walking-Indoors allowed/Bathing allowed/Showering allowed/Walking-Outdoors allowed/Please continue to advance your activity as tolerated, keeping yourself as active as possible, and continue with any strengthening exercises that you may tolerate.

## 2019-01-29 NOTE — DISCHARGE NOTE ADULT - MEDICATION SUMMARY - MEDICATIONS TO STOP TAKING
I will STOP taking the medications listed below when I get home from the hospital:    predniSONE 50 mg oral tablet  -- 1 tab(s) by mouth once a day   -- It is very important that you take or use this exactly as directed.  Do not skip doses or discontinue unless directed by your doctor.  Obtain medical advice before taking any non-prescription drugs as some may affect the action of this medication.  Take with food or milk.    predniSONE 20 mg oral tablet  -- 4 tab(s) by mouth once a day   -- It is very important that you take or use this exactly as directed.  Do not skip doses or discontinue unless directed by your doctor.  Obtain medical advice before taking any non-prescription drugs as some may affect the action of this medication.  Take with food or milk.    predniSONE 10 mg oral tablet  -- 2 tab(s) by mouth once a day   -- It is very important that you take or use this exactly as directed.  Do not skip doses or discontinue unless directed by your doctor.  Obtain medical advice before taking any non-prescription drugs as some may affect the action of this medication.  Take with food or milk.

## 2019-01-29 NOTE — DISCHARGE NOTE ADULT - PLAN OF CARE
Limit disease progression and symptom development You were found to be short of breath, wheezing, coughing, and ultimately required advancement in oxygen delivery therapy from nasal cannula to high flow nasal cannula. You received steroids intravenously in the emergency room, and orally when you arrived upstairs at the regional medical floors. You also received nebulization treatments to help with your breathing, which has significantly improved since your arrival. Please continue to take your home medications of Spiriva, Advair, and Albuterol nebulizations as prescribed. Please follow-up with Dr. Vignesh Wallace on February 15, 2019 at 1:20 PM. You were noted to have a history of lung cancer. Please follow-up with Dr. Reveles at [facility] at your pre-scheduled appointment. Monitor diet and encourage active lifestyle Your hemoglobin A1c, which measures your blood sugars for the last 90 days, was found to be elevated at 6.2. This places you in the pre-diabetic range. Please moderate and keep your intake of carbohydrates. This includes juices, added sweeteners, and soda. Please try to increase your physical activity as tolerated (20-30 minutes every 2 days). Supportive care for viral infection You were noted to be positive for respiratory syncytial virus, which may have contributed to your COPD exacerbation. The treatment is supportive. Please increase your oral intake of fluids, continue your COPD medications, and avoid sick contacts. You had imaging studies, a computed tomographic imaging ("CAT Scan") of the blood vessels in your lungs, as well as an echocardiogram (ultrasound of the heart) during your stay. The cat scan revealed no clots in your lungs, but it noted some increased blood pressures in the blood vessels of your lung, called pulmonary hypertension, as well as a thyroid nodule, which you are to follow-up with your endocrinologist, Dr. Nichols at your pre-scheduled February appointment. The echocardiogram revealed that your heart on the left was ishaan normally (but with impaired relaxation), with an ejection fraction of 60-65% (which is normal), and mild pulmonary hypertension. Please follow-up with your primary care physician, Dr. De Dios. You can call his office and make an appointment at your convenience.

## 2019-01-29 NOTE — DISCHARGE NOTE ADULT - CARE PROVIDERS DIRECT ADDRESSES
,[email or web address],DirectAddress_Unknown,DirectAddress_Unknown,DirectAddress_Unknown

## 2019-01-29 NOTE — DISCHARGE NOTE ADULT - SECONDARY DIAGNOSIS.
Adenocarcinoma of lung Prediabetes RSV (respiratory syncytial virus infection) Pulmonary hypertension

## 2019-01-29 NOTE — DISCHARGE NOTE ADULT - CARE PLAN
Principal Discharge DX:	COPD exacerbation  Goal:	Limit disease progression and symptom development  Assessment and plan of treatment:	You were found to be short of breath, wheezing, coughing, and ultimately required advancement in oxygen delivery therapy from nasal cannula to high flow nasal cannula. You received steroids intravenously in the emergency room, and orally when you arrived upstairs at the regional medical floors. You also received nebulization treatments to help with your breathing, which has significantly improved since your arrival. Please continue to take your home medications of Spiriva, Advair, and Albuterol nebulizations as prescribed. Please follow-up with Dr. Vignesh Wallace on February 15, 2019 at 1:20 PM.  Secondary Diagnosis:	Adenocarcinoma of lung  Goal:	Limit disease progression and symptom development  Assessment and plan of treatment:	You were noted to have a history of lung cancer. Please follow-up with Dr. Reveles at [facility] at your pre-scheduled appointment.  Secondary Diagnosis:	Prediabetes  Goal:	Monitor diet and encourage active lifestyle  Assessment and plan of treatment:	Your hemoglobin A1c, which measures your blood sugars for the last 90 days, was found to be elevated at 6.2. This places you in the pre-diabetic range. Please moderate and keep your intake of carbohydrates. This includes juices, added sweeteners, and soda. Please try to increase your physical activity as tolerated (20-30 minutes every 2 days).  Secondary Diagnosis:	RSV (respiratory syncytial virus infection)  Goal:	Supportive care for viral infection  Assessment and plan of treatment:	You were noted to be positive for respiratory syncytial virus, which may have contributed to your COPD exacerbation. The treatment is supportive. Please increase your oral intake of fluids, continue your COPD medications, and avoid sick contacts.  Secondary Diagnosis:	Pulmonary hypertension  Goal:	Limit disease progression and symptom development  Assessment and plan of treatment:	You had imaging studies, a computed tomographic imaging ("CAT Scan") of the blood vessels in your lungs, as well as an echocardiogram (ultrasound of the heart) during your stay. The cat scan revealed no clots in your lungs, but it noted some increased blood pressures in the blood vessels of your lung, called pulmonary hypertension, as well as a thyroid nodule, which you are to follow-up with your endocrinologist, Dr. Nichols at your pre-scheduled February appointment. The echocardiogram revealed that your heart on the left was ishaan normally (but with impaired relaxation), with an ejection fraction of 60-65% (which is normal), and mild pulmonary hypertension. Please follow-up with your primary care physician, Dr. De Dios. You can call his office and make an appointment at your convenience.

## 2019-01-29 NOTE — PROGRESS NOTE ADULT - SUBJECTIVE AND OBJECTIVE BOX
INTERVAL HPI/OVERNIGHT EVENTS:    ANTIBIOTICS    MEDICATIONS  (STANDING):  ALBUTerol/ipratropium for Nebulization 3 milliLiter(s) Nebulizer every 4 hours  azithromycin   Tablet 250 milliGRAM(s) Oral daily  buDESOnide  80 MICROgram(s)/formoterol 4.5 MICROgram(s) Inhaler 2 Puff(s) Inhalation two times a day  heparin  Injectable 7500 Unit(s) SubCutaneous every 8 hours  lidocaine   Patch 1 Patch Transdermal daily  pantoprazole    Tablet 40 milliGRAM(s) Oral before breakfast    MEDICATIONS  (PRN):  acetaminophen   Tablet .. 650 milliGRAM(s) Oral every 6 hours PRN Moderate Pain (4 - 6)  guaiFENesin   Syrup  (Sugar-Free) 100 milliGRAM(s) Oral every 6 hours PRN Cough  oxyCODONE    IR 30 milliGRAM(s) Oral daily PRN Severe Pain (7 - 10)      Allergies    penicillin (Angioedema)    Intolerances        REVIEW OF SYSTEMS:    Constitutional: No fever, weight loss or fatigue  Eyes: No eye pain, visual disturbances, or discharge  ENMT:  No difficulty hearing, tinnitus, vertigo; No sinus or throat pain  Neck: No pain or stiffness  Respiratory: cough resolved  Cardiovascular: No chest pain, palpitations, shortness of breath, dizziness or leg swelling  Gastrointestinal: No abdominal or epigastric pain. No nausea, vomiting or hematemesis; No diarrhea or constipation. No melena or hematochezia.  Genitourinary: No dysuria, frequency, hematuria or incontinence  Rectal: No pain, hemorrhoids or incontinence  Neurological: No headaches, memory loss, loss of strength, numbness or tremors  Skin: No itching, burning, rashes or lesions   Lymph Nodes: No enlarged glands  Endocrine: No heat or cold intolerance; No hair loss  Musculoskeletal: No joint pain or swelling; No muscle, back or extremity pain  Heme/Lymph: No easy bruising or bleeding gums  Allergy and Immunologic: No hives or eczema    Vital Signs Last 24 Hrs  T(C): 36.9 (29 Jan 2019 08:58), Max: 36.9 (29 Jan 2019 08:58)  T(F): 98.4 (29 Jan 2019 08:58), Max: 98.4 (29 Jan 2019 08:58)  HR: 85 (29 Jan 2019 08:58) (80 - 92)  BP: 115/83 (29 Jan 2019 08:58) (103/68 - 124/77)  BP(mean): --  RR: 16 (29 Jan 2019 08:58) (15 - 18)  SpO2: 95% (29 Jan 2019 08:58) (95% - 98%)    PHYSICAL EXAM:    General: Well developed; well nourished; in no acute distress  Eyes: PERRL, EOM intact; conjunctiva and sclera clear  Head: Normocephalic; atraumatic  ENMT: No nasal discharge; airway clear  Neck: Supple; non tender; no masses  Respiratory: No wheezes, rales or rhonchi  Cardiovascular: Regular rate and rhythm. S1 and S2 Normal; No murmurs, gallops or rubs  Gastrointestinal: Soft non-tender non-distended; Normal bowel sounds; No hepatosplenomegaly  Genitourinary: No costovertebral angle tenderness  Extremities: Normal range of motion, No clubbing, cyanosis or edema  Vascular: Peripheral pulses palpable 2+ bilaterally  Neurological: Alert and oriented x3  Skin: Warm and dry. No acute rash  Lymph Nodes: No acute cervical adenopathy  Musculoskeletal: Normal gait, tone, without deformities    LABS:                        12.4   10.0  )-----------( 270      ( 29 Jan 2019 06:53 )             39.1     01-29    143  |  106  |  20  ----------------------------<  101<H>  4.3   |  25  |  0.95    Ca    9.1      29 Jan 2019 06:52  Mg     2.2     01-29    TPro  8.3  /  Alb  4.5  /  TBili  0.3  /  DBili  x   /  AST  16  /  ALT  13  /  AlkPhos  97  01-27    PT/INR - ( 27 Jan 2019 11:03 )   PT: 12.1 sec;   INR: 1.07          PTT - ( 27 Jan 2019 11:03 )  PTT:33.7 sec        MICROBIOLOGY:  Culture Results:   No growth at 1 day. (01-27 @ 12:51)  Culture Results:   No growth at 1 day. (01-27 @ 12:51)      RADIOLOGY & ADDITIONAL STUDIES:

## 2019-01-29 NOTE — DISCHARGE NOTE ADULT - MEDICATION SUMMARY - MEDICATIONS TO TAKE
I will START or STAY ON the medications listed below when I get home from the hospital:    Nebulizer machine  -- Please provide the patient with a nebulizer machine for inhalational treatments at home  -- Indication: For COPD    predniSONE 20 mg oral tablet  -- 2 tab(s) by mouth once a day   -- It is very important that you take or use this exactly as directed.  Do not skip doses or discontinue unless directed by your doctor.  Obtain medical advice before taking any non-prescription drugs as some may affect the action of this medication.  Take with food or milk.    -- Indication: For COPD    oxyCODONE 30 mg oral tablet  -- 1 tab(s) by mouth once a day, As needed, Severe Pain (7 - 10)  -- Indication: For Chronic back pain    Advair Diskus 500 mcg-50 mcg inhalation powder  -- 1 puff(s) inhaled 2 times a day  -- Indication: For COPD    albuterol 90 mcg/inh inhalation aerosol  -- 2 puff(s) inhaled 4 times a day  -- Indication: For COPD    Spiriva 18 mcg inhalation capsule  -- 1 cap(s) inhaled once a day  -- Indication: For COPD

## 2019-01-29 NOTE — DISCHARGE NOTE ADULT - HOSPITAL COURSE
58F history of lung adenocarcinoma (RLL en bloc middle lobe resection 10/2016; w/ brain met s/p craniotomy w/ L parietooccipital mass resection 10/2017, GKRS at [facility] 1/2018), former smoker (10 pack years, quit 3 years ago), chronic lower back pain (on oxycodone 30 mg qdailyPRN), COPD (followed by Dr. Wallace, never intubated, no home O2), presents with 4 days of SOB, "chest tightness", associated w/ productive cough (greyish to yellowish sputum), nasal congestion w/ rhinorrhea and post nasal drip, sore throat. Was on azithro and prednisone prior to admission (3 days). In the ED patient required high flow nasal cannula in setting of hypoxia and increased work of breathing, received duonebs q4h and 185 mg IV solumedrol (total). CT PE positive for pHTN, no PE. ICU consulted, patient stable for management of COPD exacerbation on RMF. Patient de-escalated from HFNC, to nasal cannula, to room air. Patient stable for discharge, continuing home COPD medications, 2 days of prednisone 40 mg daily, and with follow-up with Dr. Wallace on 2/15/2019 (with additional follow-up with endocrinology of known thyroid nodule, med onc for lung adeno, and PCP). 58F history of lung adenocarcinoma (RLL en bloc middle lobe resection 10/2016; w/ brain met s/p craniotomy w/ L parietooccipital mass resection 10/2017, [place] [facility] [place] former smoker (10 pack years, quit 3 years ago), chronic lower back pain (on oxycodone 30 mg qdailyPRN), COPD (followed by Dr. Wallace, never intubated, no home O2), presents with 4 days of SOB, "chest tightness", associated w/ productive cough (greyish to yellowish sputum), nasal congestion w/ rhinorrhea and post nasal drip, sore throat. Was on azithro and prednisone prior to admission (3 days). In the ED patient required high flow nasal cannula in setting of hypoxia and increased work of breathing, received duonebs q4h and 185 mg IV solumedrol (total). CT PE positive for pHTN, no PE. ICU consulted, patient stable for management of COPD exacerbation on RMF. Patient de-escalated from HFNC, to nasal cannula, to room air. Received prednisone 40 mg daily, duonebs q4, and azithro 250 daily on RMF. TTE for pHTN finding revealed: LVEF 60-65, LV wall motion normal. Mitral inflow pattern ~impaired LV relaxation, mildly elevated LA pressure. Trace TR. Mild pHTN.    Patient stable for discharge, continuing home COPD medications, 2 days of prednisone 40 mg daily, and with follow-up with Dr. Wallace on 2/15/2019 (with additional follow-up with endocrinology of known thyroid nodule, med onc for lung adeno, and PCP).

## 2019-01-29 NOTE — DISCHARGE NOTE ADULT - PATIENT PORTAL LINK FT
You can access the [facility] Patient Portal, offered by [facility], by registering with the following website: [email or web address]

## 2019-01-29 NOTE — DISCHARGE NOTE ADULT - CARE PROVIDER_API CALL
Norris Nichols), EndocrinologyMetabDiabetes  [address]  [address]  [place] [address]  Phone: [phone]  Fax: [phone]    Vignesh Wallace), Critical Care Medicine; Pulmonary Disease  [address]  [place] [address]  Phone: [phone]  Fax: [phone]    Maritza De Dios  [address] [place] [address]  Phone: [phone]  Fax: (   )    -    Da Reveles  [address] [place] [address]  Phone: [phone]  Fax: (   )    -

## 2019-01-29 NOTE — PROGRESS NOTE ADULT - SUBJECTIVE AND OBJECTIVE BOX
CC/ HPI Patient is a 58 year old female, ex-smoker with chronic obstructive pulmonary disease, status post right lower lobectomy, 2016, craniotomy with left parietooccipital mass resection, 2017, for lung cancer with bran metastasis, admitted with shortness of breath, cough, and wheezing, despite outpatient therapy with prednisone, Respiratory Syncitial Virus tracheobronchitis, this morning ambulating without respiratory complaint.    PAST MEDICAL & SURGICAL HISTORY:  Brain metastasis: brain tumor s/p resection  MRSA (methicillin resistant Staphylococcus aureus): history of  Adenocarcinoma of lung  COPD (chronic obstructive pulmonary disease)  History of lung surgery: right wedge resection  H/O brain surgery  Surgery, elective: lung biopsy  History of appendectomy    SOCHX:  + tobacco,  -  alcohol    FMHX: FA/MO  - contributory     ROS reviewed below with positive findings marked (+) :  GEN:  fever, chills ENT: tracheostomy,   epistaxis,  sinusitis COR: CAD, CHF,  HTN, dysrhythmia PUL: +COPD, ILD, asthma, +pneumonia GI: PEG, dysphagia, hemorrhage, other DOUGLAS: kidney disease, electrolyte disorder HEM:  anemia, thrombus, coagulopathy, +cancer ENDO:  thyroid disease, diabetes mellitus CNS:  dementia, stroke, seizure, PSY:  depression, anxiety, other    MEDICATIONS  (STANDING):  ALBUTerol/ipratropium for Nebulization 3 milliLiter(s) Nebulizer every 4 hours  buDESOnide  80 MICROgram(s)/formoterol 4.5 MICROgram(s) Inhaler 2 Puff(s) Inhalation two times a day  heparin  Injectable 7500 Unit(s) SubCutaneous every 8 hours  lidocaine   Patch 1 Patch Transdermal daily  pantoprazole    Tablet 40 milliGRAM(s) Oral before breakfast    MEDICATIONS  (PRN):  acetaminophen   Tablet .. 650 milliGRAM(s) Oral every 6 hours PRN Moderate Pain (4 - 6)  guaiFENesin   Syrup  (Sugar-Free) 100 milliGRAM(s) Oral every 6 hours PRN Cough  oxyCODONE    IR 30 milliGRAM(s) Oral daily PRN Severe Pain (7 - 10)      Vital Signs Last 24 Hrs  T(C): 36.9 (29 Jan 2019 08:58), Max: 36.9 (29 Jan 2019 08:58)  T(F): 98.4 (29 Jan 2019 08:58), Max: 98.4 (29 Jan 2019 08:58)  HR: 85 (29 Jan 2019 08:58) (80 - 90)  BP: 115/83 (29 Jan 2019 08:58) (103/68 - 115/83)  BP(mean): --  RR: 16 (29 Jan 2019 08:58) (15 - 18)  SpO2: 95% (29 Jan 2019 08:58) (95% - 98%)    GENERAL:         comfortable,  - distress.  HEENT:            - trauma,  - icterus,  - injection,  - nasal discharge.  NECK:              - jugular venous distention, - thyromegaly.  LYMPH:           - lymphadenopathy, - masses.  RESP:               + wheezes,   - rales,   - rhonchi,   - wheezes.   COR:                S1S2   - gallops,  - rubs.  ABD:                bowel sounds,   soft, - tender, - distended.  EXT/MSC:         - cyanosis,  - clubbing,  - edema.    NEURO:             alert,   responds to stimuli.                              12.4   10.0  )-----------( 270      ( 29 Jan 2019 06:53 )             39.1         CT Angio Chest PE Protocol w/ IV Cont (01.27.19 )   Pulmonary arterial hypertension. No tavo central or segmental pulmonary embolism.      X-ray Chest  (01.27.19 ) No acute infiltrates    Rapid Respiratory Viral Panel (01.27.19 @ 11:03)    Rapid RVP Result: Detected:     Resp Syncytial Virus (RapRVP): Detected:         ASSESSMENT/PLAN    1) Respiratory Syncitial Virus Infection  2) Chronic obstructive pulmonary disease  3) Atelectasis      Bronchodilators:  Atrovent/ albuterol q 4 hours as needed  Corticosteroids: prednisone taper  Cardiac/HTN: satisfactory  GI: Rx/ prophylaxis c PPI/H2B  Heme: Rx/VT prophylaxis  Discussed with Dr. Hathaway

## 2019-01-29 NOTE — DISCHARGE NOTE ADULT - PROVIDER TOKENS
TOKEN:'[id number]',TOKEN:'[id number]',FREE:[LAST:[Lanre],FIRST:[Maritza],PHONE:[phone]],FAX:[(   )    -],ADDRESS:[[address] [place] [address]]],FREE:[LAST:[Anushka],FIRST:[Da],PHONE:[phone]],FAX:[(   )    -],ADDRESS:[[address] [place] [id number]]]

## 2019-01-31 DIAGNOSIS — Z98.890 OTHER SPECIFIED POSTPROCEDURAL STATES: ICD-10-CM

## 2019-01-31 DIAGNOSIS — Z87.891 PERSONAL HISTORY OF NICOTINE DEPENDENCE: ICD-10-CM

## 2019-01-31 DIAGNOSIS — E66.9 OBESITY, UNSPECIFIED: ICD-10-CM

## 2019-01-31 DIAGNOSIS — M54.5 LOW BACK PAIN: ICD-10-CM

## 2019-01-31 DIAGNOSIS — R73.03 PREDIABETES: ICD-10-CM

## 2019-01-31 DIAGNOSIS — B97.4 RESPIRATORY SYNCYTIAL VIRUS AS THE CAUSE OF DISEASES CLASSIFIED ELSEWHERE: ICD-10-CM

## 2019-01-31 DIAGNOSIS — J20.8 ACUTE BRONCHITIS DUE TO OTHER SPECIFIED ORGANISMS: ICD-10-CM

## 2019-01-31 DIAGNOSIS — Z86.14 PERSONAL HISTORY OF METHICILLIN RESISTANT STAPHYLOCOCCUS AUREUS INFECTION: ICD-10-CM

## 2019-01-31 DIAGNOSIS — J44.1 CHRONIC OBSTRUCTIVE PULMONARY DISEASE WITH (ACUTE) EXACERBATION: ICD-10-CM

## 2019-01-31 DIAGNOSIS — I27.20 PULMONARY HYPERTENSION, UNSPECIFIED: ICD-10-CM

## 2019-01-31 DIAGNOSIS — J98.11 ATELECTASIS: ICD-10-CM

## 2019-01-31 DIAGNOSIS — E04.1 NONTOXIC SINGLE THYROID NODULE: ICD-10-CM

## 2019-01-31 DIAGNOSIS — Z85.118 PERSONAL HISTORY OF OTHER MALIGNANT NEOPLASM OF BRONCHUS AND LUNG: ICD-10-CM

## 2019-01-31 DIAGNOSIS — Z85.841 PERSONAL HISTORY OF MALIGNANT NEOPLASM OF BRAIN: ICD-10-CM

## 2019-01-31 DIAGNOSIS — Z88.0 ALLERGY STATUS TO PENICILLIN: ICD-10-CM

## 2019-02-01 LAB
CULTURE RESULTS: SIGNIFICANT CHANGE UP
CULTURE RESULTS: SIGNIFICANT CHANGE UP
SPECIMEN SOURCE: SIGNIFICANT CHANGE UP
SPECIMEN SOURCE: SIGNIFICANT CHANGE UP

## 2019-02-21 ENCOUNTER — APPOINTMENT (OUTPATIENT)
Dept: ENDOCRINOLOGY | Facility: CLINIC | Age: 59
End: 2019-02-21
Payer: MEDICARE

## 2019-02-21 ENCOUNTER — EMERGENCY (EMERGENCY)
Facility: HOSPITAL | Age: 59
LOS: 1 days | Discharge: HOME | End: 2019-02-21
Attending: EMERGENCY MEDICINE | Admitting: EMERGENCY MEDICINE
Payer: MEDICARE

## 2019-02-21 VITALS
BODY MASS INDEX: 37.9 KG/M2 | HEART RATE: 104 BPM | WEIGHT: 222 LBS | HEIGHT: 64 IN | DIASTOLIC BLOOD PRESSURE: 81 MMHG | SYSTOLIC BLOOD PRESSURE: 115 MMHG

## 2019-02-21 VITALS
HEART RATE: 90 BPM | OXYGEN SATURATION: 96 % | DIASTOLIC BLOOD PRESSURE: 69 MMHG | TEMPERATURE: 98 F | SYSTOLIC BLOOD PRESSURE: 116 MMHG | RESPIRATION RATE: 17 BRPM

## 2019-02-21 VITALS
SYSTOLIC BLOOD PRESSURE: 104 MMHG | HEART RATE: 90 BPM | OXYGEN SATURATION: 100 % | DIASTOLIC BLOOD PRESSURE: 82 MMHG | RESPIRATION RATE: 18 BRPM | TEMPERATURE: 98 F

## 2019-02-21 DIAGNOSIS — Z79.52 LONG TERM (CURRENT) USE OF SYSTEMIC STEROIDS: ICD-10-CM

## 2019-02-21 DIAGNOSIS — Z41.9 ENCOUNTER FOR PROCEDURE FOR PURPOSES OTHER THAN REMEDYING HEALTH STATE, UNSPECIFIED: Chronic | ICD-10-CM

## 2019-02-21 DIAGNOSIS — Z88.0 ALLERGY STATUS TO PENICILLIN: ICD-10-CM

## 2019-02-21 DIAGNOSIS — R06.02 SHORTNESS OF BREATH: ICD-10-CM

## 2019-02-21 DIAGNOSIS — C71.9 MALIGNANT NEOPLASM OF BRAIN, UNSPECIFIED: ICD-10-CM

## 2019-02-21 DIAGNOSIS — J44.9 CHRONIC OBSTRUCTIVE PULMONARY DISEASE, UNSPECIFIED: ICD-10-CM

## 2019-02-21 DIAGNOSIS — Z98.890 OTHER SPECIFIED POSTPROCEDURAL STATES: Chronic | ICD-10-CM

## 2019-02-21 DIAGNOSIS — Z90.49 ACQUIRED ABSENCE OF OTHER SPECIFIED PARTS OF DIGESTIVE TRACT: Chronic | ICD-10-CM

## 2019-02-21 DIAGNOSIS — Z79.891 LONG TERM (CURRENT) USE OF OPIATE ANALGESIC: ICD-10-CM

## 2019-02-21 DIAGNOSIS — Z87.891 PERSONAL HISTORY OF NICOTINE DEPENDENCE: ICD-10-CM

## 2019-02-21 DIAGNOSIS — I10 ESSENTIAL (PRIMARY) HYPERTENSION: ICD-10-CM

## 2019-02-21 DIAGNOSIS — Z79.899 OTHER LONG TERM (CURRENT) DRUG THERAPY: ICD-10-CM

## 2019-02-21 LAB
FLU A RESULT: DETECTED
FLU A RESULT: DETECTED
FLUAV AG NPH QL: DETECTED
FLUBV AG NPH QL: SIGNIFICANT CHANGE UP
RSV RESULT: SIGNIFICANT CHANGE UP
RSV RNA RESP QL NAA+PROBE: SIGNIFICANT CHANGE UP

## 2019-02-21 PROCEDURE — 87631 RESP VIRUS 3-5 TARGETS: CPT

## 2019-02-21 PROCEDURE — 99215 OFFICE O/P EST HI 40 MIN: CPT

## 2019-02-21 PROCEDURE — 80053 COMPREHEN METABOLIC PANEL: CPT

## 2019-02-21 PROCEDURE — 36415 COLL VENOUS BLD VENIPUNCTURE: CPT

## 2019-02-21 PROCEDURE — 85730 THROMBOPLASTIN TIME PARTIAL: CPT

## 2019-02-21 PROCEDURE — 99284 EMERGENCY DEPT VISIT MOD MDM: CPT

## 2019-02-21 PROCEDURE — 96374 THER/PROPH/DIAG INJ IV PUSH: CPT | Mod: XU

## 2019-02-21 PROCEDURE — 83880 ASSAY OF NATRIURETIC PEPTIDE: CPT

## 2019-02-21 PROCEDURE — 85610 PROTHROMBIN TIME: CPT

## 2019-02-21 PROCEDURE — 71275 CT ANGIOGRAPHY CHEST: CPT

## 2019-02-21 PROCEDURE — 94640 AIRWAY INHALATION TREATMENT: CPT

## 2019-02-21 PROCEDURE — 71275 CT ANGIOGRAPHY CHEST: CPT | Mod: 26

## 2019-02-21 PROCEDURE — 71046 X-RAY EXAM CHEST 2 VIEWS: CPT

## 2019-02-21 PROCEDURE — 82550 ASSAY OF CK (CPK): CPT

## 2019-02-21 PROCEDURE — 84484 ASSAY OF TROPONIN QUANT: CPT

## 2019-02-21 PROCEDURE — 71046 X-RAY EXAM CHEST 2 VIEWS: CPT | Mod: 26

## 2019-02-21 PROCEDURE — 82553 CREATINE MB FRACTION: CPT

## 2019-02-21 PROCEDURE — 85025 COMPLETE CBC W/AUTO DIFF WBC: CPT

## 2019-02-21 PROCEDURE — 99284 EMERGENCY DEPT VISIT MOD MDM: CPT | Mod: 25

## 2019-02-21 RX ORDER — IPRATROPIUM BROMIDE 0.2 MG/ML
500 SOLUTION, NON-ORAL INHALATION ONCE
Qty: 0 | Refills: 0 | Status: COMPLETED | OUTPATIENT
Start: 2019-02-21 | End: 2019-02-21

## 2019-02-21 RX ORDER — ALBUTEROL 90 UG/1
2.5 AEROSOL, METERED ORAL ONCE
Qty: 0 | Refills: 0 | Status: COMPLETED | OUTPATIENT
Start: 2019-02-21 | End: 2019-02-21

## 2019-02-21 RX ORDER — OXYCODONE AND ACETAMINOPHEN 5; 325 MG/1; MG/1
1 TABLET ORAL ONCE
Qty: 0 | Refills: 0 | Status: DISCONTINUED | OUTPATIENT
Start: 2019-02-21 | End: 2019-02-21

## 2019-02-21 RX ADMIN — OXYCODONE AND ACETAMINOPHEN 1 TABLET(S): 5; 325 TABLET ORAL at 22:02

## 2019-02-21 RX ADMIN — Medication 500 MICROGRAM(S): at 21:22

## 2019-02-21 RX ADMIN — ALBUTEROL 2.5 MILLIGRAM(S): 90 AEROSOL, METERED ORAL at 21:22

## 2019-02-21 RX ADMIN — Medication 75 MILLIGRAM(S): at 22:15

## 2019-02-21 RX ADMIN — Medication 125 MILLIGRAM(S): at 21:23

## 2019-02-21 NOTE — ED PROVIDER NOTE - CLINICAL SUMMARY MEDICAL DECISION MAKING FREE TEXT BOX
pulmonary care rendered , labs, radiological imaging and dispo per results and patient progress with shared decision making toward admission pulmonary care rendered , labs, radiological imaging and dispo per results and patient progress with shared decision making toward admission    feeling markedly improved -> discussed tamiflu, return parameters and prednisone care

## 2019-02-21 NOTE — REVIEW OF SYSTEMS
[Shortness Of Breath] : shortness of breath [Negative] : Gastrointestinal [FreeTextEntry2] : tiredness

## 2019-02-21 NOTE — ED ADULT TRIAGE NOTE - CHIEF COMPLAINT QUOTE
pt with hx of lung CA with mets, c/o COPD exacerbation. Hx of lobectomy now with SOB. Received 3 combi-vents by EMS

## 2019-02-21 NOTE — PHYSICAL EXAM
[Alert] : alert [Normal Sclera/Conjunctiva] : normal sclera/conjunctiva [EOMI] : extra ocular movement intact [No Proptosis] : no proptosis [Normal Oropharynx] : the oropharynx was normal [Thyroid Not Enlarged] : the thyroid was not enlarged [No Thyroid Nodules] : there were no palpable thyroid nodules [No Respiratory Distress] : no respiratory distress [No Accessory Muscle Use] : no accessory muscle use [Clear to Auscultation] : lungs were clear to auscultation bilaterally [Normal Rate] : heart rate was normal  [Normal S1, S2] : normal S1 and S2 [Regular Rhythm] : with a regular rhythm [Pedal Pulses Normal] : the pedal pulses are present [No Edema] : there was no peripheral edema [Normal Bowel Sounds] : normal bowel sounds [Post Cervical Nodes] : posterior cervical nodes [Anterior Cervical Nodes] : anterior cervical nodes [Axillary Nodes] : axillary nodes [Normal] : normal and non tender [No Spinal Tenderness] : no spinal tenderness [Spine Straight] : spine straight [No Stigmata of Cushings Syndrome] : no stigmata of cushings syndrome [Normal Gait] : normal gait [Normal Strength/Tone] : muscle strength and tone were normal [No Rash] : no rash [Acanthosis Nigricans] : no acanthosis nigricans [Normal Reflexes] : deep tendon reflexes were 2+ and symmetric [No Tremors] : no tremors [Oriented x3] : oriented to person, place, and time [de-identified] : bilateral thyroid nodules, no tenderness. No LN enlargement

## 2019-02-21 NOTE — ASSESSMENT
[FreeTextEntry1] : History of COPD and lung ca\par seen in endocrinology for thyroid nodules\par Complaint of shortness\par Referred to ER for COPD exacerbation vs pneumonia. For details see H&P\par

## 2019-02-21 NOTE — ED PROVIDER NOTE - ATTENDING CONTRIBUTION TO CARE
pt seen and examined by me, key points of case charlie GONZALES.  59 yo female with hx of copd co feeling bad over past few days, cough, general malaise.  denies fever.  pt examined by me after steroids and nebs.  says she feels much better.  lungs clear.  heart normal.  ct chest no PE or PNA.  rvp swab positive for flu. given rx tamiflu and steroids for home, fu pmd, return if worsens

## 2019-02-21 NOTE — ED PROVIDER NOTE - OBJECTIVE STATEMENT
complex pulmonary Hx today return to [facility] secondary to SOB / coughing x several days -> denies fever but states had cold last week   recent d/c note for this complex pulmonary patient pasted here below from earlier admission this year     pasted note here below last discharge   58F history of lung adenocarcinoma (RLL en bloc middle lobe resection 10/2016; w/ brain met s/p craniotomy w/ L parietooccipital mass resection 10/2017, [facility]), former smoker (10 pack years, quit 3 years ago), chronic lower back pain (on oxycodone 30 mg qdailyPRN), COPD (followed by Dr. Wallace, never intubated, no home O2), presents with 4 days of SOB, "chest tightness", associated w/ productive cough (greyish to yellowish sputum), nasal congestion w/ rhinorrhea and post nasal drip, sore throat. Was on azithro and prednisone prior to admission (3 days). In the ED patient required high flow nasal cannula in setting of hypoxia and increased work of breathing, received duonebs q4h and 185 mg IV solumedrol (total). CT PE positive for pHTN, no PE. ICU consulted, patient stable for management of COPD exacerbation on RMF. Patient de-escalated from HFNC, to nasal cannula, to room air. Received prednisone 40 mg daily, duonebs q4, and azithro 250 daily on RMF. TTE for pHTN finding revealed: LVEF 60-65, LV wall motion normal. Mitral inflow pattern ~impaired LV relaxation, mildly elevated LA pressure. Trace TR. Mild pHTN.

## 2019-02-21 NOTE — ED PROVIDER NOTE - CARE PLAN
Principal Discharge DX:	COPD (chronic obstructive pulmonary disease)  Secondary Diagnosis:	Brain metastasis

## 2019-02-21 NOTE — HISTORY OF PRESENT ILLNESS
[FreeTextEntry1] : Thyroid nodules diagnosed in 2016.\par  Right lower lobectomy for lung ca in 2016\par Significant past medical history for COPD, overweight.\par in October 2017 she underwent brain surgery for lung ca mets, and she received gamma knife treatment in January of 2018\par Chest CAT scan- 5/30/17 : right 1.4 cm thyroid nodule, and left probable 2.3 cm nodule\par April 19, 2017 right thyroid 2.9 cm nodule FNA biopsy at [facility] : Benign, Granton II\par June of 2017, calcium 10.2, TSH 1.2, free T4: 1.2\par \par Feb 21,2019\par She's here today for thyroid followup\par She began respiratory symptoms approximately 3 weeks ago, unproductive cough, wheezing , abducted out pain and chest discomfort with burning sensation worsened in the past 3 days feels extremely fatigued.\par Tachycardic, rhonchus,bilaterally wheezing and pleuritic pain when breath deep\par \par She's been taking Tylenol but chest discomfort and abdominal pain  continues.\par PE. Afebrile, pulse 110\par She is not diaphoretic looks very tired and weak\par Lung Bilaterally wheezing, and few crackles, pleuritic pain upon deep breathing \par Abdomen: Nondistended no tenderness, no rebound\par  Extremities : 1 + pitting edema\par \par Referral to emergency room for COPD exacerbation, rule out pneumonia\par \par \par \par October 4,2018 \par tsh 1.6, t4 free 1.3, a 25 oh vit d 17.5\par

## 2019-02-21 NOTE — ED ADULT NURSE NOTE - OBJECTIVE STATEMENT
pt with hx of lung CA with mets, c/o COPD exacerbation. Hx of lobectomy now with SOB and productive cough with yellow sputum since sunday. states she was tx for RSV 3 weeks ago. denies any fever/chills. pt is speaking in clear, complete sentences.  Received 3 combi-vents by EMS with 500 ml 0.9 NS. and states she feels better.

## 2019-02-26 PROCEDURE — 82962 GLUCOSE BLOOD TEST: CPT

## 2019-02-26 PROCEDURE — 71275 CT ANGIOGRAPHY CHEST: CPT

## 2019-02-26 PROCEDURE — 82553 CREATINE MB FRACTION: CPT

## 2019-02-26 PROCEDURE — 85027 COMPLETE CBC AUTOMATED: CPT

## 2019-02-26 PROCEDURE — 80053 COMPREHEN METABOLIC PANEL: CPT

## 2019-02-26 PROCEDURE — 80048 BASIC METABOLIC PNL TOTAL CA: CPT

## 2019-02-26 PROCEDURE — 83605 ASSAY OF LACTIC ACID: CPT

## 2019-02-26 PROCEDURE — 85025 COMPLETE CBC W/AUTO DIFF WBC: CPT

## 2019-02-26 PROCEDURE — 87581 M.PNEUMON DNA AMP PROBE: CPT

## 2019-02-26 PROCEDURE — 36415 COLL VENOUS BLD VENIPUNCTURE: CPT

## 2019-02-26 PROCEDURE — 94640 AIRWAY INHALATION TREATMENT: CPT

## 2019-02-26 PROCEDURE — 99285 EMERGENCY DEPT VISIT HI MDM: CPT | Mod: 25

## 2019-02-26 PROCEDURE — 85379 FIBRIN DEGRADATION QUANT: CPT

## 2019-02-26 PROCEDURE — 85730 THROMBOPLASTIN TIME PARTIAL: CPT

## 2019-02-26 PROCEDURE — 97161 PT EVAL LOW COMPLEX 20 MIN: CPT

## 2019-02-26 PROCEDURE — 87486 CHLMYD PNEUM DNA AMP PROBE: CPT

## 2019-02-26 PROCEDURE — 82550 ASSAY OF CK (CPK): CPT

## 2019-02-26 PROCEDURE — 84484 ASSAY OF TROPONIN QUANT: CPT

## 2019-02-26 PROCEDURE — 83735 ASSAY OF MAGNESIUM: CPT

## 2019-02-26 PROCEDURE — 96374 THER/PROPH/DIAG INJ IV PUSH: CPT

## 2019-02-26 PROCEDURE — 96375 TX/PRO/DX INJ NEW DRUG ADDON: CPT

## 2019-02-26 PROCEDURE — 87798 DETECT AGENT NOS DNA AMP: CPT

## 2019-02-26 PROCEDURE — 71045 X-RAY EXAM CHEST 1 VIEW: CPT

## 2019-02-26 PROCEDURE — 85610 PROTHROMBIN TIME: CPT

## 2019-02-26 PROCEDURE — 83036 HEMOGLOBIN GLYCOSYLATED A1C: CPT

## 2019-02-26 PROCEDURE — 87389 HIV-1 AG W/HIV-1&-2 AB AG IA: CPT

## 2019-02-26 PROCEDURE — 93306 TTE W/DOPPLER COMPLETE: CPT

## 2019-02-26 PROCEDURE — 93005 ELECTROCARDIOGRAM TRACING: CPT

## 2019-02-26 PROCEDURE — 87633 RESP VIRUS 12-25 TARGETS: CPT

## 2019-02-26 PROCEDURE — 87040 BLOOD CULTURE FOR BACTERIA: CPT

## 2019-02-26 PROCEDURE — 83880 ASSAY OF NATRIURETIC PEPTIDE: CPT

## 2019-03-28 ENCOUNTER — APPOINTMENT (OUTPATIENT)
Dept: RADIATION ONCOLOGY | Facility: CLINIC | Age: 59
End: 2019-03-28
Payer: MEDICARE

## 2019-03-28 VITALS
HEART RATE: 80 BPM | BODY MASS INDEX: 38.11 KG/M2 | WEIGHT: 222 LBS | RESPIRATION RATE: 16 BRPM | DIASTOLIC BLOOD PRESSURE: 76 MMHG | OXYGEN SATURATION: 98 % | SYSTOLIC BLOOD PRESSURE: 107 MMHG

## 2019-03-28 PROCEDURE — 99214 OFFICE O/P EST MOD 30 MIN: CPT

## 2019-03-28 NOTE — DISEASE MANAGEMENT
[Pathological] : TNM Stage: p [IV] : IV [FreeTextEntry4] : Now recurrent [TTNM] : 3 [NTNM] : 1 [MTNM] : 0 [de-identified] : right parietal brain

## 2019-03-28 NOTE — REVIEW OF SYSTEMS
[Negative] : Psychiatric [Tinnitus - Grade 0] : Tinnitus - Grade 0 [Blurred Vision: Grade 0] : Blurred Vision: Grade 0 [Cognitive Disturbance: Grade 1 - Mild cognitive disability; not interfering with work/school/life performance; specialized educational services/devices not indicated] : Cognitive Disturbance: Grade 1 - Mild cognitive disability; not interfering with work/school/life performance; specialized educational services/devices not indicated [Concentration Impairment: Grade 1] : Concentration Impairment: Grade 1 - Mild inattention or decreased level of concentration [Dizziness: Grade 0] : Dizziness: Grade 0  [Headache: Grade 0] : Headache: Grade 0 [Fever] : no fever [Night Sweats] : no night sweats [Fatigue] : no fatigue [Chest Pain] : no chest pain [Shortness Of Breath] : no shortness of breath [Cough] : no cough [Abdominal Pain] : no abdominal pain [Constipation] : no constipation [FreeTextEntry3] : diminished right peripheral vision [FreeTextEntry6] : hx of asthma. uses inhalers  [FreeTextEntry9] : lower back and right shoulder pain. Needs shoulder surgery but not willing to do at this time [de-identified] : memory impairment.

## 2019-03-28 NOTE — PHYSICAL EXAM
[General Appearance - Well Developed] : well developed [General Appearance - Alert] : alert [Obese] : obese [Sclera] : the sclera and conjunctiva were normal [PERRL With Normal Accommodation] : pupils were equal in size, round, reactive to light [Hearing Threshold Finger Rub Not [place]] : hearing was normal [Normal] : supple with no thyromegaly or masses appreciated [] : no respiratory distress [Respiration, Rhythm And Depth] : normal respiratory rhythm and effort [Heart Rate And Rhythm] : heart rate and rhythm were normal [Heart Sounds] : normal S1 and S2 [Bowel Sounds] : normal bowel sounds [Abdomen Soft] : soft [Cervical Lymph Nodes Enlarged Posterior Bilaterally] : posterior cervical [Cervical Lymph Nodes Enlarged Anterior Bilaterally] : anterior cervical [Supraclavicular Lymph Nodes Enlarged Bilaterally] : supraclavicular [Musculoskeletal - Swelling] : no joint swelling [Skin Color & Pigmentation] : normal skin color and pigmentation [No Focal Deficits] : no focal deficits [Oriented To Time, Place, And Person] : oriented to person, place, and time [Mood] : the mood was normal [Auscultation Breath Sounds / Voice Sounds] : lungs were clear to auscultation bilaterally [Sensation] : the sensory exam was normal to light touch and pinprick [Motor Exam] : the motor exam was normal [de-identified] : Delayed recall 0/3.  [de-identified] : Delayed recall 0/3.

## 2019-03-28 NOTE — VITALS
[Least Pain Intensity: 0/10] : 0/10 [Opioid] : opioid [80: Normal activity with effort; some signs or symptoms of disease.] : 80: Normal activity with effort; some signs or symptoms of disease.  [ECOG Performance Status: 1 - Restricted in physically strenuous activity but ambulatory and able to carry out work of a light or sedentary nature] : Performance Status: 1 - Restricted in physically strenuous activity but ambulatory and able to carry out work of a light or sedentary nature, e.g., light house work, office work [Date: ____________] : Patient's last distress assessment performed on [unfilled]. [0 - No Distress] : Distress Level: 0 [Pain Location: ___] : Pain Location: [unfilled] [Pain Interferes with ADLs] : Pain interferes with activities of daily living.

## 2019-03-28 NOTE — HISTORY OF PRESENT ILLNESS
[FreeTextEntry1] : Ms Ashlie Mendoza is a 58y old female with a history of adenocarcinoma of the lung initially diagnosed during pre-operative workup for surgery for an orthopedic issue. She underwent right lower lobectomy with en bloc middle lobe resection on 10/24/2016, which revealed  pT3, pN1 with visceral pleura and LVI, involving 2/4 peribronchial lymph nodes. She completed  adjuvant chemotherapy between January and March 2017. \par \par She then developed a brain metastasis, and completed radiation therapy with Dr Owen for a total of 2700 cGy from 11/14/17- 11/20/17 over 3 fractions for treatment of left parieto-occipital mets (s/p subtotal resection). She had a cerebral hemorrhage in November 2017 post treatment and was hospitalized at [facility]. On follow up surveillance MRI brain 1/3/18 she was found to a have a new 5 mm right parietal enhancing mass. She is s/p Gamma Knife stereotactic radiosurgery on 1/18/2018 by Dr. Rico and Dr. Owen at [facility].\par \par She now follows up with me since Dr. Owen has moved to [facility]. \par \par 3/28/19 - FOLLOW UP\par Ms. Mendoza is here for routine follow up. She was last seen here on 4/19/18. Insurance issues prevented her from coming back until now.  The plan at the time was to repeat MRI in 3 months and continue follow up with Dr. Peterson. She last saw Dr. Peterson approx two weeks ago, and as per patient, plan is for continued surveillance with chest CT and brain MRI. She reports she had a brain MRI and CTs of lungs done at [facility] around February 2019 under the care of Dr. Da Reveles; report/ disc not available at this time. Dr. Peterson's office will fax what records they have. \par \par MRI done 6/13/18 showed the following:\par -resolution of small right parietal lobe enhancing focus and edema, indicating favorable response to therapy. \par -There has been further evolution of left parietal surgical site with resolution of enhancement and increase in white matter signal now crossing the corpus callosum that is presumed post-RT change. \par -No new brain lesion given slight motion limitation of study. \par \par She saw Dr. Montano for cardiology evaluation of pulmonary hypertension prior to left shoulder surgery on 8/17/18. She was cleared for surgery at that visit and started on inhalers. Oxycodone was given for pain. She saw edward Tapia, for thyroid follow up on 10/4/18, found to be euthyroid; plan was for TFT and thyroid u/s, consider repeat FNA biopsy. She saw him last on 2/21/19, and was sent to ED for respiratory symptoms, SOB. She was found to have COPD exacerbation, managed with Prednisone and duonebs, and advised to follow up with pulmonologist Dr. Wallace. \par \par Of note, she had CT chest on 10/18/18 and it showed pulmonary emphysema; no evidence of disease recurrence. Plan is to repeat imaging in six months.  \par \par Today, she reports she has been feeling overall well. No new neuro complaints. Continues to have memory impairment (short term). She also reports a "nagging" soreness to left side of scalp for the past few weeks, does not recall any head injuries. She denies any other c/o to include headache, vision changes, numbness, tingling, fever, chills, fatigue, CP, SOB, cough, N/V/D. \par ______________________________________________________\par \par 4/19/18- FOLLOW UP\par Ms. Mendoza presents today for routine follow up. Since her last visit with us she saw Dr. Gutierrez of ophthalmology. \par \par Her PET scan on 2/27/18 showed that since 11/30/17 there was a subcentimeter hypermetabolic lymph node in the left inguinal region of uncertain significance. She saw Dr. Mann regarding this on 3/15/18, and he recommended follow up with her PCP Dr. Vignesh Wallace regarding viral illness. \par \par She has been feeling overall well. No new neuro complaints. Continues to have memory loss.\par \par Oncologic History\par Ms Ashlie Mendoza is a 57y old female with a history of adenocarcinoma of the lung initially diagnosed during per-operative workup for surgery in July 2017 for an orthopedic issue. She underwent right lower lobectomy with en bloc middle lobe resection on 10/24/2016, which revealed  pT3, pN1 with visceral pleura and LVI, involving 2/4 peribronchial lymph nodes. She completed chemo between January and March 2017. She did not receive radiation therapy though prior to undergoing surgery she was simulated for potential radiation therapy.  \par \par Her current relevant history dates back to July when she began having headaches.  October 3 when she reports not "feeling right,"  accompanied by feeling hot. She called her son and told him that she "was not feeling right" who then called the ambulance. She reports she woke up in the emergency room at [facility] in [place]. Upon finding a mass in her head, she was transferred to [facility]. She is unsure if she lost consciousness.\par \par She underwent craniotomy with left parietooccipital mass resection 10/4/17  for metastasis presumably from her lung cancer at [place]. We do not have pathology available at this time. Her post-op course was uneventful and she was subsequently discharged to rehab. She presented to [facility] from rehab on  10/19/17. MRI brain showed hematoma at the resection site, and along the tract site. There was nodular enhancement surrounding the hematoma and extending along the tract site. \par \par \par 1/3 - Today she feels well. She denies nausea, notes intermittent headaches, relieved by 30 mg oxycodone PRN. She has not tried tylenol for this. Her memory is improving. She notes right visual field cut is improving. Her energy is improving. her keppra was recently stopped by her neurosurgeon. She will see neurology at the end of the month, and will see Dr. Peterson at the end of the month.  MRI from today shows no evidence of residual disease at site of tumor.  There is a new 0.6 cm right parietal enhancing lesion suspicious of metastasis. \par  \par 2/13/18-PTE\par Ms. Mendoza completed radiation therapy for a total of 2700 cGy from 11/14/17- 11/20/17 over 3 fractions for treatment of left parieto-occipital mets. She had a cerebral hemorrhage in Nov. 2017 post treatment and was hospitalized in [facility].  On follow up surveillance MRI brain 1/3/18 she was found to a have a new 5 mm right parietal enhancing mass s/p Gamma Knife stereotactic radiosurgery on 1/18/2018 by Dr. Rico and Dr. Owen at [facility].\par Since her last visit she has f/u with Dr. Barclay(neuro) in Jan. 2018 and Dr. Rico yesterday. She has trouble with right peripheral vision and has appt. with neuro opthalmology (Andrea Gutierrez) on 2/16/18. \par Today she feels well. Has occasional headaches, takes tylenol, not persistent and don't feel like they did before. Denies seizures. Has pain in right shoulder and was told that she needs surgery but has been postponing. Takes OxyContin for shoulder and lower back pain as needed. She has appt. for thyroid scan today. She will f/u with Dr. Peterson in March and will have PET before. She also has bilateral thigh weakness at times, possibly related to steroids.  no

## 2019-04-02 ENCOUNTER — APPOINTMENT (OUTPATIENT)
Dept: ENDOCRINOLOGY | Facility: CLINIC | Age: 59
End: 2019-04-02
Payer: MEDICARE

## 2019-04-02 VITALS
HEIGHT: 64 IN | BODY MASS INDEX: 37.22 KG/M2 | DIASTOLIC BLOOD PRESSURE: 65 MMHG | HEART RATE: 91 BPM | WEIGHT: 218 LBS | SYSTOLIC BLOOD PRESSURE: 91 MMHG

## 2019-04-02 PROCEDURE — 99213 OFFICE O/P EST LOW 20 MIN: CPT

## 2019-04-05 NOTE — ASSESSMENT
[FreeTextEntry1] : 57 years old female with history lung cancer with metastases to the brain\par Thyroid nodules, in 2017 FNA biopsy was normal\par She's clinically euthyroid.\par \par  Plan TFTs today and thyroid US in October to assess thyroid nodules growth rate. \par \par \par Return at the end of October.

## 2019-04-05 NOTE — ADDENDUM
[FreeTextEntry1] : I, Malathi Ambriz, acted soley as a scribe for Dr. Norris Nichols on this date. 04/02/2019.

## 2019-04-05 NOTE — END OF VISIT
[FreeTextEntry3] : All medical record entries made by the Scribe were at my, Dr. Norris Nichols, direction and personally dictated by me on 04/02/2019. I have reviewed the chart and agree that the record accurately reflects my personal performance of the history, physical exam, assessment and plan. I have also personally directed, reviewed and agreed with the chart. \par \par   [Time Spent: ___ minutes] : I have spent [unfilled] minutes of face to face time with the patient

## 2019-04-05 NOTE — PHYSICAL EXAM
[Alert] : alert [Normal Sclera/Conjunctiva] : normal sclera/conjunctiva [EOMI] : extra ocular movement intact [No Proptosis] : no proptosis [Normal Oropharynx] : the oropharynx was normal [Thyroid Not Enlarged] : the thyroid was not enlarged [No Thyroid Nodules] : there were no palpable thyroid nodules [No Respiratory Distress] : no respiratory distress [No Accessory Muscle Use] : no accessory muscle use [Clear to Auscultation] : lungs were clear to auscultation bilaterally [Normal Rate] : heart rate was normal  [Normal S1, S2] : normal S1 and S2 [Regular Rhythm] : with a regular rhythm [Pedal Pulses Normal] : the pedal pulses are present [No Edema] : there was no peripheral edema [Normal Bowel Sounds] : normal bowel sounds [Post Cervical Nodes] : posterior cervical nodes [Anterior Cervical Nodes] : anterior cervical nodes [Axillary Nodes] : axillary nodes [Normal] : normal and non tender [No Spinal Tenderness] : no spinal tenderness [Spine Straight] : spine straight [No Stigmata of Cushings Syndrome] : no stigmata of cushings syndrome [Normal Gait] : normal gait [Normal Strength/Tone] : muscle strength and tone were normal [No Rash] : no rash [Normal Reflexes] : deep tendon reflexes were 2+ and symmetric [No Tremors] : no tremors [Oriented x3] : oriented to person, place, and time [Acanthosis Nigricans] : no acanthosis nigricans [de-identified] : bilateral thyroid nodules, no tenderness. Enlarged thyroid gland

## 2019-04-05 NOTE — HISTORY OF PRESENT ILLNESS
[FreeTextEntry1] : 5/30/17 chest CT: right 1.4 cm thyroid nodule, and left probable 2.3 cm nodule\par 4/19/17 right thyroid 2.9 cm nodule FNA biopsy at [facility] : Benign, [place] II\par June 2017, calcium 10.2, TSH 1.2, free T4: 1.2\par 10/4/18: TSH 1.6 \par 10/18/18 thyroid US: R lobe mid upper which was previously biopsied, measures 3 x 1.5 x 2.1 cm, spongy form (previously 3.2 x 1.7 x 2.0 cm). L lobe upper pole solid nodule 2.2 x 1.6 x 1.8 cm unchanged from previous US report from 2/13/18. \par \par 56 y/o F pt with a Hx of thyroid nodules (dx in 2016) who presents today for thyroid follow-up. PMHx: COPD, overweight, R lower lobectomy for lung adenocarcinoma (October 2016). PSHx: brain surgery for lung CA mets (October 2017). Pt received gamma knife treatment in January of 2018. \par \par Patient presents today for her routine thyroid nodules follow up. On 2/21/19, pt had scheduled appointment here, but was sent to the ED for SOB. Pt relates that she was admitted in the hospital at that time and given breathing treatments and fluids. Pt's cancer doctor ordered for MRI and CT of head for May 2019. Pt denies difficulty breathing, voice change, difficulty swallowing. \par \par Current Medications:

## 2019-05-30 ENCOUNTER — APPOINTMENT (OUTPATIENT)
Dept: CT IMAGING | Facility: HOSPITAL | Age: 59
End: 2019-05-30
Payer: MEDICARE

## 2019-05-30 ENCOUNTER — APPOINTMENT (OUTPATIENT)
Dept: MRI IMAGING | Facility: HOSPITAL | Age: 59
End: 2019-05-30
Payer: MEDICARE

## 2019-05-30 ENCOUNTER — APPOINTMENT (OUTPATIENT)
Dept: MRI IMAGING | Facility: HOSPITAL | Age: 59
End: 2019-05-30

## 2019-05-30 ENCOUNTER — OUTPATIENT (OUTPATIENT)
Dept: OUTPATIENT SERVICES | Facility: HOSPITAL | Age: 59
LOS: 1 days | End: 2019-05-30
Payer: MEDICARE

## 2019-05-30 DIAGNOSIS — Z98.890 OTHER SPECIFIED POSTPROCEDURAL STATES: Chronic | ICD-10-CM

## 2019-05-30 DIAGNOSIS — Z90.49 ACQUIRED ABSENCE OF OTHER SPECIFIED PARTS OF DIGESTIVE TRACT: Chronic | ICD-10-CM

## 2019-05-30 DIAGNOSIS — Z41.9 ENCOUNTER FOR PROCEDURE FOR PURPOSES OTHER THAN REMEDYING HEALTH STATE, UNSPECIFIED: Chronic | ICD-10-CM

## 2019-05-30 PROCEDURE — 70553 MRI BRAIN STEM W/O & W/DYE: CPT | Mod: 26

## 2019-05-30 PROCEDURE — 74160 CT ABDOMEN W/CONTRAST: CPT

## 2019-05-30 PROCEDURE — 71260 CT THORAX DX C+: CPT | Mod: 26

## 2019-05-30 PROCEDURE — A9585: CPT

## 2019-05-30 PROCEDURE — 71260 CT THORAX DX C+: CPT

## 2019-05-30 PROCEDURE — 74160 CT ABDOMEN W/CONTRAST: CPT | Mod: 26

## 2019-05-30 PROCEDURE — 70553 MRI BRAIN STEM W/O & W/DYE: CPT

## 2019-06-03 ENCOUNTER — APPOINTMENT (OUTPATIENT)
Dept: RADIATION ONCOLOGY | Facility: CLINIC | Age: 59
End: 2019-06-03
Payer: MEDICARE

## 2019-06-03 VITALS
SYSTOLIC BLOOD PRESSURE: 118 MMHG | BODY MASS INDEX: 37.97 KG/M2 | OXYGEN SATURATION: 98 % | DIASTOLIC BLOOD PRESSURE: 80 MMHG | RESPIRATION RATE: 16 BRPM | WEIGHT: 221.2 LBS | HEART RATE: 83 BPM

## 2019-06-03 PROCEDURE — 99214 OFFICE O/P EST MOD 30 MIN: CPT

## 2019-06-03 NOTE — DISEASE MANAGEMENT
[TTNM] : 3 [NTNM] : 1 [FreeTextEntry4] : Now recurrent [MTNM] : 0 [de-identified] : right parietal brain

## 2019-06-03 NOTE — REVIEW OF SYSTEMS
[Chest Pain] : no chest pain [FreeTextEntry7] : see HPI [FreeTextEntry9] : chronic back pain [de-identified] : see HPI

## 2019-06-03 NOTE — HISTORY OF PRESENT ILLNESS
[FreeTextEntry1] : Ms Ashlie Mendoza is a 58y old female with a history of adenocarcinoma of the lung initially diagnosed during pre-operative workup for surgery for an orthopedic issue. She underwent right lower lobectomy with en bloc middle lobe resection on 10/24/2016, which revealed  pT3, pN1 with visceral pleura and LVI, involving 2/4 peribronchial lymph nodes. She completed  adjuvant chemotherapy between January and March 2017. \par \par She then developed a brain metastasis, and completed radiation therapy with Dr Owen for a total of 2700 cGy from 11/14/17- 11/20/17 over 3 fractions for treatment of left parieto-occipital mets (s/p subtotal resection). She had a cerebral hemorrhage in November 2017 post treatment and was hospitalized at [facility]. On follow up surveillance MRI brain 1/3/18 she was found to a have a new 5 mm right parietal enhancing mass. She is s/p Gamma Knife stereotactic radiosurgery on 1/18/2018 by Dr. Rico and Dr. Owen at [facility].\par \par She now follows up with me since Dr. Owen has moved to [facility]. \par \par 6/3/19 - Follow-up\par Ms. Mendoza returns today for routine follow-up.  She was last seen here on 3/28/19.  She reported having imaging done at [facility] in February, but reports were not available at that time.  Plan at that time was MRI in May and follow-up with Dr. Peterson for systemic imaging.\par \par CT chest, abdomen and pelvis with contrast 2/7/19 ([facility]) - Impression: Since November 2, 2018 no new metastases \par \par MRI Brain with and without contrast 2/7/19 ([facility]) - Impression: Since November 2, 2018, evolving postoperative and treatment changes in the left parietal lobe.  Interval slightly increased enhancement marginating the resection cavity in the left parietal lobe is nonspecific and possibly reflects sequelae of postoperative change, however recommend continued surveillance to document stability.  \par \par MRI Brain with and without contrast 5/30/19 - IMPRESSION: Interval progression of enhancement along the left parietal \par resection cavity with stable to slightly increased surrounding FLAIR/T2 signal abnormality. This may represent recurrent tumoral disease or post therapeutic changes. \par \par She is unsure of the last time she saw Dr. Peterson, but states she is scheduled for follow-up again on 6/17/19. She had CT CAP for him on 5/30/19.  She saw endocrine, Dr. Nichols on 4/2/19; plan is for thyroid US in October.  TSH 1.27, free T4 1.2 on 4/15/19.  Today, she reports feeling "pretty good."  She notes unchanged intermittent headaches approximately once every 2 weeks, rated 5/10, little relief with motrin.  She also reports continued short term memory loss.  She also notes RUQ pain that she has "always" had and an episode of vomiting yesterday.  She states she has been trying to lose weight to no avail; requesting dietician referral.  She denies fevers, chills, dizziness, lightheadedness, unilateral weakness, visual/hearing disturbances, dyspnea, chest pain, palpitations, gait disturbances.  \par \par 3/28/19 - FOLLOW UP\par Ms. Mendoza is here for routine follow up. She was last seen here on 4/19/18. Insurance issues prevented her from coming back until now.  The plan at the time was to repeat MRI in 3 months and continue follow up with Dr. Peterson. She last saw Dr. Peterson approx two weeks ago, and as per patient, plan is for continued surveillance with chest CT and brain MRI. She reports she had a brain MRI and CTs of lungs done at [facility] around February 2019 under the care of Dr. Da Reveles; report/ disc not available at this time. Dr. Peterson's office will fax what records they have. \par \par MRI done 6/13/18 showed the following:\par -resolution of small right parietal lobe enhancing focus and edema, indicating favorable response to therapy. \par -There has been further evolution of left parietal surgical site with resolution of enhancement and increase in white matter signal now crossing the corpus callosum that is presumed post-RT change. \par -No new brain lesion given slight motion limitation of study. \par \par She saw Dr. Montano for cardiology evaluation of pulmonary hypertension prior to left shoulder surgery on 8/17/18. She was cleared for surgery at that visit and started on inhalers. Oxycodone was given for pain. She saw edward Tapia, for thyroid follow up on 10/4/18, found to be euthyroid; plan was for TFT and thyroid u/s, consider repeat FNA biopsy. She saw him last on 2/21/19, and was sent to ED for respiratory symptoms, SOB. She was found to have COPD exacerbation, managed with Prednisone and duonebs, and advised to follow up with pulmonologist Dr. Wallace. \par \par Of note, she had CT chest on 10/18/18 and it showed pulmonary emphysema; no evidence of disease recurrence. Plan is to repeat imaging in six months.  \par \par Today, she reports she has been feeling overall well. No new neuro complaints. Continues to have memory impairment (short term). She also reports a "nagging" soreness to left side of scalp for the past few weeks, does not recall any head injuries. She denies any other c/o to include headache, vision changes, numbness, tingling, fever, chills, fatigue, CP, SOB, cough, N/V/D. \par ______________________________________________________\par \par 4/19/18- FOLLOW UP\par Ms. Mendoza presents today for routine follow up. Since her last visit with us she saw Dr. Gutierrez of ophthalmology. \par \par Her PET scan on 2/27/18 showed that since 11/30/17 there was a subcentimeter hypermetabolic lymph node in the left inguinal region of uncertain significance. She saw Dr. Mann regarding this on 3/15/18, and he recommended follow up with her PCP Dr. Vignesh Wallace regarding viral illness. \par \par She has been feeling overall well. No new neuro complaints. Continues to have memory loss.\par \par Oncologic History\par Ms Ashlie Mendoza is a 57y old female with a history of adenocarcinoma of the lung initially diagnosed during per-operative workup for surgery in July 2017 for an orthopedic issue. She underwent right lower lobectomy with en bloc middle lobe resection on 10/24/2016, which revealed  pT3, pN1 with visceral pleura and LVI, involving 2/4 peribronchial lymph nodes. She completed chemo between January and March 2017. She did not receive radiation therapy though prior to undergoing surgery she was simulated for potential radiation therapy.  \par \par Her current relevant history dates back to July when she began having headaches.  October 3 when she reports not "feeling right,"  accompanied by feeling hot. She called her son and told him that she "was not feeling right" who then called the ambulance. She reports she woke up in the emergency room at [facility] in [place]. Upon finding a mass in her head, she was transferred to [facility] She is unsure if she lost consciousness.\par \par She underwent craniotomy with left parietooccipital mass resection 10/4/17  for metastasis presumably from her lung cancer at [place]. We do not have pathology available at this time. Her post-op course was uneventful and she was subsequently discharged to rehab. She presented to [facility] from rehab on  10/19/17. MRI brain showed hematoma at the resection site, and along the tract site. There was nodular enhancement surrounding the hematoma and extending along the tract site. \par \par \par 1/3 - Today she feels well. She denies nausea, notes intermittent headaches, relieved by 30 mg oxycodone PRN. She has not tried tylenol for this. Her memory is improving. She notes right visual field cut is improving. Her energy is improving. her keppra was recently stopped by her neurosurgeon. She will see neurology at the end of the month, and will see Dr. Peterson at the end of the month.  MRI from today shows no evidence of residual disease at site of tumor.  There is a new 0.6 cm right parietal enhancing lesion suspicious of metastasis. \par  \par 2/13/18-PTE\par Ms. Mendoza completed radiation therapy for a total of 2700 cGy from 11/14/17- 11/20/17 over 3 fractions for treatment of left parieto-occipital mets. She had a cerebral hemorrhage in Nov. 2017 post treatment and was hospitalized in [facility].  On follow up surveillance MRI brain 1/3/18 she was found to a have a new 5 mm right parietal enhancing mass s/p Gamma Knife stereotactic radiosurgery on 1/18/2018 by Dr. Rico and Dr. Owen at [facility].\par Since her last visit she has f/u with Dr. Barclay(neuro) in Jan. 2018 and Dr. Rico yesterday. She has trouble with right peripheral vision and has appt. with neuro opthalmology (Andrea Gutierrez) on 2/16/18. \par Today she feels well. Has occasional headaches, takes tylenol, not persistent and don't feel like they did before. Denies seizures. Has pain in right shoulder and was told that she needs surgery but has been postponing. Takes OxyContin for shoulder and lower back pain as needed. She has appt. for thyroid scan today. She will f/u with Dr. Peterson in March and will have PET before. She also has bilateral thigh weakness at times, possibly related to steroids.

## 2019-06-24 ENCOUNTER — APPOINTMENT (OUTPATIENT)
Dept: RADIATION ONCOLOGY | Facility: CLINIC | Age: 59
End: 2019-06-24

## 2019-06-25 LAB
T4 FREE SERPL-MCNC: 1.2 NG/DL
TSH SERPL-ACNC: 1.27 UIU/ML

## 2019-07-05 ENCOUNTER — HOSPITAL ENCOUNTER (INPATIENT)
Dept: HOSPITAL 74 - JER | Age: 59
LOS: 3 days | Discharge: HOME | DRG: 392 | End: 2019-07-08
Attending: INTERNAL MEDICINE | Admitting: INTERNAL MEDICINE
Payer: COMMERCIAL

## 2019-07-05 VITALS — BODY MASS INDEX: 35.8 KG/M2

## 2019-07-05 DIAGNOSIS — K83.9: ICD-10-CM

## 2019-07-05 DIAGNOSIS — Z85.841: ICD-10-CM

## 2019-07-05 DIAGNOSIS — J98.11: ICD-10-CM

## 2019-07-05 DIAGNOSIS — K57.10: Primary | ICD-10-CM

## 2019-07-05 DIAGNOSIS — Z85.110: ICD-10-CM

## 2019-07-05 DIAGNOSIS — N83.201: ICD-10-CM

## 2019-07-05 DIAGNOSIS — J44.9: ICD-10-CM

## 2019-07-05 DIAGNOSIS — K86.89: ICD-10-CM

## 2019-07-05 DIAGNOSIS — E66.9: ICD-10-CM

## 2019-07-05 DIAGNOSIS — R10.9: ICD-10-CM

## 2019-07-05 DIAGNOSIS — R18.8: ICD-10-CM

## 2019-07-05 DIAGNOSIS — J45.909: ICD-10-CM

## 2019-07-05 LAB
ALBUMIN SERPL-MCNC: 3.8 G/DL (ref 3.4–5)
ALP SERPL-CCNC: 98 U/L (ref 45–117)
ALT SERPL-CCNC: 18 U/L (ref 13–61)
AMYLASE SERPL-CCNC: 65 U/L (ref 25–115)
ANION GAP SERPL CALC-SCNC: 8 MMOL/L (ref 8–16)
APPEARANCE UR: CLEAR
AST SERPL-CCNC: 13 U/L (ref 15–37)
BASOPHILS # BLD: 0.2 % (ref 0–2)
BILIRUB SERPL-MCNC: 0.3 MG/DL (ref 0.2–1)
BILIRUB UR STRIP.AUTO-MCNC: NEGATIVE MG/DL
BUN SERPL-MCNC: 13.7 MG/DL (ref 7–18)
CALCIUM SERPL-MCNC: 9.3 MG/DL (ref 8.5–10.1)
CHLORIDE SERPL-SCNC: 106 MMOL/L (ref 98–107)
CO2 SERPL-SCNC: 27 MMOL/L (ref 21–32)
COLOR UR: YELLOW
CREAT SERPL-MCNC: 0.8 MG/DL (ref 0.55–1.3)
DEPRECATED RDW RBC AUTO: 14.6 % (ref 11.6–15.6)
EOSINOPHIL # BLD: 0.8 % (ref 0–4.5)
GLUCOSE SERPL-MCNC: 99 MG/DL (ref 74–106)
HCT VFR BLD CALC: 43.3 % (ref 32.4–45.2)
HGB BLD-MCNC: 14.3 GM/DL (ref 10.7–15.3)
KETONES UR QL STRIP: (no result)
LEUKOCYTE ESTERASE UR QL STRIP.AUTO: NEGATIVE
LIPASE SERPL-CCNC: 282 U/L (ref 73–393)
LYMPHOCYTES # BLD: 9.9 % (ref 8–40)
MCH RBC QN AUTO: 28.4 PG (ref 25.7–33.7)
MCHC RBC AUTO-ENTMCNC: 33 G/DL (ref 32–36)
MCV RBC: 86 FL (ref 80–96)
MONOCYTES # BLD AUTO: 3.9 % (ref 3.8–10.2)
NEUTROPHILS # BLD: 85.2 % (ref 42.8–82.8)
NITRITE UR QL STRIP: NEGATIVE
PH UR: 5.5 [PH] (ref 5–8)
PLATELET # BLD AUTO: 304 K/MM3 (ref 134–434)
PMV BLD: 7.7 FL (ref 7.5–11.1)
POTASSIUM SERPLBLD-SCNC: 3.9 MMOL/L (ref 3.5–5.1)
PROT SERPL-MCNC: 7.6 G/DL (ref 6.4–8.2)
PROT UR QL STRIP: (no result)
PROT UR QL STRIP: NEGATIVE
RBC # BLD AUTO: 5.03 M/MM3 (ref 3.6–5.2)
SODIUM SERPL-SCNC: 140 MMOL/L (ref 136–145)
SP GR UR: 1.02 (ref 1.01–1.03)
UROBILINOGEN UR STRIP-MCNC: 1 MG/DL (ref 0.2–1)
WBC # BLD AUTO: 12.3 K/MM3 (ref 4–10)

## 2019-07-05 PROCEDURE — A9579 GAD-BASE MR CONTRAST NOS,1ML: HCPCS

## 2019-07-05 NOTE — PDOC
Documentation entered by Martha Pathak SCRIBE, acting as scribe for Real Dooley MD.








Real Dooley MD:  This documentation has been prepared by the Zo soto Sammi, SCRIBE, under my direction and personally reviewed by me in its 

entirety.  I confirm that the documentation accurately reflects all work, 

treatment, procedures, and medical decision making performed by me.  





History of Present Illness





- General


Chief Complaint: Pain


Stated Complaint: STOMACH PAIN


Time Seen by Provider: 07/05/19 19:14


History Source: Patient


Exam Limitations: No Limitations





- History of Present Illness


Initial Comments: 





07/05/19 19:52


The patient is a 58 year old female, with a significant PMH of lung cancer, 

brain cancer, asthma, COPD, who presents to the emergency department for 

evaluation of 1 day of diffuse abdominal pain. The patient reports at around 

2pm this afternoon her stomach began to hurt with associated 3 large bowel 

movements and 1 episode of vomit. The patient states she ate a new smoothie 

today and a new granola bar before the symptoms began. 





The patient denies chest pain, shortness of breath, headache and dizziness.


Denies dysuria, frequency, urgency and hematuria.





Allergies: NDA


Surgical history: appendectomy, brain surgery for cancer








Past History





- Past Medical History


Allergies/Adverse Reactions: 


 Allergies











Allergy/AdvReac Type Severity Reaction Status Date / Time


 


No Known Allergies Allergy   Verified 11/22/17 05:38











COPD: No





- Immunization History


Immunization Up to Date: Yes





- Suicide/Smoking/Psychosocial Hx


Smoking History: Never smoked


Have you smoked in the past 12 months: No


Hx Alcohol Use: No


Drug/Substance Use Hx: No





**Review of Systems





- Review of Systems


Comments:: 





07/05/19 19:52


GENERAL/CONSTITUTIONAL: No fever or chills. No weakness.


HEAD, EYES, EARS, NOSE AND THROAT: No change in vision. No ear pain or 

discharge. No sore throat.


CARDIOVASCULAR: No chest pain or shortness of breath.


RESPIRATORY: No cough, wheezing, or hemoptysis.


GASTROINTESTINAL: (+)diffuse abdominal pain. (+)nausea (+)vomiting


GENITOURINARY: No dysuria, frequency, or change in urination.


MUSCULOSKELETAL: No joint or muscle swelling or pain. No neck or back pain.


SKIN: No rash


NEUROLOGIC: No headache, vertigo, loss of consciousness, or change in strength/

sensation.








*Physical Exam





- Vital Signs


 Last Vital Signs











Temp Pulse Resp BP Pulse Ox


 


 98.1 F   90   20   117/84    


 


 07/05/19 17:48  07/05/19 17:48  07/05/19 17:48  07/05/19 17:48   














- Physical Exam


Comments: 





07/05/19 19:52


GENERAL: Uncomfortable appearing. 


HEAD: No signs of trauma


ENT: Hearing grossly normal.


NECK: Normal ROM. Supple.


LUNGS: Breath sounds equal, clear to auscultation bilaterally.  No wheezes, and 

no crackles


HEART: Regular rate and rhythm, normal S1 and S2, no murmurs, rubs or gallops


ABDOMEN: (+)Diffuse abdominal tenderness to palpation. Soft, No guarding, no 

rebound.


EXTREMITIES: Normal range of motion, no edema.  No clubbing or cyanosis. No 

cords, erythema, or tenderness


NEUROLOGICAL: Cranial nerves II through XII grossly intact.  Normal speech.


SKIN: Warm, Dry. No rashes or lesions noted.








ED Treatment Course





- LABORATORY


CBC & Chemistry Diagram: 


 07/05/19 18:40





 07/05/19 18:40





- ADDITIONAL ORDERS


Additional order review: 


 











  07/05/19





  18:40


 


RBC  5.03


 


MCV  86.0


 


MCHC  33.0


 


RDW  14.6


 


MPV  7.7


 


Neutrophils %  85.2 H


 


Lymphocytes %  9.9  D


 


Monocytes %  3.9


 


Eosinophils %  0.8


 


Basophils %  0.2














- RADIOLOGY


Radiology Studies Ordered: 














 Category Date Time Status


 


 ABDOMEN & PELVIS CT WITH CONTR [CT] Stat CT Scan  07/05/19 19:31 Ordered














- Medications


Given in the ED: 


ED Medications














Discontinued Medications














Generic Name Dose Route Start Last Admin





  Trade Name Romain  PRN Reason Stop Dose Admin


 


Acetaminophen  1,000 mg  07/05/19 18:23  07/05/19 18:47





  Ofirmev Injection -  IVPB  07/05/19 18:24  1,000 mg





  ONCE ONE   Administration





     





     





     





     


 


Morphine Sulfate  4 mg  07/05/19 18:44  07/05/19 19:07





  Morphine Injection -  IVPUSH  07/05/19 18:45  4 mg





  ONCE ONE   Administration





     





     





     





     


 


Ondansetron HCl  4 mg  07/05/19 18:23  07/05/19 18:48





  Zofran Injection  IVPUSH  07/05/19 18:24  4 mg





  ONCE ONE   Administration





     





     





     





     














Medical Decision Making





- Medical Decision Making





07/05/19 19:41





A portion of this note was documented by scribe services under my direction. I 

have reviewed the details of the note, within reason, and agree with the 

documentation with the following case summary and management plan written by 

me. 





Patient treated in the ED.





Nursing notes are reviewed and incorporated into the medical decision-making.


Vital signs reviewed.





Peripheral IV access obtained by the nurse, laboratory studies are drawn and 

sent, reviewed and interpreted by myself. 





Vital Signs











Temp Pulse Resp BP Pulse Ox


 


 98.1 F   90   20   117/84    


 


 07/05/19 17:48  07/05/19 17:48  07/05/19 17:48  07/05/19 17:48   








58-year-old with past medical history of brain and lung cancer currently in 

remission, with lung nodules with next scheduled CT scan the chest in September 2019, asthma and COPD presents with diffuse abdominal pain. The patient 

reported that she ate nothing unusual yesterday. Woke up this morning in her 

usual state health. She ate a new smoothie with new ingredients including fruit 

this morning and subsequently developed diffuse abdominal pain. She reported 

that she was straining to move her bowels and had vomited once. Patient is a 

history of an appendectomy. Denies any fevers or chills.





Though food poisoning and gastroenteritis is within the differential. Patient 

does have prior cancer history and surgical history. We'll need to rule out 

bowel obstruction, diverticulitis, other acute abdominal pathology. Labs, CAT 

scan abdomen pelvis, pain control, IV fluids and reassess.


07/05/19 23:41





 CBC, BMP





 07/05/19 18:40 





 07/05/19 18:40 





 CMP











Sodium  140 mmol/L (136-145)   07/05/19  18:40    


 


Potassium  3.9 mmol/L (3.5-5.1)   07/05/19  18:40    


 


Chloride  106 mmol/L ()   07/05/19  18:40    


 


Carbon Dioxide  27 mmol/L (21-32)   07/05/19  18:40    


 


Anion Gap  8 MMOL/L (8-16)   07/05/19  18:40    


 


BUN  13.7 mg/dL (7-18)   07/05/19  18:40    


 


Creatinine  0.8 mg/dL (0.55-1.3)   07/05/19  18:40    


 


Est GFR (CKD-EPI)AfAm  92.87   07/05/19  18:40    


 


Est GFR (CKD-EPI)NonAf  80.13   07/05/19  18:40    


 


Random Glucose  99 mg/dL ()   07/05/19  18:40    


 


Lactic Acid  0.7 mmol/L (0.4-2.0)   07/05/19  18:40    


 


Calcium  9.3 mg/dL (8.5-10.1)   07/05/19  18:40    


 


Total Bilirubin  0.3 mg/dL (0.2-1)   07/05/19  18:40    


 


AST  13 U/L (15-37)  L  07/05/19  18:40    


 


ALT  18 U/L (13-61)   07/05/19  18:40    


 


Alkaline Phosphatase  98 U/L ()   07/05/19  18:40    


 


Total Protein  7.6 g/dl (6.4-8.2)   07/05/19  18:40    


 


Albumin  3.8 g/dl (3.4-5.0)   07/05/19  18:40    


 


Total Amylase  65 U/L ()   07/05/19  18:40    


 


Lipase  282 U/L ()   07/05/19  18:40    











CAT scan demonstrates a questionable enlargement of the right ovary as well as 

a nonspecific dilatation of the common bile duct. Given these nonspecific finds

, we'll order an ultrasound of the ovary as well as MRCP of the abdomen. We'll 

admit the patient to the hospital for further evaluation. GI consultation 

ordered.





*DC/Admit/Observation/Transfer


Diagnosis at time of Disposition: 


Abdominal pain


Qualifiers:


 Abdominal location: generalized Qualified Code(s): R10.84 - Generalized 

abdominal pain








- Discharge Dispostion


Condition at time of disposition: Stable


Decision to Admit order: Yes





- Referrals





- Patient Instructions





- Post Discharge Activity

## 2019-07-06 LAB
ALBUMIN SERPL-MCNC: 3.5 G/DL (ref 3.4–5)
ALP SERPL-CCNC: 91 U/L (ref 45–117)
ALT SERPL-CCNC: 17 U/L (ref 13–61)
ANION GAP SERPL CALC-SCNC: 6 MMOL/L (ref 8–16)
AST SERPL-CCNC: 10 U/L (ref 15–37)
BASOPHILS # BLD: 0.2 % (ref 0–2)
BILIRUB SERPL-MCNC: 0.4 MG/DL (ref 0.2–1)
BUN SERPL-MCNC: 8.3 MG/DL (ref 7–18)
CALCIUM SERPL-MCNC: 8.7 MG/DL (ref 8.5–10.1)
CHLORIDE SERPL-SCNC: 107 MMOL/L (ref 98–107)
CO2 SERPL-SCNC: 29 MMOL/L (ref 21–32)
CREAT SERPL-MCNC: 0.9 MG/DL (ref 0.55–1.3)
DEPRECATED RDW RBC AUTO: 14.4 % (ref 11.6–15.6)
EOSINOPHIL # BLD: 2.5 % (ref 0–4.5)
GLUCOSE SERPL-MCNC: 91 MG/DL (ref 74–106)
HCT VFR BLD CALC: 41 % (ref 32.4–45.2)
HGB BLD-MCNC: 13.5 GM/DL (ref 10.7–15.3)
LYMPHOCYTES # BLD: 28.7 % (ref 8–40)
MAGNESIUM SERPL-MCNC: 2.3 MG/DL (ref 1.8–2.4)
MCH RBC QN AUTO: 28.6 PG (ref 25.7–33.7)
MCHC RBC AUTO-ENTMCNC: 32.8 G/DL (ref 32–36)
MCV RBC: 87.2 FL (ref 80–96)
MONOCYTES # BLD AUTO: 6.1 % (ref 3.8–10.2)
NEUTROPHILS # BLD: 62.5 % (ref 42.8–82.8)
PHOSPHATE SERPL-MCNC: 3.1 MG/DL (ref 2.5–4.9)
PLATELET # BLD AUTO: 301 K/MM3 (ref 134–434)
PMV BLD: 7.8 FL (ref 7.5–11.1)
POTASSIUM SERPLBLD-SCNC: 4.3 MMOL/L (ref 3.5–5.1)
PROT SERPL-MCNC: 7.1 G/DL (ref 6.4–8.2)
RBC # BLD AUTO: 4.7 M/MM3 (ref 3.6–5.2)
SODIUM SERPL-SCNC: 141 MMOL/L (ref 136–145)
WBC # BLD AUTO: 7.9 K/MM3 (ref 4–10)

## 2019-07-06 RX ADMIN — BUDESONIDE AND FORMOTEROL FUMARATE DIHYDRATE SCH PUFF: 80; 4.5 AEROSOL RESPIRATORY (INHALATION) at 21:39

## 2019-07-06 RX ADMIN — ONDANSETRON PRN MG: 2 INJECTION INTRAMUSCULAR; INTRAVENOUS at 18:34

## 2019-07-06 RX ADMIN — TIOTROPIUM BROMIDE INHALATION SPRAY SCH PUFF: 3.12 SPRAY, METERED RESPIRATORY (INHALATION) at 16:12

## 2019-07-06 RX ADMIN — POLYETHYLENE GLYCOL 3350 SCH GRAMS: 17 POWDER, FOR SOLUTION ORAL at 21:40

## 2019-07-06 RX ADMIN — PANTOPRAZOLE SODIUM SCH MG: 40 TABLET, DELAYED RELEASE ORAL at 21:40

## 2019-07-06 RX ADMIN — ACETAMINOPHEN PRN MG: 10 INJECTION, SOLUTION INTRAVENOUS at 20:34

## 2019-07-06 RX ADMIN — BUDESONIDE AND FORMOTEROL FUMARATE DIHYDRATE SCH PUFF: 80; 4.5 AEROSOL RESPIRATORY (INHALATION) at 16:12

## 2019-07-06 NOTE — CONSULT
Consult


Referred by:: Hospitalist- IM


Reason for Consultation:: Slight Ovarian Enlargement on CT





- History of Present Illness


Chief Complaint: Abdominal pain


History of Present Illness: 





59yo here with abdominal pain after having a smoothie. No pelvic pain, no 

discharge/bleeding.


Denies any recent OBGYN care, uncertain of last pap.





- History Source


History Provided By: Patient





- Past Medical History


...Pregnant: No





- Alcohol/Substance Use


Hx Alcohol Use: No


History of Substance Use: reports: None





- Smoking History


Smoking history: Never smoked


Have you smoked in the past 12 months: No





- Social History


ADL: Independent





Home Medications





- Allergies


Allergies/Adverse Reactions: 


 Allergies











Allergy/AdvReac Type Severity Reaction Status Date / Time


 


No Known Allergies Allergy   Verified 11/22/17 05:38














Physical Exam


Vital Signs: 


 Vital Signs











Temperature  98.1 F   07/06/19 09:10


 


Pulse Rate  69   07/06/19 09:10


 


Respiratory Rate  18   07/06/19 09:10


 


Blood Pressure  92/60   07/06/19 09:10


 


O2 Sat by Pulse Oximetry (%)  97   07/06/19 05:00











Constitutional: Yes: Well Nourished (Exam deferred given no abnormal clinical 

findings), No Distress, Calm


Labs: 


 CBC, BMP





 07/05/19 18:40 





 07/05/19 18:40 











Assessment/Plan





59yo here with abdominal pain, incidental finding on CT scan of "slightly 

enlarged ovary"


-No details on CT imaging, TVUS sono imagines reviewed. Normal size uterus, ES 

3mm, ovary appears a normal size with a simple appearing cyst


-Nothing suspicious on TVUS findings


-Has not had established GYN care for some years, likely due for pap smear. 

This can be accomplished as an outpatient.





Jennifer Norris MD

## 2019-07-06 NOTE — PN
Physical Exam: 


SUBJECTIVE: Patient seen and examined, abdominal pain better, no nausea, 

vomiting or concerns otherwise. reported generalized abdominal pain yesterday 

after having smoothie. But reports chronic RUQ discomfort since her lung 

surgery. No relation to diet or fatty foods. 








OBJECTIVE:





 Vital Signs











 Period  Temp  Pulse  Resp  BP Sys/Andino  Pulse Ox


 


 Last 24 Hr  97.2 F-98.1 F  69-90  18-22  /60-84  93-97








 Intake & Output











 07/03/19 07/04/19 07/05/19 07/06/19





 23:59 23:59 23:59 23:59


 


Intake Total   100 


 


Balance   100 


 


Weight   220 lb 215 lb 2 oz











GENERAL: sitting in bed in no acute distress


Neck: soft, supple, no JVD


CVS:S1s2 regular


Chest: CTAB, no rales or wheezing


Abdomen: soft, right per-umbilical tenderness, no RUQ tenderness, neg Lopez's 

sign, no voluntary or involuntary gaurding or rigidity, pos bowel sounds


Extremities: no edema


Psych: pleasant, co-operative











 Laboratory Results - last 24 hr











  07/05/19 07/05/19 07/05/19





  18:40 18:40 18:40


 


WBC  12.3 H  


 


RBC  5.03  


 


Hgb  14.3  


 


Hct  43.3  D  


 


MCV  86.0  


 


MCH  28.4  


 


MCHC  33.0  


 


RDW  14.6  


 


Plt Count  304  


 


MPV  7.7  


 


Absolute Neuts (auto)  10.4 H  


 


Neutrophils %  85.2 H  


 


Lymphocytes %  9.9  D  


 


Monocytes %  3.9  


 


Eosinophils %  0.8  


 


Basophils %  0.2  


 


Nucleated RBC %  0  


 


Sodium   140 


 


Potassium   3.9 


 


Chloride   106 


 


Carbon Dioxide   27 


 


Anion Gap   8 


 


BUN   13.7 


 


Creatinine   0.8 


 


Est GFR (CKD-EPI)AfAm   92.87 


 


Est GFR (CKD-EPI)NonAf   80.13 


 


Random Glucose   99 


 


Lactic Acid    0.7


 


Calcium   9.3 


 


Phosphorus   


 


Magnesium   


 


Total Bilirubin   0.3 


 


AST   13 L 


 


ALT   18 


 


Alkaline Phosphatase   98 


 


Total Protein   7.6 


 


Albumin   3.8 


 


Total Amylase   65 


 


Lipase   282 


 


Urine Color   


 


Urine Appearance   


 


Urine pH   


 


Ur Specific Gravity   


 


Urine Protein   


 


Urine Glucose (UA)   


 


Urine Ketones   


 


Urine Blood   


 


Urine Nitrite   


 


Urine Bilirubin   


 


Urine Urobilinogen   


 


Ur Leukocyte Esterase   














  07/05/19 07/06/19 07/06/19





  19:00 10:49 10:49


 


WBC   7.9 


 


RBC   4.70 


 


Hgb   13.5 


 


Hct   41.0 


 


MCV   87.2 


 


MCH   28.6 


 


MCHC   32.8 


 


RDW   14.4 


 


Plt Count   301 


 


MPV   7.8 


 


Absolute Neuts (auto)   4.9 


 


Neutrophils %   62.5  D 


 


Lymphocytes %   28.7  D 


 


Monocytes %   6.1 


 


Eosinophils %   2.5  D 


 


Basophils %   0.2 


 


Nucleated RBC %   0 


 


Sodium    141


 


Potassium    4.3


 


Chloride    107


 


Carbon Dioxide    29


 


Anion Gap    6 L


 


BUN    8.3


 


Creatinine    0.9


 


Est GFR (CKD-EPI)AfAm    80.55


 


Est GFR (CKD-EPI)NonAf    69.50


 


Random Glucose    91


 


Lactic Acid   


 


Calcium    8.7


 


Phosphorus    3.1


 


Magnesium    2.3


 


Total Bilirubin    0.4


 


AST    10 L


 


ALT    17


 


Alkaline Phosphatase    91


 


Total Protein    7.1


 


Albumin    3.5


 


Total Amylase   


 


Lipase   


 


Urine Color  Yellow  


 


Urine Appearance  Clear  


 


Urine pH  5.5  D  


 


Ur Specific Gravity  1.022  


 


Urine Protein  Trace  


 


Urine Glucose (UA)  Negative  


 


Urine Ketones  2+ H  


 


Urine Blood  Negative  


 


Urine Nitrite  Negative  


 


Urine Bilirubin  Negative  


 


Urine Urobilinogen  1.0  


 


Ur Leukocyte Esterase  Negative  








Active Medications











Generic Name Dose Route Start Last Admin





  Trade Name Freq  PRN Reason Stop Dose Admin


 


Acetaminophen  1,000 mg  07/06/19 03:05  





  Ofirmev Injection -  IVPB   





  Q6H PRN   





  PAIN LEVEL 6-10   





     





     





     


 


Albuterol Sulfate  1 amp  07/06/19 03:06  07/06/19 06:25





  Ventolin 0.083% Nebulizer Soln -  NEB   1 amp





  Q6H PRN   Administration





  SHORT OF BREATH/WHEEZING   





     





     





     


 


Dextrose/Lactated Ringer's  1,000 mls @ 125 mls/hr  07/06/19 12:45  





  D5-Lr -  IV   





  ASDIR NOE   





     





     





     





     


 


Morphine Sulfate  1 mg  07/06/19 12:37  





  Morphine Sulfate  IVPUSH   





  Q6H PRN   





  PAIN LEVEL 7 - 10   





     





     





     


 


Non-Formulary Medication  1 each  07/06/19 12:45  





  Fluticasone/Salmeterol [Advair 250-50 Diskus]  IH   





  BID [facility]   





     





     





     





     


 


Non-Formulary Medication  1 inh  07/06/19 12:45  





  Tiotropium Bromide [Spiriva]  PO   





  DAILY [facility]   





     





     





     





     








CT A/P results  reviewed





ASSESSMENT/PLAN:


58 yof with PMhx of Lung ca s/p right lobectomy, Brain Ca s/p radiation, COPD, 

admitted with generalized abdominal pain after having a smoothie.





-Abdominal pain, ?gastroenteritis vs hepatic, less likely Gyn etiology


-CBD dilatation


-Ovarian cyst


-Lung ca s/p right lobectomy


-?Brain Ca s/p irradiation


-COPD





Plan:


Check MRCP, GI input. 


Trend labs.


NPO, IVF, pain control, supportive tx. 


Gyn input noted, outpatient follow up.


H/o Brain Ca with ?Haemorrhage. Will hold off on lovenox for now till further 

info retrived.


SCDs 


Dispo pending above imaging, GI input and clinical improvement. 


Plan discussed with patient and nursing in detail, all questions answered. 








Visit type





- Emergency Visit


Emergency Visit: Yes


ED Registration Date: 07/05/19


Care time: The patient presented to the Emergency Department on the above date 

and was hospitalized for further evaluation of their emergent condition.





- New Patient


This patient is new to me today: Yes


Date on this admission: 07/06/19





- Critical Care


Critical Care patient: No





- Discharge Referral


Referred to [facility] No

## 2019-07-06 NOTE — PN
Teaching Attending Note


Name of Resident: Jolie David





ATTENDING PHYSICIAN STATEMENT





I saw and evaluated the patient.


Chart, data, imaging reviewed. 


I reviewed the resident's note and discussed the case with the resident.


I agree with the resident's findings and plan as documented.








SUBJECTIVE:


 58 year old female, with a significant PMH of lung cancer, brain cancer, asthma

, COPD, c/o acute onset of nausea and nonbloody vomiting following ingestion of 

fruit smoothie one day ago.  


OBJECTIVE:


 Last Vital Signs











Temp Pulse Resp BP Pulse Ox


 


 98.1 F   76   22 H  109/74   97 


 


 07/06/19 06:00  07/06/19 06:00  07/06/19 06:00  07/06/19 06:00  07/06/19 05:00








abdomen -soft, BS+, nt  obese 


ext- no pedal edema appreciated 











 Abnormal Lab Results











  07/05/19 07/05/19 07/05/19





  18:40 18:40 19:00


 


WBC  12.3 H  


 


Absolute Neuts (auto)  10.4 H  


 


Neutrophils %  85.2 H  


 


AST   13 L 


 


Urine Ketones    2+ H








Imaging studies reviewed 


CT scan demonstrates a questionable enlargement of the right ovary as well as a 

nonspecific dilatation of the common bile duct. 








ASSESSMENT AND PLAN:


#59yo woman with acute nausea and vomiting- possibly gastroenteritis? 

Incidental finding of right ovary enlargement on CT abd/pelvis with some trace 

ascites concerning for possible malignancy. Met from prior lung ca? CBD 

dialtation also found - unlikely related to current symptoms. 


-med/surg 


-u/s for right ovary enlargement 


-obgyn eval  


-hepatic function panel 


-GI consult CBD dilatation 


-iv fluid hydration 


-zofran IV prn if n/v 


-echo 


-clear liquid diet and advance as tolerated 


-heparin sc for dvt ppx

## 2019-07-06 NOTE — CON.GI
Consult


Consult Specialty:: Gastroenterology ( covering the [facility] GI service) 


Referred by:: Real Dooley MD


Reason for Consultation:: dilated CBD





- History of Present Illness


Chief Complaint: diffuse upper abdominal pain


History of Present Illness: 


 58F is admitted with diffuse upper abdominal pain. She denies N/V and 

diarrhea. No back pain. No recent weight loss. Denies any previous h/o GI, 

liver or pancreatic diseases. She had a normal colonoscopy over 10 years ago. 

She had a 2nd surgery for appendicitis which revealed no residual appendix and 

during which nothing else was removed. Her mother  of a carcinoid of the 

pancreas. Marisol is s/p excision and RT of brain metastases at [facility] in 2017 

with subsequent bleed. She had a right partial lobectomy for lung cancer in 

2016.  





- History Source


History Provided By: Patient


Limitations to Obtaining History: Other (has recent memory loss since brain 

surgery and RT)





- Past Medical History


CNS: Yes: CVA ( intracerebral bleed), Other (Brain metastses excised and 

RT in )


Pulmonary: Yes: Cancer (Right lung cancer excised by thoracoscopy in )


Gastrointestinal: Yes: Diverticulosis


Hepatobiliary: Yes: Other (fatty liver)


...Pregnant: No


Heme/Onc: Yes: Cancer (right lung cancer excised 2016, brain mets excised and 

RT in 2017)


Musculoskeletal: Yes: Other (left rotator cuff tear repaired)





- Past Surgical History


Past Surgical History: Yes: Appendectomy (with subsequent 2nd surgery for 

suspected appendicitis), Colonoscopy, Craniotomy (brain metatases excision )


Additional Surgical History: Right thoracoscopic excision of lung cancer 2016.  

Brain metastases excision 2017.  Left rotator cuff surgery following fall in 

2016 which led to lung cancer discovery





- Alcohol/Substance Use


Hx Alcohol Use: No


History of Substance Use: reports: None





- Smoking History


Smoking history: Former smoker


Have you smoked in the past 12 months: No


If you are a former smoker, when did you quit?: 2016





- Social History


Usual Living Arrangement: Alone


ADL: Independent


Occupation: retired [employer] worker


Place of Birth: [place]


History of Recent Travel: No





Home Medications





- Allergies


Allergies/Adverse Reactions: 


 Allergies











Allergy/AdvReac Type Severity Reaction Status Date / Time


 


No Known Allergies Allergy   Verified 17 05:38














- Home Medications


Home Medications: 


Ambulatory Orders





Fluticasone/Salmeterol [Advair 250-50 Diskus] 1 each IH BID 07/06/19 


Tiotropium Bromide [Spiriva] 1 inh PO DAILY 19 











Family Disease History





- Family Disease History


Family Disease History: CA: Father ( of lung cancer), Mother ( of 

pancreatic carcinoid tumor), Sister (vulvar cancer)





Review of Systems





- Review of Systems


Constitutional: reports: No Symptoms


Eyes: reports: No Symptoms


HENT: reports: No Symptoms


Neck: reports: No Symptoms


Cardiovascular: reports: No Symptoms


Respiratory: reports: Exercise Intolerance


Gastrointestinal: reports: Abdominal Pain, Bloating


Musculoskeletal: reports: Joint Pain


Neurological: reports: Other (recent memory loss since RT and brain surgery)


Endocrine: reports: No Symptoms





Physical Exam-GI


Vital Signs: 


 Vital Signs











Temperature  97.7 F   19 13:45


 


Pulse Rate  70   19 13:45


 


Respiratory Rate  18   19 13:45


 


Blood Pressure  100/58 L  19 13:45


 


O2 Sat by Pulse Oximetry (%)  97   19 05:00








CBC,CMP











WBC  7.9 K/mm3 (4.0-10.0)   19  10:49    


 


RBC  4.70 M/mm3 (3.60-5.2)   19  10:49    


 


Hgb  13.5 GM/dL (10.7-15.3)   19  10:49    


 


Hct  41.0 % (32.4-45.2)   19  10:49    


 


MCV  87.2 fl (80-96)   19  10:49    


 


MCH  28.6 pg (25.7-33.7)   19  10:49    


 


MCHC  32.8 g/dl (32.0-36.0)   19  10:49    


 


RDW  14.4 % (11.6-15.6)   19  10:49    


 


Plt Count  301 K/MM3 (134-434)   19  10:49    


 


MPV  7.8 fl (7.5-11.1)   19  10:49    


 


Absolute Neuts (auto)  4.9 K/mm3 (1.5-8.0)   19  10:49    


 


Neutrophils %  62.5 % (42.8-82.8)  D 19  10:49    


 


Lymphocytes %  28.7 % (8-40)  D 19  10:49    


 


Monocytes %  6.1 % (3.8-10.2)   19  10:49    


 


Eosinophils %  2.5 % (0-4.5)  D 19  10:49    


 


Basophils %  0.2 % (0-2.0)   19  10:49    


 


Nucleated RBC %  0 % (0-0)   19  10:49    


 


Sodium  141 mmol/L (136-145)   19  10:49    


 


Potassium  4.3 mmol/L (3.5-5.1)   19  10:49    


 


Chloride  107 mmol/L ()   19  10:49    


 


Carbon Dioxide  29 mmol/L (21-32)   19  10:49    


 


Anion Gap  6 MMOL/L (8-16)  L  19  10:49    


 


BUN  8.3 mg/dL (7-18)   19  10:49    


 


Creatinine  0.9 mg/dL (0.55-1.3)   19  10:49    


 


Est GFR (CKD-EPI)AfAm  80.55   19  10:49    


 


Est GFR (CKD-EPI)NonAf  69.50   19  10:49    


 


Random Glucose  91 mg/dL ()   19  10:49    


 


Lactic Acid  0.7 mmol/L (0.4-2.0)   19  18:40    


 


Calcium  8.7 mg/dL (8.5-10.1)   19  10:49    


 


Phosphorus  3.1 mg/dL (2.5-4.9)   19  10:49    


 


Magnesium  2.3 mg/dL (1.8-2.4)   19  10:49    


 


Total Bilirubin  0.4 mg/dL (0.2-1)   19  10:49    


 


AST  10 U/L (15-37)  L  19  10:49    


 


ALT  17 U/L (13-61)   19  10:49    


 


Alkaline Phosphatase  91 U/L ()   19  10:49    


 


Total Protein  7.1 g/dl (6.4-8.2)   19  10:49    


 


Albumin  3.5 g/dl (3.4-5.0)   19  10:49    


 


Total Amylase  65 U/L ()   19  18:40    


 


Lipase  282 U/L ()   19  18:40    








 Current Medications











Generic Name Dose Route Start Last Admin





  Trade Name Freq  PRN Reason Stop Dose Admin


 


Acetaminophen  1,000 mg  19 03:05  





  Ofirmev Injection -  IVPB   





  Q6H PRN   





  PAIN LEVEL 6-10   





     





     





     


 


Albuterol Sulfate  1 amp  19 03:06  19 06:25





  Ventolin 0.083% Nebulizer Soln -  NEB   1 amp





  Q6H PRN   Administration





  SHORT OF BREATH/WHEEZING   





     





     





     


 


Budesonide/Formoterol Fumarate  2 puff  19 13:00  





  Symbicort 80/4.5mcg -  IH   





  BID NOE   





     





     





     





     


 


Dextrose/Lactated Ringer's  1,000 mls @ 50 mls/hr  19 14:01  





  D5-Lr -  IV   





  ASDIR NOE   





     





     





     





     


 


Morphine Sulfate  1 mg  19 12:37  





  Morphine Sulfate  IVPUSH   





  Q6H PRN   





  PAIN LEVEL 7 - 10   





     





     





     


 


Tiotropium Bromide  2 puff  19 13:00  





  Spiriva Respimat  IH   





  DAILY NOE   





     





     





     





     











Constitutional: Yes: No Distress


Eyes: Yes: Conjunctiva Clear


HENT: Yes: Normocephalic


Neck: Yes: Supple


Cardiovascular: Yes: Regular Rate and Rhythm


Respiratory: Yes: CTA Bilaterally, Other (healed right thoracoscopy incisions)


Gastrointestinal Inspection: Yes: Distention, Scars (healed transverse RLQ and 

vertical suprapubic incisions)


...Auscultate: Yes: Normoactive Bowel Sounds


...Palpate: Yes: Soft, Other (nontender)


...Rectal Exam: Yes: Guaiac Negative (no masses, brown guaiac negative stool)


Edema: No


Neurological: Yes: Alert, Oriented


Labs: 


 CBC, BMP





 19 10:49 





 19 10:49 











Imaging





- Results


Cat Scan: Image Reviewed (7mm pancreatic duct head and neck area, 6mm CBD)





Problem List





- Problems


(1) Dilated pancreatic duct


Code(s): K86.89 - OTHER SPECIFIED DISEASES OF PANCREAS   





(2) Dilated cbd, acquired


Code(s): K83.8 - OTHER SPECIFIED DISEASES OF BILIARY TRACT   





(3) Lung cancer metastatic to brain


Code(s): C34.90 - MALIGNANT NEOPLASM OF UNSP PART OF UNSP BRONCHUS OR LUNG; 

C79.31 - SECONDARY MALIGNANT NEOPLASM OF BRAIN   





(4) Memory loss due to medical condition


Code(s): R41.3 - OTHER AMNESIA   





(5) History of appendectomy


Code(s): Z90.49 - ACQUIRED ABSENCE OF OTHER SPECIFIED PARTS OF DIGESTIVE TRACT 

  





(6) Abdominal pain


Code(s): R10.9 - UNSPECIFIED ABDOMINAL PAIN   


Qualifiers: 


   Abdominal location: generalized   Qualified Code(s): R10.84 - Generalized 

abdominal pain   





Assessment/Plan





Impression: 


- The source of pain is not entirely clear. No actual pancreatitis seen and 

would expect vomiting with this. Given the dilated pancreatic and common bile 

ducts an underlying neoplasm and stone disease need to be excluded. The pain 

may ultimately reflect gaseous distension due to constipation


- FH of pancreatic carcinoid





Plan:


-- Change MRCP to be done with contrast to exclude an IPMN of other pancreatic 

or bile duct neoplasms. Discussed with Dr Chapin


-- Ca 19.9 and AFP tumor markers


-- Trial of clear liquids and advance diet as tolerated


-- Will decrease RL rate


-- Miralax


-- If pain persists an EGD may prove necessary. I have also advised a repeat 

screening colonoscopy after discharge.

## 2019-07-06 NOTE — HP
CHIEF COMPLAINT:diffuse abdominal pain





PCP:





HISTORY OF PRESENT ILLNESS:


Patient is a 58 year old female with past medical history of lung ca s/p 

lobectomy, brain cancer s/p surgery, asthma, COPD, presented to the ED due to 

sudden onset severe diffuse abdominal pain that started yesterday afternoon. 

Patient reported she was feeling okay during the day, later on had some smoothie

, which she normally has, and afterwards experienced sudden diffuse abdominal 

pain. This was accompanied by nausea and NBNB vomiting. Patient denies any fever

, chills, headache, dizziness, chest pain, SOB, diarrhea, constipation, urinary 

symptoms. 





ER course was notable for:


(1)


(2)


(3)





Recent Travel:denies





PAST MEDICAL HISTORY:


Lung ca s/p lobectomy


brain cancer s/p surgery


asthma


COPD





PAST SURGICAL HISTORY:


lobectomy


brain surgery


appendectomy


right rotator cuff repair





Social History:


Smoking:denies


Alcohol:denies


Drugs: denies





Family History:


Allergies





No Known Allergies Allergy (Verified 11/22/17 05:38)


 








HOME MEDICATIONS:


REVIEW OF SYSTEMS


CONSTITUTIONAL: 


Absent:  fever, chills, diaphoresis, generalized weakness, malaise, loss of 

appetite, weight change


HEENT: 


Absent:  rhinorrhea, nasal congestion, throat pain, throat swelling, difficulty 

swallowing, mouth swelling, ear pain, eye pain, visual changes


CARDIOVASCULAR: 


Absent: chest pain, syncope, palpitations, irregular heart rate, lightheadedness

, peripheral edema


RESPIRATORY: 


Absent: cough, shortness of breath, dyspnea with exertion, orthopnea, wheezing, 

stridor, hemoptysis


GASTROINTESTINAL:


Absent: abdominal pain, abdominal distension, nausea, vomiting, diarrhea, 

constipation, melena, hematochezia


GENITOURINARY: 


Absent: dysuria, frequency, urgency, hesitancy, hematuria, flank pain, genital 

pain


MUSCULOSKELETAL: 


Absent: myalgia, arthralgia, joint swelling, back pain, neck pain


SKIN: 


Absent: rash, itching, pallor


HEMATOLOGIC/IMMUNOLOGIC: 


Absent: easy bleeding, easy bruising, lymphadenopathy, frequent infections


ENDOCRINE:


Absent: unexplained weight gain, unexplained weight loss, heat intolerance, 

cold intolerance


NEUROLOGIC: 


Absent: headache, focal weakness or paresthesias, dizziness, unsteady gait, 

seizure, mental status changes, bladder or bowel incontinence


PSYCHIATRIC: 


Absent: anxiety, depression, suicidal or homicidal ideation, hallucinations.








PHYSICAL EXAMINATION


 Vital Signs - 24 hr











  07/05/19 07/06/19 07/06/19





  17:48 01:51 03:59


 


Temperature 98.1 F 97.2 F L 97.7 F


 


Pulse Rate 90  69


 


Pulse Rate [  76 





Left Radial]   


 


Respiratory 20  20





Rate   


 


Blood Pressure 117/84  107/75


 


Blood Pressure  97/64 





[Right Arm]   


 


O2 Sat by Pulse  93 L 





Oximetry (%)   














  07/06/19 07/06/19





  05:00 06:00


 


Temperature  98.1 F


 


Pulse Rate  76


 


Pulse Rate [  





Left Radial]  


 


Respiratory  22 H





Rate  


 


Blood Pressure  109/74


 


Blood Pressure  





[Right Arm]  


 


O2 Sat by Pulse 97 





Oximetry (%)  











GENERAL: Awake, alert, and fully oriented, in no acute distress.


HEAD: Normal with no signs of trauma.


EYES: Pupils equal, round and reactive to light, extraocular movements intact, 

sclera anicteric, conjunctiva clear. No lid lag.


EARS, NOSE, THROAT: Ears normal, nares patent, oropharynx clear without 

exudates. Moist mucous membranes.


NECK: Normal range of motion, supple without lymphadenopathy, JVD, or masses.


LUNGS: Breath sounds equal, clear to auscultation bilaterally. No wheezes, and 

no crackles. No accessory muscle use.


HEART: Regular rate and rhythm, normal S1 and S2 without murmur, rub or gallop.


ABDOMEN: Soft, nontender, not distended, normoactive bowel sounds, no guarding, 

no rebound, no masses.  No hepatomegaly or  splenomegaly. 


MUSCULOSKELETAL: Normal range of motion at all joints. No bony deformities or 

tenderness. No CVA tenderness.


UPPER EXTREMITIES: 2+ pulses, warm, well-perfused. No cyanosis. No clubbing. No 

peripheral edema.


LOWER EXTREMITIES: 2+ pulses, warm, well-perfused. No calf tenderness. No 

peripheral edema. 


NEUROLOGICAL:  Cranial nerves II-XII intact. Normal speech. Normal gait.


PSYCHIATRIC: Cooperative. Good eye contact. Appropriate mood and affect.


SKIN: Warm, dry, normal turgor, no rashes or lesions noted, normal capillary 

refill. 


 Laboratory Results - last 24 hr











  07/05/19 07/05/19 07/05/19





  18:40 18:40 18:40


 


WBC  12.3 H  


 


RBC  5.03  


 


Hgb  14.3  


 


Hct  43.3  D  


 


MCV  86.0  


 


MCH  28.4  


 


MCHC  33.0  


 


RDW  14.6  


 


Plt Count  304  


 


MPV  7.7  


 


Absolute Neuts (auto)  10.4 H  


 


Neutrophils %  85.2 H  


 


Lymphocytes %  9.9  D  


 


Monocytes %  3.9  


 


Eosinophils %  0.8  


 


Basophils %  0.2  


 


Nucleated RBC %  0  


 


Sodium   140 


 


Potassium   3.9 


 


Chloride   106 


 


Carbon Dioxide   27 


 


Anion Gap   8 


 


BUN   13.7 


 


Creatinine   0.8 


 


Est GFR (CKD-EPI)AfAm   92.87 


 


Est GFR (CKD-EPI)NonAf   80.13 


 


Random Glucose   99 


 


Lactic Acid    0.7


 


Calcium   9.3 


 


Total Bilirubin   0.3 


 


AST   13 L 


 


ALT   18 


 


Alkaline Phosphatase   98 


 


Total Protein   7.6 


 


Albumin   3.8 


 


Total Amylase   65 


 


Lipase   282 


 


Urine Color   


 


Urine Appearance   


 


Urine pH   


 


Ur Specific Gravity   


 


Urine Protein   


 


Urine Glucose (UA)   


 


Urine Ketones   


 


Urine Blood   


 


Urine Nitrite   


 


Urine Bilirubin   


 


Urine Urobilinogen   


 


Ur Leukocyte Esterase   














  07/05/19





  19:00


 


WBC 


 


RBC 


 


Hgb 


 


Hct 


 


MCV 


 


MCH 


 


MCHC 


 


RDW 


 


Plt Count 


 


MPV 


 


Absolute Neuts (auto) 


 


Neutrophils % 


 


Lymphocytes % 


 


Monocytes % 


 


Eosinophils % 


 


Basophils % 


 


Nucleated RBC % 


 


Sodium 


 


Potassium 


 


Chloride 


 


Carbon Dioxide 


 


Anion Gap 


 


BUN 


 


Creatinine 


 


Est GFR (CKD-EPI)AfAm 


 


Est GFR (CKD-EPI)NonAf 


 


Random Glucose 


 


Lactic Acid 


 


Calcium 


 


Total Bilirubin 


 


AST 


 


ALT 


 


Alkaline Phosphatase 


 


Total Protein 


 


Albumin 


 


Total Amylase 


 


Lipase 


 


Urine Color  Yellow


 


Urine Appearance  Clear


 


Urine pH  5.5  D


 


Ur Specific Gravity  1.022


 


Urine Protein  Trace


 


Urine Glucose (UA)  Negative


 


Urine Ketones  2+ H


 


Urine Blood  Negative


 


Urine Nitrite  Negative


 


Urine Bilirubin  Negative


 


Urine Urobilinogen  1.0


 


Ur Leukocyte Esterase  Negative











ASSESSMENT/PLAN:


Patient is a 58 year old female with past medical history of lung ca s/p 

lobectomy, brain cancer s/p surgery, asthma, COPD, presented to the ED due to 

sudden onset severe diffuse abdominal pain that started yesterday afternoon. 





#Abdominal pain, may be 2/2 acute gatroenteritis vs possible metastates


-CTAP: Main pancreatic duct at level of head and neck reveal nonspecific 

dilatation of 0.7cm. Probable diffuse hepatic steatosis. mild Sigmoid 

diverticulosis with no evidence of acute diverticulitis. Small amount of pelvic 

ascites and mild subtle enlargement of the right ovary.


-IV fluids


-Zofran PRN for nausea or vomiting


-IV morphine PRN for pain


-Clear liquid diet for now, advance as tolerated


-GI consulted.





#Right ovary enlargement on CTAP


-small cut pelvic ascites and mild subtle enlargement of right ovary may be 

concerning for malignancy


-TVS done to further assess enlarged right ovary


-GYN (Dr. Norris) consulted.





#Asthma, COPD


-Albuterol neb prn for wheezing or SOB





#FEn


-IV LR @125cc/hr


-electrolytes wnl, routine bmp monitoring


-NPO





#Prophylaxis


-Lovenox 40mg sq daily





#disposition


-med surg


-full code














Visit type





- Emergency Visit


Emergency Visit: Yes


ED Registration Date: 07/05/19


Care time: The patient presented to the Emergency Department on the above date 

and was hospitalized for further evaluation of their emergent condition.





- New Patient


This patient is new to me today: Yes


Date on this admission: 07/08/19





- Critical Care


Critical Care patient: No

## 2019-07-07 LAB
ALBUMIN SERPL-MCNC: 3.5 G/DL (ref 3.4–5)
ALP SERPL-CCNC: 86 U/L (ref 45–117)
ALT SERPL-CCNC: 17 U/L (ref 13–61)
ANION GAP SERPL CALC-SCNC: 4 MMOL/L (ref 8–16)
AST SERPL-CCNC: 12 U/L (ref 15–37)
BASOPHILS # BLD: 0.3 % (ref 0–2)
BILIRUB CONJ SERPL-MCNC: 0.1 MG/DL (ref 0–0.2)
BILIRUB SERPL-MCNC: 0.3 MG/DL (ref 0.2–1)
BUN SERPL-MCNC: 6.2 MG/DL (ref 7–18)
CALCIUM SERPL-MCNC: 9.1 MG/DL (ref 8.5–10.1)
CHLORIDE SERPL-SCNC: 108 MMOL/L (ref 98–107)
CO2 SERPL-SCNC: 32 MMOL/L (ref 21–32)
CREAT SERPL-MCNC: 1 MG/DL (ref 0.55–1.3)
DEPRECATED RDW RBC AUTO: 14.5 % (ref 11.6–15.6)
EOSINOPHIL # BLD: 3.9 % (ref 0–4.5)
GGT SERPL-CCNC: 25 U/L (ref 5–85)
GLUCOSE SERPL-MCNC: 92 MG/DL (ref 74–106)
HCT VFR BLD CALC: 43.1 % (ref 32.4–45.2)
HGB BLD-MCNC: 14 GM/DL (ref 10.7–15.3)
LIPASE SERPL-CCNC: 151 U/L (ref 73–393)
LYMPHOCYTES # BLD: 37 % (ref 8–40)
MAGNESIUM SERPL-MCNC: 2.3 MG/DL (ref 1.8–2.4)
MCH RBC QN AUTO: 28.4 PG (ref 25.7–33.7)
MCHC RBC AUTO-ENTMCNC: 32.4 G/DL (ref 32–36)
MCV RBC: 87.8 FL (ref 80–96)
MONOCYTES # BLD AUTO: 7.8 % (ref 3.8–10.2)
NEUTROPHILS # BLD: 51 % (ref 42.8–82.8)
PHOSPHATE SERPL-MCNC: 3.9 MG/DL (ref 2.5–4.9)
PLATELET # BLD AUTO: 287 K/MM3 (ref 134–434)
PMV BLD: 8.1 FL (ref 7.5–11.1)
POTASSIUM SERPLBLD-SCNC: 4.3 MMOL/L (ref 3.5–5.1)
PROT SERPL-MCNC: 7 G/DL (ref 6.4–8.2)
RBC # BLD AUTO: 4.91 M/MM3 (ref 3.6–5.2)
SODIUM SERPL-SCNC: 144 MMOL/L (ref 136–145)
WBC # BLD AUTO: 6 K/MM3 (ref 4–10)

## 2019-07-07 RX ADMIN — POLYETHYLENE GLYCOL 3350 SCH GRAMS: 17 POWDER, FOR SOLUTION ORAL at 09:34

## 2019-07-07 RX ADMIN — BUDESONIDE AND FORMOTEROL FUMARATE DIHYDRATE SCH PUFF: 80; 4.5 AEROSOL RESPIRATORY (INHALATION) at 21:13

## 2019-07-07 RX ADMIN — PANTOPRAZOLE SODIUM SCH MG: 40 TABLET, DELAYED RELEASE ORAL at 09:34

## 2019-07-07 RX ADMIN — BUDESONIDE AND FORMOTEROL FUMARATE DIHYDRATE SCH PUFF: 80; 4.5 AEROSOL RESPIRATORY (INHALATION) at 09:36

## 2019-07-07 RX ADMIN — TIOTROPIUM BROMIDE INHALATION SPRAY SCH PUFF: 3.12 SPRAY, METERED RESPIRATORY (INHALATION) at 09:36

## 2019-07-07 RX ADMIN — ONDANSETRON PRN MG: 2 INJECTION INTRAMUSCULAR; INTRAVENOUS at 09:33

## 2019-07-07 RX ADMIN — PANTOPRAZOLE SODIUM SCH MG: 40 TABLET, DELAYED RELEASE ORAL at 21:13

## 2019-07-07 RX ADMIN — SODIUM CHLORIDE, SODIUM LACTATE, POTASSIUM CHLORIDE, CALCIUM CHLORIDE AND DEXTROSE MONOHYDRATE SCH MLS/HR: 5; 600; 310; 30; 20 INJECTION, SOLUTION INTRAVENOUS at 12:23

## 2019-07-07 RX ADMIN — ACETAMINOPHEN PRN MG: 10 INJECTION, SOLUTION INTRAVENOUS at 12:08

## 2019-07-07 RX ADMIN — SODIUM CHLORIDE, SODIUM LACTATE, POTASSIUM CHLORIDE, CALCIUM CHLORIDE AND DEXTROSE MONOHYDRATE SCH MLS/HR: 5; 600; 310; 30; 20 INJECTION, SOLUTION INTRAVENOUS at 14:42

## 2019-07-07 RX ADMIN — POLYETHYLENE GLYCOL 3350 SCH GRAMS: 17 POWDER, FOR SOLUTION ORAL at 21:14

## 2019-07-07 NOTE — PN.GI
GI Progress Note


Subjective: 





GI NOte ( covering the [facility] GI service): Had pain earlier today but is pain 

free and hungry at present. I discussed stopping the morphine and trying a 

solid diet with Marisol and she wishes to proceed. MRCP/MRI done but not 

read. I do not see any obvious abnormality but need official radiology reading. 

Ca 19.9 pending





- Objective


Vital Signs: 


 Vital Signs











Temperature  98.6 F   07/07/19 11:00


 


Pulse Rate  68   07/07/19 11:00


 


Respiratory Rate  18   07/07/19 11:00


 


Blood Pressure  98/55 L  07/07/19 11:00


 


O2 Sat by Pulse Oximetry (%)  95   07/06/19 21:00








 Laboratory Tests











  07/07/19 07/07/19 07/07/19





  06:15 06:15 06:15


 


Total Bilirubin  0.3  


 


Direct Bilirubin  0.1  


 


GGT   25 


 


AST  12 L  


 


ALT  17  


 


Alkaline Phosphatase  86  


 


C-Reactive Protein   1.3 H 


 


Lipase   151 


 


CA 19-9 Antigen    Pending











Constitutional: Anxious


...Auscultate: Yes: Normoactive Bowel Sounds


...Palpate: Yes: Soft, Other (nontender)


Labs: 


 CBC, BMP





 07/07/19 06:15 





 07/07/19 06:15 











Assessment/Plan





Impression: 


- The source of pain is not entirely clear. Given the dilated pancreatic and 

common bile ducts an underlying neoplasm and stone disease need to be excluded. 

The pain may ultimately reflect gaseous distension due to constipation


- FH of pancreatic carcinoid





Plan:


-- Await  MRCP/MRI reading to exclude an IPMN of other pancreatic or bile duct 

neoplasms. 


-- Ca 19.9 and AFP tumor markers


-- Will stop narcotic and advance the diet and stop the IV


-- Miralax


-- If pain persists an EGD may prove necessary. I have also advised a repeat 

screening colonoscopy after discharge. 








Problem List





- Problems


(1) Dilated pancreatic duct


Code(s): K86.89 - OTHER SPECIFIED DISEASES OF PANCREAS   





(2) Dilated cbd, acquired


Code(s): K83.8 - OTHER SPECIFIED DISEASES OF BILIARY TRACT   





(3) Lung cancer metastatic to brain


Code(s): C34.90 - MALIGNANT NEOPLASM OF UNSP PART OF UNSP BRONCHUS OR LUNG; 

C79.31 - SECONDARY MALIGNANT NEOPLASM OF BRAIN   





(4) Memory loss due to medical condition


Code(s): R41.3 - OTHER AMNESIA   





(5) History of appendectomy


Code(s): Z90.49 - ACQUIRED ABSENCE OF OTHER SPECIFIED PARTS OF DIGESTIVE TRACT 

  





(6) Abdominal pain


Code(s): R10.9 - UNSPECIFIED ABDOMINAL PAIN   


Qualifiers: 


   Abdominal location: generalized   Qualified Code(s): R10.84 - Generalized 

abdominal pain

## 2019-07-07 NOTE — PN
Physical Exam: 


SUBJECTIVE: Patient seen and examined, still with abdominal/RUQ pain, passing 

gas, no BM. no nausea/vomitting noted. 








OBJECTIVE:





 Vital Signs











 Period  Temp  Pulse  Resp  BP Sys/Andino  Pulse Ox


 


 Last 24 Hr  97.4 F-98.6 F  68-94  18-20  /55-73  95








 Intake & Output











 07/04/19 07/05/19 07/06/19 07/07/19





 23:59 23:59 23:59 23:59


 


Intake Total  100 1450 350


 


Balance  100 1450 350


 


Weight  220 lb 215 lb 2 oz 











GENERAL: lying in bed in no acute distress


Chest: CTAB, no rales or wheezing


Abdomen:Soft, obese, sukumar-umbilical tenderness, mild RUQ tenderness, neg Lopez'

s sign, no voluntary or involuntary guarding or rigidity, pos bowel sounds


Extremities: no edema


Neck: soft, supple


CVS:S1S2 regular











 Laboratory Results - last 24 hr











  07/06/19 07/06/19 07/07/19





  10:49 10:49 06:15


 


WBC  7.9   6.0


 


RBC  4.70   4.91


 


Hgb  13.5   14.0


 


Hct  41.0   43.1


 


MCV  87.2   87.8


 


MCH  28.6   28.4


 


MCHC  32.8   32.4


 


RDW  14.4   14.5


 


Plt Count  301   287


 


MPV  7.8   8.1


 


Absolute Neuts (auto)  4.9   3.1


 


Neutrophils %  62.5  D   51.0


 


Lymphocytes %  28.7  D   37.0  D


 


Monocytes %  6.1   7.8


 


Eosinophils %  2.5  D   3.9


 


Basophils %  0.2   0.3


 


Nucleated RBC %  0   0


 


Sodium   141 


 


Potassium   4.3 


 


Chloride   107 


 


Carbon Dioxide   29 


 


Anion Gap   6 L 


 


BUN   8.3 


 


Creatinine   0.9 


 


Est GFR (CKD-EPI)AfAm   80.55 


 


Est GFR (CKD-EPI)NonAf   69.50 


 


Random Glucose   91 


 


Calcium   8.7 


 


Phosphorus   3.1 


 


Magnesium   2.3 


 


Total Bilirubin   0.4 


 


Direct Bilirubin   


 


GGT   


 


AST   10 L 


 


ALT   17 


 


Alkaline Phosphatase   91 


 


C-Reactive Protein   


 


Total Protein   7.1 


 


Albumin   3.5 


 


Lipase   














  07/07/19 07/07/19





  06:15 06:15


 


WBC  


 


RBC  


 


Hgb  


 


Hct  


 


MCV  


 


MCH  


 


MCHC  


 


RDW  


 


Plt Count  


 


MPV  


 


Absolute Neuts (auto)  


 


Neutrophils %  


 


Lymphocytes %  


 


Monocytes %  


 


Eosinophils %  


 


Basophils %  


 


Nucleated RBC %  


 


Sodium  144 


 


Potassium  4.3 


 


Chloride  108 H 


 


Carbon Dioxide  32 


 


Anion Gap  4 L 


 


BUN  6.2 L 


 


Creatinine  1.0 


 


Est GFR (CKD-EPI)AfAm  70.91 


 


Est GFR (CKD-EPI)NonAf  61.19 


 


Random Glucose  92 


 


Calcium  9.1 


 


Phosphorus  3.9 


 


Magnesium  2.3 


 


Total Bilirubin  0.3 


 


Direct Bilirubin  0.1 


 


GGT   25


 


AST  12 L 


 


ALT  17 


 


Alkaline Phosphatase  86 


 


C-Reactive Protein   1.3 H


 


Total Protein  7.0 


 


Albumin  3.5 


 


Lipase   151








Active Medications











Generic Name Dose Route Start Last Admin





  Trade Name Freq  PRN Reason Stop Dose Admin


 


Acetaminophen  1,000 mg  07/06/19 03:05  07/06/19 20:34





  Ofirmev Injection -  IVPB   1,000 mg





  Q6H PRN   Administration





  PAIN LEVEL 6-10   





     





     





     


 


Albuterol Sulfate  1 amp  07/06/19 03:06  07/06/19 06:25





  Ventolin 0.083% Nebulizer Soln -  NEB   1 amp





  Q6H PRN   Administration





  SHORT OF BREATH/WHEEZING   





     





     





     


 


Budesonide/Formoterol Fumarate  2 puff  07/06/19 13:00  07/07/19 09:36





  Symbicort 80/4.5mcg -  IH   2 puff





  BID NOE   Administration





     





     





     





     


 


Morphine Sulfate  1 mg  07/06/19 12:37  07/07/19 09:33





  Morphine Sulfate  IVPUSH   1 mg





  Q6H PRN   Administration





  PAIN LEVEL 7 - 10   





     





     





     


 


Ondansetron HCl  4 mg  07/06/19 18:16  07/07/19 09:33





  Zofran Injection  IVPUSH   4 mg





  Q6H PRN   Administration





  NAUSEA AND/OR VOMITING   





     





     





     


 


Pantoprazole Sodium  40 mg  07/06/19 22:00  07/07/19 09:34





  Protonix -  PO   40 mg





  BID NOE   Administration





     





     





     





     


 


Polyethylene Glycol  17 gm  07/06/19 22:00  07/07/19 09:34





  Miralax (For Daily Use) -  PO   17 grams





  BID NOE   Administration





     





     





     





     


 


Tiotropium Bromide  2 puff  07/06/19 13:00  07/07/19 09:36





  Spiriva Respimat  IH   2 puff





  DAILY NOE   Administration





     





     





     





     











ASSESSMENT/PLAN:


58 yof with PMhx of Lung ca s/p right lobectomy, Brain metastasis s/p surgery/

radiation 1 year ago, COPD, admitted with generalized abdominal pain after 

having a smoothie.





-Abdominal pain, ?gastroenteritis vs hepatic, less likely Gyn etiology


-CBD dilatation


-Ovarian cyst


-Lung ca s/p right lobectomy


-Brain metastasis s/p surgery/radiation 1 year ago


-COPD





Plan:


Follow up MRCP


GI input noted, follow CA 19-9, TGA ab. 


Trend labs.


PO clears, Increase IVF. 


Gyn input noted, outpatient follow up.


SCDs, encourage ambulation. 


Dispo pending above imaging, GI input and clinical improvement. 


Plan discussed with patient and nursing in detail, all questions answered. 








Visit type





- Emergency Visit


Emergency Visit: Yes


ED Registration Date: 07/05/19


Care time: The patient presented to the Emergency Department on the above date 

and was hospitalized for further evaluation of their emergent condition.





- New Patient


This patient is new to me today: No





- Critical Care


Critical Care patient: No





- Discharge Referral


Referred to [facility] No

## 2019-07-08 VITALS — TEMPERATURE: 98.5 F | HEART RATE: 80 BPM | DIASTOLIC BLOOD PRESSURE: 69 MMHG | SYSTOLIC BLOOD PRESSURE: 100 MMHG

## 2019-07-08 LAB
ALBUMIN SERPL-MCNC: 3.1 G/DL (ref 3.4–5)
ALP SERPL-CCNC: 75 U/L (ref 45–117)
ALT SERPL-CCNC: 16 U/L (ref 13–61)
ANION GAP SERPL CALC-SCNC: 6 MMOL/L (ref 8–16)
AST SERPL-CCNC: 11 U/L (ref 15–37)
BASOPHILS # BLD: 0.2 % (ref 0–2)
BILIRUB SERPL-MCNC: 0.2 MG/DL (ref 0.2–1)
BUN SERPL-MCNC: 7.2 MG/DL (ref 7–18)
CALCIUM SERPL-MCNC: 8.7 MG/DL (ref 8.5–10.1)
CHLORIDE SERPL-SCNC: 110 MMOL/L (ref 98–107)
CO2 SERPL-SCNC: 27 MMOL/L (ref 21–32)
CREAT SERPL-MCNC: 0.9 MG/DL (ref 0.55–1.3)
DEPRECATED RDW RBC AUTO: 14.6 % (ref 11.6–15.6)
EOSINOPHIL # BLD: 2.5 % (ref 0–4.5)
GLUCOSE SERPL-MCNC: 89 MG/DL (ref 74–106)
HCT VFR BLD CALC: 40.4 % (ref 32.4–45.2)
HGB BLD-MCNC: 13.3 GM/DL (ref 10.7–15.3)
LYMPHOCYTES # BLD: 32.3 % (ref 8–40)
MAGNESIUM SERPL-MCNC: 2.2 MG/DL (ref 1.8–2.4)
MCH RBC QN AUTO: 28.6 PG (ref 25.7–33.7)
MCHC RBC AUTO-ENTMCNC: 32.8 G/DL (ref 32–36)
MCV RBC: 87.4 FL (ref 80–96)
MONOCYTES # BLD AUTO: 6.9 % (ref 3.8–10.2)
NEUTROPHILS # BLD: 58.1 % (ref 42.8–82.8)
PHOSPHATE SERPL-MCNC: 3.6 MG/DL (ref 2.5–4.9)
PLATELET # BLD AUTO: 262 K/MM3 (ref 134–434)
PMV BLD: 7.9 FL (ref 7.5–11.1)
POTASSIUM SERPLBLD-SCNC: 3.9 MMOL/L (ref 3.5–5.1)
PROT SERPL-MCNC: 6.4 G/DL (ref 6.4–8.2)
RBC # BLD AUTO: 4.63 M/MM3 (ref 3.6–5.2)
SODIUM SERPL-SCNC: 144 MMOL/L (ref 136–145)
TRANSGLUTAMINASE IGA: < 2 U/ML (ref 0–3)
TTG IGG SER QL: < 2 U/ML (ref 0–5)
WBC # BLD AUTO: 6.2 K/MM3 (ref 4–10)

## 2019-07-08 RX ADMIN — BUDESONIDE AND FORMOTEROL FUMARATE DIHYDRATE SCH PUFF: 80; 4.5 AEROSOL RESPIRATORY (INHALATION) at 10:08

## 2019-07-08 RX ADMIN — TIOTROPIUM BROMIDE INHALATION SPRAY SCH PUFF: 3.12 SPRAY, METERED RESPIRATORY (INHALATION) at 10:09

## 2019-07-08 RX ADMIN — PANTOPRAZOLE SODIUM SCH MG: 40 TABLET, DELAYED RELEASE ORAL at 10:07

## 2019-07-08 RX ADMIN — POLYETHYLENE GLYCOL 3350 SCH: 17 POWDER, FOR SOLUTION ORAL at 10:07

## 2019-07-08 NOTE — DS
Physical Exam: 


SUBJECTIVE: Patient seen and examined. No more vomiting, with mild nausea, 

abdominal pain improved. No chest pain, no SOB. Pending report of MRCP








OBJECTIVE:





 Vital Signs











 Period  Temp  Pulse  Resp  BP Sys/Andino  Pulse Ox


 


 Last 24 Hr  98.1 F-98.8 F  72-83  16-20  /68-73  97-97





 Vital Signs











Temp  98.5 F   07/08/19 14:39


 


Pulse  80   07/08/19 14:39


 


Resp  20   07/08/19 14:39


 


BP  100/69   07/08/19 14:39


 


Pulse Ox  97   07/08/19 09:00








 Intake & Output











 07/07/19 07/08/19 07/08/19





 23:59 11:59 23:59


 


Intake Total 500  500


 


Balance 500  500


 


Intake:   


 


  IV 0  


 


    s/l 0  


 


  IVPB 100  


 


  Oral 400  500


 


Other:   


 


  Voiding Method Toilet Toilet 


 


  # Unmeasured Voids   


 


    Void  2 2


 


  Bowel Movement No No 











PHYSICAL EXAM





GENERAL: Obese female , awake, alert, and fully oriented, in no acute distress.


EYES: PERRL, extraocular movements intact, sclera anicteric, 


ENT: Ears normal, nares patent, oropharynx clear without exudates, moist mucous 

membranes.


LUNGS: Breath sounds equal, clear to auscultation bilaterally, no wheezes, no 

crackles


HEART: Regular rate and rhythm, S1, S2 


ABDOMEN: Soft, mild RUQ tenderness, Mild periumbilical tenderness, obese, 

nondistended, normoactive bowel sounds


EXTREMITIES: 2+ pulses, warm, well-perfused, no edema. 


NEUROLOGICAL: Cranial nerves II through XII grossly intact. Normal speech, gait 

not observed.





 CBC, BMP





 07/08/19 05:41 





 07/08/19 05:40 








LABS


 Laboratory Results - last 24 hr











  07/07/19 07/08/19 07/08/19





  06:15 05:40 05:41


 


WBC    6.2


 


RBC    4.63


 


Hgb    13.3


 


Hct    40.4


 


MCV    87.4


 


MCH    28.6


 


MCHC    32.8


 


RDW    14.6


 


Plt Count    262


 


MPV    7.9


 


Absolute Neuts (auto)    3.6


 


Neutrophils %    58.1


 


Lymphocytes %    32.3


 


Monocytes %    6.9


 


Eosinophils %    2.5


 


Basophils %    0.2


 


Nucleated RBC %    0


 


Sodium   144 


 


Potassium   3.9 


 


Chloride   110 H 


 


Carbon Dioxide   27 


 


Anion Gap   6 L 


 


BUN   7.2 


 


Creatinine   0.9 


 


Est GFR (CKD-EPI)AfAm   80.55 


 


Est GFR (CKD-EPI)NonAf   69.50 


 


Random Glucose   89 


 


Calcium   8.7 


 


Phosphorus   3.6 


 


Magnesium   2.2 


 


Total Bilirubin   0.2 


 


AST   11 L 


 


ALT   16 


 


Alkaline Phosphatase   75 


 


Total Protein   6.4 


 


Albumin   3.1 L 


 


Tiss Transglutamin IgG  < 2  


 


Tiss Transglutamin IgA  < 2  











 Laboratory Tests











  07/07/19





  06:15


 


CA 19-9 Antigen  49 H


 


Tiss Transglutamin IgG  < 2


 


Tiss Transglutamin IgA  < 2











CTAP : Main pancreatic duct at level of head and neck reveal nonspecific 

dilatation of 0.7cm. Probable diffuse hepatic steatosis. mild Sigmoid 

diverticulosis with no evidence of acute diverticulitis. Small amount of pelvic 

ascites and mild subtle enlargement of the right ovary.


MRCP: R Basilar linear scarring . L posterior dependent lung atelectasis. 

Atrophy of pancreatic body/tail compared to head. Pancreatic duct in head 

region 5mm. Duct in body 2-3mm and duct in tail 1mmm. 1.4T1 hyperintensity 

lesion of pancreatic head/infarct with susceptible infarct possibly small 

duodenal diverticulum. Distended gall bladder with stones/sludge. No abnormal 

gall bladder wall thickening /surrounding edema. ERCP with endoscopic US or 

short term MRI in 3 months to exclude occult lesion.











HOSPITAL COURSE:





Date of Admission:07/05/19





Date of Discharge: 07/08/19





Patient is a 58 year old female with past medical history of lung ca s/p 

lobectomy, brain cancer s/p surgery, asthma, COPD, presented to the ED due to 

sudden onset severe diffuse abdominal pain that started yesterday afternoon. 

Patient reported she was feeling okay during the day, later on had some smoothie

, which she normally has, and afterwards experienced sudden diffuse abdominal 

pain. This was accompanied by nausea and NBNB vomiting. Patient denies any fever

, chills, headache, dizziness, chest pain, SOB, diarrhea, constipation, urinary 

symptoms. There was concern for small cut pelvic ascites and mild subtle 

enlargement of right ovary may be concerning for malignancy on preliminary 

imaging and gyn was consulted (Dr Norris). TVUs was not concerning for 

pathology and patient was referred for outpatient pap smear. Nausea and 

vomiting resolved, pt was seen by GI and scheduled for out patient follow up 

with repeat imaging in 3 months.

















Minutes to complete discharge: 40





Discharge Summary


Reason For Visit: ABDOMINAL PAIN


Condition: Stable





- Instructions


Diet, Activity, Other Instructions: 


You came in for abdominal pain that has resolved. 


You got an MRI of your abdomen


You will need a repeat MRI in 3 months to check for any growth around the 

pancreas/duodenum.


You may use over the counter prilosec as needed and miralax





 Continue your home medications as prescribed.





Follow up with a GI doctor in a week


Please follow up with Gynecologist for routine check up in 1-2 months.


Follow up with your primary care physician in one week





PENDING BLOOD TESTS:


CA 19-9 and Transglutaminase Ab blood test results are pending, please have 

your doctor follow up on results or discuss with Dr. Galvez on next visit in 1-

2 weeks. 


If you feel your symptoms are not getting better, with worsening abdominal pain

, fever, worsening nausea and vomiting, please go to the nearest emergency room


Referrals: 


Jennifer Norris MD [Staff Physician] - 


Gregg Galvez MD [Staff Physician] - 1 Week


Disposition: HOME





- Home Medications


Comprehensive Discharge Medication List: 


Ambulatory Orders





Fluticasone/Salmeterol [Advair 250-50 Diskus] 1 each IH BID 07/06/19 


Tiotropium Bromide [Spiriva] 1 inh PO DAILY 07/06/19 








This patient is new to me today: Yes


Date on this admission: 07/08/19


Emergency Visit: Yes


ED Registration Date: 07/05/19


Care time: The patient presented to the Emergency Department on the above date 

and was hospitalized for further evaluation of their emergent condition.


Critical Care patient: No





- Discharge Referral


Referred to [facility] No





ATTENDING PHYSICIAN STATEMENT





I saw and evaluated the patient.


I reviewed the resident's note and discussed the case with the resident.


I agree with the resident's findings and plan as documented.








SUBJECTIVE:








OBJECTIVE:








ASSESSMENT AND PLAN:

## 2019-07-08 NOTE — PN
Progress Note (short form)





- Note


Progress Note: 





Patient seen and examined


Labs reviewed





Reports resolution of abdominal pain


Tolerating regular diet


 Vital Signs











Temp  98.1 F   07/08/19 06:00


 


Pulse  72   07/08/19 06:00


 


Resp  18   07/08/19 09:00


 


BP  97/68   07/08/19 06:00


 


Pulse Ox  97   07/08/19 09:00








NAD


Abd soft NT ND





 CBC, BMP





 07/08/19 05:41 





 07/08/19 05:40 





 Hepatic Panel











Total Bilirubin  0.2 mg/dL (0.2-1)   07/08/19  05:40    


 


Direct Bilirubin  0.1 mg/dL (0.0-0.2)   07/07/19  06:15    


 


AST  11 U/L (15-37)  L  07/08/19  05:40    


 


ALT  16 U/L (13-61)   07/08/19  05:40    


 


Alkaline Phosphatase  75 U/L ()   07/08/19  05:40    


 


Albumin  3.1 g/dl (3.4-5.0)  L  07/08/19  05:40    








MRCP reviewed, possible small duodenal diverticulum, follow up exam recommended 

in 3 months to rule out small occult lesion





Impression: abdominal pain - largely resolved


Short interval follow up abdominal MRI/MRCP in 3 mo

## 2019-07-08 NOTE — PN
Physical Exam: 


SUBJECTIVE: Patient seen and examined








OBJECTIVE:





 Vital Signs











 Period  Temp  Pulse  Resp  BP Sys/Andino  Pulse Ox


 


 Last 24 Hr  98.1 F-98.6 F  68-83  16-20  /55-73  97-97











GENERAL: The patient is awake, alert, and fully oriented, in no acute distress.


HEAD: Normal with no signs of trauma.


EYES: PERRL, extraocular movements intact, sclera anicteric, conjunctiva clear. 

No ptosis. 


ENT: Ears normal, nares patent, oropharynx clear without exudates, moist mucous 


membranes.


NECK: Trachea midline, full range of motion, supple. 


LUNGS: Breath sounds equal, clear to auscultation bilaterally, no wheezes, no 

crackles, no 


accessory muscle use. 


HEART: Regular rate and rhythm, S1, S2 without murmur, rub or gallop.


ABDOMEN: Soft, nontender, nondistended, normoactive bowel sounds, no guarding, 

no 


rebound, no hepatosplenomegaly, no masses.


EXTREMITIES: 2+ pulses, warm, well-perfused, no edema. 


NEUROLOGICAL: Cranial nerves II through XII grossly intact. Normal speech, gait 

not 


observed.


PSYCH: Normal mood, normal affect.


SKIN: Warm, dry, normal turgor, no rashes or lesions noted














 Laboratory Results - last 24 hr











  07/08/19 07/08/19





  05:40 05:41


 


WBC   6.2


 


RBC   4.63


 


Hgb   13.3


 


Hct   40.4


 


MCV   87.4


 


MCH   28.6


 


MCHC   32.8


 


RDW   14.6


 


Plt Count   262


 


MPV   7.9


 


Absolute Neuts (auto)   3.6


 


Neutrophils %   58.1


 


Lymphocytes %   32.3


 


Monocytes %   6.9


 


Eosinophils %   2.5


 


Basophils %   0.2


 


Nucleated RBC %   0


 


Sodium  144 


 


Potassium  3.9 


 


Chloride  110 H 


 


Carbon Dioxide  27 


 


Anion Gap  6 L 


 


BUN  7.2 


 


Creatinine  0.9 


 


Est GFR (CKD-EPI)AfAm  80.55 


 


Est GFR (CKD-EPI)NonAf  69.50 


 


Random Glucose  89 


 


Calcium  8.7 


 


Phosphorus  3.6 


 


Magnesium  2.2 


 


Total Bilirubin  0.2 


 


AST  11 L 


 


ALT  16 


 


Alkaline Phosphatase  75 


 


Total Protein  6.4 


 


Albumin  3.1 L 








Active Medications











Generic Name Dose Route Start Last Admin





  Trade Name Freq  PRN Reason Stop Dose Admin


 


Acetaminophen  1,000 mg  07/06/19 03:05  07/07/19 12:08





  Ofirmev Injection -  IVPB   1,000 mg





  Q6H PRN   Administration





  PAIN LEVEL 6-10   





     





     





     


 


Albuterol Sulfate  1 amp  07/06/19 03:06  07/06/19 06:25





  Ventolin 0.083% Nebulizer Soln -  NEB   1 amp





  Q6H PRN   Administration





  SHORT OF BREATH/WHEEZING   





     





     





     


 


Budesonide/Formoterol Fumarate  2 puff  07/06/19 13:00  07/07/19 21:13





  Symbicort 80/4.5mcg -  IH   2 puff





  BID NOE   Administration





     





     





     





     


 


Ondansetron HCl  4 mg  07/06/19 18:16  07/07/19 09:33





  Zofran Injection  IVPUSH   4 mg





  Q6H PRN   Administration





  NAUSEA AND/OR VOMITING   





     





     





     


 


Pantoprazole Sodium  40 mg  07/06/19 22:00  07/07/19 21:13





  Protonix -  PO   40 mg





  BID NOE   Administration





     





     





     





     


 


Polyethylene Glycol  17 gm  07/06/19 22:00  07/07/19 21:14





  Miralax (For Daily Use) -  PO   17 grams





  BID NOE   Administration





     





     





     





     


 


Tiotropium Bromide  2 puff  07/06/19 13:00  07/07/19 09:36





  Spiriva Respimat  IH   2 puff





  DAILY NOE   Administration





     





     





     





     











ASSESSMENT/PLAN:








ATTENDING PHYSICIAN STATEMENT





I saw and evaluated the patient.


I reviewed the resident's note and discussed the case with the resident.


I agree with the resident's findings and plan as documented.








SUBJECTIVE:








OBJECTIVE:








ASSESSMENT AND PLAN:

## 2019-07-08 NOTE — PN
Teaching Attending Note


Name of Resident: Jeny Contreras





ATTENDING PHYSICIAN STATEMENT





I saw and evaluated the patient.


I reviewed the resident's note and discussed the case with the resident.


I agree with the resident's findings and plan as documented with exceptions 

below.








SUBJECTIVE:


Patient seen and examined. pain improved, tolerating diet well. 





OBJECTIVE:


 Vital Signs











 Period  Temp  Pulse  Resp  BP Sys/Andino  Pulse Ox


 


 Last 24 Hr  98.1 F-98.8 F  72-83  16-20  /65-73  97-97








 Intake & Output











 07/05/19 07/06/19 07/07/19 07/08/19





 23:59 23:59 23:59 23:59


 


Intake Total 100 1450 850 


 


Balance 100 1450 850 


 


Weight 220 lb 215 lb 2 oz  








General: sitting in bed in in no acute distress


Chest: CTAB, no rales or wheezing


CVS:S1S2 regular


Abdomen:soft, obese, NT


Extremities: no edema


Neck: soft, supple, no JVD





 Home Medications











 Medication  Instructions  Recorded


 


Fluticasone/Salmeterol [Advair 1 each IH BID 07/06/19





250-50 Diskus]  


 


Tiotropium Bromide [Spiriva] 1 inh PO DAILY 07/06/19








Active Medications





Acetaminophen (Ofirmev Injection -)  1,000 mg IVPB Q6H PRN


   PRN Reason: PAIN LEVEL 6-10


   Last Admin: 07/07/19 12:08 Dose:  1,000 mg


Albuterol Sulfate (Ventolin 0.083% Nebulizer Soln -)  1 amp NEB Q6H PRN


   PRN Reason: SHORT OF BREATH/WHEEZING


   Last Admin: 07/06/19 06:25 Dose:  1 amp


Budesonide/Formoterol Fumarate (Symbicort 80/4.5mcg -)  2 puff IH BID [facility]


   Last Admin: 07/08/19 10:08 Dose:  2 puff


Ondansetron HCl (Zofran Injection)  4 mg IVPUSH Q6H PRN


   PRN Reason: NAUSEA AND/OR VOMITING


   Last Admin: 07/07/19 09:33 Dose:  4 mg


Pantoprazole Sodium (Protonix -)  40 mg PO BID NOE


   Last Admin: 07/08/19 10:07 Dose:  40 mg


Polyethylene Glycol (Miralax (For Daily Use) -)  17 gm PO BID [facility]


   Last Admin: 07/08/19 10:07 Dose:  Not Given


Tiotropium Bromide (Spiriva Respimat)  2 puff IH DAILY NOE


   Last Admin: 07/08/19 10:09 Dose:  2 puff





 Laboratory Results - last 24 hr











  07/08/19 07/08/19





  05:40 05:41


 


WBC   6.2


 


RBC   4.63


 


Hgb   13.3


 


Hct   40.4


 


MCV   87.4


 


MCH   28.6


 


MCHC   32.8


 


RDW   14.6


 


Plt Count   262


 


MPV   7.9


 


Absolute Neuts (auto)   3.6


 


Neutrophils %   58.1


 


Lymphocytes %   32.3


 


Monocytes %   6.9


 


Eosinophils %   2.5


 


Basophils %   0.2


 


Nucleated RBC %   0


 


Sodium  144 


 


Potassium  3.9 


 


Chloride  110 H 


 


Carbon Dioxide  27 


 


Anion Gap  6 L 


 


BUN  7.2 


 


Creatinine  0.9 


 


Est GFR (CKD-EPI)AfAm  80.55 


 


Est GFR (CKD-EPI)NonAf  69.50 


 


Random Glucose  89 


 


Calcium  8.7 


 


Phosphorus  3.6 


 


Magnesium  2.2 


 


Total Bilirubin  0.2 


 


AST  11 L 


 


ALT  16 


 


Alkaline Phosphatase  75 


 


Total Protein  6.4 


 


Albumin  3.1 L 








 Microbiology





07/05/19 19:00   Urine - Urine Clean Catch   Urine Culture - Final


                            NO GROWTH OBTAINED











ASSESSMENT AND PLAN:


58 yof with PMhx of Lung ca s/p right lobectomy, Brain metastasis s/p surgery/

radiation 1 year ago, COPD, admitted with generalized abdominal pain after 

having a smoothie.





-Abdominal pain, ?gastroenteritis vs hepatic, less likely Gyn etiology


-CBD dilatation


-Ovarian cyst


-Lung ca s/p right lobectomy


-Brain metastasis s/p surgery/radiation 1 year ago


-COPD





Plan:


MRCP results, GI input appreciated.


Outpatient MRI in 3 [facility]. 


GI input noted, follow CA 19-9, TGA ab. 


tolerating diet well


D/c home today with outpatient GI follow up


Plan discussed with patient in detail, all questions answered.

## 2019-09-11 ENCOUNTER — FORM ENCOUNTER (OUTPATIENT)
Age: 59
End: 2019-09-11

## 2019-09-12 ENCOUNTER — OUTPATIENT (OUTPATIENT)
Dept: OUTPATIENT SERVICES | Facility: HOSPITAL | Age: 59
LOS: 1 days | End: 2019-09-12
Payer: MEDICARE

## 2019-09-12 ENCOUNTER — APPOINTMENT (OUTPATIENT)
Dept: MRI IMAGING | Facility: HOSPITAL | Age: 59
End: 2019-09-12
Payer: MEDICARE

## 2019-09-12 ENCOUNTER — APPOINTMENT (OUTPATIENT)
Dept: CT IMAGING | Facility: HOSPITAL | Age: 59
End: 2019-09-12

## 2019-09-12 DIAGNOSIS — Z41.9 ENCOUNTER FOR PROCEDURE FOR PURPOSES OTHER THAN REMEDYING HEALTH STATE, UNSPECIFIED: Chronic | ICD-10-CM

## 2019-09-12 DIAGNOSIS — Z98.890 OTHER SPECIFIED POSTPROCEDURAL STATES: Chronic | ICD-10-CM

## 2019-09-12 DIAGNOSIS — Z90.49 ACQUIRED ABSENCE OF OTHER SPECIFIED PARTS OF DIGESTIVE TRACT: Chronic | ICD-10-CM

## 2019-09-12 PROCEDURE — 74177 CT ABD & PELVIS W/CONTRAST: CPT

## 2019-09-12 PROCEDURE — 71260 CT THORAX DX C+: CPT

## 2019-09-12 PROCEDURE — 70553 MRI BRAIN STEM W/O & W/DYE: CPT | Mod: 26

## 2019-09-12 PROCEDURE — 70553 MRI BRAIN STEM W/O & W/DYE: CPT

## 2019-09-12 PROCEDURE — 71260 CT THORAX DX C+: CPT | Mod: 26

## 2019-09-12 PROCEDURE — 74177 CT ABD & PELVIS W/CONTRAST: CPT | Mod: 26

## 2019-09-12 PROCEDURE — A9585: CPT

## 2019-09-17 NOTE — VITALS
[Least Pain Intensity: 0/10] : 0/10 [Maximal Pain Intensity: 5/10] : 5/10 [Pain Location: ___] : Pain Location: [unfilled] [NSAID/Non-Opioid] : NSAID/Non-Opioid [80: Normal activity with effort; some signs or symptoms of disease.] : 80: Normal activity with effort; some signs or symptoms of disease.  [ECOG Performance Status: 1 - Restricted in physically strenuous activity but ambulatory and able to carry out work of a light or sedentary nature] : Performance Status: 1 - Restricted in physically strenuous activity but ambulatory and able to carry out work of a light or sedentary nature, e.g., light house work, office work

## 2019-09-18 ENCOUNTER — APPOINTMENT (OUTPATIENT)
Dept: RADIATION ONCOLOGY | Facility: CLINIC | Age: 59
End: 2019-09-18
Payer: MEDICARE

## 2019-09-18 VITALS
BODY MASS INDEX: 37.15 KG/M2 | OXYGEN SATURATION: 97 % | HEART RATE: 79 BPM | DIASTOLIC BLOOD PRESSURE: 92 MMHG | SYSTOLIC BLOOD PRESSURE: 127 MMHG | WEIGHT: 216.4 LBS | RESPIRATION RATE: 16 BRPM

## 2019-09-18 PROCEDURE — 99212 OFFICE O/P EST SF 10 MIN: CPT

## 2019-09-18 NOTE — HISTORY OF PRESENT ILLNESS
[FreeTextEntry1] : Ms Ashlie Mendoza is a 58y old female with a history of adenocarcinoma of the lung initially diagnosed during pre-operative workup for surgery for an orthopedic issue. She underwent right lower lobectomy with en bloc middle lobe resection on 10/24/2016, which revealed  pT3, pN1 with visceral pleura and LVI, involving 2/4 peribronchial lymph nodes. She completed  adjuvant chemotherapy between January and March 2017. \par \par She then developed a brain metastasis, and completed radiation therapy with Dr Owen for a total of 2700 cGy from 11/14/17- 11/20/17 over 3 fractions for treatment of left parieto-occipital mets (s/p subtotal resection). She had a cerebral hemorrhage in November 2017 post treatment and was hospitalized at [facility]. On follow up surveillance MRI brain 1/3/18 she was found to a have a new 5 mm right parietal enhancing mass. She is s/p Gamma Knife stereotactic radiosurgery on 1/18/2018 by Dr. Rico and Dr. Owen at [facility].\par \par She now follows up with Dr. Barrios since Dr. Owen has moved to [facility]. \par \par 9/18/19 - Follow-up\par Ms. Mendoza returns today for routine follow-up.  She was last seen by Dr. Barrios on 6/3/19.  Plan at that time was for MRI brain in 3 months, nutrition consult and neurology consult for memory loss.  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 Perilesional FLAIR signal is stable, without mass effect. Continued surveillance is advised.  No new metastatic lesion to the brain. \par \par She also underwent surveillance CT CAP on 9/12/19 ordered by Dr. Peterson.  \par \par CT CAP 9/12/19 - IMPRESSION: Status post right lower lobe lobectomy. No recurrent or metastatic tumor seen in chest, abdomen, pelvis. The prior described nodule in the right middle lobe is not seen on this study. Could have represented bronchial mucus impaction. \par \par She last saw Dr. Peterson on Monday.  She states she has not seen neurology for workup of memory loss because she forgot.  Today, she reports feeling well.  She reports short-term memory loss is unchanged, but bothersome to her.  She notes intermitted headaches approximately once per month relieved by Excedrin.  She also notes blurry vision while reading for the past month.  She denies dizziness, lightheadedness, nausea, vomiting, hearing disturbances, unilateral weakness.  \par \par 6/3/19 - Follow-up\par Ms. Mendoza returns today for routine follow-up.  She was last seen here on 3/28/19.  She reported having imaging done at [facility] in February, but reports were not available at that time.  Plan at that time was MRI in May and follow-up with Dr. Peterson for systemic imaging.\par \par CT chest, abdomen and pelvis with contrast 2/7/19 ([facility]) - Impression: Since November 2, 2018 no new metastases \par \par MRI Brain with and without contrast 2/7/19 ([facility]) - Impression: Since November 2, 2018, evolving postoperative and treatment changes in the left parietal lobe.  Interval slightly increased enhancement marginating the resection cavity in the left parietal lobe is nonspecific and possibly reflects sequelae of postoperative change, however recommend continued surveillance to document stability.  \par \par MRI Brain with and without contrast 5/30/19 - IMPRESSION: Interval progression of enhancement along the left parietal \par resection cavity with stable to slightly increased surrounding FLAIR/T2 signal abnormality. This may represent recurrent tumoral disease or post therapeutic changes. \par \par She is unsure of the last time she saw Dr. Peterson, but states she is scheduled for follow-up again on 6/17/19. She had CT CAP for him on 5/30/19.  She saw endocrine, Dr. Nichols on 4/2/19; plan is for thyroid US in October.  TSH 1.27, free T4 1.2 on 4/15/19.  Today, she reports feeling "pretty good."  She notes unchanged intermittent headaches approximately once every 2 weeks, rated 5/10, little relief with motrin.  She also reports continued short term memory loss.  She also notes RUQ pain that she has "always" had and an episode of vomiting yesterday.  She states she has been trying to lose weight to no avail; requesting dietician referral.  She denies fevers, chills, dizziness, lightheadedness, unilateral weakness, visual/hearing disturbances, dyspnea, chest pain, palpitations, gait disturbances.  \par \par 3/28/19 - FOLLOW UP\par Ms. Mendoza is here for routine follow up. She was last seen here on 4/19/18. Insurance issues prevented her from coming back until now.  The plan at the time was to repeat MRI in 3 months and continue follow up with Dr. Peterson. She last saw Dr. Peterson approx two weeks ago, and as per patient, plan is for continued surveillance with chest CT and brain MRI. She reports she had a brain MRI and CTs of lungs done at [facility] around February 2019 under the care of Dr. Da Reveles; report/ disc not available at this time. Dr. Peterson's office will fax what records they have. \par \par MRI done 6/13/18 showed the following:\par -resolution of small right parietal lobe enhancing focus and edema, indicating favorable response to therapy. \par -There has been further evolution of left parietal surgical site with resolution of enhancement and increase in white matter signal now crossing the corpus callosum that is presumed post-RT change. \par -No new brain lesion given slight motion limitation of study. \par \par She saw Dr. Montano for cardiology evaluation of pulmonary hypertension prior to left shoulder surgery on 8/17/18. She was cleared for surgery at that visit and started on inhalers. Oxycodone was given for pain. She saw edward Tapia, for thyroid follow up on 10/4/18, found to be euthyroid; plan was for TFT and thyroid u/s, consider repeat FNA biopsy. She saw him last on 2/21/19, and was sent to ED for respiratory symptoms, SOB. She was found to have COPD exacerbation, managed with Prednisone and duonebs, and advised to follow up with pulmonologist Dr. Wallace. \par \par Of note, she had CT chest on 10/18/18 and it showed pulmonary emphysema; no evidence of disease recurrence. Plan is to repeat imaging in six months.  \par \par Today, she reports she has been feeling overall well. No new neuro complaints. Continues to have memory impairment (short term). She also reports a "nagging" soreness to left side of scalp for the past few weeks, does not recall any head injuries. She denies any other c/o to include headache, vision changes, numbness, tingling, fever, chills, fatigue, CP, SOB, cough, N/V/D. \par ______________________________________________________\par \par 4/19/18- FOLLOW UP\par Ms. Mendoza presents today for routine follow up. Since her last visit with us she saw Dr. Gutierrez of ophthalmology. \par \par Her PET scan on 2/27/18 showed that since 11/30/17 there was a subcentimeter hypermetabolic lymph node in the left inguinal region of uncertain significance. She saw Dr. Mann regarding this on 3/15/18, and he recommended follow up with her PCP Dr. Vignesh Wallace regarding viral illness. \par \par She has been feeling overall well. No new neuro complaints. Continues to have memory loss.\par \par Oncologic History\par Ms Ashlie Mendoza is a 57y old female with a history of adenocarcinoma of the lung initially diagnosed during per-operative workup for surgery in July 2017 for an orthopedic issue. She underwent right lower lobectomy with en bloc middle lobe resection on 10/24/2016, which revealed  pT3, pN1 with visceral pleura and LVI, involving 2/4 peribronchial lymph nodes. She completed chemo between January and March 2017. She did not receive radiation therapy though prior to undergoing surgery she was simulated for potential radiation therapy.  \par \par Her current relevant history dates back to July when she began having headaches.  October 3 when she reports not "feeling right,"  accompanied by feeling hot. She called her son and told him that she "was not feeling right" who then called the ambulance. She reports she woke up in the emergency room at [facility] in [place]. Upon finding a mass in her head, she was transferred to [facility]. She is unsure if she lost consciousness.\par \par She underwent craniotomy with left parietooccipital mass resection 10/4/17  for metastasis presumably from her lung cancer at [place]. We do not have pathology available at this time. Her post-op course was uneventful and she was subsequently discharged to rehab. She presented to [facility] from rehab on  10/19/17. MRI brain showed hematoma at the resection site, and along the tract site. There was nodular enhancement surrounding the hematoma and extending along the tract site. \par \par \par 1/3 - Today she feels well. She denies nausea, notes intermittent headaches, relieved by 30 mg oxycodone PRN. She has not tried tylenol for this. Her memory is improving. She notes right visual field cut is improving. Her energy is improving. her keppra was recently stopped by her neurosurgeon. She will see neurology at the end of the month, and will see Dr. Peterson at the end of the month.  MRI from today shows no evidence of residual disease at site of tumor.  There is a new 0.6 cm right parietal enhancing lesion suspicious of metastasis. \par  \par 2/13/18-PTE\par Ms. Mendoza completed radiation therapy for a total of 2700 cGy from 11/14/17- 11/20/17 over 3 fractions for treatment of left parieto-occipital mets. She had a cerebral hemorrhage in Nov. 2017 post treatment and was hospitalized in [facility].  On follow up surveillance MRI brain 1/3/18 she was found to a have a new 5 mm right parietal enhancing mass s/p Gamma Knife stereotactic radiosurgery on 1/18/2018 by Dr. Rico and Dr. Owen at [facility].\par Since her last visit she has f/u with Dr. Barclay(neuro) in Jan. 2018 and Dr. Rico yesterday. She has trouble with right peripheral vision and has appt. with neuro opthalmology (Andrea Gutierrez) on 2/16/18. \par Today she feels well. Has occasional headaches, takes tylenol, not persistent and don't feel like they did before. Denies seizures. Has pain in right shoulder and was told that she needs surgery but has been postponing. Takes OxyContin for shoulder and lower back pain as needed. She has appt. for thyroid scan today. She will f/u with Dr. Peterson in March and will have PET before. She also has bilateral thigh weakness at times, possibly related to steroids.

## 2019-09-18 NOTE — PHYSICAL EXAM
[General Appearance - Well Developed] : well developed [General Appearance - Alert] : alert [Obese] : obese [Sclera] : the sclera and conjunctiva were normal [PERRL With Normal Accommodation] : pupils were equal in size, round, reactive to light [Hearing Threshold Finger Rub Not [place]] : hearing was normal [Normal] : supple with no thyromegaly or masses appreciated [] : no respiratory distress [Respiration, Rhythm And Depth] : normal respiratory rhythm and effort [Auscultation Breath Sounds / Voice Sounds] : lungs were clear to auscultation bilaterally [Heart Sounds] : normal S1 and S2 [Heart Rate And Rhythm] : heart rate and rhythm were normal [Bowel Sounds] : normal bowel sounds [Abdomen Soft] : soft [Cervical Lymph Nodes Enlarged Anterior Bilaterally] : anterior cervical [Cervical Lymph Nodes Enlarged Posterior Bilaterally] : posterior cervical [Supraclavicular Lymph Nodes Enlarged Bilaterally] : supraclavicular [Musculoskeletal - Swelling] : no joint swelling [Skin Color & Pigmentation] : normal skin color and pigmentation [Oriented To Time, Place, And Person] : oriented to person, place, and time [Mood] : the mood was normal [de-identified] : ECOG 1-2 [de-identified] : CN II-XII grossly intact, strength 4/5 in B/L upper and lower extremities, wide stanced gait, tandem walk not tested.  Romberg is positive.

## 2019-09-18 NOTE — REVIEW OF SYSTEMS
[Tinnitus - Grade 0] : Tinnitus - Grade 0 [Blurred Vision: Grade 0] : Blurred Vision: Grade 0 [Cognitive Disturbance: Grade 1 - Mild cognitive disability; not interfering with work/school/life performance; specialized educational services/devices not indicated] : Cognitive Disturbance: Grade 1 - Mild cognitive disability; not interfering with work/school/life performance; specialized educational services/devices not indicated [Concentration Impairment: Grade 1] : Concentration Impairment: Grade 1 - Mild inattention or decreased level of concentration [Dizziness: Grade 0] : Dizziness: Grade 0  [Headache: Grade 1 - Mild pain] : Headache: Grade 1 - Mild pain [Negative] : Gastrointestinal [Chest Pain] : no chest pain [FreeTextEntry9] : chronic back pain [de-identified] : see HPI

## 2019-09-18 NOTE — DISEASE MANAGEMENT
[Pathological] : TNM Stage: p [IV] : IV [FreeTextEntry4] : Now recurrent [TTNM] : 3 [NTNM] : 1 [MTNM] : 0 [de-identified] : right parietal brain

## 2019-10-16 ENCOUNTER — APPOINTMENT (OUTPATIENT)
Dept: SLEEP CENTER | Facility: HOSPITAL | Age: 59
End: 2019-10-16

## 2019-10-16 ENCOUNTER — OUTPATIENT (OUTPATIENT)
Dept: OUTPATIENT SERVICES | Facility: HOSPITAL | Age: 59
LOS: 1 days | End: 2019-10-16
Payer: MEDICARE

## 2019-10-16 ENCOUNTER — APPOINTMENT (OUTPATIENT)
Dept: NEUROLOGY | Facility: CLINIC | Age: 59
End: 2019-10-16
Payer: MEDICARE

## 2019-10-16 VITALS
HEART RATE: 80 BPM | WEIGHT: 216 LBS | HEIGHT: 64 IN | DIASTOLIC BLOOD PRESSURE: 75 MMHG | BODY MASS INDEX: 36.88 KG/M2 | SYSTOLIC BLOOD PRESSURE: 103 MMHG | OXYGEN SATURATION: 97 % | TEMPERATURE: 98.5 F

## 2019-10-16 DIAGNOSIS — Z90.49 ACQUIRED ABSENCE OF OTHER SPECIFIED PARTS OF DIGESTIVE TRACT: Chronic | ICD-10-CM

## 2019-10-16 DIAGNOSIS — Z41.9 ENCOUNTER FOR PROCEDURE FOR PURPOSES OTHER THAN REMEDYING HEALTH STATE, UNSPECIFIED: Chronic | ICD-10-CM

## 2019-10-16 DIAGNOSIS — G47.33 OBSTRUCTIVE SLEEP APNEA (ADULT) (PEDIATRIC): ICD-10-CM

## 2019-10-16 DIAGNOSIS — R06.83 SNORING: ICD-10-CM

## 2019-10-16 DIAGNOSIS — Z98.890 OTHER SPECIFIED POSTPROCEDURAL STATES: Chronic | ICD-10-CM

## 2019-10-16 PROCEDURE — 95810 POLYSOM 6/> YRS 4/> PARAM: CPT | Mod: 26

## 2019-10-16 PROCEDURE — 99215 OFFICE O/P EST HI 40 MIN: CPT

## 2019-10-16 PROCEDURE — 95810 POLYSOM 6/> YRS 4/> PARAM: CPT

## 2019-10-17 LAB
25(OH)D3 SERPL-MCNC: 27.9 NG/ML
FOLATE SERPL-MCNC: 8.8 NG/ML
TSH SERPL-ACNC: 1.36 UIU/ML
VIT B12 SERPL-MCNC: 712 PG/ML

## 2019-10-24 ENCOUNTER — APPOINTMENT (OUTPATIENT)
Dept: ENDOCRINOLOGY | Facility: CLINIC | Age: 59
End: 2019-10-24
Payer: MEDICARE

## 2019-10-24 VITALS
DIASTOLIC BLOOD PRESSURE: 73 MMHG | HEART RATE: 84 BPM | WEIGHT: 212 LBS | SYSTOLIC BLOOD PRESSURE: 103 MMHG | BODY MASS INDEX: 36.39 KG/M2

## 2019-10-24 PROCEDURE — 99213 OFFICE O/P EST LOW 20 MIN: CPT

## 2019-10-25 NOTE — HISTORY OF PRESENT ILLNESS
[FreeTextEntry1] : 5/30/17 chest CT: right 1.4 cm thyroid nodule, and left probable 2.3 cm nodule\par 4/19/17 right thyroid 2.9 cm nodule [facility] biopsy at [facility] : Benign, [place] II\par June 2017, calcium 10.2, TSH 1.2, free T4: 1.2\par 10/4/18: TSH 1.6 \par 10/18/18 thyroid US: R lobe mid upper which was previously biopsied, measures 3 x 1.5 x 2.1 cm, spongy form (previously 3.2 x 1.7 x 2.0 cm). L lobe upper pole solid nodule 2.2 x 1.6 x 1.8 cm unchanged from previous US report from 2/13/18.\par 2/21/19 CT Chest Angio: No sign of pulmonary embolism. Emphysema presents and suggestion of pulmonary HTN\par 4/15/19: Free T4 1.2, TSH 1.27\par 10/16/19: Vit D 25 OH 27.9, TSH 1.36, B12 712, Folate 8.8, \par \par 58 y/o F pt with a Hx of thyroid nodules (dx in 2016) who presents today for thyroid follow-up. \par PMHx: COPD, overweight, R lower lobectomy for lung adenocarcinoma (October 2016). \par PSHx: brain surgery for lung CA mets (October 2017). Pt received gamma knife treatment in January of 2018. \par Recently seen PCP\par \par Patient presents today for her routine thyroid nodules follow up. On 2/21/19, pt had scheduled appointment here, but was sent to the ED for SOB. Pt relates that she was admitted in the hospital at that time and given breathing treatments and fluids. Pt's cancer doctor ordered for MRI and CT of head for May 2019. Pt denies difficulty breathing, voice change, difficulty swallowing. \par \par 10/24/2019 \par Pt presents today for a thyroid f/u, feeling well overall. She denies neck pain and swallowing difficulties. Pt has been seen by her doctor ans was told there are no signs of lung cancer and no brain lesion. She was advised to have a MRI for the brain for 3 months.\par \par Current Medications:

## 2019-10-25 NOTE — ADDENDUM
[FreeTextEntry1] : I, Amilcar Hernandez, acted solely as a scribe for Dr. Norris Nichols on this date. 10/24/2019.

## 2019-10-25 NOTE — ASSESSMENT
[FreeTextEntry1] : 57 years old female with history lung cancer with metastases to the brain\par \par 1. Hx of thyroid nodules with a long hx of brain CA : \par Recent TSH result is normal. Physical exam shows b/l thyroid nodules, unchanged from previous one. Referred pt for thyroid US. Considered FNA biopsy for the L and R thyroid nodules.\par \par f/u 4 months

## 2019-10-25 NOTE — END OF VISIT
[FreeTextEntry3] : All medical record entries made by the Scribe were at my, Dr. Norris Nichols, direction and personally dictated by me on 10/24/2019 I have reviewed the chart and agree that the record accurately reflects my personal performance of the history, physical exam, assessment and plan. I have also personally directed, reviewed and agreed with the chart.  [Time Spent: ___ minutes] : I have spent [unfilled] minutes of face to face time with the patient

## 2019-10-25 NOTE — PHYSICAL EXAM
[Alert] : alert [Normal Sclera/Conjunctiva] : normal sclera/conjunctiva [EOMI] : extra ocular movement intact [No Proptosis] : no proptosis [Normal Oropharynx] : the oropharynx was normal [No Respiratory Distress] : no respiratory distress [Clear to Auscultation] : lungs were clear to auscultation bilaterally [No Accessory Muscle Use] : no accessory muscle use [Normal Rate] : heart rate was normal  [Normal S1, S2] : normal S1 and S2 [Regular Rhythm] : with a regular rhythm [Pedal Pulses Normal] : the pedal pulses are present [No Edema] : there was no peripheral edema [Normal Bowel Sounds] : normal bowel sounds [Post Cervical Nodes] : posterior cervical nodes [Anterior Cervical Nodes] : anterior cervical nodes [Axillary Nodes] : axillary nodes [Normal] : normal and non tender [No Spinal Tenderness] : no spinal tenderness [Spine Straight] : spine straight [No Stigmata of Cushings Syndrome] : no stigmata of cushings syndrome [Normal Gait] : normal gait [Normal Strength/Tone] : muscle strength and tone were normal [No Rash] : no rash [Normal Reflexes] : deep tendon reflexes were 2+ and symmetric [No Tremors] : no tremors [Oriented x3] : oriented to person, place, and time [Acanthosis Nigricans] : no acanthosis nigricans [de-identified] : bilateral thyroid nodules, no tenderness

## 2019-12-04 ENCOUNTER — APPOINTMENT (OUTPATIENT)
Dept: OTOLARYNGOLOGY | Facility: CLINIC | Age: 59
End: 2019-12-04
Payer: MEDICARE

## 2019-12-04 VITALS
HEIGHT: 65 IN | BODY MASS INDEX: 35.65 KG/M2 | OXYGEN SATURATION: 96 % | HEART RATE: 87 BPM | DIASTOLIC BLOOD PRESSURE: 73 MMHG | WEIGHT: 214 LBS | SYSTOLIC BLOOD PRESSURE: 97 MMHG

## 2019-12-04 DIAGNOSIS — Z78.9 OTHER SPECIFIED HEALTH STATUS: ICD-10-CM

## 2019-12-04 PROCEDURE — 99213 OFFICE O/P EST LOW 20 MIN: CPT

## 2019-12-05 PROBLEM — Z78.9 CAFFEINE USE: Status: ACTIVE | Noted: 2019-12-05

## 2019-12-05 NOTE — PHYSICAL EXAM
[Normal] : orientation to person, place, and time: normal [de-identified] : TM normal, not bulging, no effusion noted, no erythema [de-identified] : palpable bilateral thyroid nodules, parotid and SMG WNL [de-identified] : no cerumen, no erythema, no discharge, no masses

## 2019-12-05 NOTE — ASSESSMENT
[FreeTextEntry1] : 59F with hx of lung adenocarcinoma with brain metastasis, s/p multiple surgeries, chemo, RT and gamma knife radiotherapy, stable b/l thyroid nodules, and right-sided hearing loss/tinnitus.\par \par Plan:\par - Repeat US thyroid.\par - Referral for audiogram.\par - Will call pt with results and plan of care.\par - Pt verbalized understanding of above.

## 2019-12-05 NOTE — HISTORY OF PRESENT ILLNESS
[de-identified] : 59F last seen in 2017 for thyroid nodules.  She has a hx of stable bilateral thyroid nodules of which 2.9cm right thyroid nodule was biopsied in 4/2017 and was benign.  \par \par 4/19/17 right thyroid 2.9 cm nodule FNA biopsy at [facility] : Benign, [place] II\par 10/18/18 thyroid US: \par Right lobe measures 5.9 x 2.2 x 3.1 cm with mid upper nodule which was previously biopsied, measures 3 x 1.5 x 2.1 cm, spongiform (previously 3.2 x 1.7 x 2.0 cm) and 1.1 x 1.2 x 1.0 cm lower pole taller than wide nodule (previously 1.2 x 1.1 x 1.0 cm).\par Left lobe measures 4.8 x 1.9 x 2.7 cm with 2.2 x 1.6 x 1.8 cm upper pole nodule (previously 2.2 x 1.4 x 1.8 cm) and 0.9 x 0.6 x 0.8 cm lower pole nodule (previously 0.8 x 0.6 x 0.9 cm)\par \par 4/15/19: Free T4 1.2, TSH 1.27\par 10/16/19: TSH 1.36 \par \par She has a hx of lung adenocarcicoma s/p right lower lobectomy (Oct 2016) with post-op chemo (cisplatin.Alimta).  She developed brain mets s/p left parietal craniotomy 10/2017 and completed adjuvant RT 2700 cGy 11/20/17. She subsequently has gamma knife stereotactic radiosurgery on 1/18/18 by Srikanth Rico and Lexie for right parietal brain metastasis.  She is followed by Sophie, Deny, Jeffery, Kristen, and Simone.\par \par She also reports right ear ringing x 1 yr, feels there is water in her ear when she rotates her head, and her friends tell her she has problems hearing. Denies otalgia, otorrhea, balance problems.

## 2019-12-05 NOTE — DATA REVIEWED
[de-identified] : prior US reports [de-identified] : previous treatment records [de-identified] : prior labwork

## 2019-12-11 ENCOUNTER — FORM ENCOUNTER (OUTPATIENT)
Age: 59
End: 2019-12-11

## 2019-12-11 NOTE — VITALS
[Maximal Pain Intensity: 5/10] : 5/10 [Least Pain Intensity: 0/10] : 0/10 [Pain Location: ___] : Pain Location: [unfilled] [NSAID/Non-Opioid] : NSAID/Non-Opioid [80: Normal activity with effort; some signs or symptoms of disease.] : 80: Normal activity with effort; some signs or symptoms of disease.  [ECOG Performance Status: 1 - Restricted in physically strenuous activity but ambulatory and able to carry out work of a light or sedentary nature] : Performance Status: 1 - Restricted in physically strenuous activity but ambulatory and able to carry out work of a light or sedentary nature, e.g., light house work, office work

## 2019-12-12 ENCOUNTER — APPOINTMENT (OUTPATIENT)
Dept: MRI IMAGING | Facility: CLINIC | Age: 59
End: 2019-12-12
Payer: MEDICARE

## 2019-12-12 ENCOUNTER — OUTPATIENT (OUTPATIENT)
Dept: OUTPATIENT SERVICES | Facility: HOSPITAL | Age: 59
LOS: 1 days | End: 2019-12-12

## 2019-12-12 ENCOUNTER — APPOINTMENT (OUTPATIENT)
Dept: ULTRASOUND IMAGING | Facility: CLINIC | Age: 59
End: 2019-12-12
Payer: MEDICARE

## 2019-12-12 DIAGNOSIS — Z41.9 ENCOUNTER FOR PROCEDURE FOR PURPOSES OTHER THAN REMEDYING HEALTH STATE, UNSPECIFIED: Chronic | ICD-10-CM

## 2019-12-12 DIAGNOSIS — Z98.890 OTHER SPECIFIED POSTPROCEDURAL STATES: Chronic | ICD-10-CM

## 2019-12-12 DIAGNOSIS — Z90.49 ACQUIRED ABSENCE OF OTHER SPECIFIED PARTS OF DIGESTIVE TRACT: Chronic | ICD-10-CM

## 2019-12-12 PROCEDURE — 70553 MRI BRAIN STEM W/O & W/DYE: CPT | Mod: 26

## 2019-12-12 PROCEDURE — 76536 US EXAM OF HEAD AND NECK: CPT | Mod: 26

## 2019-12-16 ENCOUNTER — APPOINTMENT (OUTPATIENT)
Dept: RADIATION ONCOLOGY | Facility: CLINIC | Age: 59
End: 2019-12-16
Payer: MEDICARE

## 2019-12-16 PROCEDURE — 99213 OFFICE O/P EST LOW 20 MIN: CPT

## 2019-12-16 NOTE — PHYSICAL EXAM
[General Appearance - Well Developed] : well developed [General Appearance - Alert] : alert [Obese] : obese [Sclera] : the sclera and conjunctiva were normal [PERRL With Normal Accommodation] : pupils were equal in size, round, reactive to light [] : no respiratory distress [Normal] : supple with no thyromegaly or masses appreciated [Hearing Threshold Finger Rub Not [place]] : hearing was normal [Respiration, Rhythm And Depth] : normal respiratory rhythm and effort [Auscultation Breath Sounds / Voice Sounds] : lungs were clear to auscultation bilaterally [Heart Rate And Rhythm] : heart rate and rhythm were normal [Heart Sounds] : normal S1 and S2 [Cervical Lymph Nodes Enlarged Anterior Bilaterally] : anterior cervical [Bowel Sounds] : normal bowel sounds [Cervical Lymph Nodes Enlarged Posterior Bilaterally] : posterior cervical [Abdomen Soft] : soft [Supraclavicular Lymph Nodes Enlarged Bilaterally] : supraclavicular [Musculoskeletal - Swelling] : no joint swelling [Mood] : the mood was normal [Oriented To Time, Place, And Person] : oriented to person, place, and time [Skin Color & Pigmentation] : normal skin color and pigmentation [de-identified] : ECOG 1-2 [de-identified] : CN II-XII grossly intact, strength 4/5 in B/L upper and lower extremities, wide stanced gait, tandem walk not tested.  Romberg is positive.

## 2019-12-16 NOTE — DISEASE MANAGEMENT
[Pathological] : TNM Stage: p [IV] : IV [FreeTextEntry4] : Now recurrent [TTNM] : 3 [NTNM] : 1 [MTNM] : 0 [de-identified] : right parietal brain

## 2019-12-16 NOTE — REVIEW OF SYSTEMS
[Tinnitus - Grade 0] : Tinnitus - Grade 0 [Blurred Vision: Grade 0] : Blurred Vision: Grade 0 [Cognitive Disturbance: Grade 1 - Mild cognitive disability; not interfering with work/school/life performance; specialized educational services/devices not indicated] : Cognitive Disturbance: Grade 1 - Mild cognitive disability; not interfering with work/school/life performance; specialized educational services/devices not indicated [Concentration Impairment: Grade 1] : Concentration Impairment: Grade 1 - Mild inattention or decreased level of concentration [Dizziness: Grade 0] : Dizziness: Grade 0  [Headache: Grade 1 - Mild pain] : Headache: Grade 1 - Mild pain [Negative] : Endocrine [Chest Pain] : no chest pain [de-identified] : see HPI [FreeTextEntry9] : chronic back pain

## 2019-12-16 NOTE — HISTORY OF PRESENT ILLNESS
[FreeTextEntry1] : Ms Ashlie Mendoza is a 58y old female with a history of adenocarcinoma of the lung initially diagnosed during pre-operative workup for surgery for an orthopedic issue. She underwent right lower lobectomy with en bloc middle lobe resection on 10/24/2016, which revealed  pT3, pN1 with visceral pleura and LVI, involving 2/4 peribronchial lymph nodes. She completed  adjuvant chemotherapy between January and March 2017. \par \par She then developed a brain metastasis, and completed radiation therapy with Dr Owen for a total of 2700 cGy from 11/14/17- 11/20/17 over 3 fractions for treatment of left parieto-occipital mets (s/p subtotal resection). She had a cerebral hemorrhage in November 2017 post treatment and was hospitalized at [facility]. On follow up surveillance MRI brain 1/3/18 she was found to a have a new 5 mm right parietal enhancing mass. She is s/p Gamma Knife stereotactic radiosurgery on 1/18/2018 by Dr. Rico and Dr. Owen at [facility].\par \par She now follows up with Dr. Barrios since Dr. Owen has moved to [facility]. \par \par 12/16/19 - Follow-up\par Ms. Mendoza returns for routine follow-up.  She was last seen by Dr. Sen on 9/18/19.  Plan was for neuro consult for memory loss, f/u with Dr. Peterson, [facility] in 3 months.  She saw Dr. Barclay on 10/16/19.  Plan was for TSH, B12/folate, vitamin D testing, sleep study, neuropsych testing, no driving due to decreased right sided vision, and ENT referral for tinnitus.  On 10/16/19 B12, folate and vitamin D levels were all WNL and Vitamin D level was low.  Neuropsych testing was not done. She saw Dr. Griffin on 12/4/19 for f/u of thyroid nodules and tinnitus.  Plan was for repeat thyroid US 12/12/19 and audiogram - pending.  Today, she feels well overall. No new problems to report. She is awaiting results of thyroid US from Dr. Griffin and plan for treatment. She has stable migraines that resolves with excedrin. \par She saw Dr. Peterson recently, next follow up and body imaging will be in March. \par \par 9/18/19 - Follow-up\par Ms. Mendoza returns today for routine follow-up.  She was last seen by Dr. Barrios on 6/3/19.  Plan at that time was for MRI brain in 3 months, nutrition consult and neurology consult for memory loss.  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 Perilesional FLAIR signal is stable, without mass effect. Continued surveillance is advised.  No new metastatic lesion to the brain. \par \par She also underwent surveillance CT CAP on 9/12/19 ordered by Dr. Peterson.  \par \par CT CAP 9/12/19 - IMPRESSION: Status post right lower lobe lobectomy. No recurrent or metastatic tumor seen in chest, abdomen, pelvis. The prior described nodule in the right middle lobe is not seen on this study. Could have represented bronchial mucus impaction. \par \par She last saw Dr. Peterson on Monday.  She states she has not seen neurology for workup of memory loss because she forgot.  Today, she reports feeling well.  She reports short-term memory loss is unchanged, but bothersome to her.  She notes intermitted headaches approximately once per month relieved by Excedrin.  She also notes blurry vision while reading for the past month.  She denies dizziness, lightheadedness, nausea, vomiting, hearing disturbances, unilateral weakness.  \par \par 6/3/19 - Follow-up\souleymane Mendoza returns today for routine follow-up.  She was last seen here on 3/28/19.  She reported having imaging done at [facility] in February, but reports were not available at that time.  Plan at that time was MRI in May and follow-up with Dr. Peterson for systemic imaging.\par \par CT chest, abdomen and pelvis with contrast 2/7/19 ([facility]) - Impression: Since November 2, 2018 no new metastases \par \par MRI Brain with and without contrast 2/7/19 ([facility]) - Impression: Since November 2, 2018, evolving postoperative and treatment changes in the left parietal lobe.  Interval slightly increased enhancement marginating the resection cavity in the left parietal lobe is nonspecific and possibly reflects sequelae of postoperative change, however recommend continued surveillance to document stability.  \par \par MRI Brain with and without contrast 5/30/19 - IMPRESSION: Interval progression of enhancement along the left parietal \par resection cavity with stable to slightly increased surrounding FLAIR/T2 signal abnormality. This may represent recurrent tumoral disease or post therapeutic changes. \par \par She is unsure of the last time she saw Dr. Peterson, but states she is scheduled for follow-up again on 6/17/19. She had CT CAP for him on 5/30/19.  She saw endocrine, Dr. Nichols on 4/2/19; plan is for thyroid US in October.  TSH 1.27, free T4 1.2 on 4/15/19.  Today, she reports feeling "pretty good."  She notes unchanged intermittent headaches approximately once every 2 weeks, rated 5/10, little relief with motrin.  She also reports continued short term memory loss.  She also notes RUQ pain that she has "always" had and an episode of vomiting yesterday.  She states she has been trying to lose weight to no avail; requesting dietician referral.  She denies fevers, chills, dizziness, lightheadedness, unilateral weakness, visual/hearing disturbances, dyspnea, chest pain, palpitations, gait disturbances.  \par \par 3/28/19 - FOLLOW UP\par Ms. Mendoza is here for routine follow up. She was last seen here on 4/19/18. Insurance issues prevented her from coming back until now.  The plan at the time was to repeat MRI in 3 months and continue follow up with Dr. Peterson. She last saw Dr. Peterson approx two weeks ago, and as per patient, plan is for continued surveillance with chest CT and brain MRI. She reports she had a brain MRI and CTs of lungs done at [facility] around February 2019 under the care of Dr. Da Reveles; report/ disc not available at this time. Dr. Peterson's office will fax what records they have. \par \par MRI done 6/13/18 showed the following:\par -resolution of small right parietal lobe enhancing focus and edema, indicating favorable response to therapy. \par -There has been further evolution of left parietal surgical site with resolution of enhancement and increase in white matter signal now crossing the corpus callosum that is presumed post-RT change. \par -No new brain lesion given slight motion limitation of study. \par \par She saw Dr. Montano for cardiology evaluation of pulmonary hypertension prior to left shoulder surgery on 8/17/18. She was cleared for surgery at that visit and started on inhalers. Oxycodone was given for pain. She saw edward Tapia, for thyroid follow up on 10/4/18, found to be euthyroid; plan was for TFT and thyroid u/s, consider repeat FNA biopsy. She saw him last on 2/21/19, and was sent to ED for respiratory symptoms, SOB. She was found to have COPD exacerbation, managed with Prednisone and duonebs, and advised to follow up with pulmonologist Dr. Wallace. \par \par Of note, she had CT chest on 10/18/18 and it showed pulmonary emphysema; no evidence of disease recurrence. Plan is to repeat imaging in six months.  \par \par Today, she reports she has been feeling overall well. No new neuro complaints. Continues to have memory impairment (short term). She also reports a "nagging" soreness to left side of scalp for the past few weeks, does not recall any head injuries. She denies any other c/o to include headache, vision changes, numbness, tingling, fever, chills, fatigue, CP, SOB, cough, N/V/D. \par ______________________________________________________\par \par 4/19/18- FOLLOW UP\par Ms. Mendoza presents today for routine follow up. Since her last visit with us she saw Dr. Gutierrez of ophthalmology. \par \par Her PET scan on 2/27/18 showed that since 11/30/17 there was a subcentimeter hypermetabolic lymph node in the left inguinal region of uncertain significance. She saw Dr. Mann regarding this on 3/15/18, and he recommended follow up with her PCP Dr. Vignesh Wallace regarding viral illness. \par \par She has been feeling overall well. No new neuro complaints. Continues to have memory loss.\par \par Oncologic History\par Ms Ashlie Mendoza is a 57y old female with a history of adenocarcinoma of the lung initially diagnosed during per-operative workup for surgery in July 2017 for an orthopedic issue. She underwent right lower lobectomy with en bloc middle lobe resection on 10/24/2016, which revealed  pT3, pN1 with visceral pleura and LVI, involving 2/4 peribronchial lymph nodes. She completed chemo between January and March 2017. She did not receive radiation therapy though prior to undergoing surgery she was simulated for potential radiation therapy.  \par \par Her current relevant history dates back to July when she began having headaches.  October 3 when she reports not "feeling right,"  accompanied by feeling hot. She called her son and told him that she "was not feeling right" who then called the ambulance. She reports she woke up in the emergency room at [facility] in [place]. Upon finding a mass in her head, she was transferred to [facility] [place]. She is unsure if she lost consciousness.\par \par She underwent craniotomy with left parietooccipital mass resection 10/4/17  for metastasis presumably from her lung cancer at [facility]. We do not have pathology available at this time. Her post-op course was uneventful and she was subsequently discharged to rehab. She presented to [facility] from rehab on  10/19/17. MRI brain showed hematoma at the resection site, and along the tract site. There was nodular enhancement surrounding the hematoma and extending along the tract site. \par \par \par 1/3 - Today she feels well. She denies nausea, notes intermittent headaches, relieved by 30 mg oxycodone PRN. She has not tried tylenol for this. Her memory is improving. She notes right visual field cut is improving. Her energy is improving. her keppra was recently stopped by her neurosurgeon. She will see neurology at the end of the month, and will see Dr. Peterson at the end of the month.  MRI from today shows no evidence of residual disease at site of tumor.  There is a new 0.6 cm right parietal enhancing lesion suspicious of metastasis. \par  \par 2/13/18-PTE\par Ms. Mendoza completed radiation therapy for a total of 2700 cGy from 11/14/17- 11/20/17 over 3 fractions for treatment of left parieto-occipital mets. She had a cerebral hemorrhage in Nov. 2017 post treatment and was hospitalized in [facility].  On follow up surveillance MRI brain 1/3/18 she was found to a have a new 5 mm right parietal enhancing mass s/p Gamma Knife stereotactic radiosurgery on 1/18/2018 by Dr. Rico and Dr. Owen at [facility].\par Since her last visit she has f/u with Dr. Barclay(neuro) in Jan. 2018 and Dr. Rico yesterday. She has trouble with right peripheral vision and has appt. with neuro opthalmology (Andrea Gutierrez) on 2/16/18. \par Today she feels well. Has occasional headaches, takes tylenol, not persistent and don't feel like they did before. Denies seizures. Has pain in right shoulder and was told that she needs surgery but has been postponing. Takes OxyContin for shoulder and lower back pain as needed. She has appt. for thyroid scan today. She will f/u with Dr. Peterosn in March and will have PET before. She also has bilateral thigh weakness at times, possibly related to steroids.

## 2020-01-22 ENCOUNTER — FORM ENCOUNTER (OUTPATIENT)
Age: 60
End: 2020-01-22

## 2020-01-23 ENCOUNTER — RESULT REVIEW (OUTPATIENT)
Age: 60
End: 2020-01-23

## 2020-01-23 ENCOUNTER — OUTPATIENT (OUTPATIENT)
Dept: OUTPATIENT SERVICES | Facility: HOSPITAL | Age: 60
LOS: 1 days | End: 2020-01-23

## 2020-01-23 ENCOUNTER — APPOINTMENT (OUTPATIENT)
Dept: ULTRASOUND IMAGING | Facility: CLINIC | Age: 60
End: 2020-01-23
Payer: MEDICARE

## 2020-01-23 DIAGNOSIS — Z98.890 OTHER SPECIFIED POSTPROCEDURAL STATES: Chronic | ICD-10-CM

## 2020-01-23 DIAGNOSIS — Z41.9 ENCOUNTER FOR PROCEDURE FOR PURPOSES OTHER THAN REMEDYING HEALTH STATE, UNSPECIFIED: Chronic | ICD-10-CM

## 2020-01-23 DIAGNOSIS — Z90.49 ACQUIRED ABSENCE OF OTHER SPECIFIED PARTS OF DIGESTIVE TRACT: Chronic | ICD-10-CM

## 2020-01-23 PROCEDURE — 88305 TISSUE EXAM BY PATHOLOGIST: CPT | Mod: 26

## 2020-01-23 PROCEDURE — 88173 CYTOPATH EVAL FNA REPORT: CPT | Mod: 26

## 2020-01-23 PROCEDURE — 10005 FNA BX W/US GDN 1ST LES: CPT

## 2020-01-28 LAB — NON-GYNECOLOGICAL CYTOLOGY STUDY: SIGNIFICANT CHANGE UP

## 2020-02-12 ENCOUNTER — APPOINTMENT (OUTPATIENT)
Dept: NEUROLOGY | Facility: CLINIC | Age: 60
End: 2020-02-12
Payer: MEDICARE

## 2020-02-12 VITALS
HEART RATE: 86 BPM | TEMPERATURE: 98.3 F | BODY MASS INDEX: 35.99 KG/M2 | DIASTOLIC BLOOD PRESSURE: 72 MMHG | SYSTOLIC BLOOD PRESSURE: 105 MMHG | WEIGHT: 216 LBS | HEIGHT: 65 IN | RESPIRATION RATE: 15 BRPM

## 2020-02-12 DIAGNOSIS — G40.89 OTHER SEIZURES: ICD-10-CM

## 2020-02-12 PROCEDURE — 99214 OFFICE O/P EST MOD 30 MIN: CPT

## 2020-02-13 ENCOUNTER — APPOINTMENT (OUTPATIENT)
Dept: ENDOCRINOLOGY | Facility: CLINIC | Age: 60
End: 2020-02-13
Payer: MEDICARE

## 2020-02-13 VITALS
HEART RATE: 92 BPM | BODY MASS INDEX: 35.94 KG/M2 | DIASTOLIC BLOOD PRESSURE: 68 MMHG | WEIGHT: 216 LBS | SYSTOLIC BLOOD PRESSURE: 104 MMHG

## 2020-02-13 PROCEDURE — 99214 OFFICE O/P EST MOD 30 MIN: CPT

## 2020-02-14 NOTE — ASSESSMENT
[FreeTextEntry1] : 57 year old F with Hx of lung cancer with metastases to the brain:\par \par 1. Hx of multiple thyroid nodules:\par Jan 23,2020 FNA biopsy for 1.2 cm L lower lobe nodule which was reported [place] II.\par Presence of 3.2 cm spongiform  R upper pole nodule (ASR TI-RADS TR1).\par \par Return in: 1 year

## 2020-02-14 NOTE — CONSULT LETTER
[Dear  ___] : Dear  [unfilled], [Consult Closing:] : Thank you very much for allowing me to participate in the care of this patient.  If you have any questions, please do not hesitate to contact me. [Consult Letter:] : I had the pleasure of evaluating your patient, [unfilled]. [FreeTextEntry3] : Norris Nichols

## 2020-02-14 NOTE — HISTORY OF PRESENT ILLNESS
[FreeTextEntry1] : - 5/30/17 Chest CT: R 1.4 cm thyroid nodule, and left probable 2.3 cm nodule\par - 4/19/17 R thyroid 2.9 cm nodule FNA biopsy at [facility] : Benign, [place] II\par - 6/2017: TSH 1.2, Free T4 1.2, Ca 10.2\par - 10/4/18: TSH 1.6 \par - 10/18/18 Thyroid US: R lobe mid upper which was previously biopsied, measures 3 x 1.5 x 2.1 cm, spongy form (previously 3.2 x 1.7 x 2.0 cm). L lobe upper pole solid nodule 2.2 x 1.6 x 1.8 cm unchanged from previous US report from 2/13/18.\par 2/21/19 CT Chest Angio: No sign of pulmonary embolism. Emphysema presents and suggestion of pulmonary HTN\par - 4/15/19: TSH 1.27, Free T4 1.2\par - 10/16/19: TSH 1.36, Vit D 25 OH 27.9\par - 12/12/19 Thyroid US: In comparison to prior study from 12.18/18, interval increase size of a 1.2 cm L lower pole hypoechoic thyroid nodule  which meets criteria for FNA biopsy. \par - 1/23/20 L Lower Pole 1.2 cm nodule FNA Biopsy: Benign ([place] II). Findings consistent with a nodular goiter\par \par 58 y/o F pt with a Hx of thyroid nodules (dx in 2016) and nodular goiter (dx in 1/23/20). \par PMHx: COPD, overweight\par PSHx: Brain surgery for lung CA metastasis (10/2017), Gamma knife treatment (1/2018), R lower lobectomy for lung adenocarcinoma (10/2016). \par \par 10/24/2019 \par Pt presents today for a thyroid f/u, feeling well overall. She denies neck pain and swallowing difficulties. Pt has been seen by her doctor ans was told there are no signs of lung cancer and no brain lesion. She was advised to have a MRI for the brain for 3 months.\par \par 02/13/2020 \par Pt presents today for thyroid f/u, feeling well with no major physical complaints. Pt recently visited neurologist who is planning for EEG. She also reportedly visited her oncologist in 6/2019 for surveillance on recurrence of lung CA. \par Denies difficulty swallowing, and voice changes.

## 2020-02-14 NOTE — PHYSICAL EXAM
[Normal Sclera/Conjunctiva] : normal sclera/conjunctiva [Alert] : alert [EOMI] : extra ocular movement intact [No Proptosis] : no proptosis [Normal Oropharynx] : the oropharynx was normal [No Respiratory Distress] : no respiratory distress [No Accessory Muscle Use] : no accessory muscle use [Clear to Auscultation] : lungs were clear to auscultation bilaterally [Normal Rate] : heart rate was normal  [Normal S1, S2] : normal S1 and S2 [Regular Rhythm] : with a regular rhythm [Pedal Pulses Normal] : the pedal pulses are present [No Edema] : there was no peripheral edema [Normal Bowel Sounds] : normal bowel sounds [Anterior Cervical Nodes] : anterior cervical nodes [Post Cervical Nodes] : posterior cervical nodes [Axillary Nodes] : axillary nodes [Spine Straight] : spine straight [No Spinal Tenderness] : no spinal tenderness [Normal] : normal and non tender [Normal Gait] : normal gait [No Stigmata of Cushings Syndrome] : no stigmata of cushings syndrome [No Rash] : no rash [Normal Strength/Tone] : muscle strength and tone were normal [No Tremors] : no tremors [Normal Reflexes] : deep tendon reflexes were 2+ and symmetric [Oriented x3] : oriented to person, place, and time [Acanthosis Nigricans] : no acanthosis nigricans [de-identified] : b/l nodularities palpated

## 2020-02-14 NOTE — ADDENDUM
[FreeTextEntry1] : I, Jun Mosley, acted solely as a scribe for Dr. Norris Nichols on this date. 02/13/2020.

## 2020-02-14 NOTE — END OF VISIT
[>50% of Time Spent on Counseling for ____] : Greater than 50% of the encounter time was spent on counseling for [unfilled] [Time Spent: ___ minutes] : I have spent [unfilled] minutes of face to face time with the patient [FreeTextEntry3] : All medical record entries made by the Scribe were at my, Dr. Norris Nichols, direction and personally dictated by me on 02/13/2020. I have reviewed the chart and agree that the record accurately reflects my personal performance of the history, physical exam, assessment and plan. I have also personally directed, reviewed and agreed with the chart.

## 2020-02-25 ENCOUNTER — FORM ENCOUNTER (OUTPATIENT)
Age: 60
End: 2020-02-25

## 2020-02-26 ENCOUNTER — APPOINTMENT (OUTPATIENT)
Dept: CT IMAGING | Facility: HOSPITAL | Age: 60
End: 2020-02-26
Payer: MEDICARE

## 2020-02-26 ENCOUNTER — OUTPATIENT (OUTPATIENT)
Dept: OUTPATIENT SERVICES | Facility: HOSPITAL | Age: 60
LOS: 1 days | End: 2020-02-26
Payer: MEDICARE

## 2020-02-26 DIAGNOSIS — Z98.890 OTHER SPECIFIED POSTPROCEDURAL STATES: Chronic | ICD-10-CM

## 2020-02-26 DIAGNOSIS — Z41.9 ENCOUNTER FOR PROCEDURE FOR PURPOSES OTHER THAN REMEDYING HEALTH STATE, UNSPECIFIED: Chronic | ICD-10-CM

## 2020-02-26 DIAGNOSIS — Z90.49 ACQUIRED ABSENCE OF OTHER SPECIFIED PARTS OF DIGESTIVE TRACT: Chronic | ICD-10-CM

## 2020-02-26 PROCEDURE — 74177 CT ABD & PELVIS W/CONTRAST: CPT

## 2020-02-26 PROCEDURE — 71260 CT THORAX DX C+: CPT

## 2020-02-26 PROCEDURE — 71260 CT THORAX DX C+: CPT | Mod: 26

## 2020-02-26 PROCEDURE — 74177 CT ABD & PELVIS W/CONTRAST: CPT | Mod: 26

## 2020-03-03 ENCOUNTER — APPOINTMENT (OUTPATIENT)
Dept: NEUROLOGY | Facility: CLINIC | Age: 60
End: 2020-03-03
Payer: MEDICARE

## 2020-03-03 PROCEDURE — 95819 EEG AWAKE AND ASLEEP: CPT

## 2020-03-04 ENCOUNTER — APPOINTMENT (OUTPATIENT)
Dept: NEUROLOGY | Facility: CLINIC | Age: 60
End: 2020-03-04

## 2020-03-04 PROCEDURE — 95708 EEG WO VID EA 12-26HR UNMNTR: CPT

## 2020-03-05 PROCEDURE — 95721 EEG PHY/QHP>36<60 HR W/O VID: CPT

## 2020-03-05 PROCEDURE — 95708 EEG WO VID EA 12-26HR UNMNTR: CPT

## 2020-03-05 PROCEDURE — 95700 EEG CONT REC W/VID EEG TECH: CPT

## 2020-03-13 NOTE — ED ADULT NURSE NOTE - CHEST MOVEMENT
Pt notified of medication change and to contact Dr. Cindy Shields office and states she is leaving our practice and going to [facility] to see Dr. Ashely Russo. Med list updated. symmetric

## 2020-04-27 ENCOUNTER — APPOINTMENT (OUTPATIENT)
Dept: PULMONOLOGY | Facility: CLINIC | Age: 60
End: 2020-04-27

## 2020-05-07 NOTE — REVIEW OF SYSTEMS
[Chest Pain] : no chest pain [Negative] : Endocrine [FreeTextEntry9] : chronic back pain [de-identified] : see HPI [Blurred Vision: Grade 0] : Blurred Vision: Grade 0 [Tinnitus - Grade 0] : Tinnitus - Grade 0 [Cognitive Disturbance: Grade 1 - Mild cognitive disability; not interfering with work/school/life performance; specialized educational services/devices not indicated] : Cognitive Disturbance: Grade 1 - Mild cognitive disability; not interfering with work/school/life performance; specialized educational services/devices not indicated [Concentration Impairment: Grade 1] : Concentration Impairment: Grade 1 - Mild inattention or decreased level of concentration [Dizziness: Grade 0] : Dizziness: Grade 0  [Headache: Grade 1 - Mild pain] : Headache: Grade 1 - Mild pain

## 2020-05-07 NOTE — HISTORY OF PRESENT ILLNESS
[FreeTextEntry1] : Ms Ashlie Mendoza is a 58y old female with a history of adenocarcinoma of the lung initially diagnosed during pre-operative workup for surgery for an orthopedic issue. She underwent right lower lobectomy with en bloc middle lobe resection on 10/24/2016, which revealed  pT3, pN1 with visceral pleura and LVI, involving 2/4 peribronchial lymph nodes. She completed  adjuvant chemotherapy between January and March 2017. \par \par She then developed a brain metastasis, and completed radiation therapy with Dr Owen for a total of 2700 cGy from 11/14/17- 11/20/17 over 3 fractions for treatment of left parieto-occipital mets (s/p subtotal resection). She had a cerebral hemorrhage in November 2017 post treatment and was hospitalized at [facility]. On follow up surveillance MRI brain 1/3/18 she was found to a have a new 5 mm right parietal enhancing mass. She is s/p Gamma Knife stereotactic radiosurgery on 1/18/2018 by Dr. Rico and Dr. Owen at [facility].\par \par She now follows up with Dr. Barrios since Dr. Owen has moved to [facility]. \par \par 5/11/20 - Follow-up\par Ms. Mendoza returns for routine follow-up.  She was last seen on 12/16/20.  Plan was for f/u with Dr. Barclay, continue f/u with Dr. Peterson, MRI brain in May, CT scans body in March. \par \par CT CAP 2/26/20 - Impression: Since 9/12/2019, there has been no evidence of local or metastatic disease recurrence. \par \par MRI brain with and without contrast ([facility]) 5/8/20 - \par \par She last saw Dr. Barclay on 2/12/20.  Plan was for 24h EEG, repeat neuro psych testing, no driving due to difficulty with right vision. 24h EEG on 3/3/20 demonstrated breach rhythm over right posterior head region indicating a local skull defect, no findings of active epilepsy.  She has not had repeat neruo psych testing. \par \par She contacted our office on 5/7 reporting complaints of difficulty writing with her right hand, decreased peripheral vision on the right, and feeling "confused" x 1 month.  She declined ED evaluation due to concerns about COVID-19.  Today, she \par \par 12/16/19 - Follow-up\par Ms. Mendoza returns for routine follow-up.  She was last seen by Dr. Sen on 9/18/19.  Plan was for neuro consult for memory loss, f/u with Dr. Peterson, [email or web address] in 3 months.  She saw Dr. Barclay on 10/16/19.  Plan was for TSH, B12/folate, vitamin D testing, sleep study, neuropsych testing, no driving due to decreased right sided vision, and ENT referral for tinnitus.  On 10/16/19 B12, folate and vitamin D levels were all WNL and Vitamin D level was low.  Neuropsych testing was not done. She saw Dr. Griffin on 12/4/19 for f/u of thyroid nodules and tinnitus.  Plan was for repeat thyroid US 12/12/19 and audiogram - pending.  Today, she feels well overall. No new problems to report. She is awaiting results of thyroid US from Dr. Griffin and plan for treatment. She has stable migraines that resolves with excedrin. \par She saw Dr. Peterson recently, next follow up and body imaging will be in March. \par \par 9/18/19 - Follow-up\par Ms. Mendoza returns today for routine follow-up.  She was last seen by Dr. Barrios on 6/3/19.  Plan at that time was for MRI brain in 3 months, nutrition consult and neurology consult for memory loss.  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 Perilesional FLAIR signal is stable, without mass effect. Continued surveillance is advised.  No new metastatic lesion to the brain. \par \par She also underwent surveillance CT CAP on 9/12/19 ordered by Dr. Peterson.  \par \par CT CAP 9/12/19 - IMPRESSION: Status post right lower lobe lobectomy. No recurrent or metastatic tumor seen in chest, abdomen, pelvis. The prior described nodule in the right middle lobe is not seen on this study. Could have represented bronchial mucus impaction. \par \par She last saw Dr. Peterson on Monday.  She states she has not seen neurology for workup of memory loss because she forgot.  Today, she reports feeling well.  She reports short-term memory loss is unchanged, but bothersome to her.  She notes intermitted headaches approximately once per month relieved by Excedrin.  She also notes blurry vision while reading for the past month.  She denies dizziness, lightheadedness, nausea, vomiting, hearing disturbances, unilateral weakness.  \par \par 6/3/19 - Follow-up\par Ms. Mendoza returns today for routine follow-up.  She was last seen here on 3/28/19.  She reported having [facility] done [facility] February, but reports were not available at that time.  Plan at that time was MRI in May and follow-up with Dr. Peterson for systemic imaging.\par \par CT chest, abdomen and pelvis with contrast 2/7/19 ([facility]) - Impression: Since November 2, 2018 no new metastases \par \par MRI Brain with and without contrast 2/7/19 ([facility]) - Impression: Since November 2, 2018, evolving postoperative and treatment changes in the left parietal lobe.  Interval slightly increased enhancement marginating the resection cavity in the left parietal lobe is nonspecific and possibly reflects sequelae of postoperative change, however recommend continued surveillance to document stability.  \par \par MRI Brain with and without contrast 5/30/19 - IMPRESSION: Interval progression of enhancement along the left parietal \par resection cavity with stable to slightly increased surrounding FLAIR/T2 signal abnormality. This may represent recurrent tumoral disease or post therapeutic changes. \par \par She is unsure of the last time she saw Dr. Peterson, but states she is scheduled for follow-up again on 6/17/19. She had CT CAP for him on 5/30/19.  She saw endocrine, Dr. Nichols on 4/2/19; plan is for thyroid US in October.  TSH 1.27, free T4 1.2 on 4/15/19.  Today, she reports feeling "pretty good."  She notes unchanged intermittent headaches approximately once every 2 weeks, rated 5/10, little relief with motrin.  She also reports continued short term memory loss.  She also notes RUQ pain that she has "always" had and an episode of vomiting yesterday.  She states she has been trying to lose weight to no avail; requesting dietician referral.  She denies fevers, chills, dizziness, lightheadedness, unilateral weakness, visual/hearing disturbances, dyspnea, chest pain, palpitations, gait disturbances.  \par \par 3/28/19 - FOLLOW UP\par Ms. Mendoza is here for routine follow up. She was last seen here on 4/19/18. Insurance issues prevented her from coming back until now.  The plan at the time was to repeat MRI in 3 months and continue follow up with Dr. Peterson. She last saw Dr. Peterson approx two weeks ago, and as per patient, plan is for continued surveillance with chest CT and brain MRI. She reports she had a brain MRI and CTs of lungs done at [facility] around February 2019 under the care of Dr. Da Reveles; report/ disc not available at this time. Dr. Peterson's office will fax what records they have. \par \par MRI done 6/13/18 showed the following:\par -resolution of small right parietal lobe enhancing focus and edema, indicating favorable response to therapy. \par -There has been further evolution of left parietal surgical site with resolution of enhancement and increase in white matter signal now crossing the corpus callosum that is presumed post-RT change. \par -No new brain lesion given slight motion limitation of study. \par \par She saw Dr. Montano for cardiology evaluation of pulmonary hypertension prior to left shoulder surgery on 8/17/18. She was cleared for surgery at that visit and started on inhalers. Oxycodone was given for pain. She saw edward Tapia, for thyroid follow up on 10/4/18, found to be euthyroid; plan was for TFT and thyroid u/s, consider repeat FNA biopsy. She saw him last on 2/21/19, and was sent to ED for respiratory symptoms, SOB. She was found to have COPD exacerbation, managed with Prednisone and duonebs, and advised to follow up with pulmonologist Dr. Wallace. \par \par Of note, she had CT chest on 10/18/18 and it showed pulmonary emphysema; no evidence of disease recurrence. Plan is to repeat imaging in six months.  \par \par Today, she reports she has been feeling overall well. No new neuro complaints. Continues to have memory impairment (short term). She also reports a "nagging" soreness to left side of scalp for the past few weeks, does not recall any head injuries. She denies any other c/o to include headache, vision changes, numbness, tingling, fever, chills, fatigue, CP, SOB, cough, N/V/D. \par ______________________________________________________\par \par 4/19/18- FOLLOW UP\par Ms. Mendoza presents today for routine follow up. Since her last visit with us she saw Dr. Gutierrez of ophthalmology. \par \par Her PET scan on 2/27/18 showed that since 11/30/17 there was a subcentimeter hypermetabolic lymph node in the left inguinal region of uncertain significance. She saw Dr. Mann regarding this on 3/15/18, and he recommended follow up with her PCP Dr. Vignesh Wallace regarding viral illness. \par \par She has been feeling overall well. No new neuro complaints. Continues to have memory loss.\par \par Oncologic History\par Ms Ashlie Mendoza is a 57y old female with a history of adenocarcinoma of the lung initially diagnosed during per-operative workup for surgery in July 2017 for an orthopedic issue. She underwent right lower lobectomy with en bloc middle lobe resection on 10/24/2016, which revealed  pT3, pN1 with visceral pleura and LVI, involving 2/4 peribronchial lymph nodes. She completed chemo between January and March 2017. She did not receive radiation therapy though prior to undergoing surgery she was simulated for potential radiation therapy.  \par \par Her current relevant history dates back to July when she began having headaches.  October 3 when she reports not "feeling right,"  accompanied by feeling hot. She called her son and told him that she "was not feeling right" who then called the ambulance. She reports she woke up in the emergency room at [facility] in [place]. Upon finding a mass in her head, she was transferred to [facility]. She is unsure if she lost consciousness.\par \par She underwent craniotomy with left parietooccipital mass resection 10/4/17  for metastasis presumably from her lung cancer at [place]. We do not have pathology available at this time. Her post-op course was uneventful and she was subsequently discharged to rehab. She presented to [facility] from rehab on  10/19/17. MRI brain showed hematoma at the resection site, and along the tract site. There was nodular enhancement surrounding the hematoma and extending along the tract site. \par \par \par 1/3 - Today she feels well. She denies nausea, notes intermittent headaches, relieved by 30 mg oxycodone PRN. She has not tried tylenol for this. Her memory is improving. She notes right visual field cut is improving. Her energy is improving. her keppra was recently stopped by her neurosurgeon. She will see neurology at the end of the month, and will see Dr. Peterson at the end of the month.  MRI from today shows no evidence of residual disease at site of tumor.  There is a new 0.6 cm right parietal enhancing lesion suspicious of metastasis. \par  \par 2/13/18-PTE\par Ms. Mendoza completed radiation therapy for a total of 2700 cGy from 11/14/17- 11/20/17 over 3 fractions for treatment of left parieto-occipital mets. She had a cerebral hemorrhage in Nov. 2017 post treatment and was hospitalized in [facility].  On follow up surveillance MRI brain 1/3/18 she was found to a have a new 5 mm right parietal enhancing mass s/p Gamma Knife stereotactic radiosurgery on 1/18/2018 by Dr. Rico and Dr. Owen at [facility].\par Since her last visit she has f/u with Dr. Barclay(neuro) in Jan. 2018 and Dr. Rico yesterday. She has trouble with right peripheral vision and has appt. with neuro opthalmology (Andrea Gutierrez) on 2/16/18. \par Today she feels well. Has occasional headaches, takes tylenol, not persistent and don't feel like they did before. Denies seizures. Has pain in right shoulder and was told that she needs surgery but has been postponing. Takes OxyContin for shoulder and lower back pain as needed. She has appt. for thyroid scan today. She will f/u with Dr. Peterson in March and will have PET before. She also has bilateral thigh weakness at times, possibly related to steroids.

## 2020-05-07 NOTE — PHYSICAL EXAM
[General Appearance - Well Developed] : well developed [General Appearance - Alert] : alert [Obese] : obese [Sclera] : the sclera and conjunctiva were normal [PERRL With Normal Accommodation] : pupils were equal in size, round, reactive to light [Hearing Threshold Finger Rub Not [place]] : hearing was normal [Normal] : supple with no thyromegaly or masses appreciated [] : no respiratory distress [Respiration, Rhythm And Depth] : normal respiratory rhythm and effort [Auscultation Breath Sounds / Voice Sounds] : lungs were clear to auscultation bilaterally [Heart Rate And Rhythm] : heart rate and rhythm were normal [Heart Sounds] : normal S1 and S2 [Bowel Sounds] : normal bowel sounds [Abdomen Soft] : soft [Cervical Lymph Nodes Enlarged Anterior Bilaterally] : anterior cervical [Cervical Lymph Nodes Enlarged Posterior Bilaterally] : posterior cervical [Supraclavicular Lymph Nodes Enlarged Bilaterally] : supraclavicular [Skin Color & Pigmentation] : normal skin color and pigmentation [Musculoskeletal - Swelling] : no joint swelling [Mood] : the mood was normal [Oriented To Time, Place, And Person] : oriented to person, place, and time [de-identified] : ECOG 1-2 [de-identified] : CN II-XII grossly intact, strength 4/5 in B/L upper and lower extremities, wide stanced gait, tandem walk not tested.  Romberg is positive.

## 2020-05-07 NOTE — DISEASE MANAGEMENT
[Pathological] : TNM Stage: p [FreeTextEntry4] : Now recurrent [TTNM] : 3 [MTNM] : 0 [IV] : IV [NTNM] : 1 [de-identified] : right parietal brain

## 2020-05-11 ENCOUNTER — APPOINTMENT (OUTPATIENT)
Dept: RADIATION ONCOLOGY | Facility: CLINIC | Age: 60
End: 2020-05-11

## 2020-05-11 ENCOUNTER — INPATIENT (INPATIENT)
Facility: HOSPITAL | Age: 60
LOS: 3 days | Discharge: HOME | DRG: 25 | End: 2020-05-15
Attending: NEUROLOGICAL SURGERY | Admitting: NEUROLOGICAL SURGERY
Payer: MEDICARE

## 2020-05-11 VITALS
RESPIRATION RATE: 16 BRPM | HEIGHT: 65 IN | TEMPERATURE: 99 F | HEART RATE: 81 BPM | WEIGHT: 250 LBS | DIASTOLIC BLOOD PRESSURE: 85 MMHG | OXYGEN SATURATION: 96 % | SYSTOLIC BLOOD PRESSURE: 129 MMHG

## 2020-05-11 DIAGNOSIS — C34.90 MALIGNANT NEOPLASM OF UNSPECIFIED PART OF UNSPECIFIED BRONCHUS OR LUNG: ICD-10-CM

## 2020-05-11 DIAGNOSIS — Z98.890 OTHER SPECIFIED POSTPROCEDURAL STATES: Chronic | ICD-10-CM

## 2020-05-11 DIAGNOSIS — Z90.49 ACQUIRED ABSENCE OF OTHER SPECIFIED PARTS OF DIGESTIVE TRACT: Chronic | ICD-10-CM

## 2020-05-11 DIAGNOSIS — G93.89 OTHER SPECIFIED DISORDERS OF BRAIN: ICD-10-CM

## 2020-05-11 DIAGNOSIS — Z41.9 ENCOUNTER FOR PROCEDURE FOR PURPOSES OTHER THAN REMEDYING HEALTH STATE, UNSPECIFIED: Chronic | ICD-10-CM

## 2020-05-11 LAB
ALBUMIN SERPL ELPH-MCNC: 4 G/DL — SIGNIFICANT CHANGE UP (ref 3.3–5)
ALP SERPL-CCNC: 72 U/L — SIGNIFICANT CHANGE UP (ref 40–120)
ALT FLD-CCNC: 12 U/L — SIGNIFICANT CHANGE UP (ref 10–45)
ANION GAP SERPL CALC-SCNC: 12 MMOL/L — SIGNIFICANT CHANGE UP (ref 5–17)
APTT BLD: 29.6 SEC — SIGNIFICANT CHANGE UP (ref 27.5–36.3)
AST SERPL-CCNC: 12 U/L — SIGNIFICANT CHANGE UP (ref 10–40)
BASOPHILS # BLD AUTO: 0 K/UL — SIGNIFICANT CHANGE UP (ref 0–0.2)
BASOPHILS NFR BLD AUTO: 0 % — SIGNIFICANT CHANGE UP (ref 0–2)
BILIRUB SERPL-MCNC: 0.2 MG/DL — SIGNIFICANT CHANGE UP (ref 0.2–1.2)
BLD GP AB SCN SERPL QL: NEGATIVE — SIGNIFICANT CHANGE UP
BUN SERPL-MCNC: 17 MG/DL — SIGNIFICANT CHANGE UP (ref 7–23)
CALCIUM SERPL-MCNC: 9.1 MG/DL — SIGNIFICANT CHANGE UP (ref 8.4–10.5)
CHLORIDE SERPL-SCNC: 104 MMOL/L — SIGNIFICANT CHANGE UP (ref 96–108)
CO2 SERPL-SCNC: 24 MMOL/L — SIGNIFICANT CHANGE UP (ref 22–31)
CREAT SERPL-MCNC: 0.85 MG/DL — SIGNIFICANT CHANGE UP (ref 0.5–1.3)
EOSINOPHIL # BLD AUTO: 0 K/UL — SIGNIFICANT CHANGE UP (ref 0–0.5)
EOSINOPHIL NFR BLD AUTO: 0 % — SIGNIFICANT CHANGE UP (ref 0–6)
GLUCOSE BLDC GLUCOMTR-MCNC: 114 MG/DL — HIGH (ref 70–99)
GLUCOSE SERPL-MCNC: 110 MG/DL — HIGH (ref 70–99)
HCT VFR BLD CALC: 43.8 % — SIGNIFICANT CHANGE UP (ref 34.5–45)
HGB BLD-MCNC: 14.1 G/DL — SIGNIFICANT CHANGE UP (ref 11.5–15.5)
IMM GRANULOCYTES NFR BLD AUTO: 0.3 % — SIGNIFICANT CHANGE UP (ref 0–1.5)
INR BLD: 1.1 — SIGNIFICANT CHANGE UP (ref 0.88–1.16)
LYMPHOCYTES # BLD AUTO: 1.34 K/UL — SIGNIFICANT CHANGE UP (ref 1–3.3)
LYMPHOCYTES # BLD AUTO: 13.3 % — SIGNIFICANT CHANGE UP (ref 13–44)
MCHC RBC-ENTMCNC: 28.7 PG — SIGNIFICANT CHANGE UP (ref 27–34)
MCHC RBC-ENTMCNC: 32.2 GM/DL — SIGNIFICANT CHANGE UP (ref 32–36)
MCV RBC AUTO: 89 FL — SIGNIFICANT CHANGE UP (ref 80–100)
MONOCYTES # BLD AUTO: 0.53 K/UL — SIGNIFICANT CHANGE UP (ref 0–0.9)
MONOCYTES NFR BLD AUTO: 5.2 % — SIGNIFICANT CHANGE UP (ref 2–14)
NEUTROPHILS # BLD AUTO: 8.2 K/UL — HIGH (ref 1.8–7.4)
NEUTROPHILS NFR BLD AUTO: 81.2 % — HIGH (ref 43–77)
NRBC # BLD: 0 /100 WBCS — SIGNIFICANT CHANGE UP (ref 0–0)
PLATELET # BLD AUTO: 296 K/UL — SIGNIFICANT CHANGE UP (ref 150–400)
POTASSIUM SERPL-MCNC: 4 MMOL/L — SIGNIFICANT CHANGE UP (ref 3.5–5.3)
POTASSIUM SERPL-SCNC: 4 MMOL/L — SIGNIFICANT CHANGE UP (ref 3.5–5.3)
PROT SERPL-MCNC: 7.3 G/DL — SIGNIFICANT CHANGE UP (ref 6–8.3)
PROTHROM AB SERPL-ACNC: 12.6 SEC — SIGNIFICANT CHANGE UP (ref 10–12.9)
RBC # BLD: 4.92 M/UL — SIGNIFICANT CHANGE UP (ref 3.8–5.2)
RBC # FLD: 14 % — SIGNIFICANT CHANGE UP (ref 10.3–14.5)
RH IG SCN BLD-IMP: POSITIVE — SIGNIFICANT CHANGE UP
RH IG SCN BLD-IMP: POSITIVE — SIGNIFICANT CHANGE UP
SARS-COV-2 RNA SPEC QL NAA+PROBE: SIGNIFICANT CHANGE UP
SODIUM SERPL-SCNC: 140 MMOL/L — SIGNIFICANT CHANGE UP (ref 135–145)
WBC # BLD: 10.1 K/UL — SIGNIFICANT CHANGE UP (ref 3.8–10.5)
WBC # FLD AUTO: 10.1 K/UL — SIGNIFICANT CHANGE UP (ref 3.8–10.5)

## 2020-05-11 PROCEDURE — 70552 MRI BRAIN STEM W/DYE: CPT | Mod: 26

## 2020-05-11 PROCEDURE — 71045 X-RAY EXAM CHEST 1 VIEW: CPT | Mod: 26,77

## 2020-05-11 PROCEDURE — 93970 EXTREMITY STUDY: CPT | Mod: 26

## 2020-05-11 PROCEDURE — 71045 X-RAY EXAM CHEST 1 VIEW: CPT | Mod: 26

## 2020-05-11 PROCEDURE — 93306 TTE W/DOPPLER COMPLETE: CPT | Mod: 26

## 2020-05-11 PROCEDURE — 99223 1ST HOSP IP/OBS HIGH 75: CPT

## 2020-05-11 PROCEDURE — 93010 ELECTROCARDIOGRAM REPORT: CPT

## 2020-05-11 PROCEDURE — 99285 EMERGENCY DEPT VISIT HI MDM: CPT

## 2020-05-11 RX ORDER — DEXAMETHASONE 0.5 MG/5ML
4 ELIXIR ORAL EVERY 6 HOURS
Refills: 0 | Status: DISCONTINUED | OUTPATIENT
Start: 2020-05-11 | End: 2020-05-13

## 2020-05-11 RX ORDER — BUDESONIDE AND FORMOTEROL FUMARATE DIHYDRATE 160; 4.5 UG/1; UG/1
2 AEROSOL RESPIRATORY (INHALATION)
Refills: 0 | Status: DISCONTINUED | OUTPATIENT
Start: 2020-05-11 | End: 2020-05-13

## 2020-05-11 RX ORDER — GLUCAGON INJECTION, SOLUTION 0.5 MG/.1ML
1 INJECTION, SOLUTION SUBCUTANEOUS ONCE
Refills: 0 | Status: DISCONTINUED | OUTPATIENT
Start: 2020-05-11 | End: 2020-05-13

## 2020-05-11 RX ORDER — LEVETIRACETAM 250 MG/1
500 TABLET, FILM COATED ORAL EVERY 12 HOURS
Refills: 0 | Status: DISCONTINUED | OUTPATIENT
Start: 2020-05-11 | End: 2020-05-13

## 2020-05-11 RX ORDER — DEXTROSE 50 % IN WATER 50 %
25 SYRINGE (ML) INTRAVENOUS ONCE
Refills: 0 | Status: DISCONTINUED | OUTPATIENT
Start: 2020-05-11 | End: 2020-05-13

## 2020-05-11 RX ORDER — PANTOPRAZOLE SODIUM 20 MG/1
40 TABLET, DELAYED RELEASE ORAL
Refills: 0 | Status: DISCONTINUED | OUTPATIENT
Start: 2020-05-11 | End: 2020-05-13

## 2020-05-11 RX ORDER — DEXTROSE 50 % IN WATER 50 %
12.5 SYRINGE (ML) INTRAVENOUS ONCE
Refills: 0 | Status: DISCONTINUED | OUTPATIENT
Start: 2020-05-11 | End: 2020-05-13

## 2020-05-11 RX ORDER — INSULIN LISPRO 100/ML
VIAL (ML) SUBCUTANEOUS
Refills: 0 | Status: DISCONTINUED | OUTPATIENT
Start: 2020-05-11 | End: 2020-05-13

## 2020-05-11 RX ORDER — TIOTROPIUM BROMIDE 18 UG/1
1 CAPSULE ORAL; RESPIRATORY (INHALATION) ONCE
Refills: 0 | Status: DISCONTINUED | OUTPATIENT
Start: 2020-05-11 | End: 2020-05-11

## 2020-05-11 RX ORDER — ACETAMINOPHEN 500 MG
650 TABLET ORAL EVERY 6 HOURS
Refills: 0 | Status: DISCONTINUED | OUTPATIENT
Start: 2020-05-11 | End: 2020-05-13

## 2020-05-11 RX ORDER — SENNA PLUS 8.6 MG/1
2 TABLET ORAL AT BEDTIME
Refills: 0 | Status: DISCONTINUED | OUTPATIENT
Start: 2020-05-11 | End: 2020-05-13

## 2020-05-11 RX ORDER — TIOTROPIUM BROMIDE 18 UG/1
1 CAPSULE ORAL; RESPIRATORY (INHALATION) DAILY
Refills: 0 | Status: DISCONTINUED | OUTPATIENT
Start: 2020-05-11 | End: 2020-05-13

## 2020-05-11 RX ORDER — DEXTROSE 50 % IN WATER 50 %
15 SYRINGE (ML) INTRAVENOUS ONCE
Refills: 0 | Status: DISCONTINUED | OUTPATIENT
Start: 2020-05-11 | End: 2020-05-13

## 2020-05-11 RX ORDER — SODIUM CHLORIDE 9 MG/ML
1000 INJECTION, SOLUTION INTRAVENOUS
Refills: 0 | Status: DISCONTINUED | OUTPATIENT
Start: 2020-05-11 | End: 2020-05-13

## 2020-05-11 RX ADMIN — Medication 4 MILLIGRAM(S): at 20:45

## 2020-05-11 RX ADMIN — BUDESONIDE AND FORMOTEROL FUMARATE DIHYDRATE 2 PUFF(S): 160; 4.5 AEROSOL RESPIRATORY (INHALATION) at 21:04

## 2020-05-11 RX ADMIN — LEVETIRACETAM 420 MILLIGRAM(S): 250 TABLET, FILM COATED ORAL at 20:58

## 2020-05-11 NOTE — ED PROVIDER NOTE - CLINICAL SUMMARY MEDICAL DECISION MAKING FREE TEXT BOX
59F with a h/o asthma, COPD, hx of left parietal mets (from lung adenocarcinoma) s/p craniotomy and radiation therapy (Oct/Nov 2017), followed by Dr. Rico and Dr. Barrios, who was sent to  for preop and admission to Dr. Rico after MRI performed last week shows new brain mass (MRI done at [facility]). Pt reports that she has been experiencing difficulty writing with her right hand, decreased peripheral vision on the right, and feeling "confused" for the past month. She denies other symptoms including headache, dizziness, nausea, vomiting, weakness, gait disturbance. No URI sx.    Pt well-appearing on exam, VSS, A&O x 3, will perform preop workup including Covid testing. Consult NS, plan for admission.

## 2020-05-11 NOTE — ED PROVIDER NOTE - PHYSICAL EXAMINATION
GEN: Well appearing, well nourished, awake, alert, oriented to person, place, time/situation and in no apparent distress. NTAF  ENT: Airway patent, Nasal mucosa clear. Mouth with normal mucosa.  EYES: Clear bilaterally. PERRL, EOMI  RESPIRATORY: Breathing comfortably with normal RR. No W/C/R, no hypoxia or resp distress.  CARDIAC: Regular rate and rhythm, no M/R/G  ABDOMEN: Soft, nontender, +bowel sounds, no rebound, rigidity, or guarding.  MSK: Range of motion is not limited, no deformities noted.  NEURO: Alert and oriented, no focal deficits. Please refer to NS note for detailed exam.  SKIN: Skin normal color for race, warm, dry and intact. No evidence of rash.  PSYCH: Alert and oriented to person, place, time/situation. normal mood and affect. no apparent risk to self or others.

## 2020-05-11 NOTE — H&P ADULT - NSHPPHYSICALEXAM_GEN_ALL_CORE
A&OX3 ANNA no facial weakness  Fully conversant able to name objects correctly and follow simple commands  MORSE 5/5 no drift or dysmetria  Gait is steady A&OX3 ANNA no facial weakness  Fully conversant able to name objects correctly some word-finding difficulty and follow simple commands  MORSE 5/5 no drift or dysmetria  Gait is steady

## 2020-05-11 NOTE — H&P ADULT - ATTENDING COMMENTS
Patient seen and examined by me 5/11/20. She presents with worsening neurological symptoms including right visual field defect Patient seen and examined by me 5/11/20. She presents with worsening neurological symptoms including right visual field defect, unsteady gait, clumsiness with right arm, intermittent word-finding difficulty, memory loss. She was sent for brain MRI which reveals a recurrent enhancing brain mass in the left parietal area measuring approximately 3 cm, creating local brain compression, and is associated with exuberance cerebral edema, Differential diagnosis includes recurrent metastasis, radiation necrosis, other lesion. Patient seen and examined by me 5/11/20. She presents with worsening neurological symptoms including right visual field defect, unsteady gait, clumsiness with right arm, intermittent word-finding difficulty, memory loss. She was sent for brain MRI which reveals a recurrent enhancing brain mass in the left parietal area measuring approximately 3 cm, creating local brain compression, and is associated with exuberance cerebral edema, Differential diagnosis includes recurrent brain metastasis, radiation necrosis, other lesion. Patient placed on steroid therapy as outpatient by radiation oncology. Due to the symptomatic Patient seen and examined by me 5/11/20. She presents with worsening neurological symptoms including right visual field defect, unsteady gait, clumsiness with right arm, intermittent word-finding difficulty, memory loss. She was sent for brain MRI which reveals a recurrent enhancing brain mass in the left parietal area measuring approximately 3 cm, creating local brain compression, and is associated with exuberance cerebral edema, Differential diagnosis includes recurrent brain metastasis, radiation necrosis, other lesion. Patient placed on steroid therapy as outpatient by radiation oncology. Due to the large size, symptomatic nature of the brain mass, and uncertain diagnosis will plan for craniotomy for resection. Risks of surgery including but not limited to infection, bleeding, stroke, need for re-operation, permanent neurological disability, failure to symptoms to improve discussed in detail. Patient understands and wishes to move forward with surgery. Plan for preop clearance, MRI brain navigation/feli protocol, CT chest/abd/pel r/o additional metastasis, keppra for seizure prophylaxis, decadron for cerebral edema.     Kapil Rico M.D.

## 2020-05-11 NOTE — PROGRESS NOTE ADULT - SUBJECTIVE AND OBJECTIVE BOX
Neurosurgical Indications for Screening Dopplers on Admission:       1) Known hypercoagulation disorder (h/o VTE, thrombophilia, HIT, etc.)   2) Admitted from prolonged stay from another institution (straight forward ER transfers not included)  3) Presenting with significant leg immobility   4) Presenting with signs and symptoms of VTE?    5) With significant critical illness, Including "found down" for unknown period of time in HPI  6) With significant neurotrauma (TBI, SCI / TLS spine fractures)   7) Who are comatose   8) With known malignancy (e.g. glioblastoma multiforme, meningioma, etc.). Excludes IA chemo 23hr observation stays  9) On hemodialysis   10) Who have received platelet transfusion or antithrombotic reversal agents recently   11) Who have had recent major orthopedic surgery          Screening dopplers indicated?   Y _x   N _    DVT Prophylaxis:  _x SCD's   _ chemoprophylaxis

## 2020-05-11 NOTE — H&P ADULT - PROBLEM SELECTOR PLAN 1
Admit Neurosurgery Dr Rico  MRI [facility] Protocol Brain  Seizure precaution  Monitor Neuro status  No steroids until final read on MRI  F/U Covid results  Dopplers BLE  Continue current medical regime at this time

## 2020-05-11 NOTE — ED PROVIDER NOTE - OBJECTIVE STATEMENT
59F with a h/o asthma, COPD, hx of left parietal mets (from lung adenocarcinoma) s/p craniotomy and radiation therapy (Oct/Nov 2017), followed by Dr. Rico and Dr. Barrios, who was sent to  for preop and admission to Dr. Rico after MRI performed last week shows new brain mass (MRI done at [facility]). Pt reports that she has been experiencing difficulty writing with her right hand, decreased peripheral vision on the right, and feeling "confused" for the past month. She denies other symptoms including headache, dizziness, nausea, vomiting, weakness, gait disturbance. No URI sx.

## 2020-05-11 NOTE — ED ADULT NURSE NOTE - OBJECTIVE STATEMENT
PT is a 59y female presented to ED for preop admission. PT has hx of left lung CA w/ mets to brain. MRI completed last week revealed a new brain mass. PT c/o R arm numbness, PT is a 59y female presented to ED for preop admission. PT has hx of left lung CA w/ mets to brain. MRI completed last week revealed a new brain mass. PT c/o trouble when writing with right hand, peripheral vision changes to the right and "feeling confused." Pt denies any fever,chills, N/V/D, no CP or SOB.

## 2020-05-11 NOTE — CHART NOTE - NSCHARTNOTEFT_GEN_A_CORE
Neurosurgical Indications for Screening Dopplers on Admission:       1) Known hypercoagulation disorder (h/o VTE, thrombophilia, HIT, etc.)   2) Admitted from prolonged stay from another institution (straight forward ER transfers not included)  3) Presenting with significant leg immobility   4) Presenting with signs and symptoms of VTE?    5) With significant critical illness, Including "found down" for unknown period of time in HPI  6) With significant neurotrauma (TBI, SCI / TLS spine fractures)   7) Who are comatose   8) With known malignancy (e.g. glioblastoma multiforme, meningioma, etc.). Excludes IA chemo 23hr observation stays  9) On hemodialysis   10) Who have received platelet transfusion or antithrombotic reversal agents recently   11) Who have had recent major orthopedic surgery          Screening dopplers indicated?   Y _x   N _    DVT Prophylaxis:  x_ SCD's   _ chemoprophylaxis

## 2020-05-11 NOTE — PROGRESS NOTE ADULT - SUBJECTIVE AND OBJECTIVE BOX
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 Pisano [id number]   s/p  .  Left parieto occiptal cranin [place] for Metastatic disease followed by Gamma Knife  with Dr Rico.   PMH  COPD, seizures (likely 2/2 brain mets) .  Last thursday pt noticed being a little confusion and right hand she was weaker for about 7 days.  Called Dr Rico and went for MRI Brain. PT returns to the ER for further managment.  She has right lateral visual impairment    Denies any fevers, n/v or traveling over the past 6 months.   She has not been around people that had Covid virus. She lives alone in       PAST MEDICAL & SURGICAL HISTORY:  MRSA (methicillin resistant Staphylococcus aureus): history of  Adenocarcinoma of lung  COPD (chronic obstructive pulmonary disease)  H/O brain surgery  Surgery, elective: lung biopsy  History of appendectomy    Allergies : PCN     Review of Systems:  Review of Systems: PHYSICAL EXAM:  Constitutional: NAD, resting comfortably  Neck: supple, no JVD  Back: no CVAT B/L  Respiratory: decreased BS on RML/RLL , well healed scar  Cardiovascular: +S1, S2, RRR  Gastrointestinal: Abdomen soft NT/ND  Neurological: AAOX3, FC, speech coherent  CNII-XII: EOM intact, PERRL, face symmetric, tongue midline  Motor: MAEx4 5/5 UE and LE B/L, neg protonator drift, neg dysmetria  Scalp: Left parietal incision site C/D/I, well healed	      Allergies and Intolerances:        Allergies:  	penicillin: Drug, Angioedema    Home Medications:   * Patient Currently Takes Medications as of 2019 22:33 documented in Structured Notes  · 	predniSONE 50 mg oral tablet: not at home  · 	oseltamivir 75 mg oral capsule: "a long while ago"   · 	predniSONE 20 mg oral tablet: not at home   · 	Spiriva 18 mcg inhalation capsule: 1 cap(s) inhaled once a day  · 	albuterol 90 mcg/inh inhalation aerosol: 2 puff(s) inhaled 4 times a day  · 	Advair Diskus 500 mcg-50 mcg inhalation powder: 1 puff(s) inhaled 2 times a day  · 	oxyCODONE 30 mg oral tablet: 1 tab(s) orally once a day, As needed, Severe Pain (7 - 10)  · 	Nebulizer machine: patient with a nebulizer machine for inhalational treatments at home    .    Patient History:    Past Medical, Past Surgical, and Family History:  PAST MEDICAL HISTORY:  Adenocarcinoma of lung     Brain metastasis brain tumor s/p resection    COPD (chronic obstructive pulmonary disease)     MRSA (methicillin resistant Staphylococcus aureus) history of.     PAST SURGICAL HISTORY:  H/O brain surgery     History of appendectomy     History of lung surgery right wedge resection    Surgery, elective lung biopsy.     FAMILY HISTORY:  Father  Still living? Unknown  No family history of cardiovascular disease, Age at diagnosis: Age Unknown    Mother  Still living? Unknown  Family history of cancer in mother, Age at diagnosis: Age Unknown  Family history of diabetes mellitus, Age at diagnosis: Age Unknown  Family history of essential hypertension, Age at diagnosis: Age Unknown    Sibling  Still living? Unknown  Family history of diabetes mellitus, Age at diagnosis: Age Unknown  Family history of essential hypertension, Age at diagnosis: Age Unknown  Family history of uterine cancer, Age at diagnosis: Age Unknown.     Tobacco Screening:  · Core Measure Site	No	  · Has the patient used tobacco in the past 30 days?	No	    Risk Assessment:    Present on Admission:  Deep Venous Thrombosis	no	  Pulmonary Embolus	no	  Urinary Catheter	no	  Central Venous Catheter/PICC Line	no	  Surgical Site Incision	no	  Pressure Ulcer(s)	no	     Heart Failure:  Does this patient have a history of or has been diagnosed with heart failure? no.    HIV Screen (per [facility] Department of Health, HIV screening must be offered to every individual between ages 13 and 64)	Not applicable (known HIV negative status in last year)	  Screening uterine cytology (Pap smear) performed in last 3 years	no	  According to [facility] regulations, a screening uterine cytology (Pap) smear must be performed unless medically contraindicated	Medically contraindicated	    Physical Exam:    Reference Recent Physical Exam:  · In accordance with current standards limiting patient contact please refer to the recent:	Other	  · Other Physical Exam Document	no	    Physical Exam: A&OX3 ANNA no facial weakness  Fully conversant able to name objects correctly and follow simple commands  MORSE 5/5 no drift or dysmetria Gait is steady	       Labs and Results:  Labs, Radiology, Cardiology, and Other Results: Comprehensive Metabolic Panel (20 @ 10:44)    Sodium, Serum: 140 mmol/L    Potassium, Serum: 4.0 mmol/L    Chloride, Serum: 104 mmol/L    Carbon Dioxide, Serum: 24 mmol/L    Anion Gap, Serum: 12 mmol/L    Blood Urea Nitrogen, Serum: 17 mg/dL    Creatinine, Serum: 0.85 mg/dL    Glucose, Serum: 110 mg/dL    Calcium, Total Serum: 9.1 mg/dL    Protein Total, Serum: 7.3 g/dL    Albumin, Serum: 4.0 g/dL    Bilirubin Total, Serum: 0.2 mg/dL    Alkaline Phosphatase, Serum: 72 U/L    Aspartate Aminotransferase (AST/SGOT): 12 U/L    Alanine Aminotransferase (ALT/SGPT): 12 U/L    Complete Blood Count + Automated Diff (20 @ 10:44)    WBC Count: 10.10 K/uL    RBC Count: 4.92 M/uL    Hemoglobin: 14.1 g/dL    Hematocrit: 43.8 %    Mean Cell Volume: 89.0 fl    Mean Cell Hemoglobin: 28.7 pg    Mean Cell Hemoglobin Conc: 32.2 gm/dL    Red Cell Distrib Width: 14.0 %    Platelet Count - Automated: 296 K/uL    Auto Neutrophil #: 8.20 K/uL    Auto Lymphocyte #: 1.34 K/uL    Auto Monocyte #: 0.53 K/uL    Auto Eosinophil #: 0.00 K/uL    Auto Basophil #: 0.00 K/uL    Auto Neutrophil %: 81.2: Differential percentages must be correlated with absolute numbers for  clinical significance. %    Auto Lymphocyte %: 13.3 %    Auto Monocyte %: 5.2 %    Auto Eosinophil %: 0.0 %    Auto Basophil %: 0.0 %    Auto Immature Granulocyte %: 0.3 %    Nucleated RBC: 0 /100 WBCs   Prothrombin Time and INR, Plasma (20 @ 10:44)    Prothrombin Time, Plasma: 12.6 sec    INR: 1.10: Recommended ranges for therapeutic INR:       Activated Partial Thromboplastin Time (20 @ 10:44)   Activated Partial Thromboplastin Time: 29.6 sec  MRI with left occipital lesion  CXR with emphysema changes, no change	      EXAM:  CT ABDOMEN AND PELVIS OC IC                          EXAM:  CT CHEST OC IC                          PROCEDURE DATE:  2020      INTERPRETATION:  CT SCAN OF CHEST, ABDOMEN AND PELVIS    History: History of lung cancer status post right lower lobectomy. Surveillance imaging.    Technique: CT scan of chest, abdomen and pelvis performed from lung apices through pubic symphysis. Intravenous and oral contrast material were administered. Axial, coronal, and sagittal multiplanar reformatted images were produced. Thin section axial images and axial MIPS of the chest were also produced. 94 mL Optiray 350 were used as IV contrast.    Comparison: CT chest abdomen and pelvis 2019, CT chest abdomen and pelvis 2019    Findings:     Lungs and large airways: Status post right lower lobectomy. Centrilobular emphysema. Unchanged right-sided 3 mm fissural nodule (3:140).     Pleura:  No pleural effusion.    Mediastinum and hilar regions: No thoracic lymphadenopathy.    Heart and pericardium:  Heart size is normal. No pericardial effusion.    Vessels:  Normal.    Chest wall and lower neck:  Redemonstration of bilateral thyroid nodules with the right measuring up to 2.2 cm in diameter.    Liver:  Normal.    Gallbladder: No radiopaque stones gallbladder.    Spleen:  Normal.    Pancreas:  Normal.    Adrenal glands:  Normal.    Kidneys: Normal.    Abdominal and pelvic adenopathy:  No lymphadenopathy in abdomen or pelvis.    Ascites: None.    Gastrointestinal tract: Redemonstration of small periampullary duodenal diverticulum. Colonic diverticulosis.    Pelvic organs: Unremarkable.    Soft tissues: Normal.    Bones: Degenerative changes. Postoperative changes in the right sixth and seventh ribs. No lytic or blastic lesions identified.    Impression: Since 2019, there has been no evidence of local or metastatic disease recurrence.      EXAM:  ECHO TTE WO CON COMP W DOPP                          PROCEDURE DATE:  2020          INTERPRETATION:  REPORT:    -----------------------------------  TRANSTHORACIC ECHOCARDIOGRAM REPORT       --------------------------------------------------------------------------------  Patient Name:   VICTOR HUGO KEITH Date of Exam:        2020  Medical Rec #:  [id number]           Height/Weight:       65.0 in / 249.12 lb  Account #:      [id number]           BSA:                 2.17 m²  Date ofBirth:  1960         BP:                  140/70 mmHg  Patient Age:    59 years          HR:  Patient Gender: F                 Sonographer:         Juan Manuel Nguyễn                                    Referring Physician: VILMA MARTÍNEZ       --------------------------------------------------------------------------------  CPT:               ECHO TTE WO CON COMP W DOPP - 15817; - [id number].m  Indication(s):     R06.00 - Dyspnea, unspecified  Procedure:         A complete two-dimensional transthoracic echocardiogram was                     performed: 2D, M-mode, spectral and color flow Doppler.  Ordering Location: [facility]     Study Quality:     Study quality was fair.       --------------------------------------------------------------------------------  CONCLUSIONS:     1. Normal left and right ventricular size and systolic function.   2. No significant valvular disease.   3. No pericardial effusion.    --------------------------------------------------------------------------------  2D AND M-MODE MEASUREMENTS (normal ranges within parentheses):     Left Ventricle:         Normal - Men Normal - Women  IVSd (2D):      0.94 cm  (0.6-1.0)     (0.6-0.9)  LVPWd (2D):     0.91 cm  (0.6-1.0)     (0.6-0.9)  LVIDd (2D):     3.69 cm  (4.2-5.8)   (3.8-5.2)  LVIDs (2D):     2.74 cm  LV FS (2D):     25.7 %     (>25%)  LV EF (MOD BP):  67 %      (>55%)  Aorta/Left Atrium:      Normal - Men Normal - Women  Aortic Root (2D):  3.00  (2.4-3.7)     LA Volume A/L:     Right Ventricle:    LV DIASTOLIC FUNCTION:     MV Peak E: 75.00 cm/s MV e' lat:  9.0 cm/s MV E/e' lat ratio: 8.3  MV Peak A: 48.90 cm/s MV e' med:  7.2 cm/s MV E/e' med ratio: 10.4  E/A Ratio: 1.53       Decel Time: 169 msec MV E/e' av.4    SPECTRAL DOPPLER ANALYSIS:     Mitral Valve:  MV Max Rodriguez:   MV P1/2 Time: 49.01 msec  MV Mean Grad: MV Area, PHT: 4.49 cm²       Aortic Valve: AoV Max Rodriguez:             AoV Peak PG:            AoV Mean PG:   3 mmHg  LVOT Vmax:      0.98 m/s LVOT VTI:      0.218 m  LVOT Diameter: 2.10 cm  AoV Area, VMax:          AoV Area, VTI: 2.85 cm² AoV Area, Vmn: 3.09 cm²       Tricuspid Valve and PA/RV Systolic Pressure: TR Max Velocity: 2.43 m/s RA Pressure: 3 mmHg RVSP/PASP: 27 mmHg    FINDINGS:     Left Ventricle:  The left ventricle is normal in size, wall thickness, and systolic function with a calculated ejection fraction of 67%. There are no regional wall motion abnormalities seen. There is normal left ventricular diastolic function.     Right Ventricle:  The right ventricle is normal in size. Right ventricular systolic function is normal.     Left Atrium:  The left atrium is normal in size.     Right Atrium:  The right atrium is normal in size.     Aortic Valve:  The aortic valve is mildly thickened. No hemodynamically significant aortic stenosis. There is no evidence of aortic regurgitation.     Mitral Valve:  Structurally normal mitral valve with normal leaflet excursion. There is trace mitral regurgitation.     Tricuspid Valve:  Structurally normal tricuspid valve with normal leaflet excursion. There is trace tricuspid regurgitation. Pulmonary artery systolic pressure (estimated using the tricuspid regurgitant gradient and an estimate of right atrial pressure) is 27 mmHg.     Inferior Vena Cava:  The inferior vena cava is normal in size (<2.1cm) with normal inspiratory collapse (>50%) consistent with normal right atrial pressure (  3, range 0-5mmHg). The inferior vena cava isnormal in size (<2.1cm) with abnormal inspiratory collapse (<50%) consistent with mildly elevated right atrial pressure (  8, range 5-10mmHg).     Pulmonic Valve:  The pulmonic valve is not well visualized. There is no evidence of pulmonic regurgitation.     Aorta:  The aortic root is normal in size.     Pericardium:  No pericardial effusion is seen.     Assessment and Plan:    Problem/Plan - 1:  ·  Problem: Brain mass.  Plan: Admit Neurosurgery Dr Rico  MRI [facility] Protocol Brain  Seizure precaution  Monitor Neuro status  No steroids until final read on MRI  F/U Covid results - negative  Dopplers BLE  Continue current medical regime at this time.      Problem/Plan - 2:  ·  Problem: Adenocarcinoma of lung. S/P lobectomy.    COPD and asthma    Continue triple inhalers. Systemic steroid for asthma/COPD currently not necessary.    It is appropriate to proceed with neurosurgical intervention if necessary, from a pulmonary point of view. 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 Pisano [id number]   s/p  .  Left parieto occiptal cranin [place] for Metastatic disease followed by Gamma Knife  with Dr Rico.   PMH  COPD, seizures (likely 2/2 brain mets) .  Last thursday pt noticed being a little confusion and right hand she was weaker for about 7 days.  Called Dr Rico and went for MRI Brain. PT returns to the ER for further managment.  She has right lateral visual impairment    Denies any fevers, n/v or traveling over the past 6 months.   She has not been around people that had Covid virus. She lives alone in       PAST MEDICAL & SURGICAL HISTORY:  MRSA (methicillin resistant Staphylococcus aureus): history of  Adenocarcinoma of lung  COPD (chronic obstructive pulmonary disease)  H/O brain surgery  Surgery, elective: lung biopsy  History of appendectomy    Allergies : PCN     Review of Systems:  Review of Systems: PHYSICAL EXAM:  Constitutional: NAD, resting comfortably  Neck: supple, no JVD  Back: no CVAT B/L  Respiratory: decreased BS on RML/RLL , well healed scar  Cardiovascular: +S1, S2, RRR  Gastrointestinal: Abdomen soft NT/ND  Neurological: AAOX3, FC, speech coherent  CNII-XII: EOM intact, PERRL, face symmetric, tongue midline  Motor: MAEx4 5/5 UE and LE B/L, neg protonator drift, neg dysmetria  Scalp: Left parietal incision site C/D/I, well healed	      Allergies and Intolerances:        Allergies:  	penicillin: Drug, Angioedema    Home Medications:   * Patient Currently Takes Medications as of 2019 22:33 documented in Structured Notes  · 	predniSONE 50 mg oral tablet: not at home  · 	oseltamivir 75 mg oral capsule: "a long while ago"   · 	predniSONE 20 mg oral tablet: not at home   · 	Spiriva 18 mcg inhalation capsule: 1 cap(s) inhaled once a day  · 	albuterol 90 mcg/inh inhalation aerosol: 2 puff(s) inhaled 4 times a day  · 	Advair Diskus 500 mcg-50 mcg inhalation powder: 1 puff(s) inhaled 2 times a day  · 	oxyCODONE 30 mg oral tablet: 1 tab(s) orally once a day, As needed, Severe Pain (7 - 10)  · 	Nebulizer machine: patient with a nebulizer machine for inhalational treatments at home    .    Patient History:    Past Medical, Past Surgical, and Family History:  PAST MEDICAL HISTORY:  Adenocarcinoma of lung     Brain metastasis brain tumor s/p resection    COPD (chronic obstructive pulmonary disease)     MRSA (methicillin resistant Staphylococcus aureus) history of.     PAST SURGICAL HISTORY:  H/O brain surgery     History of appendectomy     History of lung surgery right wedge resection    Surgery, elective lung biopsy.     FAMILY HISTORY:  Father  Still living? Unknown  No family history of cardiovascular disease, Age at diagnosis: Age Unknown    Mother  Still living? Unknown  Family history of cancer in mother, Age at diagnosis: Age Unknown  Family history of diabetes mellitus, Age at diagnosis: Age Unknown  Family history of essential hypertension, Age at diagnosis: Age Unknown    Sibling  Still living? Unknown  Family history of diabetes mellitus, Age at diagnosis: Age Unknown  Family history of essential hypertension, Age at diagnosis: Age Unknown  Family history of uterine cancer, Age at diagnosis: Age Unknown.     Tobacco Screening:  · Core Measure Site	No	  · Has the patient used tobacco in the past 30 days?	No	    Risk Assessment:    Present on Admission:  Deep Venous Thrombosis	no	  Pulmonary Embolus	no	  Urinary Catheter	no	  Central Venous Catheter/PICC Line	no	  Surgical Site Incision	no	  Pressure Ulcer(s)	no	     Heart Failure:  Does this patient have a history of or has been diagnosed with heart failure? no.    HIV Screen (per [facility] Department of Health, HIV screening must be offered to every individual between ages 13 and 64)	Not applicable (known HIV negative status in last year)	  Screening uterine cytology (Pap smear) performed in last 3 years	no	  According to [facility] regulations, a screening uterine cytology (Pap) smear must be performed unless medically contraindicated	Medically contraindicated	    Physical Exam:    Reference Recent Physical Exam:  · In accordance with current standards limiting patient contact please refer to the recent:	Other	  · Other Physical Exam Document	no	    Physical Exam: A&OX3 ANNA no facial weakness  Fully conversant able to name objects correctly and follow simple commands  MORSE 5/5 no drift or dysmetria Gait is steady A x O3 N s, no LAP MT: no thrush, moist L: BS bilateral decreased, mild wheezing, mild rhonchi, no rales H: RRR, -MRG A: s, NTND E: no c/c/e	       Labs and Results:  Labs, Radiology, Cardiology, and Other Results: Comprehensive Metabolic Panel (20 @ 10:44)    Sodium, Serum: 140 mmol/L    Potassium, Serum: 4.0 mmol/L    Chloride, Serum: 104 mmol/L    Carbon Dioxide, Serum: 24 mmol/L    Anion Gap, Serum: 12 mmol/L    Blood Urea Nitrogen, Serum: 17 mg/dL    Creatinine, Serum: 0.85 mg/dL    Glucose, Serum: 110 mg/dL    Calcium, Total Serum: 9.1 mg/dL    Protein Total, Serum: 7.3 g/dL    Albumin, Serum: 4.0 g/dL    Bilirubin Total, Serum: 0.2 mg/dL    Alkaline Phosphatase, Serum: 72 U/L    Aspartate Aminotransferase (AST/SGOT): 12 U/L    Alanine Aminotransferase (ALT/SGPT): 12 U/L    Complete Blood Count + Automated Diff (20 @ 10:44)    WBC Count: 10.10 K/uL    RBC Count: 4.92 M/uL    Hemoglobin: 14.1 g/dL    Hematocrit: 43.8 %    Mean Cell Volume: 89.0 fl    Mean Cell Hemoglobin: 28.7 pg    Mean Cell Hemoglobin Conc: 32.2 gm/dL    Red Cell Distrib Width: 14.0 %    Platelet Count - Automated: 296 K/uL    Auto Neutrophil #: 8.20 K/uL    Auto Lymphocyte #: 1.34 K/uL    Auto Monocyte #: 0.53 K/uL    Auto Eosinophil #: 0.00 K/uL    Auto Basophil #: 0.00 K/uL    Auto Neutrophil %: 81.2: Differential percentages must be correlated with absolute numbers for  clinical significance. %    Auto Lymphocyte %: 13.3 %    Auto Monocyte %: 5.2 %    Auto Eosinophil %: 0.0 %    Auto Basophil %: 0.0 %    Auto Immature Granulocyte %: 0.3 %    Nucleated RBC: 0 /100 WBCs   Prothrombin Time and INR, Plasma (20 @ 10:44)    Prothrombin Time, Plasma: 12.6 sec    INR: 1.10: Recommended ranges for therapeutic INR:       Activated Partial Thromboplastin Time (20 @ 10:44)   Activated Partial Thromboplastin Time: 29.6 sec  MRI with left occipital lesion  CXR with emphysema changes, no change	      EXAM:  CT ABDOMEN AND PELVIS OC IC                          EXAM:  CT CHEST OC IC                          PROCEDURE DATE:  2020      INTERPRETATION:  CT SCAN OF CHEST, ABDOMEN AND PELVIS    History: History of lung cancer status post right lower lobectomy. Surveillance imaging.    Technique: CT scan of chest, abdomen and pelvis performed from lung apices through pubic symphysis. Intravenous and oral contrast material were administered. Axial, coronal, and sagittal multiplanar reformatted images were produced. Thin section axial images and axial MIPS of the chest were also produced. 94 mL Optiray 350 were used as IV contrast.    Comparison: CT chest abdomen and pelvis 2019, CT chest abdomen and pelvis 2019    Findings:     Lungs and large airways: Status post right lower lobectomy. Centrilobular emphysema. Unchanged right-sided 3 mm fissural nodule (3:140).     Pleura:  No pleural effusion.    Mediastinum and hilar regions: No thoracic lymphadenopathy.    Heart and pericardium:  Heart size is normal. No pericardial effusion.    Vessels:  Normal.    Chest wall and lower neck:  Redemonstration of bilateral thyroid nodules with the right measuring up to 2.2 cm in diameter.    Liver:  Normal.    Gallbladder: No radiopaque stones gallbladder.    Spleen:  Normal.    Pancreas:  Normal.    Adrenal glands:  Normal.    Kidneys: Normal.    Abdominal and pelvic adenopathy:  No lymphadenopathy in abdomen or pelvis.    Ascites: None.    Gastrointestinal tract: Redemonstration of small periampullary duodenal diverticulum. Colonic diverticulosis.    Pelvic organs: Unremarkable.    Soft tissues: Normal.    Bones: Degenerative changes. Postoperative changes in the right sixth and seventh ribs. No lytic or blastic lesions identified.    Impression: Since 2019, there has been no evidence of local or metastatic disease recurrence.      EXAM:  ECHO TTE WO CON COMP W DOPP                          PROCEDURE DATE:  2020          INTERPRETATION:  REPORT:    -----------------------------------  TRANSTHORACIC ECHOCARDIOGRAM REPORT       --------------------------------------------------------------------------------  Patient Name:   VICTOR HUGO KEITH Date of Exam:        2020  Medical Rec #:  [id number]           Height/Weight:       65.0 in / 249.12 lb  Account #:      [id number]           BSA:                 2.17 m²  Date ofBirth:  1960         BP:                  140/70 mmHg  Patient Age:    59 years          HR:  Patient Gender: F                 Sonographer:         Juan Manuel Nguyễn                                    Referring Physician: VILMA MARTÍNEZ       --------------------------------------------------------------------------------  CPT:               ECHO TTE WO CON COMP W DOPP - 94427; - [id number].m  Indication(s):     R06.00 - Dyspnea, unspecified  Procedure:         A complete two-dimensional transthoracic echocardiogram was                     performed: 2D, M-mode, spectral and color flow Doppler.  Ordering Location: [facility]     Study Quality:     Study quality was fair.       --------------------------------------------------------------------------------  CONCLUSIONS:     1. Normal left and right ventricular size and systolic function.   2. No significant valvular disease.   3. No pericardial effusion.    --------------------------------------------------------------------------------  2D AND M-MODE MEASUREMENTS (normal ranges within parentheses):     Left Ventricle:         Normal - Men Normal - Women  IVSd (2D):      0.94 cm  (0.6-1.0)     (0.6-0.9)  LVPWd (2D):     0.91 cm  (0.6-1.0)     (0.6-0.9)  LVIDd (2D):     3.69 cm  (4.2-5.8)   (3.8-5.2)  LVIDs (2D):     2.74 cm  LV FS (2D):     25.7 %     (>25%)  LV EF (MOD BP):  67 %      (>55%)  Aorta/Left Atrium:      Normal - Men Normal - Women  Aortic Root (2D):  3.00  (2.4-3.7)     LA Volume A/L:     Right Ventricle:    LV DIASTOLIC FUNCTION:     MV Peak E: 75.00 cm/s MV e' lat:  9.0 cm/s MV E/e' lat ratio: 8.3  MV Peak A: 48.90 cm/s MV e' med:  7.2 cm/s MV E/e' med ratio: 10.4  E/A Ratio: 1.53       Decel Time: 169 msec MV E/e' av.4    SPECTRAL DOPPLER ANALYSIS:     Mitral Valve:  MV Max Rodriguez:   MV P1/2 Time: 49.01 msec  MV Mean Grad: MV Area, PHT: 4.49 cm²       Aortic Valve: AoV Max Rodriguez:             AoV Peak PG:            AoV Mean PG:   3 mmHg  LVOT Vmax:      0.98 m/s LVOT VTI:      0.218 m  LVOT Diameter: 2.10 cm  AoV Area, VMax:          AoV Area, VTI: 2.85 cm² AoV Area, Vmn: 3.09 cm²       Tricuspid Valve and PA/RV Systolic Pressure: TR Max Velocity: 2.43 m/s RA Pressure: 3 mmHg RVSP/PASP: 27 mmHg    FINDINGS:     Left Ventricle:  The left ventricle is normal in size, wall thickness, and systolic function with a calculated ejection fraction of 67%. There are no regional wall motion abnormalities seen. There is normal left ventricular diastolic function.     Right Ventricle:  The right ventricle is normal in size. Right ventricular systolic function is normal.     Left Atrium:  The left atrium is normal in size.     Right Atrium:  The right atrium is normal in size.     Aortic Valve:  The aortic valve is mildly thickened. No hemodynamically significant aortic stenosis. There is no evidence of aortic regurgitation.     Mitral Valve:  Structurally normal mitral valve with normal leaflet excursion. There is trace mitral regurgitation.     Tricuspid Valve:  Structurally normal tricuspid valve with normal leaflet excursion. There is trace tricuspid regurgitation. Pulmonary artery systolic pressure (estimated using the tricuspid regurgitant gradient and an estimate of right atrial pressure) is 27 mmHg.     Inferior Vena Cava:  The inferior vena cava is normal in size (<2.1cm) with normal inspiratory collapse (>50%) consistent with normal right atrial pressure (  3, range 0-5mmHg). The inferior vena cava isnormal in size (<2.1cm) with abnormal inspiratory collapse (<50%) consistent with mildly elevated right atrial pressure (  8, range 5-10mmHg).     Pulmonic Valve:  The pulmonic valve is not well visualized. There is no evidence of pulmonic regurgitation.     Aorta:  The aortic root is normal in size.     Pericardium:  No pericardial effusion is seen.     Assessment and Plan:    Problem/Plan - 1:  ·  Problem: Brain mass.  Plan: Admit Neurosurgery Dr Rico  [facility] Protocol Brain  Seizure precaution  Monitor Neuro status  No steroids until final read on MRI  F/U Covid results - negative  Dopplers BLE  Continue current medical regime at this time.      Problem/Plan - 2:  ·  Problem: Adenocarcinoma of lung. S/P lobectomy.    COPD and asthma    Continue triple inhalers. Systemic steroid for asthma/COPD currently not necessary.    It is appropriate to proceed with neurosurgical intervention if necessary, from a pulmonary point of view.

## 2020-05-11 NOTE — H&P ADULT - HISTORY OF PRESENT ILLNESS
Neurosurgery NP /Dr Rico    HISTORY OF PRESENT ILLNESS:     60yo Female right handed  w/PMHx NSCLC (s/p RLL lobectomy, RML wedge resection by Dr. Pisano [id number]   s/p  2017  Left parieto occiptal cranin [place] for Metastatic disease followed by Gamma Knife 2018 with Dr Rico   [facility]  COPD, seizures (likely 2/2 brain mets) .  Last thursday pt noticed being a little confusion and right hand she was weaker.  Called Dr Rico and went for MRI Brain. PT returns to the ER for further managment.    Denies any fevers, n/v, visual changes or traveling over the past 6 months.   She has not been around people that had Covid virus      PAST MEDICAL & SURGICAL HISTORY:  MRSA (methicillin resistant Staphylococcus aureus): history of  Adenocarcinoma of lung  COPD (chronic obstructive pulmonary disease)  H/O brain surgery  Surgery, elective: lung biopsy  History of appendectomy    Allergies : PCN Neurosurgery NP /Dr Rico    HISTORY OF PRESENT ILLNESS:     60yo Female right handed  w/PMHx NSCLC (s/p RLL lobectomy, RML wedge resection by Dr. Pisano [id number]   s/p  2017  Left parieto occiptal craniotomy in [place] for metastatic disease followed by adjuvant radiation.  She also underwent Gamma Knife radiosurgery 2018 with Dr Rico for a new right parietal region metastasis.      PMH  COPD, seizures (likely 2/2 brain mets). Patient has noticed worsening confusion, word-finding difficulty, gait imbalance, and right hand weakness.  She called Dr Rico and went for MRI Brain. Patient now presents after MRI revealed a large left side brain mass with brain compression.    Denies any fevers, n/v, visual changes or traveling over the past 6 months.   She has not been around people that had Covid virus      PAST MEDICAL & SURGICAL HISTORY:  MRSA (methicillin resistant Staphylococcus aureus): history of  Adenocarcinoma of lung  COPD (chronic obstructive pulmonary disease)  H/O brain surgery  Surgery, elective: lung biopsy  History of appendectomy    Allergies : PCN

## 2020-05-11 NOTE — H&P ADULT - NSICDXFAMILYHX_GEN_ALL_CORE_FT
FAMILY HISTORY:  Father  Still living? Unknown  No family history of cardiovascular disease, Age at diagnosis: Age Unknown    Mother  Still living? Unknown  Family history of cancer in mother, Age at diagnosis: Age Unknown  Family history of diabetes mellitus, Age at diagnosis: Age Unknown  Family history of essential hypertension, Age at diagnosis: Age Unknown    Sibling  Still living? Unknown  Family history of diabetes mellitus, Age at diagnosis: Age Unknown  Family history of essential hypertension, Age at diagnosis: Age Unknown  Family history of uterine cancer, Age at diagnosis: Age Unknown

## 2020-05-11 NOTE — H&P ADULT - NSHPLABSRESULTS_GEN_ALL_CORE
Comprehensive Metabolic Panel (05.11.20 @ 10:44)    Sodium, Serum: 140 mmol/L    Potassium, Serum: 4.0 mmol/L    Chloride, Serum: 104 mmol/L    Carbon Dioxide, Serum: 24 mmol/L    Anion Gap, Serum: 12 mmol/L    Blood Urea Nitrogen, Serum: 17 mg/dL    Creatinine, Serum: 0.85 mg/dL    Glucose, Serum: 110 mg/dL    Calcium, Total Serum: 9.1 mg/dL    Protein Total, Serum: 7.3 g/dL    Albumin, Serum: 4.0 g/dL    Bilirubin Total, Serum: 0.2 mg/dL    Alkaline Phosphatase, Serum: 72 U/L    Aspartate Aminotransferase (AST/SGOT): 12 U/L    Alanine Aminotransferase (ALT/SGPT): 12 U/L     Complete Blood Count + Automated Diff (05.11.20 @ 10:44)    WBC Count: 10.10 K/uL    RBC Count: 4.92 M/uL    Hemoglobin: 14.1 g/dL    Hematocrit: 43.8 %    Mean Cell Volume: 89.0 fl    Mean Cell Hemoglobin: 28.7 pg    Mean Cell Hemoglobin Conc: 32.2 gm/dL    Red Cell Distrib Width: 14.0 %    Platelet Count - Automated: 296 K/uL    Auto Neutrophil #: 8.20 K/uL    Auto Lymphocyte #: 1.34 K/uL    Auto Monocyte #: 0.53 K/uL    Auto Eosinophil #: 0.00 K/uL    Auto Basophil #: 0.00 K/uL    Auto Neutrophil %: 81.2: Differential percentages must be correlated with absolute numbers for  clinical significance. %    Auto Lymphocyte %: 13.3 %    Auto Monocyte %: 5.2 %    Auto Eosinophil %: 0.0 %    Auto Basophil %: 0.0 %    Auto Immature Granulocyte %: 0.3 %    Nucleated RBC: 0 /100 WBCs    Prothrombin Time and INR, Plasma (05.11.20 @ 10:44)    Prothrombin Time, Plasma: 12.6 sec    INR: 1.10: Recommended ranges for therapeutic INR:        Activated Partial Thromboplastin Time (05.11.20 @ 10:44)    Activated Partial Thromboplastin Time: 29.6 sec

## 2020-05-11 NOTE — H&P ADULT - NSHPREVIEWOFSYSTEMS_GEN_ALL_CORE
PHYSICAL EXAM:  Constitutional: NAD, resting comfortably  Neck: supple, no JVD  Back: no CVAT B/L  Respiratory: decreased BS on RML/RLL , well healed scar  Cardiovascular: +S1, S2, RRR  Gastrointestinal: Abdomen soft NT/ND  Neurological: AAOX3, FC, speech coherent  CNII-XII: EOM intact, PERRL, face symmetric, tongue midline  Motor: MAEx4 5/5 UE and LE B/L, neg protonator drift, neg dysmetria  Scalp: Left parietal incision site C/D/I, well healed    P PHYSICAL EXAM:  Constitutional: NAD, resting comfortably  Neck: supple, no JVD  Back: no CVAT B/L  Respiratory: decreased BS on RML/RLL , well healed scar  Cardiovascular: +S1, S2, RRR  Gastrointestinal: Abdomen soft NT/ND  Neurological: AAOX3, FC, speech coherent  CNII-XII: EOM intact, PERRL, face symmetric, tongue midline R visual field cut memory loss  Motor: MAEx4 5/5 UE and LE B/L, neg protonation drift, neg dysmetria  Scalp: Left parietal incision site C/D/I, well healed    P

## 2020-05-11 NOTE — H&P ADULT - NSICDXPASTSURGICALHX_GEN_ALL_CORE_FT
PAST SURGICAL HISTORY:  H/O brain surgery     History of appendectomy     History of lung surgery right wedge resection    Surgery, elective lung biopsy

## 2020-05-11 NOTE — ED ADULT TRIAGE NOTE - OTHER COMPLAINTS
patient ambulated into ED sent by MD Rico for pre-op testing, as per sister "she has a brain tumor they found last week and she needs surgery on Wednesday"--as per sister, patient with intermittent confusion--patient at present AOx4, speaking in full, complete sentences

## 2020-05-12 ENCOUNTER — TRANSCRIPTION ENCOUNTER (OUTPATIENT)
Age: 60
End: 2020-05-12

## 2020-05-12 DIAGNOSIS — J43.2 CENTRILOBULAR EMPHYSEMA: ICD-10-CM

## 2020-05-12 DIAGNOSIS — Z01.818 ENCOUNTER FOR OTHER PREPROCEDURAL EXAMINATION: ICD-10-CM

## 2020-05-12 DIAGNOSIS — C34.90 MALIGNANT NEOPLASM OF UNSPECIFIED PART OF UNSPECIFIED BRONCHUS OR LUNG: ICD-10-CM

## 2020-05-12 LAB
A1C WITH ESTIMATED AVERAGE GLUCOSE RESULT: 6.3 % — HIGH (ref 4–5.6)
ANION GAP SERPL CALC-SCNC: 12 MMOL/L — SIGNIFICANT CHANGE UP (ref 5–17)
BLD GP AB SCN SERPL QL: NEGATIVE — SIGNIFICANT CHANGE UP
BUN SERPL-MCNC: 17 MG/DL — SIGNIFICANT CHANGE UP (ref 7–23)
CALCIUM SERPL-MCNC: 9.5 MG/DL — SIGNIFICANT CHANGE UP (ref 8.4–10.5)
CHLORIDE SERPL-SCNC: 105 MMOL/L — SIGNIFICANT CHANGE UP (ref 96–108)
CO2 SERPL-SCNC: 22 MMOL/L — SIGNIFICANT CHANGE UP (ref 22–31)
CREAT SERPL-MCNC: 0.73 MG/DL — SIGNIFICANT CHANGE UP (ref 0.5–1.3)
ESTIMATED AVERAGE GLUCOSE: 134 MG/DL — HIGH (ref 68–114)
GLUCOSE BLDC GLUCOMTR-MCNC: 111 MG/DL — HIGH (ref 70–99)
GLUCOSE BLDC GLUCOMTR-MCNC: 123 MG/DL — HIGH (ref 70–99)
GLUCOSE BLDC GLUCOMTR-MCNC: 128 MG/DL — HIGH (ref 70–99)
GLUCOSE BLDC GLUCOMTR-MCNC: 130 MG/DL — HIGH (ref 70–99)
GLUCOSE SERPL-MCNC: 151 MG/DL — HIGH (ref 70–99)
HCT VFR BLD CALC: 46 % — HIGH (ref 34.5–45)
HGB BLD-MCNC: 14.9 G/DL — SIGNIFICANT CHANGE UP (ref 11.5–15.5)
MAGNESIUM SERPL-MCNC: 2.2 MG/DL — SIGNIFICANT CHANGE UP (ref 1.6–2.6)
MCHC RBC-ENTMCNC: 28.8 PG — SIGNIFICANT CHANGE UP (ref 27–34)
MCHC RBC-ENTMCNC: 32.4 GM/DL — SIGNIFICANT CHANGE UP (ref 32–36)
MCV RBC AUTO: 89 FL — SIGNIFICANT CHANGE UP (ref 80–100)
NRBC # BLD: 0 /100 WBCS — SIGNIFICANT CHANGE UP (ref 0–0)
PHOSPHATE SERPL-MCNC: 3.5 MG/DL — SIGNIFICANT CHANGE UP (ref 2.5–4.5)
PLATELET # BLD AUTO: 290 K/UL — SIGNIFICANT CHANGE UP (ref 150–400)
POTASSIUM SERPL-MCNC: 4.4 MMOL/L — SIGNIFICANT CHANGE UP (ref 3.5–5.3)
POTASSIUM SERPL-SCNC: 4.4 MMOL/L — SIGNIFICANT CHANGE UP (ref 3.5–5.3)
RBC # BLD: 5.17 M/UL — SIGNIFICANT CHANGE UP (ref 3.8–5.2)
RBC # FLD: 14.1 % — SIGNIFICANT CHANGE UP (ref 10.3–14.5)
SODIUM SERPL-SCNC: 139 MMOL/L — SIGNIFICANT CHANGE UP (ref 135–145)
WBC # BLD: 8.89 K/UL — SIGNIFICANT CHANGE UP (ref 3.8–10.5)
WBC # FLD AUTO: 8.89 K/UL — SIGNIFICANT CHANGE UP (ref 3.8–10.5)

## 2020-05-12 PROCEDURE — 99232 SBSQ HOSP IP/OBS MODERATE 35: CPT

## 2020-05-12 PROCEDURE — 99223 1ST HOSP IP/OBS HIGH 75: CPT | Mod: GC

## 2020-05-12 PROCEDURE — 99233 SBSQ HOSP IP/OBS HIGH 50: CPT

## 2020-05-12 RX ORDER — POVIDONE-IODINE 5 %
1 AEROSOL (ML) TOPICAL ONCE
Refills: 0 | Status: COMPLETED | OUTPATIENT
Start: 2020-05-13 | End: 2020-05-13

## 2020-05-12 RX ORDER — SODIUM CHLORIDE 9 MG/ML
1000 INJECTION INTRAMUSCULAR; INTRAVENOUS; SUBCUTANEOUS
Refills: 0 | Status: DISCONTINUED | OUTPATIENT
Start: 2020-05-12 | End: 2020-05-13

## 2020-05-12 RX ADMIN — Medication 4 MILLIGRAM(S): at 20:57

## 2020-05-12 RX ADMIN — Medication 4 MILLIGRAM(S): at 03:27

## 2020-05-12 RX ADMIN — Medication 4 MILLIGRAM(S): at 15:16

## 2020-05-12 RX ADMIN — TIOTROPIUM BROMIDE 1 CAPSULE(S): 18 CAPSULE ORAL; RESPIRATORY (INHALATION) at 10:14

## 2020-05-12 RX ADMIN — PANTOPRAZOLE SODIUM 40 MILLIGRAM(S): 20 TABLET, DELAYED RELEASE ORAL at 07:33

## 2020-05-12 RX ADMIN — BUDESONIDE AND FORMOTEROL FUMARATE DIHYDRATE 2 PUFF(S): 160; 4.5 AEROSOL RESPIRATORY (INHALATION) at 20:57

## 2020-05-12 RX ADMIN — BUDESONIDE AND FORMOTEROL FUMARATE DIHYDRATE 2 PUFF(S): 160; 4.5 AEROSOL RESPIRATORY (INHALATION) at 08:40

## 2020-05-12 RX ADMIN — Medication 4 MILLIGRAM(S): at 08:39

## 2020-05-12 RX ADMIN — SENNA PLUS 2 TABLET(S): 8.6 TABLET ORAL at 21:00

## 2020-05-12 RX ADMIN — LEVETIRACETAM 420 MILLIGRAM(S): 250 TABLET, FILM COATED ORAL at 18:12

## 2020-05-12 RX ADMIN — LEVETIRACETAM 420 MILLIGRAM(S): 250 TABLET, FILM COATED ORAL at 07:33

## 2020-05-12 NOTE — PROGRESS NOTE ADULT - SUBJECTIVE AND OBJECTIVE BOX
NP Note Neurosurgery Preop Note    HPI:  Neurosurgery NP /Dr Rico    HISTORY OF PRESENT ILLNESS:     58yo Female right handed  w/PMHx NSCLC (s/p RLL lobectomy, RML wedge resection by Dr. Pisano [id number]   s/p  2017  Left parieto occiptal craniotomy in [place] for metastatic disease followed by adjuvant radiation.  She also underwent Gamma Knife radiosurgery 2018 with Dr Rico for a new right parietal region metastasis.      PMH  COPD, seizures (likely 2/2 brain mets). Patient has noticed worsening confusion, word-finding difficulty, gait imbalance, and right hand weakness.  She called Dr Rico and went for MRI Brain. Patient now presents after MRI revealed a large left side brain mass with brain compression.    Denies any fevers, n/v, visual changes or traveling over the past 6 months.   She has not been around people that had Covid virus      PAST MEDICAL & SURGICAL HISTORY:  MRSA (methicillin resistant Staphylococcus aureus): history of  Adenocarcinoma of lung  COPD (chronic obstructive pulmonary disease)  H/O brain surgery  Surgery, elective: lung biopsy  History of appendectomy    Allergies : PCN (11 May 2020 11:24)    OVERNIGHT EVENTS:  Vital Signs Last 24 Hrs  T(C): 36.4 (12 May 2020 08:50), Max: 36.8 (11 May 2020 20:42)  T(F): 97.5 (12 May 2020 08:50), Max: 98.2 (11 May 2020 20:42)  HR: 73 (12 May 2020 08:50) (66 - 75)  BP: 97/65 (12 May 2020 08:50) (97/65 - 126/76)  BP(mean): --  RR: 18 (12 May 2020 08:50) (17 - 18)  SpO2: 96% (12 May 2020 08:50) (94% - 97%)    I&O's Summary    11 May 2020 07:01  -  12 May 2020 07:00  --------------------------------------------------------  IN: 720 mL / OUT: 0 mL / NET: 720 mL        PHYSICAL EXAM:  Neurological:  A&OX 3 Cranial nerves intact  MORSE no drift or dysmetria                                                         [] Others      DIET:  [] NPO  [] Mechanical  [] Tube feeds    LABS:                        14.9   8.89  )-----------( 290      ( 12 May 2020 06:51 )             46.0     05-12    139  |  105  |  17  ----------------------------<  151<H>  4.4   |  22  |  0.73    Ca    9.5      12 May 2020 06:51  Phos  3.5     05-12  Mg     2.2     05-12    TPro  7.3  /  Alb  4.0  /  TBili  0.2  /  DBili  x   /  AST  12  /  ALT  12  /  AlkPhos  72  05-11    PT/INR - ( 11 May 2020 10:44 )   PT: 12.6 sec;   INR: 1.10          PTT - ( 11 May 2020 10:44 )  PTT:29.6 sec        CAPILLARY BLOOD GLUCOSE      POCT Blood Glucose.: 123 mg/dL (12 May 2020 07:41)  POCT Blood Glucose.: 114 mg/dL (11 May 2020 21:46)      Drug Levels: [] N/A    CSF Analysis: [] N/A      Allergies    penicillin (Angioedema)    Intolerances      MEDICATIONS:  Antibiotics:    Neuro:  acetaminophen   Tablet .. 650 milliGRAM(s) Oral every 6 hours PRN  levETIRAcetam  IVPB 500 milliGRAM(s) IV Intermittent every 12 hours    Anticoagulation:    OTHER:  budesonide 160 MICROgram(s)/formoterol 4.5 MICROgram(s) Inhaler 2 Puff(s) Inhalation two times a day  dexAMETHasone  Injectable 4 milliGRAM(s) IV Push every 6 hours  dextrose 40% Gel 15 Gram(s) Oral once PRN  dextrose 50% Injectable 12.5 Gram(s) IV Push once  dextrose 50% Injectable 25 Gram(s) IV Push once  dextrose 50% Injectable 25 Gram(s) IV Push once  glucagon  Injectable 1 milliGRAM(s) IntraMuscular once PRN  insulin lispro (HumaLOG) corrective regimen sliding scale   SubCutaneous Before meals and at bedtime  pantoprazole    Tablet 40 milliGRAM(s) Oral before breakfast  senna 2 Tablet(s) Oral at bedtime  tiotropium 18 MICROgram(s) Capsule 1 Capsule(s) Inhalation daily    IVF:  dextrose 5%. 1000 milliLiter(s) IV Continuous <Continuous>  sodium chloride 0.9%. 1000 milliLiter(s) IV Continuous <Continuous>    CULTURES:    RADIOLOGY & ADDITIONAL TESTS:      ASSESSMENT:  59y Female s/p    PLAN:  NEURO:    CARDIOVASCULAR:    PULMONARY:    RENAL:    GI:    HEME:    ID:    ENDO:    DVT PROPHYLAXIS:  [] Venodynes                                [] Heparin/Lovenox    FALL RISK:  [] Low Risk                                    [] Impulsive  Assessment:  Present when checked    []  GCS  E   V  M     Heart Failure: []Acute, [] acute on chronic , []chronic  Heart Failure:  [] Diastolic (HFpEF), [] Systolic (HFrEF), []Combined (HFpEF and HFrEF), [] RHF, [] Pulm HTN, [] Other    [] RAINE, [] ATN, [] AIN, [] other  [] CKD1, [] CKD2, [] CKD 3, [] CKD 4, [] CKD 5, []ESRD    Encephalopathy: [] Metabolic, [] Hepatic, [] toxic, [] Neurological, [] Other    Abnormal Nurtitional Status: [] malnurtition (see nutrition note), [ ]underweight: BMI < 19, [] morbid obesity: BMI >40, [] Cachexia    [] Sepsis  [] hypovolemic shock,[] cardiogenic shock, [] hemorrhagic shock, [] neuogenic shock  [] Acute Respiratory Failure  []Cerebral edema, [] Brain compression/ herniation,   [] Functional quadriplegia  [] Acute blood loss anemia Surgery:  Left craniotomy for brain tumor resection  Consent: Signed by patient vs HCP  Sania Malik (Daughter)  [phone]                   NAME/NUMBER of HCP:     penicillin (Angioedema)      T(C): 36.4 (20 @ 08:50), Max: 36.8 (20 @ 20:42)  HR: 73 (20 @ 08:50) (66 - 75)  BP: 97/65 (20 @ 08:50) (97/65 - 126/76)  RR: 18 (20 @ 08:50) (17 - 18)  SpO2: 96% (20 @ 08:50) (94% - 97%)  Wt(kg): --    EXAM:   Neurological: AAOx3, FC, speech coherent currently although intermittent word-finding difficulty  CNII-XII: EOM intact, PERRL, R visual field cut, face symmetric  Motor: MAEx4 5/5 UE and LE B/L, neg protonator drift, neg dysmetria  SILT throughout          139  |  105  |  17  ----------------------------<  151<H>  4.4   |  22  |  0.73    Ca    9.5      12 May 2020 06:51  Phos  3.5       Mg     2.2         TPro  7.3  /  Alb  4.0  /  TBili  0.2  /  DBili  x   /  AST  12  /  ALT  12  /  AlkPhos  72      CBC Full  -  ( 12 May 2020 06:51 )  WBC Count : 8.89 K/uL  RBC Count : 5.17 M/uL  Hemoglobin : 14.9 g/dL  Hematocrit : 46.0 %  Platelet Count - Automated : 290 K/uL  Mean Cell Volume : 89.0 fl  Mean Cell Hemoglobin : 28.8 pg  Mean Cell Hemoglobin Concentration : 32.4 gm/dL  Auto Neutrophil # : x  Auto Lymphocyte # : x  Auto Monocyte # : x  Auto Eosinophil # : x  Auto Basophil # : x  Auto Neutrophil % : x  Auto Lymphocyte % : x  Auto Monocyte % : x  Auto Eosinophil % : x  Auto Basophil % : x    PT/INR - ( 11 May 2020 10:44 )   PT: 12.6 sec;   INR: 1.10        PTT - ( 11 May 2020 10:44 )  PTT:29.6 sec    Pregnancy test: Not indicated  Type & Screen (in past 72hrs):     CXR: 20 Lungs are clear  EK20: NSR  ECHO: 20: left EF 67%  Medical Clearances: Cleared by Dr. Vignesh Wallace and Dr. Dion Mckinnon    Last dose of antiplatelet/anticoagulation drug: None    Implanted Devices (pacemaker, drug pump...etc):  []YES   [x] NO                  If yes --> EPS consulted to interrogate/adjust device:    3M nasal swab ordered?  xY  _N  Cranial surgery: Order written for hair to be shampooed night before surgery  [x] yes   []no                 Assessment:  59y Female w/PMHx NSCLC (s/p RLL lobectomy, RML wedge resection by Dr. Pisano in , s/p  2017  Left parieto occiptal cranin [place] for Metastatic disease followed by Gamma Knife  with Dr Rico   [facility]  COPD, seizures (likely 2/2 brain mets), presented to ER after episodes of confusion and right hand weakness, MRI shows left parietal mass with edema and mass effect    Plan:  -OR tomorrow for left craniotomy brain tumor resection  -Consent obtained from daughter Sania Malik (in chart)  -NPO after midnight, IVF while NPO  -Coags reviewed from 20  -Type and screen  -2 units PRBCs on hold for OR  -Medically cleared by Dr. Wallace and pulmonary Dr. Mckinnon  -No anticoagulation given since admission  -D/w Dr. Rico    Assessment:  Present when checked    []  GCS  E   V  M     Heart Failure: []Acute, [] acute on chronic , []chronic  Heart Failure:  [] Diastolic (HFpEF), [] Systolic (HFrEF), []Combined (HFpEF and HFrEF), [] RHF, [] Pulm HTN, [] Other    [] RAINE, [] ATN, [] AIN, [] other  [] CKD1, [] CKD2, [] CKD 3, [] CKD 4, [] CKD 5, []ESRD    Encephalopathy: [] Metabolic, [] Hepatic, [] toxic, [] Neurological, [] Other    Abnormal Nurtitional Status: [] malnurtition (see nutrition note), [ ]underweight: BMI < 19, [] morbid obesity: BMI >40, [] Cachexia    [] Sepsis  [] hypovolemic shock,[] cardiogenic shock, [] hemorrhagic shock, [] neuogenic shock  [] Acute Respiratory Failure  []Cerebral edema, [] Brain compression/ herniation,   [] Functional quadriplegia  [] Acute blood loss anemia Surgery:  Left craniotomy for brain tumor resection  Consent: Signed by patient vs HCP  Sania Malik (Daughter)  [phone]                   NAME/NUMBER of HCP:     penicillin (Angioedema)      T(C): 36.4 (20 @ 08:50), Max: 36.8 (20 @ 20:42)  HR: 73 (20 @ 08:50) (66 - 75)  BP: 97/65 (20 @ 08:50) (97/65 - 126/76)  RR: 18 (20 @ 08:50) (17 - 18)  SpO2: 96% (20 @ 08:50) (94% - 97%)  Wt(kg): --    EXAM:   Neurological: AAOx3, FC, speech coherent currently although intermittent word-finding difficulty  CNII-XII: EOM intact, PERRL, R visual field cut, face symmetric  Motor: MAEx4 5/5 UE and LE B/L, neg protonator drift, neg dysmetria  SILT throughout          139  |  105  |  17  ----------------------------<  151<H>  4.4   |  22  |  0.73    Ca    9.5      12 May 2020 06:51  Phos  3.5       Mg     2.2         TPro  7.3  /  Alb  4.0  /  TBili  0.2  /  DBili  x   /  AST  12  /  ALT  12  /  AlkPhos  72      CBC Full  -  ( 12 May 2020 06:51 )  WBC Count : 8.89 K/uL  RBC Count : 5.17 M/uL  Hemoglobin : 14.9 g/dL  Hematocrit : 46.0 %  Platelet Count - Automated : 290 K/uL  Mean Cell Volume : 89.0 fl  Mean Cell Hemoglobin : 28.8 pg  Mean Cell Hemoglobin Concentration : 32.4 gm/dL  Auto Neutrophil # : x  Auto Lymphocyte # : x  Auto Monocyte # : x  Auto Eosinophil # : x  Auto Basophil # : x  Auto Neutrophil % : x  Auto Lymphocyte % : x  Auto Monocyte % : x  Auto Eosinophil % : x  Auto Basophil % : x    PT/INR - ( 11 May 2020 10:44 )   PT: 12.6 sec;   INR: 1.10        PTT - ( 11 May 2020 10:44 )  PTT:29.6 sec    Pregnancy test: Not indicated  Type & Screen (in past 72hrs):     CXR: 20 Lungs are clear  EK20: NSR  ECHO: 20: left EF 67%  Medical Clearances: Cleared by Dr. Dion Mckinnon    Last dose of antiplatelet/anticoagulation drug: None    Implanted Devices (pacemaker, drug pump...etc):  []YES   [x] NO                  If yes --> EPS consulted to interrogate/adjust device:    3M nasal swab ordered?  xY  _N  Cranial surgery: Order written for hair to be shampooed night before surgery  [x] yes   []no                 Assessment:  59y Female w/PMHx NSCLC (s/p RLL lobectomy, RML wedge resection by Dr. Pisano in 2016, s/p  2017  Left parieto occiptal cranin [place] for Metastatic disease followed by Gamma Knife 2018 with Dr Rico   [facility]  COPD, seizures (likely 2/2 brain mets), presented to ER after episodes of confusion and right hand weakness, MRI shows left parietal mass with edema and mass effect    Plan:  -OR tomorrow for left craniotomy brain tumor resection  -Consent obtained from daughter Sania Malik (in chart)  -NPO after midnight, IVF while NPO  -Coags reviewed from 20  -Type and screen  -2 units PRBCs on hold for OR  -Cleared by pulmonary Dr. Mckinnon  -F/u medical clearance from Dr. Donovan  -No anticoagulation given since admission  -D/w Dr. Rico    Assessment:  Present when checked    []  GCS  E   V  M     Heart Failure: []Acute, [] acute on chronic , []chronic  Heart Failure:  [] Diastolic (HFpEF), [] Systolic (HFrEF), []Combined (HFpEF and HFrEF), [] RHF, [] Pulm HTN, [] Other    [] RAINE, [] ATN, [] AIN, [] other  [] CKD1, [] CKD2, [] CKD 3, [] CKD 4, [] CKD 5, []ESRD    Encephalopathy: [] Metabolic, [] Hepatic, [] toxic, [] Neurological, [] Other    Abnormal Nurtitional Status: [] malnurtition (see nutrition note), [ ]underweight: BMI < 19, [] morbid obesity: BMI >40, [] Cachexia    [] Sepsis  [] hypovolemic shock,[] cardiogenic shock, [] hemorrhagic shock, [] neuogenic shock  [] Acute Respiratory Failure  []Cerebral edema, [] Brain compression/ herniation,   [] Functional quadriplegia  [] Acute blood loss anemia Surgery:  Left craniotomy for brain tumor resection  Consent: Signed by patient vs HCP  Najma Pham (Sister) [phone]                    NAME/NUMBER of HCP:     penicillin (Angioedema)      T(C): 36.4 (20 @ 08:50), Max: 36.8 (20 @ 20:42)  HR: 73 (20 @ 08:50) (66 - 75)  BP: 97/65 (20 @ 08:50) (97/65 - 126/76)  RR: 18 (20 @ 08:50) (17 - 18)  SpO2: 96% (20 @ 08:50) (94% - 97%)  Wt(kg): --    EXAM:   Neurological: AAOx3, FC, speech coherent currently although intermittent word-finding difficulty  CNII-XII: EOM intact, PERRL, R visual field cut, face symmetric  Motor: MAEx4 5/5 UE and LE B/L, neg protonator drift, neg dysmetria  SILT throughout          139  |  105  |  17  ----------------------------<  151<H>  4.4   |  22  |  0.73    Ca    9.5      12 May 2020 06:51  Phos  3.5       Mg     2.2         TPro  7.3  /  Alb  4.0  /  TBili  0.2  /  DBili  x   /  AST  12  /  ALT  12  /  AlkPhos  72      CBC Full  -  ( 12 May 2020 06:51 )  WBC Count : 8.89 K/uL  RBC Count : 5.17 M/uL  Hemoglobin : 14.9 g/dL  Hematocrit : 46.0 %  Platelet Count - Automated : 290 K/uL  Mean Cell Volume : 89.0 fl  Mean Cell Hemoglobin : 28.8 pg  Mean Cell Hemoglobin Concentration : 32.4 gm/dL  Auto Neutrophil # : x  Auto Lymphocyte # : x  Auto Monocyte # : x  Auto Eosinophil # : x  Auto Basophil # : x  Auto Neutrophil % : x  Auto Lymphocyte % : x  Auto Monocyte % : x  Auto Eosinophil % : x  Auto Basophil % : x    PT/INR - ( 11 May 2020 10:44 )   PT: 12.6 sec;   INR: 1.10        PTT - ( 11 May 2020 10:44 )  PTT:29.6 sec    Pregnancy test: Not indicated  Type & Screen (in past 72hrs):     CXR: 20 Lungs are clear  EK20: NSR  ECHO: 20: left EF 67%  Medical Clearances: Cleared by Dr. Dion Mckinnon    Last dose of antiplatelet/anticoagulation drug: None    Implanted Devices (pacemaker, drug pump...etc):  []YES   [x] NO                  If yes --> EPS consulted to interrogate/adjust device:    3M nasal swab ordered?  xY  _N  Cranial surgery: Order written for hair to be shampooed night before surgery  [x] yes   []no                 Assessment:  59y Female w/PMHx NSCLC (s/p RLL lobectomy, RML wedge resection by Dr. Pisano in , s/p  2017  Left parieto occiptal cranin [place] for Metastatic disease followed by Gamma Knife  with Dr Rico   [facility]  COPD, seizures (likely 2/2 brain mets), presented to ER after episodes of confusion and right hand weakness, MRI shows left parietal mass with edema and mass effect    Plan:  -OR tomorrow for left craniotomy brain tumor resection  -Consent obtained from Najma Pham (Sister)  -NPO after midnight, IVF while NPO  -Coags reviewed from 20  -Type and screen  -2 units PRBCs on hold for OR  -Cleared by pulmonary Dr. Mckinnon  -F/u medical clearance from Dr. Donovan  -No anticoagulation given since admission  -D/w Dr. Jacky Mendoza has anointed her sister, Najma Pham, her healthcare proxy as her son Zachary lives far in [place].     Assessment:  Present when checked    []  GCS  E   V  M     Heart Failure: []Acute, [] acute on chronic , []chronic  Heart Failure:  [] Diastolic (HFpEF), [] Systolic (HFrEF), []Combined (HFpEF and HFrEF), [] RHF, [] Pulm HTN, [] Other    [] RAINE, [] ATN, [] AIN, [] other  [] CKD1, [] CKD2, [] CKD 3, [] CKD 4, [] CKD 5, []ESRD    Encephalopathy: [] Metabolic, [] Hepatic, [] toxic, [] Neurological, [] Other    Abnormal Nurtitional Status: [] malnurtition (see nutrition note), [ ]underweight: BMI < 19, [] morbid obesity: BMI >40, [] Cachexia    [] Sepsis  [] hypovolemic shock,[] cardiogenic shock, [] hemorrhagic shock, [] neuogenic shock  [] Acute Respiratory Failure  []Cerebral edema, [] Brain compression/ herniation,   [] Functional quadriplegia  [] Acute blood loss anemia

## 2020-05-12 NOTE — PROGRESS NOTE ADULT - SUBJECTIVE AND OBJECTIVE BOX
HISTORY OF PRESENT ILLNESS: 60yo Female right handed  w/PMHx NSCLC (s/p RLL lobectomy, RML wedge resection by Dr. Pisano [id number]   s/p  2017  Left parieto occiptal cranin [place] for Metastatic disease followed by Gamma Knife 2018 with Dr Rico   [facility]  COPD, seizures (likely 2/2 brain mets) .  Last thursday pt noticed being a little confusion and right hand she was weaker.  Called Dr Rico and went for MRI Brain. PT returns to the ER for further managment.Denies any fevers, n/v, visual changes or traveling over the past 6 months.   She has not been around people that had Covid virus      S/Overnight events:  No overnight events, neuro exam stable.    Hospital Course:   5/11/20: Admitted through ER with left parietal brain mass, underwent MRI Brain which shows mass/edema, started on decadron 4 mg q 6 hours and keppra for seizure prophylaxis.  5/12/20: LAN, neuro stable.  Pre-op for OR Weds.    Vital Signs Last 24 Hrs  T(C): 36.8 (11 May 2020 20:42), Max: 37.2 (11 May 2020 10:16)  T(F): 98.2 (11 May 2020 20:42), Max: 98.9 (11 May 2020 10:16)  HR: 73 (11 May 2020 20:42) (66 - 81)  BP: 108/74 (11 May 2020 20:42) (108/74 - 129/85)  BP(mean): --  RR: 18 (11 May 2020 20:42) (16 - 18)  SpO2: 97% (11 May 2020 20:42) (96% - 97%)    I&O's Detail    11 May 2020 07:01  -  11 May 2020 23:03  --------------------------------------------------------  IN:    Oral Fluid: 620 mL  Total IN: 620 mL    OUT:  Total OUT: 0 mL    Total NET: 620 mL    I&O's Summary    11 May 2020 07:01  -  11 May 2020 23:03  --------------------------------------------------------  IN: 620 mL / OUT: 0 mL / NET: 620 mL    PHYSICAL EXAM:    60 y/o female AA&O x 3 in NAD, resting comfortably  	HEENT: Oropharyngeal mucosa moist, pink, tongue midline.  PERRL.    Respiratory: decreased BS on RML/RLL , well healed scar  	Cardiovascular: +S1, S2, RRR  	Gastrointestinal: Abdomen soft NT/ND. +BS  	Neurological: AAOX3, FC, speech coherent  	CNII-XII: EOM intact, PERRL, face symmetric, tongue midline  	Motor: MAEx4 5/5 UE and LE B/L, neg protonator drift, neg dysmetria  	Scalp: Left parietal incision site C/D/I, well healed    Extrem: calves soft, nontender.    TUBES/LINES:  [] CVC  [] A-line  [] Lumbar Drain  [] Ventriculostomy  [] Other    DIET:  [] NPO  [x] Mechanical  [] Tube feeds    LABS: am labs pending                        14.1   10.10 )-----------( 296      ( 11 May 2020 10:44 )             43.8     05-11    140  |  104  |  17  ----------------------------<  110<H>  4.0   |  24  |  0.85    Ca    9.1      11 May 2020 10:44    TPro  7.3  /  Alb  4.0  /  TBili  0.2  /  DBili  x   /  AST  12  /  ALT  12  /  AlkPhos  72  05-11    PT/INR - ( 11 May 2020 10:44 )   PT: 12.6 sec;   INR: 1.10       PTT - ( 11 May 2020 10:44 )  PTT:29.6 sec    COVID-19 PCR . (05.11.20 @ 10:43)  COVID-19 PCR: NotDetec: This test has been validated by [facility] to be accurate;    CAPILLARY BLOOD GLUCOSE  POCT Blood Glucose.: 114 mg/dL (11 May 2020 21:46)    Drug Levels: [] N/A    CSF Analysis: [] N/A    Allergies  penicillin (Angioedema)    Intolerances    MEDICATIONS:  Antibiotics:    Neuro:  acetaminophen   Tablet .. 650 milliGRAM(s) Oral every 6 hours PRN  levETIRAcetam  IVPB 500 milliGRAM(s) IV Intermittent every 12 hours    Anticoagulation:    OTHER:  budesonide 160 MICROgram(s)/formoterol 4.5 MICROgram(s) Inhaler 2 Puff(s) Inhalation two times a day  dexAMETHasone  Injectable 4 milliGRAM(s) IV Push every 6 hours  dextrose 40% Gel 15 Gram(s) Oral once PRN  dextrose 50% Injectable 12.5 Gram(s) IV Push once  dextrose 50% Injectable 25 Gram(s) IV Push once  dextrose 50% Injectable 25 Gram(s) IV Push once  glucagon  Injectable 1 milliGRAM(s) IntraMuscular once PRN  insulin lispro (HumaLOG) corrective regimen sliding scale   SubCutaneous Before meals and at bedtime  pantoprazole    Tablet 40 milliGRAM(s) Oral before breakfast  senna 2 Tablet(s) Oral at bedtime  tiotropium 18 MICROgram(s) Capsule 1 Capsule(s) Inhalation daily    IVF:  dextrose 5%. 1000 milliLiter(s) IV Continuous <Continuous>    CULTURES:    RADIOLOGY & ADDITIONAL TESTS: All imaging reviewed with Dr. Rico. < from: MR Brain Stereotactic w/ IV Cont (05.11.20 @ 15:22) >  The MRI examination of the brain demonstrates the patient to be status post left parietal craniotomy. There is a heterogeneously enhancing lesion within the paramedian left parietal lobe at the resection cavity which demonstrates a "soap bubble" appearance of enhancement. The lesion measures approximately 2.8 cm width x 2.7 cm AP x 2.3 cm height (series 702 image 17 and series 701 image 72). There are areas of hypointense T1 and T2 signal along the medial  margin of the lesion which demonstrates susceptibility and may represent hemorrhagic products. There is extensive surrounding FLAIR/T2 signal abnormality which extends into the splenium of the corpus callosum and into the contralateral right periatrial white matter. The FLAIR/T2 signal abnormality extends anteriorly to the capsular region and throughout the posterior temporal, parietal and occipital white matter. There is mass effect on the left atria and posterior body of the left lateral ventricle. These findings have progressed when compared to the prior MRI's and may represent post therapeutic changes. Alternative considerations would include tumoral disease.    < end of copied text >      ASSESSMENT:  59y Female w/PMHx NSCLC (s/p RLL lobectomy, RML wedge resection by Dr. Pisano in 2016, s/p  2017  Left parieto occiptal cranin [place] for Metastatic disease followed by Gamma Knife 2018 with Dr Rico   [facility]  COPD, seizures (likely 2/2 brain mets), presented to ER after episodes of confusion and right hand weakness, MRI shows left parietal mass with edema and mass effect.      BRAIN MASS  No family history of cardiovascular disease (Father)  Family history of cancer in mother (Mother)  Family history of uterine cancer (Sibling)  Family history of diabetes mellitus (Mother, Sibling)  Family history of essential hypertension (Mother, Sibling)  No pertinent family history in first degree relatives  Handoff  MEWS Score  Brain metastasis  Dyspnea  MRSA (methicillin resistant Staphylococcus aureus)  Adenocarcinoma of lung  COPD (chronic obstructive pulmonary disease)  Asthma  Pulmonary nodules  Brain mass  Adenocarcinoma of lung  Brain mass  History of lung surgery  H/O brain surgery  Surgery, elective  History of appendectomy  No significant past surgical history  MEDICAL EVAL  90+      PLAN:  NEURO:  - neuro checks  - vitals checks  - dex 4q6  - keppra 500q12  - preop for OR Weds    CARDIOVASCULAR:  - ANDREAS    PULMONARY:  - pulm clearance in chart from Dr. Mckinnon  - continue asthma meds    RENAL:  - ANDREAS    GI:  - protonix gi proph    HEME:  - B/L SCDs  - 5/11 screening dopplers negative for DVT    ID:  - ANDREAS, afebrile    ENDO:  - iss    DVT PROPHYLAXIS:  [x] Venodynes                                [] Heparin/Lovenox    FALL RISK:  [] Low Risk                                    [] Impulsive    DISPOSITION:  Preop for OR Weds    All above d/w Dr. Rico.       Assessment:  Present when checked    []  GCS  E   V  M     Heart Failure: []Acute, [] acute on chronic , []chronic  Heart Failure:  [] Diastolic (HFpEF), [] Systolic (HFrEF), []Combined (HFpEF and HFrEF), [] RHF, [] Pulm HTN, [] Other    [] RAINE, [] ATN, [] AIN, [] other  [] CKD1, [] CKD2, [] CKD 3, [] CKD 4, [] CKD 5, []ESRD    Encephalopathy: [] Metabolic, [] Hepatic, [] toxic, [] Neurological, [] Other    Abnormal Nurtitional Status: [] malnurtition (see nutrition note), [ ]underweight: BMI < 19, [] morbid obesity: BMI >40, [] Cachexia    [] Sepsis  [] hypovolemic shock,[] cardiogenic shock, [] hemorrhagic shock, [] neuogenic shock  [] Acute Respiratory Failure  [X]Cerebral edema, [] Brain compression/ herniation,   [] Functional quadriplegia  [] Acute blood loss anemia

## 2020-05-12 NOTE — CONSULT NOTE ADULT - SUBJECTIVE AND OBJECTIVE BOX
Patient is a 59y old  Female who presents with a chief complaint of Brain mass (12 May 2020 13:12)      HPI:  Neurosurgery NP /Dr Rico    HISTORY OF PRESENT ILLNESS:     58yo Female right handed  w/PMHx NSCLC (s/p RLL lobectomy, RML wedge resection by Dr. Pisano [id number]   s/p  2017  Left parieto occiptal craniotomy in [place] for metastatic disease followed by adjuvant radiation.  She also underwent Gamma Knife radiosurgery 2018 with Dr Rico for a new right parietal region metastasis.      PMH  COPD, seizures (likely 2/2 brain mets). Patient has noticed worsening confusion, word-finding difficulty, gait imbalance, and right hand weakness.  She called Dr Rico and went for MRI Brain. Patient now presents after MRI revealed a large left side brain mass with brain compression.    Denies any fevers, n/v, visual changes or traveling over the past 6 months.   She has not been around people that had Covid virus      PAST MEDICAL & SURGICAL HISTORY:  MRSA (methicillin resistant Staphylococcus aureus): history of  Adenocarcinoma of lung  COPD (chronic obstructive pulmonary disease)  H/O brain surgery  Surgery, elective: lung biopsy  History of appendectomy    Allergies : PCN (11 May 2020 11:24)      PAST MEDICAL & SURGICAL HISTORY:  Brain metastasis: brain tumor s/p resection  MRSA (methicillin resistant Staphylococcus aureus): history of  Adenocarcinoma of lung  COPD (chronic obstructive pulmonary disease)  History of lung surgery: right wedge resection  H/O brain surgery  Surgery, elective: lung biopsy  History of appendectomy      FAMILY HISTORY:  No family history of cardiovascular disease (Father)  Family history of cancer in mother (Mother)  Family history of uterine cancer (Sibling): sister  Family history of diabetes mellitus (Mother, Sibling)  Family history of essential hypertension (Mother, Sibling)      SOCIAL HISTORY:  Smoking Status: [ ] Current, [ ] Former, [ ] Never  Pack Years:    MEDICATIONS:  Pulmonary:  budesonide 160 MICROgram(s)/formoterol 4.5 MICROgram(s) Inhaler 2 Puff(s) Inhalation two times a day  tiotropium 18 MICROgram(s) Capsule 1 Capsule(s) Inhalation daily    Antimicrobials:    Anticoagulants:    Onc:    GI/:  pantoprazole    Tablet 40 milliGRAM(s) Oral before breakfast  senna 2 Tablet(s) Oral at bedtime    Endocrine:  dexAMETHasone  Injectable 4 milliGRAM(s) IV Push every 6 hours  dextrose 40% Gel 15 Gram(s) Oral once PRN  dextrose 50% Injectable 12.5 Gram(s) IV Push once  dextrose 50% Injectable 25 Gram(s) IV Push once  dextrose 50% Injectable 25 Gram(s) IV Push once  glucagon  Injectable 1 milliGRAM(s) IntraMuscular once PRN  insulin lispro (HumaLOG) corrective regimen sliding scale   SubCutaneous Before meals and at bedtime    Cardiac:    Other Medications:  acetaminophen   Tablet .. 650 milliGRAM(s) Oral every 6 hours PRN  dextrose 5%. 1000 milliLiter(s) IV Continuous <Continuous>  levETIRAcetam  IVPB 500 milliGRAM(s) IV Intermittent every 12 hours  sodium chloride 0.9%. 1000 milliLiter(s) IV Continuous <Continuous>      Allergies    penicillin (Angioedema)    Intolerances        Review of Systems:   •	General: negative  •	Skin/Breast: negative  •	Ophthalmologic: negative  •	ENMT: negative  •	Respiratory and Thorax: negative  •	Cardiovascular: negative  •	Gastrointestinal: negative  •	Genitourinary: negative  •	Musculoskeletal: negative  •	Neurological: negative  •	Psychiatric: negative  •	Hematology/Lymphatics: negative  •	Endocrine: negative  •	Allergic/Immunologic: negative    Physical Exam:   • Constitutional:	Well-developed, well nourished  • Eyes:	EOMI; PERRL; no drainage or redness  • ENMT:	No oral lesions; no gross abnormalities  • Neck	No bruits; no thyromegaly or nodules  • Breasts:	not examined  • Back:	No deformity or limitation of movement  • Respiratory:	Breath Sounds equal & clear to percussion & auscultation, no accessory muscle use  • Cardiovascular:	Regular rate & rhythm, normal S1, S2; no murmurs, gallops or rubs; no S3, S4  • Gastrointestinal:	Soft, non-tender, no hepatosplenomegaly, normal bowel sounds  • Genitourinary:	not examined  • Rectal: not examined  • Extremities:	No cyanosis, clubbing or edema  • Vascular:	Equal and normal pulses (carotid, femoral, dorsalis pedis)  • Neurologica:l	not examined  • Skin:	No lesions; no rash  • Lymph Nodes:	No lymphadedenopathy  • Musculoskeletal:	No joint pain, swelling or deformity; no limitation of movement      Vital Signs Last 24 Hrs  T(C): 36.4 (12 May 2020 13:33), Max: 36.8 (11 May 2020 20:42)  T(F): 97.5 (12 May 2020 13:33), Max: 98.2 (11 May 2020 20:42)  HR: 84 (12 May 2020 13:33) (66 - 84)  BP: 104/69 (12 May 2020 13:33) (97/65 - 126/76)  BP(mean): --  RR: 19 (12 May 2020 13:33) (17 - 19)  SpO2: 97% (12 May 2020 13:33) (94% - 97%)    05-11 @ 07:01  -  05-12 @ 07:00  --------------------------------------------------------  IN: 720 mL / OUT: 0 mL / NET: 720 mL    05-12 @ 07:01  -  05-12 @ 15:47  --------------------------------------------------------  IN: 540 mL / OUT: 0 mL / NET: 540 mL          LABS:      CBC Full  -  ( 12 May 2020 06:51 )  WBC Count : 8.89 K/uL  RBC Count : 5.17 M/uL  Hemoglobin : 14.9 g/dL  Hematocrit : 46.0 %  Platelet Count - Automated : 290 K/uL  Mean Cell Volume : 89.0 fl  Mean Cell Hemoglobin : 28.8 pg  Mean Cell Hemoglobin Concentration : 32.4 gm/dL  Auto Neutrophil # : x  Auto Lymphocyte # : x  Auto Monocyte # : x  Auto Eosinophil # : x  Auto Basophil # : x  Auto Neutrophil % : x  Auto Lymphocyte % : x  Auto Monocyte % : x  Auto Eosinophil % : x  Auto Basophil % : x    05-12    139  |  105  |  17  ----------------------------<  151<H>  4.4   |  22  |  0.73    Ca    9.5      12 May 2020 06:51  Phos  3.5     05-12  Mg     2.2     05-12    TPro  7.3  /  Alb  4.0  /  TBili  0.2  /  DBili  x   /  AST  12  /  ALT  12  /  AlkPhos  72  05-11    PT/INR - ( 11 May 2020 10:44 )   PT: 12.6 sec;   INR: 1.10          PTT - ( 11 May 2020 10:44 )  PTT:29.6 sec    < from: 12 Lead ECG (05.11.20 @ 10:31) >  Ventricular Rate 72 BPM    Atrial Rate 72 BPM    P-R Interval 152 ms    QRS Duration 88 ms    Q-T Interval 398 ms    QTC Calculation(Bezet) 435 ms    P Axis 81 degrees    R Axis 75 degrees    T Axis 76 degrees    Diagnosis Line Normal sinus rhythm    < end of copied text >  < from: Xray Chest 1 View- PORTABLE-Routine (05.11.20 @ 19:47) >  EXAM:  XR CHEST PORTABLE ROUTINE 1V                          PROCEDURE DATE:  05/11/2020          INTERPRETATION:  PORTABLE CHEST X-RAY     HISTORY: preop    PRIOR STUDIES: 5/11/2020    FINDINGS: The lungs are clear.  There are no pleural effusions.  The cardiac silhouette, bones and soft tissues are unremarkable. Uncoiled/tortuous thoracic aorta.    IMPRESSION:  The lungs are clear.    < end of copied text >                RADIOLOGY & ADDITIONAL STUDIES (The following images were personally reviewed):  < from: US Duplex Venous Lower Ext Complete, Bilateral (05.11.20 @ 13:07) >  IMPRESSION:  No deep vein thrombosis seen.    < end of copied text >  < from: TTE Echo Complete w/o contrast w/ Doppler (05.11.20 @ 13:42) >     --------------------------------------------------------------------------------  CONCLUSIONS:     1. Normal left and right ventricular size and systolic function.   2. No significant valvular disease.   3. No pericardial effusion.    < end of copied text >    < from: CT Abdomen and Pelvis w/ Oral Cont and w/ IV Cont (02.26.20 @ 17:32) >  Impression: Since 9/12/2019, there has been no evidence of local or metastatic disease recurrence.    < end of copied text >

## 2020-05-12 NOTE — CONSULT NOTE ADULT - PROBLEM SELECTOR RECOMMENDATION 3
stable and as per patient she has asthma and she is to continue on the bronchodilator.  base line oxygen saturation is normal

## 2020-05-12 NOTE — CONSULT NOTE ADULT - PROBLEM SELECTOR RECOMMENDATION 4
The patient's medical condition is optimized for surgery.  There is no contraindication for surgery.  There is no clinical evidence neither of angina, decompensated CHF, arrhthymias, nor valvular disease.   There is no limitation of exercise capacity.  MET is 5 .  ASA class is 2 .  [place]ch cardiac risk factor is low .  DVT prophylaxis is indicated.  Pain control.  Early mobilization.  Avoid fluid overload.

## 2020-05-12 NOTE — PROGRESS NOTE ADULT - SUBJECTIVE AND OBJECTIVE BOX
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 Pisano [id number]   s/p  .  Left parieto occiptal cranin [place] for Metastatic disease followed by Gamma Knife  with Dr Rico.   PMH  COPD, seizures (likely 2/2 brain mets) .  Last thursday pt noticed being a little confusion and right hand she was weaker for about 7 days.  Called Dr Rico and went for MRI Brain. PT returns to the ER for further managment.  She has right lateral visual impairment    Denies any fevers, n/v or traveling over the past 6 months.   She has not been around people that had Covid virus. She lives alone in       PAST MEDICAL & SURGICAL HISTORY:  MRSA (methicillin resistant Staphylococcus aureus): history of  Adenocarcinoma of lung  COPD (chronic obstructive pulmonary disease)  H/O brain surgery  Surgery, elective: lung biopsy  History of appendectomy    Allergies : PCN     Review of Systems:  Review of Systems: PHYSICAL EXAM:  Constitutional: NAD, resting comfortably  Neck: supple, no JVD  Back: no CVAT B/L  Respiratory: decreased BS on RML/RLL , well healed scar  Cardiovascular: +S1, S2, RRR  Gastrointestinal: Abdomen soft NT/ND  Neurological: AAOX3, FC, speech coherent  CNII-XII: EOM intact, PERRL, face symmetric, tongue midline  Motor: MAEx4 5/5 UE and LE B/L, neg protonator drift, neg dysmetria  Scalp: Left parietal incision site C/D/I, well healed	      Allergies and Intolerances:        Allergies:  	penicillin: Drug, Angioedema    Home Medications:   * Patient Currently Takes Medications as of 2019 22:33 documented in Structured Notes  · 	predniSONE 50 mg oral tablet: not at home  · 	oseltamivir 75 mg oral capsule: "a long while ago"   · 	predniSONE 20 mg oral tablet: not at home   · 	Spiriva 18 mcg inhalation capsule: 1 cap(s) inhaled once a day  · 	albuterol 90 mcg/inh inhalation aerosol: 2 puff(s) inhaled 4 times a day  · 	Advair Diskus 500 mcg-50 mcg inhalation powder: 1 puff(s) inhaled 2 times a day  · 	oxyCODONE 30 mg oral tablet: 1 tab(s) orally once a day, As needed, Severe Pain (7 - 10)  · 	Nebulizer machine: patient with a nebulizer machine for inhalational treatments at home    .    Patient History:    Past Medical, Past Surgical, and Family History:  PAST MEDICAL HISTORY:  Adenocarcinoma of lung     Brain metastasis brain tumor s/p resection    COPD (chronic obstructive pulmonary disease)     MRSA (methicillin resistant Staphylococcus aureus) history of.     PAST SURGICAL HISTORY:  H/O brain surgery     History of appendectomy     History of lung surgery right wedge resection    Surgery, elective lung biopsy.     FAMILY HISTORY:  Father  Still living? Unknown  No family history of cardiovascular disease, Age at diagnosis: Age Unknown    Mother  Still living? Unknown  Family history of cancer in mother, Age at diagnosis: Age Unknown  Family history of diabetes mellitus, Age at diagnosis: Age Unknown  Family history of essential hypertension, Age at diagnosis: Age Unknown    Sibling  Still living? Unknown  Family history of diabetes mellitus, Age at diagnosis: Age Unknown  Family history of essential hypertension, Age at diagnosis: Age Unknown  Family history of uterine cancer, Age at diagnosis: Age Unknown.     Tobacco Screening:  · Core Measure Site	No	  · Has the patient used tobacco in the past 30 days?	No	    Risk Assessment:    Present on Admission:  Deep Venous Thrombosis	no	  Pulmonary Embolus	no	  Urinary Catheter	no	  Central Venous Catheter/PICC Line	no	  Surgical Site Incision	no	  Pressure Ulcer(s)	no	     Heart Failure:  Does this patient have a history of or has been diagnosed with heart failure? no.    HIV Screen (per [facility] Department of Health, HIV screening must be offered to every individual between ages 13 and 64)	Not applicable (known HIV negative status in last year)	  Screening uterine cytology (Pap smear) performed in last 3 years	no	  According to [facility] regulations, a screening uterine cytology (Pap) smear must be performed unless medically contraindicated	Medically contraindicated	    Physical Exam:    Reference Recent Physical Exam:  · In accordance with current standards limiting patient contact please refer to the recent:	Other	  · Other Physical Exam Document	no	    Physical Exam: A&OX3 ANNA no facial weakness  Fully conversant able to name objects correctly and follow simple commands  MORSE 5/5 no drift or dysmetria Gait is steady A x O3 N s, no LAP MT: no thrush, moist L: BS bilateral decreased, mild wheezing, mild rhonchi, no rales H: RRR, -MRG A: s, NTND E: no c/c/e	       Labs and Results:  Labs, Radiology, Cardiology, and Other Results: Comprehensive Metabolic Panel (20 @ 10:44)    Sodium, Serum: 140 mmol/L    Potassium, Serum: 4.0 mmol/L    Chloride, Serum: 104 mmol/L    Carbon Dioxide, Serum: 24 mmol/L    Anion Gap, Serum: 12 mmol/L    Blood Urea Nitrogen, Serum: 17 mg/dL    Creatinine, Serum: 0.85 mg/dL    Glucose, Serum: 110 mg/dL    Calcium, Total Serum: 9.1 mg/dL    Protein Total, Serum: 7.3 g/dL    Albumin, Serum: 4.0 g/dL    Bilirubin Total, Serum: 0.2 mg/dL    Alkaline Phosphatase, Serum: 72 U/L    Aspartate Aminotransferase (AST/SGOT): 12 U/L    Alanine Aminotransferase (ALT/SGPT): 12 U/L    Complete Blood Count + Automated Diff (20 @ 10:44)    WBC Count: 10.10 K/uL    RBC Count: 4.92 M/uL    Hemoglobin: 14.1 g/dL    Hematocrit: 43.8 %    Mean Cell Volume: 89.0 fl    Mean Cell Hemoglobin: 28.7 pg    Mean Cell Hemoglobin Conc: 32.2 gm/dL    Red Cell Distrib Width: 14.0 %    Platelet Count - Automated: 296 K/uL    Auto Neutrophil #: 8.20 K/uL    Auto Lymphocyte #: 1.34 K/uL    Auto Monocyte #: 0.53 K/uL    Auto Eosinophil #: 0.00 K/uL    Auto Basophil #: 0.00 K/uL    Auto Neutrophil %: 81.2: Differential percentages must be correlated with absolute numbers for  clinical significance. %    Auto Lymphocyte %: 13.3 %    Auto Monocyte %: 5.2 %    Auto Eosinophil %: 0.0 %    Auto Basophil %: 0.0 %    Auto Immature Granulocyte %: 0.3 %    Nucleated RBC: 0 /100 WBCs   Prothrombin Time and INR, Plasma (20 @ 10:44)    Prothrombin Time, Plasma: 12.6 sec    INR: 1.10: Recommended ranges for therapeutic INR:       Activated Partial Thromboplastin Time (20 @ 10:44)   Activated Partial Thromboplastin Time: 29.6 sec  MRI with left occipital lesion  CXR with emphysema changes, no change	      EXAM:  CT ABDOMEN AND PELVIS OC IC                          EXAM:  CT CHEST OC IC                          PROCEDURE DATE:  2020      INTERPRETATION:  CT SCAN OF CHEST, ABDOMEN AND PELVIS    History: History of lung cancer status post right lower lobectomy. Surveillance imaging.    Technique: CT scan of chest, abdomen and pelvis performed from lung apices through pubic symphysis. Intravenous and oral contrast material were administered. Axial, coronal, and sagittal multiplanar reformatted images were produced. Thin section axial images and axial MIPS of the chest were also produced. 94 mL Optiray 350 were used as IV contrast.    Comparison: CT chest abdomen and pelvis 2019, CT chest abdomen and pelvis 2019    Findings:     Lungs and large airways: Status post right lower lobectomy. Centrilobular emphysema. Unchanged right-sided 3 mm fissural nodule (3:140).     Pleura:  No pleural effusion.    Mediastinum and hilar regions: No thoracic lymphadenopathy.    Heart and pericardium:  Heart size is normal. No pericardial effusion.    Vessels:  Normal.    Chest wall and lower neck:  Redemonstration of bilateral thyroid nodules with the right measuring up to 2.2 cm in diameter.    Liver:  Normal.    Gallbladder: No radiopaque stones gallbladder.    Spleen:  Normal.    Pancreas:  Normal.    Adrenal glands:  Normal.    Kidneys: Normal.    Abdominal and pelvic adenopathy:  No lymphadenopathy in abdomen or pelvis.    Ascites: None.    Gastrointestinal tract: Redemonstration of small periampullary duodenal diverticulum. Colonic diverticulosis.    Pelvic organs: Unremarkable.    Soft tissues: Normal.    Bones: Degenerative changes. Postoperative changes in the right sixth and seventh ribs. No lytic or blastic lesions identified.    Impression: Since 2019, there has been no evidence of local or metastatic disease recurrence.      EXAM:  ECHO TTE WO CON COMP W DOPP                          PROCEDURE DATE:  2020          INTERPRETATION:  REPORT:    -----------------------------------  TRANSTHORACIC ECHOCARDIOGRAM REPORT       --------------------------------------------------------------------------------  Patient Name:   VICTOR HUGO KEITH Date of Exam:        2020  Medical Rec #:  [id number]           Height/Weight:       65.0 in / 249.12 lb  Account #:      [id number]           BSA:                 2.17 m²  Date ofBirth:  1960         BP:                  140/70 mmHg  Patient Age:    59 years          HR:  Patient Gender: F                 Sonographer:         Juan Manuel Nguyễn                                    Referring Physician: VILMA MARTÍNEZ       --------------------------------------------------------------------------------  CPT:               ECHO TTE WO CON COMP W DOPP - 18464; - [id number]  Indication(s):     R06.00 - Dyspnea, unspecified  Procedure:         A complete two-dimensional transthoracic echocardiogram was                     performed: 2D, M-mode, spectral and color flow Doppler.  Ordering Location: [facility]     Study Quality:     Study quality was fair.       --------------------------------------------------------------------------------  CONCLUSIONS:     1. Normal left and right ventricular size and systolic function.   2. No significant valvular disease.   3. No pericardial effusion.    --------------------------------------------------------------------------------  2D AND M-MODE MEASUREMENTS (normal ranges within parentheses):     Left Ventricle:         Normal - Men Normal - Women  IVSd (2D):      0.94 cm  (0.6-1.0)     (0.6-0.9)  LVPWd (2D):     0.91 cm  (0.6-1.0)     (0.6-0.9)  LVIDd (2D):     3.69 cm  (4.2-5.8)   (3.8-5.2)  LVIDs (2D):     2.74 cm  LV FS (2D):     25.7 %     (>25%)  LV EF (MOD BP):  67 %      (>55%)  Aorta/Left Atrium:      Normal - Men Normal - Women  Aortic Root (2D):  3.00  (2.4-3.7)     LA Volume A/L:     Right Ventricle:    LV DIASTOLIC FUNCTION:     MV Peak E: 75.00 cm/s MV e' lat:  9.0 cm/s MV E/e' lat ratio: 8.3  MV Peak A: 48.90 cm/s MV e' med:  7.2 cm/s MV E/e' med ratio: 10.4  E/A Ratio: 1.53       Decel Time: 169 msec MV E/e' av.4    SPECTRAL DOPPLER ANALYSIS:     Mitral Valve:  MV Max Rodriguez:   MV P1/2 Time: 49.01 msec  MV Mean Grad: MV Area, PHT: 4.49 cm²       Aortic Valve: AoV Max Rodriguez:             AoV Peak PG:            AoV Mean PG:   3 mmHg  LVOT Vmax:      0.98 m/s LVOT VTI:      0.218 m  LVOT Diameter: 2.10 cm  AoV Area, VMax:          AoV Area, VTI: 2.85 cm² AoV Area, Vmn: 3.09 cm²       Tricuspid Valve and PA/RV Systolic Pressure: TR Max Velocity: 2.43 m/s RA Pressure: 3 mmHg RVSP/PASP: 27 mmHg    FINDINGS:     Left Ventricle:  The left ventricle is normal in size, wall thickness, and systolic function with a calculated ejection fraction of 67%. There are no regional wall motion abnormalities seen. There is normal left ventricular diastolic function.     Right Ventricle:  The right ventricle is normal in size. Right ventricular systolic function is normal.     Left Atrium:  The left atrium is normal in size.     Right Atrium:  The right atrium is normal in size.     Aortic Valve:  The aortic valve is mildly thickened. No hemodynamically significant aortic stenosis. There is no evidence of aortic regurgitation.     Mitral Valve:  Structurally normal mitral valve with normal leaflet excursion. There is trace mitral regurgitation.     Tricuspid Valve:  Structurally normal tricuspid valve with normal leaflet excursion. There is trace tricuspid regurgitation. Pulmonary artery systolic pressure (estimated using the tricuspid regurgitant gradient and an estimate of right atrial pressure) is 27 mmHg.     Inferior Vena Cava:  The inferior vena cava is normal in size (<2.1cm) with normal inspiratory collapse (>50%) consistent with normal right atrial pressure (  3, range 0-5mmHg). The inferior vena cava isnormal in size (<2.1cm) with abnormal inspiratory collapse (<50%) consistent with mildly elevated right atrial pressure (  8, range 5-10mmHg).     Pulmonic Valve:  The pulmonic valve is not well visualized. There is no evidence of pulmonic regurgitation.     Aorta:  The aortic root is normal in size.     Pericardium:  No pericardial effusion is seen.    EXAM:  MR BRAIN IC FOR SRS                          PROCEDURE DATE:  2020      INTERPRETATION:  PROCEDURE: MRI brain with and without contrast    INDICATION: Lung cancer; brain metastases    TECHNIQUE: Sagittal T1, axial T1, T2, FLAIR, diffusion, GRE and coronal FLAIR images of the brain were obtained. Following the intravenous administration of contrast 7.5 ml of Gadavist, axial T1 and sagittal T1- MPRAGE images of the brain were obtained and reconstructed in the axial and coronal plane.     COMPARISON: [MRI's brain 2019 and 2019]    FINDINGS: The MRI examination of the brain demonstrates the patient to be status post left parietal craniotomy. There is a heterogeneously enhancing lesion within the paramedian left parietal lobe at the resection cavity which demonstrates a "soap bubble" appearance of enhancement. The lesion measures approximately 2.8 cm width x 2.7 cm AP x 2.3 cm height (series 702 image 17 and series 701 image 72). There are areas of hypointense T1 and T2 signal along the medial  margin of the lesion which demonstrates susceptibility and may represent hemorrhagic products. There is extensive surrounding FLAIR/T2 signal abnormality which extends into the splenium of the corpus callosum and into the contralateral right periatrial white matter. The FLAIR/T2 signal abnormality extends anteriorly to the capsular region and throughout the posterior temporal, parietal and occipital white matter. There is mass effect on the left atria and posterior body of the left lateral ventricle. These findings have progressed when compared to the prior MRI's and may represent post therapeutic changes. Alternative considerations would include tumoral disease.    The ventricles especially the temporal horns have slightly increased in size. There is no midline shift or extra-axial collection. The FLAIR/T2-weighted images also demonstrate punctate foci of increased signal within the perifrontal white matter which is nonspecific and may represent the sequelaof small vessel ischemic disease in this patient. There is normal vascular flow-voids. There is mucosal thickening within scattered ethmoid air cells bilaterally. The rest of the visualized paranasal sinuses are free of mucosal disease. The mastoid air cells are well-aerated.    IMPRESSION: Status post left parietal craniotomy. Interval increase in the size of the enhancing enhancing lesion within the paramedian left parietal lobe with surrounding FLAIR/T2 signal abnormality and mass effect. These findings may represent relation necrosis and correlation with radiation history is recommended. Alternative considerations would include tumoral disease. Slight interval increases in ventricular size.     Assessment and Plan:    Problem/Plan - 1:  ·  Problem: Brain mass.  Plan: Admit Neurosurgery Dr Rico  MRI [facility] Protocol Brain  Seizure precaution  Monitor Neuro status  No steroids until final read on MRI  F/U Covid results - negative  Dopplers BLE  Continue current medical regime at this time.      Problem/Plan - 2:  ·  Problem: Adenocarcinoma of lung. S/P lobectomy.    COPD and asthma    Continue triple inhalers. Systemic steroid for asthma/COPD currently not necessary.    It is appropriate to proceed with neurosurgical intervention if necessary, from a pulmonary point of view. Probably resection tomorrow.

## 2020-05-13 ENCOUNTER — RESULT REVIEW (OUTPATIENT)
Age: 60
End: 2020-05-13

## 2020-05-13 LAB
ALBUMIN SERPL ELPH-MCNC: 3.5 G/DL — SIGNIFICANT CHANGE UP (ref 3.3–5)
ALP SERPL-CCNC: 66 U/L — SIGNIFICANT CHANGE UP (ref 40–120)
ALT FLD-CCNC: 10 U/L — SIGNIFICANT CHANGE UP (ref 10–45)
ANION GAP SERPL CALC-SCNC: 12 MMOL/L — SIGNIFICANT CHANGE UP (ref 5–17)
ANION GAP SERPL CALC-SCNC: 14 MMOL/L — SIGNIFICANT CHANGE UP (ref 5–17)
AST SERPL-CCNC: 12 U/L — SIGNIFICANT CHANGE UP (ref 10–40)
BILIRUB DIRECT SERPL-MCNC: <0.2 MG/DL — SIGNIFICANT CHANGE UP (ref 0–0.2)
BILIRUB INDIRECT FLD-MCNC: >0 MG/DL — LOW (ref 0.2–1)
BILIRUB SERPL-MCNC: 0.2 MG/DL — SIGNIFICANT CHANGE UP (ref 0.2–1.2)
BUN SERPL-MCNC: 15 MG/DL — SIGNIFICANT CHANGE UP (ref 7–23)
BUN SERPL-MCNC: 17 MG/DL — SIGNIFICANT CHANGE UP (ref 7–23)
CALCIUM SERPL-MCNC: 8.2 MG/DL — LOW (ref 8.4–10.5)
CALCIUM SERPL-MCNC: 9.4 MG/DL — SIGNIFICANT CHANGE UP (ref 8.4–10.5)
CHLORIDE SERPL-SCNC: 104 MMOL/L — SIGNIFICANT CHANGE UP (ref 96–108)
CHLORIDE SERPL-SCNC: 108 MMOL/L — SIGNIFICANT CHANGE UP (ref 96–108)
CO2 SERPL-SCNC: 21 MMOL/L — LOW (ref 22–31)
CO2 SERPL-SCNC: 25 MMOL/L — SIGNIFICANT CHANGE UP (ref 22–31)
CREAT SERPL-MCNC: 0.71 MG/DL — SIGNIFICANT CHANGE UP (ref 0.5–1.3)
CREAT SERPL-MCNC: 0.81 MG/DL — SIGNIFICANT CHANGE UP (ref 0.5–1.3)
GLUCOSE BLDC GLUCOMTR-MCNC: 123 MG/DL — HIGH (ref 70–99)
GLUCOSE BLDC GLUCOMTR-MCNC: 139 MG/DL — HIGH (ref 70–99)
GLUCOSE SERPL-MCNC: 132 MG/DL — HIGH (ref 70–99)
GLUCOSE SERPL-MCNC: 152 MG/DL — HIGH (ref 70–99)
HCT VFR BLD CALC: 49.8 % — HIGH (ref 34.5–45)
HGB BLD-MCNC: 15.6 G/DL — HIGH (ref 11.5–15.5)
MAGNESIUM SERPL-MCNC: 2.3 MG/DL — SIGNIFICANT CHANGE UP (ref 1.6–2.6)
MAGNESIUM SERPL-MCNC: 2.4 MG/DL — SIGNIFICANT CHANGE UP (ref 1.6–2.6)
MCHC RBC-ENTMCNC: 28.1 PG — SIGNIFICANT CHANGE UP (ref 27–34)
MCHC RBC-ENTMCNC: 31.3 GM/DL — LOW (ref 32–36)
MCV RBC AUTO: 89.6 FL — SIGNIFICANT CHANGE UP (ref 80–100)
NRBC # BLD: 0 /100 WBCS — SIGNIFICANT CHANGE UP (ref 0–0)
PHOSPHATE SERPL-MCNC: 3.5 MG/DL — SIGNIFICANT CHANGE UP (ref 2.5–4.5)
PHOSPHATE SERPL-MCNC: 3.7 MG/DL — SIGNIFICANT CHANGE UP (ref 2.5–4.5)
PLATELET # BLD AUTO: 317 K/UL — SIGNIFICANT CHANGE UP (ref 150–400)
POTASSIUM SERPL-MCNC: 4.2 MMOL/L — SIGNIFICANT CHANGE UP (ref 3.5–5.3)
POTASSIUM SERPL-MCNC: 4.6 MMOL/L — SIGNIFICANT CHANGE UP (ref 3.5–5.3)
POTASSIUM SERPL-SCNC: 4.2 MMOL/L — SIGNIFICANT CHANGE UP (ref 3.5–5.3)
POTASSIUM SERPL-SCNC: 4.6 MMOL/L — SIGNIFICANT CHANGE UP (ref 3.5–5.3)
PROT SERPL-MCNC: 6.3 G/DL — SIGNIFICANT CHANGE UP (ref 6–8.3)
RBC # BLD: 5.56 M/UL — HIGH (ref 3.8–5.2)
RBC # FLD: 14 % — SIGNIFICANT CHANGE UP (ref 10.3–14.5)
SODIUM SERPL-SCNC: 141 MMOL/L — SIGNIFICANT CHANGE UP (ref 135–145)
SODIUM SERPL-SCNC: 143 MMOL/L — SIGNIFICANT CHANGE UP (ref 135–145)
WBC # BLD: 9.8 K/UL — SIGNIFICANT CHANGE UP (ref 3.8–10.5)
WBC # FLD AUTO: 9.8 K/UL — SIGNIFICANT CHANGE UP (ref 3.8–10.5)

## 2020-05-13 PROCEDURE — 61781 SCAN PROC CRANIAL INTRA: CPT

## 2020-05-13 PROCEDURE — 88334 PATH CONSLTJ SURG CYTO XM EA: CPT | Mod: 26,59

## 2020-05-13 PROCEDURE — 61510 CRNEC TREPH EXC BRN TUM STTL: CPT

## 2020-05-13 PROCEDURE — 69990 MICROSURGERY ADD-ON: CPT | Mod: 59

## 2020-05-13 PROCEDURE — 88307 TISSUE EXAM BY PATHOLOGIST: CPT | Mod: 26

## 2020-05-13 PROCEDURE — 95961 ELECTRODE STIMULATION BRAIN: CPT | Mod: 26

## 2020-05-13 PROCEDURE — 99232 SBSQ HOSP IP/OBS MODERATE 35: CPT | Mod: GC

## 2020-05-13 PROCEDURE — 88331 PATH CONSLTJ SURG 1 BLK 1SPC: CPT | Mod: 26

## 2020-05-13 RX ORDER — FAMOTIDINE 10 MG/ML
20 INJECTION INTRAVENOUS EVERY 12 HOURS
Refills: 0 | Status: DISCONTINUED | OUTPATIENT
Start: 2020-05-13 | End: 2020-05-13

## 2020-05-13 RX ORDER — DEXAMETHASONE 0.5 MG/5ML
6 ELIXIR ORAL EVERY 8 HOURS
Refills: 0 | Status: DISCONTINUED | OUTPATIENT
Start: 2020-05-15 | End: 2020-05-15

## 2020-05-13 RX ORDER — LEVETIRACETAM 250 MG/1
1000 TABLET, FILM COATED ORAL EVERY 12 HOURS
Refills: 0 | Status: DISCONTINUED | OUTPATIENT
Start: 2020-05-13 | End: 2020-05-14

## 2020-05-13 RX ORDER — DEXAMETHASONE 0.5 MG/5ML
2 ELIXIR ORAL EVERY 12 HOURS
Refills: 0 | Status: CANCELLED | OUTPATIENT
Start: 2020-05-20 | End: 2020-05-15

## 2020-05-13 RX ORDER — DEXAMETHASONE 0.5 MG/5ML
2 ELIXIR ORAL EVERY 8 HOURS
Refills: 0 | Status: CANCELLED | OUTPATIENT
Start: 2020-05-19 | End: 2020-05-15

## 2020-05-13 RX ORDER — DEXAMETHASONE 0.5 MG/5ML
6 ELIXIR ORAL EVERY 6 HOURS
Refills: 0 | Status: COMPLETED | OUTPATIENT
Start: 2020-05-13 | End: 2020-05-14

## 2020-05-13 RX ORDER — SENNA PLUS 8.6 MG/1
2 TABLET ORAL AT BEDTIME
Refills: 0 | Status: DISCONTINUED | OUTPATIENT
Start: 2020-05-13 | End: 2020-05-15

## 2020-05-13 RX ORDER — SODIUM CHLORIDE 9 MG/ML
1000 INJECTION, SOLUTION INTRAVENOUS
Refills: 0 | Status: DISCONTINUED | OUTPATIENT
Start: 2020-05-13 | End: 2020-05-15

## 2020-05-13 RX ORDER — DEXAMETHASONE 0.5 MG/5ML
4 ELIXIR ORAL EVERY 6 HOURS
Refills: 0 | Status: DISCONTINUED | OUTPATIENT
Start: 2020-05-16 | End: 2020-05-15

## 2020-05-13 RX ORDER — DEXAMETHASONE 0.5 MG/5ML
4 ELIXIR ORAL EVERY 8 HOURS
Refills: 0 | Status: CANCELLED | OUTPATIENT
Start: 2020-05-17 | End: 2020-05-15

## 2020-05-13 RX ORDER — ACETAMINOPHEN 500 MG
1000 TABLET ORAL ONCE
Refills: 0 | Status: COMPLETED | OUTPATIENT
Start: 2020-05-13 | End: 2020-05-13

## 2020-05-13 RX ORDER — SODIUM CHLORIDE 9 MG/ML
1000 INJECTION INTRAMUSCULAR; INTRAVENOUS; SUBCUTANEOUS
Refills: 0 | Status: DISCONTINUED | OUTPATIENT
Start: 2020-05-13 | End: 2020-05-14

## 2020-05-13 RX ORDER — BUDESONIDE AND FORMOTEROL FUMARATE DIHYDRATE 160; 4.5 UG/1; UG/1
2 AEROSOL RESPIRATORY (INHALATION)
Refills: 0 | Status: DISCONTINUED | OUTPATIENT
Start: 2020-05-13 | End: 2020-05-15

## 2020-05-13 RX ORDER — INSULIN LISPRO 100/ML
VIAL (ML) SUBCUTANEOUS
Refills: 0 | Status: DISCONTINUED | OUTPATIENT
Start: 2020-05-13 | End: 2020-05-15

## 2020-05-13 RX ORDER — DEXTROSE 50 % IN WATER 50 %
12.5 SYRINGE (ML) INTRAVENOUS ONCE
Refills: 0 | Status: DISCONTINUED | OUTPATIENT
Start: 2020-05-13 | End: 2020-05-15

## 2020-05-13 RX ORDER — DEXTROSE 50 % IN WATER 50 %
15 SYRINGE (ML) INTRAVENOUS ONCE
Refills: 0 | Status: DISCONTINUED | OUTPATIENT
Start: 2020-05-13 | End: 2020-05-15

## 2020-05-13 RX ORDER — DEXAMETHASONE 0.5 MG/5ML
ELIXIR ORAL
Refills: 0 | Status: DISCONTINUED | OUTPATIENT
Start: 2020-05-13 | End: 2020-05-15

## 2020-05-13 RX ORDER — VANCOMYCIN HCL 1 G
2000 VIAL (EA) INTRAVENOUS EVERY 12 HOURS
Refills: 0 | Status: COMPLETED | OUTPATIENT
Start: 2020-05-14 | End: 2020-05-14

## 2020-05-13 RX ORDER — DEXTROSE 50 % IN WATER 50 %
25 SYRINGE (ML) INTRAVENOUS ONCE
Refills: 0 | Status: DISCONTINUED | OUTPATIENT
Start: 2020-05-13 | End: 2020-05-15

## 2020-05-13 RX ORDER — OXYCODONE HYDROCHLORIDE 5 MG/1
5 TABLET ORAL EVERY 4 HOURS
Refills: 0 | Status: DISCONTINUED | OUTPATIENT
Start: 2020-05-13 | End: 2020-05-14

## 2020-05-13 RX ORDER — LEVETIRACETAM 250 MG/1
1000 TABLET, FILM COATED ORAL EVERY 12 HOURS
Refills: 0 | Status: DISCONTINUED | OUTPATIENT
Start: 2020-05-13 | End: 2020-05-13

## 2020-05-13 RX ORDER — GLUCAGON INJECTION, SOLUTION 0.5 MG/.1ML
1 INJECTION, SOLUTION SUBCUTANEOUS ONCE
Refills: 0 | Status: DISCONTINUED | OUTPATIENT
Start: 2020-05-13 | End: 2020-05-15

## 2020-05-13 RX ORDER — TIOTROPIUM BROMIDE 18 UG/1
1 CAPSULE ORAL; RESPIRATORY (INHALATION) DAILY
Refills: 0 | Status: DISCONTINUED | OUTPATIENT
Start: 2020-05-13 | End: 2020-05-15

## 2020-05-13 RX ORDER — DEXAMETHASONE 0.5 MG/5ML
2 ELIXIR ORAL EVERY 6 HOURS
Refills: 0 | Status: CANCELLED | OUTPATIENT
Start: 2020-05-18 | End: 2020-05-15

## 2020-05-13 RX ORDER — HYDROMORPHONE HYDROCHLORIDE 2 MG/ML
0.5 INJECTION INTRAMUSCULAR; INTRAVENOUS; SUBCUTANEOUS EVERY 6 HOURS
Refills: 0 | Status: DISCONTINUED | OUTPATIENT
Start: 2020-05-13 | End: 2020-05-14

## 2020-05-13 RX ORDER — PANTOPRAZOLE SODIUM 20 MG/1
40 TABLET, DELAYED RELEASE ORAL DAILY
Refills: 0 | Status: DISCONTINUED | OUTPATIENT
Start: 2020-05-13 | End: 2020-05-14

## 2020-05-13 RX ORDER — ACETAMINOPHEN 500 MG
650 TABLET ORAL EVERY 6 HOURS
Refills: 0 | Status: DISCONTINUED | OUTPATIENT
Start: 2020-05-13 | End: 2020-05-15

## 2020-05-13 RX ORDER — ONDANSETRON 8 MG/1
4 TABLET, FILM COATED ORAL EVERY 6 HOURS
Refills: 0 | Status: DISCONTINUED | OUTPATIENT
Start: 2020-05-13 | End: 2020-05-15

## 2020-05-13 RX ADMIN — Medication 4 MILLIGRAM(S): at 03:19

## 2020-05-13 RX ADMIN — BUDESONIDE AND FORMOTEROL FUMARATE DIHYDRATE 2 PUFF(S): 160; 4.5 AEROSOL RESPIRATORY (INHALATION) at 09:05

## 2020-05-13 RX ADMIN — Medication 400 MILLIGRAM(S): at 19:45

## 2020-05-13 RX ADMIN — Medication 5 MILLIGRAM(S): at 21:51

## 2020-05-13 RX ADMIN — SODIUM CHLORIDE 75 MILLILITER(S): 9 INJECTION INTRAMUSCULAR; INTRAVENOUS; SUBCUTANEOUS at 00:18

## 2020-05-13 RX ADMIN — LEVETIRACETAM 420 MILLIGRAM(S): 250 TABLET, FILM COATED ORAL at 06:22

## 2020-05-13 RX ADMIN — TIOTROPIUM BROMIDE 1 CAPSULE(S): 18 CAPSULE ORAL; RESPIRATORY (INHALATION) at 09:05

## 2020-05-13 RX ADMIN — Medication 4 MILLIGRAM(S): at 09:05

## 2020-05-13 RX ADMIN — Medication 1 APPLICATION(S): at 11:03

## 2020-05-13 RX ADMIN — Medication 6 MILLIGRAM(S): at 23:55

## 2020-05-13 RX ADMIN — HYDROMORPHONE HYDROCHLORIDE 0.5 MILLIGRAM(S): 2 INJECTION INTRAMUSCULAR; INTRAVENOUS; SUBCUTANEOUS at 23:59

## 2020-05-13 RX ADMIN — OXYCODONE HYDROCHLORIDE 5 MILLIGRAM(S): 5 TABLET ORAL at 22:00

## 2020-05-13 RX ADMIN — SENNA PLUS 2 TABLET(S): 8.6 TABLET ORAL at 21:51

## 2020-05-13 NOTE — PROGRESS NOTE ADULT - ASSESSMENT
58yo female, hx of lung adenocarcinoma with brain metastasis. Now presenting with symptomatic 3cm left parietal met.    I have personally reviewed all imaging and I have discussed with Dr. Rico.    MRI Brain done here shows 2.8cm lesion in left parietal lobe with associated edema. This could be RT necrosis or recurrent disease. She is scheduled for craniotomy. If it is tumor, we will have to see if she can receive more RT to the left brain afterwards. Will need to do dose fusions with her MRI to determine safety and feasibility. Ms Mendoza understands. I will follow up with her post-operatively.    Call if any questions [phone]

## 2020-05-13 NOTE — PROGRESS NOTE ADULT - SUBJECTIVE AND OBJECTIVE BOX
[facility]    VICTOR HUGO KEITH  MRN-[id number]    HPI: 59 y.o woman , well known to me-followed for stage IV non small cell lung cancer (adenocarcinoma) since the diagnosis in 2016. She underwent RLL lobectomy and RML wedge resection and in 2017 underwent craniotomy for brain metastatic disease. This was followed by gamma knife in 2018 for a new single met. No actionable mutations were identified, but high PDL1. She has not received systemic treatment-there was no evidence of disease elsewhere. Now admitted with new L sided brain lesion after being evaluated as outpatient for dizziness and confusion.    ROS:  NO headhaches  + confusion  + dizziness. No visual changes  chest pain or SOB.    No nausea/vomiting/fevers/chills/night sweats.   No abdominal pain/diarrhea/constipation.    No history of easy bruising/bleeding.   No leg pain or leg swelling.    ROS is otherwise negative.    PMH/PSH:  PAST MEDICAL & SURGICAL HISTORY:  Brain metastasis: brain tumor s/p resection  Dyspnea  MRSA (methicillin resistant Staphylococcus aureus): history of  Adenocarcinoma of lung  COPD (chronic obstructive pulmonary disease)  Asthma  Pulmonary nodules  History of lung surgery: right wedge resection  H/O brain surgery  Surgery, elective: lung biopsy  History of appendectomy  No significant past surgical history      Medications:  MEDICATIONS  (STANDING):  budesonide 160 MICROgram(s)/formoterol 4.5 MICROgram(s) Inhaler 2 Puff(s) Inhalation two times a day  dexAMETHasone  Injectable 4 milliGRAM(s) IV Push every 6 hours  dextrose 5%. 1000 milliLiter(s) (50 mL/Hr) IV Continuous <Continuous>  dextrose 50% Injectable 12.5 Gram(s) IV Push once  dextrose 50% Injectable 25 Gram(s) IV Push once  dextrose 50% Injectable 25 Gram(s) IV Push once  insulin lispro (HumaLOG) corrective regimen sliding scale   SubCutaneous Before meals and at bedtime  levETIRAcetam  IVPB 500 milliGRAM(s) IV Intermittent every 12 hours  pantoprazole    Tablet 40 milliGRAM(s) Oral before breakfast  povidone iodine 5% Nasal Swab 1 Application(s) Both Nostrils once  senna 2 Tablet(s) Oral at bedtime  sodium chloride 0.9%. 1000 milliLiter(s) (75 mL/Hr) IV Continuous <Continuous>  tiotropium 18 MICROgram(s) Capsule 1 Capsule(s) Inhalation daily    MEDICATIONS  (PRN):  acetaminophen   Tablet .. 650 milliGRAM(s) Oral every 6 hours PRN Moderate Pain (4 - 6)  dextrose 40% Gel 15 Gram(s) Oral once PRN Blood Glucose LESS THAN 70 milliGRAM(s)/deciliter  glucagon  Injectable 1 milliGRAM(s) IntraMuscular once PRN Glucose LESS THAN 70 milligrams/deciliter    penicillin (Angioedema)    Allergies    penicillin (Angioedema)    Intolerances    Exam:  Vital Signs Last 24 Hrs  T(C): 36.4 (05-13-20 @ 05:10), Max: 36.7 (05-12-20 @ 16:50)  T(F): 97.5 (05-13-20 @ 05:10), Max: 98.1 (05-12-20 @ 16:50)  HR: 70 (05-13-20 @ 05:10) (69 - 93)  BP: 115/76 (05-13-20 @ 05:10) (97/65 - 129/82)  BP(mean): --  RR: 18 (05-13-20 @ 05:10) (17 - 19)  SpO2: 99% (05-13-20 @ 05:10) (96% - 99%)  HEENT: pink mucosae, anicteric sclerae  No palpable peripheral lymphadenopathy  COR: regular rhytm, rate, no murmurs, rubs or gallops  PULMO: clear to auscultation B/L  ABD: soft, no palpable masses, no splenomegaly  EXT: no edema  NEURO: intact  SKIN: no lesions on visible skin    Labs:  CBC Full  -  ( 13 May 2020 06:15 )  WBC Count : 9.80 K/uL  RBC Count : 5.56 M/uL  Hemoglobin : 15.6 g/dL  Hematocrit : 49.8 %  Platelet Count - Automated : 317 K/uL  Mean Cell Volume : 89.6 fl  Mean Cell Hemoglobin : 28.1 pg  Mean Cell Hemoglobin Concentration : 31.3 gm/dL  Auto Neutrophil # : x  Auto Lymphocyte # : x  Auto Monocyte # : x  Auto Eosinophil # : x  Auto Basophil # : x  Auto Neutrophil % : x  Auto Lymphocyte % : x  Auto Monocyte % : x  Auto Eosinophil % : x  Auto Basophil % : x    PT/INR - ( 11 May 2020 10:44 )   PT: 12.6 sec;   INR: 1.10          PTT - ( 11 May 2020 10:44 )  PTT:29.6 sec    05-13    143  |  104  |  17  ----------------------------<  132<H>  4.2   |  25  |  0.81    Ca    9.4      13 May 2020 06:15  Phos  3.5     05-13  Mg     2.4     05-13    TPro  7.3  /  Alb  4.0  /  TBili  0.2  /  DBili  x   /  AST  12  /  ALT  12  /  AlkPhos  72  05-11      Pertinent imaging studies: reviewed    Assessment:    Metastatic lung cancer (adenocarcinoma)    Plan:    Recurrent metastatic lung ca with new large brain lesion  Schedule for OR today  We will await pathology and will repeat NGS to assess for possible actionable mutation  We will also perform liquid biopsy as well as restaging PET/CT once she recovers from surgery  Her ECOG PS remains excellent    Thank you    Calos Peterson MD  [phone]

## 2020-05-13 NOTE — BRIEF OPERATIVE NOTE - OPERATION/FINDINGS
Left parietal Craniotomy and excision of tumor     Frozen: Left parietal Craniotomy and excision of tumor     Frozen: Necrosis

## 2020-05-13 NOTE — PROGRESS NOTE ADULT - SUBJECTIVE AND OBJECTIVE BOX
Interval Events: Reviewed  Patient seen and examined at bedside.    Patient is a 59y old  Female who presents with a chief complaint of Brain mass (13 May 2020 17:32)    she is doing well and she is waiting for OR ( seen earlier in the day)  PAST MEDICAL & SURGICAL HISTORY:  Brain metastasis: brain tumor s/p resection  MRSA (methicillin resistant Staphylococcus aureus): history of  Adenocarcinoma of lung  COPD (chronic obstructive pulmonary disease)  History of lung surgery: right wedge resection  H/O brain surgery  Surgery, elective: lung biopsy  History of appendectomy      MEDICATIONS:  Pulmonary:  budesonide 160 MICROgram(s)/formoterol 4.5 MICROgram(s) Inhaler 2 Puff(s) Inhalation two times a day  tiotropium 18 MICROgram(s) Capsule 1 Capsule(s) Inhalation daily    Antimicrobials:    Anticoagulants:    Cardiac:      Allergies    penicillin (Angioedema)    Intolerances        Vital Signs Last 24 Hrs  T(C): 34.4 (13 May 2020 19:17), Max: 36.6 (12 May 2020 20:52)  T(F): 94 (13 May 2020 19:17), Max: 97.8 (12 May 2020 20:52)  HR: 63 (13 May 2020 19:15) (60 - 70)  BP: 114/78 (13 May 2020 08:25) (108/73 - 115/76)  BP(mean): --  RR: 23 (13 May 2020 19:15) (18 - 23)  SpO2: 100% (13 May 2020 19:15) (96% - 100%)    05-12 @ 07:01  -  05-13 @ 07:00  --------------------------------------------------------  IN: 1215 mL / OUT: 0 mL / NET: 1215 mL    05-13 @ 07:01  -  05-13 @ 19:34  --------------------------------------------------------  IN: 0 mL / OUT: 140 mL / NET: -140 mL          Review of Systems:   •	General: negative  •	Skin/Breast: negative  •	Ophthalmologic: negative  •	ENMT: negative  •	Respiratory and Thorax: negative  •	Cardiovascular: negative  •	Gastrointestinal: negative  •	Genitourinary: negative  •	Musculoskeletal: negative  •	Neurological: negative  •	Psychiatric: negative  •	Hematology/Lymphatics: negative  •	Endocrine: negative  •	Allergic/Immunologic: negative    Physical Exam:   • Constitutional:	Well-developed, well nourished  • Eyes:	EOMI; PERRL; no drainage or redness  • ENMT:	No oral lesions; no gross abnormalities  • Neck	No bruits; no thyromegaly or nodules  • Breasts:	not examined  • Back:	No deformity or limitation of movement  • Respiratory:	Breath Sounds equal & clear to percussion & auscultation, no accessory muscle use  • Cardiovascular:	Regular rate & rhythm, normal S1, S2; no murmurs, gallops or rubs; no S3, S4  • Gastrointestinal:	Soft, non-tender, no hepatosplenomegaly, normal bowel sounds  • Genitourinary:	not examined  • Rectal: not examined  • Extremities:	No cyanosis, clubbing or edema  • Vascular:	Equal and normal pulses (carotid, femoral, dorsalis pedis)  • Neurologica:l	not examined  • Skin:	No lesions; no rash  • Lymph Nodes:	No lymphadedenopathy  • Musculoskeletal:	No joint pain, swelling or deformity; no limitation of movement        LABS:      CBC Full  -  ( 13 May 2020 06:15 )  WBC Count : 9.80 K/uL  RBC Count : 5.56 M/uL  Hemoglobin : 15.6 g/dL  Hematocrit : 49.8 %  Platelet Count - Automated : 317 K/uL  Mean Cell Volume : 89.6 fl  Mean Cell Hemoglobin : 28.1 pg  Mean Cell Hemoglobin Concentration : 31.3 gm/dL  Auto Neutrophil # : x  Auto Lymphocyte # : x  Auto Monocyte # : x  Auto Eosinophil # : x  Auto Basophil # : x  Auto Neutrophil % : x  Auto Lymphocyte % : x  Auto Monocyte % : x  Auto Eosinophil % : x  Auto Basophil % : x    05-13    143  |  104  |  17  ----------------------------<  132<H>  4.2   |  25  |  0.81    Ca    9.4      13 May 2020 06:15  Phos  3.5     05-13  Mg     2.4     05-13                          RADIOLOGY & ADDITIONAL STUDIES (The following images were personally reviewed):  Jeff:                                     No  Urine output:                       adequate  DVT prophylaxis:                 Yes  Flattus:                                  Yes  Bowel movement:              No

## 2020-05-13 NOTE — PROGRESS NOTE ADULT - SUBJECTIVE AND OBJECTIVE BOX
Radiation Oncology Progress Note    Patient was seen and examined at bedside.    HPI:  Neurosurgery NP /Dr Rico    HISTORY OF PRESENT ILLNESS:     Ms Ashlie Mendoza is a 59 yr old female with a history of adenocarcinoma of the lung initially diagnosed during pre-operative workup for surgery for an orthopedic issue. She underwent right lower lobectomy with en bloc middle lobe resection on 10/24/2016, which revealed pT3, pN1 with visceral pleura and LVI, involving 2/4 peribronchial lymph nodes. She completed adjuvant chemotherapy between January and March 2017.     She then developed a brain metastasis, and completed radiation therapy with Dr Owen for a total of 2700 cGy from 11/14/17- 11/20/17 over 3 fractions for treatment of left parieto-occipital mets (s/p subtotal resection). She had a cerebral hemorrhage in November 2017 post treatment and was hospitalized at [facility]. On follow up surveillance MRI brain 1/3/18 she was found to a have a new 5 mm right parietal enhancing mass. She is s/p Gamma Knife stereotactic radiosurgery on 1/18/2018 by Dr. Rico and Dr. Owen at [facility].    She now follows up with me. She called us last week with confusion and right hand weakness for almost one month. She was sent for MRI brain ASAP which showed 3cm left parietal mass. I started her on steroids. Spoke with Dr. Rico on 5/8/20 and he advised she come to ED on 5/11/20 for admission and workup for craniotomy.     MRI brain 5/11/20 at [facility]:  IMPRESSION: Status post left parietal craniotomy. Interval increase in the size of the enhancing enhancing lesion within the paramedian left parietal lobe with surrounding FLAIR/T2 signal abnormality and mass effect. These findings may represent relation necrosis and correlation with radiation history is recommended. Alternative considerations would include tumoral disease. Slight interval increases in ventricular size.    Denies any fevers, n/v, visual changes or traveling over the past 6 months.       PAST MEDICAL & SURGICAL HISTORY:  MRSA (methicillin resistant Staphylococcus aureus): history of  Adenocarcinoma of lung  COPD (chronic obstructive pulmonary disease)  H/O brain surgery  Surgery, elective: lung biopsy  History of appendectomy    Allergies : PCN (11 May 2020 11:24)      Interval History:     ROS is otherwise negative.    PHYSICAL EXAM:    T(F): 97.5 (05-13-20 @ 05:10), Max: 98.1 (05-12-20 @ 16:50)  HR: 70 (05-13-20 @ 05:10) (69 - 93)  BP: 115/76 (05-13-20 @ 05:10) (104/69 - 129/82)  RR: 18 (05-13-20 @ 05:10) (17 - 19)  SpO2: 99% (05-13-20 @ 05:10) (96% - 99%)  Wt(kg): --    Gen: well developed, well nourished, comfortable  HEENT: normocephalic/atraumatic, no conjunctival pallor, no scleral icterus, no oral thrush/mucosal bleeding/mucositis  Neck: supple, no masses, no JVD  Cardiovascular: RR, nl S1S2, no murmurs/rubs/gallops  Respiratory: clear air entry b/l  Gastrointestinal: BS+, soft, NT/ND, no masses, no splenomegaly, no hepatomegaly, no evidence for ascites  Extremities: no clubbing/cyanosis, no edema, no calf tenderness  Vascular:  DP/PT 2+ b/l  Neurological: CN 2-12 grossly intact, AO x 3. Right hand slightly weak.   Skin: no rash on visible skin  Lymph Nodes:  no cervical/supraclavicular LAD, no axillary/groin LAD  Musculoskeletal:  full ROM        Medications:  MEDICATIONS  (STANDING):  budesonide 160 MICROgram(s)/formoterol 4.5 MICROgram(s) Inhaler 2 Puff(s) Inhalation two times a day  dexAMETHasone  Injectable 4 milliGRAM(s) IV Push every 6 hours  dextrose 5%. 1000 milliLiter(s) (50 mL/Hr) IV Continuous <Continuous>  dextrose 50% Injectable 12.5 Gram(s) IV Push once  dextrose 50% Injectable 25 Gram(s) IV Push once  dextrose 50% Injectable 25 Gram(s) IV Push once  insulin lispro (HumaLOG) corrective regimen sliding scale   SubCutaneous Before meals and at bedtime  levETIRAcetam  IVPB 500 milliGRAM(s) IV Intermittent every 12 hours  pantoprazole    Tablet 40 milliGRAM(s) Oral before breakfast  povidone iodine 5% Nasal Swab 1 Application(s) Both Nostrils once  senna 2 Tablet(s) Oral at bedtime  sodium chloride 0.9%. 1000 milliLiter(s) (75 mL/Hr) IV Continuous <Continuous>  tiotropium 18 MICROgram(s) Capsule 1 Capsule(s) Inhalation daily    MEDICATIONS  (PRN):  acetaminophen   Tablet .. 650 milliGRAM(s) Oral every 6 hours PRN Moderate Pain (4 - 6)  dextrose 40% Gel 15 Gram(s) Oral once PRN Blood Glucose LESS THAN 70 milliGRAM(s)/deciliter  glucagon  Injectable 1 milliGRAM(s) IntraMuscular once PRN Glucose LESS THAN 70 milligrams/deciliter      Labs:                          15.6   9.80  )-----------( 317      ( 13 May 2020 06:15 )             49.8     CBC Full  -  ( 13 May 2020 06:15 )  WBC Count : 9.80 K/uL  RBC Count : 5.56 M/uL  Hemoglobin : 15.6 g/dL  Hematocrit : 49.8 %  Platelet Count - Automated : 317 K/uL  Mean Cell Volume : 89.6 fl  Mean Cell Hemoglobin : 28.1 pg  Mean Cell Hemoglobin Concentration : 31.3 gm/dL  Auto Neutrophil # : x  Auto Lymphocyte # : x  Auto Monocyte # : x  Auto Eosinophil # : x  Auto Basophil # : x  Auto Neutrophil % : x  Auto Lymphocyte % : x  Auto Monocyte % : x  Auto Eosinophil % : x  Auto Basophil % : x    PT/INR - ( 11 May 2020 10:44 )   PT: 12.6 sec;   INR: 1.10          PTT - ( 11 May 2020 10:44 )  PTT:29.6 sec  05-13    143  |  104  |  17  ----------------------------<  132<H>  4.2   |  25  |  0.81    Ca    9.4      13 May 2020 06:15  Phos  3.5     05-13  Mg     2.4     05-13    TPro  7.3  /  Alb  4.0  /  TBili  0.2  /  DBili  x   /  AST  12  /  ALT  12  /  AlkPhos  72  05-11      Pathology:          Imaging Studies: Radiation Oncology Progress Note    Patient was seen and examined at bedside.      HISTORY OF PRESENT ILLNESS:     Ms Ashlie Mendoza is a 59 yr old female with a history of adenocarcinoma of the lung initially diagnosed during pre-operative workup for surgery for an orthopedic issue. She underwent right lower lobectomy with en bloc middle lobe resection on 10/24/2016, which revealed pT3, pN1 with visceral pleura and LVI, involving 2/4 peribronchial lymph nodes. She completed adjuvant chemotherapy between January and March 2017.     She then developed a brain metastasis, and completed radiation therapy with Dr Owen for a total of 2700 cGy from 11/14/17- 11/20/17 over 3 fractions for treatment of left parieto-occipital mets (s/p subtotal resection). She had a cerebral hemorrhage in November 2017 post treatment and was hospitalized at [facility]. On follow up surveillance MRI brain 1/3/18 she was found to a have a new 5 mm right parietal enhancing mass. She is s/p Gamma Knife stereotactic radiosurgery on 1/18/2018 by Dr. Rico and Dr. Owen at [facility].    She now follows up with me. She called us last week with confusion and right hand weakness for almost one month. She was sent for MRI brain ASAP which showed 3cm left parietal mass. I started her on steroids. Spoke with Dr. Rico on 5/8/20 and he advised she come to ED on 5/11/20 for admission and workup for craniotomy.     MRI brain 5/11/20 at [facility]:  IMPRESSION: Status post left parietal craniotomy. Interval increase in the size of the enhancing enhancing lesion within the paramedian left parietal lobe with surrounding FLAIR/T2 signal abnormality and mass effect. These findings may represent relation necrosis and correlation with radiation history is recommended. Alternative considerations would include tumoral disease. Slight interval increases in ventricular size.    Denies any fevers, n/v, visual changes or traveling over the past 6 months.       PAST MEDICAL & SURGICAL HISTORY:  MRSA (methicillin resistant Staphylococcus aureus): history of  Adenocarcinoma of lung  COPD (chronic obstructive pulmonary disease)  H/O brain surgery  Surgery, elective: lung biopsy  History of appendectomy    Allergies : PCN (11 May 2020 11:24)      Interval History:     ROS is otherwise negative.    PHYSICAL EXAM:    T(F): 97.5 (05-13-20 @ 05:10), Max: 98.1 (05-12-20 @ 16:50)  HR: 70 (05-13-20 @ 05:10) (69 - 93)  BP: 115/76 (05-13-20 @ 05:10) (104/69 - 129/82)  RR: 18 (05-13-20 @ 05:10) (17 - 19)  SpO2: 99% (05-13-20 @ 05:10) (96% - 99%)  Wt(kg): --    Gen: well developed, well nourished, comfortable  HEENT: normocephalic/atraumatic, no conjunctival pallor, no scleral icterus, no oral thrush/mucosal bleeding/mucositis  Neck: supple, no masses, no JVD  Cardiovascular: RR, nl S1S2, no murmurs/rubs/gallops  Respiratory: clear air entry b/l  Gastrointestinal: BS+, soft, NT/ND, no masses, no splenomegaly, no hepatomegaly, no evidence for ascites  Extremities: no clubbing/cyanosis, no edema, no calf tenderness  Vascular:  DP/PT 2+ b/l  Neurological: CN 2-12 grossly intact, AO x 3. Right hand slightly weak.   Skin: no rash on visible skin  Lymph Nodes:  no cervical/supraclavicular LAD, no axillary/groin LAD  Musculoskeletal:  full ROM        Medications:  MEDICATIONS  (STANDING):  budesonide 160 MICROgram(s)/formoterol 4.5 MICROgram(s) Inhaler 2 Puff(s) Inhalation two times a day  dexAMETHasone  Injectable 4 milliGRAM(s) IV Push every 6 hours  dextrose 5%. 1000 milliLiter(s) (50 mL/Hr) IV Continuous <Continuous>  dextrose 50% Injectable 12.5 Gram(s) IV Push once  dextrose 50% Injectable 25 Gram(s) IV Push once  dextrose 50% Injectable 25 Gram(s) IV Push once  insulin lispro (HumaLOG) corrective regimen sliding scale   SubCutaneous Before meals and at bedtime  levETIRAcetam  IVPB 500 milliGRAM(s) IV Intermittent every 12 hours  pantoprazole    Tablet 40 milliGRAM(s) Oral before breakfast  povidone iodine 5% Nasal Swab 1 Application(s) Both Nostrils once  senna 2 Tablet(s) Oral at bedtime  sodium chloride 0.9%. 1000 milliLiter(s) (75 mL/Hr) IV Continuous <Continuous>  tiotropium 18 MICROgram(s) Capsule 1 Capsule(s) Inhalation daily    MEDICATIONS  (PRN):  acetaminophen   Tablet .. 650 milliGRAM(s) Oral every 6 hours PRN Moderate Pain (4 - 6)  dextrose 40% Gel 15 Gram(s) Oral once PRN Blood Glucose LESS THAN 70 milliGRAM(s)/deciliter  glucagon  Injectable 1 milliGRAM(s) IntraMuscular once PRN Glucose LESS THAN 70 milligrams/deciliter      Labs:                          15.6   9.80  )-----------( 317      ( 13 May 2020 06:15 )             49.8     CBC Full  -  ( 13 May 2020 06:15 )  WBC Count : 9.80 K/uL  RBC Count : 5.56 M/uL  Hemoglobin : 15.6 g/dL  Hematocrit : 49.8 %  Platelet Count - Automated : 317 K/uL  Mean Cell Volume : 89.6 fl  Mean Cell Hemoglobin : 28.1 pg  Mean Cell Hemoglobin Concentration : 31.3 gm/dL  Auto Neutrophil # : x  Auto Lymphocyte # : x  Auto Monocyte # : x  Auto Eosinophil # : x  Auto Basophil # : x  Auto Neutrophil % : x  Auto Lymphocyte % : x  Auto Monocyte % : x  Auto Eosinophil % : x  Auto Basophil % : x    PT/INR - ( 11 May 2020 10:44 )   PT: 12.6 sec;   INR: 1.10          PTT - ( 11 May 2020 10:44 )  PTT:29.6 sec  05-13    143  |  104  |  17  ----------------------------<  132<H>  4.2   |  25  |  0.81    Ca    9.4      13 May 2020 06:15  Phos  3.5     05-13  Mg     2.4     05-13    TPro  7.3  /  Alb  4.0  /  TBili  0.2  /  DBili  x   /  AST  12  /  ALT  12  /  AlkPhos  72  05-11      Pathology:          Imaging Studies:

## 2020-05-13 NOTE — PROGRESS NOTE ADULT - SUBJECTIVE AND OBJECTIVE BOX
PUD FOR DR BARFIELD    INTERVAL HPI/OVERNIGHT EVENTS:    PAST MEDICAL & SURGICAL HISTORY:  Brain metastasis: brain tumor s/p resection  MRSA (methicillin resistant Staphylococcus aureus): history of  Adenocarcinoma of lung  COPD (chronic obstructive pulmonary disease)  History of lung surgery: right wedge resection  H/O brain surgery  Surgery, elective: lung biopsy  History of appendectomy      FAMILY HISTORY:  No family history of cardiovascular disease (Father)  Family history of cancer in mother (Mother)  Family history of uterine cancer (Sibling): sister  Family history of diabetes mellitus (Mother, Sibling)  Family history of essential hypertension (Mother, Sibling)      SOCIAL HISTORY:    REVIEW OF SYSTEMS:  Constitutional: () weight change, () fever,  () chills, () fatigue, () night sweats  Eyes: () discharge, () eye pain, () visual change  ENT:  () hearing difficulty, () vertigo, () sinus pain,  () throat pain, () epistaxis, () dysphagia, () hoarseness  Neck: () pain, () stiffness, () swelling  Respiratory: () cough, () wheezing, () hemoptysis      Cardiovascular: () chest pain, ()palpitations, () dizziness   Gastrointestinal: () abdominal pain, () nausea, () vomiting, () hematemesis, () diarrhea,  () constipation, () melena  Genitourinary:  () dysuria, () frequency, () hematuria, () incontinence      Neurologic: () headache, () memory loss, () loss of strength, () numbness, () tremor     Skin: () itching, () burning, () rash, () lesions   Lymphatic: () enlarged lymph nodes  Endocrine: () hair loss, () temperature intolerance         Musculoskeletal: () back pain, () joint pain,  () extremity pain  Psychiatric: () visual change, () auditory change, () depression, () anxiety, () suicidal  Sleep: () disorder, () insomnia, () sleep deprivation  Heme/Lymph: () easy bruising, () bleeding gums            Allergy and Immunologic: () hives, () eczema    Vital Signs Last 24 Hrs  T(C): 36.3 (13 May 2020 08:25), Max: 36.6 (12 May 2020 20:52)  T(F): 97.4 (13 May 2020 08:25), Max: 97.8 (12 May 2020 20:52)  HR: 60 (13 May 2020 08:25) (60 - 70)  BP: 114/78 (13 May 2020 08:25) (108/73 - 115/76)  BP(mean): --  RR: 18 (13 May 2020 08:25) (18 - 18)  SpO2: 97% (13 May 2020 08:25) (96% - 99%)            I&O's Detail    12 May 2020 07:01  -  13 May 2020 07:00  --------------------------------------------------------  IN:    Oral Fluid: 840 mL    sodium chloride 0.9%: 375 mL  Total IN: 1215 mL    OUT:  Total OUT: 0 mL    Total NET: 1215 mL          PHYSICAL EXAM:    Well nourished, well developed, comfortable, - acute distress; vital signs are monitored continuously  Eyes: PERRLA, EOMI, -conjunctivitis, -scleritis   Head: no focal deficit, normocephalic,  no trauma  ENMT: moist tongue, no thrush, -nasal discharge, -hoarseness, normal hearing, -cough, -hemoptysis, trachea midline  Neck: supple, - lymphadenopathy,  -masses, -JVD  Respiratory: bilateral diminished breath sounds, -wheezing, -rhonchi, -rales, -crackles  Chest: -accessory muscle use, -paradoxical breathing  Cardiovascular: regular rate and sinus rhythm, S1 S2 normal, -S3, -S4, -murmur, -gallop, -rub  Gastrointestinal: soft, nontender, nondistended, normal bowel sounds, no hepatosplenomegaly  Genitourinary: -flank pain, -dysuria  Extremities: -clubbing, -cyanosis, -edema    Vascular: peripheral pulses palpable 2+ bilaterally  Neurological: alert, oriented x 3, no focal deficit, -tremor   Skin: warm, dry, -erythema, iv sites intact  Lymph nodes; no cervical, supraclavicular or axillary adenopathy  Psychiatric: cooperative, appropriate mood      MEDICATIONS  (STANDING):    MEDICATIONS  (PRN):      Allergies    penicillin (Angioedema)    Intolerances        LABS:                        15.6   9.80  )-----------( 317      ( 13 May 2020 06:15 )             49.8     05-13    143  |  104  |  17  ----------------------------<  132<H>  4.2   |  25  |  0.81    Ca    9.4      13 May 2020 06:15  Phos  3.5     05-13  Mg     2.4     05-13          +DVT prophylaxis  +Sleep  +Nutrition goals  -Pain  -Decubital ulcer  +GI prophylaxis (PPV, coagulopathy, Hx)  +Aspiration prophylaxis (45 degrees)    Ventilator synchronized  Tracheal cuff pressure    +Sedation/analgesia stopped once  +ID (phos, CH, mupi, SB)  -Delirium  +Cardiac Beta/ACEI-ARB/ASA/statin  +Prevention  +Education  +Medication reviewed (drug-drug interactions, PDA)  Medical devices    Discussed with ICU, PGY, CCRN, family    RADIOLOGY & ADDITIONAL STUDIES: PUD FOR DR BARFIELD    INTERVAL HPI/OVERNIGHT EVENTS:    Had resection of left brain lesion  discussed with anesthesia    PAST MEDICAL & SURGICAL HISTORY:  Brain metastasis: brain tumor s/p resection  MRSA (methicillin resistant Staphylococcus aureus): history of  Adenocarcinoma of lung  COPD (chronic obstructive pulmonary disease)  History of lung surgery: right wedge resection  H/O brain surgery  Surgery, elective: lung biopsy  History of appendectomy      FAMILY HISTORY:  No family history of cardiovascular disease (Father)  Family history of cancer in mother (Mother)  Family history of uterine cancer (Sibling): sister  Family history of diabetes mellitus (Mother, Sibling)  Family history of essential hypertension (Mother, Sibling)      SOCIAL HISTORY:    REVIEW OF SYSTEMS:  Constitutional: () weight change, () fever,  () chills, () fatigue, () night sweats  Eyes: () discharge, () eye pain, () visual change  ENT:  () hearing difficulty, () vertigo, () sinus pain,  () throat pain, () epistaxis, () dysphagia, () hoarseness  Neck: () pain, () stiffness, () swelling  Respiratory: () cough, () wheezing, () hemoptysis      Cardiovascular: () chest pain, ()palpitations, () dizziness   Gastrointestinal: () abdominal pain, () nausea, () vomiting, () hematemesis, () diarrhea,  () constipation, () melena  Genitourinary:  () dysuria, () frequency, () hematuria, () incontinence      Neurologic: () headache, () memory loss, () loss of strength, () numbness, () tremor     Skin: () itching, () burning, () rash, () lesions   Lymphatic: () enlarged lymph nodes  Endocrine: () hair loss, () temperature intolerance         Musculoskeletal: () back pain, () joint pain,  () extremity pain  Psychiatric: () visual change, () auditory change, () depression, () anxiety, () suicidal  Sleep: () disorder, () insomnia, () sleep deprivation  Heme/Lymph: () easy bruising, () bleeding gums            Allergy and Immunologic: () hives, () eczema    Vital Signs Last 24 Hrs  T(C): 36.3 (13 May 2020 08:25), Max: 36.6 (12 May 2020 20:52)  T(F): 97.4 (13 May 2020 08:25), Max: 97.8 (12 May 2020 20:52)  HR: 60 (13 May 2020 08:25) (60 - 70)  BP: 114/78 (13 May 2020 08:25) (108/73 - 115/76)  BP(mean): --  RR: 18 (13 May 2020 08:25) (18 - 18)  SpO2: 97% (13 May 2020 08:25) (96% - 99%)            I&O's Detail    12 May 2020 07:01  -  13 May 2020 07:00  --------------------------------------------------------  IN:    Oral Fluid: 840 mL    sodium chloride 0.9%: 375 mL  Total IN: 1215 mL    OUT:  Total OUT: 0 mL    Total NET: 1215 mL    PHYSICAL EXAM:    Well nourished, well developed, comfortable, - acute distress; vital signs are monitored continuously  Eyes: PERRLA, EOMI, -conjunctivitis, -scleritis   Head: no focal deficit, normocephalic,  no trauma  ENMT: moist tongue, no thrush, -nasal discharge, -hoarseness, normal hearing, -cough, -hemoptysis, trachea midline  Neck: supple, - lymphadenopathy,  -masses, -JVD  Respiratory: bilateral diminished breath sounds, -wheezing, -rhonchi, -rales, -crackles  Chest: -accessory muscle use, -paradoxical breathing  Cardiovascular: regular rate and sinus rhythm, S1 S2 normal, -S3, -S4, -murmur, -gallop, -rub  Gastrointestinal: soft, nontender, nondistended, normal bowel sounds, no hepatosplenomegaly  Genitourinary: -flank pain, -dysuria  Extremities: -clubbing, -cyanosis, -edema    Vascular: peripheral pulses palpable 2+ bilaterally  Neurological: alert, oriented x 3, no focal deficit, -tremor   Skin: warm, dry, -erythema, iv sites intact  Lymph nodes; no cervical, supraclavicular or axillary adenopathy  Psychiatric: cooperative, appropriate mood      MEDICATIONS  (STANDING):    MEDICATIONS  (PRN):      Allergies    penicillin (Angioedema)    Intolerances    LABS:                        15.6   9.80  )-----------( 317      ( 13 May 2020 06:15 )             49.8     05-13    143  |  104  |  17  ----------------------------<  132<H>  4.2   |  25  |  0.81    Ca    9.4      13 May 2020 06:15  Phos  3.5     05-13  Mg     2.4     05-13    +DVT prophylaxis  IPCD  +Sleep  +Nutrition goals  -Pain  -Decubital ulcer  +GI prophylaxis (PPV, coagulopathy, Hx)  +Aspiration prophylaxis (45 degrees)  Ventilator synchronized  Tracheal cuff pressure  +Sedation/analgesia stopped once  +ID (phos, CH, mupi, SB)  -Delirium  +Cardiac Beta/ACEI-ARB/ASA/statin  +Prevention  +Education  +Medication reviewed (drug-drug interactions, PDA)  Medical devices  Discussed with CCRN, anesthesia    RADIOLOGY & ADDITIONAL STUDIES:    CXR without infiltrates

## 2020-05-14 DIAGNOSIS — J45.909 UNSPECIFIED ASTHMA, UNCOMPLICATED: ICD-10-CM

## 2020-05-14 DIAGNOSIS — J44.9 CHRONIC OBSTRUCTIVE PULMONARY DISEASE, UNSPECIFIED: ICD-10-CM

## 2020-05-14 DIAGNOSIS — A49.02 METHICILLIN RESISTANT STAPHYLOCOCCUS AUREUS INFECTION, UNSPECIFIED SITE: ICD-10-CM

## 2020-05-14 DIAGNOSIS — Z98.890 OTHER SPECIFIED POSTPROCEDURAL STATES: ICD-10-CM

## 2020-05-14 DIAGNOSIS — R06.81 APNEA, NOT ELSEWHERE CLASSIFIED: ICD-10-CM

## 2020-05-14 DIAGNOSIS — C79.31 SECONDARY MALIGNANT NEOPLASM OF BRAIN: ICD-10-CM

## 2020-05-14 LAB
ANION GAP SERPL CALC-SCNC: 12 MMOL/L — SIGNIFICANT CHANGE UP (ref 5–17)
BUN SERPL-MCNC: 14 MG/DL — SIGNIFICANT CHANGE UP (ref 7–23)
CALCIUM SERPL-MCNC: 8.3 MG/DL — LOW (ref 8.4–10.5)
CHLORIDE SERPL-SCNC: 106 MMOL/L — SIGNIFICANT CHANGE UP (ref 96–108)
CO2 SERPL-SCNC: 21 MMOL/L — LOW (ref 22–31)
CREAT SERPL-MCNC: 0.83 MG/DL — SIGNIFICANT CHANGE UP (ref 0.5–1.3)
GLUCOSE BLDC GLUCOMTR-MCNC: 108 MG/DL — HIGH (ref 70–99)
GLUCOSE BLDC GLUCOMTR-MCNC: 147 MG/DL — HIGH (ref 70–99)
GLUCOSE BLDC GLUCOMTR-MCNC: 149 MG/DL — HIGH (ref 70–99)
GLUCOSE BLDC GLUCOMTR-MCNC: 175 MG/DL — HIGH (ref 70–99)
GLUCOSE SERPL-MCNC: 233 MG/DL — HIGH (ref 70–99)
HAV IGM SER-ACNC: SIGNIFICANT CHANGE UP
HBV CORE IGM SER-ACNC: SIGNIFICANT CHANGE UP
HBV SURFACE AG SER-ACNC: SIGNIFICANT CHANGE UP
HCT VFR BLD CALC: 39.1 % — SIGNIFICANT CHANGE UP (ref 34.5–45)
HCV AB S/CO SERPL IA: 0.03 S/CO — SIGNIFICANT CHANGE UP
HCV AB SERPL-IMP: SIGNIFICANT CHANGE UP
HGB BLD-MCNC: 12.3 G/DL — SIGNIFICANT CHANGE UP (ref 11.5–15.5)
HIV 1+2 AB+HIV1 P24 AG SERPL QL IA: SIGNIFICANT CHANGE UP
MAGNESIUM SERPL-MCNC: 2.2 MG/DL — SIGNIFICANT CHANGE UP (ref 1.6–2.6)
MCHC RBC-ENTMCNC: 28.1 PG — SIGNIFICANT CHANGE UP (ref 27–34)
MCHC RBC-ENTMCNC: 31.5 GM/DL — LOW (ref 32–36)
MCV RBC AUTO: 89.5 FL — SIGNIFICANT CHANGE UP (ref 80–100)
NRBC # BLD: 0 /100 WBCS — SIGNIFICANT CHANGE UP (ref 0–0)
PHOSPHATE SERPL-MCNC: 2.8 MG/DL — SIGNIFICANT CHANGE UP (ref 2.5–4.5)
PLATELET # BLD AUTO: 269 K/UL — SIGNIFICANT CHANGE UP (ref 150–400)
POTASSIUM SERPL-MCNC: 4.6 MMOL/L — SIGNIFICANT CHANGE UP (ref 3.5–5.3)
POTASSIUM SERPL-SCNC: 4.6 MMOL/L — SIGNIFICANT CHANGE UP (ref 3.5–5.3)
RBC # BLD: 4.37 M/UL — SIGNIFICANT CHANGE UP (ref 3.8–5.2)
RBC # FLD: 14 % — SIGNIFICANT CHANGE UP (ref 10.3–14.5)
SODIUM SERPL-SCNC: 139 MMOL/L — SIGNIFICANT CHANGE UP (ref 135–145)
WBC # BLD: 12.1 K/UL — HIGH (ref 3.8–10.5)
WBC # FLD AUTO: 12.1 K/UL — HIGH (ref 3.8–10.5)

## 2020-05-14 PROCEDURE — 99232 SBSQ HOSP IP/OBS MODERATE 35: CPT | Mod: GC

## 2020-05-14 PROCEDURE — 70553 MRI BRAIN STEM W/O & W/DYE: CPT | Mod: 26

## 2020-05-14 RX ORDER — HYDROMORPHONE HYDROCHLORIDE 2 MG/ML
0.5 INJECTION INTRAMUSCULAR; INTRAVENOUS; SUBCUTANEOUS EVERY 6 HOURS
Refills: 0 | Status: DISCONTINUED | OUTPATIENT
Start: 2020-05-14 | End: 2020-05-15

## 2020-05-14 RX ORDER — OXYCODONE AND ACETAMINOPHEN 5; 325 MG/1; MG/1
1 TABLET ORAL EVERY 4 HOURS
Refills: 0 | Status: DISCONTINUED | OUTPATIENT
Start: 2020-05-14 | End: 2020-05-15

## 2020-05-14 RX ORDER — PANTOPRAZOLE SODIUM 20 MG/1
40 TABLET, DELAYED RELEASE ORAL
Refills: 0 | Status: DISCONTINUED | OUTPATIENT
Start: 2020-05-14 | End: 2020-05-15

## 2020-05-14 RX ORDER — SODIUM,POTASSIUM PHOSPHATES 278-250MG
1 POWDER IN PACKET (EA) ORAL
Refills: 0 | Status: COMPLETED | OUTPATIENT
Start: 2020-05-14 | End: 2020-05-15

## 2020-05-14 RX ORDER — OXYCODONE AND ACETAMINOPHEN 5; 325 MG/1; MG/1
2 TABLET ORAL EVERY 6 HOURS
Refills: 0 | Status: DISCONTINUED | OUTPATIENT
Start: 2020-05-14 | End: 2020-05-15

## 2020-05-14 RX ORDER — LEVETIRACETAM 250 MG/1
1000 TABLET, FILM COATED ORAL
Refills: 0 | Status: DISCONTINUED | OUTPATIENT
Start: 2020-05-14 | End: 2020-05-15

## 2020-05-14 RX ORDER — ALBUTEROL 90 UG/1
1 AEROSOL, METERED ORAL EVERY 4 HOURS
Refills: 0 | Status: DISCONTINUED | OUTPATIENT
Start: 2020-05-14 | End: 2020-05-15

## 2020-05-14 RX ORDER — BUDESONIDE AND FORMOTEROL FUMARATE DIHYDRATE 160; 4.5 UG/1; UG/1
2 AEROSOL RESPIRATORY (INHALATION)
Refills: 0 | Status: DISCONTINUED | OUTPATIENT
Start: 2020-05-14 | End: 2020-05-14

## 2020-05-14 RX ADMIN — OXYCODONE AND ACETAMINOPHEN 1 TABLET(S): 5; 325 TABLET ORAL at 18:37

## 2020-05-14 RX ADMIN — Medication 6 MILLIGRAM(S): at 18:52

## 2020-05-14 RX ADMIN — Medication 2: at 06:42

## 2020-05-14 RX ADMIN — HYDROMORPHONE HYDROCHLORIDE 0.5 MILLIGRAM(S): 2 INJECTION INTRAMUSCULAR; INTRAVENOUS; SUBCUTANEOUS at 11:46

## 2020-05-14 RX ADMIN — OXYCODONE HYDROCHLORIDE 5 MILLIGRAM(S): 5 TABLET ORAL at 06:06

## 2020-05-14 RX ADMIN — BUDESONIDE AND FORMOTEROL FUMARATE DIHYDRATE 2 PUFF(S): 160; 4.5 AEROSOL RESPIRATORY (INHALATION) at 18:29

## 2020-05-14 RX ADMIN — Medication 250 MILLIGRAM(S): at 23:54

## 2020-05-14 RX ADMIN — Medication 250 MILLIGRAM(S): at 13:53

## 2020-05-14 RX ADMIN — TIOTROPIUM BROMIDE 1 CAPSULE(S): 18 CAPSULE ORAL; RESPIRATORY (INHALATION) at 15:11

## 2020-05-14 RX ADMIN — LEVETIRACETAM 1000 MILLIGRAM(S): 250 TABLET, FILM COATED ORAL at 18:29

## 2020-05-14 RX ADMIN — Medication 6 MILLIGRAM(S): at 12:00

## 2020-05-14 RX ADMIN — Medication 250 MILLIGRAM(S): at 02:49

## 2020-05-14 RX ADMIN — Medication 6 MILLIGRAM(S): at 06:05

## 2020-05-14 RX ADMIN — Medication 1 PACKET(S): at 13:14

## 2020-05-14 RX ADMIN — Medication 650 MILLIGRAM(S): at 09:09

## 2020-05-14 RX ADMIN — Medication 1 PACKET(S): at 18:29

## 2020-05-14 RX ADMIN — BUDESONIDE AND FORMOTEROL FUMARATE DIHYDRATE 2 PUFF(S): 160; 4.5 AEROSOL RESPIRATORY (INHALATION) at 07:48

## 2020-05-14 RX ADMIN — SENNA PLUS 2 TABLET(S): 8.6 TABLET ORAL at 21:36

## 2020-05-14 RX ADMIN — Medication 5 MILLIGRAM(S): at 21:36

## 2020-05-14 RX ADMIN — LEVETIRACETAM 400 MILLIGRAM(S): 250 TABLET, FILM COATED ORAL at 06:05

## 2020-05-14 NOTE — CHART NOTE - NSCHARTNOTEFT_GEN_A_CORE
Upon Nutritional Assessment by the Registered Dietitian your patient was determined to meet criteria / has evidence of the following diagnosis/diagnoses:          [ ]  Mild Protein Calorie Malnutrition        [ ]  Moderate Protein Calorie Malnutrition        [ ] Severe Protein Calorie Malnutrition        [ ] Unspecified Protein Calorie Malnutrition        [ ] Underweight / BMI <19        [x ] Morbid Obesity / BMI > 40(BMI:41.6)      Findings as based on:  •  Comprehensive nutrition assessment and consultation  •  Calorie counts (nutrient intake analysis)  •  Food acceptance and intake status from observations by staff  •  Follow up  •  Patient education  •  Intervention secondary to interdisciplinary rounds  •   concerns      Treatment:    The following diet has been recommended:      PROVIDER Section:     By signing this assessment you are acknowledging and agree with the diagnosis/diagnoses assigned by the Registered Dietitian    Comments:

## 2020-05-14 NOTE — PHYSICAL THERAPY INITIAL EVALUATION ADULT - FOLLOWS COMMANDS/ANSWERS QUESTIONS, REHAB EVAL
75% of the time/requires repeat cues, paraphrasing, and hand over hand at times. ~ 3 min to attempt Finger to Nose task

## 2020-05-14 NOTE — CHART NOTE - NSCHARTNOTEFT_GEN_A_CORE
Admitting Diagnosis:   Patient is a 59y old  Female who presents with a chief complaint of Brain mass (14 May 2020 13:43)      Consult: Yes [   ]  No [  x ]    Reason for Initial Nutrition Assessment:Assessment      PAST MEDICAL & SURGICAL HISTORY:  Brain metastasis: brain tumor s/p resection  MRSA (methicillin resistant Staphylococcus aureus): history of  Adenocarcinoma of lung  COPD (chronic obstructive pulmonary disease)  History of lung surgery: right wedge resection  H/O brain surgery  Surgery, elective: lung biopsy  History of appendectomy      Current Nutrition Order: Regular    PO Intake: Excellent (%) [  x ]  Good (50-75%) [   ]  Fair (25-50%) [   ]  Poor (<25%) [   ]per RN, able to feed self     GI Issues: Denied N/V/D/C    Pain :none reported    Skin Integrity: surgical incision    Labs:   05-14    139  |  106  |  14  ----------------------------<  233<H>  4.6   |  21<L>  |  0.83    Ca    8.3<L>      14 May 2020 06:28  Phos  2.8     05-14  Mg     2.2     05-14    TPro  6.3  /  Alb  3.5  /  TBili  0.2  /  DBili  <0.2  /  AST  12  /  ALT  10  /  AlkPhos  66  05-13    CAPILLARY BLOOD GLUCOSE      POCT Blood Glucose.: 108 mg/dL (14 May 2020 11:55)  POCT Blood Glucose.: 175 mg/dL (14 May 2020 06:32)  POCT Blood Glucose.: 139 mg/dL (13 May 2020 21:55)    Nutritionally Pertinent Lab Values:HGBA1C:6.3(on steroids)    Medications:  MEDICATIONS  (STANDING):  bisacodyl 5 milliGRAM(s) Oral at bedtime  budesonide 160 MICROgram(s)/formoterol 4.5 MICROgram(s) Inhaler 2 Puff(s) Inhalation two times a day  dexAMETHasone  Injectable   IV Push   dexAMETHasone  Injectable 6 milliGRAM(s) IV Push every 6 hours  dextrose 5%. 1000 milliLiter(s) (50 mL/Hr) IV Continuous <Continuous>  dextrose 50% Injectable 12.5 Gram(s) IV Push once  dextrose 50% Injectable 25 Gram(s) IV Push once  dextrose 50% Injectable 25 Gram(s) IV Push once  insulin lispro (HumaLOG) corrective regimen sliding scale   SubCutaneous Before meals and at bedtime  levETIRAcetam 1000 milliGRAM(s) Oral two times a day  pantoprazole    Tablet 40 milliGRAM(s) Oral before breakfast  potassium phosphate / sodium phosphate powder 1 Packet(s) Oral three times a day with meals  senna 2 Tablet(s) Oral at bedtime  tiotropium 18 MICROgram(s) Capsule 1 Capsule(s) Inhalation daily  vancomycin  IVPB 2000 milliGRAM(s) IV Intermittent every 12 hours    MEDICATIONS  (PRN):  acetaminophen   Tablet .. 650 milliGRAM(s) Oral every 6 hours PRN Temp greater or equal to 38C (100.4F), Mild Pain (1 - 3)  ALBUTerol    90 MICROgram(s) HFA Inhaler 1 Puff(s) Inhalation every 4 hours PRN Shortness of Breath and/or Wheezing  dextrose 40% Gel 15 Gram(s) Oral once PRN Blood Glucose LESS THAN 70 milliGRAM(s)/deciliter  glucagon  Injectable 1 milliGRAM(s) IntraMuscular once PRN Glucose LESS THAN 70 milligrams/deciliter  HYDROmorphone  Injectable 0.5 milliGRAM(s) IV Push every 6 hours PRN breakthrough pain  ondansetron Injectable 4 milliGRAM(s) IV Push every 6 hours PRN Nausea and/or Vomiting  oxycodone    5 mG/acetaminophen 325 mG 1 Tablet(s) Oral every 4 hours PRN Moderate Pain (4 - 6)  oxycodone    5 mG/acetaminophen 325 mG 2 Tablet(s) Oral every 6 hours PRN Severe Pain (7 - 10)      Admitted Anthropometrics:Height:5ft 5inches,IBW:125lbs +/-10%.216% of IBW.Present weight:249.4lbs.BMI:41.6(morbid obesity)     Weight:113.4kg  Daily     Daily     Weight Change: patient denied any weight changes. Stated present weight was her UBW    Nutrition Focused Physical Exam: Completed [   ]  Unable to complete [   ]not warranted.Not wasted and no malnutrition parameters identified  Muscle Wasting:  Subcutaneous Fat Wasting:    Estimated energy needs: Based on IBW due to above 120% of IBW.IBW:125lbs(56.8kg)s37-46sipb(NSCLC demands):1704-1988kcal and 1-1.2gmprotein:56.8-68gmprotein and 30-35ccfluids:1704-1988cc fluids    Subjective: 60y/o female with history of NSCLC/ S/P r/l Lobectomy. RML wedge admitted with left parietal brain mass. Noted right hand dominent> S/P craniotomy POD #!.Per discussion with patient, she is eating adequate amounts independently 75% of tray and has no GI issues. She does not follow any specific restrictions in her diet PTA .She resides alone and claims she generally eats well balanced diet. Noted FS/HGBA1C (noted to be on steroids).Patient is morbidly obese and claims she has not lost any weight(question accuracy)     Nutrition Diagnosis: Increased nutrient needs r/t increased demand for /protein and nutrients AEB: surgical demands  [  ] No active nutrition diagnosis at this time  [  ] Current medical condition precludes nutrition intervention    Goal: Meet 80% of needs consistently    Recommendations:1.Updated weights2.Monitor FS closely and assess need for restriction in diet FS/ BS. May warrant diet change to C-CHO with snack due to noted BS    Risk Level: High [   ] Moderate [ x  ] Low [   ]

## 2020-05-14 NOTE — PHYSICAL THERAPY INITIAL EVALUATION ADULT - ACTIVE RANGE OF MOTION EXAMINATION, REHAB EVAL
albino. upper extremity Active ROM was WNL (within normal limits)/bilateral lower extremity Active ROM was WNL (within normal limits)

## 2020-05-14 NOTE — OCCUPATIONAL THERAPY INITIAL EVALUATION ADULT - ADDITIONAL COMMENTS
Patient lives alone but reports son and daughter-in-law are in town from [place] and currently staying with her, with plans to stay throughout recovery. Patient reports independence with ADLs and functional mobility PTA, denies use of AE/AD, reports son has been assisting with IADLs. Patient denies history of falls but reports running into things 2/2 decreased peripheral vision.

## 2020-05-14 NOTE — PROGRESS NOTE ADULT - SUBJECTIVE AND OBJECTIVE BOX
Interval Events: Reviewed  Patient seen and examined at bedside.    Patient is a 59y old  Female who presents with a chief complaint of Brain mass (14 May 2020 13:43)    she is feeling well and no sob  PAST MEDICAL & SURGICAL HISTORY:  Brain metastasis: brain tumor s/p resection  MRSA (methicillin resistant Staphylococcus aureus): history of  Adenocarcinoma of lung  COPD (chronic obstructive pulmonary disease)  History of lung surgery: right wedge resection  H/O brain surgery  Surgery, elective: lung biopsy  History of appendectomy      MEDICATIONS:  Pulmonary:  ALBUTerol    90 MICROgram(s) HFA Inhaler 1 Puff(s) Inhalation every 4 hours PRN  budesonide 160 MICROgram(s)/formoterol 4.5 MICROgram(s) Inhaler 2 Puff(s) Inhalation two times a day  tiotropium 18 MICROgram(s) Capsule 1 Capsule(s) Inhalation daily    Antimicrobials:  vancomycin  IVPB 2000 milliGRAM(s) IV Intermittent every 12 hours    Anticoagulants:    Cardiac:      Allergies    penicillin (Angioedema)    Intolerances        Vital Signs Last 24 Hrs  T(C): 37.4 (14 May 2020 21:06), Max: 37.4 (14 May 2020 07:01)  T(F): 99.3 (14 May 2020 21:06), Max: 99.4 (14 May 2020 07:01)  HR: 72 (14 May 2020 20:24) (58 - 82)  BP: 97/53 (14 May 2020 20:24) (97/53 - 122/67)  BP(mean): 70 (14 May 2020 20:24) (70 - 87)  RR: 16 (14 May 2020 20:24) (11 - 27)  SpO2: 98% (14 May 2020 20:24) (95% - 100%)    05-13 @ 07:01  -  05-14 @ 07:00  --------------------------------------------------------  IN: 2325 mL / OUT: 1945 mL / NET: 380 mL    05-14 @ 07:01  -  05-14 @ 21:18  --------------------------------------------------------  IN: 225 mL / OUT: 760 mL / NET: -535 mL          Review of Systems:   •	General: negative  •	Skin/Breast: negative  •	Ophthalmologic: negative  •	ENMT: negative  •	Respiratory and Thorax: negative  •	Cardiovascular: negative  •	Gastrointestinal: negative  •	Genitourinary: negative  •	Musculoskeletal: negative  •	Neurological: negative  •	Psychiatric: negative  •	Hematology/Lymphatics: negative  •	Endocrine: negative  •	Allergic/Immunologic: negative    Physical Exam:   • Constitutional:	Well-developed, well nourished  • Eyes:	EOMI; PERRL; no drainage or redness  • ENMT:	No oral lesions; no gross abnormalities  • Neck	No bruits; no thyromegaly or nodules  • Breasts:	not examined  • Back:	No deformity or limitation of movement  • Respiratory:	Breath Sounds equal & clear to percussion & auscultation, no accessory muscle use  • Cardiovascular:	Regular rate & rhythm, normal S1, S2; no murmurs, gallops or rubs; no S3, S4  • Gastrointestinal:	Soft, non-tender, no hepatosplenomegaly, normal bowel sounds  • Genitourinary:	not examined  • Rectal: not examined  • Extremities:	No cyanosis, clubbing or edema  • Vascular:	Equal and normal pulses (carotid, femoral, dorsalis pedis)  • Neurologica:l	not examined  • Skin:	No lesions; no rash  • Lymph Nodes:	No lymphadedenopathy  • Musculoskeletal:	No joint pain, swelling or deformity; no limitation of movement        LABS:      CBC Full  -  ( 14 May 2020 06:34 )  WBC Count : 12.10 K/uL  RBC Count : 4.37 M/uL  Hemoglobin : 12.3 g/dL  Hematocrit : 39.1 %  Platelet Count - Automated : 269 K/uL  Mean Cell Volume : 89.5 fl  Mean Cell Hemoglobin : 28.1 pg  Mean Cell Hemoglobin Concentration : 31.5 gm/dL  Auto Neutrophil # : x  Auto Lymphocyte # : x  Auto Monocyte # : x  Auto Eosinophil # : x  Auto Basophil # : x  Auto Neutrophil % : x  Auto Lymphocyte % : x  Auto Monocyte % : x  Auto Eosinophil % : x  Auto Basophil % : x    05-14    139  |  106  |  14  ----------------------------<  233<H>  4.6   |  21<L>  |  0.83    Ca    8.3<L>      14 May 2020 06:28  Phos  2.8     05-14  Mg     2.2     05-14    TPro  6.3  /  Alb  3.5  /  TBili  0.2  /  DBili  <0.2  /  AST  12  /  ALT  10  /  AlkPhos  66  05-13                        RADIOLOGY & ADDITIONAL STUDIES (The following images were personally reviewed):  Jeff:                                     No  Urine output:                       adequate  DVT prophylaxis:                 Yes  Flattus:                                  Yes  Bowel movement:              No

## 2020-05-14 NOTE — OCCUPATIONAL THERAPY INITIAL EVALUATION ADULT - PLANNED THERAPY INTERVENTIONS, OT EVAL
ADL retraining/motor coordination training/transfer training/cognitive, visual perceptual/balance training

## 2020-05-14 NOTE — OCCUPATIONAL THERAPY INITIAL EVALUATION ADULT - VISUAL ASSESSMENT: SCANNING
mild impairment/Pt with decreased navigation of obstacles, reporting history of bumping into things 2/2 decreased peripheral vision. Pt educated on compensatory strategies for scanning

## 2020-05-14 NOTE — OCCUPATIONAL THERAPY INITIAL EVALUATION ADULT - VISUAL ASSESSMENT: VISUAL FIELD CUTS
Patient reporting chronic decreased peripheral vision. Today presenting with decreased right/+ Right homonymous hemianopia. Patient reporting chronic decreased peripheral vision

## 2020-05-14 NOTE — OCCUPATIONAL THERAPY INITIAL EVALUATION ADULT - GENERAL OBSERVATIONS, REHAB EVAL
Patient right hand dominant. Chart reviewed, HARRISON Morris (covering) cleared patient for OT evaluation. Patient received supine in bed, NAD, +IV heplock, +tele, +cranial dressing (dry blood, intact). Patient right hand dominant. Chart reviewed, HARRISON Morris (covering) cleared patient for OT evaluation. Patient received supine in bed, NAD, +IV heplock, +tele, +cranial dressing (dry blood, intact), +LUCY drain.

## 2020-05-14 NOTE — OCCUPATIONAL THERAPY INITIAL EVALUATION ADULT - VISUAL ACUITY
Patient wears glasses, not worn during eval. Patient denies acute diplopia or blurry vision, able to correctly read orientation board ~10ft away.

## 2020-05-14 NOTE — OCCUPATIONAL THERAPY INITIAL EVALUATION ADULT - STANDING BALANCE: DYNAMIC, REHAB EVAL
Pt ambulated 80ft with intermittent HHA and CGA, +mod VCs for safety awareness and navigation of obstacles/fair minus

## 2020-05-14 NOTE — PHYSICAL THERAPY INITIAL EVALUATION ADULT - PLANNED THERAPY INTERVENTIONS, PT EVAL
strengthening/gait training/transfer training/neuromuscular re-education/postural re-education/motor coordination training/balance training

## 2020-05-14 NOTE — PHYSICAL THERAPY INITIAL EVALUATION ADULT - SENSORY TESTS
R hand dominant; (L) hand  5/5, (R) hand  5/5. CN Testing: B/L Frontalis intact; B/L buccinator intact; smile symmetrical; tongue protrusion at midline; B/L eyes open/close intact; Shoulder elevation: intact bilaterally; Vision H-Test: bilateral tracking and smooth pursuit intact, +2 horizontal nystagmus to L with R>L tracking; Convergence/Divergence: intact; Vision Quadrant Test: B peripheral vision impaired*** R>L field cut to midline, R eye 2/5 fingers ID'd correctly within visualized

## 2020-05-14 NOTE — PHYSICAL THERAPY INITIAL EVALUATION ADULT - ORIENTATION, REHAB EVAL
place/person/Pt required extensive education on date>month>year, >5 min cues to repeat and recall post 5 min

## 2020-05-14 NOTE — PROGRESS NOTE ADULT - PROBLEM SELECTOR PLAN 4
The patient is hemodynamically stable.  The pain is controlled.  Patient is on DVT prophylaxis.  Patient is using incentive spirometry.  Oxygen saturation is acceptable.  Advance diet as tolerated.  Advance activity as tolerated.  Monitor for ileus.  Patient is on Laxatives.  Surgical wound is stable.  No indication for monitor bed.  BP is on low normal and observe.  BS increased and DC glucose infusion

## 2020-05-14 NOTE — PHYSICAL THERAPY INITIAL EVALUATION ADULT - GAIT DEVIATIONS NOTED, PT EVAL
decreased stride length/decreased step length/running into objects on R > 5 instances, requires near constant cues to avoid

## 2020-05-14 NOTE — PHYSICAL THERAPY INITIAL EVALUATION ADULT - GENERAL OBSERVATIONS, REHAB EVAL
Admitted STM loss, RUE weakness, POD #1 L parietal crani for resection. Pt rcvd semi supine, +tele, +head gauze with serosang drainage, +LUCY, +B heplock. Pt alert, agreeable to PT, denies pain. Sangeetha session fairly well, demo supervision bed mob, sup transfers, CG amb 200ft with poor obstacle navigation. FIM gait=4.

## 2020-05-14 NOTE — PHYSICAL THERAPY INITIAL EVALUATION ADULT - IMPAIRMENTS FOUND, PT EVAL
poor safety awareness/visual motor/arousal, attention, and cognition/gait, locomotion, and balance/fine motor

## 2020-05-14 NOTE — PHYSICAL THERAPY INITIAL EVALUATION ADULT - CRITERIA FOR SKILLED THERAPEUTIC INTERVENTIONS
risk reduction/prevention/functional limitations in following categories/rehab potential/therapy frequency/anticipated discharge recommendation/predicted duration of therapy intervention/impairments found

## 2020-05-14 NOTE — PHYSICAL THERAPY INITIAL EVALUATION ADULT - COORDINATION ASSESSED, REHAB EVAL
finger to nose/no dysmetria noted BUE, pt requires visual/verbal/tactile/demo/hand over hand cues, >3 min for task education. Confusion between nose/finger, forgetting task at hand

## 2020-05-14 NOTE — PHYSICAL THERAPY INITIAL EVALUATION ADULT - PERTINENT HX OF CURRENT PROBLEM, REHAB EVAL
59y Female w/PMHx NSCLC (s/p RLL lobectomy, RML wedge resection by Dr. Pisano in 2016, s/p  2017  Left parieto occiptal cranin [place] for Metastatic disease followed by Gamma Knife 2018 with Dr Rico   [facility]  COPD, seizures (likely 2/2 brain mets), presented to ER after episodes of confusion and right hand weakness

## 2020-05-14 NOTE — PROGRESS NOTE ADULT - SUBJECTIVE AND OBJECTIVE BOX
HISTORY OF PRESENT ILLNESS: 58yo Female right handed  w/PMHx NSCLC (s/p RLL lobectomy, RML wedge resection by Dr. Pisano [id number]   s/p  2017  Left parieto occiptal cranin [place] for Metastatic disease followed by Gamma Knife 2018 with Dr Rico   [facility]  COPD, seizures (likely 2/2 brain mets) .  Last thursday pt noticed being a little confusion and right hand she was weaker.  Called Dr Rico and went for MRI Brain. PT returns to the ER for further managment.Denies any fevers, n/v, visual changes or traveling over the past 6 months.   She has not been around people that had Covid virus      S/Overnight events:  No overnight events, neuro exam stable.    Hospital Course:   5/11/20: Admitted through ER with left parietal brain mass, underwent MRI Brain which shows mass/edema, started on decadron 4 mg q 6 hours and keppra for seizure prophylaxis.  5/12/20: LAN, neuro stable.  Pre-op for OR Weds.  5/14 POD#1 s/p Left parietal Craniotomy and excision of tumor (frozen necrosis), post-op trace RUE weakness, LUCY subgaleal, MRI brain post-op pending, Decadron taper over 1 week, Keppra 1Q12    ICU Vital Signs Last 24 Hrs  T(C): 34.6 (13 May 2020 19:50), Max: 36.4 (13 May 2020 05:10)  T(F): 94.3 (13 May 2020 19:50), Max: 97.5 (13 May 2020 05:10)  HR: 66 (14 May 2020 01:00) (56 - 82)  BP: 122/67 (14 May 2020 00:00) (105/64 - 122/67)  BP(mean): 87 (14 May 2020 00:00) (80 - 87)  ABP: 115/72 (14 May 2020 01:00) (99/79 - 132/71)  ABP(mean): 84 (14 May 2020 01:00) (84 - 96)  RR: 13 (14 May 2020 01:00) (11 - 23)  SpO2: 98% (14 May 2020 01:00) (97% - 100%)      PHYSICAL EXAM:    58 y/o female AA&O x 3 in NAD, resting comfortably  	HEENT: Oropharyngeal mucosa moist, pink, tongue midline.  PERRL.    Respiratory: decreased BS on RML/RLL , well healed scar  	Cardiovascular: +S1, S2, RRR  	Gastrointestinal: Abdomen soft NT/ND. +BS  	Neurological: AAOX3, FC, speech coherent  	CNII-XII: EOM intact, PERRL, face symmetric, tongue midline  	Motor: MAEx4 5/5 UE and LE B/L, neg protonator drift, neg dysmetria  	Scalp: Left parietal incision site C/D/I, well healed    Extrem: calves soft, nontender.    TUBES/LINES:  [] CVC  [x] A-line  [] Lumbar Drain  [] Ventriculostomy  [] Other    DIET:  [] NPO  [x] Mechanical  [] Tube feeds    LABS:   am labs pending                 COVID-19 PCR . (05.11.20 @ 10:43)  COVID-19 PCR: NotDetec: This test has been validated by [facility] [employer] to be accurate;    CAPILLARY BLOOD GLUCOSE  POCT Blood Glucose.: 114 mg/dL (11 May 2020 21:46)    Drug Levels: [] N/A    CSF Analysis: [] N/A    Allergies  penicillin (Angioedema)    Intolerances    MEDICATIONS:  Antibiotics:    Neuro:  acetaminophen   Tablet .. 650 milliGRAM(s) Oral every 6 hours PRN  levETIRAcetam  IVPB 500 milliGRAM(s) IV Intermittent every 12 hours    Anticoagulation:    OTHER:  budesonide 160 MICROgram(s)/formoterol 4.5 MICROgram(s) Inhaler 2 Puff(s) Inhalation two times a day  dexAMETHasone  Injectable 4 milliGRAM(s) IV Push every 6 hours  dextrose 40% Gel 15 Gram(s) Oral once PRN  dextrose 50% Injectable 12.5 Gram(s) IV Push once  dextrose 50% Injectable 25 Gram(s) IV Push once  dextrose 50% Injectable 25 Gram(s) IV Push once  glucagon  Injectable 1 milliGRAM(s) IntraMuscular once PRN  insulin lispro (HumaLOG) corrective regimen sliding scale   SubCutaneous Before meals and at bedtime  pantoprazole    Tablet 40 milliGRAM(s) Oral before breakfast  senna 2 Tablet(s) Oral at bedtime  tiotropium 18 MICROgram(s) Capsule 1 Capsule(s) Inhalation daily    IVF:  dextrose 5%. 1000 milliLiter(s) IV Continuous <Continuous>    CULTURES:    RADIOLOGY & ADDITIONAL TESTS: All imaging reviewed with Dr. Rico. < from: MR Brain Stereotactic w/ IV Cont (05.11.20 @ 15:22) >  The MRI examination of the brain demonstrates the patient to be status post left parietal craniotomy. There is a heterogeneously enhancing lesion within the paramedian left parietal lobe at the resection cavity which demonstrates a "soap bubble" appearance of enhancement. The lesion measures approximately 2.8 cm width x 2.7 cm AP x 2.3 cm height (series 702 image 17 and series 701 image 72). There are areas of hypointense T1 and T2 signal along the medial  margin of the lesion which demonstrates susceptibility and may represent hemorrhagic products. There is extensive surrounding FLAIR/T2 signal abnormality which extends into the splenium of the corpus callosum and into the contralateral right periatrial white matter. The FLAIR/T2 signal abnormality extends anteriorly to the capsular region and throughout the posterior temporal, parietal and occipital white matter. There is mass effect on the left atria and posterior body of the left lateral ventricle. These findings have progressed when compared to the prior MRI's and may represent post therapeutic changes. Alternative considerations would include tumoral disease.    < end of copied text >      ASSESSMENT:  59y Female w/PMHx NSCLC (s/p RLL lobectomy, RML wedge resection by Dr. Pisano in 2016, s/p  2017  Left parieto occiptal cranin [place] for Metastatic disease followed by Gamma Knife 2018 with Dr Rico   [facility]  COPD, seizures (likely 2/2 brain mets), presented to ER after episodes of confusion and right hand weakness, MRI shows left parietal mass with edema and mass effect, POD#1 (5/13) s/p Left parietal Craniotomy and excision of tumor (frozen necrosis)    BRAIN MASS  No family history of cardiovascular disease (Father)  Family history of cancer in mother (Mother)  Family history of uterine cancer (Sibling)  Family history of diabetes mellitus (Mother, Sibling)  Family history of essential hypertension (Mother, Sibling)  No pertinent family history in first degree relatives  Handoff  MEWS Score  Brain metastasis  Dyspnea  MRSA (methicillin resistant Staphylococcus aureus)  Adenocarcinoma of lung  COPD (chronic obstructive pulmonary disease)  Asthma  Pulmonary nodules  Brain mass  Adenocarcinoma of lung  Brain mass  History of lung surgery  H/O brain surgery  Surgery, elective  History of appendectomy  No significant past surgical history  MEDICAL EVAL  90+      PLAN:  NEURO:  - LUCY subgaleal monitor output  - neuro checks  - vitals checks  - dex 6q6, taper over 1 week,  - keppra [id number]  - MRI brain post-op : Pending    CARDIOVASCULAR:  - SBP<140    PULMONARY:  - IS  - continue asthma meds    RENAL:  - IVL    GI:  - Protonix while on decadron  - Reg diet  - Bowel Regimen    HEME:  - B/L SCDs  - 5/11 screening dopplers negative for DVT    ID:  - ANDREAS, afebrile    ENDO:  - iss    DVT PROPHYLAXIS:  [x] Venodynes                                [] Heparin/Lovenox    FALL RISK:  [] Low Risk                                    [] Impulsive    DISPOSITION:    full code  ICU status  PT/OT eval pending    All above d/w Dr. Rico and Dr. Mark      Assessment:  Present when checked    []  GCS  E   V  M     Heart Failure: []Acute, [] acute on chronic , []chronic  Heart Failure:  [] Diastolic (HFpEF), [] Systolic (HFrEF), []Combined (HFpEF and HFrEF), [] RHF, [] Pulm HTN, [] Other    [] RAINE, [] ATN, [] AIN, [] other  [] CKD1, [] CKD2, [] CKD 3, [] CKD 4, [] CKD 5, []ESRD    Encephalopathy: [] Metabolic, [] Hepatic, [] toxic, [] Neurological, [] Other    Abnormal Nurtitional Status: [] malnurtition (see nutrition note), [ ]underweight: BMI < 19, [] morbid obesity: BMI >40, [] Cachexia    [] Sepsis  [] hypovolemic shock,[] cardiogenic shock, [] hemorrhagic shock, [] neuogenic shock  [] Acute Respiratory Failure  [X]Cerebral edema, [] Brain compression/ herniation,   [] Functional quadriplegia  [] Acute blood loss anemia

## 2020-05-14 NOTE — OCCUPATIONAL THERAPY INITIAL EVALUATION ADULT - FINE MOTOR COORDINATION, LEFT HAND, FINGER TO NOSE, OT EVAL
with max cues and [place] assist 2/2 decreased motor planning and complex command following/normal performance

## 2020-05-14 NOTE — OCCUPATIONAL THERAPY INITIAL EVALUATION ADULT - MD ORDER
59y Female w/PMHx NSCLC (s/p RLL lobectomy, RML wedge resection by Dr. Pisano in 2016, s/p  2017  Left parieto occiptal cranin [place] for Metastatic disease followed by Gamma Knife 2018 with Dr Rico   [facility]  COPD, seizures (likely 2/2 brain mets), presented to ER after episodes of confusion and right hand weakness, MRI shows left parietal mass with edema and mass effect.

## 2020-05-14 NOTE — OCCUPATIONAL THERAPY INITIAL EVALUATION ADULT - FINE MOTOR COORDINATION, RIGHT HAND, FINGER TO NOSE, OT EVAL
normal performance/with max cues and [place] assist 2/2 decreased motor planning and complex command following

## 2020-05-14 NOTE — PHYSICAL THERAPY INITIAL EVALUATION ADULT - ADDITIONAL COMMENTS
Patient has short term memory recall impairment. Pt states lives in house in [place] with 3 steps to enter, on 1st floor. Typically lives alone however son and daughter in law from [place] staying with patient post-op/during COVID. At baseline, pt indpt ambulator, needs help with cooking at times. Has not been leaving house 2/2 covid. R handed, visual aide for glasses. Reports impaired peripheral vision post-sx 2017, which then improved, now worsening.

## 2020-05-14 NOTE — OCCUPATIONAL THERAPY INITIAL EVALUATION ADULT - NS ASR FOLLOW COMMAND OT EVAL
with max cues and redirection for complex/novel commands/75% of the time/able to follow single-step instructions

## 2020-05-14 NOTE — PROGRESS NOTE ADULT - SUBJECTIVE AND OBJECTIVE BOX
PUD [facility] FOR DR BARFIELD    INTERVAL HPI/OVERNIGHT EVENTS:    Extubated. S/P resection of left brain lesion.  All new data reviewed, including VS, lab, imaging, Rx and documentation.    PAST MEDICAL & SURGICAL HISTORY:  Brain metastasis: brain tumor s/p resection  MRSA (methicillin resistant Staphylococcus aureus): history of  Adenocarcinoma of lung  COPD (chronic obstructive pulmonary disease)  History of lung surgery: right wedge resection  H/O brain surgery  Surgery, elective: lung biopsy  History of appendectomy      FAMILY HISTORY:  No family history of cardiovascular disease (Father)  Family history of cancer in mother (Mother)  Family history of uterine cancer (Sibling): sister  Family history of diabetes mellitus (Mother, Sibling)  Family history of essential hypertension (Mother, Sibling)      SOCIAL HISTORY:    REVIEW OF SYSTEMS: no signficant change  Constitutional: (-) weight change, () fever,  () chills, () fatigue, () night sweats  Eyes: (-) discharge, (-) eye pain, (+) RIGHT visual change  ENT:  (-) hearing difficulty, (-) vertigo, () sinus pain,  () throat pain, () epistaxis, () dysphagia, () hoarseness  Neck: (-) pain, (-) stiffness, () swelling  Respiratory: (+) cough, () wheezing, () hemoptysis      Cardiovascular: (-) chest pain, ()palpitations, () dizziness   Gastrointestinal: (-) abdominal pain, () nausea, () vomiting, () hematemesis, () diarrhea,  () constipation, () melena  Genitourinary:  (-) dysuria, () frequency, () hematuria, () incontinence      Neurologic: (+) IMPROVING headache, () memory loss, () loss of strength, () numbness, () tremor     Skin: (-) itching, () burning, () rash, () lesions   Lymphatic: () enlarged lymph nodes  Endocrine: (-) hair loss, () temperature intolerance         Musculoskeletal: (-) back pain, () joint pain,  () extremity pain  Psychiatric: () visual change, (-) auditory change, (-) depression, () anxiety, () suicidal  Sleep: (-) disorder, () insomnia, () sleep deprivation  Heme/Lymph: () easy bruising, () bleeding gums            Allergy and Immunologic: () hives, () eczema    Vital Signs Last 24 Hrs  T(C): 37.3 (14 May 2020 08:54), Max: 37.4 (14 May 2020 07:01)  T(F): 99.1 (14 May 2020 08:54), Max: 99.4 (14 May 2020 07:01)  HR: 76 (14 May 2020 12:48) (56 - 82)  BP: 111/63 (14 May 2020 12:48) (99/57 - 122/67)  BP(mean): 85 (14 May 2020 12:18) (73 - 87)  RR: 27 (14 May 2020 12:48) (11 - 27)  SpO2: 96% (14 May 2020 12:48) (95% - 100%)    I&O's Detail    13 May 2020 07:01  -  14 May 2020 07:00  --------------------------------------------------------  IN:    IV PiggyBack: 700 mL    Oral Fluid: 650 mL    sodium chloride 0.9%: 975 mL  Total IN: 2325 mL    OUT:    Bulb: 80 mL    Indwelling Catheter - Urethral: 1865 mL  Total OUT: 1945 mL    Total NET: 380 mL      14 May 2020 07:01  -  14 May 2020 13:43  --------------------------------------------------------  IN:    sodium chloride 0.9%: 225 mL  Total IN: 225 mL    OUT:    Bulb: 15 mL    Indwelling Catheter - Urethral: 275 mL    Voided: 150 mL  Total OUT: 440 mL    Total NET: -215 mL    PHYSICAL EXAM:  in bed, om monitor, one drain  Well nourished, well developed, comfortable, - acute distress; vital signs are monitored continuously  Eyes: PERRLA, EOMI, -conjunctivitis, -scleritis   Head: no focal deficit, normocephalic,  no trauma  ENMT: moist tongue, no thrush, -nasal discharge, -hoarseness, normal hearing, -cough, -hemoptysis, trachea midline  Neck: supple, - lymphadenopathy,  -masses, -JVD  Respiratory: bilateral diminished breath sounds, -wheezing, -rhonchi, -rales, -crackles  Chest: -accessory muscle use, -paradoxical breathing  Cardiovascular: regular rate and sinus rhythm, S1 S2 normal, -S3, -S4, -murmur, -gallop, -rub  Gastrointestinal: soft, nontender, nondistended, normal bowel sounds, no hepatosplenomegaly  Genitourinary: -flank pain, -dysuria  Extremities: -clubbing, -cyanosis, -edema    Vascular: peripheral pulses palpable 2+ bilaterally  Neurological: alert, oriented x 3, no focal deficit, -tremor   Skin: warm, dry, -erythema, iv sites intact  Lymph nodes; no cervical, supraclavicular or axillary adenopathy  Psychiatric: cooperative, appropriate mood      MEDICATIONS  (STANDING):  bisacodyl 5 milliGRAM(s) Oral at bedtime  budesonide 160 MICROgram(s)/formoterol 4.5 MICROgram(s) Inhaler 2 Puff(s) Inhalation two times a day  dexAMETHasone  Injectable   IV Push   dexAMETHasone  Injectable 6 milliGRAM(s) IV Push every 6 hours  dextrose 5%. 1000 milliLiter(s) (50 mL/Hr) IV Continuous <Continuous>  dextrose 50% Injectable 12.5 Gram(s) IV Push once  dextrose 50% Injectable 25 Gram(s) IV Push once  dextrose 50% Injectable 25 Gram(s) IV Push once  insulin lispro (HumaLOG) corrective regimen sliding scale   SubCutaneous Before meals and at bedtime  levETIRAcetam 1000 milliGRAM(s) Oral two times a day  pantoprazole    Tablet 40 milliGRAM(s) Oral before breakfast  potassium phosphate / sodium phosphate powder 1 Packet(s) Oral three times a day with meals  senna 2 Tablet(s) Oral at bedtime  tiotropium 18 MICROgram(s) Capsule 1 Capsule(s) Inhalation daily  vancomycin  IVPB 2000 milliGRAM(s) IV Intermittent every 12 hours    MEDICATIONS  (PRN):  acetaminophen   Tablet .. 650 milliGRAM(s) Oral every 6 hours PRN Temp greater or equal to 38C (100.4F), Mild Pain (1 - 3)  ALBUTerol    90 MICROgram(s) HFA Inhaler 1 Puff(s) Inhalation every 4 hours PRN Shortness of Breath and/or Wheezing  dextrose 40% Gel 15 Gram(s) Oral once PRN Blood Glucose LESS THAN 70 milliGRAM(s)/deciliter  glucagon  Injectable 1 milliGRAM(s) IntraMuscular once PRN Glucose LESS THAN 70 milligrams/deciliter  HYDROmorphone  Injectable 0.5 milliGRAM(s) IV Push every 6 hours PRN breakthrough pain  ondansetron Injectable 4 milliGRAM(s) IV Push every 6 hours PRN Nausea and/or Vomiting  oxycodone    5 mG/acetaminophen 325 mG 1 Tablet(s) Oral every 4 hours PRN Moderate Pain (4 - 6)  oxycodone    5 mG/acetaminophen 325 mG 2 Tablet(s) Oral every 6 hours PRN Severe Pain (7 - 10)      Allergies    penicillin (Angioedema)    Intolerances        LABS:                        12.3   12.10 )-----------( 269      ( 14 May 2020 06:34 )             39.1     05-14    139  |  106  |  14  ----------------------------<  233<H>  4.6   |  21<L>  |  0.83    Ca    8.3<L>      14 May 2020 06:28  Phos  2.8     05-14  Mg     2.2     05-14    TPro  6.3  /  Alb  3.5  /  TBili  0.2  /  DBili  <0.2  /  AST  12  /  ALT  10  /  AlkPhos  66  05-13    +DVT prophylaxis  IPCD  +Sleep  GOOD  +Nutrition goals  ORAL  -Pain  DENIED  -Decubital ulcer  +GI prophylaxis (PPV, coagulopathy, Hx)  +Aspiration prophylaxis (45 degrees)  +Sedation/analgesia stopped  +ID (phos, CH, mupi, SB)   VANCO 2 x 3  -Delirium  +Cardiac Beta/ACEI-ARB/ASA/statin  +Prevention  +Education  +Medication reviewed (drug-drug interactions, PDA)  Medical devices  DRAIN  Discussed with ICU, PGY, CCRN, family    RADIOLOGY & ADDITIONAL STUDIES:    EXAM:  MR BRAIN WAW IC                          PROCEDURE DATE:  05/14/2020          INTERPRETATION:  PROCEDURE: MRI brain with and without contrast    INDICATION: Status post brain mass resection    TECHNIQUE: Sagittal T1, axial T1, T2, FLAIR, diffusion, GRE and coronal FLAIR images of the brain were obtained. Following the intravenous administration of contrast 7.5 ml of Gadavist, axial T1 and sagittal T1- MPRAGE images of the brain were obtained and reconstructed in the axial and coronal plane.    COMPARISON: MRI brain 5/11/2020    FINDINGS: The MRI examination of the brain demonstrates the patient to be status post interval re-parietal craniotomy. There is soft tissue swelling along the craniotomy site with a thin fluid collection. There also is a thin left parietal convexity subdural collection with fluid and blood. There now is a and hemorrhage. Filled resection cavity within the left paramedian parietal lobe. There has been resection of the bulk of the heterogeneously enhancing lesion within the paramedian left parietal lobe. There is irregular enhancement along the medial and inferior portions of the resection cavity (series 14 image 17 and series 15 images 116-120). These findings may represent residual radiation necrosis versus tumoral disease versus new post therapeutic changes. There remains extensive surrounding FLAIR/T2 signal abnormality within the left posterior frontal, parietal and occipital white matter. The FLAIR/T2 signal abnormality also extends into the splenium of the corpus callosum into the contralateral right periatrial white matter. There has been slight interval improvement in the mass effect on the left atria.    There is normal vascular flow-voids. The visualized paranasal sinuses are free of mucosal disease. The mastoid air cells are well-aerated.    IMPRESSION: Patient status post interval left parietal craniotomy with resection of the bulk of the heterogeneously enhancing lesion within the left parietal lobe. Small volume of residual enhancement along the medial inferior portion of the resection cavity.

## 2020-05-15 ENCOUNTER — TRANSCRIPTION ENCOUNTER (OUTPATIENT)
Age: 60
End: 2020-05-15

## 2020-05-15 VITALS — SYSTOLIC BLOOD PRESSURE: 113 MMHG | DIASTOLIC BLOOD PRESSURE: 74 MMHG | HEART RATE: 88 BPM

## 2020-05-15 LAB
ANION GAP SERPL CALC-SCNC: 13 MMOL/L — SIGNIFICANT CHANGE UP (ref 5–17)
BUN SERPL-MCNC: 22 MG/DL — SIGNIFICANT CHANGE UP (ref 7–23)
CALCIUM SERPL-MCNC: 9 MG/DL — SIGNIFICANT CHANGE UP (ref 8.4–10.5)
CHLORIDE SERPL-SCNC: 103 MMOL/L — SIGNIFICANT CHANGE UP (ref 96–108)
CO2 SERPL-SCNC: 25 MMOL/L — SIGNIFICANT CHANGE UP (ref 22–31)
CREAT SERPL-MCNC: 0.84 MG/DL — SIGNIFICANT CHANGE UP (ref 0.5–1.3)
GLUCOSE BLDC GLUCOMTR-MCNC: 117 MG/DL — HIGH (ref 70–99)
GLUCOSE BLDC GLUCOMTR-MCNC: 126 MG/DL — HIGH (ref 70–99)
GLUCOSE SERPL-MCNC: 111 MG/DL — HIGH (ref 70–99)
HCT VFR BLD CALC: 43.5 % — SIGNIFICANT CHANGE UP (ref 34.5–45)
HGB BLD-MCNC: 13.8 G/DL — SIGNIFICANT CHANGE UP (ref 11.5–15.5)
MAGNESIUM SERPL-MCNC: 2.5 MG/DL — SIGNIFICANT CHANGE UP (ref 1.6–2.6)
MCHC RBC-ENTMCNC: 28.5 PG — SIGNIFICANT CHANGE UP (ref 27–34)
MCHC RBC-ENTMCNC: 31.7 GM/DL — LOW (ref 32–36)
MCV RBC AUTO: 89.7 FL — SIGNIFICANT CHANGE UP (ref 80–100)
NRBC # BLD: 0 /100 WBCS — SIGNIFICANT CHANGE UP (ref 0–0)
PHOSPHATE SERPL-MCNC: 2.9 MG/DL — SIGNIFICANT CHANGE UP (ref 2.5–4.5)
PLATELET # BLD AUTO: 281 K/UL — SIGNIFICANT CHANGE UP (ref 150–400)
POTASSIUM SERPL-MCNC: 4.4 MMOL/L — SIGNIFICANT CHANGE UP (ref 3.5–5.3)
POTASSIUM SERPL-SCNC: 4.4 MMOL/L — SIGNIFICANT CHANGE UP (ref 3.5–5.3)
RBC # BLD: 4.85 M/UL — SIGNIFICANT CHANGE UP (ref 3.8–5.2)
RBC # FLD: 14.1 % — SIGNIFICANT CHANGE UP (ref 10.3–14.5)
SODIUM SERPL-SCNC: 141 MMOL/L — SIGNIFICANT CHANGE UP (ref 135–145)
SURGICAL PATHOLOGY STUDY: SIGNIFICANT CHANGE UP
WBC # BLD: 11.89 K/UL — HIGH (ref 3.8–10.5)
WBC # FLD AUTO: 11.89 K/UL — HIGH (ref 3.8–10.5)

## 2020-05-15 PROCEDURE — 99232 SBSQ HOSP IP/OBS MODERATE 35: CPT | Mod: GC

## 2020-05-15 RX ORDER — DEXAMETHASONE 0.5 MG/5ML
4 ELIXIR ORAL
Qty: 70 | Refills: 0
Start: 2020-05-15 | End: 2020-06-11

## 2020-05-15 RX ORDER — LEVETIRACETAM 250 MG/1
1 TABLET, FILM COATED ORAL
Qty: 62 | Refills: 0
Start: 2020-05-15 | End: 2020-06-14

## 2020-05-15 RX ORDER — PANTOPRAZOLE SODIUM 20 MG/1
1 TABLET, DELAYED RELEASE ORAL
Qty: 30 | Refills: 0
Start: 2020-05-15 | End: 2020-06-13

## 2020-05-15 RX ORDER — OXYCODONE AND ACETAMINOPHEN 5; 325 MG/1; MG/1
1 TABLET ORAL
Qty: 30 | Refills: 0
Start: 2020-05-15 | End: 2020-05-19

## 2020-05-15 RX ORDER — SENNA PLUS 8.6 MG/1
2 TABLET ORAL
Qty: 60 | Refills: 0
Start: 2020-05-15 | End: 2020-06-13

## 2020-05-15 RX ORDER — ACETAMINOPHEN 500 MG
2 TABLET ORAL
Qty: 0 | Refills: 0 | DISCHARGE
Start: 2020-05-15

## 2020-05-15 RX ADMIN — LEVETIRACETAM 1000 MILLIGRAM(S): 250 TABLET, FILM COATED ORAL at 06:25

## 2020-05-15 RX ADMIN — BUDESONIDE AND FORMOTEROL FUMARATE DIHYDRATE 2 PUFF(S): 160; 4.5 AEROSOL RESPIRATORY (INHALATION) at 06:25

## 2020-05-15 RX ADMIN — Medication 1 PACKET(S): at 09:17

## 2020-05-15 RX ADMIN — PANTOPRAZOLE SODIUM 40 MILLIGRAM(S): 20 TABLET, DELAYED RELEASE ORAL at 06:25

## 2020-05-15 RX ADMIN — Medication 6 MILLIGRAM(S): at 06:25

## 2020-05-15 RX ADMIN — TIOTROPIUM BROMIDE 1 CAPSULE(S): 18 CAPSULE ORAL; RESPIRATORY (INHALATION) at 11:39

## 2020-05-15 NOTE — DISCHARGE NOTE PROVIDER - NSDCMRMEDTOKEN_GEN_ALL_CORE_FT
Advair Diskus 500 mcg-50 mcg inhalation powder: 1 puff(s) inhaled 2 times a day  albuterol 90 mcg/inh inhalation aerosol: 2 puff(s) inhaled 4 times a day  dexamethasone 4 mg oral tablet: 4 mg orally Q6hrs x 2 days  4 mg orally Q12hrs  x 2 days  2 mg orally Q12hrs  x 30 days MDD:4  Ducodyl 5 mg oral delayed release tablet: 1 tab(s) orally once a day (at bedtime) MDD:1  Innerclean oral tablet: 2 tab(s) orally once a day (at bedtime) MDD:1  Keppra 1000 mg oral tablet: 1 tab(s) orally 2 times a day MDD:2  Nebulizer machine: Please provide the patient with a nebulizer machine for inhalational treatments at home  oseltamivir 75 mg oral capsule: 1 cap(s) orally 2 times a day   Percocet 5 mg-325 mg oral tablet: 1 tab(s) orally every 4 hours, As needed, Moderate Pain (4 - 6) MDD:6  Protonix 40 mg oral delayed release tablet: 1 tab(s) orally once a day (before a meal) MDD:1  Spiriva 18 mcg inhalation capsule: 1 cap(s) inhaled every 24 hours  Tylenol 325 mg oral tablet: 2 tab(s) orally every 6 hours, As needed, Temp greater or equal to 38C (100.4F), Mild Pain (1 - 3)

## 2020-05-15 NOTE — PROGRESS NOTE ADULT - SUBJECTIVE AND OBJECTIVE BOX
[facility]    VICTOR HUGO KEIHT  MRN-[id number]    INTERVAL Hx: S/p resection of brain lesion on 5/13. NO viable tumor seen, only necrosis. The patient is doing well. No HA or visual changes. NO SOB or CP    HPI: 59 y.o woman , well known to me-followed for stage IV non small cell lung cancer (adenocarcinoma) since the diagnosis in 2016. She underwent RLL lobectomy and RML wedge resection and in 2017 underwent craniotomy for brain metastatic disease. This was followed by gamma knife in 2018 for a new single met. No actionable mutations were identified, but high PDL1. She has not received systemic treatment-there was no evidence of disease elsewhere. Now admitted with new L sided brain lesion after being evaluated as outpatient for dizziness and confusion.    ROS:  NO headhaches  + confusion  + dizziness. No visual changes  chest pain or SOB.    No nausea/vomiting/fevers/chills/night sweats.   No abdominal pain/diarrhea/constipation.    No history of easy bruising/bleeding.   No leg pain or leg swelling.    ROS is otherwise negative.    PMH/PSH:  PAST MEDICAL & SURGICAL HISTORY:  Brain metastasis: brain tumor s/p resection  Dyspnea  MRSA (methicillin resistant Staphylococcus aureus): history of  Adenocarcinoma of lung  COPD (chronic obstructive pulmonary disease)  Asthma  Pulmonary nodules  History of lung surgery: right wedge resection  H/O brain surgery  Surgery, elective: lung biopsy  History of appendectomy  No significant past surgical history      Medications:  MEDICATIONS  (STANDING):  budesonide 160 MICROgram(s)/formoterol 4.5 MICROgram(s) Inhaler 2 Puff(s) Inhalation two times a day  dexAMETHasone  Injectable 4 milliGRAM(s) IV Push every 6 hours  dextrose 5%. 1000 milliLiter(s) (50 mL/Hr) IV Continuous <Continuous>  dextrose 50% Injectable 12.5 Gram(s) IV Push once  dextrose 50% Injectable 25 Gram(s) IV Push once  dextrose 50% Injectable 25 Gram(s) IV Push once  insulin lispro (HumaLOG) corrective regimen sliding scale   SubCutaneous Before meals and at bedtime  levETIRAcetam  IVPB 500 milliGRAM(s) IV Intermittent every 12 hours  pantoprazole    Tablet 40 milliGRAM(s) Oral before breakfast  povidone iodine 5% Nasal Swab 1 Application(s) Both Nostrils once  senna 2 Tablet(s) Oral at bedtime  sodium chloride 0.9%. 1000 milliLiter(s) (75 mL/Hr) IV Continuous <Continuous>  tiotropium 18 MICROgram(s) Capsule 1 Capsule(s) Inhalation daily    MEDICATIONS  (PRN):  acetaminophen   Tablet .. 650 milliGRAM(s) Oral every 6 hours PRN Moderate Pain (4 - 6)  dextrose 40% Gel 15 Gram(s) Oral once PRN Blood Glucose LESS THAN 70 milliGRAM(s)/deciliter  glucagon  Injectable 1 milliGRAM(s) IntraMuscular once PRN Glucose LESS THAN 70 milligrams/deciliter    penicillin (Angioedema)    Allergies    penicillin (Angioedema)    Intolerances    Exam:  Vital Signs Last 24 Hrs  T(C): 36.7 (15 May 2020 09:00), Max: 37.4 (14 May 2020 21:06)  T(F): 98 (15 May 2020 09:00), Max: 99.3 (14 May 2020 21:06)  HR: 82 (15 May 2020 09:20) (58 - 85)  BP: 112/79 (15 May 2020 09:20) (92/62 - 118/63)  BP(mean): 91 (15 May 2020 09:20) (70 - 91)  RR: 16 (15 May 2020 08:10) (11 - 27)  SpO2: 95% (15 May 2020 08:10) (94% - 98%)    HEENT: pink mucosae, anicteric sclerae  No palpable peripheral lymphadenopathy  COR: regular rhytm, rate, no murmurs, rubs or gallops  PULMO: clear to auscultation B/L  ABD: soft, no palpable masses, no splenomegaly  EXT: no edema  NEURO: intact  SKIN: no lesions on visible skin    Labs:                        13.8   11.89 )-----------( 281      ( 15 May 2020 06:59 )             43.5         CBC Full  -  ( 15 May 2020 06:59 )  WBC Count : 11.89 K/uL  RBC Count : 4.85 M/uL  Hemoglobin : 13.8 g/dL  Hematocrit : 43.5 %  Platelet Count - Automated : 281 K/uL  Mean Cell Volume : 89.7 fl  Mean Cell Hemoglobin : 28.5 pg  Mean Cell Hemoglobin Concentration : 31.7 gm/dL  Auto Neutrophil # : x  Auto Lymphocyte # : x  Auto Monocyte # : x  Auto Eosinophil # : x  Auto Basophil # : x  Auto Neutrophil % : x  Auto Lymphocyte % : x  Auto Monocyte % : x  Auto Eosinophil % : x  Auto Basophil % : x        05-15    141  |  103  |  22  ----------------------------<  111<H>  4.4   |  25  |  0.84    Ca    9.0      15 May 2020 06:59  Phos  2.9     05-15  Mg     2.5     05-15    TPro  6.3  /  Alb  3.5  /  TBili  0.2  /  DBili  <0.2  /  AST  12  /  ALT  10  /  AlkPhos  66  05-13          Pertinent imaging studies: reviewed    Assessment:    Metastatic lung cancer (adenocarcinoma)    Plan:    Metastatic lung ca with new large brain lesion  S/p craniotomy on 5/13  Path c/w necrosis, no viable tumor seen  We will plan liquid biopsy as well as restaging PET/CT once she recovers from surgery  Her ECOG PS remains excellent  We will arrange outpatient follow up    Thank you    Calos Peterson MD  [phone]

## 2020-05-15 NOTE — DISCHARGE NOTE PROVIDER - HOSPITAL COURSE
Hospital Course:     5/11/20: Admitted through ER with left parietal brain mass, underwent MRI Brain which shows mass/edema, started on decadron 4 mg q 6 hours and keppra for seizure prophylaxis.    5/12/20: LAN, neuro stable.  Pre-op for OR Weds.    5/14 POD#1 s/p Left parietal Craniotomy and excision of tumor (frozen necrosis), post-op trace RUE weakness, LUCY subgaleal, MRI brain post-op pending, Decadron taper over 1 week, Keppra 1Q12    5/15: MRI Brain performed yesterday. LUCY drain and head wrap removed this morning without complication. Patient was reevaluated by physical therapy and occupational therapy today. Both service cleared patient for discharge home.

## 2020-05-15 NOTE — DISCHARGE NOTE PROVIDER - CARE PROVIDER_API CALL
Kapil Rico A  NEUROSURGERY  [address] [place] [address]  Phone: [phone]  Fax: [phone]  Follow Up Time:

## 2020-05-15 NOTE — DISCHARGE NOTE NURSING/CASE MANAGEMENT/SOCIAL WORK - PATIENT PORTAL LINK FT
You can access the FollowMyHealth Patient Portal offered by [facility] by registering at the following website: [email or web address]. By joining [employer]’s FollowMyHealth portal, you will also be able to view your health information using other applications (apps) compatible with our system.

## 2020-05-15 NOTE — PROGRESS NOTE ADULT - PROBLEM SELECTOR PROBLEM 3
Adenocarcinoma of lung, unspecified laterality
Adenocarcinoma of lung
Centrilobular emphysema

## 2020-05-15 NOTE — PROGRESS NOTE ADULT - SUBJECTIVE AND OBJECTIVE BOX
HISTORY OF PRESENT ILLNESS:   58yo Female right handed  w/PMHx NSCLC (s/p RLL lobectomy, RML wedge resection by Dr. Pisano [id number]   s/p  2017  Left parieto occiptal craniotomy in [place] for metastatic disease followed by adjuvant radiation.  She also underwent Gamma Knife radiosurgery 2018 with Dr Rico for a new right parietal region metastasis.      PMH  COPD, seizures (likely 2/2 brain mets). Patient has noticed worsening confusion, word-finding difficulty, gait imbalance, and right hand weakness.  She called Dr Rico and went for MRI Brain. Patient now presents after MRI revealed a large left side brain mass with brain compression.    Denies any fevers, n/v, visual changes or traveling over the past 6 months.   She has not been around people that had Covid virus      PAST MEDICAL & SURGICAL HISTORY:  MRSA (methicillin resistant Staphylococcus aureus): history of  Adenocarcinoma of lung  COPD (chronic obstructive pulmonary disease)  H/O brain surgery  Surgery, elective: lung biopsy  History of appendectomy    Allergies : PCN (11 May 2020 11:24)    OVERNIGHT EVENTS:  No overnight events, neuro exam stable.    Hospital Course:   5/11/20: Admitted through ER with left parietal brain mass, underwent MRI Brain which shows mass/edema, started on decadron 4 mg q 6 hours and keppra for seizure prophylaxis.  5/12/20: LAN, neuro stable.  Pre-op for OR Weds.  5/14 POD#1 s/p Left parietal Craniotomy and excision of tumor (frozen necrosis), post-op trace RUE weakness, LUCY subgaleal, MRI brain post-op pending, Decadron taper over 1 week, Keppra 1Q12  5/15: MRI Brain performed. LUCY drain and head wrap removed, pending acute rehab.        Vital Signs Last 24 Hrs  T(C): 37.4 (14 May 2020 21:06), Max: 37.4 (14 May 2020 07:01)  T(F): 99.3 (14 May 2020 21:06), Max: 99.4 (14 May 2020 07:01)  HR: 65 (15 May 2020 00:02) (58 - 76)  BP: 97/57 (15 May 2020 00:02) (97/53 - 111/68)  BP(mean): 72 (15 May 2020 00:02) (70 - 85)  RR: 16 (15 May 2020 00:02) (11 - 27)  SpO2: 94% (15 May 2020 00:02) (94% - 98%)    I&O's Summary    13 May 2020 07:01  -  14 May 2020 07:00  --------------------------------------------------------  IN: 2325 mL / OUT: 1945 mL / NET: 380 mL    14 May 2020 07:01  -  15 May 2020 00:34  --------------------------------------------------------  IN: 725 mL / OUT: 780 mL / NET: -55 mL        PHYSICAL EXAM:  Gen: NAD, AAOx3  HEENT: PERRL. EOMI. +head wrap, +LUCY drain.  Neck: FROM, nontender  Lungs: clear b/l  Heart; S1, S2. NSR.  Abd: Soft, NT/ND. +BS  Exts: Pulses 2+ throughout  Neuro: CNs II-XII intact. 5/5 str x4 extremities. Negative pronator drift. Sensation to LT intact. Speech clear. Mild confusion.    TUBES/LINES:  [] Jeff  [] Lumbar Drain  [x] Wound Drains  [] Others      DIET:  [] NPO  [x] Mechanical  [] Tube feeds    LABS:                        12.3   12.10 )-----------( 269      ( 14 May 2020 06:34 )             39.1     05-14    139  |  106  |  14  ----------------------------<  233<H>  4.6   |  21<L>  |  0.83    Ca    8.3<L>      14 May 2020 06:28  Phos  2.8     05-14  Mg     2.2     05-14    TPro  6.3  /  Alb  3.5  /  TBili  0.2  /  DBili  <0.2  /  AST  12  /  ALT  10  /  AlkPhos  66  05-13            CAPILLARY BLOOD GLUCOSE      POCT Blood Glucose.: 147 mg/dL (14 May 2020 21:44)  POCT Blood Glucose.: 149 mg/dL (14 May 2020 16:02)  POCT Blood Glucose.: 108 mg/dL (14 May 2020 11:55)  POCT Blood Glucose.: 175 mg/dL (14 May 2020 06:32)      Drug Levels: [] N/A    CSF Analysis: [] N/A      Allergies    penicillin (Angioedema)    Intolerances      MEDICATIONS:  Antibiotics:    Neuro:  acetaminophen   Tablet .. 650 milliGRAM(s) Oral every 6 hours PRN  HYDROmorphone  Injectable 0.5 milliGRAM(s) IV Push every 6 hours PRN  levETIRAcetam 1000 milliGRAM(s) Oral two times a day  ondansetron Injectable 4 milliGRAM(s) IV Push every 6 hours PRN  oxycodone    5 mG/acetaminophen 325 mG 1 Tablet(s) Oral every 4 hours PRN  oxycodone    5 mG/acetaminophen 325 mG 2 Tablet(s) Oral every 6 hours PRN    Anticoagulation:    OTHER:  ALBUTerol    90 MICROgram(s) HFA Inhaler 1 Puff(s) Inhalation every 4 hours PRN  bisacodyl 5 milliGRAM(s) Oral at bedtime  budesonide 160 MICROgram(s)/formoterol 4.5 MICROgram(s) Inhaler 2 Puff(s) Inhalation two times a day  dexAMETHasone  Injectable   IV Push   dexAMETHasone  Injectable 6 milliGRAM(s) IV Push every 8 hours  dextrose 40% Gel 15 Gram(s) Oral once PRN  dextrose 50% Injectable 12.5 Gram(s) IV Push once  dextrose 50% Injectable 25 Gram(s) IV Push once  dextrose 50% Injectable 25 Gram(s) IV Push once  glucagon  Injectable 1 milliGRAM(s) IntraMuscular once PRN  insulin lispro (HumaLOG) corrective regimen sliding scale   SubCutaneous Before meals and at bedtime  pantoprazole    Tablet 40 milliGRAM(s) Oral before breakfast  senna 2 Tablet(s) Oral at bedtime  tiotropium 18 MICROgram(s) Capsule 1 Capsule(s) Inhalation daily    IVF:  dextrose 5%. 1000 milliLiter(s) IV Continuous <Continuous>  potassium phosphate / sodium phosphate powder 1 Packet(s) Oral three times a day with meals    CULTURES:    RADIOLOGY & ADDITIONAL TESTS: < from: MR Head w/wo IV Cont (05.14.20 @ 11:33) >  IMPRESSION: Patient status post interval left parietal craniotomy with resection of the bulk of the heterogeneously enhancing lesion within the left parietal lobe. Small volume of residual enhancement along the medial inferior portion of the resection cavity.          ASSESSMENT: 59y Female w/PMHx NSCLC (s/p RLL lobectomy, RML wedge resection by Dr. Pisano in 2016, s/p  2017  Left parieto occiptal cranin [place] for Metastatic disease followed by Gamma Knife 2018 with Dr Rico   [facility]  COPD, seizures (likely 2/2 brain mets), presented to ER after episodes of confusion and right hand weakness, MRI shows left parietal mass with edema and mass effect, POD#1 (5/13) s/p Left parietal Craniotomy and excision of tumor (frozen necrosis)      BRAIN MASS  No family history of cardiovascular disease (Father)  Family history of cancer in mother (Mother)  Family history of uterine cancer (Sibling)  Family history of diabetes mellitus (Mother, Sibling)  Family history of essential hypertension (Mother, Sibling)  No pertinent family history in first degree relatives  Handoff  MEWS Score  Brain metastasis  Dyspnea  MRSA (methicillin resistant Staphylococcus aureus)  Adenocarcinoma of lung  COPD (chronic obstructive pulmonary disease)  Asthma  Pulmonary nodules  Brain tumor  Brain tumor  Brain mass  Postoperative state  Apnea  COPD (chronic obstructive pulmonary disease)  MRSA (methicillin resistant Staphylococcus aureus)  Brain metastasis  Asthma  Preoperative clearance  Centrilobular emphysema  Adenocarcinoma of lung, unspecified laterality  Adenocarcinoma of lung  Brain mass  Craniotomy for brain tumor using navigation system  History of lung surgery  H/O brain surgery  Surgery, elective  History of appendectomy  No significant past surgical history  MEDICAL EVAL  90+      PLAN:  NEURO:  - LUCY subgaleal monitor output, consider DC today  - continue neuro checks  - Decadron taper over 1 week,  - keppra [id number] for seizure ppx  - MRI Brain post op performed, reviewed.  - Pain meds PRN    CARDIOVASCULAR:  - SBP<140  - AM labs, electrolyte repletion PRN    PULMONARY:  - IS  - continue asthma meds    RENAL:  - voiding    GI:  - Protonix while on decadron  - Reg diet  - Bowel Regimen    ID:  - ANDREAS, afebrile    ENDO:  - iss    DVT PROPHYLAXIS:  - SCDs, SQL  - LE dopplers negative 5/11    DISPOSITION:    - continue telemetry monitoring,  - PT/OT, recommend acute rehab  - discharge planning,  - D/w Dr. Rico, Dr. Mark    Assessment:  Present when checked    []  GCS  E   V  M     Heart Failure: []Acute, [] acute on chronic , []chronic  Heart Failure:  [] Diastolic (HFpEF), [] Systolic (HFrEF), []Combined (HFpEF and HFrEF), [] RHF, [] Pulm HTN, [] Other    [] RAINE, [] ATN, [] AIN, [] other  [] CKD1, [] CKD2, [] CKD 3, [] CKD 4, [] CKD 5, []ESRD    Encephalopathy: [] Metabolic, [] Hepatic, [] toxic, [] Neurological, [] Other    Abnormal Nurtitional Status: [] malnurtition (see nutrition note), [ ]underweight: BMI < 19, [] morbid obesity: BMI >40, [] Cachexia    [] Sepsis  [] hypovolemic shock,[] cardiogenic shock, [] hemorrhagic shock, [] neuogenic shock  [] Acute Respiratory Failure  []Cerebral edema, [] Brain compression/ herniation,   [] Functional quadriplegia  [] Acute blood loss anemia HISTORY OF PRESENT ILLNESS:   58yo Female right handed  w/PMHx NSCLC (s/p RLL lobectomy, RML wedge resection by Dr. Pisano [id number]   s/p  2017  Left parieto occiptal craniotomy in [place] for metastatic disease followed by adjuvant radiation.  She also underwent Gamma Knife radiosurgery 2018 with Dr Rico for a new right	 parietal region metastasis.      PMH  COPD, seizures (likely 2/2 brain mets). Patient has noticed worsening confusion, word-finding difficulty, gait imbalance, and right hand weakness.  She called Dr Rico and went for MRI Brain. Patient now presents after MRI revealed a large left side brain mass with brain compression.    Denies any fevers, n/v, visual changes or traveling over the past 6 months.   She has not been around people that had Covid virus      PAST MEDICAL & SURGICAL HISTORY:  MRSA (methicillin resistant Staphylococcus aureus): history of  Adenocarcinoma of lung  COPD (chronic obstructive pulmonary disease)  H/O brain surgery  Surgery, elective: lung biopsy  History of appendectomy    Allergies : PCN (11 May 2020 11:24)    OVERNIGHT EVENTS:  No overnight events, neuro exam stable.    Hospital Course:   5/11/20: Admitted through ER with left parietal brain mass, underwent MRI Brain which shows mass/edema, started on decadron 4 mg q 6 hours and keppra for seizure prophylaxis.  5/12/20: LAN, neuro stable.  Pre-op for OR Weds.  5/14 POD#1 s/p Left parietal Craniotomy and excision of tumor (frozen necrosis), post-op trace RUE weakness, LUCY subgaleal, MRI brain post-op pending, Decadron taper over 1 week, Keppra 1Q12  5/15: MRI Brain performed. LUCY drain and head wrap removed, pending acute rehab.        Vital Signs Last 24 Hrs  T(C): 37.4 (14 May 2020 21:06), Max: 37.4 (14 May 2020 07:01)  T(F): 99.3 (14 May 2020 21:06), Max: 99.4 (14 May 2020 07:01)  HR: 65 (15 May 2020 00:02) (58 - 76)  BP: 97/57 (15 May 2020 00:02) (97/53 - 111/68)  BP(mean): 72 (15 May 2020 00:02) (70 - 85)  RR: 16 (15 May 2020 00:02) (11 - 27)  SpO2: 94% (15 May 2020 00:02) (94% - 98%)    I&O's Summary    13 May 2020 07:01  -  14 May 2020 07:00  --------------------------------------------------------  IN: 2325 mL / OUT: 1945 mL / NET: 380 mL    14 May 2020 07:01  -  15 May 2020 00:34  --------------------------------------------------------  IN: 725 mL / OUT: 780 mL / NET: -55 mL        PHYSICAL EXAM:  Gen: NAD, AAOx3  HEENT: PERRL. EOMI. +head wrap, +LUCY drain.  Neck: FROM, nontender  Lungs: clear b/l  Heart; S1, S2. NSR.  Abd: Soft, NT/ND. +BS  Exts: Pulses 2+ throughout  Neuro: CNs II-XII intact. 5/5 str x4 extremities. Negative pronator drift. Sensation to LT intact. Speech clear. Mild confusion.    TUBES/LINES:  [] Jeff  [] Lumbar Drain  [x] Wound Drains  [] Others      DIET:  [] NPO  [x] Mechanical  [] Tube feeds    LABS:                        12.3   12.10 )-----------( 269      ( 14 May 2020 06:34 )             39.1     05-14    139  |  106  |  14  ----------------------------<  233<H>  4.6   |  21<L>  |  0.83    Ca    8.3<L>      14 May 2020 06:28  Phos  2.8     05-14  Mg     2.2     05-14    TPro  6.3  /  Alb  3.5  /  TBili  0.2  /  DBili  <0.2  /  AST  12  /  ALT  10  /  AlkPhos  66  05-13            CAPILLARY BLOOD GLUCOSE      POCT Blood Glucose.: 147 mg/dL (14 May 2020 21:44)  POCT Blood Glucose.: 149 mg/dL (14 May 2020 16:02)  POCT Blood Glucose.: 108 mg/dL (14 May 2020 11:55)  POCT Blood Glucose.: 175 mg/dL (14 May 2020 06:32)      Drug Levels: [] N/A    CSF Analysis: [] N/A      Allergies    penicillin (Angioedema)    Intolerances      MEDICATIONS:  Antibiotics:    Neuro:  acetaminophen   Tablet .. 650 milliGRAM(s) Oral every 6 hours PRN  HYDROmorphone  Injectable 0.5 milliGRAM(s) IV Push every 6 hours PRN  levETIRAcetam 1000 milliGRAM(s) Oral two times a day  ondansetron Injectable 4 milliGRAM(s) IV Push every 6 hours PRN  oxycodone    5 mG/acetaminophen 325 mG 1 Tablet(s) Oral every 4 hours PRN  oxycodone    5 mG/acetaminophen 325 mG 2 Tablet(s) Oral every 6 hours PRN    Anticoagulation:    OTHER:  ALBUTerol    90 MICROgram(s) HFA Inhaler 1 Puff(s) Inhalation every 4 hours PRN  bisacodyl 5 milliGRAM(s) Oral at bedtime  budesonide 160 MICROgram(s)/formoterol 4.5 MICROgram(s) Inhaler 2 Puff(s) Inhalation two times a day  dexAMETHasone  Injectable   IV Push   dexAMETHasone  Injectable 6 milliGRAM(s) IV Push every 8 hours  dextrose 40% Gel 15 Gram(s) Oral once PRN  dextrose 50% Injectable 12.5 Gram(s) IV Push once  dextrose 50% Injectable 25 Gram(s) IV Push once  dextrose 50% Injectable 25 Gram(s) IV Push once  glucagon  Injectable 1 milliGRAM(s) IntraMuscular once PRN  insulin lispro (HumaLOG) corrective regimen sliding scale   SubCutaneous Before meals and at bedtime  pantoprazole    Tablet 40 milliGRAM(s) Oral before breakfast  senna 2 Tablet(s) Oral at bedtime  tiotropium 18 MICROgram(s) Capsule 1 Capsule(s) Inhalation daily    IVF:  dextrose 5%. 1000 milliLiter(s) IV Continuous <Continuous>  potassium phosphate / sodium phosphate powder 1 Packet(s) Oral three times a day with meals    CULTURES:    RADIOLOGY & ADDITIONAL TESTS: < from: MR Head w/wo IV Cont (05.14.20 @ 11:33) >  IMPRESSION: Patient status post interval left parietal craniotomy with resection of the bulk of the heterogeneously enhancing lesion within the left parietal lobe. Small volume of residual enhancement along the medial inferior portion of the resection cavity.          ASSESSMENT: 59y Female w/PMHx NSCLC (s/p RLL lobectomy, RML wedge resection by Dr. Pisano in 2016, s/p  2017  Left parieto occiptal cranin [place] for Metastatic disease followed by Gamma Knife 2018 with Dr Rico   [facility]  COPD, seizures (likely 2/2 brain mets), presented to ER after episodes of confusion and right hand weakness, MRI shows left parietal mass with edema and mass effect, POD#1 (5/13) s/p Left parietal Craniotomy and excision of tumor (frozen necrosis)      BRAIN MASS  No family history of cardiovascular disease (Father)  Family history of cancer in mother (Mother)  Family history of uterine cancer (Sibling)  Family history of diabetes mellitus (Mother, Sibling)  Family history of essential hypertension (Mother, Sibling)  No pertinent family history in first degree relatives  Handoff  MEWS Score  Brain metastasis  Dyspnea  MRSA (methicillin resistant Staphylococcus aureus)  Adenocarcinoma of lung  COPD (chronic obstructive pulmonary disease)  Asthma  Pulmonary nodules  Brain tumor  Brain tumor  Brain mass  Postoperative state  Apnea  COPD (chronic obstructive pulmonary disease)  MRSA (methicillin resistant Staphylococcus aureus)  Brain metastasis  Asthma  Preoperative clearance  Centrilobular emphysema  Adenocarcinoma of lung, unspecified laterality  Adenocarcinoma of lung  Brain mass  Craniotomy for brain tumor using navigation system  History of lung surgery  H/O brain surgery  Surgery, elective  History of appendectomy  No significant past surgical history  MEDICAL EVAL  90+      PLAN:  NEURO:  - LUCY subgaleal monitor output, consider DC today  - continue neuro checks  - Decadron taper over 1 week,  - keppra [id number] for seizure ppx  - MRI Brain post op performed, reviewed.  - Pain meds PRN    CARDIOVASCULAR:  - SBP<140  - AM labs, electrolyte repletion PRN    PULMONARY:  - IS  - continue asthma meds    RENAL:  - voiding    GI:  - Protonix while on decadron  - Reg diet  - Bowel Regimen    ID:  - ANDREAS, afebrile    ENDO:  - iss    DVT PROPHYLAXIS:  - SCDs, SQL  - LE dopplers negative 5/11    DISPOSITION:    - continue telemetry monitoring,  - PT/OT, recommend acute rehab  - discharge planning,  - D/w Dr. Rico, Dr. Mark    Assessment:  Present when checked    []  GCS  E   V  M     Heart Failure: []Acute, [] acute on chronic , []chronic  Heart Failure:  [] Diastolic (HFpEF), [] Systolic (HFrEF), []Combined (HFpEF and HFrEF), [] RHF, [] Pulm HTN, [] Other    [] RAINE, [] ATN, [] AIN, [] other  [] CKD1, [] CKD2, [] CKD 3, [] CKD 4, [] CKD 5, []ESRD    Encephalopathy: [] Metabolic, [] Hepatic, [] toxic, [] Neurological, [] Other    Abnormal Nurtitional Status: [] malnurtition (see nutrition note), [ ]underweight: BMI < 19, [] morbid obesity: BMI >40, [] Cachexia    [] Sepsis  [] hypovolemic shock,[] cardiogenic shock, [] hemorrhagic shock, [] neuogenic shock  [] Acute Respiratory Failure  []Cerebral edema, [] Brain compression/ herniation,   [] Functional quadriplegia  [] Acute blood loss anemia

## 2020-05-15 NOTE — PROGRESS NOTE ADULT - SUBJECTIVE AND OBJECTIVE BOX
Interval Events: Reviewed  Patient seen and examined at bedside.    Patient is a 59y old  Female who presents with a chief complaint of Brain mass (15 May 2020 12:45)    she is doing well and is not wheezing  PAST MEDICAL & SURGICAL HISTORY:  Brain metastasis: brain tumor s/p resection  MRSA (methicillin resistant Staphylococcus aureus): history of  Adenocarcinoma of lung  COPD (chronic obstructive pulmonary disease)  History of lung surgery: right wedge resection  H/O brain surgery  Surgery, elective: lung biopsy  History of appendectomy      MEDICATIONS:  Pulmonary:  ALBUTerol    90 MICROgram(s) HFA Inhaler 1 Puff(s) Inhalation every 4 hours PRN  budesonide 160 MICROgram(s)/formoterol 4.5 MICROgram(s) Inhaler 2 Puff(s) Inhalation two times a day  tiotropium 18 MICROgram(s) Capsule 1 Capsule(s) Inhalation daily    Antimicrobials:    Anticoagulants:    Cardiac:      Allergies    penicillin (Angioedema)    Intolerances        Vital Signs Last 24 Hrs  T(C): 36.7 (15 May 2020 09:00), Max: 37.4 (14 May 2020 21:06)  T(F): 98 (15 May 2020 09:00), Max: 99.3 (14 May 2020 21:06)  HR: 88 (15 May 2020 09:55) (63 - 88)  BP: 113/74 (15 May 2020 09:55) (92/62 - 118/63)  BP(mean): 89 (15 May 2020 09:55) (70 - 91)  RR: 16 (15 May 2020 08:10) (14 - 16)  SpO2: 95% (15 May 2020 08:10) (94% - 98%)    05-14 @ 07:01  -  05-15 @ 07:00  --------------------------------------------------------  IN: 725 mL / OUT: 1600 mL / NET: -875 mL    05-15 @ 07:01  -  05-15 @ 16:55  --------------------------------------------------------  IN: 0 mL / OUT: 800 mL / NET: -800 mL          Review of Systems:   •	General: negative  •	Skin/Breast: negative  •	Ophthalmologic: negative  •	ENMT: negative  •	Respiratory and Thorax: negative  •	Cardiovascular: negative  •	Gastrointestinal: negative  •	Genitourinary: negative  •	Musculoskeletal: negative  •	Neurological: negative  •	Psychiatric: negative  •	Hematology/Lymphatics: negative  •	Endocrine: negative  •	Allergic/Immunologic: negative    Physical Exam:   • Constitutional:	Well-developed, well nourished  • Eyes:	EOMI; PERRL; no drainage or redness  • ENMT:	No oral lesions; no gross abnormalities  • Neck	No bruits; no thyromegaly or nodules  • Breasts:	not examined  • Back:	No deformity or limitation of movement  • Respiratory:	Breath Sounds equal & clear to percussion & auscultation, no accessory muscle use  • Cardiovascular:	Regular rate & rhythm, normal S1, S2; no murmurs, gallops or rubs; no S3, S4  • Gastrointestinal:	Soft, non-tender, no hepatosplenomegaly, normal bowel sounds  • Genitourinary:	not examined  • Rectal: not examined  • Extremities:	No cyanosis, clubbing or edema  • Vascular:	Equal and normal pulses (carotid, femoral, dorsalis pedis)  • Neurologica:l	not examined  • Skin:	No lesions; no rash  • Lymph Nodes:	No lymphadedenopathy  • Musculoskeletal:	No joint pain, swelling or deformity; no limitation of movement        LABS:      CBC Full  -  ( 15 May 2020 06:59 )  WBC Count : 11.89 K/uL  RBC Count : 4.85 M/uL  Hemoglobin : 13.8 g/dL  Hematocrit : 43.5 %  Platelet Count - Automated : 281 K/uL  Mean Cell Volume : 89.7 fl  Mean Cell Hemoglobin : 28.5 pg  Mean Cell Hemoglobin Concentration : 31.7 gm/dL  Auto Neutrophil # : x  Auto Lymphocyte # : x  Auto Monocyte # : x  Auto Eosinophil # : x  Auto Basophil # : x  Auto Neutrophil % : x  Auto Lymphocyte % : x  Auto Monocyte % : x  Auto Eosinophil % : x  Auto Basophil % : x    05-15    141  |  103  |  22  ----------------------------<  111<H>  4.4   |  25  |  0.84    Ca    9.0      15 May 2020 06:59  Phos  2.9     05-15  Mg     2.5     05-15    TPro  6.3  /  Alb  3.5  /  TBili  0.2  /  DBili  <0.2  /  AST  12  /  ALT  10  /  AlkPhos  66  05-13                        RADIOLOGY & ADDITIONAL STUDIES (The following images were personally reviewed):  Jeff:                                     No  Urine output:                       adequate  DVT prophylaxis:                 Yes  Flattus:                                  Yes  Bowel movement:              No

## 2020-05-15 NOTE — PROGRESS NOTE ADULT - SUBJECTIVE AND OBJECTIVE BOX
[facility] FOR DR BARFIELD    INTERVAL HPI/OVERNIGHT EVENTS:    Extubated. S/P resection of left brain lesion.  All new data reviewed, including VS, lab, imaging, Rx and documentation.  No headache and no wheezing on bronchodilators.    PAST MEDICAL & SURGICAL HISTORY:  Brain metastasis: brain tumor s/p resection  MRSA (methicillin resistant Staphylococcus aureus): history of  Adenocarcinoma of lung  COPD (chronic obstructive pulmonary disease)  History of lung surgery: right wedge resection  H/O brain surgery  Surgery, elective: lung biopsy  History of appendectomy      FAMILY HISTORY:  No family history of cardiovascular disease (Father)  Family history of cancer in mother (Mother)  Family history of uterine cancer (Sibling): sister  Family history of diabetes mellitus (Mother, Sibling)  Family history of essential hypertension (Mother, Sibling)      SOCIAL HISTORY:    REVIEW OF SYSTEMS: no signficant change  Constitutional: (-) weight change, () fever,  () chills, () fatigue, () night sweats  Eyes: (-) discharge, (-) eye pain, (+) RIGHT visual change  ENT:  (-) hearing difficulty, (-) vertigo, () sinus pain,  () throat pain, () epistaxis, () dysphagia, () hoarseness  Neck: (-) pain, (-) stiffness, () swelling  Respiratory: (+) cough, () wheezing, () hemoptysis      Cardiovascular: (-) chest pain, ()palpitations, () dizziness   Gastrointestinal: (-) abdominal pain, () nausea, () vomiting, () hematemesis, () diarrhea,  () constipation, () melena  Genitourinary:  (-) dysuria, () frequency, () hematuria, () incontinence      Neurologic: (+) IMPROVING headache, () memory loss, () loss of strength, () numbness, () tremor     Skin: (-) itching, () burning, () rash, () lesions   Lymphatic: () enlarged lymph nodes  Endocrine: (-) hair loss, () temperature intolerance         Musculoskeletal: (-) back pain, () joint pain,  () extremity pain  Psychiatric: () visual change, (-) auditory change, (-) depression, () anxiety, () suicidal  Sleep: (-) disorder, () insomnia, () sleep deprivation  Heme/Lymph: () easy bruising, () bleeding gums            Allergy and Immunologic: () hives, () eczema    Vital Signs Last 24 Hrs  T(C): 37.3 (14 May 2020 08:54), Max: 37.4 (14 May 2020 07:01)  T(F): 99.1 (14 May 2020 08:54), Max: 99.4 (14 May 2020 07:01)  HR: 76 (14 May 2020 12:48) (56 - 82)  BP: 111/63 (14 May 2020 12:48) (99/57 - 122/67)  BP(mean): 85 (14 May 2020 12:18) (73 - 87)  RR: 27 (14 May 2020 12:48) (11 - 27)  SpO2: 96% (14 May 2020 12:48) (95% - 100%)    I&O's Detail    13 May 2020 07:01  -  14 May 2020 07:00  --------------------------------------------------------  IN:    IV PiggyBack: 700 mL    Oral Fluid: 650 mL    sodium chloride 0.9%: 975 mL  Total IN: 2325 mL    OUT:    Bulb: 80 mL    Indwelling Catheter - Urethral: 1865 mL  Total OUT: 1945 mL    Total NET: 380 mL      14 May 2020 07:01  -  14 May 2020 13:43  --------------------------------------------------------  IN:    sodium chloride 0.9%: 225 mL  Total IN: 225 mL    OUT:    Bulb: 15 mL    Indwelling Catheter - Urethral: 275 mL    Voided: 150 mL  Total OUT: 440 mL    Total NET: -215 mL    PHYSICAL EXAM:  in bed, om monitor, one drain  Well nourished, well developed, comfortable, - acute distress; vital signs are monitored continuously  Eyes: PERRLA, EOMI, -conjunctivitis, -scleritis   Head: no focal deficit, normocephalic,  no trauma  ENMT: moist tongue, no thrush, -nasal discharge, -hoarseness, normal hearing, -cough, -hemoptysis, trachea midline  Neck: supple, - lymphadenopathy,  -masses, -JVD  Respiratory: bilateral diminished breath sounds, -wheezing, -rhonchi, -rales, -crackles  Chest: -accessory muscle use, -paradoxical breathing  Cardiovascular: regular rate and sinus rhythm, S1 S2 normal, -S3, -S4, -murmur, -gallop, -rub  Gastrointestinal: soft, nontender, nondistended, normal bowel sounds, no hepatosplenomegaly  Genitourinary: -flank pain, -dysuria  Extremities: -clubbing, -cyanosis, -edema    Vascular: peripheral pulses palpable 2+ bilaterally  Neurological: alert, oriented x 3, no focal deficit, -tremor   Skin: warm, dry, -erythema, iv sites intact  Lymph nodes; no cervical, supraclavicular or axillary adenopathy  Psychiatric: cooperative, appropriate mood      MEDICATIONS  (STANDING):  bisacodyl 5 milliGRAM(s) Oral at bedtime  budesonide 160 MICROgram(s)/formoterol 4.5 MICROgram(s) Inhaler 2 Puff(s) Inhalation two times a day  dexAMETHasone  Injectable   IV Push   dexAMETHasone  Injectable 6 milliGRAM(s) IV Push every 6 hours  dextrose 5%. 1000 milliLiter(s) (50 mL/Hr) IV Continuous <Continuous>  dextrose 50% Injectable 12.5 Gram(s) IV Push once  dextrose 50% Injectable 25 Gram(s) IV Push once  dextrose 50% Injectable 25 Gram(s) IV Push once  insulin lispro (HumaLOG) corrective regimen sliding scale   SubCutaneous Before meals and at bedtime  levETIRAcetam 1000 milliGRAM(s) Oral two times a day  pantoprazole    Tablet 40 milliGRAM(s) Oral before breakfast  potassium phosphate / sodium phosphate powder 1 Packet(s) Oral three times a day with meals  senna 2 Tablet(s) Oral at bedtime  tiotropium 18 MICROgram(s) Capsule 1 Capsule(s) Inhalation daily  vancomycin  IVPB 2000 milliGRAM(s) IV Intermittent every 12 hours    MEDICATIONS  (PRN):  acetaminophen   Tablet .. 650 milliGRAM(s) Oral every 6 hours PRN Temp greater or equal to 38C (100.4F), Mild Pain (1 - 3)  ALBUTerol    90 MICROgram(s) HFA Inhaler 1 Puff(s) Inhalation every 4 hours PRN Shortness of Breath and/or Wheezing  dextrose 40% Gel 15 Gram(s) Oral once PRN Blood Glucose LESS THAN 70 milliGRAM(s)/deciliter  glucagon  Injectable 1 milliGRAM(s) IntraMuscular once PRN Glucose LESS THAN 70 milligrams/deciliter  HYDROmorphone  Injectable 0.5 milliGRAM(s) IV Push every 6 hours PRN breakthrough pain  ondansetron Injectable 4 milliGRAM(s) IV Push every 6 hours PRN Nausea and/or Vomiting  oxycodone    5 mG/acetaminophen 325 mG 1 Tablet(s) Oral every 4 hours PRN Moderate Pain (4 - 6)  oxycodone    5 mG/acetaminophen 325 mG 2 Tablet(s) Oral every 6 hours PRN Severe Pain (7 - 10)      Allergies    penicillin (Angioedema)    Intolerances        LABS:                        12.3   12.10 )-----------( 269      ( 14 May 2020 06:34 )             39.1     05-14    139  |  106  |  14  ----------------------------<  233<H>  4.6   |  21<L>  |  0.83    Ca    8.3<L>      14 May 2020 06:28  Phos  2.8     05-14  Mg     2.2     05-14    TPro  6.3  /  Alb  3.5  /  TBili  0.2  /  DBili  <0.2  /  AST  12  /  ALT  10  /  AlkPhos  66  05-13    +DVT prophylaxis  IPCD  +Sleep  GOOD  +Nutrition goals  ORAL  -Pain  DENIED  -Decubital ulcer  +GI prophylaxis (PPV, coagulopathy, Hx)  +Aspiration prophylaxis (45 degrees)  +Sedation/analgesia stopped  +ID (phos, CH, mupi, SB)   VANCO 2 x 3  -Delirium  +Cardiac Beta/ACEI-ARB/ASA/statin  +Prevention  +Education  +Medication reviewed (drug-drug interactions, PDA)  Medical devices  DRAIN  Discussed with ICU, PGY, CCRN, family    RADIOLOGY & ADDITIONAL STUDIES:    EXAM:  MR BRAIN WAW IC                          PROCEDURE DATE:  05/14/2020          INTERPRETATION:  PROCEDURE: MRI brain with and without contrast    INDICATION: Status post brain mass resection    TECHNIQUE: Sagittal T1, axial T1, T2, FLAIR, diffusion, GRE and coronal FLAIR images of the brain were obtained. Following the intravenous administration of contrast 7.5 ml of Gadavist, axial T1 and sagittal T1- MPRAGE images of the brain were obtained and reconstructed in the axial and coronal plane.    COMPARISON: MRI brain 5/11/2020    FINDINGS: The MRI examination of the brain demonstrates the patient to be status post interval re-parietal craniotomy. There is soft tissue swelling along the craniotomy site with a thin fluid collection. There also is a thin left parietal convexity subdural collection with fluid and blood. There now is a and hemorrhage. Filled resection cavity within the left paramedian parietal lobe. There has been resection of the bulk of the heterogeneously enhancing lesion within the paramedian left parietal lobe. There is irregular enhancement along the medial and inferior portions of the resection cavity (series 14 image 17 and series 15 images 116-120). These findings may represent residual radiation necrosis versus tumoral disease versus new post therapeutic changes. There remains extensive surrounding FLAIR/T2 signal abnormality within the left posterior frontal, parietal and occipital white matter. The FLAIR/T2 signal abnormality also extends into the splenium of the corpus callosum into the contralateral right periatrial white matter. There has been slight interval improvement in the mass effect on the left atria.    There is normal vascular flow-voids. The visualized paranasal sinuses are free of mucosal disease. The mastoid air cells are well-aerated.    IMPRESSION: Patient status post interval left parietal craniotomy with resection of the bulk of the heterogeneously enhancing lesion within the left parietal lobe. Small volume of residual enhancement along the medial inferior portion of the resection cavity.        Assessment and Plan:    Problem/Plan - 1:  ·  Problem: Centrilobular emphysema.      Problem/Plan - 2:  ·  Problem: Asthma.      Problem/Plan - 3:  ·  Problem: Adenocarcinoma of lung, unspecified laterality.      Problem/Plan - 4:  ·  Problem: Brain mass.      Problem/Plan - 5:  ·  Problem: Brain metastasis.      Problem/Plan - 6:  Problem: MRSA (methicillin resistant Staphylococcus aureus).     Problem/Plan - 7:  ·  Problem: COPD (chronic obstructive pulmonary disease).      Problem/Plan - 8:  ·  Problem: Apnea.     Sleep apnea being evaluated with Dr Wallace.  F/u at his office.

## 2020-05-15 NOTE — PROGRESS NOTE ADULT - PROBLEM SELECTOR PROBLEM 2
Asthma
Adenocarcinoma of lung, unspecified laterality
Centrilobular emphysema

## 2020-05-15 NOTE — PROGRESS NOTE ADULT - REASON FOR ADMISSION
Brain mass

## 2020-05-15 NOTE — PROGRESS NOTE ADULT - ATTENDING COMMENTS
Patient seen and examined with house-staff during bedside rounds.  Resident note read, including vitals, physical findings, laboratory data, and radiological reports.   Revisions included below.  Direct personal management at bed side and extensive interpretation of the data.  Plan was outlined and discussed in details with the housestaff.  Decision making of high complexity  Action taken for acute disease activity to reflect the level of care provided:  - medication reconciliation  - review laboratory data  she is clincially stable  No evidnce of infection  no evidence of VTE  stable medically for DC

## 2020-05-15 NOTE — DISCHARGE NOTE PROVIDER - NSDCCPCAREPLAN_GEN_ALL_CORE_FT
PRINCIPAL DISCHARGE DIAGNOSIS  Diagnosis: Brain mass  Assessment and Plan of Treatment: On 5/12 patient underwent left craniotomy. Frozen section showed radiation necrosis. There were no complications.  Plan: Follow up with Dr. Rico in 2 weeks. May resume home normal diet.  Limitted physical, do not lift eight, may ambulate indoor and aoutdoor.  Contact your primary care doctor for steroid management.  Call the office for any question or problem. Call for fever greater than 101F, for any fluid discharge from wound, for any neurological changes.  May wash hair/head tomorrow with gentle shampoo.  You have appointment with Jacky for Friday 5/29 in the afternoon. call to confirm.
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
## 2020-05-21 DIAGNOSIS — J43.2 CENTRILOBULAR EMPHYSEMA: ICD-10-CM

## 2020-05-21 DIAGNOSIS — G81.91 HEMIPLEGIA, UNSPECIFIED AFFECTING RIGHT DOMINANT SIDE: ICD-10-CM

## 2020-05-21 DIAGNOSIS — G93.5 COMPRESSION OF BRAIN: ICD-10-CM

## 2020-05-21 DIAGNOSIS — C34.31 MALIGNANT NEOPLASM OF LOWER LOBE, RIGHT BRONCHUS OR LUNG: ICD-10-CM

## 2020-05-21 DIAGNOSIS — Z90.2 ACQUIRED ABSENCE OF LUNG [PART OF]: ICD-10-CM

## 2020-05-21 DIAGNOSIS — H53.40 UNSPECIFIED VISUAL FIELD DEFECTS: ICD-10-CM

## 2020-05-21 DIAGNOSIS — I67.89 OTHER CEREBROVASCULAR DISEASE: ICD-10-CM

## 2020-05-21 DIAGNOSIS — Z80.8 FAMILY HISTORY OF MALIGNANT NEOPLASM OF OTHER ORGANS OR SYSTEMS: ICD-10-CM

## 2020-05-21 DIAGNOSIS — E66.9 OBESITY, UNSPECIFIED: ICD-10-CM

## 2020-05-21 DIAGNOSIS — R47.01 APHASIA: ICD-10-CM

## 2020-05-21 DIAGNOSIS — R06.81 APNEA, NOT ELSEWHERE CLASSIFIED: ICD-10-CM

## 2020-05-21 DIAGNOSIS — G93.9 DISORDER OF BRAIN, UNSPECIFIED: ICD-10-CM
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
## 2020-05-21 DIAGNOSIS — Z92.21 PERSONAL HISTORY OF ANTINEOPLASTIC CHEMOTHERAPY: ICD-10-CM

## 2020-05-21 DIAGNOSIS — Z79.52 LONG TERM (CURRENT) USE OF SYSTEMIC STEROIDS: ICD-10-CM

## 2020-05-21 DIAGNOSIS — G93.6 CEREBRAL EDEMA: ICD-10-CM

## 2020-05-21 DIAGNOSIS — Z86.14 PERSONAL HISTORY OF METHICILLIN RESISTANT STAPHYLOCOCCUS AUREUS INFECTION: ICD-10-CM

## 2020-05-21 DIAGNOSIS — Z88.0 ALLERGY STATUS TO PENICILLIN: ICD-10-CM

## 2020-05-21 DIAGNOSIS — R56.9 UNSPECIFIED CONVULSIONS: ICD-10-CM

## 2020-05-21 DIAGNOSIS — Z92.3 PERSONAL HISTORY OF IRRADIATION: ICD-10-CM

## 2020-05-21 DIAGNOSIS — C79.31 SECONDARY MALIGNANT NEOPLASM OF BRAIN: ICD-10-CM

## 2020-05-29 ENCOUNTER — APPOINTMENT (OUTPATIENT)
Dept: NEUROSURGERY | Facility: CLINIC | Age: 60
End: 2020-05-29

## 2020-05-29 ENCOUNTER — APPOINTMENT (OUTPATIENT)
Dept: NEUROSURGERY | Facility: CLINIC | Age: 60
End: 2020-05-29
Payer: MEDICARE

## 2020-05-29 VITALS
SYSTOLIC BLOOD PRESSURE: 101 MMHG | OXYGEN SATURATION: 98 % | DIASTOLIC BLOOD PRESSURE: 72 MMHG | TEMPERATURE: 98.1 F | RESPIRATION RATE: 16 BRPM | HEART RATE: 72 BPM

## 2020-05-29 PROCEDURE — 99024 POSTOP FOLLOW-UP VISIT: CPT

## 2020-05-29 NOTE — HISTORY OF PRESENT ILLNESS
[FreeTextEntry1] : 59 year old woman with a history of metastatic lung adenocarcinoma to brain who presents with worsening RIGHT and weakness, confusion, imbalance and expressive aphasia. She previously underwent left parietal craniotomy for tumor resection and adjuvant radiation approximately 3 years ago In November 2017 at another institution. MRI brain demonstrates a recurrent 3 cm left parietal enhancing brain mass in the area of prior tumor resection and radiation.

## 2020-05-29 NOTE — REASON FOR VISIT
[Family Member] : family member [de-identified] : Left parietal craniotomy for resection of brain tumor [de-identified] : Reports she is doing well.\par Denies any signs of postop wound infection which could include but is not limited to redness/swelling/purulent drainage\par Denies CP/SOB/unilateral leg edema. Denies headaches, nausea, vomiting, dizziness, weakness. \par She is slowly introducing preop activities\par \par Denies seizures.\par \par She remains on keppra 1000 mg bid and dexamethasone 2 mg bid. \par She feels her RIGHT hand weakness has improved. She feels that her coordination in the RIGHT hand is still somewhat "off" and she can tell by her handwriting. She reports forgetfulness.\par \par She continues to report a right visual field cut. [de-identified] : 5/13/2020

## 2020-05-29 NOTE — ASSESSMENT
[FreeTextEntry1] : Shower daily. Wash hair with mild shampoo, i.e. Ender and Ender.  Pat incision line dry with dry separate towel.\par Report all s/s infection including drainage, redness, warmth, fever, weakness, chills.\par Follow-up with Dr. Rico in one month for postop visit.\par No tub baths/pools for 8 weeks.\par Keep incision covered and protected from sun for 3-6 months following surgery.\par \par Dr. Rico in to evaluate patient during visit.\par Pathology discussed and explained to patient and patient's son - consistent with radionecrosis. \par \par -Can taper keppra to 500 mg bid\par -can taper dexamethasone to off - instructions provided regarding remaining taper\par \par Patient verbalizes agreement and understanding with plan.\par

## 2020-05-29 NOTE — PHYSICAL EXAM
[General Appearance - In No Acute Distress] : in no acute distress [General Appearance - Alert] : alert [Intact] : intact [Clean] : clean [Dry] : dry [Normal Skin] : normal [No Drainage] : without drainage [Oriented To Time, Place, And Person] : oriented to person, place, and time [Cranial Nerves Oculomotor (III)] : extraocular motion intact [Cranial Nerves Optic (II)] : visual acuity intact bilaterally,  pupils equal round and reactive to light [Cranial Nerves Facial (VII)] : face symmetrical [Cranial Nerves Trigeminal (V)] : facial sensation intact symmetrically [Cranial Nerves Vestibulocochlear (VIII)] : hearing was intact bilaterally [Cranial Nerves Glossopharyngeal (IX)] : tongue and palate midline [Cranial Nerves Hypoglossal (XII)] : there was no tongue deviation with protrusion [Motor Tone] : muscle tone was normal in all four extremities [Cranial Nerves Accessory (XI - Cranial And Spinal)] : head turning and shoulder shrug symmetric [Motor Strength] : muscle strength was normal in all four extremities [Sclera] : the sclera and conjunctiva were normal [] : no respiratory distress [Respiration, Rhythm And Depth] : normal respiratory rhythm and effort [Heart Rate And Rhythm] : heart rate was normal and rhythm regular [Skin Color & Pigmentation] : normal skin color and pigmentation [Abnormal Walk] : normal gait [FreeTextEntry1] : LEFT parietal [FreeTextEntry6] : staples removed without difficulty

## 2020-06-02 PROCEDURE — 88333 PATH CONSLTJ SURG CYTO XM 1: CPT

## 2020-06-02 PROCEDURE — 85027 COMPLETE CBC AUTOMATED: CPT

## 2020-06-02 PROCEDURE — 85730 THROMBOPLASTIN TIME PARTIAL: CPT

## 2020-06-02 PROCEDURE — 93970 EXTREMITY STUDY: CPT

## 2020-06-02 PROCEDURE — A9585: CPT

## 2020-06-02 PROCEDURE — 84100 ASSAY OF PHOSPHORUS: CPT

## 2020-06-02 PROCEDURE — C1713: CPT

## 2020-06-02 PROCEDURE — 80048 BASIC METABOLIC PNL TOTAL CA: CPT

## 2020-06-02 PROCEDURE — 97161 PT EVAL LOW COMPLEX 20 MIN: CPT

## 2020-06-02 PROCEDURE — 99285 EMERGENCY DEPT VISIT HI MDM: CPT

## 2020-06-02 PROCEDURE — 83735 ASSAY OF MAGNESIUM: CPT

## 2020-06-02 PROCEDURE — 83036 HEMOGLOBIN GLYCOSYLATED A1C: CPT

## 2020-06-02 PROCEDURE — 85025 COMPLETE CBC W/AUTO DIFF WBC: CPT

## 2020-06-02 PROCEDURE — 71045 X-RAY EXAM CHEST 1 VIEW: CPT

## 2020-06-02 PROCEDURE — 36415 COLL VENOUS BLD VENIPUNCTURE: CPT

## 2020-06-02 PROCEDURE — 97535 SELF CARE MNGMENT TRAINING: CPT

## 2020-06-02 PROCEDURE — 97116 GAIT TRAINING THERAPY: CPT

## 2020-06-02 PROCEDURE — 80053 COMPREHEN METABOLIC PANEL: CPT

## 2020-06-02 PROCEDURE — 87635 SARS-COV-2 COVID-19 AMP PRB: CPT

## 2020-06-02 PROCEDURE — 95940 IONM IN OPERATNG ROOM 15 MIN: CPT

## 2020-06-02 PROCEDURE — 88307 TISSUE EXAM BY PATHOLOGIST: CPT

## 2020-06-02 PROCEDURE — 70552 MRI BRAIN STEM W/DYE: CPT

## 2020-06-02 PROCEDURE — 86850 RBC ANTIBODY SCREEN: CPT

## 2020-06-02 PROCEDURE — 93005 ELECTROCARDIOGRAM TRACING: CPT

## 2020-06-02 PROCEDURE — 80076 HEPATIC FUNCTION PANEL: CPT

## 2020-06-02 PROCEDURE — 94640 AIRWAY INHALATION TREATMENT: CPT

## 2020-06-02 PROCEDURE — 93306 TTE W/DOPPLER COMPLETE: CPT

## 2020-06-02 PROCEDURE — 88331 PATH CONSLTJ SURG 1 BLK 1SPC: CPT

## 2020-06-02 PROCEDURE — 86901 BLOOD TYPING SEROLOGIC RH(D): CPT

## 2020-06-02 PROCEDURE — C1889: CPT

## 2020-06-02 PROCEDURE — 82962 GLUCOSE BLOOD TEST: CPT

## 2020-06-02 PROCEDURE — 85610 PROTHROMBIN TIME: CPT

## 2020-06-02 PROCEDURE — 80074 ACUTE HEPATITIS PANEL: CPT

## 2020-06-02 PROCEDURE — 70553 MRI BRAIN STEM W/O & W/DYE: CPT

## 2020-06-02 PROCEDURE — 87389 HIV-1 AG W/HIV-1&-2 AB AG IA: CPT

## 2020-06-03 ENCOUNTER — APPOINTMENT (OUTPATIENT)
Dept: NEUROLOGY | Facility: CLINIC | Age: 60
End: 2020-06-03

## 2020-06-12 ENCOUNTER — APPOINTMENT (OUTPATIENT)
Dept: NEUROSURGERY | Facility: CLINIC | Age: 60
End: 2020-06-12
Payer: MEDICARE

## 2020-06-12 VITALS
RESPIRATION RATE: 16 BRPM | TEMPERATURE: 98.2 F | HEART RATE: 80 BPM | SYSTOLIC BLOOD PRESSURE: 110 MMHG | WEIGHT: 216 LBS | OXYGEN SATURATION: 98 % | DIASTOLIC BLOOD PRESSURE: 70 MMHG | HEIGHT: 65 IN | BODY MASS INDEX: 35.99 KG/M2

## 2020-06-12 PROCEDURE — 99024 POSTOP FOLLOW-UP VISIT: CPT

## 2020-06-12 RX ORDER — DEXAMETHASONE 4 MG/1
4 TABLET ORAL EVERY 8 HOURS
Qty: 30 | Refills: 0 | Status: DISCONTINUED | COMMUNITY
Start: 2020-05-08 | End: 2020-06-12

## 2020-06-12 RX ORDER — OXYCODONE HYDROCHLORIDE 30 MG/1
30 TABLET ORAL
Qty: 120 | Refills: 0 | Status: DISCONTINUED | COMMUNITY
Start: 2018-01-03 | End: 2020-06-12

## 2020-06-12 NOTE — PHYSICAL EXAM
[General Appearance - Alert] : alert [Healing Well] : healing well [General Appearance - In No Acute Distress] : in no acute distress [Intact] : intact [No Drainage] : without drainage [Normal Skin] : normal [Oriented To Time, Place, And Person] : oriented to person, place, and time [Cranial Nerves Oculomotor (III)] : extraocular motion intact [Cranial Nerves Optic (II)] : visual acuity intact bilaterally,  pupils equal round and reactive to light [Cranial Nerves Facial (VII)] : face symmetrical [Cranial Nerves Trigeminal (V)] : facial sensation intact symmetrically [Cranial Nerves Vestibulocochlear (VIII)] : hearing was intact bilaterally [Cranial Nerves Glossopharyngeal (IX)] : tongue and palate midline [Cranial Nerves Accessory (XI - Cranial And Spinal)] : head turning and shoulder shrug symmetric [Cranial Nerves Hypoglossal (XII)] : there was no tongue deviation with protrusion [Motor Tone] : muscle tone was normal in all four extremities [Motor Strength] : muscle strength was normal in all four extremities [Outer Ear] : the ears and nose were normal in appearance [Neck Appearance] : the appearance of the neck was normal [Abnormal Walk] : normal gait [Respiration, Rhythm And Depth] : normal respiratory rhythm and effort [] : no respiratory distress [Skin Color & Pigmentation] : normal skin color and pigmentation [Erythema] : not erythematous [Warm] : not warm [Fluctuant] : not fluctuant [FreeTextEntry1] : LEFT frontotemporal healing well; scabs intact; no drainage expressed; wound edges well approximated; focal tenderness on palpation to the scalp

## 2020-06-12 NOTE — ASSESSMENT
[FreeTextEntry1] : Discussed with Dr. Rico.\par \par Recommend percocet every 4-6 hours prn pain - orders provided. Education provided regarding medication regimen and to restart stool softeners to prevent constipation. Encouraged fluids. Encouraged rest over the weekend.\par \par Ms. Mendoza is afebrile and incision site is intact without s/s infection. Will hold off on antibiotics as currently no indication for infection at this time.\par Recommend monitoring incision site - if any incisional drainage, fever, chills, seek emergency care.\par If pain worsens or persists, seek emergency care.\par \par She verbalizes understanding.\par Return on Monday to follow up and evaluate progress.\par

## 2020-06-15 ENCOUNTER — APPOINTMENT (OUTPATIENT)
Dept: RADIATION ONCOLOGY | Facility: CLINIC | Age: 60
End: 2020-06-15
Payer: MEDICARE

## 2020-06-15 ENCOUNTER — APPOINTMENT (OUTPATIENT)
Dept: NEUROSURGERY | Facility: CLINIC | Age: 60
End: 2020-06-15
Payer: MEDICARE

## 2020-06-15 VITALS
WEIGHT: 216 LBS | BODY MASS INDEX: 35.99 KG/M2 | DIASTOLIC BLOOD PRESSURE: 72 MMHG | HEIGHT: 65 IN | SYSTOLIC BLOOD PRESSURE: 105 MMHG | RESPIRATION RATE: 16 BRPM | HEART RATE: 96 BPM | OXYGEN SATURATION: 95 % | TEMPERATURE: 98.4 F

## 2020-06-15 DIAGNOSIS — H53.461 HOMONYMOUS BILATERAL FIELD DEFECTS, RIGHT SIDE: ICD-10-CM

## 2020-06-15 DIAGNOSIS — G89.18 OTHER ACUTE POSTPROCEDURAL PAIN: ICD-10-CM

## 2020-06-15 DIAGNOSIS — C34.91 MALIGNANT NEOPLASM OF UNSPECIFIED PART OF RIGHT BRONCHUS OR LUNG: ICD-10-CM

## 2020-06-15 PROCEDURE — 99024 POSTOP FOLLOW-UP VISIT: CPT

## 2020-06-15 PROCEDURE — 99213 OFFICE O/P EST LOW 20 MIN: CPT

## 2020-06-15 RX ORDER — PANTOPRAZOLE 40 MG/1
40 TABLET, DELAYED RELEASE ORAL
Qty: 30 | Refills: 0 | Status: ACTIVE | COMMUNITY
Start: 2020-05-15

## 2020-06-15 RX ORDER — TIOTROPIUM BROMIDE INHALATION SPRAY 1.56 UG/1
1.25 SPRAY, METERED RESPIRATORY (INHALATION)
Qty: 4 | Refills: 0 | Status: ACTIVE | COMMUNITY
Start: 2020-06-03

## 2020-06-15 NOTE — DISEASE MANAGEMENT
[Pathological] : TNM Stage: p [IV] : IV [FreeTextEntry4] : Now recurrent [TTNM] : 3 [MTNM] : 0 [NTNM] : 1 [de-identified] : right parietal brain

## 2020-06-15 NOTE — REVIEW OF SYSTEMS
[Tinnitus - Grade 0] : Tinnitus - Grade 0 [Blurred Vision: Grade 0] : Blurred Vision: Grade 0 [Cognitive Disturbance: Grade 1 - Mild cognitive disability; not interfering with work/school/life performance; specialized educational services/devices not indicated] : Cognitive Disturbance: Grade 1 - Mild cognitive disability; not interfering with work/school/life performance; specialized educational services/devices not indicated [Concentration Impairment: Grade 1] : Concentration Impairment: Grade 1 - Mild inattention or decreased level of concentration [Dizziness: Grade 0] : Dizziness: Grade 0  [Headache: Grade 1 - Mild pain] : Headache: Grade 1 - Mild pain [Negative] : Neurological [FreeTextEntry9] : chronic back pain [FreeTextEntry3] : right VF cut [de-identified] : see HPI

## 2020-06-15 NOTE — REASON FOR VISIT
[de-identified] : Left parietal craniotomy for resection of brain tumor [de-identified] : 5/13/2020 [de-identified] : \par TODAY'S VISIT:\par She has been taking percocet prn over the weekend and reports improvement in LEFT scalp pain. \par Denies s/s infection including fever, chills, wound drainage. She reports scalp pain is worse with palpation.\par \par PREVIOUS VISIT 6/12/2020:\par She called the office today reporting worsening LEFT Sided pain and she was asked to come to the office for further evaluation. She does state she was very busy in the beginning of the week, running errands and cooking. Pain developed after these activities. She denies injury to the head. Currently rates pain 8/10.\par Reports worsening LEFT sided pain at top of head below incision, worsening over the last 4 days. \par \par She did take oxycodone when the pain started but feels that it made her more fatigued and it did not alleviate the pain so she stopped taking it. \par She denies s/s infection including fever, chills, incisional drainage. Denies neck pain, nausea/vomiting. Denies focal weakness, changes in vision/speech.\par \par \par She states pain is worse with palpation. Pain is not affected by position. Denies rhinorrhea/otorrhea. She took tylenol earlier today without relief.  \par \par PREVIOUS VISIT 5/29/2020:\par Reports she is doing well.\par Denies any signs of postop wound infection which could include but is not limited to redness/swelling/purulent drainage\par Denies CP/SOB/unilateral leg edema. Denies headaches, nausea, vomiting, dizziness, weakness. \par She is slowly introducing preop activities\par \par Denies seizures.\par \par She remains on keppra 1000 mg bid and dexamethasone 2 mg bid. \par She feels her RIGHT hand weakness has improved. She feels that her coordination in the RIGHT hand is still somewhat "off" and she can tell by her handwriting. She reports forgetfulness.\par \par She continues to report a right visual field cut.

## 2020-06-15 NOTE — VITALS
[Maximal Pain Intensity: 5/10] : 5/10 [Least Pain Intensity: 0/10] : 0/10 [Pain Location: ___] : Pain Location: [unfilled] [NSAID/Non-Opioid] : NSAID/Non-Opioid [70: Cares for self; unalbe to carry on normal activity or do active work.] : 70: Cares for self; unable to carry on normal activity or do active work. [ECOG Performance Status: 2 - Ambulatory and capable of all self care but unable to carry out any work activities] : Performance Status: 2 - Ambulatory and capable of all self care but unable to carry out any work activities. Up and about more than 50% of waking hours

## 2020-06-15 NOTE — ASSESSMENT
[FreeTextEntry1] : Reports improvement in incisional pain over the weekend.\par Recommend percocet prn for pain.\par Educated regarding s/s infection including wound drainage, fever, chills, report to ED.\par Follow up with MRI brain in 3 months (August 2020). Return to office to review MRI after complete.\par Follow up with Dr. Juan Barrios - appointment this afternoon.\par Follow up with medical oncologist - Dr. Peterson - she states she has appointment next week.\par Consult with Dr. Andrea Gutierrez for evaluation of right visual field cut.\par Begin physical therapy for gait training, muscular strengthening, balance and coordination.\par \par Patient verbalizes agreement and understanding with plan.

## 2020-06-15 NOTE — PHYSICAL EXAM
[General Appearance - In No Acute Distress] : in no acute distress [General Appearance - Alert] : alert [Healing Well] : healing well [Intact] : intact [Normal Skin] : normal [No Drainage] : without drainage [Motor Tone] : muscle tone was normal in all four extremities [Oriented To Time, Place, And Person] : oriented to person, place, and time [Motor Strength] : muscle strength was normal in all four extremities [Sclera] : the sclera and conjunctiva were normal [Neck Appearance] : the appearance of the neck was normal [] : no respiratory distress [Respiration, Rhythm And Depth] : normal respiratory rhythm and effort [Skin Color & Pigmentation] : normal skin color and pigmentation [Abnormal Walk] : normal gait [Erythema] : not erythematous [Warm] : not warm [Fluctuant] : not fluctuant [FreeTextEntry1] : LEFT frontotemporal healing well; scabs intact; no drainage expressed; wound edges well approximated; focal tenderness on palpation to the scalp

## 2020-06-15 NOTE — HISTORY OF PRESENT ILLNESS
[FreeTextEntry1] : Ms Ashlie Mendoza is a 58y old female with a history of adenocarcinoma of the lung initially diagnosed during pre-operative workup for surgery for an orthopedic issue. She underwent right lower lobectomy with en bloc middle lobe resection on 10/24/2016, which revealed  pT3, pN1 with visceral pleura and LVI, involving 2/4 peribronchial lymph nodes. She completed  adjuvant chemotherapy between January and March 2017. \par \par She then developed a brain metastasis, and completed radiation therapy with Dr Owen for a total of 2700 cGy from 11/14/17- 11/20/17 over 3 fractions for treatment of left parieto-occipital mets (s/p subtotal resection). She had a cerebral hemorrhage in November 2017 post treatment and was hospitalized at [facility]. On follow up surveillance MRI brain 1/3/18 she was found to a have a new 5 mm right parietal enhancing mass. She is s/p Gamma Knife stereotactic radiosurgery on 1/18/2018 by Dr. Rico and Dr. Owen at [facility].  MRI brain in May 2020 showed a new left parietal enhancing mass s/p craniotomy and subtotal resection on 5/13/20 by Dr. Rico.  Pathology demonstrated necrosis, no viable tumor identified.  \par \par She now follows up with Dr. Barrios since Dr. Owen has moved to [facility]. \par \par 6/15/20 - Follow-up\par Ms. Mendoza returns for routine follow-up.  She was last seen on 12/16/19.  Plan was for MRI and follow-up in 4 months, body imaging in March, continue follow-up with Dr. Barclay and Dr. Peterson.  She reported symptoms of difficulty writing with her right hand, decreased peripheral vision on the right, and feeling "confused" x 1 month to our office on 5/7/20.  MRI brain the following day done at [facility] demonstrated a new 2.9 x 3.1 x 3.1 cm left parietal enhancing mass with moderated vasogenic edema and minimal midline shift.  She underwent craniotomy and subtotal resection of the tumor by Dr. Rico on 5/13/20.  Pathology as follows:\par \par Final Diagnosis\par 1. Brain tumor #1, biopsy:\par - Brain tissue with extensive necrosis and reactive changes.\par 2. Brain tumor #2, biopsy:\par - Brain tissue with extensive necrosis and reactive changes.\par 3. Brain tumor, resection:\par - Brain tissue with extensive necrosis and reactive changes.\par Note:  No viable tumor is identified.\par \par Post-op MRI brain 5/14/20 \par - Status post interval left parietal craniotomy with resection of the bulk of the heterogeneously enhancing lesion within the \par left parietal lobe. \par - Small volume of residual enhancement along the medial inferior portion of the resection cavity. \par \par She saw Dr. Rico earlier today.  Plan is for referral to Dr. Gutierrez for right VF deficit and PT.  She is taking Percocet for incisional pain with adequate relief.  Today, she reports feeling tired.  She denies dizziness, weakness, nausea, vomiting, gait disturbance.  \par \par 12/16/19 - Follow-up\par Ms. Mendoza returns for routine follow-up.  She was last seen by Dr. Sen on 9/18/19.  Plan was for neuro consult for memory loss, f/u with Dr. Peterson, [facility] in 3 months.  She saw Dr. Barclay on 10/16/19.  Plan was for TSH, B12/folate, vitamin D testing, sleep study, neuropsych testing, no driving due to decreased right sided vision, and ENT referral for tinnitus.  On 10/16/19 B12, folate and vitamin D levels were all WNL and Vitamin D level was low.  Neuropsych testing was not done. She saw Dr. Griffin on 12/4/19 for f/u of thyroid nodules and tinnitus.  Plan was for repeat thyroid US 12/12/19 and audiogram - pending.  Today, she feels well overall. No new problems to report. She is awaiting results of thyroid US from Dr. Griffin and plan for treatment. She has stable migraines that resolves with excedrin. \par She saw Dr. Peterson recently, next follow up and body imaging will be in March. \par \par 9/18/19 - Follow-up\par Ms. Mendoza returns today for routine follow-up.  She was last seen by Dr. Barrios on 6/3/19.  Plan at that time was for MRI brain in 3 months, nutrition consult and neurology consult for memory loss.  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 Perilesional FLAIR signal is stable, without mass effect. Continued surveillance is advised.  No new metastatic lesion to the brain. \par \par She also underwent surveillance CT CAP on 9/12/19 ordered by Dr. Peterson.  \par \par CT CAP 9/12/19 - IMPRESSION: Status post right lower lobe lobectomy. No recurrent or metastatic tumor seen in chest, abdomen, pelvis. The prior described nodule in the right middle lobe is not seen on this study. Could have represented bronchial mucus impaction. \par \par She last saw Dr. Peterson on Monday.  She states she has not seen neurology for workup of memory loss because she forgot.  Today, she reports feeling well.  She reports short-term memory loss is unchanged, but bothersome to her.  She notes intermitted headaches approximately once per month relieved by Excedrin.  She also notes blurry vision while reading for the past month.  She denies dizziness, lightheadedness, nausea, vomiting, hearing disturbances, unilateral weakness.  \par \par 6/3/19 - Follow-up\par Ms. Mendoza returns today for routine follow-up.  She was last seen here on 3/28/19.  She reported having imaging done at [facility] in February, but reports were not available at that time.  Plan at that time was MRI in May and follow-up with Dr. Peterson for systemic imaging.\par \par CT chest, abdomen and pelvis with contrast 2/7/19 ([facility]) - Impression: Since November 2, 2018 no new metastases \par \par MRI Brain with and without contrast 2/7/19 ([facility]) - Impression: Since November 2, 2018, evolving postoperative and treatment changes in the left parietal lobe.  Interval slightly increased enhancement marginating the resection cavity in the left parietal lobe is nonspecific and possibly reflects sequelae of postoperative change, however recommend continued surveillance to document stability.  \par \par MRI Brain with and without contrast 5/30/19 - IMPRESSION: Interval progression of enhancement along the left parietal \par resection cavity with stable to slightly increased surrounding FLAIR/T2 signal abnormality. This may represent recurrent tumoral disease or post therapeutic changes. \par \par She is unsure of the last time she saw Dr. Peterson, but states she is scheduled for follow-up again on 6/17/19. She had CT CAP for him on 5/30/19.  She saw endocrine, Dr. Nichols on 4/2/19; plan is for thyroid US in October.  TSH 1.27, free T4 1.2 on 4/15/19.  Today, she reports feeling "pretty good."  She notes unchanged intermittent headaches approximately once every 2 weeks, rated 5/10, little relief with motrin.  She also reports continued short term memory loss.  She also notes RUQ pain that she has "always" had and an episode of vomiting yesterday.  She states she has been trying to lose weight to no avail; requesting dietician referral.  She denies fevers, chills, dizziness, lightheadedness, unilateral weakness, visual/hearing disturbances, dyspnea, chest pain, palpitations, gait disturbances.  \par \par 3/28/19 - FOLLOW UP\par Ms. Mendoza is here for routine follow up. She was last seen here on 4/19/18. Insurance issues prevented her from coming back until now.  The plan at the time was to repeat MRI in 3 months and continue follow up with Dr. Peterson. She last saw Dr. Peterson approx two weeks ago, and as per patient, plan is for continued surveillance with chest CT and brain MRI. She reports she had a brain MRI and CTs of lungs done at [facility] around February 2019 under the care of Dr. Da Reveles; report/ disc not available at this time. Dr. Peterson's office will fax what records they have. \par \par MRI done 6/13/18 showed the following:\par -resolution of small right parietal lobe enhancing focus and edema, indicating favorable response to therapy. \par -There has been further evolution of left parietal surgical site with resolution of enhancement and increase in white matter signal now crossing the corpus callosum that is presumed post-RT change. \par -No new brain lesion given slight motion limitation of study. \par \par She saw Dr. Montano for cardiology evaluation of pulmonary hypertension prior to left shoulder surgery on 8/17/18. She was cleared for surgery at that visit and started on inhalers. Oxycodone was given for pain. She saw edward Tapia, for thyroid follow up on 10/4/18, found to be euthyroid; plan was for TFT and thyroid u/s, consider repeat FNA biopsy. She saw him last on 2/21/19, and was sent to ED for respiratory symptoms, SOB. She was found to have COPD exacerbation, managed with Prednisone and duonebs, and advised to follow up with pulmonologist Dr. Wallace. \par \par Of note, she had CT chest on 10/18/18 and it showed pulmonary emphysema; no evidence of disease recurrence. Plan is to repeat imaging in six months.  \par \par Today, she reports she has been feeling overall well. No new neuro complaints. Continues to have memory impairment (short term). She also reports a "nagging" soreness to left side of scalp for the past few weeks, does not recall any head injuries. She denies any other c/o to include headache, vision changes, numbness, tingling, fever, chills, fatigue, CP, SOB, cough, N/V/D. \par ______________________________________________________\par \par 4/19/18- FOLLOW UP\par Ms. Mendoza presents today for routine follow up. Since her last visit with us she saw Dr. Gutierrez of ophthalmology. \par \par Her PET scan on 2/27/18 showed that since 11/30/17 there was a subcentimeter hypermetabolic lymph node in the left inguinal region of uncertain significance. She saw Dr. Mann regarding this on 3/15/18, and he recommended follow up with her PCP Dr. Vignesh Wallace regarding viral illness. \par \par She has been feeling overall well. No new neuro complaints. Continues to have memory loss.\par \par Oncologic History\par Ms Ashlie Mendoza is a 57y old female with a history of adenocarcinoma of the lung initially diagnosed during per-operative workup for surgery in July 2017 for an orthopedic issue. She underwent right lower lobectomy with en bloc middle lobe resection on 10/24/2016, which revealed  pT3, pN1 with visceral pleura and LVI, involving 2/4 peribronchial lymph nodes. She completed chemo between January and March 2017. She did not receive radiation therapy though prior to undergoing surgery she was simulated for potential radiation therapy.  \par \par Her current relevant history dates back to July when she began having headaches.  October 3 when she reports not "feeling right,"  accompanied by feeling hot. She called her son and told him that she "was not feeling right" who then called the ambulance. She reports she woke up in the emergency room at [facility] in [place]. Upon finding a mass in her head, she was transferred to [facility]. She is unsure if she lost consciousness.\par \par She underwent craniotomy with left parietooccipital mass resection 10/4/17  for metastasis presumably from her lung cancer at [place]. We do not have pathology available at this time. Her post-op course was uneventful and she was subsequently discharged to rehab. She presented to [facility] from rehab on  10/19/17. MRI brain showed hematoma at the resection site, and along the tract site. There was nodular enhancement surrounding the hematoma and extending along the tract site. \par \par \par 1/3 - Today she feels well. She denies nausea, notes intermittent headaches, relieved by 30 mg oxycodone PRN. She has not tried tylenol for this. Her memory is improving. She notes right visual field cut is improving. Her energy is improving. her keppra was recently stopped by her neurosurgeon. She will see neurology at the end of the month, and will see Dr. Peterson at the end of the month.  MRI from today shows no evidence of residual disease at site of tumor.  There is a new 0.6 cm right parietal enhancing lesion suspicious of metastasis. \par  \par 2/13/18-PTE\par Ms. Mendoza completed radiation therapy for a total of 2700 cGy from 11/14/17- 11/20/17 over 3 fractions for treatment of left parieto-occipital mets. She had a cerebral hemorrhage in Nov. 2017 post treatment and was hospitalized in [facility].  On follow up surveillance MRI brain 1/3/18 she was found to a have a new 5 mm right parietal enhancing mass s/p Gamma Knife stereotactic radiosurgery on 1/18/2018 by Dr. Rico and Dr. Owen at [facility].\par Since her last visit she has f/u with Dr. Barclay(neuro) in Jan. 2018 and Dr. Rico yesterday. She has trouble with right peripheral vision and has appt. with neuro opthalmology (Andrea Gutierrez) on 2/16/18. \par Today she feels well. Has occasional headaches, takes tylenol, not persistent and don't feel like they did before. Denies seizures. Has pain in right shoulder and was told that she needs surgery but has been postponing. Takes OxyContin for shoulder and lower back pain as needed. She has appt. for thyroid scan today. She will f/u with Dr. Peterson in March and will have PET before. She also has bilateral thigh weakness at times, possibly related to steroids.

## 2020-06-22 ENCOUNTER — APPOINTMENT (OUTPATIENT)
Dept: NEUROSURGERY | Facility: CLINIC | Age: 60
End: 2020-06-22

## 2020-06-22 ENCOUNTER — APPOINTMENT (OUTPATIENT)
Dept: MRI IMAGING | Facility: HOSPITAL | Age: 60
End: 2020-06-22

## 2020-06-23 ENCOUNTER — NON-APPOINTMENT (OUTPATIENT)
Age: 60
End: 2020-06-23

## 2020-06-23 ENCOUNTER — APPOINTMENT (OUTPATIENT)
Dept: OPHTHALMOLOGY | Facility: CLINIC | Age: 60
End: 2020-06-23
Payer: MEDICARE

## 2020-06-23 PROCEDURE — 92014 COMPRE OPH EXAM EST PT 1/>: CPT

## 2020-06-23 PROCEDURE — 92083 EXTENDED VISUAL FIELD XM: CPT

## 2020-07-13 ENCOUNTER — APPOINTMENT (OUTPATIENT)
Dept: NEUROSURGERY | Facility: CLINIC | Age: 60
End: 2020-07-13
Payer: MEDICARE

## 2020-07-13 VITALS
RESPIRATION RATE: 18 BRPM | DIASTOLIC BLOOD PRESSURE: 70 MMHG | HEART RATE: 90 BPM | BODY MASS INDEX: 35.99 KG/M2 | SYSTOLIC BLOOD PRESSURE: 109 MMHG | WEIGHT: 216 LBS | OXYGEN SATURATION: 98 % | TEMPERATURE: 97.8 F | HEIGHT: 65 IN

## 2020-07-13 DIAGNOSIS — Z51.89 ENCOUNTER FOR OTHER SPECIFIED AFTERCARE: ICD-10-CM

## 2020-07-13 PROCEDURE — 99024 POSTOP FOLLOW-UP VISIT: CPT

## 2020-07-13 NOTE — ASSESSMENT
[FreeTextEntry1] : Incision healing well with no s/s infection - no tenderness on palpation; no drainage, erythema. wound edges well approximated.\par Discussed need for repeat MRI brain with and without contrast in one month. \par Dr. Rico in during visit and evaluated patient. He agrees with above assessment.\par \par The patient was educated regarding disease process and verbalizes understanding regarding plan of care.

## 2020-07-13 NOTE — REASON FOR VISIT
[de-identified] : Left parietal craniotomy for resection of brain tumor [de-identified] : 5/13/2020 [de-identified] : \par TODAY'S VISIT:\par \souleymane Called office last week to state that she noticed a "small bump" near incision. Denies drainage, fevers.\par Comes to the office today for wound check.\par She is doing well. She reports scalp pain from previous office visits has subsided. She is no longer taking pain medications for incisional pain. She does report persistent right visual field cut, for which she saw Dr. Andrea Gutierrez. She will return in 3-4 months for re-evaluation.\par She has been seen by Dr. Barrios. She is due for repeat MRI brain with and without contrast in 3 months.\par \par PREVIOUS VISIT 6/15/2020:\par She has been taking percocet prn over the weekend and reports improvement in LEFT scalp pain. \par Denies s/s infection including fever, chills, wound drainage. She reports scalp pain is worse with palpation.\par \par PREVIOUS VISIT 6/12/2020:\par She called the office today reporting worsening LEFT Sided pain and she was asked to come to the office for further evaluation. She does state she was very busy in the beginning of the week, running errands and cooking. Pain developed after these activities. She denies injury to the head. Currently rates pain 8/10.\par Reports worsening LEFT sided pain at top of head below incision, worsening over the last 4 days. \par \par She did take oxycodone when the pain started but feels that it made her more fatigued and it did not alleviate the pain so she stopped taking it. \par She denies s/s infection including fever, chills, incisional drainage. Denies neck pain, nausea/vomiting. Denies focal weakness, changes in vision/speech.\par \par \par She states pain is worse with palpation. Pain is not affected by position. Denies rhinorrhea/otorrhea. She took tylenol earlier today without relief.  \par \par PREVIOUS VISIT 5/29/2020:\par Reports she is doing well.\par Denies any signs of postop wound infection which could include but is not limited to redness/swelling/purulent drainage\par Denies CP/SOB/unilateral leg edema. Denies headaches, nausea, vomiting, dizziness, weakness. \par She is slowly introducing preop activities\par \par Denies seizures.\par \par She remains on keppra 1000 mg bid and dexamethasone 2 mg bid. \par She feels her RIGHT hand weakness has improved. She feels that her coordination in the RIGHT hand is still somewhat "off" and she can tell by her handwriting. She reports forgetfulness.\par \par She continues to report a right visual field cut.

## 2020-07-13 NOTE — PHYSICAL EXAM
[General Appearance - Alert] : alert [General Appearance - In No Acute Distress] : in no acute distress [Healing Well] : healing well [Intact] : intact [No Drainage] : without drainage [Normal Skin] : normal [Oriented To Time, Place, And Person] : oriented to person, place, and time [Motor Tone] : muscle tone was normal in all four extremities [Motor Strength] : muscle strength was normal in all four extremities [Sclera] : the sclera and conjunctiva were normal [Neck Appearance] : the appearance of the neck was normal [] : no respiratory distress [Respiration, Rhythm And Depth] : normal respiratory rhythm and effort [Abnormal Walk] : normal gait [Skin Color & Pigmentation] : normal skin color and pigmentation [Erythema] : not erythematous [Tender] : not tender [Warm] : not warm [Fluctuant] : not fluctuant [FreeTextEntry1] : LEFT frontotemporal healing well; no drainage expressed; wound edges well approximated

## 2020-07-24 ENCOUNTER — APPOINTMENT (OUTPATIENT)
Dept: NEUROSURGERY | Facility: CLINIC | Age: 60
End: 2020-07-24
Payer: MEDICARE

## 2020-07-24 VITALS
TEMPERATURE: 98.7 F | HEART RATE: 73 BPM | WEIGHT: 216 LBS | HEIGHT: 65 IN | BODY MASS INDEX: 35.99 KG/M2 | SYSTOLIC BLOOD PRESSURE: 99 MMHG | RESPIRATION RATE: 18 BRPM | OXYGEN SATURATION: 98 % | DIASTOLIC BLOOD PRESSURE: 70 MMHG

## 2020-07-24 PROCEDURE — 99024 POSTOP FOLLOW-UP VISIT: CPT

## 2020-07-25 RX ORDER — IBUPROFEN 600 MG/1
600 TABLET, FILM COATED ORAL
Qty: 28 | Refills: 0 | Status: DISCONTINUED | COMMUNITY
Start: 2020-06-19 | End: 2020-07-25

## 2020-07-27 NOTE — ADDENDUM
[FreeTextEntry1] : 2020 \par  \par OFFICE FOLLOWUP NOTE \par  \par  \par Re:	Ashlie Mendoza  \par :	[birth year] \par MRN:  [id number] \par  \par Ms. Mendoza is a 59-year-old woman who underwent left parietal craniotomy for resection of an enhancing brain lesion on May 13, 2020. The pathology report indicated radiation necrosis. Perioperatively she did very well and was discharged home. She was tapered off steroids, as well as Keppra. However, 4 days ago she had an episode of speech arrest and right side weakness and was taken to an outside hospital where she was thought to be having focal seizures causing expressive aphasia. Brain MRI at that time revealed increased left parietal edema. She was restarted on dexamethasone, as well as Keppra. She returned to her baseline and she now returns to me for followup. At this point she is on dexamethasone 6 mg q.8h, as well as Keppra 750 mg b.i.d. On neurological exam she is nonfocal. \par  \par Overall it seems like Ms. Mendoza did indeed have a focal seizure related to increased cerebral edema. I believe this edema is a consequence of the radiation necrosis for which she did have a resection of the symptomatic mass like enhancing portion. We will plan for her to have a confirmatory brain MRI with perfusion study. If she does not have symptomatic improvement with the steroids, she may be a candidate for intravenous Avastin treatment. We will plan to taper her dexamethasone over 2 weeks to 2 mg b.i.d. We will also put her on Keppra 1 gram b.i.d. She will let me know if there are any issues with this, and I will see her after the brain MRI has been performed. \par  \par  \par  \par  \par  \par Kapil Rico M.D. \par  of Neurosurgery \par [facility] \[facility] Department of Neurosurgery \par [facility] \par [address] \par [facility], Third Floor \[place] [address] \par Tel: [phone] \par Email:  [email or web address] \par  \par Dictated but not read. \par  \par JE/rlp DocuMed #[id number] \par  \par

## 2020-07-27 NOTE — PHYSICAL EXAM
[General Appearance - Alert] : alert [General Appearance - In No Acute Distress] : in no acute distress [Healing Well] : healing well [Intact] : intact [No Drainage] : without drainage [Normal Skin] : normal [Oriented To Time, Place, And Person] : oriented to person, place, and time [Motor Tone] : muscle tone was normal in all four extremities [Motor Strength] : muscle strength was normal in all four extremities [Sclera] : the sclera and conjunctiva were normal [Neck Appearance] : the appearance of the neck was normal [] : no respiratory distress [Respiration, Rhythm And Depth] : normal respiratory rhythm and effort [Abnormal Walk] : normal gait [Skin Color & Pigmentation] : normal skin color and pigmentation [Erythema] : not erythematous [Tender] : not tender [Fluctuant] : not fluctuant [Warm] : not warm [FreeTextEntry6] : RIGHT upper extremity 4/5 strength [FreeTextEntry1] : LEFT frontotemporal healing well; no drainage expressed; wound edges well approximated

## 2020-07-27 NOTE — ASSESSMENT
[FreeTextEntry1] : Ms. Mendoza is doing better, aphasia has improved and RIGHT upper extremity weakness with significant improvement with steroids (currently on dexamethasone 6 mg q8h)\par Recommend 2 week taper of dexamethasone; will remain on 2 mg dexamethasone after taper - instructions written and reviewed with patient and patient's son.\par Continue to take pantoprazole 40 mg daily and take steroids with food to prevent GI upset.\par Increase keppra to 1000 mg bid.\par Recommend MRI brain with and without contrast with perfusion protocol to determine radiation injury vs disease progression - scheduled for 7/30 at 5 PM.\par Return to the office after MRI brain complete to review.\par Education provided regarding plan of care.\par \par Teachback method implemented.\par Patient and patient's son verbalize agreement and understanding with plan of care. \par

## 2020-07-27 NOTE — REASON FOR VISIT
[Follow-Up: _____] : a [unfilled] follow-up visit [FreeTextEntry1] : \par TODAY'S VISIT:\par Ms. Mendoza was hospitalized at [facility] due to sudden onset difficulty speaking as well as RIGHT arm weakness.\par She was admitted and given steroids and seizure medication.\par She comes today for follow up after hospitalization.\par \par She is currently on 6 mg dexamethasone every 8 hours and 750 mg keppra bid.\par She comes to the appointment with her son. \par She reports significant improvement in RIGHT arm weakness as well as aphasia. Her son, Ty, states that she does have mild difficulty word finding but it is largely improved. \par \par PREVIOUS OFFICE VISIT 7/13/2020:\par \souleymane Called office last week to state that she noticed a "small bump" near incision. Denies drainage, fevers.\par Comes to the office today for wound check.\par She is doing well. She reports scalp pain from previous office visits has subsided. She is no longer taking pain medications for incisional pain. She does report persistent right visual field cut, for which she saw Dr. Andrea Gutierrez. She will return in 3-4 months for re-evaluation.\par She has been seen by Dr. Barrios. She is due for repeat MRI brain with and without contrast in 3 months.\par \par PREVIOUS VISIT 6/15/2020:\par She has been taking percocet prn over the weekend and reports improvement in LEFT scalp pain. \par Denies s/s infection including fever, chills, wound drainage. She reports scalp pain is worse with palpation.\par \par PREVIOUS VISIT 6/12/2020:\par She called the office today reporting worsening LEFT Sided pain and she was asked to come to the office for further evaluation. She does state she was very busy in the beginning of the week, running errands and cooking. Pain developed after these activities. She denies injury to the head. Currently rates pain 8/10.\par Reports worsening LEFT sided pain at top of head below incision, worsening over the last 4 days. \par \par She did take oxycodone when the pain started but feels that it made her more fatigued and it did not alleviate the pain so she stopped taking it. \par She denies s/s infection including fever, chills, incisional drainage. Denies neck pain, nausea/vomiting. Denies focal weakness, changes in vision/speech.\par \par \par She states pain is worse with palpation. Pain is not affected by position. Denies rhinorrhea/otorrhea. She took tylenol earlier today without relief.  \par \par PREVIOUS VISIT 5/29/2020:\par Reports she is doing well.\par Denies any signs of postop wound infection which could include but is not limited to redness/swelling/purulent drainage\par Denies CP/SOB/unilateral leg edema. Denies headaches, nausea, vomiting, dizziness, weakness. \par She is slowly introducing preop activities\par \par Denies seizures.\par \par She remains on keppra 1000 mg bid and dexamethasone 2 mg bid. \par She feels her RIGHT hand weakness has improved. She feels that her coordination in the RIGHT hand is still somewhat "off" and she can tell by her handwriting. She reports forgetfulness.\par \par She continues to report a right visual field cut. [de-identified] : Left parietal craniotomy for resection of brain tumor [de-identified] : 5/13/2020 [de-identified] : \par PREVIOUS OFFICE VISIT 7/13/2020:\par \par Called office last week to state that she noticed a "small bump" near incision. Denies drainage, fevers.\par Comes to the office today for wound check.\par She is doing well. She reports scalp pain from previous office visits has subsided. She is no longer taking pain medications for incisional pain. She does report persistent right visual field cut, for which she saw Dr. Andrea Gutierrez. She will return in 3-4 months for re-evaluation.\par She has been seen by Dr. Barrios. She is due for repeat MRI brain with and without contrast in 3 months.\par \par PREVIOUS VISIT 6/15/2020:\par She has been taking percocet prn over the weekend and reports improvement in LEFT scalp pain. \par Denies s/s infection including fever, chills, wound drainage. She reports scalp pain is worse with palpation.\par \par PREVIOUS VISIT 6/12/2020:\par She called the office today reporting worsening LEFT Sided pain and she was asked to come to the office for further evaluation. She does state she was very busy in the beginning of the week, running errands and cooking. Pain developed after these activities. She denies injury to the head. Currently rates pain 8/10.\par Reports worsening LEFT sided pain at top of head below incision, worsening over the last 4 days. \par \par She did take oxycodone when the pain started but feels that it made her more fatigued and it did not alleviate the pain so she stopped taking it. \par She denies s/s infection including fever, chills, incisional drainage. Denies neck pain, nausea/vomiting. Denies focal weakness, changes in vision/speech.\par \par \par She states pain is worse with palpation. Pain is not affected by position. Denies rhinorrhea/otorrhea. She took tylenol earlier today without relief.  \par \par PREVIOUS VISIT 5/29/2020:\par Reports she is doing well.\par Denies any signs of postop wound infection which could include but is not limited to redness/swelling/purulent drainage\par Denies CP/SOB/unilateral leg edema. Denies headaches, nausea, vomiting, dizziness, weakness. \par She is slowly introducing preop activities\par \par Denies seizures.\par \par She remains on keppra 1000 mg bid and dexamethasone 2 mg bid. \par She feels her RIGHT hand weakness has improved. She feels that her coordination in the RIGHT hand is still somewhat "off" and she can tell by her handwriting. She reports forgetfulness.\par \par She continues to report a right visual field cut.

## 2020-07-30 ENCOUNTER — RESULT REVIEW (OUTPATIENT)
Age: 60
End: 2020-07-30

## 2020-07-30 ENCOUNTER — APPOINTMENT (OUTPATIENT)
Dept: MRI IMAGING | Facility: HOSPITAL | Age: 60
End: 2020-07-30
Payer: MEDICARE

## 2020-07-30 ENCOUNTER — OUTPATIENT (OUTPATIENT)
Dept: OUTPATIENT SERVICES | Facility: HOSPITAL | Age: 60
LOS: 1 days | End: 2020-07-30
Payer: MEDICARE

## 2020-07-30 DIAGNOSIS — Z41.9 ENCOUNTER FOR PROCEDURE FOR PURPOSES OTHER THAN REMEDYING HEALTH STATE, UNSPECIFIED: Chronic | ICD-10-CM

## 2020-07-30 DIAGNOSIS — Z98.890 OTHER SPECIFIED POSTPROCEDURAL STATES: Chronic | ICD-10-CM

## 2020-07-30 DIAGNOSIS — Z90.49 ACQUIRED ABSENCE OF OTHER SPECIFIED PARTS OF DIGESTIVE TRACT: Chronic | ICD-10-CM

## 2020-07-30 PROCEDURE — 70553 MRI BRAIN STEM W/O & W/DYE: CPT

## 2020-07-30 PROCEDURE — 70553 MRI BRAIN STEM W/O & W/DYE: CPT | Mod: 26

## 2020-07-30 PROCEDURE — A9585: CPT

## 2020-08-03 ENCOUNTER — APPOINTMENT (OUTPATIENT)
Dept: NEUROSURGERY | Facility: CLINIC | Age: 60
End: 2020-08-03
Payer: MEDICARE

## 2020-08-03 PROCEDURE — 99024 POSTOP FOLLOW-UP VISIT: CPT

## 2020-08-04 VITALS
DIASTOLIC BLOOD PRESSURE: 78 MMHG | SYSTOLIC BLOOD PRESSURE: 110 MMHG | HEIGHT: 65 IN | HEART RATE: 78 BPM | TEMPERATURE: 98.2 F | RESPIRATION RATE: 18 BRPM | OXYGEN SATURATION: 98 %

## 2020-08-04 NOTE — PHYSICAL EXAM
[General Appearance - Alert] : alert [Healing Well] : healing well [Intact] : intact [General Appearance - In No Acute Distress] : in no acute distress [No Drainage] : without drainage [Normal Skin] : normal [Oriented To Time, Place, And Person] : oriented to person, place, and time [Motor Tone] : muscle tone was normal in all four extremities [Motor Strength] : muscle strength was normal in all four extremities [Neck Appearance] : the appearance of the neck was normal [Sclera] : the sclera and conjunctiva were normal [Respiration, Rhythm And Depth] : normal respiratory rhythm and effort [] : no respiratory distress [Abnormal Walk] : normal gait [Skin Color & Pigmentation] : normal skin color and pigmentation [Erythema] : not erythematous [Tender] : not tender [Warm] : not warm [Fluctuant] : not fluctuant [FreeTextEntry1] : LEFT frontotemporal healing well; no drainage expressed; wound edges well approximated [FreeTextEntry6] : RIGHT upper extremity 4/5 strength

## 2020-08-04 NOTE — REASON FOR VISIT
[Follow-Up: _____] : a [unfilled] follow-up visit [FreeTextEntry1] : \par TODAY'S VISIT:\par \souleymane Returns to review MRI brain with and without contrast from 7/30/2020.\par \par She is currently on dexamethasone 2 mg every 8 hours. She is currently on 1000 mg keppra bid.\par She reports resolution of difficulty word finding and slurred speech. She reports improvement in RIGHT arm weakness. Denies seizures.\par \par \par PREVIOUS VISIT 7/24/2020:\par Ms. Mendoza was hospitalized at [facility] due to sudden onset difficulty speaking as well as RIGHT arm weakness.\par She was admitted and given steroids and seizure medication.\par She comes today for follow up after hospitalization.\par \par She is currently on 6 mg dexamethasone every 8 hours and 750 mg keppra bid.\par She comes to the appointment with her son. \par She reports significant improvement in RIGHT arm weakness as well as aphasia. Her son, Ty, states that she does have mild difficulty word finding but it is largely improved. \par \par PREVIOUS OFFICE VISIT 7/13/2020:\par \par Called office last week to state that she noticed a "small bump" near incision. Denies drainage, fevers.\par Comes to the office today for wound check.\par She is doing well. She reports scalp pain from previous office visits has subsided. She is no longer taking pain medications for incisional pain. She does report persistent right visual field cut, for which she saw Dr. Andrea Gutierrez. She will return in 3-4 months for re-evaluation.\par She has been seen by Dr. Barrios. She is due for repeat MRI brain with and without contrast in 3 months.\par \par PREVIOUS VISIT 6/15/2020:\par She has been taking percocet prn over the weekend and reports improvement in LEFT scalp pain. \par Denies s/s infection including fever, chills, wound drainage. She reports scalp pain is worse with palpation.\par \par PREVIOUS VISIT 6/12/2020:\par She called the office today reporting worsening LEFT Sided pain and she was asked to come to the office for further evaluation. She does state she was very busy in the beginning of the week, running errands and cooking. Pain developed after these activities. She denies injury to the head. Currently rates pain 8/10.\par Reports worsening LEFT sided pain at top of head below incision, worsening over the last 4 days. \par \par She did take oxycodone when the pain started but feels that it made her more fatigued and it did not alleviate the pain so she stopped taking it. \par She denies s/s infection including fever, chills, incisional drainage. Denies neck pain, nausea/vomiting. Denies focal weakness, changes in vision/speech.\par \par \par She states pain is worse with palpation. Pain is not affected by position. Denies rhinorrhea/otorrhea. She took tylenol earlier today without relief.  \par \par PREVIOUS VISIT 5/29/2020:\par Reports she is doing well.\par Denies any signs of postop wound infection which could include but is not limited to redness/swelling/purulent drainage\par Denies CP/SOB/unilateral leg edema. Denies headaches, nausea, vomiting, dizziness, weakness. \par She is slowly introducing preop activities\par \par Denies seizures.\par \par She remains on keppra 1000 mg bid and dexamethasone 2 mg bid. \par She feels her RIGHT hand weakness has improved. She feels that her coordination in the RIGHT hand is still somewhat "off" and she can tell by her handwriting. She reports forgetfulness.\par \par She continues to report a right visual field cut.

## 2020-08-04 NOTE — DATA REVIEWED
[de-identified] : \par EXAM: MR BRAIN WAW IC \par \par PROCEDURE DATE: 07/30/2020 \par \par \par \par INTERPRETATION: PROCEDURE: MRI Brain with and without contrast \par \par INDICATION: Intracranial metastases status post resection and radiation therapy. Recent resection of radiation necrosis. \par \par TECHNIQUE: Sagittal and axial T1, axial and coronal T2 FLAIR, axial T2, GRE and diffusion imaging of the brain is obtained. Following the intravenous administration of 7.5 cc Gadavist contrast material, axial T1 spin-echo imaging is obtained followed by sagittal T1 volumetric imaging with MPR provided. \par \par COMPARISON: Multiple prior MRI brain most recent dated 5/14/2020 \par \par FINDINGS: \par \par Again demonstrated status post left parietal craniotomy for resection of the medial left parietal lobe lesion. There is mild increase in size in the surgical cavity within the paramedial posterior left parietal lobe. There is decreased T1 hyperintensity and susceptibility artifact in the surgical cavity as well as mild restricted diffusion secondary to hemorrhagic products. Again noted is a thin extra-axial fluid collection which is deep to the craniotomy site. There is dural thickening and enhancement deep to the craniotomy site likely secondary postsurgical changes. There is been interval resolution of the susceptibility artifact within the left frontal convexity likely representing focus of air. \par \par There is been interval decrease vasogenic edema in the left temporal and occipital lobes with essentially unchanged increased and edema in the left parietal lobe and extending across the splenium of the corpus callosum. There is mild decreased mass effect on the atria of the left lateral ventricle. \par \par There is increased irregular peripheral enhancement along the anterior, medial and inferior margins of the resection cavity extending to the ependymal surface of the left lateral ventricle which measures 2.9 cm AP x 1.5 cm transverse x 2 cm craniocaudal (series 11 image 20 and series 10 image 71). \par \par There is no new enhancing parenchymal lesion. \par \par The ventricles and sulci are stable size and configuration from prior exam. There is no acute intracranial hemorrhage or midline shift. \par \par The diffusion-weighted images demonstrate no recent infarction. \par \par The vascular flow voids are present. \par \par The sella and suprasellar regions are unremarkable. \par \par The visualized paranasal sinuses are free of mucosal disease. The mastoid air cells are well-aerated. \par \par IMPRESSION: \par \par Since prior MRI brain 5/14/2020: \par \par Redemonstrated left parietal craniotomy for resection of the left posterior parietal lesion. Interval decrease in surrounding vasogenic edema within the left occipital and temporal lobes with decreased mass effect. Stable vasogenic edema within the left parietal lobe. Evolution of surgical cavity within the left parietal lobe with mild increase in size of the surgical cavity. \par \par Interval increase in irregular peripheral enhancement surrounding the surgical cavity extending to the ependymal surface. These findings may be secondary to posttreatment changes including radiation necrosis (as previously demonstrated on prior surgical resection) however tumoral disease cannot be excluded and continued follow-up is recommended. \par \par \par \par \par Thank you for the opportunity to participate in the care of this patient. \par \par \par \par KIRSTIE POWERS M.D., ATTENDING RADIOLOGIST \par This document has been electronically signed. Jul 31 2020 10:06AM \par \par \par \par \par \par \par \par \par    \par \par \par \par \par \par \par \par \par \par \par \par \par \par \par \par   \par  \par  \par \par \par Notes

## 2020-08-09 ENCOUNTER — APPOINTMENT (OUTPATIENT)
Dept: CT IMAGING | Facility: HOSPITAL | Age: 60
End: 2020-08-09

## 2020-08-30 ENCOUNTER — OUTPATIENT (OUTPATIENT)
Dept: OUTPATIENT SERVICES | Facility: HOSPITAL | Age: 60
LOS: 1 days | End: 2020-08-30
Payer: MEDICARE

## 2020-08-30 ENCOUNTER — APPOINTMENT (OUTPATIENT)
Dept: MRI IMAGING | Facility: HOSPITAL | Age: 60
End: 2020-08-30
Payer: MEDICARE

## 2020-08-30 ENCOUNTER — RESULT REVIEW (OUTPATIENT)
Age: 60
End: 2020-08-30

## 2020-08-30 DIAGNOSIS — Z98.890 OTHER SPECIFIED POSTPROCEDURAL STATES: Chronic | ICD-10-CM

## 2020-08-30 DIAGNOSIS — Z90.49 ACQUIRED ABSENCE OF OTHER SPECIFIED PARTS OF DIGESTIVE TRACT: Chronic | ICD-10-CM

## 2020-08-30 DIAGNOSIS — Z41.9 ENCOUNTER FOR PROCEDURE FOR PURPOSES OTHER THAN REMEDYING HEALTH STATE, UNSPECIFIED: Chronic | ICD-10-CM

## 2020-08-30 PROCEDURE — 70553 MRI BRAIN STEM W/O & W/DYE: CPT | Mod: 26

## 2020-08-30 PROCEDURE — 70553 MRI BRAIN STEM W/O & W/DYE: CPT

## 2020-08-30 PROCEDURE — A9585: CPT

## 2020-08-31 ENCOUNTER — APPOINTMENT (OUTPATIENT)
Dept: NEUROSURGERY | Facility: CLINIC | Age: 60
End: 2020-08-31
Payer: MEDICARE

## 2020-08-31 VITALS
WEIGHT: 216 LBS | SYSTOLIC BLOOD PRESSURE: 109 MMHG | DIASTOLIC BLOOD PRESSURE: 78 MMHG | OXYGEN SATURATION: 98 % | TEMPERATURE: 97.7 F | HEART RATE: 79 BPM | RESPIRATION RATE: 18 BRPM | HEIGHT: 65 IN | BODY MASS INDEX: 35.99 KG/M2

## 2020-08-31 PROCEDURE — 99215 OFFICE O/P EST HI 40 MIN: CPT

## 2020-09-01 NOTE — DATA REVIEWED
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 Surveillance imaging. \par \par TECHNIQUE: Sagittal and axial T1, axial T2 FFE and diffusion imaging of the brain is obtained as well as sagittal 3-D T2-FLAIR imaging with MPR provided. Following the bolus intravenous administration of 7.5 cc Gadavist contrast material, dynamic susceptibility perfusion imaging is obtained with leakage-corrected rCBV maps sent to PACS, followed by axial T1 and T2 postcontrast imaging as well as sagittal volumetric SPGR imaging with axial and coronal reformations provided. \par \par COMPARISON: Brain MRI 7/30/2020, 05/14/2020 \par \par FINDINGS: \par \par When compared to 07/30/2020, this second postoperative surveillance study shows no significant change in size or configuration of residual heterogeneous enhancement in the left parietal lobe deep to recent surgical resection and less-so at the superior occipital lobe. This measures up to 2.8 cm in the axial plane (series 901, images 17-18) and up to 2.6 CM in craniocaudad dimension (series 1003, image 31). Superiorly, enhancement appears somewhat foamy (series 901, image 20) and is most reminiscent of radiation necrosis. There is no increasing or suspicious nodular enhancement. Enhancement currently is more conspicuous on the SPGR/3-D images, that is likely a result of differing technique with the current 3-D imaging done in more delayed fashion. Enhancement at the occipital lobe (series 1001, image 78) appears more conspicuous compared to before, but unchanged on the spin-echo series 901, image 17). The T2-FLAIR images demonstrate improved vasogenic edema in the left parietal lobe and medial superior temporal lobe, with stable signal abnormality surrounding the sites of abnormal enhancement and stable signal abnormality crossing the midline via the splenium of the corpus callosum. On the perfusion images, there is mild blood volume appreciated on series 703 with relevant images saved as key images in PACS. The susceptibility images show parenchymal blood product deposition at this site which does limit assessment. However, quantitative perfusion compared to the contralateral side shows overall diminished perfusion at these sites, favoring the diagnosis of radiation necrosis. \par \par There is no hydrocephalus. There is mild ex vacuo enlargement of the left lateral ventricular atrium as postoperative change subsides. There is no mass effect or midline shift. Susceptibility images show no blood product deposition remote from the operative site. Diffusion imaging shows mild reduced diffusion at the enhancing abnormality which may be artifactual in the presence of blood products. No evidence of acute infarction. \par \par Postcontrast imaging shows no new site of abnormal enhancement. Specifically, there is no new metastatic lesion detected. Teeth to craniotomy, there is similar appearance of reactive dural enhancement without measurable fluid collection. No fluid collection or inflammatory change is evident in the scalp as well. 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 Edema has improved with otherwise stable FLAIR signal around the treated lesion and crossing midline via the splenium of the corpus callosum. Surveillance at 3-6 months is suggested. \par \par \par \par \par Thank you for the opportunity to participate in the care of this patient. \par \par CALLY PARSONS M.D., RADIOLOGY RESIDENT \par This document has been electronically signed. \par SCOTTIE CHÁVEZ M.D. ATTENDING RADIOLOGIST \par This document has been electronically signed. Aug 31 2020 6:34PM

## 2020-09-01 NOTE — REASON FOR VISIT
[Follow-Up: _____] : a [unfilled] follow-up visit [FreeTextEntry1] : \par TODAY'S VISIT:\par \souleymane Returns to review MRI brain with and without contrast from 8/30/2020.\par She is doing well. She reports improvement in RIGHT hand weakness. Denies seizures. She occasionally has diffculty with speech.\par Denies headaches, nausea/vomiting, changes in vision.\par She is currently on keppra 1000 mg bid and states that she feels drowsy and fatigued after taking keppra.\par She is on 2 mg dexamethasone bid.\par \par PREVIOUS OFFICE VISIT 8/3/2020:\par \par Returns to review MRI brain with and without contrast from 7/30/2020.\par \par She is currently on dexamethasone 2 mg every 8 hours. She is currently on 1000 mg keppra bid.\par She reports resolution of difficulty word finding and slurred speech. She reports improvement in RIGHT arm weakness. Denies seizures.\par \par \par PREVIOUS VISIT 7/24/2020:\par Ms. Mendoza was hospitalized at [facility] due to sudden onset difficulty speaking as well as RIGHT arm weakness.\par She was admitted and given steroids and seizure medication.\par She comes today for follow up after hospitalization.\par \par She is currently on 6 mg dexamethasone every 8 hours and 750 mg keppra bid.\par She comes to the appointment with her son. \par She reports significant improvement in RIGHT arm weakness as well as aphasia. Her son, Ty, states that she does have mild difficulty word finding but it is largely improved. \par \par PREVIOUS OFFICE VISIT 7/13/2020:\par \par Called office last week to state that she noticed a "small bump" near incision. Denies drainage, fevers.\par Comes to the office today for wound check.\par She is doing well. She reports scalp pain from previous office visits has subsided. She is no longer taking pain medications for incisional pain. She does report persistent right visual field cut, for which she saw Dr. Andrea Gutierrez. She will return in 3-4 months for re-evaluation.\par She has been seen by Dr. Barrios. She is due for repeat MRI brain with and without contrast in 3 months.\par \par PREVIOUS VISIT 6/15/2020:\par She has been taking percocet prn over the weekend and reports improvement in LEFT scalp pain. \par Denies s/s infection including fever, chills, wound drainage. She reports scalp pain is worse with palpation.\par \par PREVIOUS VISIT 6/12/2020:\par She called the office today reporting worsening LEFT Sided pain and she was asked to come to the office for further evaluation. She does state she was very busy in the beginning of the week, running errands and cooking. Pain developed after these activities. She denies injury to the head. Currently rates pain 8/10.\par Reports worsening LEFT sided pain at top of head below incision, worsening over the last 4 days. \par \par She did take oxycodone when the pain started but feels that it made her more fatigued and it did not alleviate the pain so she stopped taking it. \par She denies s/s infection including fever, chills, incisional drainage. Denies neck pain, nausea/vomiting. Denies focal weakness, changes in vision/speech.\par \par \par She states pain is worse with palpation. Pain is not affected by position. Denies rhinorrhea/otorrhea. She took tylenol earlier today without relief.  \par \par PREVIOUS VISIT 5/29/2020:\par Reports she is doing well.\par Denies any signs of postop wound infection which could include but is not limited to redness/swelling/purulent drainage\par Denies CP/SOB/unilateral leg edema. Denies headaches, nausea, vomiting, dizziness, weakness. \par She is slowly introducing preop activities\par \par Denies seizures.\par \par She remains on keppra 1000 mg bid and dexamethasone 2 mg bid. \par She feels her RIGHT hand weakness has improved. She feels that her coordination in the RIGHT hand is still somewhat "off" and she can tell by her handwriting. She reports forgetfulness.\par \par She continues to report a right visual field cut.

## 2020-09-01 NOTE — PHYSICAL EXAM
[General Appearance - Alert] : alert [General Appearance - In No Acute Distress] : in no acute distress [Healing Well] : healing well [Intact] : intact [No Drainage] : without drainage [Normal Skin] : normal [Oriented To Time, Place, And Person] : oriented to person, place, and time [Motor Tone] : muscle tone was normal in all four extremities [Motor Strength] : muscle strength was normal in all four extremities [Sclera] : the sclera and conjunctiva were normal [Neck Appearance] : the appearance of the neck was normal [] : no respiratory distress [Respiration, Rhythm And Depth] : normal respiratory rhythm and effort [Abnormal Walk] : normal gait [Skin Color & Pigmentation] : normal skin color and pigmentation [Erythema] : not erythematous [Tender] : not tender [Warm] : not warm [Fluctuant] : not fluctuant [FreeTextEntry1] : LEFT frontotemporal healing well; no drainage expressed; wound edges well approximated [FreeTextEntry6] : RIGHT upper extremity 4/5 strength

## 2020-09-01 NOTE — ASSESSMENT
[FreeTextEntry1] : MRI brain from 8/30/2020 reviewed by Dr. Rico, findings consistent with radiation necrosis and decreased edema.\par Neurologically, patient's symptoms are improving.\par Will reduce keppra to 750 mg bid due to increased fatigue.\par Will taper dexamethasone off over 2 weeks - take 2 mg daily X 7 days and 1 mg daily X 7 days then stop.\par If patient experiences worsening neurological s/s including but not limited to, weakness, speech difficulty, changes in vision, notify MD or report to ED.\par Recommend MRI brain with and without contrast in 3 months (November 2020).\par \par Education provided regarding disease process and plan of care.\par \par Patient verbalizes agreement and understanding with plan of care.\par

## 2020-09-17 ENCOUNTER — EMERGENCY (EMERGENCY)
Facility: HOSPITAL | Age: 60
LOS: 1 days | Discharge: HOME | End: 2020-09-17
Attending: EMERGENCY MEDICINE | Admitting: EMERGENCY MEDICINE
Payer: MEDICARE

## 2020-09-17 VITALS
TEMPERATURE: 98 F | OXYGEN SATURATION: 98 % | HEART RATE: 76 BPM | RESPIRATION RATE: 18 BRPM | DIASTOLIC BLOOD PRESSURE: 80 MMHG | SYSTOLIC BLOOD PRESSURE: 122 MMHG

## 2020-09-17 VITALS
WEIGHT: 214.07 LBS | SYSTOLIC BLOOD PRESSURE: 134 MMHG | HEIGHT: 65 IN | OXYGEN SATURATION: 95 % | DIASTOLIC BLOOD PRESSURE: 84 MMHG | TEMPERATURE: 98 F | RESPIRATION RATE: 18 BRPM | HEART RATE: 100 BPM

## 2020-09-17 DIAGNOSIS — Z90.49 ACQUIRED ABSENCE OF OTHER SPECIFIED PARTS OF DIGESTIVE TRACT: Chronic | ICD-10-CM

## 2020-09-17 DIAGNOSIS — Z98.890 OTHER SPECIFIED POSTPROCEDURAL STATES: Chronic | ICD-10-CM

## 2020-09-17 DIAGNOSIS — Z41.9 ENCOUNTER FOR PROCEDURE FOR PURPOSES OTHER THAN REMEDYING HEALTH STATE, UNSPECIFIED: Chronic | ICD-10-CM

## 2020-09-17 LAB
ALBUMIN SERPL ELPH-MCNC: 3.8 G/DL — SIGNIFICANT CHANGE UP (ref 3.3–5)
ALP SERPL-CCNC: 67 U/L — SIGNIFICANT CHANGE UP (ref 40–120)
ALT FLD-CCNC: 20 U/L — SIGNIFICANT CHANGE UP (ref 10–45)
ANION GAP SERPL CALC-SCNC: 11 MMOL/L — SIGNIFICANT CHANGE UP (ref 5–17)
APTT BLD: 29.7 SEC — SIGNIFICANT CHANGE UP (ref 27.5–35.5)
AST SERPL-CCNC: 17 U/L — SIGNIFICANT CHANGE UP (ref 10–40)
BASOPHILS # BLD AUTO: 0.02 K/UL — SIGNIFICANT CHANGE UP (ref 0–0.2)
BASOPHILS NFR BLD AUTO: 0.2 % — SIGNIFICANT CHANGE UP (ref 0–2)
BILIRUB SERPL-MCNC: 0.2 MG/DL — SIGNIFICANT CHANGE UP (ref 0.2–1.2)
BLD GP AB SCN SERPL QL: NEGATIVE — SIGNIFICANT CHANGE UP
BUN SERPL-MCNC: 13 MG/DL — SIGNIFICANT CHANGE UP (ref 7–23)
CALCIUM SERPL-MCNC: 9.8 MG/DL — SIGNIFICANT CHANGE UP (ref 8.4–10.5)
CHLORIDE SERPL-SCNC: 103 MMOL/L — SIGNIFICANT CHANGE UP (ref 96–108)
CO2 SERPL-SCNC: 28 MMOL/L — SIGNIFICANT CHANGE UP (ref 22–31)
CREAT SERPL-MCNC: 0.87 MG/DL — SIGNIFICANT CHANGE UP (ref 0.5–1.3)
EOSINOPHIL # BLD AUTO: 0.08 K/UL — SIGNIFICANT CHANGE UP (ref 0–0.5)
EOSINOPHIL NFR BLD AUTO: 0.9 % — SIGNIFICANT CHANGE UP (ref 0–6)
GLUCOSE SERPL-MCNC: 96 MG/DL — SIGNIFICANT CHANGE UP (ref 70–99)
HCT VFR BLD CALC: 43.5 % — SIGNIFICANT CHANGE UP (ref 34.5–45)
HGB BLD-MCNC: 13.9 G/DL — SIGNIFICANT CHANGE UP (ref 11.5–15.5)
IMM GRANULOCYTES NFR BLD AUTO: 0.4 % — SIGNIFICANT CHANGE UP (ref 0–1.5)
INR BLD: 0.97 — SIGNIFICANT CHANGE UP (ref 0.88–1.16)
LYMPHOCYTES # BLD AUTO: 3.07 K/UL — SIGNIFICANT CHANGE UP (ref 1–3.3)
LYMPHOCYTES # BLD AUTO: 34.3 % — SIGNIFICANT CHANGE UP (ref 13–44)
MCHC RBC-ENTMCNC: 29.2 PG — SIGNIFICANT CHANGE UP (ref 27–34)
MCHC RBC-ENTMCNC: 32 GM/DL — SIGNIFICANT CHANGE UP (ref 32–36)
MCV RBC AUTO: 91.4 FL — SIGNIFICANT CHANGE UP (ref 80–100)
MONOCYTES # BLD AUTO: 0.67 K/UL — SIGNIFICANT CHANGE UP (ref 0–0.9)
MONOCYTES NFR BLD AUTO: 7.5 % — SIGNIFICANT CHANGE UP (ref 2–14)
NEUTROPHILS # BLD AUTO: 5.06 K/UL — SIGNIFICANT CHANGE UP (ref 1.8–7.4)
NEUTROPHILS NFR BLD AUTO: 56.7 % — SIGNIFICANT CHANGE UP (ref 43–77)
NRBC # BLD: 0 /100 WBCS — SIGNIFICANT CHANGE UP (ref 0–0)
PLATELET # BLD AUTO: 224 K/UL — SIGNIFICANT CHANGE UP (ref 150–400)
POTASSIUM SERPL-MCNC: 3.8 MMOL/L — SIGNIFICANT CHANGE UP (ref 3.5–5.3)
POTASSIUM SERPL-SCNC: 3.8 MMOL/L — SIGNIFICANT CHANGE UP (ref 3.5–5.3)
PROT SERPL-MCNC: 7.2 G/DL — SIGNIFICANT CHANGE UP (ref 6–8.3)
PROTHROM AB SERPL-ACNC: 11.7 SEC — SIGNIFICANT CHANGE UP (ref 10.6–13.6)
RBC # BLD: 4.76 M/UL — SIGNIFICANT CHANGE UP (ref 3.8–5.2)
RBC # FLD: 14.6 % — HIGH (ref 10.3–14.5)
RH IG SCN BLD-IMP: POSITIVE — SIGNIFICANT CHANGE UP
SODIUM SERPL-SCNC: 142 MMOL/L — SIGNIFICANT CHANGE UP (ref 135–145)
TROPONIN T SERPL-MCNC: <0.01 NG/ML — SIGNIFICANT CHANGE UP (ref 0–0.01)
WBC # BLD: 8.94 K/UL — SIGNIFICANT CHANGE UP (ref 3.8–10.5)
WBC # FLD AUTO: 8.94 K/UL — SIGNIFICANT CHANGE UP (ref 3.8–10.5)

## 2020-09-17 PROCEDURE — 84484 ASSAY OF TROPONIN QUANT: CPT

## 2020-09-17 PROCEDURE — 85730 THROMBOPLASTIN TIME PARTIAL: CPT

## 2020-09-17 PROCEDURE — 86900 BLOOD TYPING SEROLOGIC ABO: CPT

## 2020-09-17 PROCEDURE — 85025 COMPLETE CBC W/AUTO DIFF WBC: CPT

## 2020-09-17 PROCEDURE — 86850 RBC ANTIBODY SCREEN: CPT

## 2020-09-17 PROCEDURE — 71045 X-RAY EXAM CHEST 1 VIEW: CPT | Mod: 26

## 2020-09-17 PROCEDURE — 71045 X-RAY EXAM CHEST 1 VIEW: CPT

## 2020-09-17 PROCEDURE — 80053 COMPREHEN METABOLIC PANEL: CPT

## 2020-09-17 PROCEDURE — 70450 CT HEAD/BRAIN W/O DYE: CPT

## 2020-09-17 PROCEDURE — 99284 EMERGENCY DEPT VISIT MOD MDM: CPT | Mod: 25

## 2020-09-17 PROCEDURE — 86901 BLOOD TYPING SEROLOGIC RH(D): CPT

## 2020-09-17 PROCEDURE — 85610 PROTHROMBIN TIME: CPT

## 2020-09-17 PROCEDURE — 96375 TX/PRO/DX INJ NEW DRUG ADDON: CPT

## 2020-09-17 PROCEDURE — 70450 CT HEAD/BRAIN W/O DYE: CPT | Mod: 26

## 2020-09-17 PROCEDURE — 36415 COLL VENOUS BLD VENIPUNCTURE: CPT

## 2020-09-17 PROCEDURE — 99282 EMERGENCY DEPT VISIT SF MDM: CPT

## 2020-09-17 PROCEDURE — 99285 EMERGENCY DEPT VISIT HI MDM: CPT

## 2020-09-17 PROCEDURE — 96374 THER/PROPH/DIAG INJ IV PUSH: CPT

## 2020-09-17 RX ORDER — METOCLOPRAMIDE HCL 10 MG
10 TABLET ORAL ONCE
Refills: 0 | Status: COMPLETED | OUTPATIENT
Start: 2020-09-17 | End: 2020-09-17

## 2020-09-17 RX ORDER — DEXAMETHASONE 0.5 MG/5ML
1 ELIXIR ORAL
Qty: 11 | Refills: 0
Start: 2020-09-17 | End: 2020-09-23

## 2020-09-17 RX ORDER — SODIUM CHLORIDE 9 MG/ML
1000 INJECTION INTRAMUSCULAR; INTRAVENOUS; SUBCUTANEOUS ONCE
Refills: 0 | Status: COMPLETED | OUTPATIENT
Start: 2020-09-17 | End: 2020-09-17

## 2020-09-17 RX ORDER — ACETAMINOPHEN 500 MG
650 TABLET ORAL ONCE
Refills: 0 | Status: COMPLETED | OUTPATIENT
Start: 2020-09-17 | End: 2020-09-17

## 2020-09-17 RX ORDER — HYDROMORPHONE HYDROCHLORIDE 2 MG/ML
0.5 INJECTION INTRAMUSCULAR; INTRAVENOUS; SUBCUTANEOUS ONCE
Refills: 0 | Status: DISCONTINUED | OUTPATIENT
Start: 2020-09-17 | End: 2020-09-17

## 2020-09-17 RX ADMIN — Medication 10 MILLIGRAM(S): at 16:03

## 2020-09-17 RX ADMIN — Medication 650 MILLIGRAM(S): at 16:28

## 2020-09-17 RX ADMIN — HYDROMORPHONE HYDROCHLORIDE 0.5 MILLIGRAM(S): 2 INJECTION INTRAMUSCULAR; INTRAVENOUS; SUBCUTANEOUS at 16:27

## 2020-09-17 RX ADMIN — HYDROMORPHONE HYDROCHLORIDE 0.5 MILLIGRAM(S): 2 INJECTION INTRAMUSCULAR; INTRAVENOUS; SUBCUTANEOUS at 17:08

## 2020-09-17 RX ADMIN — SODIUM CHLORIDE 1000 MILLILITER(S): 9 INJECTION INTRAMUSCULAR; INTRAVENOUS; SUBCUTANEOUS at 16:02

## 2020-09-17 RX ADMIN — Medication 650 MILLIGRAM(S): at 16:03

## 2020-09-17 NOTE — ED ADULT NURSE NOTE - CHPI ED NUR SYMPTOMS NEG
no numbness/no weakness/no loss of consciousness/no fever/no nausea/no change in level of consciousness/no blurred vision/no vomiting/no confusion

## 2020-09-17 NOTE — ED ADULT NURSE NOTE - OBJECTIVE STATEMENT
60 y/o female here c/o headache since this morning accompanied with nausea and cp and heartburn. pt endorsed pmf of brain ca with multiples surgeries in the past. denies blurred vision, numbness, tingling, falls or injuries.

## 2020-09-17 NOTE — ED ADULT NURSE REASSESSMENT NOTE - NS ED NURSE REASSESS COMMENT FT1
pt c/o headache and cp pain, not relieved after meds administration, MD Gamez made aware, awaiting orders. will continue to monitor.

## 2020-09-17 NOTE — ED ADULT TRIAGE NOTE - CHIEF COMPLAINT QUOTE
Patient states, PMHx of brain cancer, complaining of HA, nausea and CP since this morning.  Patient denies any changes in vision, numbness, tingling, fevers or any other complaints at this time.

## 2020-09-17 NOTE — ED PROVIDER NOTE - CLINICAL SUMMARY MEDICAL DECISION MAKING FREE TEXT BOX
59F PMH COPD, seizures (likely 2/2 brain mets), NSCLC (s/p RLL lobectomy, RML wedge resection by Dr. Pisano 2016), L parietooccipital craniotomy (2017, [place] for metastatic disease followed by adjuvant radiation), Gamma Knife radiosurgery (2018, Dr Rico, for R parietal region mets), L parietal resection/craniotomy (May 2020, Dr. Rico) p/w HA/CP. Pt awoke this morning w/ HA, no relief w/ tylenol. Mild nausea. Also w/ midsternal CP, began while eating this morning, constant. Has minimal baseline R hand weakness. Vitals wnl, exam as above.   HA: Likely related to prior surgeries/brain meds.   CP: Likely GI vs. less likely ACS.  Labs, CTH, symptom control, reassess.

## 2020-09-17 NOTE — ED PROVIDER NOTE - OBJECTIVE STATEMENT
59F PMH COPD, seizures (likely 2/2 brain mets), NSCLC (s/p RLL lobectomy, RML wedge resection by Dr. Pisano 2016), L parietooccipital craniotomy (2017, [place] for metastatic disease followed by adjuvant radiation), Gamma Knife radiosurgery (2018, Dr Rico, for R parietal region mets), L parietal resection/craniotomy (May 2020, Dr. Rico) p/w 59F PMH COPD, seizures (likely 2/2 brain mets), NSCLC (s/p RLL lobectomy, RML wedge resection by Dr. Pisano 2016), L parietooccipital craniotomy (2017, [place] for metastatic disease followed by adjuvant radiation), Gamma Knife radiosurgery (2018, Dr Rico, for R parietal region mets), L parietal resection/craniotomy (May 2020, Dr. Rico) p/w HA/CP. Pt awoke this morning w/ HA, no relief w/ tylenol. Mild nausea. Also w/ midsternal CP, began while eating this morning, constant. Has minimal baseline R hand weakness. Denies vision changes, other focal weakness/numbness, vertigo, neck pain, rashes, tinnitus, hearing changes, URI symptoms, f/c, SOB, NVD, abd pain, urinary complaints, black/bloody stool.   Last MRI 8/30/20, last neurosurg visit 9/1/20.

## 2020-09-17 NOTE — ED PROVIDER NOTE - PROGRESS NOTE DETAILS
[place]: labs grossly wnl. CTH no acute pathology. CP completely resolved, clinically not ACS. HA much improved. Evaluated by neurosurg, recommending 1w steroid taper, neuro, and outpt f/u. Clinically no indication for further emergent ED workup or hospitalization at this time. Comfortable for dc, outpt f/u.

## 2020-09-17 NOTE — ED PROVIDER NOTE - CROS ED NEURO POS
Kristyn Rizzo is a 75 year old female  Referred by Leana Bland*      Chief Complaint   Patient presents with   • Consultation     CVA, basilar artery occlusion       Symptoms: diffuse weakness    Duration of symptoms: 2020     Detailed description: referred here secondary to basilar artery occlusion. Was having diffuse weakness and high BP. CTA and found basilar occlusion. No known h/o stroke.       Onset: acute onset    Progression: resolved within 24 hours       Aggravating: none    Relieving: none       Meds taken: Zocor 10 mg daily; no ASA    Associated symptoms    Weakness: diffuse.    Speech difficulty: none    Visual: none    Dizziness: mild    Headache: none    Numbness: none     No known h/o strokes; asymptomatic lacunar infarct on ct scan      Past Medical History:   Diagnosis Date   • Arthritis     hand   • Basilar artery occlusion    • CVA (cerebral vascular accident) (CMS/HCC)     basilar infarct   • Degenerative joint disease of thoracic spine    • Diabetes mellitus, type 2 (CMS/HCC)    • Diabetic retinopathy, background (CMS/HCC)     bilateral   • Diverticular disease     left-sided   • Gastroesophageal reflux disease    • Hyperlipidemia    • Hypertension    • Internal hemorrhoids    • Major depression in full remission (CMS/[facility])    • Menorrhagia    • Osteoporosis, unspecified 2008   • Sinusitis, chronic      Past Surgical History:   Procedure Laterality Date   • Appendectomy     • Dexa bone density axial skeleton  2008   • Hysterectomy     • Joint replacement     • Knee surgery      R TKR     Family History   Problem Relation Age of Onset   • Diabetes Brother    • Cancer Brother      Social History     Tobacco Use   • Smoking status: Former Smoker     Packs/day: 0.50     Years: 10.00     Pack years: 5.00     Last attempt to quit: 1990     Years since quittin.6   • Smokeless tobacco: Never Used   Substance Use Topics   • Alcohol use: No   • Drug use: No        Current Outpatient Medications   Medication Sig   • ONETOUCH ULTRA test strip TEST ONCE DAILY   • potassium CHLORIDE (KLOR-CON M) 20 MEQ farhat ER tablet Take 1 tablet by mouth 2 times daily.   • glyBURIDE-metformin (GLUCOVANCE) 1. MG per tablet Take 1 tablet by mouth 2 times daily.   • metoprolol-hydrochlorothiazide (LOPRESSOR HCT) 100-25 MG per tablet Take 1 tablet by mouth daily. MARIE   • simvastatin (ZOCOR) 10 MG tablet Take 1 tablet by mouth nightly.   • Lancets (ONETOUCH ULTRASOFT) Misc TEST DAILY   • ONETOUCH DELICA LANCETS 33G Misc Tests once daily, DX E11.621   • Daily Multiple Vitamins TABS Women's Vitamin   • traZODone (DESYREL) 50 MG tablet Take 1 tablet by mouth nightly. (Patient taking differently: Take 50 mg by mouth nightly. Pt reports takes medication prn)     No current facility-administered medications for this visit.        Review of Systems:  General: Fatigue  Skin: Negative  Eyes: Negative  Ears: Negative  Nose: Negative  Mouth  and Throat: Negative  Respiratory: Negative  Cardiovascular: Negative  Endocrine: Diabetes  Gastrointestinal: Negative  Genitourinary: Negative  Musculoskeletal: Negative  Hematology/Oncology: Negative  Psychiatric: Negative  Neurological: see hpi  Allergies: Negative        On examination:     Vitals:    08/10/20 1246   BP: 128/70   Pulse: 61   Resp: 16         Weight    08/10/20 1246   Weight: 80.8 kg       General Appearance: Pleasant and cooperative, normal in appearance         NEUROLOGIC:        Higher cortical function:  The patient is alert, oriented times 3 to time, place and person.  Memory, attention span, concentration mildly reduced language and fund of knowledge are normal.    Cranial nerves:   Extraocular movements are full and smooth with normal pursuits and saccades.  No nystagmus noted.  Normal facial sensation. No facial droop. Hearing is normal. Normal shoulder shrug. Symmetric palatal elevation and tongue protrusion.       Motor exam:        Bulk are within normal limits in all 4 extremities.    Strength is 5/5 in all 4 extremities. No pronator drift.    Sensation:  Intact to light touch.    Coordination:  Normal finger to nose testing.      Gait: Normal    Medical Decision Making:     CT Head (3/2020):   IMPRESSION:  1. No evidence for an acute intracranial abnormality.  2. Mild generalized renal atrophy and mild chronic vessel ischemic disease.  3. Old left basal ganglia lacunar infarct.    (provide images)    CTA head and neck: IMPRESSION:     1. No significant right or left common carotid artery or  internal carotid artery stenosis.  2. The right and left vertebral arteries are diminutive in caliber. The left vertebral artery appears to end in the left posterior inferior cerebellar artery. The intracranial right vertebral artery becomes markedly tapered. The initial portion of the   basilar artery is also small in caliber and poorly opacified. The mid basilar appears occluded. The distal basilar artery is better opacified most likely secondary to retrograde flow. Overall the basilar artery is congenitally small in caliber because   the posterior cerebral arises from the internal carotid arteries. However, the findings on the current study suggests that there is acquired disease overlying the congenital small basilar artery.  3. The cerebral arteries appear widely patent.    Labs: LDL 72      Reviewed notes from Leana Bland* outlining history and reasons for referral.     Impression/Plan:  Kristyn was seen today for consultation and video visit.    Diagnoses and all orders for this visit:    Abnormal CT of brain: basilar occlusion.   -     MRI BRAIN WO CONTRAST; Future  -     aspirin EC tablet 81 mg    Basilar artery occlusion: BP and BS control. Continue Zocor and ASA 81 mg daily    Return in about 6 months (around 2/10/2021). Seek medical attention sooner in case of any worsening/new symptoms developing.    Edvin Chase MD          Thank  You very much for allowing me to participate in the care of this patient.      Copy: Leana Bland*       HEADACHE

## 2020-09-17 NOTE — ED PROVIDER NOTE - NSFOLLOWUPINSTRUCTIONS_ED_ALL_ED_FT
Can take tylenol 650mg every 6hrs as needed for pain.  Take dexamethasone as prescribed (1mg tablets: 2 tabs for 4 days then 1 tab for 3 days).  Stay well hydrated.  Return for fevers, persistent vomit, uncontrolled pain, worsening breathing, worsening lightheaded, focal weakness/numbness, worsening vision changes.  Follow up with neurologist. Can call [phone] to schedule appointment.   FOLLOW UP WITH DR. FLAHERTY ON MONDAY 9/21/20.       A headache is pain or discomfort felt around the head or neck area. The specific cause of a headache may not be found. There are many causes and types of headaches. A few common ones are:  Tension headaches.  Migraine headaches.  Cluster headaches.  Chronic daily headaches.  Follow these instructions at home:  Watch your condition for any changes.     Take these steps to help with your condition:  Managing pain   Take over-the-counter and prescription medicines only as told by your health care provider.  Lie down in a dark, quiet room when you have a headache.  If directed, apply ice to the head and neck area:  Put ice in a plastic bag.  Place a towel between your skin and the bag.  Leave the ice on for 20 minutes, 2–3 times per day.  Use a heating pad or hot shower to apply heat to the head and neck area as told by your health care provider.  Keep lights dim if bright lights bother you or make your headaches worse.    Eating and drinking   Eat meals on a regular schedule.  Limit alcohol use.  Decrease the amount of caffeine you drink, or stop drinking caffeine.    General instructions      Keep all follow-up visits as told by your health care provider. This is important.  Keep a headache journal to help find out what may trigger your headaches. For example, write down:  What you eat and drink.  How much sleep you get.  Any change to your diet or medicines.  Try massage or other relaxation techniques.  Limit stress.  Sit up straight, and do not tense your muscles.  Do not use tobacco products, including cigarettes, chewing tobacco, or e-cigarettes. If you need help quitting, ask your health care provider.  Exercise regularly as told by your health care provider.  Sleep on a regular schedule. Get 7–9 hours of sleep, or the amount recommended by your health care provider.    Contact a health care provider if:  Your symptoms are not helped by medicine.  You have a headache that is different from the usual headache.  You have nausea or you vomit.  You have a fever.  Get help right away if:  Your headache becomes severe.  You have repeated vomiting.  You have a stiff neck.  You have a loss of vision.  You have problems with speech.  You have pain in the eye or ear.  You have muscular weakness or loss of muscle control.  You lose your balance or have trouble walking.  You feel faint or pass out.  You have confusion.

## 2020-09-17 NOTE — CONSULT NOTE ADULT - SUBJECTIVE AND OBJECTIVE BOX
NEUROSURGERY CONSULT NOTE       Note from ED Provider:   · HPI Objective Statement: 59F PMH COPD, seizures (likely 2/2 brain mets), NSCLC (s/p RLL lobectomy, RML wedge resection by Dr. Pisano 2016), L parietooccipital craniotomy (2017, [place] for metastatic disease followed by adjuvant radiation), Gamma Knife radiosurgery (2018, Dr Rico, for R parietal region mets), L parietal resection/craniotomy (May 2020, Dr. Rico) p/w HA/CP. Pt awoke this morning w/ HA, no relief w/ tylenol. Mild nausea. Also w/ midsternal CP, began while eating this morning, constant. Has minimal baseline R hand weakness. Denies vision changes, other focal weakness/numbness, vertigo, neck pain, rashes, tinnitus, hearing changes, URI symptoms, f/c, SOB, NVD, abd pain, urinary complaints, black/bloody stool.   Last MRI 8/30/20, last neurosurg visit 9/1/20.    Of note, the patient stated she has been compliant with her Keppra. Her steroids stopped yesterday 9/16/2020      Vital Signs Last 24 Hrs  T(C): 36.8 (17 Sep 2020 15:26), Max: 36.8 (17 Sep 2020 15:26)  T(F): 98.3 (17 Sep 2020 15:26), Max: 98.3 (17 Sep 2020 15:26)  HR: 85 (17 Sep 2020 16:31) (85 - 100)  BP: 109/77 (17 Sep 2020 16:31) (109/77 - 134/84)  BP(mean): --  RR: 18 (17 Sep 2020 16:31) (18 - 18)  SpO2: 98% (17 Sep 2020 16:31) (95% - 98%)    I&O's Detail    I&O's Summary      PHYSICAL EXAM:  Gen: Laying in stretcher. NAD.  HEENT: PERRL. EOMI b/l   Neck: Supple   Lungs: CTAB  Heart: RRR. S1, S2.  Abd: Soft, NT ND  Exts: 2+ pulses throughout. Skin warm and dry  Neuro: AAO x3. +dysmetria (per patient it is her baseline since her operation in May 2020), 5/5 full strength on uppers and lowers. No drift. SLIT. CN II-XII intact.     TUBES/LINES:  [] CVC  [] A-line  [] Lumbar Drain  [] Ventriculostomy  [] Other    DIET:  [] NPO  [] Mechanical  [] Tube feeds    LABS:                        13.9   8.94  )-----------( 224      ( 17 Sep 2020 16:10 )             43.5     09-17    142  |  103  |  13  ----------------------------<  96  3.8   |  28  |  0.87    Ca    9.8      17 Sep 2020 16:10    TPro  7.2  /  Alb  3.8  /  TBili  0.2  /  DBili  x   /  AST  17  /  ALT  20  /  AlkPhos  67  09-17    PT/INR - ( 17 Sep 2020 16:10 )   PT: 11.7 sec;   INR: 0.97          PTT - ( 17 Sep 2020 16:10 )  PTT:29.7 sec    CARDIAC MARKERS ( 17 Sep 2020 16:10 )  x     / <0.01 ng/mL / x     / x     / x          CAPILLARY BLOOD GLUCOSE          Drug Levels: [] N/A    CSF Analysis: [] N/A      Allergies    penicillin (Angioedema)    Intolerances      MEDICATIONS:  Antibiotics:    Neuro:    Anticoagulation:    OTHER:    IVF:    CULTURES:    RADIOLOGY & ADDITIONAL TESTS:  CT Head No Cont (09.17.20 @ 16:50)  Impression:    No acute intracranial hemorrhage or demarcated territorial infarction.    Status post left parietal craniotomy with postsurgical changes. Surgical cavity in the left parietal lobe with surrounding hypodensity likely corresponding to vasogenic edema as demonstrated on recent MRI brain 8/30/2020..          ASSESSMENT:  59y Female s/p most recently in May 2020 L parietal resection/crani. Pt coming in with HA since this morning not relieved with OTC pain medication.     No family history of cardiovascular disease (Father)    Family history of cancer in mother (Mother)    Family history of uterine cancer (Sibling)    Family history of diabetes mellitus (Mother, Sibling)    Family history of essential hypertension (Mother, Sibling)    No pertinent family history in first degree relatives    MEWS Score    Brain metastasis    Dyspnea    MRSA (methicillin resistant Staphylococcus aureus)    Adenocarcinoma of lung    COPD (chronic obstructive pulmonary disease)    Asthma    Pulmonary nodules    Headache    History of lung surgery    H/O brain surgery    Surgery, elective    History of appendectomy    No significant past surgical history    HEADACHE    90+    Chest pain    [email or web address]        PLAN:  Discharge patient with 1 week steroid taper  Plan to see Neurosurgeon, Dr. Rico in office on Monday 9/21/2020    Plan d/w Dr. Rico and ED primary team      Assessment:  Present when checked    []  GCS  E   V  M     Heart Failure: []Acute, [] acute on chronic , []chronic  Heart Failure:  [] Diastolic (HFpEF), [] Systolic (HFrEF), []Combined (HFpEF and HFrEF), [] RHF, [] Pulm HTN, [] Other    [] RAINE, [] ATN, [] AIN, [] other  [] CKD1, [] CKD2, [] CKD 3, [] CKD 4, [] CKD 5, []ESRD    Encephalopathy: [] Metabolic, [] Hepatic, [] toxic, [] Neurological, [] Other    Abnormal Nurtitional Status: [] malnurtition (see nutrition note), [ ]underweight: BMI < 19, [] morbid obesity: BMI >40, [] Cachexia    [] Sepsis  [] hypovolemic shock,[] cardiogenic shock, [] hemorrhagic shock, [] neuogenic shock  [] Acute Respiratory Failure  []Cerebral edema, [] Brain compression/ herniation,   [] Functional quadriplegia  [] Acute blood loss anemia   NEUROSURGERY CONSULT NOTE       Note from ED Provider:   · HPI Objective Statement: 59F PMH COPD, seizures (likely 2/2 brain mets), NSCLC (s/p RLL lobectomy, RML wedge resection by Dr. Pisano 2016), L parietooccipital craniotomy (2017, [place] for metastatic disease followed by adjuvant radiation), Gamma Knife radiosurgery (2018, Dr Rico, for R parietal region mets), L parietal resection/craniotomy (May 2020, Dr. Rico) p/w HA/CP. Pt awoke this morning w/ HA, no relief w/ tylenol. Mild nausea. Also w/ midsternal CP, began while eating this morning, constant. Has minimal baseline R hand weakness. Denies vision changes, other focal weakness/numbness, vertigo, neck pain, rashes, tinnitus, hearing changes, URI symptoms, f/c, SOB, NVD, abd pain, urinary complaints, black/bloody stool.   Last MRI 8/30/20, last neurosurg visit 9/1/20.    Of note, the patient stated she has been compliant with her Keppra. Her steroids stopped yesterday 9/16/2020      Vital Signs Last 24 Hrs  T(C): 36.8 (17 Sep 2020 15:26), Max: 36.8 (17 Sep 2020 15:26)  T(F): 98.3 (17 Sep 2020 15:26), Max: 98.3 (17 Sep 2020 15:26)  HR: 85 (17 Sep 2020 16:31) (85 - 100)  BP: 109/77 (17 Sep 2020 16:31) (109/77 - 134/84)  BP(mean): --  RR: 18 (17 Sep 2020 16:31) (18 - 18)  SpO2: 98% (17 Sep 2020 16:31) (95% - 98%)    I&O's Detail    I&O's Summary      PHYSICAL EXAM:  Gen: Laying in stretcher. NAD.  HEENT: PERRL. EOMI b/l   Neck: Supple   Lungs: CTAB  Heart: RRR. S1, S2.  Abd: Soft, NT ND  Exts: 2+ pulses throughout. Skin warm and dry  Neuro: AAO x3. +dysmetria (per patient it is her baseline since her operation in May 2020), 5/5 full strength on uppers and lowers. No drift. SLIT. CN II-XII intact.     TUBES/LINES:  [] CVC  [] A-line  [] Lumbar Drain  [] Ventriculostomy  [] Other    DIET:  [] NPO  [] Mechanical  [] Tube feeds    LABS:                        13.9   8.94  )-----------( 224      ( 17 Sep 2020 16:10 )             43.5     09-17    142  |  103  |  13  ----------------------------<  96  3.8   |  28  |  0.87    Ca    9.8      17 Sep 2020 16:10    TPro  7.2  /  Alb  3.8  /  TBili  0.2  /  DBili  x   /  AST  17  /  ALT  20  /  AlkPhos  67  09-17    PT/INR - ( 17 Sep 2020 16:10 )   PT: 11.7 sec;   INR: 0.97          PTT - ( 17 Sep 2020 16:10 )  PTT:29.7 sec    CARDIAC MARKERS ( 17 Sep 2020 16:10 )  x     / <0.01 ng/mL / x     / x     / x          CAPILLARY BLOOD GLUCOSE          Drug Levels: [] N/A    CSF Analysis: [] N/A      Allergies    penicillin (Angioedema)    Intolerances      MEDICATIONS:  Antibiotics:    Neuro:    Anticoagulation:    OTHER:    IVF:    CULTURES:    RADIOLOGY & ADDITIONAL TESTS:  CT Head No Cont (09.17.20 @ 16:50)  Impression:    No acute intracranial hemorrhage or demarcated territorial infarction.    Status post left parietal craniotomy with postsurgical changes. Surgical cavity in the left parietal lobe with surrounding hypodensity likely corresponding to vasogenic edema as demonstrated on recent MRI brain 8/30/2020..          ASSESSMENT:  59y Female s/p most recently in May 2020 L parietal resection/crani. Pt coming in with HA since this morning not relieved with OTC pain medication.     No family history of cardiovascular disease (Father)    Family history of cancer in mother (Mother)    Family history of uterine cancer (Sibling)    Family history of diabetes mellitus (Mother, Sibling)    Family history of essential hypertension (Mother, Sibling)    No pertinent family history in first degree relatives    MEWS Score    Brain metastasis    Dyspnea    MRSA (methicillin resistant Staphylococcus aureus)    Adenocarcinoma of lung    COPD (chronic obstructive pulmonary disease)    Asthma    Pulmonary nodules    Headache    History of lung surgery    H/O brain surgery    Surgery, elective    History of appendectomy    No significant past surgical history    HEADACHE    90+    Chest pain    [email or web address]        PLAN:  Neurology work-up for headache   Discharge patient with 1 week steroid taper  Plan to see Neurosurgeon, Dr. Rico in office on Monday 9/21/2020    Plan d/w Dr. Rico and ED primary team      Assessment:  Present when checked    []  GCS  E   V  M     Heart Failure: []Acute, [] acute on chronic , []chronic  Heart Failure:  [] Diastolic (HFpEF), [] Systolic (HFrEF), []Combined (HFpEF and HFrEF), [] RHF, [] Pulm HTN, [] Other    [] RAINE, [] ATN, [] AIN, [] other  [] CKD1, [] CKD2, [] CKD 3, [] CKD 4, [] CKD 5, []ESRD    Encephalopathy: [] Metabolic, [] Hepatic, [] toxic, [] Neurological, [] Other    Abnormal Nurtitional Status: [] malnurtition (see nutrition note), [ ]underweight: BMI < 19, [] morbid obesity: BMI >40, [] Cachexia    [] Sepsis  [] hypovolemic shock,[] cardiogenic shock, [] hemorrhagic shock, [] neuogenic shock  [] Acute Respiratory Failure  []Cerebral edema, [] Brain compression/ herniation,   [] Functional quadriplegia  [] Acute blood loss anemia

## 2020-09-17 NOTE — ED PROVIDER NOTE - PHYSICAL EXAMINATION
no LE edema, normal equal distal pulses  PERRL, slight disconjugate downward gaze. other EOMI, no nystagmus. CN intact. Strength 5/5. No pronator drift. Sensation intact.

## 2020-09-21 DIAGNOSIS — Z79.1 LONG TERM (CURRENT) USE OF NON-STEROIDAL ANTI-INFLAMMATORIES (NSAID): ICD-10-CM

## 2020-09-21 DIAGNOSIS — J44.9 CHRONIC OBSTRUCTIVE PULMONARY DISEASE, UNSPECIFIED: ICD-10-CM

## 2020-09-21 DIAGNOSIS — Z88.0 ALLERGY STATUS TO PENICILLIN: ICD-10-CM

## 2020-09-21 DIAGNOSIS — R07.89 OTHER CHEST PAIN: ICD-10-CM

## 2020-09-21 DIAGNOSIS — Z79.899 OTHER LONG TERM (CURRENT) DRUG THERAPY: ICD-10-CM

## 2020-09-21 DIAGNOSIS — R51 HEADACHE: ICD-10-CM

## 2020-09-28 ENCOUNTER — APPOINTMENT (OUTPATIENT)
Dept: NEUROSURGERY | Facility: CLINIC | Age: 60
End: 2020-09-28
Payer: MEDICARE

## 2020-09-28 VITALS
SYSTOLIC BLOOD PRESSURE: 102 MMHG | WEIGHT: 216 LBS | DIASTOLIC BLOOD PRESSURE: 71 MMHG | HEIGHT: 65 IN | OXYGEN SATURATION: 98 % | BODY MASS INDEX: 35.99 KG/M2 | TEMPERATURE: 97.2 F | HEART RATE: 98 BPM | RESPIRATION RATE: 18 BRPM

## 2020-09-28 DIAGNOSIS — T66.XXXA RADIATION SICKNESS, UNSPECIFIED, INITIAL ENCOUNTER: ICD-10-CM

## 2020-09-28 DIAGNOSIS — Z09 ENCOUNTER FOR FOLLOW-UP EXAMINATION AFTER COMPLETED TREATMENT FOR CONDITIONS OTHER THAN MALIGNANT NEOPLASM: ICD-10-CM

## 2020-09-28 PROCEDURE — 99215 OFFICE O/P EST HI 40 MIN: CPT

## 2020-09-28 RX ORDER — DEXAMETHASONE 2 MG/1
2 TABLET ORAL
Qty: 60 | Refills: 0 | Status: DISCONTINUED | COMMUNITY
Start: 2020-08-18 | End: 2020-09-28

## 2020-09-28 RX ORDER — LEVETIRACETAM 1000 MG/1
1000 TABLET, FILM COATED ORAL TWICE DAILY
Qty: 60 | Refills: 1 | Status: DISCONTINUED | COMMUNITY
Start: 2020-07-24 | End: 2020-09-28

## 2020-09-28 RX ORDER — OXYCODONE AND ACETAMINOPHEN 5; 325 MG/1; MG/1
5-325 TABLET ORAL
Qty: 28 | Refills: 0 | Status: DISCONTINUED | COMMUNITY
Start: 2020-06-12 | End: 2020-09-28

## 2020-09-28 RX ORDER — DEXAMETHASONE 2 MG/1
2 TABLET ORAL
Qty: 60 | Refills: 0 | Status: DISCONTINUED | COMMUNITY
Start: 2020-07-24 | End: 2020-09-28

## 2020-09-28 RX ORDER — DEXAMETHASONE 1 MG/1
1 TABLET ORAL
Qty: 15 | Refills: 0 | Status: DISCONTINUED | COMMUNITY
Start: 2020-08-31 | End: 2020-09-28

## 2020-09-28 RX ORDER — BISACODYL 5 MG/1
5 TABLET ORAL
Qty: 30 | Refills: 0 | Status: DISCONTINUED | COMMUNITY
Start: 2020-08-18 | End: 2020-09-28

## 2020-09-28 RX ORDER — LEVETIRACETAM 1000 MG/1
1000 TABLET, FILM COATED ORAL
Qty: 62 | Refills: 0 | Status: DISCONTINUED | COMMUNITY
Start: 2020-05-15 | End: 2020-09-28

## 2020-09-28 RX ORDER — LEVETIRACETAM 500 MG/1
500 TABLET, FILM COATED ORAL TWICE DAILY
Qty: 60 | Refills: 0 | Status: DISCONTINUED | COMMUNITY
Start: 2020-05-29 | End: 2020-09-28

## 2020-09-28 NOTE — DATA REVIEWED
[de-identified] : EXAM: CT BRAIN \par \par PROCEDURE DATE: 09/17/2020 \par \par \par \par INTERPRETATION: Clinical history/reason for exam: Headache, history of brain metastasis \par \par Technique: Multiple contiguous axial CT images of the head were obtained from the base of the skull to the vertex without the administration of intravenous contrast. Coronal and sagittal reformatted images were obtained. \par \par Comparison:Noncontrast CT head 7/11/2018, MRI brain 8/30/2020 \par \par Findings: \par \par Again demonstrated status post left parietal craniotomy with subjacent dural thickening. There is a resection cavity within the left parietal lobe. There is surrounding hypodensity within the left parietal and occipital lobes compatible with vasogenic edema as demonstrated is an MRI brain. \par \par There is no hydrocephalus. \par \par There is no acute intracranial hemorrhage, mass effect or midline shift. \par \par There is no demarcated territorial infarction.. \par \par There is no acute calvarial fracture.. \par \par There is mucosal thickening of the ethmoid sinuses. The mastoid air cells are well-aerated. \par \par Impression: \par \par No acute intracranial hemorrhage or demarcated territorial infarction. \par \par Status post left parietal craniotomy with postsurgical changes. Surgical cavity in the left parietal lobe with surrounding hypodensity likely corresponding to vasogenic edema as demonstrated on recent MRI brain 8/30/2020.. \par \par \par \par \par Thank you for the opportunity to participate in the care of this patient. \par \par \par KIRSTIE POWERS M.D., ATTENDING RADIOLOGIST \par This document has been electronically signed. Sep 17 2020 4:57PM

## 2020-09-28 NOTE — ASSESSMENT
[FreeTextEntry1] : CT head done on 9/17/2020 reviewed by chris García.\par Recommend repeat MRI brain with and without contrast in 3 months - November 2020.\par Continue keppra 750 mg bid.\par \par Education provided regarding disease process and plan of care.\par \par Patient verbalizes agreement and understanding with plan of care.

## 2020-09-28 NOTE — PHYSICAL EXAM
[General Appearance - Alert] : alert [General Appearance - In No Acute Distress] : in no acute distress [Well-Healed] : well-healed [No Drainage] : without drainage [Normal Skin] : normal [Oriented To Time, Place, And Person] : oriented to person, place, and time [Motor Tone] : muscle tone was normal in all four extremities [Motor Strength] : muscle strength was normal in all four extremities [Sclera] : the sclera and conjunctiva were normal [Neck Appearance] : the appearance of the neck was normal [] : no respiratory distress [Respiration, Rhythm And Depth] : normal respiratory rhythm and effort [Abnormal Walk] : normal gait [Skin Color & Pigmentation] : normal skin color and pigmentation [Erythema] : not erythematous [Tender] : not tender [Warm] : not warm [Fluctuant] : not fluctuant [FreeTextEntry1] : LEFT frontotemporal healing well; no drainage expressed; wound edges well approximated [FreeTextEntry6] : RIGHT upper extremity 4/5 strength

## 2020-09-28 NOTE — REASON FOR VISIT
[Follow-Up: _____] : a [unfilled] follow-up visit [FreeTextEntry1] : \par TODAY'S VISIT:\par Returns to evaluate progress.\par She was treated in ED last week for headaches - given dexamethasone taper X 1 week. She states she did not take dexamethasone taper.\par \par She states that she later discovered that she had a dental infection. She subsequently went to her dentist  and had tooth extraction.\par \par She denies headaches. She reports improvement in RIGHT hand weakness and speech. Continues to report right visual field cut. She denies seizures. She is currently on 750 mg bid keppra.\par She reports forgetfulness and memory loss.\par \par She reports difficulty swallowing. States she feels she has a "lump" in her throat.\par \par CT head from [facility] ED done on 9/17/20 for review.\par \par PREVIOUS OFFICE VISIT 8/31/2020:\par \par Returns to review MRI brain with and without contrast from 8/30/2020.\par She is doing well. She reports improvement in RIGHT hand weakness. Denies seizures. She occasionally has diffculty with speech.\par Denies headaches, nausea/vomiting, changes in vision.\par She is currently on keppra 1000 mg bid and states that she feels drowsy and fatigued after taking keppra.\par She is on 2 mg dexamethasone bid.\par \par PREVIOUS OFFICE VISIT 8/3/2020:\par \par Returns to review MRI brain with and without contrast from 7/30/2020.\par \par She is currently on dexamethasone 2 mg every 8 hours. She is currently on 1000 mg keppra bid.\par She reports resolution of difficulty word finding and slurred speech. She reports improvement in RIGHT arm weakness. Denies seizures.\par \par \par PREVIOUS VISIT 7/24/2020:\par Ms. Mendoza was hospitalized at [facility] due to sudden onset difficulty speaking as well as RIGHT arm weakness.\par She was admitted and given steroids and seizure medication.\par She comes today for follow up after hospitalization.\par \par She is currently on 6 mg dexamethasone every 8 hours and 750 mg keppra bid.\par She comes to the appointment with her son. \par She reports significant improvement in RIGHT arm weakness as well as aphasia. Her son, Ty, states that she does have mild difficulty word finding but it is largely improved. \par \par PREVIOUS OFFICE VISIT 7/13/2020:\par \souleymane Called office last week to state that she noticed a "small bump" near incision. Denies drainage, fevers.\par Comes to the office today for wound check.\par She is doing well. She reports scalp pain from previous office visits has subsided. She is no longer taking pain medications for incisional pain. She does report persistent right visual field cut, for which she saw Dr. Andrea Gutierrez. She will return in 3-4 months for re-evaluation.\par She has been seen by Dr. Barrios. She is due for repeat MRI brain with and without contrast in 3 months.\par \par PREVIOUS VISIT 6/15/2020:\par She has been taking percocet prn over the weekend and reports improvement in LEFT scalp pain. \par Denies s/s infection including fever, chills, wound drainage. She reports scalp pain is worse with palpation.\par \par PREVIOUS VISIT 6/12/2020:\par She called the office today reporting worsening LEFT Sided pain and she was asked to come to the office for further evaluation. She does state she was very busy in the beginning of the week, running errands and cooking. Pain developed after these activities. She denies injury to the head. Currently rates pain 8/10.\par Reports worsening LEFT sided pain at top of head below incision, worsening over the last 4 days. \par \par She did take oxycodone when the pain started but feels that it made her more fatigued and it did not alleviate the pain so she stopped taking it. \par She denies s/s infection including fever, chills, incisional drainage. Denies neck pain, nausea/vomiting. Denies focal weakness, changes in vision/speech.\par \par \par She states pain is worse with palpation. Pain is not affected by position. Denies rhinorrhea/otorrhea. She took tylenol earlier today without relief.  \par \par PREVIOUS VISIT 5/29/2020:\par Reports she is doing well.\par Denies any signs of postop wound infection which could include but is not limited to redness/swelling/purulent drainage\par Denies CP/SOB/unilateral leg edema. Denies headaches, nausea, vomiting, dizziness, weakness. \par She is slowly introducing preop activities\par \par Denies seizures.\par \par She remains on keppra 1000 mg bid and dexamethasone 2 mg bid. \par She feels her RIGHT hand weakness has improved. She feels that her coordination in the RIGHT hand is still somewhat "off" and she can tell by her handwriting. She reports forgetfulness.\par \par She continues to report a right visual field cut.

## 2020-10-06 ENCOUNTER — APPOINTMENT (OUTPATIENT)
Dept: RADIATION ONCOLOGY | Facility: CLINIC | Age: 60
End: 2020-10-06
Payer: MEDICARE

## 2020-10-06 VITALS
SYSTOLIC BLOOD PRESSURE: 111 MMHG | OXYGEN SATURATION: 95 % | BODY MASS INDEX: 36.11 KG/M2 | DIASTOLIC BLOOD PRESSURE: 78 MMHG | RESPIRATION RATE: 15 BRPM | WEIGHT: 217 LBS | HEART RATE: 86 BPM | TEMPERATURE: 97.9 F
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
## 2020-10-06 DIAGNOSIS — I67.89 OTHER CEREBROVASCULAR DISEASE: ICD-10-CM

## 2020-10-06 PROCEDURE — 99214 OFFICE O/P EST MOD 30 MIN: CPT

## 2020-10-06 NOTE — REVIEW OF SYSTEMS
[Negative] : Neurological [Tinnitus - Grade 0] : Tinnitus - Grade 0 [Blurred Vision: Grade 0] : Blurred Vision: Grade 0 [Cognitive Disturbance: Grade 1 - Mild cognitive disability; not interfering with work/school/life performance; specialized educational services/devices not indicated] : Cognitive Disturbance: Grade 1 - Mild cognitive disability; not interfering with work/school/life performance; specialized educational services/devices not indicated [Concentration Impairment: Grade 1] : Concentration Impairment: Grade 1 - Mild inattention or decreased level of concentration [Dizziness: Grade 0] : Dizziness: Grade 0  [Headache: Grade 0] : Headache: Grade 0 [FreeTextEntry3] : right VF cut [FreeTextEntry9] : chronic back pain [de-identified] : see HPI

## 2020-10-06 NOTE — DISEASE MANAGEMENT
[Pathological] : TNM Stage: p [IV] : IV [FreeTextEntry4] : Now recurrent [TTNM] : 3 [NTNM] : 1 [MTNM] : 0 [de-identified] : right parietal brain

## 2020-10-06 NOTE — HISTORY OF PRESENT ILLNESS
[FreeTextEntry1] : Ms Ashlie Mendoza is a 58y old female with a history of adenocarcinoma of the lung initially diagnosed during pre-operative workup for surgery for an orthopedic issue. She underwent right lower lobectomy with en bloc middle lobe resection on 10/24/2016, which revealed  pT3, pN1 with visceral pleura and LVI, involving 2/4 peribronchial lymph nodes. She completed  adjuvant chemotherapy between January and March 2017. \par \par She then developed a brain metastasis, and completed radiation therapy with Dr Owen for a total of 2700 cGy from 11/14/17- 11/20/17 over 3 fractions for treatment of left parieto-occipital mets (s/p subtotal resection). She had a cerebral hemorrhage in November 2017 post treatment and was hospitalized at [facility]. On follow up surveillance MRI brain 1/3/18 she was found to a have a new 5 mm right parietal enhancing mass. She is s/p Gamma Knife stereotactic radiosurgery on 1/18/2018 by Dr. Rico and Dr. Owen at [facility].  MRI brain in May 2020 showed a new left parietal enhancing mass s/p craniotomy and subtotal resection on 5/13/20 by Dr. Rico.  Pathology demonstrated necrosis, no viable tumor identified.  \par \par She now follows up with Dr. Barrios since Dr. Owen has moved to [facility]. \par \par 10/6/20 - Follow-up\par Ms. Mendoza returns for routine follow-up.  She was last seen on 6/15/20.  Plan was for MRI and f/u with Dr. Rico in 2 months, CT CAP in September with Dr. Peterson, neuro-optho exam with Dr. Gutierrez, PT, f/u in October.  She was hospitalized at [facility] with expressive aphasia and right arm weakness.  MRI brain during hospitalization reportedly demonstrated increased left parietal edema and she was given a dexamethasone taper.  \par \par Most recent MRI as follows:\par \par MRI brain 8/30/20 - Since 7/30/2020, there is stable size and distribution of enhancement in the left parietal and occipital lobes, pathologically-proven radiation necrosis with perfusion imaging showing decreased rCBV compared to the contralateral size which supports this diagnosis as opposed to residual tumor. Edema has improved with otherwise stable FLAIR signal around the treated lesion and crossing midline via the splenium of the corpus callosum. Surveillance at 3-6 months is suggested.\par \par She was seen in the ED on 9/17/20 for headaches.  She was given a 1 week dexamethasone taper, which she did not take.  She later discovered that she had a dental infection and underwent tooth extraction.  \par \par CT brain 9/17/20\par - No acute intracranial hemorrhage or demarcated territorial infarction. \par - Status post left parietal craniotomy with postsurgical changes. Surgical cavity in the left parietal lobe with surrounding hypodensity likely corresponding to vasogenic edema as demonstrated on recent MRI brain 8/30/2020.. \par \par She is scheduled for CT and follow-up with Dr. Peterson in November.  She last saw Dr. Gutierrez on 6/23/20.  She last saw Dr. Rico on 9/28/20; plan is for MRI and f/u in November, continue Keppra 750 mg BID.  She will see her PCP at the end of October.  Today, she reports feeling well.  She reports right sided weakness, aphasia, and headaches have resolved.  She reports continued right visual field cut and feeling confused at times.  She also reports left posterior thigh for the past few days.  \par  \par 6/15/20 - Follow-up\par Ms. Mendoza returns for routine follow-up.  She was last seen on 12/16/19.  Plan was for MRI and follow-up in 4 months, body imaging in March, continue follow-up with Dr. Barclay and Dr. Peterson.  She reported symptoms of difficulty writing with her right hand, decreased peripheral vision on the right, and feeling "confused" x 1 month to our office on 5/7/20.  MRI brain the following day done at [facility] demonstrated a new 2.9 x 3.1 x 3.1 cm left parietal enhancing mass with moderated vasogenic edema and minimal midline shift.  She underwent craniotomy and subtotal resection of the tumor by Dr. Rico on 5/13/20.  Pathology as follows:\par \par Final Diagnosis\par 1. Brain tumor #1, biopsy:\par - Brain tissue with extensive necrosis and reactive changes.\par 2. Brain tumor #2, biopsy:\par - Brain tissue with extensive necrosis and reactive changes.\par 3. Brain tumor, resection:\par - Brain tissue with extensive necrosis and reactive changes.\par Note:  No viable tumor is identified.\par \par Post-op MRI brain 5/14/20 \par - Status post interval left parietal craniotomy with resection of the bulk of the heterogeneously enhancing lesion within the \par left parietal lobe. \par - Small volume of residual enhancement along the medial inferior portion of the resection cavity. \par \par She saw Dr. Rico earlier today.  Plan is for referral to Dr. Gutierrez for right VF deficit and PT.  She is taking Percocet for incisional pain with adequate relief.  Today, she reports feeling tired.  She denies dizziness, weakness, nausea, vomiting, gait disturbance.  \par \par 12/16/19 - Follow-up\par Ms. Mendoza returns for routine follow-up.  She was last seen by Dr. Sen on 9/18/19.  Plan was for neuro consult for memory loss, f/u with Dr. Peterson, [facility] in 3 months.  She saw Dr. Barclay on 10/16/19.  Plan was for TSH, B12/folate, vitamin D testing, sleep study, neuropsych testing, no driving due to decreased right sided vision, and ENT referral for tinnitus.  On 10/16/19 B12, folate and vitamin D levels were all WNL and Vitamin D level was low.  Neuropsych testing was not done. She saw Dr. Griffin on 12/4/19 for f/u of thyroid nodules and tinnitus.  Plan was for repeat thyroid US 12/12/19 and audiogram - pending.  Today, she feels well overall. No new problems to report. She is awaiting results of thyroid US from Dr. Griffin and plan for treatment. She has stable migraines that resolves with excedrin. \par She saw Dr. Peterson recently, next follow up and body imaging will be in March. \par \par 9/18/19 - Follow-up\par Ms. Mendoza returns today for routine follow-up.  She was last seen by Dr. Barrios on 6/3/19.  Plan at that time was for MRI brain in 3 months, nutrition consult and neurology consult for memory loss.  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 Perilesional FLAIR signal is stable, without mass effect. Continued surveillance is advised.  No new metastatic lesion to the brain. \par \par She also underwent surveillance CT CAP on 9/12/19 ordered by Dr. Peterson.  \par \par CT CAP 9/12/19 - IMPRESSION: Status post right lower lobe lobectomy. No recurrent or metastatic tumor seen in chest, abdomen, pelvis. The prior described nodule in the right middle lobe is not seen on this study. Could have represented bronchial mucus impaction. \par \par She last saw Dr. Peterson on Monday.  She states she has not seen neurology for workup of memory loss because she forgot.  Today, she reports feeling well.  She reports short-term memory loss is unchanged, but bothersome to her.  She notes intermitted headaches approximately once per month relieved by Excedrin.  She also notes blurry vision while reading for the past month.  She denies dizziness, lightheadedness, nausea, vomiting, hearing disturbances, unilateral weakness.  \par \par 6/3/19 - Follow-up\par Ms. Mendoza returns today for routine follow-up.  She was last seen here on 3/28/19.  She reported having imaging done at [facility] in February, but reports were not available at that time.  Plan at that time was MRI in May and follow-up with Dr. Peterson for systemic imaging.\par \par CT chest, abdomen and pelvis with contrast 2/7/19 ([facility]) - Impression: Since November 2, 2018 no new metastases \par \par MRI Brain with and without contrast 2/7/19 ([facility]) - Impression: Since November 2, 2018, evolving postoperative and treatment changes in the left parietal lobe.  Interval slightly increased enhancement marginating the resection cavity in the left parietal lobe is nonspecific and possibly reflects sequelae of postoperative change, however recommend continued surveillance to document stability.  \par \par MRI Brain with and without contrast 5/30/19 - IMPRESSION: Interval progression of enhancement along the left parietal \par resection cavity with stable to slightly increased surrounding FLAIR/T2 signal abnormality. This may represent recurrent tumoral disease or post therapeutic changes. \par \par She is unsure of the last time she saw Dr. Peterson, but states she is scheduled for follow-up again on 6/17/19. She had CT CAP for him on 5/30/19.  She saw endocrine, Dr. Nichols on 4/2/19; plan is for thyroid US in October.  TSH 1.27, free T4 1.2 on 4/15/19.  Today, she reports feeling "pretty good."  She notes unchanged intermittent headaches approximately once every 2 weeks, rated 5/10, little relief with motrin.  She also reports continued short term memory loss.  She also notes RUQ pain that she has "always" had and an episode of vomiting yesterday.  She states she has been trying to lose weight to no avail; requesting dietician referral.  She denies fevers, chills, dizziness, lightheadedness, unilateral weakness, visual/hearing disturbances, dyspnea, chest pain, palpitations, gait disturbances.  \par \par 3/28/19 - FOLLOW UP\par Ms. Mendoza is here for routine follow up. She was last seen here on 4/19/18. Insurance issues prevented her from coming back until now.  The plan at the time was to repeat MRI in 3 months and continue follow up with Dr. Peterson. She last saw Dr. Peterson approx two weeks ago, and as per patient, plan is for continued surveillance with chest CT and brain MRI. She reports she had a brain MRI and CTs of lungs done at [facility] around February 2019 under the care of Dr. Da Reveles; report/ disc not available at this time. Dr. Peterson's office will fax what records they have. \par \par MRI done 6/13/18 showed the following:\par -resolution of small right parietal lobe enhancing focus and edema, indicating favorable response to therapy. \par -There has been further evolution of left parietal surgical site with resolution of enhancement and increase in white matter signal now crossing the corpus callosum that is presumed post-RT change. \par -No new brain lesion given slight motion limitation of study. \par \par She saw Dr. Montano for cardiology evaluation of pulmonary hypertension prior to left shoulder surgery on 8/17/18. She was cleared for surgery at that visit and started on inhalers. Oxycodone was given for pain. She saw edward Tapia, for thyroid follow up on 10/4/18, found to be euthyroid; plan was for TFT and thyroid u/s, consider repeat FNA biopsy. She saw him last on 2/21/19, and was sent to ED for respiratory symptoms, SOB. She was found to have COPD exacerbation, managed with Prednisone and duonebs, and advised to follow up with pulmonologist Dr. Wallace. \par \par Of note, she had CT chest on 10/18/18 and it showed pulmonary emphysema; no evidence of disease recurrence. Plan is to repeat imaging in six months.  \par \par Today, she reports she has been feeling overall well. No new neuro complaints. Continues to have memory impairment (short term). She also reports a "nagging" soreness to left side of scalp for the past few weeks, does not recall any head injuries. She denies any other c/o to include headache, vision changes, numbness, tingling, fever, chills, fatigue, CP, SOB, cough, N/V/D. \par ______________________________________________________\par \par 4/19/18- FOLLOW UP\par Ms. Mendoza presents today for routine follow up. Since her last visit with us she saw Dr. Gutierrez of ophthalmology. \par \par Her PET scan on 2/27/18 showed that since 11/30/17 there was a subcentimeter hypermetabolic lymph node in the left inguinal region of uncertain significance. She saw Dr. Mann regarding this on 3/15/18, and he recommended follow up with her PCP Dr. Vignesh Wallace regarding viral illness. \par \par She has been feeling overall well. No new neuro complaints. Continues to have memory loss.\par \par Oncologic History\par Ms Ashlie Mendoza is a 57y old female with a history of adenocarcinoma of the lung initially diagnosed during per-operative workup for surgery in July 2017 for an orthopedic issue. She underwent right lower lobectomy with en bloc middle lobe resection on 10/24/2016, which revealed  pT3, pN1 with visceral pleura and LVI, involving 2/4 peribronchial lymph nodes. She completed chemo between January and March 2017. She did not receive radiation therapy though prior to undergoing surgery she was simulated for potential radiation therapy.  \par \par Her current relevant history dates back to July when she began having headaches.  October 3 when she reports not "feeling right,"  accompanied by feeling hot. She called her son and told him that she "was not feeling right" who then called the ambulance. She reports she woke up in the emergency room at [facility] in [place]. Upon finding a mass in her head, she was transferred to [facility]. She is unsure if she lost consciousness.\par \par She underwent craniotomy with left parietooccipital mass resection 10/4/17  for metastasis presumably from her lung cancer at [place]. We do not have pathology available at this time. Her post-op course was uneventful and she was subsequently discharged to rehab. She presented to [facility] from rehab on  10/19/17. MRI brain showed hematoma at the resection site, and along the tract site. There was nodular enhancement surrounding the hematoma and extending along the tract site. \par \par \par 1/3 - Today she feels well. She denies nausea, notes intermittent headaches, relieved by 30 mg oxycodone PRN. She has not tried tylenol for this. Her memory is improving. She notes right visual field cut is improving. Her energy is improving. her keppra was recently stopped by her neurosurgeon. She will see neurology at the end of the month, and will see Dr. Peterson at the end of the month.  MRI from today shows no evidence of residual disease at site of tumor.  There is a new 0.6 cm right parietal enhancing lesion suspicious of metastasis. \par  \par 2/13/18-PTE\par Ms. Mendoza completed radiation therapy for a total of 2700 cGy from 11/14/17- 11/20/17 over 3 fractions for treatment of left parieto-occipital mets. She had a cerebral hemorrhage in Nov. 2017 post treatment and was hospitalized in [facility].  On follow up surveillance MRI brain 1/3/18 she was found to a have a new 5 mm right parietal enhancing mass s/p Gamma Knife stereotactic radiosurgery on 1/18/2018 by Dr. Rico and Dr. Owen at [facility].\par Since her last visit she has f/u with Dr. Barclay(neuro) in Jan. 2018 and Dr. Rico yesterday. She has trouble with right peripheral vision and has appt. with neuro opthalmology (Andrea Gutierrez) on 2/16/18. \par Today she feels well. Has occasional headaches, takes tylenol, not persistent and don't feel like they did before. Denies seizures. Has pain in right shoulder and was told that she needs surgery but has been postponing. Takes OxyContin for shoulder and lower back pain as needed. She has appt. for thyroid scan today. She will f/u with Dr. Peterson in March and will have PET before. She also has bilateral thigh weakness at times, possibly related to steroids.

## 2020-10-06 NOTE — VITALS
[Least Pain Intensity: 0/10] : 0/10 [70: Cares for self; unalbe to carry on normal activity or do active work.] : 70: Cares for self; unable to carry on normal activity or do active work. [ECOG Performance Status: 2 - Ambulatory and capable of all self care but unable to carry out any work activities] : Performance Status: 2 - Ambulatory and capable of all self care but unable to carry out any work activities. Up and about more than 50% of waking hours [Maximal Pain Intensity: 0/10] : 0/10

## 2020-10-06 NOTE — PHYSICAL EXAM
[General Appearance - In No Acute Distress] : in no acute distress [General Appearance - Alert] : alert [PERRL With Normal Accommodation] : pupils were equal in size, round, reactive to light [Obese] : obese [Sclera] : the sclera and conjunctiva were normal [Hearing Threshold Finger Rub Not [place]] : hearing was normal [Outer Ear] : the ears and nose were normal in appearance [] : no respiratory distress [Examination Of The Oral Cavity] : the lips and gums were normal [Oropharynx] : The oropharynx was normal [Normal] : no joint swelling, no clubbing or cyanosis of the fingernails and muscle strength and tone were normal [Exaggerated Use Of Accessory Muscles For Inspiration] : no accessory muscle use [Respiration, Rhythm And Depth] : normal respiratory rhythm and effort [Oriented To Time, Place, And Person] : oriented to person, place, and time [Mood] : the mood was normal [Skin Color & Pigmentation] : normal skin color and pigmentation [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 Tandem gait unsteady, leans to left. FNF slower on left side.  Pt walks carefully to avoid bumping into right sided objects she cannot see

## 2020-11-05 ENCOUNTER — APPOINTMENT (OUTPATIENT)
Dept: CT IMAGING | Facility: HOSPITAL | Age: 60
End: 2020-11-05
Payer: MEDICARE

## 2020-11-05 ENCOUNTER — OUTPATIENT (OUTPATIENT)
Dept: OUTPATIENT SERVICES | Facility: HOSPITAL | Age: 60
LOS: 1 days | End: 2020-11-05
Payer: MEDICARE

## 2020-11-05 DIAGNOSIS — Z98.890 OTHER SPECIFIED POSTPROCEDURAL STATES: Chronic | ICD-10-CM

## 2020-11-05 DIAGNOSIS — Z41.9 ENCOUNTER FOR PROCEDURE FOR PURPOSES OTHER THAN REMEDYING HEALTH STATE, UNSPECIFIED: Chronic | ICD-10-CM

## 2020-11-05 DIAGNOSIS — Z90.49 ACQUIRED ABSENCE OF OTHER SPECIFIED PARTS OF DIGESTIVE TRACT: Chronic | ICD-10-CM

## 2020-11-05 PROCEDURE — 74177 CT ABD & PELVIS W/CONTRAST: CPT | Mod: 26

## 2020-11-05 PROCEDURE — 71260 CT THORAX DX C+: CPT

## 2020-11-05 PROCEDURE — 74177 CT ABD & PELVIS W/CONTRAST: CPT

## 2020-11-05 PROCEDURE — 71260 CT THORAX DX C+: CPT | Mod: 26

## 2020-11-16 ENCOUNTER — NON-APPOINTMENT (OUTPATIENT)
Age: 60
End: 2020-11-16

## 2020-11-18 ENCOUNTER — OUTPATIENT (OUTPATIENT)
Dept: OUTPATIENT SERVICES | Facility: HOSPITAL | Age: 60
LOS: 1 days | End: 2020-11-18
Payer: MEDICARE

## 2020-11-18 ENCOUNTER — RESULT REVIEW (OUTPATIENT)
Age: 60
End: 2020-11-18

## 2020-11-18 ENCOUNTER — APPOINTMENT (OUTPATIENT)
Dept: MRI IMAGING | Facility: HOSPITAL | Age: 60
End: 2020-11-18
Payer: MEDICARE

## 2020-11-18 DIAGNOSIS — Z98.890 OTHER SPECIFIED POSTPROCEDURAL STATES: Chronic | ICD-10-CM

## 2020-11-18 DIAGNOSIS — Z90.49 ACQUIRED ABSENCE OF OTHER SPECIFIED PARTS OF DIGESTIVE TRACT: Chronic | ICD-10-CM

## 2020-11-18 DIAGNOSIS — Z41.9 ENCOUNTER FOR PROCEDURE FOR PURPOSES OTHER THAN REMEDYING HEALTH STATE, UNSPECIFIED: Chronic | ICD-10-CM

## 2020-11-18 PROCEDURE — 70553 MRI BRAIN STEM W/O & W/DYE: CPT

## 2020-11-18 PROCEDURE — 70553 MRI BRAIN STEM W/O & W/DYE: CPT | Mod: 26

## 2020-11-18 PROCEDURE — A9585: CPT

## 2020-12-04 ENCOUNTER — APPOINTMENT (OUTPATIENT)
Dept: NEUROSURGERY | Facility: CLINIC | Age: 60
End: 2020-12-04

## 2020-12-11 ENCOUNTER — APPOINTMENT (OUTPATIENT)
Dept: NEUROSURGERY | Facility: CLINIC | Age: 60
End: 2020-12-11
Payer: MEDICARE

## 2020-12-11 VITALS
WEIGHT: 217 LBS | SYSTOLIC BLOOD PRESSURE: 105 MMHG | OXYGEN SATURATION: 98 % | TEMPERATURE: 95.6 F | DIASTOLIC BLOOD PRESSURE: 76 MMHG | HEART RATE: 91 BPM | HEIGHT: 65 IN | RESPIRATION RATE: 18 BRPM | BODY MASS INDEX: 36.15 KG/M2

## 2020-12-11 PROCEDURE — 99215 OFFICE O/P EST HI 40 MIN: CPT

## 2020-12-11 NOTE — PHYSICAL EXAM
[General Appearance - Alert] : alert [General Appearance - In No Acute Distress] : in no acute distress [Healing Well] : healing well [Intact] : intact [No Drainage] : without drainage [Normal Skin] : normal [Oriented To Time, Place, And Person] : oriented to person, place, and time [Motor Tone] : muscle tone was normal in all four extremities [Motor Strength] : muscle strength was normal in all four extremities [Sclera] : the sclera and conjunctiva were normal [Neck Appearance] : the appearance of the neck was normal [] : no respiratory distress [Respiration, Rhythm And Depth] : normal respiratory rhythm and effort [Abnormal Walk] : normal gait [Skin Color & Pigmentation] : normal skin color and pigmentation [Erythema] : not erythematous [Tender] : not tender [Warm] : not warm [Fluctuant] : not fluctuant [FreeTextEntry1] : LEFT frontotemporal

## 2020-12-11 NOTE — REASON FOR VISIT
[FreeTextEntry1] : \par TODAY'S VISIT:\par Returns after obtaining MRI brain with and without contrast done on 11/18/2020, stable.\par \par She is doing well. She denies RIGHT hand weakness. Denies seizures - she remains on 750 mg keppra bid. She has tapered off steroids completely.\par She denies headaches, focal weakness, changes in speech.\par She recently saw Dr. Peterson and had CT chest/abdomen/pelvis which she states showed no POD.\par \par PREVIOUS OFFICE VISIT 9/28/2020:\par Returns to evaluate progress.\par She was treated in ED last week for headaches - given dexamethasone taper X 1 week. She states she did not take dexamethasone taper.\par \par She states that she later discovered that she had a dental infection. She subsequently went to her dentist  and had tooth extraction.\par \par She denies headaches. She reports improvement in RIGHT hand weakness and speech. Continues to report right visual field cut. She denies seizures. She is currently on 750 mg bid keppra.\par She reports forgetfulness and memory loss.\par \par She reports difficulty swallowing. States she feels she has a "lump" in her throat.\par \par CT head from [facility] ED done on 9/17/20 for review.\par \par PREVIOUS OFFICE VISIT 8/31/2020:\par \par Returns to review MRI brain with and without contrast from 8/30/2020.\par She is doing well. She reports improvement in RIGHT hand weakness. Denies seizures. She occasionally has diffculty with speech.\par Denies headaches, nausea/vomiting, changes in vision.\par She is currently on keppra 1000 mg bid and states that she feels drowsy and fatigued after taking keppra.\par She is on 2 mg dexamethasone bid.\par \par PREVIOUS OFFICE VISIT 8/3/2020:\par \par Returns to review MRI brain with and without contrast from 7/30/2020.\par \par She is currently on dexamethasone 2 mg every 8 hours. She is currently on 1000 mg keppra bid.\par She reports resolution of difficulty word finding and slurred speech. She reports improvement in RIGHT arm weakness. Denies seizures.\par \par \par PREVIOUS VISIT 7/24/2020:\par Ms. Mendoza was hospitalized at [facility] due to sudden onset difficulty speaking as well as RIGHT arm weakness.\par She was admitted and given steroids and seizure medication.\par She comes today for follow up after hospitalization.\par \par She is currently on 6 mg dexamethasone every 8 hours and 750 mg keppra bid.\par She comes to the appointment with her son. \par She reports significant improvement in RIGHT arm weakness as well as aphasia. Her son, Ty, states that she does have mild difficulty word finding but it is largely improved. \par \par PREVIOUS OFFICE VISIT 7/13/2020:\par \par Called office last week to state that she noticed a "small bump" near incision. Denies drainage, fevers.\par Comes to the office today for wound check.\par She is doing well. She reports scalp pain from previous office visits has subsided. She is no longer taking pain medications for incisional pain. She does report persistent right visual field cut, for which she saw Dr. Andrea Gutierrez. She will return in 3-4 months for re-evaluation.\par She has been seen by Dr. Barrios. She is due for repeat MRI brain with and without contrast in 3 months.\par \par PREVIOUS VISIT 6/15/2020:\par She has been taking percocet prn over the weekend and reports improvement in LEFT scalp pain. \par Denies s/s infection including fever, chills, wound drainage. She reports scalp pain is worse with palpation.\par \par PREVIOUS VISIT 6/12/2020:\par She called the office today reporting worsening LEFT Sided pain and she was asked to come to the office for further evaluation. She does state she was very busy in the beginning of the week, running errands and cooking. Pain developed after these activities. She denies injury to the head. Currently rates pain 8/10.\par Reports worsening LEFT sided pain at top of head below incision, worsening over the last 4 days. \par \par She did take oxycodone when the pain started but feels that it made her more fatigued and it did not alleviate the pain so she stopped taking it. \par She denies s/s infection including fever, chills, incisional drainage. Denies neck pain, nausea/vomiting. Denies focal weakness, changes in vision/speech.\par \par \par She states pain is worse with palpation. Pain is not affected by position. Denies rhinorrhea/otorrhea. She took tylenol earlier today without relief.  \par \par PREVIOUS VISIT 5/29/2020:\par Reports she is doing well.\par Denies any signs of postop wound infection which could include but is not limited to redness/swelling/purulent drainage\par Denies CP/SOB/unilateral leg edema. Denies headaches, nausea, vomiting, dizziness, weakness. \par She is slowly introducing preop activities\par \par Denies seizures.\par \par She remains on keppra 1000 mg bid and dexamethasone 2 mg bid. \par She feels her RIGHT hand weakness has improved. She feels that her coordination in the RIGHT hand is still somewhat "off" and she can tell by her handwriting. She reports forgetfulness.\par \par She continues to report a right visual field cut.

## 2020-12-11 NOTE — ASSESSMENT
[FreeTextEntry1] : MRI brain with and without contrast done on 11/18/2020 reviewed by chris García.\par She is doing well - continues to have right peripheral visual field cut - will return to see Dr. Gutierrez\par Continue keppra 750 mg bid\par Instructed to schedule appointment with Dr. Barrios to review new MRI.\par Recommend repeat MRI brain with and without contrast in 3 months (February 2021). patient will call to schedule in January 2021.\par \par Patient verbalizes agreement and understanding with plan of care.\par

## 2020-12-11 NOTE — DATA REVIEWED
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 Following the bolus intravenous administration of 7.5 cc Gadavist contrast material, dynamic susceptibility perfusion imaging is obtained with leakage-corrected rCBV maps sent to PACS, followed by axial T1 and T2 postcontrast imaging as well as sagittal volumetric SPGR imaging with axial and coronal reformations provided.\par \par COMPARISON: Brain MRI 8/30/2020 and 7/30/2020\par \par FINDINGS: Since 8/30/2020, the patient is again status post left parieto-occipital craniotomy. There is slight interval contraction of the surgical cavity and only minimal new enhancement lining the posterior lateral aspect of the surgical cavity seen on series 901, image 21 and series 1001, images 62-66 that may reflect granulation change. There is no development of any suspicious nodular or masslike enhancement. In the axial plane, enhancement spans a 1.7 cm in diameter superiorly which is similar to slightly decreased in size. More inferiorly, oblique anterior posterior dimension of enhancing disease measures 2.8 cm which is stable. Susceptibility imaging again shows blood products at the operative site which does limit sensitivity of MR perfusion imaging. Within this limitation, there is no evidence of increased cerebral blood volume on dynamic susceptibility perfusion imaging.\par \par There is no significant mass effect or midline shift. There is stable ex vacuo enlargement of the left posterior lateral ventricle. No hydrocephalus. There is stable appearance of the craniotomy site with expected dural thickening and no fluid collection.\par \par There is no significant change of T2/FLAIR signal abnormality within the resection cavity as well as edema versus gliosis in the left parietal and occipital lobe, even smaller seen at the left posterior temporal lobe white matter extending with stable signal crossing the splenium of the corpus callosum involving a small amount in the right parieto-occipital lobes ventricular region.\par \par There are scattered periventricular and subcortical white matter T2/FLAIR hyperintensities which likely represent small vessel ischemic disease. The diffusion-weighted images demonstrate no recent infarct. No blood products are seen remote from the operative site on susceptibility images to infarction. There are preserved large vascular flow-voids.\par \par The visualized paranasal sinuses are free of mucosal disease. The mastoid air cells are well-aerated.\par \par IMPRESSION:\par \par Since 8/30/2020, there is partial contraction of the surgical cavity at the site of treated left parietal lobe metastasis with new thin/linear enhancement at its posterior aspect that is favored to reflect granulation tissue or minor progression of radiation necrosis if steroids have been tapered off. No masslike appearance or evidence of hyperperfusion to imply tumoral recurrence. FLAIR signal abnormality surrounding the surgical site and crossing the splenium of the corpus callosum is stable.\par \par \par \par \par Thank you for the opportunity to participate in the care of this patient.\par \par CALLY PARSOSN M.D., RADIOLOGY RESIDENT\par This document has been electronically signed.\par SCOTTIE CHÁVEZ MD; Attending Radiologist\par This document has been electronically signed. Nov 18 2020  4:20PM\par \par  \par \par  Ordered by: MAGUI FLAEHRTY       Collected/Examined: 18Nov2020 01:25PM       \par Verification Required       Stage: Final       \par  Performed at: [facility]       Resulted: 18Nov2020 04:23PM       Last Updated: 18Nov2020 04:23PM       Accession: [id number]

## 2020-12-17 NOTE — ED ADULT NURSE NOTE - PAIN RATING/NUMBER SCALE (0-10): ACTIVITY
Left message to pt regarding that a urine pregnancy test is needed on the day of 12-2-20 or 2 days before and that she can do a at home test and just send us a picture of the negative test .    10

## 2021-01-04 ENCOUNTER — NON-APPOINTMENT (OUTPATIENT)
Age: 61
End: 2021-01-04

## 2021-01-04 ENCOUNTER — APPOINTMENT (OUTPATIENT)
Dept: OPHTHALMOLOGY | Facility: CLINIC | Age: 61
End: 2021-01-04
Payer: MEDICARE

## 2021-01-04 PROCEDURE — 92014 COMPRE OPH EXAM EST PT 1/>: CPT

## 2021-01-04 PROCEDURE — 92083 EXTENDED VISUAL FIELD XM: CPT

## 2021-01-18 ENCOUNTER — TRANSCRIPTION ENCOUNTER (OUTPATIENT)
Age: 61
End: 2021-01-18

## 2021-02-03 ENCOUNTER — OUTPATIENT (OUTPATIENT)
Dept: OUTPATIENT SERVICES | Facility: HOSPITAL | Age: 61
LOS: 1 days | End: 2021-02-03
Payer: MEDICARE

## 2021-02-03 ENCOUNTER — APPOINTMENT (OUTPATIENT)
Dept: MRI IMAGING | Facility: HOSPITAL | Age: 61
End: 2021-02-03
Payer: MEDICARE

## 2021-02-03 DIAGNOSIS — Z41.9 ENCOUNTER FOR PROCEDURE FOR PURPOSES OTHER THAN REMEDYING HEALTH STATE, UNSPECIFIED: Chronic | ICD-10-CM

## 2021-02-03 DIAGNOSIS — Z98.890 OTHER SPECIFIED POSTPROCEDURAL STATES: Chronic | ICD-10-CM

## 2021-02-03 DIAGNOSIS — Z90.49 ACQUIRED ABSENCE OF OTHER SPECIFIED PARTS OF DIGESTIVE TRACT: Chronic | ICD-10-CM

## 2021-02-03 PROCEDURE — A9585: CPT

## 2021-02-03 PROCEDURE — 70553 MRI BRAIN STEM W/O & W/DYE: CPT | Mod: 26,MG

## 2021-02-03 PROCEDURE — 70553 MRI BRAIN STEM W/O & W/DYE: CPT

## 2021-02-03 PROCEDURE — G1004: CPT

## 2021-02-03 NOTE — PHYSICAL EXAM
[General Appearance - Alert] : alert [Oriented To Time, Place, And Person] : oriented to person, place, and time [Mood] : the mood was normal

## 2021-02-04 ENCOUNTER — NON-APPOINTMENT (OUTPATIENT)
Age: 61
End: 2021-02-04

## 2021-02-09 ENCOUNTER — APPOINTMENT (OUTPATIENT)
Dept: RADIATION ONCOLOGY | Facility: CLINIC | Age: 61
End: 2021-02-09
Payer: MEDICARE

## 2021-02-09 PROCEDURE — 99442: CPT | Mod: 95

## 2021-02-09 NOTE — DISEASE MANAGEMENT
[Pathological] : TNM Stage: p [IV] : IV [FreeTextEntry4] : Now recurrent [TTNM] : 3 [NTNM] : 1 [MTNM] : 0 [de-identified] : right parietal brain

## 2021-02-09 NOTE — HISTORY OF PRESENT ILLNESS
[FreeTextEntry1] : Ms Ashlie Mendoza is a 58y old female with a history of adenocarcinoma of the lung initially diagnosed during pre-operative workup for surgery for an orthopedic issue. She underwent right lower lobectomy with en bloc middle lobe resection on 10/24/2016, which revealed  pT3, pN1 with visceral pleura and LVI, involving 2/4 peribronchial lymph nodes. She completed  adjuvant chemotherapy between January and 2017. \par \par She then developed a brain metastasis, and completed radiation therapy with Dr Owen for a total of 2700 cGy from 17- 17 over 3 fractions for treatment of left parieto-occipital mets (s/p subtotal resection). She had a cerebral hemorrhage in 2017 post treatment and was hospitalized at [facility]. On follow up surveillance MRI brain 1/3/18 she was found to a have a new 5 mm right parietal enhancing mass. She is s/p Gamma Knife stereotactic radiosurgery on 2018 by Dr. Rico and Dr. Owen at [facility].  MRI brain in May 2020 showed a new left parietal enhancing mass s/p craniotomy and subtotal resection on 20 by Dr. Rico.  Pathology demonstrated necrosis, no viable tumor identified.  \par \par She now follows up with Dr. Barrios since Dr. Owen has moved to [facility]. \par \par 21 - Follow-up\par Ms. Mendoza returns for routine follow-up.  She was last seen on 10/6/20, MRI was stable at that time.  Plan was for CT and f/u with Dr. Peterson in November, MRI brain and f/u with Dr. Rico, routine PCP follow-up, consider PT for left sided weakness.  She was also advised not to travel during COVID.  \par \par CT CAP 11/15/20\par - A new 6 mm nodule in the right upper lobe. Follow-up with chest CT at 6-12 months is recommended.\par - Right lower lobectomy with unchanged postoperative change.\par - No evidence of distant metastasis.\par \par MRI brain 20\par - Since 2020, there is partial contraction of the surgical cavity at the site of treated left parietal lobe metastasis with new thin/linear enhancement at its posterior aspect that is favored to reflect granulation tissue or minor progression of radiation necrosis if steroids have been tapered off. \par - No masslike appearance or evidence of hyperperfusion to imply tumoral recurrence. \par - FLAIR signal abnormality surrounding the surgical site and crossing the splenium of the corpus callosum is stable.\par \par MRI brain 2/3/21\par - Status post left posterior parietal craniotomy. Interval mild involution of left parietal occipital surgical cavity with no change in surrounding heterogeneous enhancement and T2 and FLAIR signal hyperintensity. No areas of new enhancement.\par - Nonspecific several scattered punctate foci of T2 and Flair signal hyperintensities within the white matter; findings may be seen in patient with migraine headaches, vasculitis, microvascular disease, demyelinating disease, Lyme disease and viral illness. No evidence of acute infarction.\par \par She last saw Dr. Rico on 20.  She is scheduled to see him on Friday.  She saw Dr. Andrea Gutierrez on 21, plan is for f/u in 3-4 months.  Today, she reports feeling well overall.  Right sided weakness has resolved.  She feels peripheral vision has improved.  She denies headaches, confusion, seizures, nausea, vomiting, weakness.  Her   at the end of last October.\par \par 10/6/20 - Follow-up\par Ms. Mendoza returns for routine follow-up.  She was last seen on 6/15/20.  Plan was for MRI and f/u with Dr. Rico in 2 months, CT CAP in September with Dr. Peterson, neuro-optho exam with Dr. Gutierrez, PT, f/u in October.  She was hospitalized at [facility] with expressive aphasia and right arm weakness.  MRI brain during hospitalization reportedly demonstrated increased left parietal edema and she was given a dexamethasone taper.  \par \par Most recent MRI as follows:\par \par MRI brain 20 - Since 2020, there is stable size and distribution of enhancement in the left parietal and occipital lobes, pathologically-proven radiation necrosis with perfusion imaging showing decreased rCBV compared to the contralateral size which supports this diagnosis as opposed to residual tumor. Edema has improved with otherwise stable FLAIR signal around the treated lesion and crossing midline via the splenium of the corpus callosum. Surveillance at 3-6 months is suggested.\par \par She was seen in the ED on 20 for headaches.  She was given a 1 week dexamethasone taper, which she did not take.  She later discovered that she had a dental infection and underwent tooth extraction.  \par \par CT brain 20\par - No acute intracranial hemorrhage or demarcated territorial infarction. \par - Status post left parietal craniotomy with postsurgical changes. Surgical cavity in the left parietal lobe with surrounding hypodensity likely corresponding to vasogenic edema as demonstrated on recent MRI brain 2020.. \par \par She is scheduled for CT and follow-up with Dr. Peterson in November.  She last saw Dr. Gutierrez on 20.  She last saw Dr. Rico on 20; plan is for MRI and f/u in November, continue Keppra 750 mg BID.  She will see her PCP at the end of October.  Today, she reports feeling well.  She reports right sided weakness, aphasia, and headaches have resolved.  She reports continued right visual field cut and feeling confused at times.  She also reports left posterior thigh for the past few days.  \par  \par 6/15/20 - Follow-up\par Ms. Mendoza returns for routine follow-up.  She was last seen on 19.  Plan was for MRI and follow-up in 4 months, body imaging in March, continue follow-up with Dr. Barclay and Dr. Peterson.  She reported symptoms of difficulty writing with her right hand, decreased peripheral vision on the right, and feeling "confused" x 1 month to our office on 20.  MRI brain the following day done at [facility] demonstrated a new 2.9 x 3.1 x 3.1 cm left parietal enhancing mass with moderated vasogenic edema and minimal midline shift.  She underwent craniotomy and subtotal resection of the tumor by Dr. Rico on 20.  Pathology as follows:\par \par Final Diagnosis\par 1. Brain tumor #1, biopsy:\par - Brain tissue with extensive necrosis and reactive changes.\par 2. Brain tumor #2, biopsy:\par - Brain tissue with extensive necrosis and reactive changes.\par 3. Brain tumor, resection:\par - Brain tissue with extensive necrosis and reactive changes.\par Note:  No viable tumor is identified.\par \par Post-op MRI brain 20 \par - Status post interval left parietal craniotomy with resection of the bulk of the heterogeneously enhancing lesion within the \par left parietal lobe. \par - Small volume of residual enhancement along the medial inferior portion of the resection cavity. \par \par She saw Dr. Rico earlier today.  Plan is for referral to Dr. Gutierrez for right VF deficit and PT.  She is taking Percocet for incisional pain with adequate relief.  Today, she reports feeling tired.  She denies dizziness, weakness, nausea, vomiting, gait disturbance.  \par \par 19 - Follow-up\par Ms. Mendoza returns for routine follow-up.  She was last seen by Dr. Sen on 19.  Plan was for neuro consult for memory loss, f/u with Dr. Peterson, [facility] in 3 months.  She saw Dr. Barclay on 10/16/19.  Plan was for TSH, B12/folate, vitamin D testing, sleep study, neuropsych testing, no driving due to decreased right sided vision, and ENT referral for tinnitus.  On 10/16/19 B12, folate and vitamin D levels were all WNL and Vitamin D level was low.  Neuropsych testing was not done. She saw Dr. Griffin on 19 for f/u of thyroid nodules and tinnitus.  Plan was for repeat thyroid US 19 and audiogram - pending.  Today, she feels well overall. No new problems to report. She is awaiting results of thyroid US from Dr. Griffin and plan for treatment. She has stable migraines that resolves with excedrin. \par She saw Dr. Peterson recently, next follow up and body imaging will be in March. \par \par 19 - Follow-up\par Ms. Mendoza returns today for routine follow-up.  She was last seen by Dr. Barrios on 6/3/19.  Plan at that time was for MRI brain in 3 months, nutrition consult and neurology consult for memory loss.  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 Perilesional FLAIR signal is stable, without mass effect. Continued surveillance is advised.  No new metastatic lesion to the brain. \par \par She also underwent surveillance CT CAP on 19 ordered by Dr. Peterson.  \par \par CT CAP 19 - IMPRESSION: Status post right lower lobe lobectomy. No recurrent or metastatic tumor seen in chest, abdomen, pelvis. The prior described nodule in the right middle lobe is not seen on this study. Could have represented bronchial mucus impaction. \par \par She last saw Dr. Peterson on Monday.  She states she has not seen neurology for workup of memory loss because she forgot.  Today, she reports feeling well.  She reports short-term memory loss is unchanged, but bothersome to her.  She notes intermitted headaches approximately once per month relieved by Excedrin.  She also notes blurry vision while reading for the past month.  She denies dizziness, lightheadedness, nausea, vomiting, hearing disturbances, unilateral weakness.  \par \par 6/3/19 - Follow-up\par Ms. Mendoza returns today for routine follow-up.  She was last seen here on 3/28/19.  She reported having imaging done at [facility] in February, but reports were not available at that time.  Plan at that time was MRI in May and follow-up with Dr. Peterson for systemic imaging.\par \par CT chest, abdomen and pelvis with contrast 19 ([facility]) - Impression: Since 2018 no new metastases \par \par MRI Brain with and without contrast 19 ([facility]) - Impression: Since 2018, evolving postoperative and treatment changes in the left parietal lobe.  Interval slightly increased enhancement marginating the resection cavity in the left parietal lobe is nonspecific and possibly reflects sequelae of postoperative change, however recommend continued surveillance to document stability.  \par \par MRI Brain with and without contrast 19 - IMPRESSION: Interval progression of enhancement along the left parietal \par resection cavity with stable to slightly increased surrounding FLAIR/T2 signal abnormality. This may represent recurrent tumoral disease or post therapeutic changes. \par \par She is unsure of the last time she saw Dr. Peterson, but states she is scheduled for follow-up again on 19. She had CT CAP for him on 19.  She saw endocrine, Dr. Nichols on 19; plan is for thyroid US in October.  TSH 1.27, free T4 1.2 on 4/15/19.  Today, she reports feeling "pretty good."  She notes unchanged intermittent headaches approximately once every 2 weeks, rated 5/10, little relief with motrin.  She also reports continued short term memory loss.  She also notes RUQ pain that she has "always" had and an episode of vomiting yesterday.  She states she has been trying to lose weight to no avail; requesting dietician referral.  She denies fevers, chills, dizziness, lightheadedness, unilateral weakness, visual/hearing disturbances, dyspnea, chest pain, palpitations, gait disturbances.  \par \par 3/28/19 - FOLLOW UP\par Ms. Mendoza is here for routine follow up. She was last seen here on 18. Insurance issues prevented her from coming back until now.  The plan at the time was to repeat MRI in 3 months and continue follow up with Dr. Peterson. She last saw Dr. Peterson approx two weeks ago, and as per patient, plan is for continued surveillance with chest CT and brain MRI. She reports she had a brain MRI and CTs of lungs done at [facility] around 2019 under the care of Dr. Da Reveles; report/ disc not available at this time. Dr. Peterson's office will fax what records they have. \par \par MRI done 18 showed the following:\par -resolution of small right parietal lobe enhancing focus and edema, indicating favorable response to therapy. \par -There has been further evolution of left parietal surgical site with resolution of enhancement and increase in white matter signal now crossing the corpus callosum that is presumed post-RT change. \par -No new brain lesion given slight motion limitation of study. \par \par She saw Dr. Montano for cardiology evaluation of pulmonary hypertension prior to left shoulder surgery on 18. She was cleared for surgery at that visit and started on inhalers. Oxycodone was given for pain. She saw edward Tapia, for thyroid follow up on 10/4/18, found to be euthyroid; plan was for TFT and thyroid u/s, consider repeat FNA biopsy. She saw him last on 19, and was sent to ED for respiratory symptoms, SOB. She was found to have COPD exacerbation, managed with Prednisone and duonebs, and advised to follow up with pulmonologist Dr. Wallace. \par \par Of note, she had CT chest on 10/18/18 and it showed pulmonary emphysema; no evidence of disease recurrence. Plan is to repeat imaging in six months.  \par \par Today, she reports she has been feeling overall well. No new neuro complaints. Continues to have memory impairment (short term). She also reports a "nagging" soreness to left side of scalp for the past few weeks, does not recall any head injuries. She denies any other c/o to include headache, vision changes, numbness, tingling, fever, chills, fatigue, CP, SOB, cough, N/V/D. \par ______________________________________________________\par \par 18- FOLLOW UP\par Ms. Mendoza presents today for routine follow up. Since her last visit with us she saw Dr. Gutierrez of ophthalmology. \par \par Her PET scan on 18 showed that since 17 there was a subcentimeter hypermetabolic lymph node in the left inguinal region of uncertain significance. She saw Dr. Mann regarding this on 3/15/18, and he recommended follow up with her PCP Dr. Vignesh Wallace regarding viral illness. \par \par She has been feeling overall well. No new neuro complaints. Continues to have memory loss.\par \par Oncologic History\par Ms Ashlie Mendoza is a 57y old female with a history of adenocarcinoma of the lung initially diagnosed during per-operative workup for surgery in 2017 for an orthopedic issue. She underwent right lower lobectomy with en bloc middle lobe resection on 10/24/2016, which revealed  pT3, pN1 with visceral pleura and LVI, involving 2/4 peribronchial lymph nodes. She completed chemo between January and 2017. She did not receive radiation therapy though prior to undergoing surgery she was simulated for potential radiation therapy.  \par \par Her current relevant history dates back to July when she began having headaches.  October 3 when she reports not "feeling right,"  accompanied by feeling hot. She called her son and told him that she "was not feeling right" who then called the ambulance. She reports she woke up in the emergency room at [facility] in [place]. Upon finding a mass in her head, she was transferred to [facility]. She is unsure if she lost consciousness.\par \par She underwent craniotomy with left parietooccipital mass resection 10/4/17  for metastasis presumably from her lung cancer at [place]. We do not have pathology available at this time. Her post-op course was uneventful and she was subsequently discharged to rehab. She presented to [facility] from rehab on  10/19/17. MRI brain showed hematoma at the resection site, and along the tract site. There was nodular enhancement surrounding the hematoma and extending along the tract site. \par \par \par 1/3 - Today she feels well. She denies nausea, notes intermittent headaches, relieved by 30 mg oxycodone PRN. She has not tried tylenol for this. Her memory is improving. She notes right visual field cut is improving. Her energy is improving. her keppra was recently stopped by her neurosurgeon. She will see neurology at the end of the month, and will see Dr. Peterson at the end of the month.  MRI from today shows no evidence of residual disease at site of tumor.  There is a new 0.6 cm right parietal enhancing lesion suspicious of metastasis. \par  \par 18-PTE\par Ms. Mendoza completed radiation therapy for a total of 2700 cGy from 17- 17 over 3 fractions for treatment of left parieto-occipital mets. She had a cerebral hemorrhage in 2017 post treatment and was hospitalized in [facility].  On follow up surveillance MRI brain 1/3/18 she was found to a have a new 5 mm right parietal enhancing mass s/p Gamma Knife stereotactic radiosurgery on 2018 by Dr. Rico and Dr. Owen at [facility].\par Since her last visit she has f/u with Dr. Barclay(neuro) in 2018 and Dr. Rico yesterday. She has trouble with right peripheral vision and has appt. with neuro opthalmology (Andrea Gutierrez) on 18. \par Today she feels well. Has occasional headaches, takes tylenol, not persistent and don't feel like they did before. Denies seizures. Has pain in right shoulder and was told that she needs surgery but has been postponing. Takes OxyContin for shoulder and lower back pain as needed. She has appt. for thyroid scan today. She will f/u with Dr. Peterson in March and will have PET before. She also has bilateral thigh weakness at times, possibly related to steroids.

## 2021-02-09 NOTE — REVIEW OF SYSTEMS
[Negative] : Psychiatric [Tinnitus - Grade 0] : Tinnitus - Grade 0 [Blurred Vision: Grade 0] : Blurred Vision: Grade 0 [Cognitive Disturbance: Grade 1 - Mild cognitive disability; not interfering with work/school/life performance; specialized educational services/devices not indicated] : Cognitive Disturbance: Grade 1 - Mild cognitive disability; not interfering with work/school/life performance; specialized educational services/devices not indicated [Concentration Impairment: Grade 1] : Concentration Impairment: Grade 1 - Mild inattention or decreased level of concentration [Dizziness: Grade 0] : Dizziness: Grade 0  [Headache: Grade 0] : Headache: Grade 0 [FreeTextEntry3] : right VF cut [de-identified] : see HPI [FreeTextEntry9] : chronic back pain

## 2021-02-12 ENCOUNTER — APPOINTMENT (OUTPATIENT)
Dept: NEUROSURGERY | Facility: CLINIC | Age: 61
End: 2021-02-12
Payer: MEDICARE

## 2021-02-12 VITALS
OXYGEN SATURATION: 99 % | WEIGHT: 210 LBS | HEIGHT: 65 IN | DIASTOLIC BLOOD PRESSURE: 69 MMHG | BODY MASS INDEX: 34.99 KG/M2 | HEART RATE: 87 BPM | TEMPERATURE: 97.6 F | RESPIRATION RATE: 16 BRPM | SYSTOLIC BLOOD PRESSURE: 99 MMHG

## 2021-02-12 PROCEDURE — 99215 OFFICE O/P EST HI 40 MIN: CPT

## 2021-02-12 NOTE — HISTORY OF PRESENT ILLNESS
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 She previously underwent left parietal craniotomy for tumor resection and adjuvant radiation approximately 3 years ago In November 2017 at another institution. She also had GKRS on 1/8/18 to a right parietal brain metastasis with Dr. Rico. MRI brain on admission demonstrated a recurrent 3 cm left parietal enhancing brain mass in the area of prior tumor resection. She underwent surgical resection of left parietal mass on 5/13/2020 with Dr. Rico. Pathology indicated radiation necrosis. \par \par Postoperatively, her right hand weakness improved. She does have right visual field cut. She was referred to Dr. Andrea Gutierrez for neuro opthalmology consult.\par \par In July 2020, Ms. Mendoza was hospitalized at [facility] due to sudden onset difficulty speaking as well as RIGHT hand weakness. She was admitted and given steroids and seizure medication. She was weaned off the dexamethasone over the next several weeks. She reported improvement in right hand weakness.\par \par She remains on keppra 750 mg bid.\par \par She returns today to review recent MRI brain with and without contrast, done on 2/3/21, which is stable.\par \par She is doing well. She denies new neurological symptoms.\par \par \par \par \par \par \par \par \par \par \par \par \par \par \par

## 2021-02-12 NOTE — REASON FOR VISIT
[Follow-Up: _____] : a [unfilled] follow-up visit [FreeTextEntry1] : s/p LEFT parietal craniotomy for resection of brain mass on 5/13/2020

## 2021-02-12 NOTE — ASSESSMENT
[FreeTextEntry1] : MRI brain with and without contrast from 2/4/21 reviewed by Dr. Rico with patient, stable.\par Recommend repeat MRI brain with and without contrast in 3 months to evaluate stability of brain metastases.\par Instructed to follow up with medical oncology, Dr. Peterson.\par \par Patient verbalizes agreement and understanding with plan of care.\par

## 2021-02-12 NOTE — DATA REVIEWED
[de-identified] : \par  MR Head w/wo IV Cont             Final\par \par No Documents Attached\par \par \par \par \par   EXAM:  MR BRAIN WAW IC\par \par PROCEDURE DATE:  02/03/2021\par \par \par \par INTERPRETATION:  Brain metastasis.\par \par Technique: MRI of the brain was performed utilizing sagittal and axial T1, axial T2, axial gradient echo, axial and coronal FLAIR and diffusion imaging. Following the administration of 7.5 cc of Gadavist, sagittal axial and coronal reformatted T1 MP rage images were obtained.\par \par Comparison is made to a prior MRI of the brain performed on 11/18/2020.\par \par Findings: Again noted, the patient is status post left posterior parietal craniotomy. There is a surgical cavity seen within the left posterior parietal occipital lobe with surrounding heterogeneous enhancement, slightly involuted when compared to the prior study (image 116 series 15). There is abnormal T2/ FLAIR signal hyperintensity surrounding the surgical site with evidence of hemosiderin and extending across the splenium of the corpus callosum, unchanged. There is ex vacuo dilation of the left atrium. There is mild diffuse left posterior parietal dural enhancement, postoperative.\par \par \par There are several scattered punctate foci of T2 and Flair signal hyperintensities within the white matter that are nonspecific. There is no evidence of mass-effect or midline shift. There is no evidence of  extra-axial fluid collection. There is no evidence of hydrocephalus. There is no evidence of acute infarction. Evaluation of the intracranial vascular flow-voids appear within normal limits.\par \par Again noted is enlargement of the sella measuring 1.6 cm in AP diameter, unchanged.  There are 2 tiny bilateral pontine chronic lacunar infarcts.\par \par The visualized paranasal sinuses and bilateral mastoid air cells are clear. There is perforation of the nasal septum.\par \par Impression: Status post left posterior parietal craniotomy. Interval mild involution of left parietal occipital surgical cavity with no change in surrounding heterogeneous enhancement and T2 and FLAIR signal hyperintensity. No areas of new enhancement.\par \par Nonspecific several scattered punctate foci of T2 and Flair signal hyperintensities within the white matter; findings may be seen in patient with migraine headaches, vasculitis, microvascular disease, demyelinating disease, Lyme disease and viral illness.  No evidence of acute infarction.\par \par \par \par \par \par \par \par \par Thank you for the opportunity to participate in the care of this patient.\par \par \par ANUM LONG MD; Attending Radiologist\par This document has been electronically signed. Feb 4 2021  2:13PM\par \par  \par \par  Ordered by: VOLODYMYR GILLESPIE       Collected/Examined: 03Feb2021 03:26PM       \par Verified by: VOLODYMYR GILLESPIE 04Feb2021 03:14PM       \par  Result Communication: No patient communication needed at this time;\par Stage: Final       \par  Performed at: [facility]       Resulted: [id number] 02:16PM       Last Updated: 04Feb2021 03:14PM       Accession: [id number]

## 2021-02-12 NOTE — PHYSICAL EXAM
[General Appearance - Alert] : alert [General Appearance - In No Acute Distress] : in no acute distress [Oriented To Time, Place, And Person] : oriented to person, place, and time [Cranial Nerves Optic (II)] : visual acuity intact bilaterally,  pupils equal round and reactive to light [Cranial Nerves Oculomotor (III)] : extraocular motion intact [Cranial Nerves Trigeminal (V)] : facial sensation intact symmetrically [Cranial Nerves Facial (VII)] : face symmetrical [Cranial Nerves Vestibulocochlear (VIII)] : hearing was intact bilaterally [Cranial Nerves Glossopharyngeal (IX)] : tongue and palate midline [Cranial Nerves Accessory (XI - Cranial And Spinal)] : head turning and shoulder shrug symmetric [Motor Tone] : muscle tone was normal in all four extremities [Cranial Nerves Hypoglossal (XII)] : there was no tongue deviation with protrusion [Motor Strength] : muscle strength was normal in all four extremities [Sclera] : the sclera and conjunctiva were normal [Outer Ear] : the ears and nose were normal in appearance [Neck Appearance] : the appearance of the neck was normal [] : no respiratory distress [Abnormal Walk] : normal gait [Skin Color & Pigmentation] : normal skin color and pigmentation

## 2021-04-26 ENCOUNTER — OUTPATIENT (OUTPATIENT)
Dept: OUTPATIENT SERVICES | Facility: HOSPITAL | Age: 61
LOS: 1 days | End: 2021-04-26
Payer: MEDICARE

## 2021-04-26 ENCOUNTER — APPOINTMENT (OUTPATIENT)
Dept: MRI IMAGING | Facility: HOSPITAL | Age: 61
End: 2021-04-26

## 2021-04-26 ENCOUNTER — RESULT REVIEW (OUTPATIENT)
Age: 61
End: 2021-04-26

## 2021-04-26 DIAGNOSIS — Z90.49 ACQUIRED ABSENCE OF OTHER SPECIFIED PARTS OF DIGESTIVE TRACT: Chronic | ICD-10-CM

## 2021-04-26 DIAGNOSIS — Z98.890 OTHER SPECIFIED POSTPROCEDURAL STATES: Chronic | ICD-10-CM

## 2021-04-26 DIAGNOSIS — Z41.9 ENCOUNTER FOR PROCEDURE FOR PURPOSES OTHER THAN REMEDYING HEALTH STATE, UNSPECIFIED: Chronic | ICD-10-CM

## 2021-04-26 PROCEDURE — G1004: CPT

## 2021-04-26 PROCEDURE — A9585: CPT

## 2021-04-26 PROCEDURE — 70553 MRI BRAIN STEM W/O & W/DYE: CPT

## 2021-04-26 PROCEDURE — 70553 MRI BRAIN STEM W/O & W/DYE: CPT | Mod: 26,ME

## 2021-05-07 ENCOUNTER — INPATIENT (INPATIENT)
Facility: HOSPITAL | Age: 61
LOS: 2 days | Discharge: HOME | DRG: 101 | End: 2021-05-10
Attending: PSYCHIATRY & NEUROLOGY | Admitting: PSYCHIATRY & NEUROLOGY
Payer: MEDICARE

## 2021-05-07 ENCOUNTER — APPOINTMENT (OUTPATIENT)
Dept: NEUROSURGERY | Facility: CLINIC | Age: 61
End: 2021-05-07
Payer: MEDICARE

## 2021-05-07 VITALS
SYSTOLIC BLOOD PRESSURE: 135 MMHG | OXYGEN SATURATION: 96 % | HEART RATE: 102 BPM | RESPIRATION RATE: 18 BRPM | DIASTOLIC BLOOD PRESSURE: 92 MMHG | TEMPERATURE: 98 F

## 2021-05-07 VITALS
TEMPERATURE: 97.7 F | RESPIRATION RATE: 18 BRPM | HEIGHT: 65 IN | OXYGEN SATURATION: 97 % | SYSTOLIC BLOOD PRESSURE: 125 MMHG | DIASTOLIC BLOOD PRESSURE: 88 MMHG | HEART RATE: 113 BPM

## 2021-05-07 DIAGNOSIS — Z98.890 OTHER SPECIFIED POSTPROCEDURAL STATES: Chronic | ICD-10-CM

## 2021-05-07 DIAGNOSIS — Z90.49 ACQUIRED ABSENCE OF OTHER SPECIFIED PARTS OF DIGESTIVE TRACT: Chronic | ICD-10-CM

## 2021-05-07 DIAGNOSIS — G40.409 OTHER GENERALIZED EPILEPSY AND EPILEPTIC SYNDROMES, NOT INTRACTABLE, WITHOUT STATUS EPILEPTICUS: ICD-10-CM

## 2021-05-07 DIAGNOSIS — C79.31 SECONDARY MALIGNANT NEOPLASM OF BRAIN: ICD-10-CM

## 2021-05-07 DIAGNOSIS — Z41.9 ENCOUNTER FOR PROCEDURE FOR PURPOSES OTHER THAN REMEDYING HEALTH STATE, UNSPECIFIED: Chronic | ICD-10-CM

## 2021-05-07 DIAGNOSIS — R47.01 APHASIA: ICD-10-CM

## 2021-05-07 LAB
ALBUMIN SERPL ELPH-MCNC: 4.2 G/DL — SIGNIFICANT CHANGE UP (ref 3.3–5)
ALP SERPL-CCNC: 94 U/L — SIGNIFICANT CHANGE UP (ref 40–120)
ALT FLD-CCNC: 13 U/L — SIGNIFICANT CHANGE UP (ref 10–45)
ANION GAP SERPL CALC-SCNC: 10 MMOL/L — SIGNIFICANT CHANGE UP (ref 5–17)
APTT BLD: 33.4 SEC — SIGNIFICANT CHANGE UP (ref 27.5–35.5)
AST SERPL-CCNC: 17 U/L — SIGNIFICANT CHANGE UP (ref 10–40)
BASOPHILS # BLD AUTO: 0.02 K/UL — SIGNIFICANT CHANGE UP (ref 0–0.2)
BASOPHILS NFR BLD AUTO: 0.2 % — SIGNIFICANT CHANGE UP (ref 0–2)
BILIRUB SERPL-MCNC: 0.4 MG/DL — SIGNIFICANT CHANGE UP (ref 0.2–1.2)
BUN SERPL-MCNC: 13 MG/DL — SIGNIFICANT CHANGE UP (ref 7–23)
CALCIUM SERPL-MCNC: 9.8 MG/DL — SIGNIFICANT CHANGE UP (ref 8.4–10.5)
CHLORIDE SERPL-SCNC: 103 MMOL/L — SIGNIFICANT CHANGE UP (ref 96–108)
CO2 SERPL-SCNC: 27 MMOL/L — SIGNIFICANT CHANGE UP (ref 22–31)
CREAT SERPL-MCNC: 0.84 MG/DL — SIGNIFICANT CHANGE UP (ref 0.5–1.3)
EOSINOPHIL # BLD AUTO: 0.2 K/UL — SIGNIFICANT CHANGE UP (ref 0–0.5)
EOSINOPHIL NFR BLD AUTO: 2.3 % — SIGNIFICANT CHANGE UP (ref 0–6)
GLUCOSE SERPL-MCNC: 112 MG/DL — HIGH (ref 70–99)
HCT VFR BLD CALC: 45.5 % — HIGH (ref 34.5–45)
HGB BLD-MCNC: 15 G/DL — SIGNIFICANT CHANGE UP (ref 11.5–15.5)
IMM GRANULOCYTES NFR BLD AUTO: 0.2 % — SIGNIFICANT CHANGE UP (ref 0–1.5)
INR BLD: 1.09 — SIGNIFICANT CHANGE UP (ref 0.88–1.16)
LYMPHOCYTES # BLD AUTO: 2.18 K/UL — SIGNIFICANT CHANGE UP (ref 1–3.3)
LYMPHOCYTES # BLD AUTO: 24.6 % — SIGNIFICANT CHANGE UP (ref 13–44)
MCHC RBC-ENTMCNC: 28.8 PG — SIGNIFICANT CHANGE UP (ref 27–34)
MCHC RBC-ENTMCNC: 33 GM/DL — SIGNIFICANT CHANGE UP (ref 32–36)
MCV RBC AUTO: 87.5 FL — SIGNIFICANT CHANGE UP (ref 80–100)
MONOCYTES # BLD AUTO: 0.63 K/UL — SIGNIFICANT CHANGE UP (ref 0–0.9)
MONOCYTES NFR BLD AUTO: 7.1 % — SIGNIFICANT CHANGE UP (ref 2–14)
NEUTROPHILS # BLD AUTO: 5.82 K/UL — SIGNIFICANT CHANGE UP (ref 1.8–7.4)
NEUTROPHILS NFR BLD AUTO: 65.6 % — SIGNIFICANT CHANGE UP (ref 43–77)
NRBC # BLD: 0 /100 WBCS — SIGNIFICANT CHANGE UP (ref 0–0)
PLATELET # BLD AUTO: 286 K/UL — SIGNIFICANT CHANGE UP (ref 150–400)
POTASSIUM SERPL-MCNC: 4.1 MMOL/L — SIGNIFICANT CHANGE UP (ref 3.5–5.3)
POTASSIUM SERPL-SCNC: 4.1 MMOL/L — SIGNIFICANT CHANGE UP (ref 3.5–5.3)
PROT SERPL-MCNC: 8.2 G/DL — SIGNIFICANT CHANGE UP (ref 6–8.3)
PROTHROM AB SERPL-ACNC: 13 SEC — SIGNIFICANT CHANGE UP (ref 10.6–13.6)
RBC # BLD: 5.2 M/UL — SIGNIFICANT CHANGE UP (ref 3.8–5.2)
RBC # FLD: 13.8 % — SIGNIFICANT CHANGE UP (ref 10.3–14.5)
SARS-COV-2 RNA SPEC QL NAA+PROBE: SIGNIFICANT CHANGE UP
SODIUM SERPL-SCNC: 140 MMOL/L — SIGNIFICANT CHANGE UP (ref 135–145)
TROPONIN T SERPL-MCNC: <0.01 NG/ML — SIGNIFICANT CHANGE UP (ref 0–0.01)
WBC # BLD: 8.87 K/UL — SIGNIFICANT CHANGE UP (ref 3.8–10.5)
WBC # FLD AUTO: 8.87 K/UL — SIGNIFICANT CHANGE UP (ref 3.8–10.5)

## 2021-05-07 PROCEDURE — 99291 CRITICAL CARE FIRST HOUR: CPT | Mod: CS

## 2021-05-07 PROCEDURE — 70496 CT ANGIOGRAPHY HEAD: CPT | Mod: 26,MA

## 2021-05-07 PROCEDURE — 99215 OFFICE O/P EST HI 40 MIN: CPT

## 2021-05-07 PROCEDURE — 99222 1ST HOSP IP/OBS MODERATE 55: CPT

## 2021-05-07 PROCEDURE — 93010 ELECTROCARDIOGRAM REPORT: CPT

## 2021-05-07 PROCEDURE — 99233 SBSQ HOSP IP/OBS HIGH 50: CPT

## 2021-05-07 PROCEDURE — 70498 CT ANGIOGRAPHY NECK: CPT | Mod: 26,MA

## 2021-05-07 PROCEDURE — 95718 EEG PHYS/QHP 2-12 HR W/VEEG: CPT

## 2021-05-07 PROCEDURE — 0042T: CPT

## 2021-05-07 PROCEDURE — 70450 CT HEAD/BRAIN W/O DYE: CPT | Mod: 26,59,MA

## 2021-05-07 RX ORDER — SENNA PLUS 8.6 MG/1
2 TABLET ORAL AT BEDTIME
Refills: 0 | Status: DISCONTINUED | OUTPATIENT
Start: 2021-05-07 | End: 2021-05-10

## 2021-05-07 RX ORDER — ENOXAPARIN SODIUM 100 MG/ML
40 INJECTION SUBCUTANEOUS DAILY
Refills: 0 | Status: DISCONTINUED | OUTPATIENT
Start: 2021-05-07 | End: 2021-05-07

## 2021-05-07 RX ORDER — ALBUTEROL 90 UG/1
2 AEROSOL, METERED ORAL
Refills: 0 | Status: DISCONTINUED | OUTPATIENT
Start: 2021-05-07 | End: 2021-05-10

## 2021-05-07 RX ORDER — LEVETIRACETAM 250 MG/1
2000 TABLET, FILM COATED ORAL ONCE
Refills: 0 | Status: DISCONTINUED | OUTPATIENT
Start: 2021-05-07 | End: 2021-05-07

## 2021-05-07 RX ORDER — KETOROLAC TROMETHAMINE 30 MG/ML
15 SYRINGE (ML) INJECTION ONCE
Refills: 0 | Status: DISCONTINUED | OUTPATIENT
Start: 2021-05-07 | End: 2021-05-07

## 2021-05-07 RX ORDER — ACETAMINOPHEN 500 MG
650 TABLET ORAL EVERY 6 HOURS
Refills: 0 | Status: DISCONTINUED | OUTPATIENT
Start: 2021-05-07 | End: 2021-05-10

## 2021-05-07 RX ORDER — ACETAMINOPHEN 500 MG
1000 TABLET ORAL ONCE
Refills: 0 | Status: COMPLETED | OUTPATIENT
Start: 2021-05-07 | End: 2021-05-07

## 2021-05-07 RX ORDER — ONDANSETRON 8 MG/1
4 TABLET, FILM COATED ORAL ONCE
Refills: 0 | Status: COMPLETED | OUTPATIENT
Start: 2021-05-07 | End: 2021-05-07

## 2021-05-07 RX ORDER — TIOTROPIUM BROMIDE 18 UG/1
1 CAPSULE ORAL; RESPIRATORY (INHALATION) EVERY 24 HOURS
Refills: 0 | Status: DISCONTINUED | OUTPATIENT
Start: 2021-05-07 | End: 2021-05-10

## 2021-05-07 RX ORDER — SODIUM CHLORIDE 9 MG/ML
1000 INJECTION INTRAMUSCULAR; INTRAVENOUS; SUBCUTANEOUS
Refills: 0 | Status: DISCONTINUED | OUTPATIENT
Start: 2021-05-07 | End: 2021-05-08

## 2021-05-07 RX ORDER — PANTOPRAZOLE SODIUM 20 MG/1
40 TABLET, DELAYED RELEASE ORAL
Refills: 0 | Status: DISCONTINUED | OUTPATIENT
Start: 2021-05-07 | End: 2021-05-10

## 2021-05-07 RX ORDER — LEVETIRACETAM 250 MG/1
1000 TABLET, FILM COATED ORAL
Refills: 0 | Status: DISCONTINUED | OUTPATIENT
Start: 2021-05-07 | End: 2021-05-10

## 2021-05-07 RX ORDER — LEVETIRACETAM 250 MG/1
2000 TABLET, FILM COATED ORAL ONCE
Refills: 0 | Status: COMPLETED | OUTPATIENT
Start: 2021-05-07 | End: 2021-05-07

## 2021-05-07 RX ADMIN — ONDANSETRON 4 MILLIGRAM(S): 8 TABLET, FILM COATED ORAL at 15:19

## 2021-05-07 RX ADMIN — Medication 15 MILLIGRAM(S): at 20:39

## 2021-05-07 RX ADMIN — Medication 1 MILLIGRAM(S): at 11:30

## 2021-05-07 RX ADMIN — Medication 400 MILLIGRAM(S): at 15:19

## 2021-05-07 RX ADMIN — LEVETIRACETAM 600 MILLIGRAM(S): 250 TABLET, FILM COATED ORAL at 11:10

## 2021-05-07 RX ADMIN — Medication 1000 MILLIGRAM(S): at 17:57

## 2021-05-07 RX ADMIN — SODIUM CHLORIDE 50 MILLILITER(S): 9 INJECTION INTRAMUSCULAR; INTRAVENOUS; SUBCUTANEOUS at 17:26

## 2021-05-07 RX ADMIN — Medication 650 MILLIGRAM(S): at 20:35

## 2021-05-07 RX ADMIN — Medication 650 MILLIGRAM(S): at 20:16

## 2021-05-07 RX ADMIN — Medication 15 MILLIGRAM(S): at 20:56

## 2021-05-07 RX ADMIN — LEVETIRACETAM 1000 MILLIGRAM(S): 250 TABLET, FILM COATED ORAL at 17:58

## 2021-05-07 NOTE — ED PROVIDER NOTE - CLINICAL SUMMARY MEDICAL DECISION MAKING FREE TEXT BOX
60F w/ lung ca w/ brain mets s/p brain radiation/resection, has baseline R visual field cut, RUE weakness, has hx of expressive aphasia, was at routine neurosurg f/u, was c/o possibly new/increased R weakness, then ~0915 developed inability to talk properly, sent to ED. Pt unable to provide additional info.   Vitals wnl, exam as above.  ddx: Concern for CVA vs. bleed vs. edema/mass.   Code stroke activated.  Labs, CT.  Pt likely NOT TPA candidate given extensive intracranial surgery/mets.

## 2021-05-07 NOTE — CONSULT NOTE ADULT - SUBJECTIVE AND OBJECTIVE BOX
**STROKE CODE CONSULT NOTE**    Last known well time/Time of onset of symptoms: 21 9:15am    HPI: 59 yo F hx of Asthma, COPD, lung adenocarcinoma (left parietal mets s/p craniotomy and radiation in 2017), residual right arm weakness, was in neurosurgery clinic this morning when she started to develop new right leg weakness and difficulty speaking. Patient immediately taken to ED and stroke code activated.  . Patient seen and examined. She was unable to answer any questions nor give history due to inability to understand and severe dysarthria. Occasionally she would be able to follow simple commands. She was able to mimic some movements during exam. When examined with attending, pt noted to have some right lid twitching and subtle jerking left and right motion of her head. Of note, patient on keppra 1g BID per nsgy team.       T(C): 36.8 (21 @ 21:42), Max: 36.9 (21 @ 16:38)  HR: 90 (21 @ 21:42) (83 - 106)  BP: 102/72 (21 @ 21:42) (102/72 - 135/92)  RR: 17 (21 @ 21:42) (17 - 18)  SpO2: 95% (21 @ 21:42) (95% - 98%)    PAST MEDICAL & SURGICAL HISTORY:  COPD (chronic obstructive pulmonary disease)    Adenocarcinoma of lung    MRSA (methicillin resistant Staphylococcus aureus)  history of    Brain metastasis  brain tumor s/p resection    History of appendectomy    Surgery, elective  lung biopsy    H/O brain surgery    History of lung surgery  right wedge resection        FAMILY HISTORY:  Family history of essential hypertension (Mother, Sibling)    Family history of diabetes mellitus (Mother, Sibling)    Family history of uterine cancer (Sibling)  sister    Family history of cancer in mother (Mother)    No family history of cardiovascular disease (Father)        SOCIAL HISTORY:    ROS:   Neurological: As per HPI      MEDICATIONS  (STANDING):  ALBUTerol    90 MICROgram(s) HFA Inhaler 2 Puff(s) Inhalation four times a day  levETIRAcetam 1000 milliGRAM(s) Oral two times a day  pantoprazole    Tablet 40 milliGRAM(s) Oral before breakfast  sodium chloride 0.9%. 1000 milliLiter(s) (50 mL/Hr) IV Continuous <Continuous>  tiotropium 18 MICROgram(s) Capsule 1 Capsule(s) Inhalation every 24 hours    MEDICATIONS  (PRN):  acetaminophen   Tablet .. 650 milliGRAM(s) Oral every 6 hours PRN Temp greater or equal to 38C (100.4F), Mild Pain (1 - 3)  bisacodyl 5 milliGRAM(s) Oral daily PRN Constipation  LORazepam   Injectable 2 milliGRAM(s) IV Push once PRN if seizure activity > 2 mins  senna 2 Tablet(s) Oral at bedtime PRN Constipation    Allergies    penicillin (Angioedema)    Intolerances      Vital Signs Last 24 Hrs  T(C): 36.8 (07 May 2021 21:42), Max: 36.9 (07 May 2021 16:38)  T(F): 98.2 (07 May 2021 21:42), Max: 98.4 (07 May 2021 16:38)  HR: 90 (07 May 2021 21:42) (83 - 106)  BP: 102/72 (07 May 2021 21:42) (102/72 - 135/92)  BP(mean): --  RR: 17 (07 May 2021 21:42) (17 - 18)  SpO2: 95% (07 May 2021 21:42) (95% - 98%)    Physical exam:  Constitutional: No acute distress, conversant  Eyes: Anicteric sclerae, moist conjunctivae, see below for CNs  Neck: trachea midline, FROM, supple, no thyromegaly or lymphadenopathy  Pulmonary: No increase work of breathing. No use of accessory muscles  Extremities: no edema    Neurologic:  -Mental status: Awake, alert. Attempts to answer questions and converse, but severely dysarthric. Occasionally will follow one step commands, more expressive aphasia than receptive.    -Cranial nerves:   II: Visual fields are full to confrontation.  III, IV, VI: Extraocular movements are intact without nystagmus. R pupil surgical (glaucoma surgery). Left pupil round and reactive to light.   VII: Face is symmetric with normal eye closure and smile  VIII: Hearing is grossly intact bilaterally  Motor: Left arms and legs 5/5 strength. Right arm 3/5 with wrist weakness (unable to keep wrist straight and would drop down when arm elevated). Right leg 2/5, moves in plane of bed.       NIHSS: 11    Fingerstick Blood Glucose: CAPILLARY BLOOD GLUCOSE  106 (07 May 2021 21:56)      POCT Blood Glucose.: 106 mg/dL (07 May 2021 09:32)    LABS:                        15.0   8.87  )-----------( 286      ( 07 May 2021 09:54 )             45.5         140  |  103  |  13  ----------------------------<  112<H>  4.1   |  27  |  0.84    Ca    9.8      07 May 2021 09:54    TPro  8.2  /  Alb  4.2  /  TBili  0.4  /  DBili  x   /  AST  17  /  ALT  13  /  AlkPhos  94      PT/INR - ( 07 May 2021 09:54 )   PT: 13.0 sec;   INR: 1.09          PTT - ( 07 May 2021 09:54 )  PTT:33.4 sec  CARDIAC MARKERS ( 07 May 2021 09:54 )  x     / <0.01 ng/mL / x     / x     / x          Urinalysis Basic - ( 07 May 2021 13:49 )    Color: Yellow / Appearance: Clear / S.015 / pH: x  Gluc: x / Ketone: NEGATIVE  / Bili: Negative / Urobili: 0.2 E.U./dL   Blood: x / Protein: NEGATIVE mg/dL / Nitrite: NEGATIVE   Leuk Esterase: Trace / RBC: < 5 /HPF / WBC 5-10 /HPF   Sq Epi: x / Non Sq Epi: 0-5 /HPF / Bacteria: Present /HPF        RADIOLOGY & ADDITIONAL STUDIES:  < from: CT Brain Stroke Protocol (21 @ 09:54) >  IMPRESSION:    No acute intracranial hemorrhage, hydrocephalus or extra-axial fluid collection.  Mild parenchymal volume loss and mild chronic microvascular ischemic changes.    Status post left parietal craniotomy. There is hypodensity subjacent to the craniotomy site within the left parieto-occipital lobes corresponding to a resection cavity with surrounding vasogenic edema, better characterized on 2021 MRI brain.    No CT evidence for acute transcortical infarction. Please note that MR imaging is a more sensitive imaging modality for detection of acute infarction and may be obtained as clinically warranted.    < from: CT Perfusion w/ Maps w/ IV Cont (21 @ 09:55) >  CT perfusion:    There are no areas of increased maximum transit time time greater than 6 seconds identified. There is an area of decreased cerebral blood flow/volume less than 30% identified within the left parietal lobe in the region of a known resection cavity, likely reflecting chronic necrotic parenchyma.    IMPRESSION:      Noncalcified atherosclerotic plaque of the bilateral carotid bifurcations and bilateral proximal internal carotid arteries, contributing to less than 50% stenosis as per NASCET criteria.

## 2021-05-07 NOTE — H&P ADULT - ATTENDING COMMENTS
I confirm I participated in the history, examination of this patient as documented by ACP. The plan was formulated by me.   Pt with Asthma, COPD, lung adenocarcinoma (left parietal mets s/p craniotomy and radiation in 2017), witnessed GTC in ED. Likely had preceding seizure. Pt confirms nonadherence of medication, so will bolus Keppra and not increase dose at this time.   Symptomatic treatment of postictal HA.   VEEG to determine if seizures controlled by current treatment.

## 2021-05-07 NOTE — DATA REVIEWED
[de-identified] : \par  MR Head w/wo IV Cont             Final\par \par No Documents Attached\par \par \par \par \par   EXAM:  MR BRAIN WAW IC\par \par PROCEDURE DATE:  04/26/2021\par \par \par \par INTERPRETATION:  PROCEDURE: MRI Brain with and without contrast\par \par INDICATION: Left parietal brain metastasis, follow-up\par \par TECHNIQUE: Sagittal T1 volumetric series with MPR, axial T2, T2-FLAIR, T2-FFE and diffusion imaging of the brain is obtained. Following the intravenous administration of 7.5 cc Gadavist contrast material, sagittal T1 volumetric imaging with MPR provided.\par \par COMPARISON: Multiple prior MRI brain most recent 2/3/2021\par \par FINDINGS:\par \par Again demonstrated status post left parietal craniotomy for resection of left parietal lobe mass. There is a surgical resection cavity within the left parietal lobe. There is dural thickening and enhancement deep to craniotomy site secondary to postsurgical changes. There is hemosiderin staining surrounding the surgical cavity.\par \par There is interval increase in irregular enhancement and masslike FLAIR signal abnormality most pronounced along the superior aspect of the surgical resection cavity with restricted diffusion. The area of enhancement measures 1.8 cm transverse by 2.3 cm AP by 2.9 cm craniocaudal (series 701 image 75 and 72 image 22), previously 1.8 x 2.1 x 2.3 cm. There is mild interval removal increase in vasogenic edema within the left parietal and occipital lobes. Again noted is irregular enhancement within the left occipital lobe adjacent to the craniotomy site which is not significantly changed from prior examinations.\par \par There are cortical foci of enhancement within the right parietal and occipital lobes (series 701 image 94 and series 702 image 22 and 18) which are predominantly new from prior examination. There is no significant FLAIR hyperintensity these regions.\par \par There is no significant change in the FLAIR signal abnormality expanding across the splenium the corpus callosum to the right periventricular region. Scattered foci of T2/FLAIR hyperintensity within the periventricular and subcortical white matter on the basis of chronic microvascular ischemic changes.\par \par There is no hydrocephalus. Ventricles and sulci are stable in size and configuration from prior examination. There is no midline shift.\par \par There is no evidence of recent infarction on the diffusion-weighted imaging.\par \par The vascular flow voids are present.\par \par The sella and suprasellar regions are unremarkable.\par \par There is mucosal thickening of the paranasal sinuses.. The mastoid air cells are well-aerated.\par \par IMPRESSION:\par \par Increase in size of irregular enhancement along the superior aspect of the surgical resection cavity in the left parietal lobe with more pronounced masslike FLAIR signal abnormality. While this may reflect continued sequelae of radiation necrosis and postsurgical changes, tumoral disease cannot be excluded.\par \par New cortical foci of enhancement within the right parietal and occipital lobes which are suspicious for tumoral disease.\par \par \par \par \par Thank you for the opportunity to participate in the care of this patient.\par \par \par KIRSTIE POWERS MD; Attending Radiologist\par This document has been electronically signed. Apr 27 2021  1:28PM\par \par  \par \par  Ordered by: VOLODYMYR GILLESPIE       Collected/Examined: 26Apr2021 11:54AM       \par Verification Required       Stage: Final       \par  Performed at: Health system       Resulted: 27Apr2021 01:17PM       Last Updated: 27Apr2021 01:31PM       Accession: [id number]

## 2021-05-07 NOTE — H&P ADULT - HISTORY OF PRESENT ILLNESS
59 yo F 59 yo F hx of Asthma, COPD, lung adenocarcinoma (left parietal mets s/p craniotomy and radiation in 2017). Per chart notes, patient complained difficulty writing with right hand & decrease peripheral vision in right eye, and increased confusion for the past month. Today she was scheduled to see neurosurgery outpatient for routine follow up and developed acute onset of expressive aphasia and worsening RUE weakness (baseline weak in RUE but worsened today). She was brought to the ED and code stroke was called. Stroke workup thus far is negative for any acute infarcts/hemorrhages. Per ED staff, she then had a witnessed GTC and loaded with 2gram of Keppra; she originally takes Keppra 1000 BID. Then she had a witnessed RUE and RLE myoclonic jerks which 1 mg ativan was given. Patient is currently somnolent and will be admitted to Epilepsy service for seizure management and vEEG monitoring.    Neurologic:     -Mental Status: AAOx3. Speech is fluent with intact naming, repetition, and comprehension, no dysarthria. Recent and remote memory intact. Follows commands. Attention/concentration intact. Fund of knowledge appropriate.     -Cranial Nerves:          II: Visual fields are full to confrontation.          III, IV, VI: EOMI without nystagmus. PERRL b/l          V:  Facial sensation V1-V3 equal and intact           VII: Face is symmetric with normal eye closure and smile          VIII: Hearing is bilaterally intact to finger rub          IX, X: Uvula is midline and soft palate rises symmetrically          XI: Head turning and shoulder shrug are intact.          XII: Tongue protrudes midline     -Motor: Normal bulk and tone. Strength bilateral upper extremity 5/5, bilateral lower extremities 5/5.                     No pronator drift. Rapid alternating movements intact and symmetric     -Sensation: Intact to light touch bilaterally. No neglect or extinction on double simultaneous testing.     -Coordination: No dysmetria on finger-to-nose and heel-to-shin bilaterally     -Reflexes: Downgoing toes bilaterally      -Gait: Narrow gait and steady    MEDICATIONS  (STANDING):  ALBUTerol    90 MICROgram(s) HFA Inhaler 2 Puff(s) Inhalation four times a day  levETIRAcetam 1000 milliGRAM(s) Oral two times a day  oseltamivir 75 milliGRAM(s) Oral every 12 hours  pantoprazole    Tablet 40 milliGRAM(s) Oral before breakfast  tiotropium 18 MICROgram(s) Capsule 1 Capsule(s) Inhalation every 24 hours    MEDICATIONS  (PRN):  bisacodyl 5 milliGRAM(s) Oral daily PRN Constipation  senna 2 Tablet(s) Oral at bedtime PRN Constipation    LABS    ( @ 09:54)                      15.0  8.87 )-----------( 286                 45.5    Neutrophils = 5.82 (65.6%)  Lymphocytes = 2.18 (24.6%)  Eosinophils = 0.20 (2.3%)  Basophils = 0.02 (0.2%)  Monocytes = 0.63 (7.1%)  Bands = --%        140  |  103  |  13  ----------------------------<  112<H>  4.1   |  27  |  0.84    Ca    9.8      07 May 2021 09:54    TPro  8.2  /  Alb  4.2  /  TBili  0.4  /  DBili  x   /  AST  17  /  ALT  13  /  AlkPhos  94      ( 07 May 2021 09:54 )   PT: 13.0 sec;   INR: 1.09 ;       PTT:33.4 sec  CARDIAC MARKERS ( 07 May 2021 09:54 )  Trop <0.01 ng/mL / CK x     / CKMB x             Urinalysis Basic - ( 07 May 2021 13:49 )    Color: Yellow / Appearance: Clear / S.015 / pH: x  Gluc: x / Ketone: NEGATIVE  / Bili: Negative / Urobili: 0.2 E.U./dL   Blood: x / Protein: NEGATIVE mg/dL / Nitrite: NEGATIVE   Leuk Esterase: Trace / RBC: < 5 /HPF / WBC 5-10 /HPF   Sq Epi: x / Non Sq Epi: 0-5 /HPF / Bacteria: Present /HPF           61 yo F hx of Asthma, COPD, lung adenocarcinoma (left parietal mets s/p craniotomy and radiation in 2017). Per chart notes, patient complained difficulty writing with right hand & decrease peripheral vision in right eye, and increased confusion for the past month. Today she was scheduled to see neurosurgery outpatient for routine follow up and developed acute onset of expressive aphasia and worsening RUE weakness (baseline weak in RUE but worsened today). She was brought to the ED and code stroke was called. Stroke workup thus far is negative for any acute infarcts/hemorrhages. Per ED staff, she then had a witnessed GTC and loaded with 2gram of Keppra; she originally takes Keppra 1000 BID. Then she had a witnessed RUE and RLE myoclonic jerks which 1 mg ativan was given. Patient is currently somnolent and will be admitted to Epilepsy service for seizure management and vEEG monitoring.    Neurologic:     -Mental Status: AAOx3, no dysarthria, speech is fluent. Follows simple and complex commands     -Cranial Nerves: EOMI, ?right field cut, PERRL b/l, facial sensation v1-v3 equal and intact, no facial asymmetry,      -Motor: right side 4/5, left side 5/5     -Sensation: Intact to light touch bilaterally     -Coordination: No dysmetria on finger-to-nose bilaterally     -Gait: deferred    MEDICATIONS  (STANDING):  ALBUTerol    90 MICROgram(s) HFA Inhaler 2 Puff(s) Inhalation four times a day  levETIRAcetam 1000 milliGRAM(s) Oral two times a day  oseltamivir 75 milliGRAM(s) Oral every 12 hours  pantoprazole    Tablet 40 milliGRAM(s) Oral before breakfast  tiotropium 18 MICROgram(s) Capsule 1 Capsule(s) Inhalation every 24 hours    MEDICATIONS  (PRN):  bisacodyl 5 milliGRAM(s) Oral daily PRN Constipation  senna 2 Tablet(s) Oral at bedtime PRN Constipation    LABS    (07 @ 09:54)                      15.0  8.87 )-----------( 286                 45.5    Neutrophils = 5.82 (65.6%)  Lymphocytes = 2.18 (24.6%)  Eosinophils = 0.20 (2.3%)  Basophils = 0.02 (0.2%)  Monocytes = 0.63 (7.1%)  Bands = --%    05-07    140  |  103  |  13  ----------------------------<  112<H>  4.1   |  27  |  0.84    Ca    9.8      07 May 2021 09:54    TPro  8.2  /  Alb  4.2  /  TBili  0.4  /  DBili  x   /  AST  17  /  ALT  13  /  AlkPhos  94  -    ( 07 May 2021 09:54 )   PT: 13.0 sec;   INR: 1.09 ;       PTT:33.4 sec  CARDIAC MARKERS ( 07 May 2021 09:54 )  Trop <0.01 ng/mL / CK x     / CKMB x             Urinalysis Basic - ( 07 May 2021 13:49 )    Color: Yellow / Appearance: Clear / S.015 / pH: x  Gluc: x / Ketone: NEGATIVE  / Bili: Negative / Urobili: 0.2 E.U./dL   Blood: x / Protein: NEGATIVE mg/dL / Nitrite: NEGATIVE   Leuk Esterase: Trace / RBC: < 5 /HPF / WBC 5-10 /HPF   Sq Epi: x / Non Sq Epi: 0-5 /HPF / Bacteria: Present /HPF           59 yo F hx of Asthma, COPD, lung adenocarcinoma (left parietal mets s/p craniotomy and radiation in 2017). Per chart notes, patient complained difficulty writing with right hand & decrease peripheral vision in right eye, and increased confusion for the past month. Today she was scheduled to see neurosurgery outpatient for routine follow up and developed acute onset of expressive aphasia and worsening RUE weakness (baseline weak in RUE but worsened today). She was brought to the ED and code stroke was called. Stroke workup thus far is negative for any acute infarcts/hemorrhages. Per ED staff, she then had a witnessed GTC and loaded with 2gram of Keppra; she originally takes Keppra 1000 BID. Then she had a witnessed RUE and RLE myoclonic jerks which 1 mg ativan was given. Patient is currently somnolent and will be admitted to Epilepsy service for seizure management and vEEG monitoring.    Neurologic:     -Mental Status: AAOx3, no dysarthria, speech is fluent. Follows simple and complex commands     -Cranial Nerves: EOMI, ?right field cut, PERRL b/l, facial sensation v1-v3 equal and intact, no facial asymmetry,      -Motor:  moves all extremities at least antigravity     -Sensation: Intact to light touch bilaterally     -Coordination: No dysmetria on finger-to-nose bilaterally     -Gait: deferred    MEDICATIONS  (STANDING):  ALBUTerol    90 MICROgram(s) HFA Inhaler 2 Puff(s) Inhalation four times a day  levETIRAcetam 1000 milliGRAM(s) Oral two times a day  oseltamivir 75 milliGRAM(s) Oral every 12 hours  pantoprazole    Tablet 40 milliGRAM(s) Oral before breakfast  tiotropium 18 MICROgram(s) Capsule 1 Capsule(s) Inhalation every 24 hours    MEDICATIONS  (PRN):  bisacodyl 5 milliGRAM(s) Oral daily PRN Constipation  senna 2 Tablet(s) Oral at bedtime PRN Constipation    LABS    ( @ 09:54)                      15.0  8.87 )-----------( 286                 45.5    Neutrophils = 5.82 (65.6%)  Lymphocytes = 2.18 (24.6%)  Eosinophils = 0.20 (2.3%)  Basophils = 0.02 (0.2%)  Monocytes = 0.63 (7.1%)  Bands = --%    05-07    140  |  103  |  13  ----------------------------<  112<H>  4.1   |  27  |  0.84    Ca    9.8      07 May 2021 09:54    TPro  8.2  /  Alb  4.2  /  TBili  0.4  /  DBili  x   /  AST  17  /  ALT  13  /  AlkPhos  94  -    ( 07 May 2021 09:54 )   PT: 13.0 sec;   INR: 1.09 ;       PTT:33.4 sec  CARDIAC MARKERS ( 07 May 2021 09:54 )  Trop <0.01 ng/mL / CK x     / CKMB x             Urinalysis Basic - ( 07 May 2021 13:49 )    Color: Yellow / Appearance: Clear / S.015 / pH: x  Gluc: x / Ketone: NEGATIVE  / Bili: Negative / Urobili: 0.2 E.U./dL   Blood: x / Protein: NEGATIVE mg/dL / Nitrite: NEGATIVE   Leuk Esterase: Trace / RBC: < 5 /HPF / WBC 5-10 /HPF   Sq Epi: x / Non Sq Epi: 0-5 /HPF / Bacteria: Present /HPF

## 2021-05-07 NOTE — CONSULT NOTE ADULT - SUBJECTIVE AND OBJECTIVE BOX
HISTORY OF PRESENT ILLNESS:   59 yo F with PMH of asthma, COPD, hx of left parietal mets (from lung adenocarcinoma) s/p craniotomy and radiation therapy (Oct/Nov 2017). Prior, pt reported difficulty writing with her right hand, decreased peripheral vison on right, and feeling "confused" for past month. Pt sent to ED as pt developed inability to talk properly. Neurosurgery consulted as pt is followed by Dr. Rico outpatient after MRI permofed last week shows new brain mass (MRI done at [facility]). Pt is s/p generalized tonic-clonic seizure and currently unable to provide additional info at this time.   PAST MEDICAL & SURGICAL HISTORY:  COPD (chronic obstructive pulmonary disease)    Adenocarcinoma of lung    MRSA (methicillin resistant Staphylococcus aureus)  history of    Brain metastasis  brain tumor s/p resection    History of appendectomy    Surgery, elective  lung biopsy    H/O brain surgery    History of lung surgery  right wedge resection      FAMILY HISTORY:  Family history of essential hypertension (Mother, Sibling)    Family history of diabetes mellitus (Mother, Sibling)    Family history of uterine cancer (Sibling)  sister    Family history of cancer in mother (Mother)    No family history of cardiovascular disease (Father)        SOCIAL HISTORY:  Tobacco Use: Unknown if ever smoked.   EtOH use: unknown   Substance: unknown     Allergies    penicillin (Angioedema)    Intolerances        REVIEW OF SYSTEMS  Pt unable to provide additional info at this time.       MEDICATIONS:  Antibiotics:    Neuro:    Anticoagulation:    OTHER:    IVF:      Vital Signs Last 24 Hrs  T(C): 36.7 (07 May 2021 09:34), Max: 36.7 (07 May 2021 09:32)  T(F): 98.1 (07 May 2021 09:34), Max: 98.1 (07 May 2021 09:32)  HR: 99 (07 May 2021 12:04) (84 - 106)  BP: 109/69 (07 May 2021 12:04) (109/69 - 135/92)  BP(mean): --  RR: 18 (07 May 2021 12:04) (18 - 18)  SpO2: 98% (07 May 2021 12:04) (96% - 98%)    PHYSICAL EXAM  Constitutional: Pt laying in bed s/p tonic-clonic seizure and Ativan 1mg, IV Push x1 dose. awake, unable to communicate,NAD   Eyes:3mm b/l PEARRL, EOM intact, sluggish eye opening, neck supple.   Respiratory: breath sounds clear and equal bilateral lungs, no wheezing rhonci or rales noted.    Cardiovascular: normal rate, regular rhythm, heart sounds s1, s2. no murmurs, rubs or gallops,   Neurological: AAOx1, thought and speech incoherent,aphasic,following commands, 5/5 strength intact LUE & LLE, 2/5 strength RUE, 0/5 strength in RLL. Sensation intact throughout extremities.      Skin:Warm and well perfused. no cyanosis or edema noted.    LABS:                        15.0   8.87  )-----------( 286      ( 07 May 2021 09:54 )             45.5     05-07    140  |  103  |  13  ----------------------------<  112<H>  4.1   |  27  |  0.84    Ca    9.8      07 May 2021 09:54    TPro  8.2  /  Alb  4.2  /  TBili  0.4  /  DBili  x   /  AST  17  /  ALT  13  /  AlkPhos  94  05-07    PT/INR - ( 07 May 2021 09:54 )   PT: 13.0 sec;   INR: 1.09          PTT - ( 07 May 2021 09:54 )  PTT:33.4 sec    CULTURES:      RADIOLOGY & ADDITIONAL STUDIES:  CT head without contrast (05/07/21):   IMPRESSION:    No acute intracranial hemorrhage, hydrocephalus or extra-axial fluid collection.  Mild parenchymal volume loss and mild chronic microvascular ischemic changes.    Status post left parietal craniotomy. There is hypodensity subjacent to the craniotomy site within the left parieto-occipital lobes corresponding to a resection cavity with surrounding vasogenic edema, better characterized on April 26, 2021 MRI brain.    No CT evidence for acute transcortical infarction. Please note that MR imaging is a more sensitive imaging modality for detection of acute infarction and may be obtained as clinically warranted.      MRI head w/wo IV cont (04/26):   IMPRESSION:  Increase in size of irregular enhancement along the superior aspect of the surgical resection cavity in the left parietal lobe with more pronounced masslike FLAIR signal abnormality.    New cortical foci of enhancement within the right parietal and occipital lobes which are suspicious for tumoral disease.       HISTORY OF PRESENT ILLNESS:   61 yo F with PMH of asthma, COPD, hx of left parietal mets (from lung adenocarcinoma) s/p craniotomy and radiation therapy (Oct/Nov 2017). Prior, pt reported difficulty writing with her right hand, decreased peripheral vison on right, and feeling "confused" for past month. Pt sent to ED as pt developed inability to talk properly. Neurosurgery consulted as pt is followed by Dr. Rico outpatient after MRI fo performed last week shows new brain mass (MRI done at [facility]). Pt is s/p generalized tonic-clonic seizure and currently unable to provide additional info at this time.   PAST MEDICAL & SURGICAL HISTORY:  COPD (chronic obstructive pulmonary disease)    Adenocarcinoma of lung    MRSA (methicillin resistant Staphylococcus aureus)  history of    Brain metastasis  brain tumor s/p resection    History of appendectomy    Surgery, elective  lung biopsy    H/O brain surgery    History of lung surgery  right wedge resection      FAMILY HISTORY:  Family history of essential hypertension (Mother, Sibling)    Family history of diabetes mellitus (Mother, Sibling)    Family history of uterine cancer (Sibling)  sister    Family history of cancer in mother (Mother)    No family history of cardiovascular disease (Father)        SOCIAL HISTORY:  Tobacco Use: Unknown if ever smoked.   EtOH use: unknown   Substance: unknown     Allergies    penicillin (Angioedema)    Intolerances        REVIEW OF SYSTEMS  Pt unable to provide additional info at this time.       MEDICATIONS:  Antibiotics:    Neuro:    Anticoagulation:    OTHER:    IVF:      Vital Signs Last 24 Hrs  T(C): 36.7 (07 May 2021 09:34), Max: 36.7 (07 May 2021 09:32)  T(F): 98.1 (07 May 2021 09:34), Max: 98.1 (07 May 2021 09:32)  HR: 99 (07 May 2021 12:04) (84 - 106)  BP: 109/69 (07 May 2021 12:04) (109/69 - 135/92)  BP(mean): --  RR: 18 (07 May 2021 12:04) (18 - 18)  SpO2: 98% (07 May 2021 12:04) (96% - 98%)    PHYSICAL EXAM  Constitutional: Pt laying in bed s/p tonic-clonic seizure and Ativan 1mg, IV Push x1 dose. awake, unable to communicate,NAD   Eyes:3mm b/l PEARRL, EOM intact, sluggish eye opening, neck supple.   Respiratory: breath sounds clear and equal bilateral lungs, no wheezing rhonci or rales noted.    Cardiovascular: normal rate, regular rhythm, heart sounds s1, s2. no murmurs, rubs or gallops,   Neurological: AAOx1, thought and speech incoherent,aphasic,following commands, 5/5 strength intact LUE & LLE, 2/5 strength RUE, 0/5 strength in RLL. Sensation intact throughout extremities.      Skin:Warm and well perfused. no cyanosis or edema noted.    LABS:                        15.0   8.87  )-----------( 286      ( 07 May 2021 09:54 )             45.5     05-07    140  |  103  |  13  ----------------------------<  112<H>  4.1   |  27  |  0.84    Ca    9.8      07 May 2021 09:54    TPro  8.2  /  Alb  4.2  /  TBili  0.4  /  DBili  x   /  AST  17  /  ALT  13  /  AlkPhos  94  05-07    PT/INR - ( 07 May 2021 09:54 )   PT: 13.0 sec;   INR: 1.09          PTT - ( 07 May 2021 09:54 )  PTT:33.4 sec    CULTURES:      RADIOLOGY & ADDITIONAL STUDIES:  CT head without contrast (05/07/21):   IMPRESSION:    No acute intracranial hemorrhage, hydrocephalus or extra-axial fluid collection.  Mild parenchymal volume loss and mild chronic microvascular ischemic changes.    Status post left parietal craniotomy. There is hypodensity subjacent to the craniotomy site within the left parieto-occipital lobes corresponding to a resection cavity with surrounding vasogenic edema, better characterized on April 26, 2021 MRI brain.    No CT evidence for acute transcortical infarction. Please note that MR imaging is a more sensitive imaging modality for detection of acute infarction and may be obtained as clinically warranted.      MRI head w/wo IV cont (04/26):   IMPRESSION:  Increase in size of irregular enhancement along the superior aspect of the surgical resection cavity in the left parietal lobe with more pronounced masslike FLAIR signal abnormality.    New cortical foci of enhancement within the right parietal and occipital lobes which are suspicious for tumoral disease.       HISTORY OF PRESENT ILLNESS:   59 yo F with PMH of asthma, COPD, hx of left parietal mets (from lung adenocarcinoma) s/p craniotomy and radiation therapy (Oct/Nov 2017). Prior, pt reported difficulty writing with her right hand, decreased peripheral vison on right, and feeling "confused" for past month. Pt sent to ED as pt developed inability to talk properly. Neurosurgery consulted as pt is followed by Dr. Rico outpatient after MRI fo performed last week shows new brain mass (MRI done at [facility]). Pt is s/p generalized tonic-clonic seizure and currently unable to provide additional info at this time.   PAST MEDICAL & SURGICAL HISTORY:  COPD (chronic obstructive pulmonary disease)    Adenocarcinoma of lung    MRSA (methicillin resistant Staphylococcus aureus)  history of    Brain metastasis  brain tumor s/p resection    History of appendectomy    Surgery, elective  lung biopsy    H/O brain surgery    History of lung surgery  right wedge resection      FAMILY HISTORY:  Family history of essential hypertension (Mother, Sibling)    Family history of diabetes mellitus (Mother, Sibling)    Family history of uterine cancer (Sibling)  sister    Family history of cancer in mother (Mother)    No family history of cardiovascular disease (Father)        SOCIAL HISTORY:  Tobacco Use: Unknown if ever smoked.   EtOH use: unknown   Substance: unknown     Allergies    penicillin (Angioedema)    Intolerances        REVIEW OF SYSTEMS  Pt unable to provide additional info at this time.       MEDICATIONS:  Antibiotics:    Neuro:    Anticoagulation:    OTHER:    IVF:      Vital Signs Last 24 Hrs  T(C): 36.7 (07 May 2021 09:34), Max: 36.7 (07 May 2021 09:32)  T(F): 98.1 (07 May 2021 09:34), Max: 98.1 (07 May 2021 09:32)  HR: 99 (07 May 2021 12:04) (84 - 106)  BP: 109/69 (07 May 2021 12:04) (109/69 - 135/92)  BP(mean): --  RR: 18 (07 May 2021 12:04) (18 - 18)  SpO2: 98% (07 May 2021 12:04) (96% - 98%)    PHYSICAL EXAM  Constitutional: Pt laying in bed s/p tonic-clonic seizure and Ativan 1mg, IV Push x1 dose. awake, unable to communicate,NAD   Eyes:3mm b/l PEARRL, EOM intact, sluggish eye opening, neck supple.   Respiratory: breath sounds clear and equal bilateral lungs, no wheezing rhonci or rales noted.    Cardiovascular: normal rate, regular rhythm, heart sounds s1, s2. no murmurs, rubs or gallops,   Neurological: AAOx1, thought and speech incoherent,aphasic, intermittently following commands, 5/5 strength intact LUE & LLE, 2/5 strength RUE, 0/5 strength in RLL. Sensation intact throughout extremities.      Skin:Warm and well perfused. no cyanosis or edema noted.    LABS:                        15.0   8.87  )-----------( 286      ( 07 May 2021 09:54 )             45.5     05-07    140  |  103  |  13  ----------------------------<  112<H>  4.1   |  27  |  0.84    Ca    9.8      07 May 2021 09:54    TPro  8.2  /  Alb  4.2  /  TBili  0.4  /  DBili  x   /  AST  17  /  ALT  13  /  AlkPhos  94  05-07    PT/INR - ( 07 May 2021 09:54 )   PT: 13.0 sec;   INR: 1.09          PTT - ( 07 May 2021 09:54 )  PTT:33.4 sec    CULTURES:      RADIOLOGY & ADDITIONAL STUDIES:  CT head without contrast (05/07/21):   IMPRESSION:    No acute intracranial hemorrhage, hydrocephalus or extra-axial fluid collection.  Mild parenchymal volume loss and mild chronic microvascular ischemic changes.    Status post left parietal craniotomy. There is hypodensity subjacent to the craniotomy site within the left parieto-occipital lobes corresponding to a resection cavity with surrounding vasogenic edema, better characterized on April 26, 2021 MRI brain.    No CT evidence for acute transcortical infarction. Please note that MR imaging is a more sensitive imaging modality for detection of acute infarction and may be obtained as clinically warranted.      MRI head w/wo IV cont (04/26):   IMPRESSION:  Increase in size of irregular enhancement along the superior aspect of the surgical resection cavity in the left parietal lobe with more pronounced masslike FLAIR signal abnormality.    New cortical foci of enhancement within the right parietal and occipital lobes which are suspicious for tumoral disease.       HISTORY OF PRESENT ILLNESS:   59 yo F with PMH of asthma, COPD, hx of left parietal mets (from lung adenocarcinoma) s/p craniotomy and radiation therapy (Oct/Nov 2017). Prior, pt reported difficulty writing with her right hand, decreased peripheral vison on right, and feeling confused for past month. Patient was being seen at an outpatient follow-up visit to review recent MRI findings on 4/27, when she developed worsening RUE weakness and acute onset of expressive aphasia.   Patient was then sent to ED and stroke code was called. Imaging was negative for acute stroke, however while in ED patient had witnessed generalized tonic clonic seizure and was loaded with 2g of IV keppra. Patient was then also noted to have RUE twitching and was given an additional 1mg of Ativan. Patient seen in ED s/p keppra load and s/p 1mg ativan and post ictal during examination.         PAST MEDICAL & SURGICAL HISTORY:  COPD (chronic obstructive pulmonary disease)    Adenocarcinoma of lung    MRSA (methicillin resistant Staphylococcus aureus)  history of    Brain metastasis  brain tumor s/p resection    History of appendectomy    Surgery, elective  lung biopsy    H/O brain surgery    History of lung surgery  right wedge resection      FAMILY HISTORY:  Family history of essential hypertension (Mother, Sibling)    Family history of diabetes mellitus (Mother, Sibling)    Family history of uterine cancer (Sibling)  sister    Family history of cancer in mother (Mother)    No family history of cardiovascular disease (Father)        SOCIAL HISTORY:  Tobacco Use: Unknown if ever smoked.   EtOH use: unknown   Substance: unknown     Allergies    penicillin (Angioedema)    Intolerances        REVIEW OF SYSTEMS  Pt unable to provide additional info at this time.       MEDICATIONS:  Antibiotics:    Neuro:    Anticoagulation:    OTHER:    IVF:      Vital Signs Last 24 Hrs  T(C): 36.7 (07 May 2021 09:34), Max: 36.7 (07 May 2021 09:32)  T(F): 98.1 (07 May 2021 09:34), Max: 98.1 (07 May 2021 09:32)  HR: 99 (07 May 2021 12:04) (84 - 106)  BP: 109/69 (07 May 2021 12:04) (109/69 - 135/92)  BP(mean): --  RR: 18 (07 May 2021 12:04) (18 - 18)  SpO2: 98% (07 May 2021 12:04) (96% - 98%)    PHYSICAL EXAM  Constitutional: Pt laying in bed s/p tonic-clonic seizure and Ativan 1mg, IV Push x1 dose, post ictal   Eyes: 3mm b/l PERRL, EOM intact, sluggish eye opening, neck supple.   Respiratory: breath sounds clear and equal bilateral lungs, no wheezing rhonchi or rales noted.    Cardiovascular: normal rate, regular rhythm, heart sounds s1, s2. no murmurs, rubs or gallops,   Neurological: AAOx1, thought and speech incoherent, expressive aphasia, intermittently following commands, 5/5 strength intact LUE & LLE, 2/5 strength RUE, 0/5 strength in RLL. Sensation intact throughout extremities.      Skin: Warm and well perfused. no cyanosis or edema noted.    LABS:                        15.0   8.87  )-----------( 286      ( 07 May 2021 09:54 )             45.5     05-07    140  |  103  |  13  ----------------------------<  112<H>  4.1   |  27  |  0.84    Ca    9.8      07 May 2021 09:54    TPro  8.2  /  Alb  4.2  /  TBili  0.4  /  DBili  x   /  AST  17  /  ALT  13  /  AlkPhos  94  05-07    PT/INR - ( 07 May 2021 09:54 )   PT: 13.0 sec;   INR: 1.09          PTT - ( 07 May 2021 09:54 )  PTT:33.4 sec    CULTURES:      RADIOLOGY & ADDITIONAL STUDIES:  CT head without contrast (05/07/21):   IMPRESSION:    No acute intracranial hemorrhage, hydrocephalus or extra-axial fluid collection.  Mild parenchymal volume loss and mild chronic microvascular ischemic changes.    Status post left parietal craniotomy. There is hypodensity subjacent to the craniotomy site within the left parieto-occipital lobes corresponding to a resection cavity with surrounding vasogenic edema, better characterized on April 26, 2021 MRI brain.    No CT evidence for acute transcortical infarction. Please note that MR imaging is a more sensitive imaging modality for detection of acute infarction and may be obtained as clinically warranted.      MRI head w/wo IV cont (04/26):   IMPRESSION:  Increase in size of irregular enhancement along the superior aspect of the surgical resection cavity in the left parietal lobe with more pronounced masslike FLAIR signal abnormality.    New cortical foci of enhancement within the right parietal and occipital lobes which are suspicious for tumoral disease.       HISTORY OF PRESENT ILLNESS:   61 yo F with PMH of asthma, COPD, hx of left parietal mets (from lung adenocarcinoma) s/p craniotomy and radiation therapy (Oct/Nov 2017). 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 Now patient reports difficulty writing with her right hand, decreased peripheral vison on right, and feeling confused for past month. Patient was being seen at an outpatient follow-up visit to review recent MRI findings on 4/27, when she developed worsening RUE weakness and acute onset of expressive aphasia. Patient also endorses missing last two doses of Keppra 750mg.     Patient was then sent to ED and stroke code was called. Imaging was negative for acute stroke, however while in ED patient had witnessed generalized tonic clonic seizure and was loaded with 2g of IV keppra. Patient was then also noted to have RUE twitching and was given an additional 1mg of Ativan. Patient seen in ED s/p keppra load and s/p 1mg ativan and post ictal during examination.       PAST MEDICAL & SURGICAL HISTORY:  COPD (chronic obstructive pulmonary disease)    Adenocarcinoma of lung    MRSA (methicillin resistant Staphylococcus aureus)  history of    Brain metastasis  brain tumor s/p resection    History of appendectomy    Surgery, elective  lung biopsy    H/O brain surgery    History of lung surgery  right wedge resection      FAMILY HISTORY:  Family history of essential hypertension (Mother, Sibling)    Family history of diabetes mellitus (Mother, Sibling)    Family history of uterine cancer (Sibling)  sister    Family history of cancer in mother (Mother)    No family history of cardiovascular disease (Father)        SOCIAL HISTORY:  Tobacco Use: Unknown if ever smoked.   EtOH use: unknown   Substance: unknown     Allergies    penicillin (Angioedema)    Intolerances        REVIEW OF SYSTEMS  Pt unable to provide additional info at this time.       MEDICATIONS:  Antibiotics:    Neuro:    Anticoagulation:    OTHER:    IVF:      Vital Signs Last 24 Hrs  T(C): 36.7 (07 May 2021 09:34), Max: 36.7 (07 May 2021 09:32)  T(F): 98.1 (07 May 2021 09:34), Max: 98.1 (07 May 2021 09:32)  HR: 99 (07 May 2021 12:04) (84 - 106)  BP: 109/69 (07 May 2021 12:04) (109/69 - 135/92)  BP(mean): --  RR: 18 (07 May 2021 12:04) (18 - 18)  SpO2: 98% (07 May 2021 12:04) (96% - 98%)    PHYSICAL EXAM  Constitutional: Pt laying in bed s/p tonic-clonic seizure and Ativan 1mg, IV Push x1 dose, post ictal   Eyes: 3mm b/l PERRL, EOM intact, sluggish eye opening, neck supple.   Respiratory: breath sounds clear and equal bilateral lungs, no wheezing rhonchi or rales noted.    Cardiovascular: normal rate, regular rhythm, heart sounds s1, s2. no murmurs, rubs or gallops,   Neurological: AAOx1, thought and speech incoherent, expressive aphasia, intermittently following commands, 5/5 strength intact LUE & LLE, 0/5 strength RUE (has some tone), 0/5 strength in RLL. Sensation intact throughout extremities.      Skin: Warm and well perfused. no cyanosis or edema noted.    LABS:                        15.0   8.87  )-----------( 286      ( 07 May 2021 09:54 )             45.5     05-07    140  |  103  |  13  ----------------------------<  112<H>  4.1   |  27  |  0.84    Ca    9.8      07 May 2021 09:54    TPro  8.2  /  Alb  4.2  /  TBili  0.4  /  DBili  x   /  AST  17  /  ALT  13  /  AlkPhos  94  05-07    PT/INR - ( 07 May 2021 09:54 )   PT: 13.0 sec;   INR: 1.09          PTT - ( 07 May 2021 09:54 )  PTT:33.4 sec    CULTURES:      RADIOLOGY & ADDITIONAL STUDIES:  CT head without contrast (05/07/21):   IMPRESSION:    No acute intracranial hemorrhage, hydrocephalus or extra-axial fluid collection.  Mild parenchymal volume loss and mild chronic microvascular ischemic changes.    Status post left parietal craniotomy. There is hypodensity subjacent to the craniotomy site within the left parieto-occipital lobes corresponding to a resection cavity with surrounding vasogenic edema, better characterized on April 26, 2021 MRI brain.    No CT evidence for acute transcortical infarction. Please note that MR imaging is a more sensitive imaging modality for detection of acute infarction and may be obtained as clinically warranted.      MRI head w/wo IV cont (04/26):   IMPRESSION:  Increase in size of irregular enhancement along the superior aspect of the surgical resection cavity in the left parietal lobe with more pronounced masslike FLAIR signal abnormality.    New cortical foci of enhancement within the right parietal and occipital lobes which are suspicious for tumoral disease.

## 2021-05-07 NOTE — ED PROVIDER NOTE - PROGRESS NOTE DETAILS
[place]: CTH w/ no acute pathology. evaluated by stroke team. concerned about possible seizure. keppra level pending. Will reassess. [place]: RN heard noise, found pt w/ generalized tonic clonic shaking, frothing at mouth. Lasted ~1min then self resolved. Currently still seems confused. Will give keppra. likely seizure. Sats dropped to 90s during episode, now improved. [place]: ~15min ago pt had sustained shaking of RUE/RLE, awake, given ativan w/ improvement. Neurosurg evaluated, recommending epilepsy admission. d/w epilepsy team who will eval pt. pt currently stable. Iris: d/w sister gilbert at bedside. she spoke w/ pt 2 nights ago and she was not having any RLE weakness or speech issues. Pt had also told sister that she wasn't on any meds.   rediscussed w/ epilepsy team, will admit for further care.

## 2021-05-07 NOTE — ED ADULT NURSE NOTE - NURSING ED NEURO SEIZURE DOCUMENTATION
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 Plan: Start prednisolone QID OD with oral antiviral coverage of Famvir 500mg TID p.o. Patient will likely need gradual taper of topical steroid with closing monitoring. Call if any worsening. Follow-up next week.
expand seizure documentation...

## 2021-05-07 NOTE — ED PROVIDER NOTE - OBJECTIVE STATEMENT
60F PMH COPD, seizures (likely 2/2 brain mets, on keppra), NSCLC (s/p RLL lobectomy, RML wedge resection by Dr. Pisano 2016), L parietooccipital craniotomy (2017, [place] for metastatic disease followed by adjuvant radiation), Gamma Knife radiosurgery (2018, Dr Rico, for R parietal region mets), L parietal resection of radiation necrosis/craniotomy (May 2020, Dr. Rico) p/w weakness/aphasia.   Pt had neurosurg appt Feb 2021 for "worsening right hand weakness, confusion, imbalance and expressive aphasia." Also has baseline R visual field cut.  Pt unable to provide additional info at this time.   Per neurosurg PA: Pt had MRI 4/26/21 showing increased ehnacement in L parietal lobe. Had routine appt today for f/u. Initially pt was speaking well, was c/o worsening RUE weakness and possible new RLE weakness of unclear duration. ~0915 pt suddenly began having expressive aphasia.

## 2021-05-07 NOTE — CONSULT NOTE ADULT - ASSESSMENT
59 yo F with PMH of asthma, COPD, hx of left parietal mets (from lung adenocarcinoma) s/p craniotomy and radiation therapy (Oct/Nov 2017), comes to ED p/w inability to talk properly, consulted to Neurosurgery as pt is followed by Dr. Rico outpatient after MRI performed last week shows new brain mass (MRI done at [facility]).   59 yo F with PMH of asthma, COPD, hx of left parietal mets (from lung adenocarcinoma) s/p craniotomy and radiation therapy (Oct/Nov 2017), comes to ED p/w inability to talk properly, consulted to Neurosurgery as pt is followed by Dr. Rico outpatient after MRI performed last week shows new brain mass (MRI done at [facility]).          Plan:   - Pt s/p generalized tonic clonic seizure and ativan 1mg IV Push.   - CT Head 05/7:       No acute intracranial hemorrhage, hydrocephalus or extra-axial fluid collection.      No CT evidence for acute transcortical infarction.  - admit to neurology for seizure management, 61 yo F with PMH of asthma, COPD, hx of left parietal mets (from lung adenocarcinoma) s/p craniotomy and radiation therapy (Oct/Nov 2017), comes to ED p/w inability to talk properly, consulted to Neurosurgery as pt is followed by Dr. Rico outpatient after MRI found to have  new brain mass (MRI done at [facility]).          Plan:   - Pt s/p generalized tonic clonic seizure and ativan 1mg IV Push.   - CT Head 05/7:       No acute intracranial hemorrhage, hydrocephalus or extra-axial fluid collection.      No CT evidence for acute transcortical infarction.  - admit to neurology for seizure management, 61 yo F with PMH of asthma, COPD, hx of left parietal mets (from lung adenocarcinoma) s/p craniotomy and radiation therapy (Oct/Nov 2017), comes to ED p/w inability to talk properly, consulted to Neurosurgery as pt is followed by Dr. Rico outpatient after MRI found to have  new brain mass (MRI done at [facility]).      Plan:   - Pt s/p generalized tonic clonic seizure and ativan 1mg IV Push.    - Neuro checks  - Consider epilepsy consult   - Admit to neurology for seizure management  - Please reconsult neurosurgery if you have any further questions or concerns [phone] 59 yo F with PMH of asthma, COPD, hx of left parietal mets (from lung adenocarcinoma) s/p craniotomy and radiation therapy (Oct/Nov 2017), comes to ED with aphasia, right hemiparesis      Plan:   - Pt s/p generalized tonic clonic seizure and ativan 1mg IV Push.    - Neuro checks  - Consider epilepsy consult   - Admit to neurology for seizure management  - Please reconsult neurosurgery if you have any further questions or concerns [phone] 59 yo F with PMH of asthma, COPD, hx of left parietal mets (from lung adenocarcinoma) s/p craniotomy and radiation therapy (Oct/Nov 2017), comes to ED with aphasia, right hemiparesis      Plan:   - Pt s/p generalized tonic clonic seizure and ativan 1mg IV Push.    - Neuro checks  - Consider epilepsy consult   - Continue Keppra   - Admit to neurology for seizure management  - Please reconsult neurosurgery if you have any further questions or concerns [phone] 61 yo F with PMH of asthma, COPD, hx of left parietal mets (from lung adenocarcinoma) s/p craniotomy and radiation therapy (Oct/Nov 2017), comes to ED with aphasia, right hemiparesis      Plan:   - Pt s/p generalized tonic clonic seizure and ativan 1mg IV Push.    - Neuro checks  - Consider epilepsy consult   - Continue Keppra   - Admit to neurology for seizure management  - Please reconsult neurosurgery if you have any further questions or concerns [phone]    Assessment and plan discussed with Dr. Rico

## 2021-05-07 NOTE — ASSESSMENT
[FreeTextEntry1] : Due to acutely worsening right hemiparesis and expressive aphasia, the patient was brought emergently to [facility] ED and full report was given to ER attending and staff.\par \par MRI brain with and without contrast done on 4/26/21 demonstrates slight increase in enhancement left parietal and new cortical foci of enhancement in right parietal and right occipital lobe. This new focus is suspicious for metastasis may be amenable for GKRS. Once the patient stabilizes, will consider treatment options.\par \par Patient verbalizes agreement and understanding with plan of care.\par \par I, Dr. Rico, personally performed the evaluation and management (E/M) services for this established patient who presents today with (a) new problem(s)/exacerbation of (an) existing condition(s).  That E/M includes conducting the examination, assessing all new/exacerbated conditions, and establishing a new plan of care.  Today, my ACP, Balbina Acuna, was here to observe my evaluation and management services for this new problem/exacerbated condition to be followed going forward.\par \par \par \par -------------------------------------------------\par Number and Complexity of Problems: High - Acutely worsening neurological deficit; potentially life threatening\par \par Amount/Complexity of Data Reviewed/Analyzed: Extensive - Dr. Rico independently reviewed and interpreted MRI brain from 4/26/21, MRI brain from 2/2021 and the patient was referred to ED for emergent evaluation \par \par Risk of Complications and/or Morbidity or Mortality of Patient Management: High - Rule out Acute stroke/ seizure\par

## 2021-05-07 NOTE — PHYSICAL EXAM
[General Appearance - Alert] : alert [General Appearance - In No Acute Distress] : in no acute distress [Oriented To Time, Place, And Person] : oriented to person, place, and time [Cranial Nerves Optic (II)] : visual acuity intact bilaterally,  pupils equal round and reactive to light [Cranial Nerves Oculomotor (III)] : extraocular motion intact [Cranial Nerves Trigeminal (V)] : facial sensation intact symmetrically [Cranial Nerves Facial (VII)] : face symmetrical [Cranial Nerves Vestibulocochlear (VIII)] : hearing was intact bilaterally [Cranial Nerves Glossopharyngeal (IX)] : tongue and palate midline [Cranial Nerves Accessory (XI - Cranial And Spinal)] : head turning and shoulder shrug symmetric [Cranial Nerves Hypoglossal (XII)] : there was no tongue deviation with protrusion [Sclera] : the sclera and conjunctiva were normal [Outer Ear] : the ears and nose were normal in appearance [Neck Appearance] : the appearance of the neck was normal [] : no respiratory distress [Abnormal Walk] : normal gait [Skin Color & Pigmentation] : normal skin color and pigmentation [FreeTextEntry6] : RIGHT hand weakness\par RUE 3/5\par RLE 3/5

## 2021-05-07 NOTE — CONSULT NOTE ADULT - PROBLEM SELECTOR RECOMMENDATION 9
- Pt s/p generalized tonic clonic seizure and ativan 1mg IV Push.   - CT Head 05/7:       No acute intracranial hemorrhage, hydrocephalus or extra-axial fluid collection.      No CT evidence for acute transcortical infarction.  - will re-assess pt functional status  -

## 2021-05-07 NOTE — PATIENT PROFILE ADULT - 
ADDITIONAL INFORMATION
Per patient, she received COVID vaccine of Moderna dose 2 in January 2021 but does not remember the exact date

## 2021-05-07 NOTE — CONSULT NOTE ADULT - ASSESSMENT
61 yo F hx of Asthma, COPD, lung adenocarcinoma (left parietal mets s/p craniotomy and radiation in 2017), residual right arm weakness, was in neurosurgery clinic morning of 5/7 when she started to develop new right leg weakness and difficulty speaking. CTH negative for hemorrhage. CTP without mismatch. CTA head/neck with no LVO or stenosis. Patient seen with left lid twitching and side to side jerking of head, concerning for seizure activity. Pt is on maintenance keppra 1g BID.     - obtain keppra level (please send lab before giving keppra dose)  - 2g Keppra IV  - gen neuro/epilepsy consult  - no further stroke w/u needed

## 2021-05-07 NOTE — CONSULT NOTE ADULT - ATTENDING COMMENTS
The patient is a 63 year old female with a PMH of metastatic adenocarcinoma of the lung with know left parietal metastasis s/p craniotomy and radiation in 2017. She developed sudden onset aphasia and RUE weakness (worse than baseline) while at an appointment with neurosurgery. She was sent to ED and acute stroke code was called. CTH showed the known metastases but no hemorrhage or other areas of infarction. CTA showed no LVO. CTP showed no significant findings.  During our exam, the patient had subtle twitching of her left face and then leftward head turn and subtle jerking. No adventious movements of her limbs were appreciated.  Given concern for keppra, she was given 2 g.  Her home regimen of Keppra is 1 g BID.  Unfortunately, the patient had a GTC while in the ED and was given 1 mg Ativan as well.    RECOMMENDATIONS:  --Consult gen neuro/epilepsy to help manage seizures  --f/u Keppra level  --Continue Keppra; defer dosing to epilepsy colleagues
Patient with prior craniotomy for left parieto-occipital metastasis at [facility] years ago, radiation, then repeat resection here at [facility]. Also s/p gamma knife radiosurgery to right parietal metastasis. Has a history of seizures, maintained on keppra. Now presents with right hemiparesis and aphasia. Stroke workup, possible seizure. Agree with further neurology workup, EEG. No acute neurosurgical intervention planned at this time.    Kapil Rico M.D.

## 2021-05-07 NOTE — H&P ADULT - ASSESSMENT
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  - f/u with Neurosurgery if Dexamethasone taper needed   61 yo F hx of Asthma, COPD, lung adenocarcinoma (left parietal mets s/p craniotomy and radiation in 2017), witnessed GTC in ED; admitted to EMU for vEEG monitoring and management of AED    #Seizures  - originally takes Keppra 1000 BID  - s/p 1 mg ativan IVP and 2gram Keppra load in ED  - neuro/vitals q4h  - will likely increase to Keppra 1000 qAM and 1500 qPM pending Keppra levels  - hook up to vEEG  - if event seizure activity >2mins, give ativan 2 mg IVP STAT and notify epilepsy team    #COPD  - continue home medications  - continuous pulse ox with goal SpO2 >88%    #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   Patient confirms nonadherence to Keppra leading to breakthrough seizure   Pt with significant postictal HA>     #Seizures  - originally takes Keppra 1000 BID  - s/p 1 mg ativan IVP and 2gram Keppra load in ED  - neuro/vitals q4h  -if needed, will  increase to Keppra 1000 qAM and 1500 qPM pending Keppra levels. patient confirms that nonadherence to keppra lead to breakthrough seizures   - hook up to vEEG  - if event seizure activity >2mins, give ativan 2 mg IVP STAT and notify epilepsy team    # Postictal Headache  - For now, IV tylenol.   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

## 2021-05-07 NOTE — ED ADULT NURSE NOTE - OBJECTIVE STATEMENT
Patient sent down from outpatient neurology visit upstairs because patient became aphasic.  Stroke code initiated in ED.   .  Hx lung cancer with mets to brain.  At baseline patient has residual RLE weakness.  Stroke team at bedside.  Patient had witnessed seizure x 2 episodes in unit.  Given keppra iv and ativan 1mg ivp as ordered.  Neurochecks, aspiration precaution, cardiac monitoring in place.  VSS.  Continue to monitor

## 2021-05-07 NOTE — ED ADULT TRIAGE NOTE - CHIEF COMPLAINT QUOTE
Patient brought to ER by NP from neurology with aphasia and right sided weakness. Per NP from neurology, patient noted to have sudden onset of aphasia at approximately 915am. Stroke code activated in triage at 0931.

## 2021-05-07 NOTE — ED ADULT NURSE REASSESSMENT NOTE - NS ED NURSE REASSESS COMMENT FT1
patient had witnessed seizure while laying down on bed. witnessed RLE and BUE jerking movements.  MD at beside.  Ativan 1mg IVP given.  Suction at bedside.  Cardiac / aspiration precautions / neurochecks maintained. Continue to monitor.

## 2021-05-07 NOTE — ED PROVIDER NOTE - PHYSICAL EXAMINATION
Does not track w/ eyes to R side. Able to follow commands. Has RUE 0/5, RLE 0/5. LUE/LLE 5/5.   Repeating "ok" or "im sorry" but otherwise not able to answer questions appropriately.

## 2021-05-08 PROCEDURE — 99233 SBSQ HOSP IP/OBS HIGH 50: CPT

## 2021-05-08 PROCEDURE — 95720 EEG PHY/QHP EA INCR W/VEEG: CPT

## 2021-05-08 RX ORDER — KETOROLAC TROMETHAMINE 30 MG/ML
15 SYRINGE (ML) INJECTION EVERY 8 HOURS
Refills: 0 | Status: DISCONTINUED | OUTPATIENT
Start: 2021-05-08 | End: 2021-05-09

## 2021-05-08 RX ORDER — KETOROLAC TROMETHAMINE 30 MG/ML
15 SYRINGE (ML) INJECTION ONCE
Refills: 0 | Status: DISCONTINUED | OUTPATIENT
Start: 2021-05-08 | End: 2021-05-08

## 2021-05-08 RX ORDER — KETOROLAC TROMETHAMINE 30 MG/ML
10 SYRINGE (ML) INJECTION ONCE
Refills: 0 | Status: DISCONTINUED | OUTPATIENT
Start: 2021-05-08 | End: 2021-05-08

## 2021-05-08 RX ORDER — ONDANSETRON 8 MG/1
4 TABLET, FILM COATED ORAL ONCE
Refills: 0 | Status: COMPLETED | OUTPATIENT
Start: 2021-05-08 | End: 2021-05-08

## 2021-05-08 RX ADMIN — ALBUTEROL 2 PUFF(S): 90 AEROSOL, METERED ORAL at 11:10

## 2021-05-08 RX ADMIN — ALBUTEROL 2 PUFF(S): 90 AEROSOL, METERED ORAL at 17:02

## 2021-05-08 RX ADMIN — ALBUTEROL 2 PUFF(S): 90 AEROSOL, METERED ORAL at 05:53

## 2021-05-08 RX ADMIN — Medication 15 MILLIGRAM(S): at 06:33

## 2021-05-08 RX ADMIN — LEVETIRACETAM 1000 MILLIGRAM(S): 250 TABLET, FILM COATED ORAL at 05:53

## 2021-05-08 RX ADMIN — Medication 650 MILLIGRAM(S): at 18:50

## 2021-05-08 RX ADMIN — Medication 15 MILLIGRAM(S): at 06:13

## 2021-05-08 RX ADMIN — Medication 650 MILLIGRAM(S): at 19:17

## 2021-05-08 RX ADMIN — ONDANSETRON 4 MILLIGRAM(S): 8 TABLET, FILM COATED ORAL at 07:13

## 2021-05-08 RX ADMIN — PANTOPRAZOLE SODIUM 40 MILLIGRAM(S): 20 TABLET, DELAYED RELEASE ORAL at 06:13

## 2021-05-08 RX ADMIN — Medication 10 MILLIGRAM(S): at 02:41

## 2021-05-08 RX ADMIN — LEVETIRACETAM 1000 MILLIGRAM(S): 250 TABLET, FILM COATED ORAL at 17:21

## 2021-05-08 RX ADMIN — Medication 15 MILLIGRAM(S): at 22:30

## 2021-05-08 RX ADMIN — Medication 15 MILLIGRAM(S): at 13:51

## 2021-05-08 RX ADMIN — Medication 15 MILLIGRAM(S): at 14:00

## 2021-05-08 RX ADMIN — Medication 10 MILLIGRAM(S): at 03:43

## 2021-05-08 RX ADMIN — Medication 15 MILLIGRAM(S): at 21:50

## 2021-05-08 NOTE — EEG REPORT - NS EEG TEXT BOX
[facility] Department of Neurology  Inpatient Continuous video-Electroencephalogram    Acquisition Details:  Electroencephalography was acquired using a minimum of 21 channels on an [employer] Neurology system v 8.5.1 with electrode placement according to the standard International 10-20 system following ACNS (American Clinical Neurophysiology Society) guidelines for Long-Term Video EEG monitoring.  Anterior temporal T1 and T2 electrodes were utilized whenever possible.   The [employer]K automated spike & seizure detections were all reviewed in detail, in addition to extensive portions of raw EEG.    Day 1: 5/7/2021 @ 5:13:04 PM to next morning @ 07:00 am  Background:  continuous, with predominantly alpha and beta frequencies.  Symmetry:  Continuous polymorphic theta/delta frequencies and Higher amplitudes of faster frequencies over the left hemisphere.  Posterior Dominant Rhythm:  8 Hz asymmetric., better formed over the right.  Organization: Rudimentary.  Voltage:  Normal (20+ uV)  Variability: Yes. 		Reactivity: Yes.  N2 sleep: Symmetric, synchronous spindles and K complexes.  Spontaneous Activity:  Occasional (1+/hr <1/min)left temporal sharply contoured frequencies, rarely with epileptiform morphology.  Periodic/rhythmic activity:  None.  Events:  No electrographic seizures or significant clinical events.  Provocations:  Hyperventilation and Photic stimulation: was not performed.    Daily Summary:    Continous left hemisphere slowing and breach artifact, suggestive of focal cerebral dysfunction and consistent with known history skull defect.  Occasional left temporal sharply contoured waves rarely with epileptiform morphology, suggestive of underlying hyperexcitability.   [facility] Department of Neurology  Inpatient Continuous video-Electroencephalogram    Acquisition Details:  Electroencephalography was acquired using a minimum of 21 channels on an [employer] Neurology system v 8.5.1 with electrode placement according to the standard International 10-20 system following ACNS (American Clinical Neurophysiology Society) guidelines for Long-Term Video EEG monitoring.  Anterior temporal T1 and T2 electrodes were utilized whenever possible.   The XLTEK automated spike & seizure detections were all reviewed in detail, in addition to extensive portions of raw EEG.    Day 1: 5/7/2021 @ 5:13:04 PM to next morning @ 07:00 am  Background:  continuous, with predominantly alpha and beta frequencies.  Symmetry:  Continuous polymorphic theta/delta frequencies and Higher amplitudes of faster frequencies over the left hemisphere.  Posterior Dominant Rhythm:  8 Hz asymmetric., better formed over the right.  Organization: Rudimentary.  Voltage:  Normal (20+ uV)  Variability: Yes. 		Reactivity: Yes.  N2 sleep: Symmetric, synchronous spindles and K complexes.  Spontaneous Activity:  Occasional (1+/hr <1/min)left temporal sharply contoured frequencies, rarely with epileptiform morphology.  Periodic/rhythmic activity:  None.  Events:  No electrographic seizures or significant clinical events.  Provocations:  Hyperventilation and Photic stimulation: was not performed.    Daily Summary:    Continous left hemisphere slowing and breach artifact, suggestive of focal cerebral dysfunction and consistent with known history skull defect.  Occasional left temporal sharp waves rarely with epileptiform morphology, suggestive of underlying hyperexcitability.

## 2021-05-08 NOTE — PROGRESS NOTE ADULT - ASSESSMENT
59 yo F hx of Asthma, COPD, lung adenocarcinoma (left parietal mets s/p craniotomy and radiation in 2017), witnessed GTC in ED; admitted to EMU for vEEG monitoring and management of AED.   Patient confirms nonadherence to Keppra leading to breakthrough seizure   Pt with significant postictal HA>     #Seizures  - originally takes Keppra 1000 BID  - s/p 1 mg ativan IVP and 2gram Keppra load in ED  - neuro/vitals q4h  -if needed, will  increase to Keppra 1000 qAM and 1500 qPM pending Keppra levels. 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

## 2021-05-08 NOTE — PROGRESS NOTE ADULT - SUBJECTIVE AND OBJECTIVE BOX
OVERNIGHT EVENTS: Patient complained of headache overnight and received IV toradol x3.     SUBJECTIVE / INTERVAL HPI: Patient seen and examined at bedside. She still complains of headache but otherwise feels well.     VITAL SIGNS:  Vital Signs Last 24 Hrs  T(C): 36.8 (08 May 2021 05:28), Max: 36.9 (07 May 2021 16:38)  T(F): 98.2 (08 May 2021 05:28), Max: 98.4 (07 May 2021 16:38)  HR: 90 (08 May 2021 05:28) (83 - 106)  BP: 104/71 (08 May 2021 05:28) (102/72 - 130/96)  BP(mean): --  RR: 18 (08 May 2021 05:28) (17 - 18)  SpO2: 97% (08 May 2021 05:28) (95% - 98%)    PHYSICAL EXAM:    Constitutional: Pt laying in bed in NAD   Eyes: 3mm b/l PERRL, EOM intact, sluggish eye opening, neck supple.   Respiratory: breath sounds clear and equal bilateral lungs, no wheezing rhonchi or rales noted.    Cardiovascular: normal rate, regular rhythm, heart sounds s1, s2. no murmurs, rubs or gallops,   Neurological: AAOx1, thought and speech incoherent, expressive aphasia, intermittently following commands, 5/5 strength intact LUE & LLE, 0/5 strength RUE (has some tone), 0/5 strength in RLL. Sensation intact throughout extremities.      Skin: Warm and well perfused. no cyanosis or edema noted.    MEDICATIONS:  MEDICATIONS  (STANDING):  ALBUTerol    90 MICROgram(s) HFA Inhaler 2 Puff(s) Inhalation four times a day  levETIRAcetam 1000 milliGRAM(s) Oral two times a day  pantoprazole    Tablet 40 milliGRAM(s) Oral before breakfast  tiotropium 18 MICROgram(s) Capsule 1 Capsule(s) Inhalation every 24 hours    MEDICATIONS  (PRN):  acetaminophen   Tablet .. 650 milliGRAM(s) Oral every 6 hours PRN Temp greater or equal to 38C (100.4F), Mild Pain (1 - 3)  bisacodyl 5 milliGRAM(s) Oral daily PRN Constipation  LORazepam   Injectable 2 milliGRAM(s) IV Push once PRN if seizure activity > 2 mins  senna 2 Tablet(s) Oral at bedtime PRN Constipation      ALLERGIES:  Allergies    penicillin (Angioedema)    Intolerances        LABS:                        15.0   8.87  )-----------( 286      ( 07 May 2021 09:54 )             45.5     05-    140  |  103  |  13  ----------------------------<  112<H>  4.1   |  27  |  0.84    Ca    9.8      07 May 2021 09:54    TPro  8.2  /  Alb  4.2  /  TBili  0.4  /  DBili  x   /  AST  17  /  ALT  13  /  AlkPhos  94  05-07    PT/INR - ( 07 May 2021 09:54 )   PT: 13.0 sec;   INR: 1.09          PTT - ( 07 May 2021 09:54 )  PTT:33.4 sec  Urinalysis Basic - ( 07 May 2021 13:49 )    Color: Yellow / Appearance: Clear / S.015 / pH: x  Gluc: x / Ketone: NEGATIVE  / Bili: Negative / Urobili: 0.2 E.U./dL   Blood: x / Protein: NEGATIVE mg/dL / Nitrite: NEGATIVE   Leuk Esterase: Trace / RBC: < 5 /HPF / WBC 5-10 /HPF   Sq Epi: x / Non Sq Epi: 0-5 /HPF / Bacteria: Present /HPF      CAPILLARY BLOOD GLUCOSE  106 (07 May 2021 21:56)      POCT Blood Glucose.: 106 mg/dL (07 May 2021 09:32)      RADIOLOGY & ADDITIONAL TESTS: Reviewed.    PLAN:  OVERNIGHT EVENTS: Patient complained of headache overnight and received IV toradol x3.     SUBJECTIVE / INTERVAL HPI: Patient seen and examined at bedside. She still complains of headache but otherwise feels well.     VITAL SIGNS:  Vital Signs Last 24 Hrs  T(C): 36.8 (08 May 2021 05:28), Max: 36.9 (07 May 2021 16:38)  T(F): 98.2 (08 May 2021 05:28), Max: 98.4 (07 May 2021 16:38)  HR: 90 (08 May 2021 05:28) (83 - 106)  BP: 104/71 (08 May 2021 05:28) (102/72 - 130/96)  BP(mean): --  RR: 18 (08 May 2021 05:28) (17 - 18)  SpO2: 97% (08 May 2021 05:28) (95% - 98%)    PHYSICAL EXAM:    Constitutional: Pt laying in bed in NAD   Eyes: 3mm b/l PERRL, EOM intact, sluggish eye opening, neck supple.   Respiratory: breath sounds clear and equal bilateral lungs, no wheezing rhonchi or rales noted.    Cardiovascular: normal rate, regular rhythm, heart sounds s1, s2. no murmurs, rubs or gallops,   Eyes open spontaneously. Conversational with appropriate sentences.  EOMI. Visual fields full. PERRL. Face symmetrical.  RUE/RLE 4/5, otherwise 5/5  Finger-nose-finger intact R/L.  Intact to light touch throughout.      MEDICATIONS:  MEDICATIONS  (STANDING):  ALBUTerol    90 MICROgram(s) HFA Inhaler 2 Puff(s) Inhalation four times a day  levETIRAcetam 1000 milliGRAM(s) Oral two times a day  pantoprazole    Tablet 40 milliGRAM(s) Oral before breakfast  tiotropium 18 MICROgram(s) Capsule 1 Capsule(s) Inhalation every 24 hours    MEDICATIONS  (PRN):  acetaminophen   Tablet .. 650 milliGRAM(s) Oral every 6 hours PRN Temp greater or equal to 38C (100.4F), Mild Pain (1 - 3)  bisacodyl 5 milliGRAM(s) Oral daily PRN Constipation  LORazepam   Injectable 2 milliGRAM(s) IV Push once PRN if seizure activity > 2 mins  senna 2 Tablet(s) Oral at bedtime PRN Constipation      ALLERGIES:  Allergies    penicillin (Angioedema)    Intolerances        LABS:                        15.0   8.87  )-----------( 286      ( 07 May 2021 09:54 )             45.5     05-    140  |  103  |  13  ----------------------------<  112<H>  4.1   |  27  |  0.84    Ca    9.8      07 May 2021 09:54    TPro  8.2  /  Alb  4.2  /  TBili  0.4  /  DBili  x   /  AST  17  /  ALT  13  /  AlkPhos  94  05-07    PT/INR - ( 07 May 2021 09:54 )   PT: 13.0 sec;   INR: 1.09          PTT - ( 07 May 2021 09:54 )  PTT:33.4 sec  Urinalysis Basic - ( 07 May 2021 13:49 )    Color: Yellow / Appearance: Clear / S.015 / pH: x  Gluc: x / Ketone: NEGATIVE  / Bili: Negative / Urobili: 0.2 E.U./dL   Blood: x / Protein: NEGATIVE mg/dL / Nitrite: NEGATIVE   Leuk Esterase: Trace / RBC: < 5 /HPF / WBC 5-10 /HPF   Sq Epi: x / Non Sq Epi: 0-5 /HPF / Bacteria: Present /HPF      CAPILLARY BLOOD GLUCOSE  106 (07 May 2021 21:56)      POCT Blood Glucose.: 106 mg/dL (07 May 2021 09:32)      RADIOLOGY & ADDITIONAL TESTS: Reviewed.    PLAN:

## 2021-05-09 LAB
ALBUMIN SERPL ELPH-MCNC: 3.5 G/DL — SIGNIFICANT CHANGE UP (ref 3.3–5)
ALP SERPL-CCNC: 78 U/L — SIGNIFICANT CHANGE UP (ref 40–120)
ALT FLD-CCNC: 10 U/L — SIGNIFICANT CHANGE UP (ref 10–45)
ANION GAP SERPL CALC-SCNC: 12 MMOL/L — SIGNIFICANT CHANGE UP (ref 5–17)
AST SERPL-CCNC: 14 U/L — SIGNIFICANT CHANGE UP (ref 10–40)
BILIRUB SERPL-MCNC: 0.3 MG/DL — SIGNIFICANT CHANGE UP (ref 0.2–1.2)
BUN SERPL-MCNC: 13 MG/DL — SIGNIFICANT CHANGE UP (ref 7–23)
CALCIUM SERPL-MCNC: 9 MG/DL — SIGNIFICANT CHANGE UP (ref 8.4–10.5)
CHLORIDE SERPL-SCNC: 106 MMOL/L — SIGNIFICANT CHANGE UP (ref 96–108)
CO2 SERPL-SCNC: 22 MMOL/L — SIGNIFICANT CHANGE UP (ref 22–31)
COVID-19 SPIKE DOMAIN AB INTERP: POSITIVE
COVID-19 SPIKE DOMAIN ANTIBODY RESULT: >250 U/ML — HIGH
CREAT SERPL-MCNC: 0.83 MG/DL — SIGNIFICANT CHANGE UP (ref 0.5–1.3)
GLUCOSE SERPL-MCNC: 112 MG/DL — HIGH (ref 70–99)
HCT VFR BLD CALC: 42.3 % — SIGNIFICANT CHANGE UP (ref 34.5–45)
HGB BLD-MCNC: 13.5 G/DL — SIGNIFICANT CHANGE UP (ref 11.5–15.5)
MAGNESIUM SERPL-MCNC: 2 MG/DL — SIGNIFICANT CHANGE UP (ref 1.6–2.6)
MCHC RBC-ENTMCNC: 28.4 PG — SIGNIFICANT CHANGE UP (ref 27–34)
MCHC RBC-ENTMCNC: 31.9 GM/DL — LOW (ref 32–36)
MCV RBC AUTO: 88.9 FL — SIGNIFICANT CHANGE UP (ref 80–100)
NRBC # BLD: 0 /100 WBCS — SIGNIFICANT CHANGE UP (ref 0–0)
PHOSPHATE SERPL-MCNC: 2.7 MG/DL — SIGNIFICANT CHANGE UP (ref 2.5–4.5)
PLATELET # BLD AUTO: 239 K/UL — SIGNIFICANT CHANGE UP (ref 150–400)
POTASSIUM SERPL-MCNC: 4 MMOL/L — SIGNIFICANT CHANGE UP (ref 3.5–5.3)
POTASSIUM SERPL-SCNC: 4 MMOL/L — SIGNIFICANT CHANGE UP (ref 3.5–5.3)
PROT SERPL-MCNC: 6.9 G/DL — SIGNIFICANT CHANGE UP (ref 6–8.3)
RBC # BLD: 4.76 M/UL — SIGNIFICANT CHANGE UP (ref 3.8–5.2)
RBC # FLD: 14 % — SIGNIFICANT CHANGE UP (ref 10.3–14.5)
SARS-COV-2 IGG+IGM SERPL QL IA: >250 U/ML — HIGH
SARS-COV-2 IGG+IGM SERPL QL IA: POSITIVE
SODIUM SERPL-SCNC: 140 MMOL/L — SIGNIFICANT CHANGE UP (ref 135–145)
WBC # BLD: 6.06 K/UL — SIGNIFICANT CHANGE UP (ref 3.8–10.5)
WBC # FLD AUTO: 6.06 K/UL — SIGNIFICANT CHANGE UP (ref 3.8–10.5)

## 2021-05-09 PROCEDURE — 95720 EEG PHY/QHP EA INCR W/VEEG: CPT

## 2021-05-09 PROCEDURE — 99232 SBSQ HOSP IP/OBS MODERATE 35: CPT

## 2021-05-09 RX ORDER — ACETAMINOPHEN 500 MG
1000 TABLET ORAL ONCE
Refills: 0 | Status: COMPLETED | OUTPATIENT
Start: 2021-05-09 | End: 2021-05-09

## 2021-05-09 RX ORDER — KETOROLAC TROMETHAMINE 30 MG/ML
30 SYRINGE (ML) INJECTION EVERY 6 HOURS
Refills: 0 | Status: DISCONTINUED | OUTPATIENT
Start: 2021-05-09 | End: 2021-05-10

## 2021-05-09 RX ORDER — METOCLOPRAMIDE HCL 10 MG
5 TABLET ORAL DAILY
Refills: 0 | Status: DISCONTINUED | OUTPATIENT
Start: 2021-05-09 | End: 2021-05-10

## 2021-05-09 RX ADMIN — Medication 400 MILLIGRAM(S): at 03:19

## 2021-05-09 RX ADMIN — LEVETIRACETAM 1000 MILLIGRAM(S): 250 TABLET, FILM COATED ORAL at 06:32

## 2021-05-09 RX ADMIN — Medication 30 MILLIGRAM(S): at 20:04

## 2021-05-09 RX ADMIN — Medication 5 MILLIGRAM(S): at 18:18

## 2021-05-09 RX ADMIN — ALBUTEROL 2 PUFF(S): 90 AEROSOL, METERED ORAL at 17:00

## 2021-05-09 RX ADMIN — Medication 30 MILLIGRAM(S): at 18:27

## 2021-05-09 RX ADMIN — LEVETIRACETAM 1000 MILLIGRAM(S): 250 TABLET, FILM COATED ORAL at 17:00

## 2021-05-09 RX ADMIN — Medication 15 MILLIGRAM(S): at 12:30

## 2021-05-09 RX ADMIN — Medication 15 MILLIGRAM(S): at 11:38

## 2021-05-09 RX ADMIN — PANTOPRAZOLE SODIUM 40 MILLIGRAM(S): 20 TABLET, DELAYED RELEASE ORAL at 06:32

## 2021-05-09 RX ADMIN — ALBUTEROL 2 PUFF(S): 90 AEROSOL, METERED ORAL at 11:36

## 2021-05-09 NOTE — PROGRESS NOTE ADULT - ATTENDING COMMENTS
Patient seen and examined by me 5/9/2021. Improved, now moving right side move, aphasia better. No neurosurgical intervention planned at this time. AED per epilepsy. Can followup with me as outpatient, [phone] for appointment.    Kapil Rico M.D.
Overall patient feeling better but still very tired and c/o of headaches.  She again admits to missing 2 doses of LEV on Thursday night and Friday morning.  home dose LEV 750mg BID- currently on 1g BID- will leave current dose for now  No seizures overnight on EEG  Will monitor for another day and optimize pain

## 2021-05-09 NOTE — PROGRESS NOTE ADULT - SUBJECTIVE AND OBJECTIVE BOX
Subjective  No events overnight. Overall feeling better. Seen walking bedside.  C/o of headache which slightly improves with meds    ROS  As above, otherwise negative for constitutional/HEENT/CV/pulm/GI//MSK/neuro/derm/endocrine/psych.     MEDICATIONS  (STANDING):  ALBUTerol    90 MICROgram(s) HFA Inhaler 2 Puff(s) Inhalation four times a day  levETIRAcetam 1000 milliGRAM(s) Oral two times a day  metoclopramide 5 milliGRAM(s) Oral daily  pantoprazole    Tablet 40 milliGRAM(s) Oral before breakfast  tiotropium 18 MICROgram(s) Capsule 1 Capsule(s) Inhalation every 24 hours    MEDICATIONS  (PRN):  acetaminophen   Tablet .. 650 milliGRAM(s) Oral every 6 hours PRN Temp greater or equal to 38C (100.4F), Mild Pain (1 - 3)  bisacodyl 5 milliGRAM(s) Oral daily PRN Constipation  ketorolac   Injectable 30 milliGRAM(s) IV Push every 6 hours PRN Severe Pain (7 - 10)  LORazepam   Injectable 2 milliGRAM(s) IV Push once PRN if seizure activity > 2 mins  senna 2 Tablet(s) Oral at bedtime PRN Constipation      T(C): 36.8 (05-09-21 @ 20:21), Max: 36.8 (05-09-21 @ 20:21)  HR: 79 (05-09-21 @ 20:21) (79 - 80)  BP: 111/75 (05-09-21 @ 20:21) (111/75 - 115/75)  RR: 18 (05-09-21 @ 20:21) (15 - 18)  SpO2: 97% (05-09-21 @ 20:21) (95% - 97%)  Wt(kg): --    Eyes open spontaneously. Conversational with appropriate sentences.  EOMI. Visual fields full. PERRL. Face symmetrical.  RUE/RLE 4+/5, otherwise 5/5  Finger-nose-finger intact R/L.  Intact to light touch throughout.    CBC Full  -  ( 09 May 2021 08:14 )  WBC Count : 6.06 K/uL  RBC Count : 4.76 M/uL  Hemoglobin : 13.5 g/dL  Hematocrit : 42.3 %  Platelet Count - Automated : 239 K/uL  Mean Cell Volume : 88.9 fl  Mean Cell Hemoglobin : 28.4 pg  Mean Cell Hemoglobin Concentration : 31.9 gm/dL  Auto Neutrophil # : x  Auto Lymphocyte # : x  Auto Monocyte # : x  Auto Eosinophil # : x  Auto Basophil # : x  Auto Neutrophil % : x  Auto Lymphocyte % : x  Auto Monocyte % : x  Auto Eosinophil % : x  Auto Basophil % : x    05-09    140  |  106  |  13  ----------------------------<  112<H>  4.0   |  22  |  0.83    Ca    9.0      09 May 2021 08:14  Phos  2.7     05-09  Mg     2.0     05-09    TPro  6.9  /  Alb  3.5  /  TBili  0.3  /  DBili  x   /  AST  14  /  ALT  10  /  AlkPhos  78  05-09    LIVER FUNCTIONS - ( 09 May 2021 08:14 )  Alb: 3.5 g/dL / Pro: 6.9 g/dL / ALK PHOS: 78 U/L / ALT: 10 U/L / AST: 14 U/L / GGT: x                 EEG:

## 2021-05-09 NOTE — PROGRESS NOTE ADULT - ASSESSMENT
61 yo F hx of Asthma, COPD, lung adenocarcinoma (left parietal mets s/p craniotomy and radiation in 2017), witnessed GTC in ED; admitted to EMU for vEEG monitoring and management of AED.   Patient confirms nonadherence to Keppra leading to breakthrough seizure   Pt with significant postictal HA>     #Seizures  - Continue Keppra 1000 BID  - s/p 1 mg ativan IVP and 2gram Keppra load in ED  - neuro/vitals q4h  - on vEEG overnight, f/u report   - if event seizure activity >2mins, give ativan 2 mg IVP STAT and notify epilepsy team    # Postictal Headache  - increase  IV toradol and add reglan    #COPD  - continue home medications  - continuous pulse ox with goal SpO2 >88%    #Lung CA w/ mets to Brain  - per Nsx: no need for Decadron & no neurosurgical intervention at this time

## 2021-05-09 NOTE — EEG REPORT - NS EEG TEXT BOX
[facility] Department of Neurology  Inpatient Continuous video-Electroencephalogram    Acquisition Details:  Electroencephalography was acquired using a minimum of 21 channels on an [employer] system v 8.5.1 with electrode placement according to the standard International 10-20 system following ACNS (American Clinical Neurophysiology Society) guidelines for Long-Term Video EEG monitoring.  Anterior temporal T1 and T2 electrodes were utilized whenever possible.   The [employer] automated spike & seizure detections were all reviewed in detail, in addition to extensive portions of raw EEG.    Daily Updates (from 07:00 am until 07:00 am):  Day 2  5/8-5/9   Pertinent medications: Keppra 1g BID  Awake Background:  continuous, with predominantly alpha and beta  frequencies.  Symmetry:  Continuous polymorphic theta/delta frequencies and Higher amplitudes of faster frequencies over the left hemisphere.  Organization: Rudimentary.  Posterior Dominant Rhythm: symmetric, well-regulated 10 Hz.  Voltage:  Normal (20+ uV)  Variability: Yes. 		Reactivity: Yes.  N2 sleep: symmetric, synchronous sleep spindles/K-complexes.  Spontaneous Activity:  Rare (<1/hour). Left temporal spikes.  Periodic/rhythmic activity:  Events:  No electrographic seizures or significant clinical events.  Provocations:  Hyperventilation and Photic stimulation: not performed.    Daily Summary:    Continous left hemisphere slowing and breach artifact, suggestive of focal cerebral dysfunction and consistent with known history skull defect.  Rare left temporal epileptiform discharges, which were indicators of left temporal epileptic potential.  No change from prior day, no events occurred.   [facility] Department of Neurology  Inpatient Continuous video-Electroencephalogram    Acquisition Details:  Electroencephalography was acquired using a minimum of 21 channels on an [employer] Neu[facility] system v 8.5.1 with electrode placement according to the standard International 10-20 system following ACNS (American Clinical Neurophysiology Society) guidelines for Long-Term Video EEG monitoring.  Anterior temporal T1 and T2 electrodes were utilized whenever possible.   The XLTEK automated spike & seizure detections were all reviewed in detail, in addition to extensive portions of raw EEG.    Daily Updates (from 07:00 am until 07:00 am):  Day 2  5/8-5/9   Pertinent medications: Keppra 1g BID  Awake Background:  continuous, with predominantly alpha and beta  frequencies.  Symmetry:  Continuous polymorphic theta/delta frequencies and Higher amplitudes of faster frequencies over the left hemisphere.  Organization: Rudimentary.  Posterior Dominant Rhythm: symmetric, well-regulated 10 Hz.  Voltage:  Normal (20+ uV)  Variability: Yes. 		Reactivity: Yes.  N2 sleep: symmetric, synchronous sleep spindles/K-complexes.  Spontaneous Activity:  Rare (<1/hour). Left temporal spikes.  Periodic/rhythmic activity:  Events:  10 subclincial left parietal focal seizures were recorded. Seizures are characterized by P3 fast activity with intermixed spikes, followed by increasingly rhythmic P3 spikes with minimal spatial evolution, lasted from 1-2 mins. There were no significant clinical changes during those events.  0:10.0	                          Seizure 1  d1 07:45:47		offset  d1 12:31:54	1:34.0	Seizure 2  d1 12:33:31		offset  d1 14:55:52	1:54.4	Seizure 3  d1 14:57:46		offset  d1 18:41:26	0:10.0	Seizure 4  d1 18:43:26		offset  d1 19:42:46	0:10.0	Seizure 5  d1 19:44:18		offset  d1 21:08:59	0:10.0	Seizure 6  d1 21:10:58		offset  d2 02:08:19	0:10.0	Seizure 7  d2 02:09:50		offset  d2 03:11:52	0:10.0	Seizure 8  d2 03:14:05		offset  d2 05:05:28	0:55.3	Seizure 9  d2 05:07:34		offset  d2 06:18:21	0:10.0	Seizure 10  d2 06:20:34		offset  Provocations:  Hyperventilation and Photic stimulation: not performed.    Daily Summary:    10 x P3 onset focal seizures were reported, as described above, no significant clinical manifestation.  Continous left hemisphere slowing and breach artifact, suggestive of focal cerebral dysfunction and consistent with known history skull defect.  Rare left temporal epileptiform discharges, which were indicators of left temporal epileptic potential.

## 2021-05-09 NOTE — PROGRESS NOTE ADULT - SUBJECTIVE AND OBJECTIVE BOX
HISTORY OF PRESENT ILLNESS:   59 yo F with PMH of asthma, COPD, hx of left parietal mets (from lung adenocarcinoma) s/p craniotomy and radiation therapy (Oct/Nov 2017). 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 Now patient reports difficulty writing with her right hand, decreased peripheral vison on right, and feeling confused for past month. Patient was being seen at an outpatient follow-up visit to review recent MRI findings on 4/27, when she developed worsening RUE weakness and acute onset of expressive aphasia. Patient also endorses missing last two doses of Keppra 750mg.     Patient was then sent to ED and stroke code was called. Imaging was negative for acute stroke, however while in ED patient had witnessed generalized tonic clonic seizure and was loaded with 2g of IV keppra. Patient was then also noted to have RUE twitching and was given an additional 1mg of Ativan. Patient seen in ED s/p keppra load and s/p 1mg ativan and post ictal during examination.       PAST MEDICAL & SURGICAL HISTORY:  COPD (chronic obstructive pulmonary disease)    Adenocarcinoma of lung    MRSA (methicillin resistant Staphylococcus aureus)  history of    Brain metastasis  brain tumor s/p resection    History of appendectomy    Surgery, elective  lung biopsy    H/O brain surgery    History of lung surgery  right wedge resection      FAMILY HISTORY:  Family history of essential hypertension (Mother, Sibling)    Family history of diabetes mellitus (Mother, Sibling)    Family history of uterine cancer (Sibling)  sister    Family history of cancer in mother (Mother)    No family history of cardiovascular disease (Father)          RADIOLOGY & ADDITIONAL STUDIES:  CT head without contrast (05/07/21):   IMPRESSION:    No acute intracranial hemorrhage, hydrocephalus or extra-axial fluid collection.  Mild parenchymal volume loss and mild chronic microvascular ischemic changes.    Status post left parietal craniotomy. There is hypodensity subjacent to the craniotomy site within the left parieto-occipital lobes corresponding to a resection cavity with surrounding vasogenic edema, better characterized on April 26, 2021 MRI brain.    No CT evidence for acute transcortical infarction. Please note that MR imaging is a more sensitive imaging modality for detection of acute infarction and may be obtained as clinically warranted.      MRI head w/wo IV cont (04/26):   IMPRESSION:  Increase in size of irregular enhancement along the superior aspect of the surgical resection cavity in the left parietal lobe with more pronounced masslike FLAIR signal abnormality.    New cortical foci of enhancement within the right parietal and occipital lobes which are suspicious for tumoral disease.        Assessment and Recommendation:   · Assessment	  59 yo F with PMH of asthma, COPD, hx of left parietal mets (from lung adenocarcinoma) s/p craniotomy and radiation therapy (Oct/Nov 2017), comes to ED with aphasia, right hemiparesis      Plan:   - Pt s/p generalized tonic clonic seizure and ativan 1mg IV Push.    - Neuro checks  - Consider epilepsy consult   - Continue Keppra   - Admit to neurology for seizure management  - Please reconsult neurosurgery if you have any further questions or concerns [phone]

## 2021-05-10 ENCOUNTER — TRANSCRIPTION ENCOUNTER (OUTPATIENT)
Age: 61
End: 2021-05-10

## 2021-05-10 VITALS
SYSTOLIC BLOOD PRESSURE: 110 MMHG | DIASTOLIC BLOOD PRESSURE: 74 MMHG | TEMPERATURE: 99 F | HEART RATE: 70 BPM | RESPIRATION RATE: 18 BRPM | OXYGEN SATURATION: 96 %

## 2021-05-10 LAB
ANION GAP SERPL CALC-SCNC: 9 MMOL/L — SIGNIFICANT CHANGE UP (ref 5–17)
BUN SERPL-MCNC: 15 MG/DL — SIGNIFICANT CHANGE UP (ref 7–23)
CALCIUM SERPL-MCNC: 9 MG/DL — SIGNIFICANT CHANGE UP (ref 8.4–10.5)
CHLORIDE SERPL-SCNC: 107 MMOL/L — SIGNIFICANT CHANGE UP (ref 96–108)
CO2 SERPL-SCNC: 24 MMOL/L — SIGNIFICANT CHANGE UP (ref 22–31)
CREAT SERPL-MCNC: 0.73 MG/DL — SIGNIFICANT CHANGE UP (ref 0.5–1.3)
GLUCOSE SERPL-MCNC: 105 MG/DL — HIGH (ref 70–99)
HCT VFR BLD CALC: 39.2 % — SIGNIFICANT CHANGE UP (ref 34.5–45)
HGB BLD-MCNC: 12.6 G/DL — SIGNIFICANT CHANGE UP (ref 11.5–15.5)
LEVETIRACETAM SERPL-MCNC: 4.3 UG/ML — LOW (ref 10–40)
MAGNESIUM SERPL-MCNC: 2.1 MG/DL — SIGNIFICANT CHANGE UP (ref 1.6–2.6)
MCHC RBC-ENTMCNC: 28.3 PG — SIGNIFICANT CHANGE UP (ref 27–34)
MCHC RBC-ENTMCNC: 32.1 GM/DL — SIGNIFICANT CHANGE UP (ref 32–36)
MCV RBC AUTO: 88.1 FL — SIGNIFICANT CHANGE UP (ref 80–100)
NRBC # BLD: 0 /100 WBCS — SIGNIFICANT CHANGE UP (ref 0–0)
PLATELET # BLD AUTO: 244 K/UL — SIGNIFICANT CHANGE UP (ref 150–400)
POTASSIUM SERPL-MCNC: 3.8 MMOL/L — SIGNIFICANT CHANGE UP (ref 3.5–5.3)
POTASSIUM SERPL-SCNC: 3.8 MMOL/L — SIGNIFICANT CHANGE UP (ref 3.5–5.3)
RBC # BLD: 4.45 M/UL — SIGNIFICANT CHANGE UP (ref 3.8–5.2)
RBC # FLD: 13.9 % — SIGNIFICANT CHANGE UP (ref 10.3–14.5)
SODIUM SERPL-SCNC: 140 MMOL/L — SIGNIFICANT CHANGE UP (ref 135–145)
WBC # BLD: 6.96 K/UL — SIGNIFICANT CHANGE UP (ref 3.8–10.5)
WBC # FLD AUTO: 6.96 K/UL — SIGNIFICANT CHANGE UP (ref 3.8–10.5)

## 2021-05-10 PROCEDURE — 83735 ASSAY OF MAGNESIUM: CPT

## 2021-05-10 PROCEDURE — 70450 CT HEAD/BRAIN W/O DYE: CPT

## 2021-05-10 PROCEDURE — 85025 COMPLETE CBC W/AUTO DIFF WBC: CPT

## 2021-05-10 PROCEDURE — 94640 AIRWAY INHALATION TREATMENT: CPT

## 2021-05-10 PROCEDURE — 86769 SARS-COV-2 COVID-19 ANTIBODY: CPT

## 2021-05-10 PROCEDURE — 99285 EMERGENCY DEPT VISIT HI MDM: CPT | Mod: 25

## 2021-05-10 PROCEDURE — 84484 ASSAY OF TROPONIN QUANT: CPT

## 2021-05-10 PROCEDURE — 70496 CT ANGIOGRAPHY HEAD: CPT

## 2021-05-10 PROCEDURE — 85730 THROMBOPLASTIN TIME PARTIAL: CPT

## 2021-05-10 PROCEDURE — 80053 COMPREHEN METABOLIC PANEL: CPT

## 2021-05-10 PROCEDURE — U0003: CPT

## 2021-05-10 PROCEDURE — U0005: CPT

## 2021-05-10 PROCEDURE — 95700 EEG CONT REC W/VID EEG TECH: CPT

## 2021-05-10 PROCEDURE — 80177 DRUG SCRN QUAN LEVETIRACETAM: CPT

## 2021-05-10 PROCEDURE — 87086 URINE CULTURE/COLONY COUNT: CPT

## 2021-05-10 PROCEDURE — 81001 URINALYSIS AUTO W/SCOPE: CPT

## 2021-05-10 PROCEDURE — 95720 EEG PHY/QHP EA INCR W/VEEG: CPT

## 2021-05-10 PROCEDURE — 85027 COMPLETE CBC AUTOMATED: CPT

## 2021-05-10 PROCEDURE — 95716 VEEG EA 12-26HR CONT MNTR: CPT

## 2021-05-10 PROCEDURE — 70498 CT ANGIOGRAPHY NECK: CPT

## 2021-05-10 PROCEDURE — 85610 PROTHROMBIN TIME: CPT

## 2021-05-10 PROCEDURE — 36415 COLL VENOUS BLD VENIPUNCTURE: CPT

## 2021-05-10 PROCEDURE — 84100 ASSAY OF PHOSPHORUS: CPT

## 2021-05-10 PROCEDURE — 80048 BASIC METABOLIC PNL TOTAL CA: CPT

## 2021-05-10 PROCEDURE — 82962 GLUCOSE BLOOD TEST: CPT

## 2021-05-10 PROCEDURE — 0042T: CPT

## 2021-05-10 RX ORDER — LEVETIRACETAM 250 MG/1
1000 TABLET, FILM COATED ORAL ONCE
Refills: 0 | Status: DISCONTINUED | OUTPATIENT
Start: 2021-05-10 | End: 2021-05-10

## 2021-05-10 RX ORDER — LEVETIRACETAM 250 MG/1
1000 TABLET, FILM COATED ORAL ONCE
Refills: 0 | Status: COMPLETED | OUTPATIENT
Start: 2021-05-10 | End: 2021-05-10

## 2021-05-10 RX ORDER — LEVETIRACETAM 250 MG/1
3 TABLET, FILM COATED ORAL
Qty: 180 | Refills: 0
Start: 2021-05-10 | End: 2021-06-08

## 2021-05-10 RX ADMIN — TIOTROPIUM BROMIDE 1 CAPSULE(S): 18 CAPSULE ORAL; RESPIRATORY (INHALATION) at 06:20

## 2021-05-10 RX ADMIN — Medication 5 MILLIGRAM(S): at 11:36

## 2021-05-10 RX ADMIN — PANTOPRAZOLE SODIUM 40 MILLIGRAM(S): 20 TABLET, DELAYED RELEASE ORAL at 06:19

## 2021-05-10 RX ADMIN — LEVETIRACETAM 1000 MILLIGRAM(S): 250 TABLET, FILM COATED ORAL at 06:20

## 2021-05-10 RX ADMIN — ALBUTEROL 2 PUFF(S): 90 AEROSOL, METERED ORAL at 06:19

## 2021-05-10 RX ADMIN — ALBUTEROL 2 PUFF(S): 90 AEROSOL, METERED ORAL at 11:36

## 2021-05-10 RX ADMIN — LEVETIRACETAM 1000 MILLIGRAM(S): 250 TABLET, FILM COATED ORAL at 12:40

## 2021-05-10 NOTE — PROGRESS NOTE ADULT - ASSESSMENT
ASSESSMENT/PLAN 59 yo F hx of Asthma, COPD, lung adenocarcinoma (left parietal mets s/p craniotomy and radiation in 2017), witnessed GTC in ED;     1. O2 observe off   2. Bronchodilators:  Atrovent/ albuterol q 4 – 6 hours as needed  3. Corticosteroids: off   4. ID/Antibiotics: off  5. Cardiac/HTN: optimize BP  6. GI: Rx/ prophylaxis c PPI/H2B  7. Heme: Rx/VT prophylaxis c SQH/SCD/AC Enoxaparin  8. Aspiration precautions  9. Neurology mngmnt priority  10Follow up c  discussed c pt   Discussed with managing team, discussed c

## 2021-05-10 NOTE — EEG REPORT - NS EEG TEXT BOX
[facility] Department of Neurology  Inpatient Continuous video-Electroencephalogram    Acquisition Details:  Electroencephalography was acquired using a minimum of 21 channels on an [employer] system v 8.5.1 with electrode placement according to the standard International 10-20 system following ACNS (American Clinical Neurophysiology Society) guidelines for Long-Term Video EEG monitoring.  Anterior temporal T1 and T2 electrodes were utilized whenever possible.   The [employer] automated spike & seizure detections were all reviewed in detail, in addition to extensive portions of raw EEG.    Daily Updates (from 07:00 am until 07:00 am):  Day 3  5/9-5/10   Pertinent medications: Keppra 1g BID  Awake Background:  continuous, with predominantly alpha and beta  frequencies.  Symmetry:  Continuous polymorphic theta/delta frequencies and Higher amplitudes of faster frequencies over the left hemisphere.  Organization: Rudimentary.  Posterior Dominant Rhythm: symmetric, well-regulated 10 Hz.  Voltage:  Normal (20+ uV)  Variability: Yes. 		Reactivity: Yes.  N2 sleep: symmetric, synchronous sleep spindles/K-complexes.  Spontaneous Activity:  Occasional P3 spikes.  Periodic/rhythmic activity:Yes, P3 lateralized periodic spikes (LPDs) at 1-2 Hz for brief duration.   Events:  10 left parietal (P3) focal seizures were recorded. Seizures are characterized by P3 fast activity with intermixed spikes, followed by increasingly rhythmic P3 spikes for up to 2-3 Hz with minimal spatial evolution, lasted from 1-2 mins. Most of the events were not associated with significant clinical manifestations, however at seizure #7, patient appeared confused while talking through the phone.   d1 08:29:45	0:10.0	Seizure 1  d1 08:32:15		offset  d1 09:53:47		resting in bed  d1 10:34:11	0:10.0	Seizure 2  d1 10:36:44		offset  d1 11:11:59	0:10.0	Seizure 3  d1 11:14:09		offset  d1 12:05:17	2:00.7	Seizure 4  d1 12:07:18		offset  d1 13:05:47	2:20.7	Seizure 5  d1 13:06:49		talking to aide about her teeth  d1 13:07:04		talking well through the P3 spiking  d1 13:08:07		offset  d1 13:38:53		Patient Event  d1 14:10:14		resting in bed  d1 14:17:58	0:10.0	Seizure 6  d1 14:19:13		headache?  d1 14:22:21		offset  d1 16:00:00	1:14.0	Seizure 7  d1 16:00:34		talking over phone  d1 16:00:44		brings phone down and looks at it  d1 16:00:58		seems confused??  d1 16:01:21		offset  d1 17:22:40	0:48.9	Seizure 8  d1 17:25:58		offset  d1 23:43:05		L P3 LPD 1-2 Hz  d1 23:43:37		LPD P3 1-2 Hz  d2 02:23:21	0:10.0	Seizure 9  d2 02:25:26		arousal  d2 02:27:23		offset  d2 03:56:47	0:10.0	Seizure 10  d2 03:57:54		L P3 LPD 1-2 Hz  d2 03:58:35		offset  Provocations:  Hyperventilation and Photic stimulation: not performed.    Daily Summary:    1)	10 x P3 onset focal seizures were reported, as described above. Most of the events were subclinical, however she was confused while talking through the phone on seizure #7.  2)	P3 LPDs suggestive of increased epileptic potential.  3)	Continous left hemisphere slowing and breach artifact, suggestive of focal cerebral dysfunction and consistent with known history skull defect.  4)	Rare left temporal epileptiform discharges, which were indicators of left temporal epileptic potential.

## 2021-05-10 NOTE — DISCHARGE NOTE NURSING/CASE MANAGEMENT/SOCIAL WORK - PATIENT PORTAL LINK FT
You can access the FollowMyHealth Patient Portal offered by [facility] by registering at the following website: [email or web address]. By joining [employer]’s FollowMyHealth portal, you will also be able to view your health information using other applications (apps) compatible with our system.

## 2021-05-10 NOTE — DISCHARGE NOTE PROVIDER - NSDCMRMEDTOKEN_GEN_ALL_CORE_FT
Advair Diskus 500 mcg-50 mcg inhalation powder: 1 puff(s) inhaled 2 times a day  albuterol 90 mcg/inh inhalation aerosol: 2 puff(s) inhaled 4 times a day  Ducodyl 5 mg oral delayed release tablet: 1 tab(s) orally once a day (at bedtime) MDD:1  Innerclean oral tablet: 2 tab(s) orally once a day (at bedtime) MDD:1  Keppra 500 mg oral tablet: 3 tab(s) orally 2 times a day   Protonix 40 mg oral delayed release tablet: 1 tab(s) orally once a day (before a meal) MDD:1  Spiriva 18 mcg inhalation capsule: 1 cap(s) inhaled every 24 hours  Tylenol 325 mg oral tablet: 2 tab(s) orally every 6 hours, As needed, Temp greater or equal to 38C (100.4F), Mild Pain (1 - 3)

## 2021-05-10 NOTE — DISCHARGE NOTE PROVIDER - CARE PROVIDER_API CALL
Zurdo Carey (MD)  Clinical Neurophysiology; EEGEpilepsy; Neurology; Sleep Medicine  [address], [address] [place] [address], [place] [address]  Phone: [phone]  Fax: [phone]  Follow Up Time: 1 month

## 2021-05-10 NOTE — PROGRESS NOTE ADULT - SUBJECTIVE AND OBJECTIVE BOX
Interventional, Pulmonary, Critical, Chest Special Procedures for     Pt was seen and fully examined by myself.     Time spent with patient in minutes:137    Patient is a 60y old  Female who presents with a chief complaint of seizure management (10 May 2021 12:21) [id number] leading to this admission and hospital course reviewed. Today the patient c restitutio ad integrum. Eupneic on RA, pain free and engaging. The patient denies any aspiration, denies any new cough pattern, denies trauma or recent URI.     HPI:  61 yo F hx of Asthma, COPD, lung adenocarcinoma (left parietal mets s/p craniotomy and radiation in 2017). Per chart notes, patient complained difficulty writing with right hand & decrease peripheral vision in right eye, and increased confusion for the past month. Today she was scheduled to see neurosurgery outpatient for routine follow up and developed acute onset of expressive aphasia and worsening RUE weakness (baseline weak in RUE but worsened today). She was brought to the ED and code stroke was called. Stroke workup thus far is negative for any acute infarcts/hemorrhages. Per ED staff, she then had a witnessed GTC and loaded with 2gram of Keppra; she originally takes Keppra 1000 BID. Then she had a witnessed RUE and RLE myoclonic jerks which 1 mg ativan was given. Patient is currently somnolent and will be admitted to Epilepsy service for seizure management and vEEG monitoring.    Neurologic:     -Mental Status: AAOx3, no dysarthria, speech is fluent. Follows simple and complex commands     -Cranial Nerves: EOMI, ?right field cut, PERRL b/l, facial sensation v1-v3 equal and intact, no facial asymmetry,      -Motor:  moves all extremities at least antigravity     -Sensation: Intact to light touch bilaterally     -Coordination: No dysmetria on finger-to-nose bilaterally     -Gait: deferred    MEDICATIONS  (STANDING):  ALBUTerol    90 MICROgram(s) HFA Inhaler 2 Puff(s) Inhalation four times a day  levETIRAcetam 1000 milliGRAM(s) Oral two times a day  oseltamivir 75 milliGRAM(s) Oral every 12 hours  pantoprazole    Tablet 40 milliGRAM(s) Oral before breakfast  tiotropium 18 MICROgram(s) Capsule 1 Capsule(s) Inhalation every 24 hours    MEDICATIONS  (PRN):  bisacodyl 5 milliGRAM(s) Oral daily PRN Constipation  senna 2 Tablet(s) Oral at bedtime PRN Constipation    LABS    ( @ 09:54)                      15.0  8.87 )-----------( 286                 45.5    Neutrophils = 5.82 (65.6%)  Lymphocytes = 2.18 (24.6%)  Eosinophils = 0.20 (2.3%)  Basophils = 0.02 (0.2%)  Monocytes = 0.63 (7.1%)  Bands = --%        140  |  103  |  13  ----------------------------<  112<H>  4.1   |  27  |  0.84    Ca    9.8      07 May 2021 09:54    TPro  8.2  /  Alb  4.2  /  TBili  0.4  /  DBili  x   /  AST  17  /  ALT  13  /  AlkPhos  94      ( 07 May 2021 09:54 )   PT: 13.0 sec;   INR: 1.09 ;       PTT:33.4 sec  CARDIAC MARKERS ( 07 May 2021 09:54 )  Trop <0.01 ng/mL / CK x     / CKMB x             Urinalysis Basic - ( 07 May 2021 13:49 )    Color: Yellow / Appearance: Clear / S.015 / pH: x  Gluc: x / Ketone: NEGATIVE  / Bili: Negative / Urobili: 0.2 E.U./dL   Blood: x / Protein: NEGATIVE mg/dL / Nitrite: NEGATIVE   Leuk Esterase: Trace / RBC: < 5 /HPF / WBC 5-10 /HPF   Sq Epi: x / Non Sq Epi: 0-5 /HPF / Bacteria: Present /HPF           (07 May 2021 13:37)      REVIEW OF SYSTEMS:  Constitutional: (-) weight change, () fever,  () chills, () fatigue, () night sweats  Eyes: (-) discharge, (-) eye pain, () RIGHT visual change  ENT:  (-) hearing difficulty, (-) vertigo, () sinus pain,  () throat pain, () epistaxis, () dysphagia, () hoarseness  Neck: (-) pain, (-) stiffness, () swelling  Respiratory: (+) intermittent cough, () wheezing, () hemoptysis      Cardiovascular: (-) chest pain, ()palpitations, () dizziness   Gastrointestinal: (-) abdominal pain, () nausea, () vomiting, () hematemesis, () diarrhea,  () constipation, () melena  Genitourinary:  (-) dysuria, () frequency, () hematuria, () incontinence      Neurologic: (+) IMPROVING headache, () memory loss, () loss of strength, () numbness, () tremor     Skin: (-) itching, () burning, () rash, () lesions   Lymphatic: () enlarged lymph nodes  Endocrine: (-) hair loss, () temperature intolerance         Musculoskeletal: (-) back pain, () joint pain,  () extremity pain  Psychiatric: () visual change, (-) auditory change, (-) depression, () anxiety, () suicidal  Sleep: (-) disorder, () insomnia, () sleep deprivation  Heme/Lymph: () easy bruising, () bleeding gums            Allergy and Immunologic: () hives, () eczema    PAST MEDICAL & SURGICAL HISTORY:  COPD (chronic obstructive pulmonary disease)    Adenocarcinoma of lung    MRSA (methicillin resistant Staphylococcus aureus)  history of    Brain metastasis  brain tumor s/p resection    History of appendectomy    Surgery, elective  lung biopsy    H/O brain surgery    History of lung surgery  right wedge resection      FAMILY HISTORY:  Family history of essential hypertension (Mother, Sibling)    Family history of diabetes mellitus (Mother, Sibling)    Family history of uterine cancer (Sibling)  sister    Family history of cancer in mother (Mother)    No family history of cardiovascular disease (Father)      SOCIAL HISTORY:      - Tobacco     - ETOH    Allergies    penicillin (Angioedema)    Intolerances      Vital Signs Last 24 Hrs  T(C): 37.1 (10 May 2021 05:27), Max: 37.1 (10 May 2021 05:27)  T(F): 98.7 (10 May 2021 05:27), Max: 98.7 (10 May 2021 05:27)  HR: 70 (10 May 2021 05:27) (70 - 79)  BP: 110/74 (10 May 2021 05:27) (110/74 - 111/75)  BP(mean): --  RR: 18 (10 May 2021 05:27) (18 - 18)  SpO2: 96% (10 May 2021 05:27) (96% - 97%)        MEDICATIONS:  MEDICATIONS  (STANDING):  ALBUTerol    90 MICROgram(s) HFA Inhaler 2 Puff(s) Inhalation four times a day  levETIRAcetam 1000 milliGRAM(s) Oral two times a day  metoclopramide 5 milliGRAM(s) Oral daily  pantoprazole    Tablet 40 milliGRAM(s) Oral before breakfast  tiotropium 18 MICROgram(s) Capsule 1 Capsule(s) Inhalation every 24 hours    MEDICATIONS  (PRN):  acetaminophen   Tablet .. 650 milliGRAM(s) Oral every 6 hours PRN Temp greater or equal to 38C (100.4F), Mild Pain (1 - 3)  bisacodyl 5 milliGRAM(s) Oral daily PRN Constipation  ketorolac   Injectable 30 milliGRAM(s) IV Push every 6 hours PRN Severe Pain (7 - 10)  LORazepam   Injectable 2 milliGRAM(s) IV Push once PRN if seizure activity > 2 mins  senna 2 Tablet(s) Oral at bedtime PRN Constipation      PHYSICAL EXAM:  Comfortable, no immediate distress  Eyes: PERRL, EOM intact; conjunctiva and sclera clear  Head: Normocephalic;  No Trauma  ENMT: No nasal discharge, mild and chronic hoarseness, resolved cough no  hemoptysis  Neck: Supple; non tender; no masses or deformities.    No JVD  Respiratory: CTA,  - WHEEZING   - RHONCHI  - RALES  - CRACKLES.  Diminished breath sounds  BILATERAL  RIGHT  LEFT  Cardiovascular: Regular rate and rhythm. S1 and S2 Normal; No murmurs, gallops or rubs     - PPM/AICD  Gastrointestinal: Soft non-tender, non-distended; Normal bowel sounds; No hepatosplenomegaly.     -PEG    -  GT   - MILLAN  Genitourinary: No costovertebral angle tenderness. No dysuria  Extremities: AROM, No clubbing, cyanosis or edema    Vascular: Peripheral pulses palpable 2+ bilaterally  Neurological: Alert and responisve to stimuli   Skin: Warm and dry. No obvious rash  Lymph Nodes: No acute cervical or supraclavicular adenopathy  Psychiatric: Cooperative and appropriate mood    DEVICES:  - DENTURES   +IV R / L     - ETUBE   -TRACH   -CTUBE  R / L    LABS:                          12.6   6.96  )-----------( 244      ( 10 May 2021 07:03 )             39.2     05-10    140  |  107  |  15  ----------------------------<  105<H>  3.8   |  24  |  0.73    Ca    9.0      10 May 2021 07:03  Phos  2.7       Mg     2.1     05-10    TPro  6.9  /  Alb  3.5  /  TBili  0.3  /  DBili  x   /  AST  14  /  ALT  10  /  AlkPhos  78        RADIOLOGY & ADDITIONAL STUDIES (The following images were personally reviewed):< from: CT Angio Neck w/ IV Cont (21 @ 09:57) >  EXAM:  CT ANGIO BRAIN (W)AW IC                          EXAM:  CT ANGIO NECK (W)AW IC                          EXAM:  CT PERFUSION W MAPS IC                          PROCEDURE DATE:  2021          INTERPRETATION:  PROCEDURE: CTA head with andwithout intravenous contrast. CTA neck with and without intravenous contrast. CT brain perfusion with contrast    INDICATION: History of brain metastases. Possible stroke with left-sided weakness    TECHNIQUE: CT angiography of the head and neck was performed with and without administration of intravenous contrast.    Multiple thin section axial images were obtained of the head through the [place] prior to and following the intravenous injection of contrast. Multiple 3-D reformatted images were generated from the axial cuts. Postprocessing was performed on an independent workstation.    Multiple axial thin sections were obtained through the neck prior to and following the intravenous injection of contrast. Multiple 3-D reformatted images were generated from the axial cuts. Postprocessing was performed on an independent workstation.    Multiple axial sections were obtained through the brain from the skull base to the vertex. Following the intravenous administration of contrast, serial axial images were obtained at the level of the basal ganglia. The CT perfusion data set was then post processed generating color maps of cerebral blood flow (CBF), cerebral blood volume (CBV), and mean transit time (MTT) through the region of thebasal ganglia.      100 mL of of Isovue 370 were administered intravenously.    COMPARISON: May 7, 2021 CT head. 2021 MRI brain    FINDINGS:    CTA Head:    Please see concurrent separately dictated noncontrast head CT and recently performed MRI brain examination for more detailed discussion of intracranial findings.    Intracranial segments of bilateral internal carotid arteries, bilateral proximal middle cerebral arteries, proximal bilateral anterior cerebral arteries and ophthalmic arteries are patent without hemodynamically significant stenosis or dissection.    Superior and inferior divisions and proximal M2 segments the bilateral middle cerebral arteries are patent.    Anterior communicating artery is unremarkable. Bilateral posterior communicating arteries are present.    Basilar artery, proximal bilateral posterior cerebral arteries, proximal bilateral superior cerebellar arteries, proximal bilateral posterior inferior cerebellar arteries, V4 segments of the bilateral vertebral arteries, and bilateral proximal anterior cerebellar arteries are patent without hemodynamically significant stenosis or dissection.    No saccular intracranial aneurysm identified. No evidence for cerebral arteriovenous malformation. Dural venous sinuses appear to be patent.      CTA Neck:    Common origin of the brachycephalic left common carotid artery.    Origins of great vessels are patent. Origins of vertebral arteries are patent. Hypoplastic right vertebral artery.    There is noncalcified atherosclerotic plaque of the bilateral carotid bifurcations and bilateral proximal internal carotid arteries, contributing to less than 50% stenosis as per NASCET criteria.      Bilateral common carotid, bilateral common carotid, cervical segments of bilateral internal carotid, V1 through V3 segments of bilateral vertebral and bilateral carotid bifurcations are patent without hemodynamically significant stenosis or dissection.  There is dental amalgam with streak artifact, which obscures evaluation of the oral cavity within the aerodigestive tract.    No enlarged cervical lymph nodes by CT size criteria are identified. No cervical lymph nodes with areas of internal necrosis or calcification are identified.    Parotid, submandibular glands are within normal limits. Thyroid gland is enlarged and heterogeneous multiple hypodensities, which may represent multinodular disease. Visualized lung apices are unremarkable.    There are multilevel degenerative changes of cervical spine with multilevel disc osteophyte complexes and bilateral uncovertebral/facet hypertrophy which probably at the C4-C5 and C5-C6 level where there is mild to moderate canal stenosis and mild to moderate bilateral foraminal stenosis.    CT perfusion:    There are no areas of increased maximum transit time time greater than 6 seconds identified. There is an area of decreased cerebral blood flow/volume less than 30% identified within the left parietal lobe in the region of a known resection cavity, likely reflecting chronic necrotic parenchyma.    IMPRESSION:      Noncalcified atherosclerotic plaque of the bilateral carotid bifurcations and bilateral proximal internal carotid arteries, contributing to less than 50% stenosis as per NASCET criteria.      Nohemodynamically significant stenosis or dissection identified in proximal intracranial and neck vasculature.  No saccular intracranial aneurysm identified.  Please refer to noncontrast head CT examination for discussion and additional findings.  No areas of increased maximum transit time time greater than 6 seconds identified. Area of decreased cerebral blood flow/volume less than 30% identified within the left parietal lobe in the region of a known resection cavity, likely reflecting chronic necrotic parenchyma. No significant perfusion mismatch deficit identified on CT perfusion. No definite areas of ischemia identified.  Please note that MR imaging is a more sensitive imaging modality for detection of acute infarction and may be obtained as clinically warranted.    < end of copied text >

## 2021-05-10 NOTE — DISCHARGE NOTE PROVIDER - HOSPITAL COURSE
#Discharge: do not delete    61 yo F hx of Asthma, COPD, lung adenocarcinoma (left parietal mets s/p craniotomy and radiation in 2017), witnessed GTC in ED; admitted to EMU for vEEG monitoring and management of AED.     Problem List/Main Diagnoses (system-based):   #Seizures  - will DC on Keppra 1500mg BID   - s/p 1 mg ativan IVP and 2gram Keppra load in ED  - neuro/vitals q4h  - vEEG showing seizure activity on 1g BID keppra dose -> increase to 1500 BID     # Postictal Headache  - increased  IV toradol and added reglan while inpatient     #COPD  - continue home medications  - continuous pulse ox with goal SpO2 >88%    #Lung CA w/ mets to Brain  - per Nsx: no need for Decadron & no neurosurgical intervention at this time         Inpatient treatment course: see above   New medications: Keppra 1500mg BID

## 2021-05-24 ENCOUNTER — APPOINTMENT (OUTPATIENT)
Dept: NEUROSURGERY | Facility: CLINIC | Age: 61
End: 2021-05-24
Payer: MEDICARE

## 2021-05-24 ENCOUNTER — APPOINTMENT (OUTPATIENT)
Dept: NEUROLOGY | Facility: CLINIC | Age: 61
End: 2021-05-24
Payer: MEDICARE

## 2021-05-24 VITALS
BODY MASS INDEX: 34.99 KG/M2 | TEMPERATURE: 97.7 F | SYSTOLIC BLOOD PRESSURE: 105 MMHG | WEIGHT: 210 LBS | HEART RATE: 78 BPM | HEIGHT: 65 IN | DIASTOLIC BLOOD PRESSURE: 71 MMHG | OXYGEN SATURATION: 95 %

## 2021-05-24 VITALS
HEIGHT: 65 IN | BODY MASS INDEX: 34.99 KG/M2 | RESPIRATION RATE: 14 BRPM | OXYGEN SATURATION: 97 % | SYSTOLIC BLOOD PRESSURE: 107 MMHG | TEMPERATURE: 97.5 F | WEIGHT: 210 LBS | DIASTOLIC BLOOD PRESSURE: 74 MMHG | HEART RATE: 79 BPM

## 2021-05-24 DIAGNOSIS — R29.898 OTHER SYMPTOMS AND SIGNS INVOLVING THE MUSCULOSKELETAL SYSTEM: ICD-10-CM

## 2021-05-24 DIAGNOSIS — R56.9 UNSPECIFIED CONVULSIONS: ICD-10-CM

## 2021-05-24 PROCEDURE — 99214 OFFICE O/P EST MOD 30 MIN: CPT

## 2021-05-24 PROCEDURE — 99215 OFFICE O/P EST HI 40 MIN: CPT

## 2021-05-24 PROCEDURE — 95819 EEG AWAKE AND ASLEEP: CPT

## 2021-05-24 RX ORDER — LEVETIRACETAM 750 MG/1
750 TABLET, FILM COATED ORAL TWICE DAILY
Qty: 60 | Refills: 2 | Status: DISCONTINUED | COMMUNITY
Start: 2020-08-31 | End: 2021-05-24

## 2021-05-24 RX ORDER — LEVETIRACETAM 1000 MG/1
1000 TABLET, FILM COATED ORAL
Refills: 0 | Status: DISCONTINUED | COMMUNITY
End: 2021-05-24

## 2021-05-24 NOTE — ASSESSMENT
[FreeTextEntry1] : MRI brain with and without contrast from 4/26/21 reviewed by Dr. Rico with patient, demonstrates new cortical foci of enhancement in right parietal and right occipital lobes.\par Recommend short interval repeat MRI in 6 weeks.\par After MRI complete, return to the office to review imaging with Dr. Rico.\par She has appointment with neurology today for management of AEDs and EEG\par Recommend physical therapy for RIGHT hand strengthening\par \par Patient verbalizes agreement and understanding with plan of care.\par \par I, Dr. Rico, personally performed the evaluation and management (E/M) services for this established patient who presents today with (a) new problem(s)/exacerbation of (an) existing condition(s).  That E/M includes conducting the examination, assessing all new/exacerbated conditions, and establishing a new plan of care.  Today, my ACP, Balbina Acuna, was here to observe my evaluation and management services for this new problem/exacerbated condition to be followed going forward.\par \par \par \par -------------------------------------------------\par Number and Complexity of Problems: Moderate - Acute - Right hand weakness; History of brain metastases; recent seizure requiring hospitalization\par \par Amount/Complexity of Data Reviewed/Analyzed: Moderate - Dr. Rico independently reviewed and interpreted MRI brain with and without contrast done on 4/26/21 \par \par Risk of Complications and/or Morbidity or Mortality of Patient Management: Moderate - recommend short interval MRI to evaluate stability of new foci of enhancement\par \par

## 2021-05-24 NOTE — PHYSICAL EXAM
[General Appearance - Alert] : alert [General Appearance - In No Acute Distress] : in no acute distress [Oriented To Time, Place, And Person] : oriented to person, place, and time [Cranial Nerves Optic (II)] : visual acuity intact bilaterally,  pupils equal round and reactive to light [Cranial Nerves Oculomotor (III)] : extraocular motion intact [Cranial Nerves Trigeminal (V)] : facial sensation intact symmetrically [Cranial Nerves Vestibulocochlear (VIII)] : hearing was intact bilaterally [Cranial Nerves Facial (VII)] : face symmetrical [Cranial Nerves Glossopharyngeal (IX)] : tongue and palate midline [Cranial Nerves Accessory (XI - Cranial And Spinal)] : head turning and shoulder shrug symmetric [Cranial Nerves Hypoglossal (XII)] : there was no tongue deviation with protrusion [Sclera] : the sclera and conjunctiva were normal [Outer Ear] : the ears and nose were normal in appearance [Neck Appearance] : the appearance of the neck was normal [] : no respiratory distress [Abnormal Walk] : normal gait [Skin Color & Pigmentation] : normal skin color and pigmentation [FreeTextEntry6] : RIGHT hand weakness\par RUE 3/5\par RLE 3/5

## 2021-05-24 NOTE — HISTORY OF PRESENT ILLNESS
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 She previously underwent left parietal craniotomy for tumor resection and adjuvant radiation approximately 3 years ago In November 2017 at another institution. She also had GKRS on 1/8/18 to a right parietal brain metastasis with Dr. Rico. MRI brain on admission demonstrated a recurrent 3 cm left parietal enhancing brain mass in the area of prior tumor resection. She underwent surgical resection of left parietal mass on 5/13/2020 with Dr. Rico. Pathology indicated radiation necrosis. \par \par Postoperatively, her right hand weakness improved. She does have right visual field cut. She was referred to Dr. Andrea Gutierrez for neuro opthalmology consult.\par \par In July 2020, Ms. Mendoza was hospitalized at [facility] due to sudden onset difficulty speaking as well as RIGHT hand weakness. She was admitted and given steroids and seizure medication. She was weaned off the dexamethasone over the next several weeks. She reported improvement in right hand weakness.\par \par She remains on keppra 750 mg bid.\par \par MRI brain with and without contrast, done on 2/3/21, was stable. \par \par Returns today to review MRI brain with and without contrast done on 4/26/21.\par \par On previous visit, she reported RIGHT hand weakness.  While in the office and during visit, she developed acute expressive aphasia and total RIGHT hemiparesis including RIGHT leg. Prior to this, she stated she had not taken keppra X 2 days. She was taken to the ED and had generalized tonic clonic seizure. She was discharged with 1000 mg keppra bid.\par \par She reports improvement in RIGHT hand weakness but continues to report some mild weakness in RIGHT hand. Reports resolution of difficulty with speech.\par \par \par \par \par \par \par \par \par \par

## 2021-05-24 NOTE — DATA REVIEWED
[de-identified] : \par  MR Head w/wo IV Cont             Final\par \par No Documents Attached\par \par \par \par \par   EXAM:  MR BRAIN WAW IC\par \par PROCEDURE DATE:  04/26/2021\par \par \par \par INTERPRETATION:  PROCEDURE: MRI Brain with and without contrast\par \par INDICATION: Left parietal brain metastasis, follow-up\par \par TECHNIQUE: Sagittal T1 volumetric series with MPR, axial T2, T2-FLAIR, T2-FFE and diffusion imaging of the brain is obtained. Following the intravenous administration of 7.5 cc Gadavist contrast material, sagittal T1 volumetric imaging with MPR provided.\par \par COMPARISON: Multiple prior MRI brain most recent 2/3/2021\par \par FINDINGS:\par \par Again demonstrated status post left parietal craniotomy for resection of left parietal lobe mass. There is a surgical resection cavity within the left parietal lobe. There is dural thickening and enhancement deep to craniotomy site secondary to postsurgical changes. There is hemosiderin staining surrounding the surgical cavity.\par \par There is interval increase in irregular enhancement and masslike FLAIR signal abnormality most pronounced along the superior aspect of the surgical resection cavity with restricted diffusion. The area of enhancement measures 1.8 cm transverse by 2.3 cm AP by 2.9 cm craniocaudal (series 701 image 75 and 72 image 22), previously 1.8 x 2.1 x 2.3 cm. There is mild interval removal increase in vasogenic edema within the left parietal and occipital lobes. Again noted is irregular enhancement within the left occipital lobe adjacent to the craniotomy site which is not significantly changed from prior examinations.\par \par There are cortical foci of enhancement within the right parietal and occipital lobes (series 701 image 94 and series 702 image 22 and 18) which are predominantly new from prior examination. There is no significant FLAIR hyperintensity these regions.\par \par There is no significant change in the FLAIR signal abnormality expanding across the splenium the corpus callosum to the right periventricular region. Scattered foci of T2/FLAIR hyperintensity within the periventricular and subcortical white matter on the basis of chronic microvascular ischemic changes.\par \par There is no hydrocephalus. Ventricles and sulci are stable in size and configuration from prior examination. There is no midline shift.\par \par There is no evidence of recent infarction on the diffusion-weighted imaging.\par \par The vascular flow voids are present.\par \par The sella and suprasellar regions are unremarkable.\par \par There is mucosal thickening of the paranasal sinuses.. The mastoid air cells are well-aerated.\par \par IMPRESSION:\par \par Increase in size of irregular enhancement along the superior aspect of the surgical resection cavity in the left parietal lobe with more pronounced masslike FLAIR signal abnormality. While this may reflect continued sequelae of radiation necrosis and postsurgical changes, tumoral disease cannot be excluded.\par \par New cortical foci of enhancement within the right parietal and occipital lobes which are suspicious for tumoral disease.\par \par \par \par \par Thank you for the opportunity to participate in the care of this patient.\par \par \par KIRSTIE POWERS MD; Attending Radiologist\par This document has been electronically signed. Apr 27 2021  1:28PM\par \par  \par \par  Ordered by: VOLODYMYR GILLESPIE       Collected/Examined: 26Apr2021 11:54AM       \par Verification Required       Stage: Final       \par  Performed at: [facility]       Resulted: 27Apr2021 01:17PM       Last Updated: 27Apr2021 01:31PM       Accession: [id number]

## 2021-05-25 DIAGNOSIS — G81.91 HEMIPLEGIA, UNSPECIFIED AFFECTING RIGHT DOMINANT SIDE: ICD-10-CM

## 2021-05-25 DIAGNOSIS — C79.31 SECONDARY MALIGNANT NEOPLASM OF BRAIN: ICD-10-CM

## 2021-05-25 DIAGNOSIS — G44.89 OTHER HEADACHE SYNDROME: ICD-10-CM

## 2021-05-25 DIAGNOSIS — Z88.0 ALLERGY STATUS TO PENICILLIN: ICD-10-CM

## 2021-05-25 DIAGNOSIS — Z85.118 PERSONAL HISTORY OF OTHER MALIGNANT NEOPLASM OF BRONCHUS AND LUNG: ICD-10-CM

## 2021-05-25 DIAGNOSIS — J44.9 CHRONIC OBSTRUCTIVE PULMONARY DISEASE, UNSPECIFIED: ICD-10-CM

## 2021-05-25 DIAGNOSIS — G40.89 OTHER SEIZURES: ICD-10-CM

## 2021-05-25 DIAGNOSIS — J45.909 UNSPECIFIED ASTHMA, UNCOMPLICATED: ICD-10-CM

## 2021-05-25 DIAGNOSIS — Z91.14 PATIENT'S OTHER NONCOMPLIANCE WITH MEDICATION REGIMEN: ICD-10-CM

## 2021-05-25 DIAGNOSIS — R47.01 APHASIA: ICD-10-CM

## 2021-05-25 DIAGNOSIS — Z92.3 PERSONAL HISTORY OF IRRADIATION: ICD-10-CM

## 2021-05-25 LAB
25(OH)D3 SERPL-MCNC: 32.8 NG/ML
ALBUMIN SERPL ELPH-MCNC: 4.5 G/DL
ALP BLD-CCNC: 86 U/L
ALT SERPL-CCNC: 13 U/L
ANION GAP SERPL CALC-SCNC: 11 MMOL/L
AST SERPL-CCNC: 13 U/L
BASOPHILS # BLD AUTO: 0.03 K/UL
BASOPHILS NFR BLD AUTO: 0.4 %
BILIRUB SERPL-MCNC: 0.2 MG/DL
BUN SERPL-MCNC: 12 MG/DL
CALCIUM SERPL-MCNC: 9.8 MG/DL
CHLORIDE SERPL-SCNC: 103 MMOL/L
CO2 SERPL-SCNC: 27 MMOL/L
CREAT SERPL-MCNC: 0.86 MG/DL
EOSINOPHIL # BLD AUTO: 0.28 K/UL
EOSINOPHIL NFR BLD AUTO: 3.8 %
FOLATE SERPL-MCNC: 11 NG/ML
GLUCOSE SERPL-MCNC: 92 MG/DL
HCT VFR BLD CALC: 42.8 %
HGB BLD-MCNC: 13.8 G/DL
IMM GRANULOCYTES NFR BLD AUTO: 0.1 %
LYMPHOCYTES # BLD AUTO: 2.31 K/UL
LYMPHOCYTES NFR BLD AUTO: 31.4 %
MAN DIFF?: NORMAL
MCHC RBC-ENTMCNC: 29.5 PG
MCHC RBC-ENTMCNC: 32.2 GM/DL
MCV RBC AUTO: 91.5 FL
MONOCYTES # BLD AUTO: 0.54 K/UL
MONOCYTES NFR BLD AUTO: 7.3 %
NEUTROPHILS # BLD AUTO: 4.18 K/UL
NEUTROPHILS NFR BLD AUTO: 57 %
PLATELET # BLD AUTO: 309 K/UL
POTASSIUM SERPL-SCNC: 4.8 MMOL/L
PROT SERPL-MCNC: 7.3 G/DL
RBC # BLD: 4.68 M/UL
RBC # FLD: 14.3 %
SODIUM SERPL-SCNC: 141 MMOL/L
VIT B12 SERPL-MCNC: 849 PG/ML
WBC # FLD AUTO: 7.35 K/UL

## 2021-05-25 PROCEDURE — 95708 EEG WO VID EA 12-26HR UNMNTR: CPT

## 2021-05-26 ENCOUNTER — HOSPITAL ENCOUNTER (INPATIENT)
Dept: HOSPITAL 74 - JER | Age: 61
LOS: 2 days | Discharge: HOME | DRG: 100 | End: 2021-05-28
Attending: GENERAL ACUTE CARE HOSPITAL | Admitting: INTERNAL MEDICINE
Payer: COMMERCIAL

## 2021-05-26 ENCOUNTER — APPOINTMENT (OUTPATIENT)
Dept: NEUROLOGY | Facility: CLINIC | Age: 61
End: 2021-05-26

## 2021-05-26 VITALS — BODY MASS INDEX: 35.5 KG/M2

## 2021-05-26 DIAGNOSIS — E66.9: ICD-10-CM

## 2021-05-26 DIAGNOSIS — Z88.0: ICD-10-CM

## 2021-05-26 DIAGNOSIS — K76.0: ICD-10-CM

## 2021-05-26 DIAGNOSIS — G93.6: ICD-10-CM

## 2021-05-26 DIAGNOSIS — T66.XXXA: ICD-10-CM

## 2021-05-26 DIAGNOSIS — C34.90: ICD-10-CM

## 2021-05-26 DIAGNOSIS — J44.9: ICD-10-CM

## 2021-05-26 DIAGNOSIS — E78.5: ICD-10-CM

## 2021-05-26 DIAGNOSIS — R47.01: ICD-10-CM

## 2021-05-26 DIAGNOSIS — R41.3: ICD-10-CM

## 2021-05-26 DIAGNOSIS — K57.90: ICD-10-CM

## 2021-05-26 DIAGNOSIS — R56.9: Primary | ICD-10-CM

## 2021-05-26 DIAGNOSIS — C79.31: ICD-10-CM

## 2021-05-26 DIAGNOSIS — R73.9: ICD-10-CM

## 2021-05-26 LAB
ALBUMIN SERPL-MCNC: 3.9 G/DL (ref 3.4–5)
ALP SERPL-CCNC: 83 U/L (ref 45–117)
ALT SERPL-CCNC: 16 U/L (ref 13–61)
ANION GAP SERPL CALC-SCNC: 7 MMOL/L (ref 8–16)
APPEARANCE UR: CLEAR
APTT BLD: 29.7 SECONDS (ref 25.2–36.5)
AST SERPL-CCNC: 11 U/L (ref 15–37)
BACTERIA # UR AUTO: 202 /UL (ref 0–1359)
BASOPHILS # BLD: 0.3 % (ref 0–2)
BILIRUB SERPL-MCNC: 0.2 MG/DL (ref 0.2–1)
BILIRUB UR STRIP.AUTO-MCNC: NEGATIVE MG/DL
BUN SERPL-MCNC: 14.4 MG/DL (ref 7–18)
CALCIUM SERPL-MCNC: 9.2 MG/DL (ref 8.5–10.1)
CASTS URNS QL MICRO: 0 /UL (ref 0–3.1)
CHLORIDE SERPL-SCNC: 107 MMOL/L (ref 98–107)
CHOLEST SERPL-MCNC: 257 MG/DL (ref 50–200)
CO2 SERPL-SCNC: 29 MMOL/L (ref 21–32)
COLOR UR: YELLOW
CREAT SERPL-MCNC: 0.8 MG/DL (ref 0.55–1.3)
DEPRECATED RDW RBC AUTO: 14.6 % (ref 11.6–15.6)
EOSINOPHIL # BLD: 3.8 % (ref 0–4.5)
EPITH CASTS URNS QL MICRO: 6 /UL (ref 0–25.1)
GLUCOSE SERPL-MCNC: 105 MG/DL (ref 74–106)
HCT VFR BLD CALC: 40.2 % (ref 32.4–45.2)
HDLC SERPL-MCNC: 71 MG/DL (ref 40–60)
HGB BLD-MCNC: 13.2 GM/DL (ref 10.7–15.3)
INR BLD: 1.04 (ref 0.83–1.09)
KETONES UR QL STRIP: NEGATIVE
LDLC SERPL CALC-MCNC: 145 MG/DL (ref 5–100)
LEUKOCYTE ESTERASE UR QL STRIP.AUTO: (no result)
LYMPHOCYTES # BLD: 25.2 % (ref 8–40)
MCH RBC QN AUTO: 28.9 PG (ref 25.7–33.7)
MCHC RBC AUTO-ENTMCNC: 33 G/DL (ref 32–36)
MCV RBC: 87.6 FL (ref 80–96)
MONOCYTES # BLD AUTO: 6.6 % (ref 3.8–10.2)
NEUTROPHILS # BLD: 64.1 % (ref 42.8–82.8)
NITRITE UR QL STRIP: NEGATIVE
PH UR: 6.5 [PH] (ref 5–8)
PLATELET # BLD AUTO: 291 K/MM3 (ref 134–434)
PMV BLD: 8.2 FL (ref 7.5–11.1)
PROT SERPL-MCNC: 7.3 G/DL (ref 6.4–8.2)
PROT UR QL STRIP: NEGATIVE
PROT UR QL STRIP: NEGATIVE
PT PNL PPP: 12.8 SEC (ref 9.7–13)
RBC # BLD AUTO: 3 /UL (ref 0–23.9)
RBC # BLD AUTO: 4.58 M/MM3 (ref 3.6–5.2)
SODIUM SERPL-SCNC: 142 MMOL/L (ref 136–145)
SP GR UR: 1 (ref 1.01–1.03)
TRIGL SERPL-MCNC: 83 MG/DL (ref 0–150)
UROBILINOGEN UR STRIP-MCNC: 0.2 MG/DL (ref 0.2–1)
WBC # BLD AUTO: 9.5 K/MM3 (ref 4–10)
WBC # UR AUTO: 16 /UL (ref 0–25.8)

## 2021-05-26 PROCEDURE — U0005 INFEC AGEN DETEC AMPLI PROBE: HCPCS

## 2021-05-26 PROCEDURE — 95721 EEG PHY/QHP>36<60 HR W/O VID: CPT

## 2021-05-26 PROCEDURE — C9803 HOPD COVID-19 SPEC COLLECT: HCPCS

## 2021-05-26 PROCEDURE — 95708 EEG WO VID EA 12-26HR UNMNTR: CPT

## 2021-05-26 PROCEDURE — 95700 EEG CONT REC W/VID EEG TECH: CPT

## 2021-05-26 PROCEDURE — U0003 INFECTIOUS AGENT DETECTION BY NUCLEIC ACID (DNA OR RNA); SEVERE ACUTE RESPIRATORY SYNDROME CORONAVIRUS 2 (SARS-COV-2) (CORONAVIRUS DISEASE [COVID-19]), AMPLIFIED PROBE TECHNIQUE, MAKING USE OF HIGH THROUGHPUT TECHNOLOGIES AS DESCRIBED BY CMS-2020-01-R: HCPCS

## 2021-05-27 LAB
ALBUMIN SERPL-MCNC: 4 G/DL (ref 3.4–5)
ALP SERPL-CCNC: 90 U/L (ref 45–117)
ALT SERPL-CCNC: 18 U/L (ref 13–61)
ANION GAP SERPL CALC-SCNC: 5 MMOL/L (ref 8–16)
AST SERPL-CCNC: 10 U/L (ref 15–37)
BILIRUB SERPL-MCNC: 0.3 MG/DL (ref 0.2–1)
BUN SERPL-MCNC: 11.2 MG/DL (ref 7–18)
CALCIUM SERPL-MCNC: 9.5 MG/DL (ref 8.5–10.1)
CHLORIDE SERPL-SCNC: 106 MMOL/L (ref 98–107)
CO2 SERPL-SCNC: 30 MMOL/L (ref 21–32)
CREAT SERPL-MCNC: 0.8 MG/DL (ref 0.55–1.3)
DEPRECATED RDW RBC AUTO: 14.4 % (ref 11.6–15.6)
GLUCOSE SERPL-MCNC: 159 MG/DL (ref 74–106)
HCT VFR BLD CALC: 42.8 % (ref 32.4–45.2)
HGB BLD-MCNC: 14.1 GM/DL (ref 10.7–15.3)
LEVETIRACETAM SERPL-MCNC: 50.3 UG/ML
MAGNESIUM SERPL-MCNC: 2.1 MG/DL (ref 1.8–2.4)
MCH RBC QN AUTO: 29.3 PG (ref 25.7–33.7)
MCHC RBC AUTO-ENTMCNC: 32.9 G/DL (ref 32–36)
MCV RBC: 89 FL (ref 80–96)
PHOSPHATE SERPL-MCNC: 3.1 MG/DL (ref 2.5–4.9)
PLATELET # BLD AUTO: 293 K/MM3 (ref 134–434)
PMV BLD: 8.2 FL (ref 7.5–11.1)
PROT SERPL-MCNC: 7.5 G/DL (ref 6.4–8.2)
RBC # BLD AUTO: 4.81 M/MM3 (ref 3.6–5.2)
SODIUM SERPL-SCNC: 141 MMOL/L (ref 136–145)
WBC # BLD AUTO: 7.7 K/MM3 (ref 4–10)

## 2021-05-27 RX ADMIN — ALBUTEROL SULFATE PRN PUFF: 90 AEROSOL, METERED RESPIRATORY (INHALATION) at 17:07

## 2021-05-27 RX ADMIN — LEVETIRACETAM SCH MG: 100 INJECTION, SOLUTION, CONCENTRATE INTRAVENOUS at 22:19

## 2021-05-27 RX ADMIN — ACETAMINOPHEN PRN MG: 325 TABLET ORAL at 17:35

## 2021-05-27 RX ADMIN — INSULIN ASPART SCH: 100 INJECTION, SOLUTION INTRAVENOUS; SUBCUTANEOUS at 17:01

## 2021-05-27 RX ADMIN — DEXAMETHASONE SODIUM PHOSPHATE SCH MG: 4 INJECTION, SOLUTION INTRAMUSCULAR; INTRAVENOUS at 17:02

## 2021-05-27 RX ADMIN — INSULIN ASPART SCH: 100 INJECTION, SOLUTION INTRAVENOUS; SUBCUTANEOUS at 06:07

## 2021-05-27 RX ADMIN — ACETAMINOPHEN PRN MG: 325 TABLET ORAL at 23:57

## 2021-05-27 RX ADMIN — LEVETIRACETAM SCH MG: 100 INJECTION, SOLUTION, CONCENTRATE INTRAVENOUS at 10:38

## 2021-05-27 RX ADMIN — INSULIN ASPART SCH: 100 INJECTION, SOLUTION INTRAVENOUS; SUBCUTANEOUS at 12:14

## 2021-05-27 RX ADMIN — FAMOTIDINE SCH MLS/HR: 20 INJECTION, SOLUTION INTRAVENOUS at 22:20

## 2021-05-27 RX ADMIN — BUDESONIDE AND FORMOTEROL FUMARATE DIHYDRATE SCH PUFF: 80; 4.5 AEROSOL RESPIRATORY (INHALATION) at 22:34

## 2021-05-27 RX ADMIN — INSULIN ASPART SCH UNITS: 100 INJECTION, SOLUTION INTRAVENOUS; SUBCUTANEOUS at 23:55

## 2021-05-28 VITALS — SYSTOLIC BLOOD PRESSURE: 105 MMHG | HEART RATE: 99 BPM | DIASTOLIC BLOOD PRESSURE: 87 MMHG

## 2021-05-28 VITALS — TEMPERATURE: 98.3 F

## 2021-05-28 LAB
ALBUMIN SERPL-MCNC: 3.6 G/DL (ref 3.4–5)
ALP SERPL-CCNC: 85 U/L (ref 45–117)
ALT SERPL-CCNC: 17 U/L (ref 13–61)
ANION GAP SERPL CALC-SCNC: 5 MMOL/L (ref 8–16)
AST SERPL-CCNC: 12 U/L (ref 15–37)
BASOPHILS # BLD: 0.1 % (ref 0–2)
BILIRUB SERPL-MCNC: 0.3 MG/DL (ref 0.2–1)
BUN SERPL-MCNC: 15.5 MG/DL (ref 7–18)
CALCIUM SERPL-MCNC: 9.4 MG/DL (ref 8.5–10.1)
CHLORIDE SERPL-SCNC: 114 MMOL/L (ref 98–107)
CO2 SERPL-SCNC: 27 MMOL/L (ref 21–32)
CREAT SERPL-MCNC: 0.8 MG/DL (ref 0.55–1.3)
DEPRECATED RDW RBC AUTO: 14.8 % (ref 11.6–15.6)
EOSINOPHIL # BLD: 0 % (ref 0–4.5)
GLUCOSE SERPL-MCNC: 148 MG/DL (ref 74–106)
HCT VFR BLD CALC: 42.2 % (ref 32.4–45.2)
HGB BLD-MCNC: 14 GM/DL (ref 10.7–15.3)
LYMPHOCYTES # BLD: 10.3 % (ref 8–40)
MAGNESIUM SERPL-MCNC: 2.3 MG/DL (ref 1.8–2.4)
MCH RBC QN AUTO: 29.5 PG (ref 25.7–33.7)
MCHC RBC AUTO-ENTMCNC: 33.1 G/DL (ref 32–36)
MCV RBC: 89.1 FL (ref 80–96)
MONOCYTES # BLD AUTO: 0.9 % (ref 3.8–10.2)
NEUTROPHILS # BLD: 88.7 % (ref 42.8–82.8)
PHOSPHATE SERPL-MCNC: 3.3 MG/DL (ref 2.5–4.9)
PLATELET # BLD AUTO: 300 K/MM3 (ref 134–434)
PMV BLD: 8.3 FL (ref 7.5–11.1)
PROT SERPL-MCNC: 7.4 G/DL (ref 6.4–8.2)
RBC # BLD AUTO: 4.74 M/MM3 (ref 3.6–5.2)
SODIUM SERPL-SCNC: 146 MMOL/L (ref 136–145)
WBC # BLD AUTO: 8.5 K/MM3 (ref 4–10)

## 2021-05-28 RX ADMIN — DEXAMETHASONE SODIUM PHOSPHATE SCH MG: 4 INJECTION, SOLUTION INTRAMUSCULAR; INTRAVENOUS at 01:36

## 2021-05-28 RX ADMIN — FAMOTIDINE SCH MLS/HR: 20 INJECTION, SOLUTION INTRAVENOUS at 09:08

## 2021-05-28 RX ADMIN — LEVETIRACETAM SCH MG: 100 INJECTION, SOLUTION, CONCENTRATE INTRAVENOUS at 09:58

## 2021-05-28 RX ADMIN — INSULIN ASPART SCH: 100 INJECTION, SOLUTION INTRAVENOUS; SUBCUTANEOUS at 11:26

## 2021-05-28 RX ADMIN — DEXAMETHASONE SODIUM PHOSPHATE SCH MG: 4 INJECTION, SOLUTION INTRAMUSCULAR; INTRAVENOUS at 09:08

## 2021-05-28 RX ADMIN — INSULIN ASPART SCH: 100 INJECTION, SOLUTION INTRAVENOUS; SUBCUTANEOUS at 07:43

## 2021-05-28 RX ADMIN — BUDESONIDE AND FORMOTEROL FUMARATE DIHYDRATE SCH: 80; 4.5 AEROSOL RESPIRATORY (INHALATION) at 10:01

## 2021-05-28 RX ADMIN — ALBUTEROL SULFATE PRN PUFF: 90 AEROSOL, METERED RESPIRATORY (INHALATION) at 10:02

## 2021-06-03 ENCOUNTER — RESULT REVIEW (OUTPATIENT)
Age: 61
End: 2021-06-03

## 2021-06-03 ENCOUNTER — APPOINTMENT (OUTPATIENT)
Dept: MRI IMAGING | Facility: HOSPITAL | Age: 61
End: 2021-06-03

## 2021-06-03 ENCOUNTER — OUTPATIENT (OUTPATIENT)
Dept: OUTPATIENT SERVICES | Facility: HOSPITAL | Age: 61
LOS: 1 days | End: 2021-06-03
Payer: MEDICARE

## 2021-06-03 DIAGNOSIS — Z90.49 ACQUIRED ABSENCE OF OTHER SPECIFIED PARTS OF DIGESTIVE TRACT: Chronic | ICD-10-CM

## 2021-06-03 DIAGNOSIS — Z98.890 OTHER SPECIFIED POSTPROCEDURAL STATES: Chronic | ICD-10-CM

## 2021-06-03 DIAGNOSIS — Z41.9 ENCOUNTER FOR PROCEDURE FOR PURPOSES OTHER THAN REMEDYING HEALTH STATE, UNSPECIFIED: Chronic | ICD-10-CM

## 2021-06-03 PROCEDURE — A9585: CPT

## 2021-06-03 PROCEDURE — 70553 MRI BRAIN STEM W/O & W/DYE: CPT | Mod: 26,ME

## 2021-06-03 PROCEDURE — G1004: CPT

## 2021-06-03 PROCEDURE — 70553 MRI BRAIN STEM W/O & W/DYE: CPT

## 2021-06-08 ENCOUNTER — NON-APPOINTMENT (OUTPATIENT)
Age: 61
End: 2021-06-08

## 2021-06-28 ENCOUNTER — APPOINTMENT (OUTPATIENT)
Dept: NEUROLOGY | Facility: CLINIC | Age: 61
End: 2021-06-28
Payer: MEDICARE

## 2021-06-28 ENCOUNTER — LABORATORY RESULT (OUTPATIENT)
Age: 61
End: 2021-06-28

## 2021-06-28 VITALS
RESPIRATION RATE: 14 BRPM | SYSTOLIC BLOOD PRESSURE: 109 MMHG | WEIGHT: 210 LBS | BODY MASS INDEX: 34.99 KG/M2 | TEMPERATURE: 97.9 F | DIASTOLIC BLOOD PRESSURE: 77 MMHG | OXYGEN SATURATION: 94 % | HEIGHT: 65 IN | HEART RATE: 77 BPM

## 2021-06-28 PROCEDURE — 99215 OFFICE O/P EST HI 40 MIN: CPT

## 2021-06-29 LAB
ALBUMIN SERPL ELPH-MCNC: 4.5 G/DL
ALP BLD-CCNC: 100 U/L
ALT SERPL-CCNC: 21 U/L
ANION GAP SERPL CALC-SCNC: 14 MMOL/L
AST SERPL-CCNC: 17 U/L
BILIRUB SERPL-MCNC: 0.2 MG/DL
BUN SERPL-MCNC: 10 MG/DL
CALCIUM SERPL-MCNC: 10.1 MG/DL
CHLORIDE SERPL-SCNC: 100 MMOL/L
CO2 SERPL-SCNC: 24 MMOL/L
CREAT SERPL-MCNC: 0.86 MG/DL
GLUCOSE SERPL-MCNC: 116 MG/DL
POTASSIUM SERPL-SCNC: 5 MMOL/L
PROT SERPL-MCNC: 7.5 G/DL
SODIUM SERPL-SCNC: 139 MMOL/L

## 2021-06-29 PROCEDURE — 95708 EEG WO VID EA 12-26HR UNMNTR: CPT

## 2021-06-29 PROCEDURE — 95700 EEG CONT REC W/VID EEG TECH: CPT

## 2021-06-30 PROCEDURE — 95708 EEG WO VID EA 12-26HR UNMNTR: CPT

## 2021-06-30 PROCEDURE — 95721 EEG PHY/QHP>36<60 HR W/O VID: CPT

## 2021-07-01 ENCOUNTER — APPOINTMENT (OUTPATIENT)
Dept: NEUROLOGY | Facility: CLINIC | Age: 61
End: 2021-07-01

## 2021-07-01 LAB
LEVETIRACETAM SERPL-MCNC: 50.3 UG/ML
OXCARBAZEPINE SERPL-MCNC: 15 UG/ML

## 2021-07-06 LAB
BASOPHILS # BLD AUTO: 0.02 K/UL
BASOPHILS NFR BLD AUTO: 0.3 %
EOSINOPHIL # BLD AUTO: 0.2 K/UL
EOSINOPHIL NFR BLD AUTO: 3 %
HCT VFR BLD CALC: 46.2 %
HGB BLD-MCNC: 14.4 G/DL
IMM GRANULOCYTES NFR BLD AUTO: 0.1 %
LYMPHOCYTES # BLD AUTO: 1.82 K/UL
LYMPHOCYTES NFR BLD AUTO: 26.9 %
MAN DIFF?: NORMAL
MCHC RBC-ENTMCNC: 29.4 PG
MCHC RBC-ENTMCNC: 31.2 GM/DL
MCV RBC AUTO: 94.3 FL
MONOCYTES # BLD AUTO: 0.52 K/UL
MONOCYTES NFR BLD AUTO: 7.7 %
NEUTROPHILS # BLD AUTO: 4.19 K/UL
NEUTROPHILS NFR BLD AUTO: 62 %
PLATELET # BLD AUTO: 319 K/UL
RBC # BLD: 4.9 M/UL
RBC # FLD: 14.4 %
WBC # FLD AUTO: 6.76 K/UL

## 2021-07-29 ENCOUNTER — HOSPITAL ENCOUNTER (EMERGENCY)
Dept: HOSPITAL 74 - JER | Age: 61
LOS: 1 days | Discharge: HOME | End: 2021-07-30
Payer: COMMERCIAL

## 2021-07-29 VITALS — BODY MASS INDEX: 35.2 KG/M2

## 2021-07-29 DIAGNOSIS — R10.84: Primary | ICD-10-CM

## 2021-07-30 VITALS — SYSTOLIC BLOOD PRESSURE: 142 MMHG | HEART RATE: 89 BPM | DIASTOLIC BLOOD PRESSURE: 74 MMHG

## 2021-07-30 VITALS — TEMPERATURE: 97.5 F

## 2021-07-30 LAB
ALBUMIN SERPL-MCNC: 3.9 G/DL (ref 3.4–5)
ALP SERPL-CCNC: 85 U/L (ref 45–117)
ALT SERPL-CCNC: 37 U/L (ref 13–61)
ANION GAP SERPL CALC-SCNC: 11 MMOL/L (ref 8–16)
APPEARANCE UR: (no result)
AST SERPL-CCNC: 25 U/L (ref 15–37)
BACTERIA # UR AUTO: 1089 /UL (ref 0–1359)
BASOPHILS # BLD: 0.3 % (ref 0–2)
BILIRUB SERPL-MCNC: 0.3 MG/DL (ref 0.2–1)
BILIRUB UR STRIP.AUTO-MCNC: NEGATIVE MG/DL
BUN SERPL-MCNC: 13 MG/DL (ref 7–18)
CALCIUM SERPL-MCNC: 9.5 MG/DL (ref 8.5–10.1)
CASTS URNS QL MICRO: 5 /UL (ref 0–3.1)
CHLORIDE SERPL-SCNC: 98 MMOL/L (ref 98–107)
CO2 SERPL-SCNC: 27 MMOL/L (ref 21–32)
COLOR UR: YELLOW
CREAT SERPL-MCNC: 0.9 MG/DL (ref 0.55–1.3)
DEPRECATED RDW RBC AUTO: 14.2 % (ref 11.6–15.6)
EOSINOPHIL # BLD: 1.9 % (ref 0–4.5)
EPITH CASTS URNS QL MICRO: >36 /UL (ref 0–25.1)
GLUCOSE SERPL-MCNC: 114 MG/DL (ref 74–106)
HCT VFR BLD CALC: 44.8 % (ref 32.4–45.2)
HGB BLD-MCNC: 15 GM/DL (ref 10.7–15.3)
KETONES UR QL STRIP: NEGATIVE
LEUKOCYTE ESTERASE UR QL STRIP.AUTO: (no result)
LIPASE SERPL-CCNC: 150 U/L (ref 73–393)
LYMPHOCYTES # BLD: 21.2 % (ref 8–40)
MCH RBC QN AUTO: 29.2 PG (ref 25.7–33.7)
MCHC RBC AUTO-ENTMCNC: 33.5 G/DL (ref 32–36)
MCV RBC: 87.1 FL (ref 80–96)
MONOCYTES # BLD AUTO: 6.3 % (ref 3.8–10.2)
NEUTROPHILS # BLD: 70.3 % (ref 42.8–82.8)
NITRITE UR QL STRIP: NEGATIVE
PH UR: 5.5 [PH] (ref 5–8)
PLATELET # BLD AUTO: 322 10^3/UL (ref 134–434)
PMV BLD: 7.4 FL (ref 7.5–11.1)
PROT SERPL-MCNC: 7.6 G/DL (ref 6.4–8.2)
PROT UR QL STRIP: (no result)
PROT UR QL STRIP: NEGATIVE
RBC # BLD AUTO: 32 /UL (ref 0–23.9)
RBC # BLD AUTO: 5.15 M/MM3 (ref 3.6–5.2)
SODIUM SERPL-SCNC: 135 MMOL/L (ref 136–145)
SP GR UR: 1.02 (ref 1.01–1.03)
UROBILINOGEN UR STRIP-MCNC: 1 MG/DL (ref 0.2–1)
WBC # BLD AUTO: 11.7 K/MM3 (ref 4–10)
WBC # UR AUTO: 219 /UL (ref 0–25.8)

## 2021-07-30 PROCEDURE — 3E033GC INTRODUCTION OF OTHER THERAPEUTIC SUBSTANCE INTO PERIPHERAL VEIN, PERCUTANEOUS APPROACH: ICD-10-PCS

## 2021-07-30 PROCEDURE — 3E0333Z INTRODUCTION OF ANTI-INFLAMMATORY INTO PERIPHERAL VEIN, PERCUTANEOUS APPROACH: ICD-10-PCS

## 2021-08-10 ENCOUNTER — OUTPATIENT (OUTPATIENT)
Dept: OUTPATIENT SERVICES | Facility: HOSPITAL | Age: 61
LOS: 1 days | End: 2021-08-10
Payer: MEDICARE

## 2021-08-10 ENCOUNTER — APPOINTMENT (OUTPATIENT)
Dept: MRI IMAGING | Facility: HOSPITAL | Age: 61
End: 2021-08-10

## 2021-08-10 DIAGNOSIS — Z98.890 OTHER SPECIFIED POSTPROCEDURAL STATES: Chronic | ICD-10-CM

## 2021-08-10 DIAGNOSIS — Z41.9 ENCOUNTER FOR PROCEDURE FOR PURPOSES OTHER THAN REMEDYING HEALTH STATE, UNSPECIFIED: Chronic | ICD-10-CM

## 2021-08-10 DIAGNOSIS — Z90.49 ACQUIRED ABSENCE OF OTHER SPECIFIED PARTS OF DIGESTIVE TRACT: Chronic | ICD-10-CM

## 2021-08-10 PROCEDURE — G1004: CPT

## 2021-08-10 PROCEDURE — 70553 MRI BRAIN STEM W/O & W/DYE: CPT | Mod: 26,ME

## 2021-08-10 PROCEDURE — 70553 MRI BRAIN STEM W/O & W/DYE: CPT | Mod: ME

## 2021-08-10 PROCEDURE — A9585: CPT

## 2021-08-23 ENCOUNTER — APPOINTMENT (OUTPATIENT)
Dept: NEUROSURGERY | Facility: CLINIC | Age: 61
End: 2021-08-23
Payer: MEDICARE

## 2021-08-23 VITALS
OXYGEN SATURATION: 98 % | WEIGHT: 210 LBS | BODY MASS INDEX: 34.99 KG/M2 | HEIGHT: 65 IN | SYSTOLIC BLOOD PRESSURE: 126 MMHG | DIASTOLIC BLOOD PRESSURE: 85 MMHG | RESPIRATION RATE: 16 BRPM | HEART RATE: 85 BPM | TEMPERATURE: 97.2 F

## 2021-08-23 PROCEDURE — 99214 OFFICE O/P EST MOD 30 MIN: CPT

## 2021-08-23 NOTE — HISTORY OF PRESENT ILLNESS
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 She previously underwent left parietal craniotomy for tumor resection and adjuvant radiation approximately 3 years ago In November 2017 at another institution. She also had GKRS on 1/8/18 to a right parietal brain metastasis with Dr. Rico. MRI brain on admission demonstrated a recurrent 3 cm left parietal enhancing brain mass in the area of prior tumor resection. She underwent surgical resection of left parietal mass on 5/13/2020 with Dr. Rico. Pathology indicated radiation necrosis. \par \par Postoperatively, her right hand weakness improved. She does have right visual field cut. She was referred to Dr. Andrea Gutierrez for neuro opthalmology consult.\par \par In July 2020, Ms. Mendoza was hospitalized at [facility] due to sudden onset difficulty speaking as well as RIGHT hand weakness. She was admitted and given steroids and seizure medication. She was weaned off the dexamethasone over the next several weeks. She reported improvement in right hand weakness.\par \par She remains on keppra 750 mg bid.\par \par MRI brain with and without contrast, done on 2/3/21, was stable. \par \par Returns today to review MRI brain with and without contrast done on 4/26/21.\par \par On 5/7/21, she reported RIGHT hand weakness.  While in the office and during visit, she developed acute expressive aphasia and total RIGHT hemiparesis including RIGHT leg. Prior to this, she stated she had not taken keppra X 2 days. She was taken to the ED and had generalized tonic clonic seizure. She was discharged with 1000 mg keppra bid.\par \par She had 4 cycles of IV avastin and had repeat MRI brain done on 8/10/21 which she returns today to review. \par \par \par \par \par \par \par \par \par \par

## 2021-08-23 NOTE — ASSESSMENT
[FreeTextEntry1] : MRI brain with and without contrast from 8/10/21  reviewed by Dr. Rico with patient, demonstrates decrease in left parietal enhancement and previously seen cortical foci of enhancement in right parietal occipital lobe is not visualized.\par She is doing well - RIGHT hand and arm strength has improved \par Recommend MRI brain with and without contrast in 3 months (November 2021)\par \par Patient verbalizes agreement and understanding with plan of care.\par \par I, Dr. Rico, personally performed the evaluation and management (E/M) services for this established patient who presents today with (a) new problem(s)/exacerbation of (an) existing condition(s).  That E/M includes conducting the examination, assessing all new/exacerbated conditions, and establishing a new plan of care.  Today, my ACP, Balbina Acuna, was here to observe my evaluation and management services for this new problem/exacerbated condition to be followed going forward.\par \par \par \par -------------------------------------------------\par Number and Complexity of Problems: Moderate Brain Metastases\par \par Amount/Complexity of Data Reviewed/Analyzed: Moderate - Dr. Rico independently reviewed and interpreted MRI brain with and without contrast done on 8/10/21; new MRI ordered\par \par Risk of Complications and/or Morbidity or Mortality of Patient Management: Moderate - recommend continued close follow up with MRI brain every 3 months to evaluate stability and rule out disease progression\par

## 2021-08-23 NOTE — PHYSICAL EXAM
[General Appearance - In No Acute Distress] : in no acute distress [General Appearance - Alert] : alert [Oriented To Time, Place, And Person] : oriented to person, place, and time [Cranial Nerves Optic (II)] : visual acuity intact bilaterally,  pupils equal round and reactive to light [Cranial Nerves Oculomotor (III)] : extraocular motion intact [Cranial Nerves Trigeminal (V)] : facial sensation intact symmetrically [Cranial Nerves Facial (VII)] : face symmetrical [Cranial Nerves Vestibulocochlear (VIII)] : hearing was intact bilaterally [Cranial Nerves Accessory (XI - Cranial And Spinal)] : head turning and shoulder shrug symmetric [Cranial Nerves Glossopharyngeal (IX)] : tongue and palate midline [Cranial Nerves Hypoglossal (XII)] : there was no tongue deviation with protrusion [Motor Tone] : muscle tone was normal in all four extremities [Motor Strength] : muscle strength was normal in all four extremities [Sclera] : the sclera and conjunctiva were normal [Outer Ear] : the ears and nose were normal in appearance [Neck Appearance] : the appearance of the neck was normal [] : no respiratory distress [Abnormal Walk] : normal gait [Skin Color & Pigmentation] : normal skin color and pigmentation [FreeTextEntry6] : \par RLE 3/5

## 2021-08-23 NOTE — DATA REVIEWED
[de-identified] : \par  MR Head w/wo IV Cont             Final\par \par No Documents Attached\par \par \par \par \par   EXAM:  MR BRAIN WAW IC\par \par PROCEDURE DATE:  08/10/2021\par \par \par \par INTERPRETATION:  PROCEDURE: MRI Brain with and without contrast\par \par INDICATION: Brain metastasis status post resection and gamma knife radiosurgery, follow-up\par \par TECHNIQUE: Sagittal T1 volumetric series with MPR, axial T2, T2-FLAIR, T2-FFE and diffusion imaging of the brain is obtained. Following the intravenous administration of 7.5 cc Gadavist contrast material, sagittal T1 volumetric imaging with MPR provided.\par \par COMPARISON: Multiple prior MRI brain most recent 6/3/2021\par \par FINDINGS:\par \par Again demonstrated status post left parietal craniotomy for resection of left parietal lobe mass. There is a surgical resection cavity within the left parietal lobe. There is dural thickening and enhancement deep to craniotomy site secondary to postsurgical changes. There is hemosiderin staining surrounding the surgical cavity.\par \par Again demonstrated is irregular enhancement and masslike FLAIR signal abnormality most pronounced along the superior aspect of the surgical resection cavity which measures 1.9 cm AP by 1.6 cm transverse by 2.9 cm craniocaudal which is mildly decreased from prior examination where measured measured 2 x 1.8 x 3 cm (series 701 image 85 and series 702 image 19). There is mild interval decrease vasogenic edema within the left parietal lobe with stable edema in the left occipital lobe. There is more pronounced T1 hyperintensity along the superior aspect of the left parietal lesion which may reflect posttreatment changes. Again noted is irregular enhancement within the left occipital lobe adjacent to the craniotomy site which is not significantly changed from prior examinations.\par \par The previously demonstrated foci of enhancement within the right parietal occipital lobes are not clearly visualized on the current exam. There is no significant FLAIR hyperintensity these regions. There are no new areas of abnormal enhancement.\par \par There is no significant change in the FLAIR signal abnormality expanding across the splenium the corpus callosum to the right periventricular region. Scattered foci of T2/FLAIR hyperintensity within the periventricular and subcortical white matter on the basis of chronic microvascular ischemic changes.\par \par The flow voids are preserved.\par \par There is no evidence of recent infarction on diffusion-weighted imaging.\par \par There is mucosal thickening of the paranasal sinuses. The mastoid air cells are well-aerated.\par \par IMPRESSION:\par \par Since prior MRI brain 6/3/2021, mild interval decrease in enhancement involving the left parietal lobe lesion with decreased surrounding vasogenic edema which may reflect posttreatment changes. Previously demonstrated foci of enhancement within the right parietal occipital lobes are not clearly visualized on the current examination. No new foci of enhancement\par \par --- End of Report ---\par \par \par \par \par Thank you for the opportunity to participate in the care of this patient.\par \par \par KIRSTIE POWERS MD; Attending Radiologist\par This document has been electronically signed. Aug 13 2021 12:44PM\par \par  \par \par  Ordered by: VOLODYMYR GILLESPIE       Collected/Examined: 10Aug2021 11:27AM       \par Verification Required       Stage: Final       \par  Performed at: [facility]       Resulted: [id number] 12:26PM       Last Updated: [id number] 12:47PM       Accession: [id number]

## 2021-09-13 ENCOUNTER — APPOINTMENT (OUTPATIENT)
Dept: OPHTHALMOLOGY | Facility: CLINIC | Age: 61
End: 2021-09-13
Payer: MEDICARE

## 2021-09-13 ENCOUNTER — NON-APPOINTMENT (OUTPATIENT)
Age: 61
End: 2021-09-13

## 2021-09-13 PROCEDURE — 92083 EXTENDED VISUAL FIELD XM: CPT

## 2021-09-13 PROCEDURE — 92014 COMPRE OPH EXAM EST PT 1/>: CPT

## 2021-11-01 ENCOUNTER — OUTPATIENT (OUTPATIENT)
Dept: OUTPATIENT SERVICES | Facility: HOSPITAL | Age: 61
LOS: 1 days | End: 2021-11-01
Payer: MEDICARE

## 2021-11-01 ENCOUNTER — APPOINTMENT (OUTPATIENT)
Dept: CT IMAGING | Facility: HOSPITAL | Age: 61
End: 2021-11-01

## 2021-11-01 DIAGNOSIS — Z41.9 ENCOUNTER FOR PROCEDURE FOR PURPOSES OTHER THAN REMEDYING HEALTH STATE, UNSPECIFIED: Chronic | ICD-10-CM

## 2021-11-01 DIAGNOSIS — Z90.49 ACQUIRED ABSENCE OF OTHER SPECIFIED PARTS OF DIGESTIVE TRACT: Chronic | ICD-10-CM

## 2021-11-01 DIAGNOSIS — Z98.890 OTHER SPECIFIED POSTPROCEDURAL STATES: Chronic | ICD-10-CM

## 2021-11-01 PROCEDURE — 74177 CT ABD & PELVIS W/CONTRAST: CPT | Mod: MH

## 2021-11-01 PROCEDURE — 74177 CT ABD & PELVIS W/CONTRAST: CPT | Mod: 26,MH

## 2021-11-01 PROCEDURE — 71260 CT THORAX DX C+: CPT | Mod: 26,MH

## 2021-11-01 PROCEDURE — 71260 CT THORAX DX C+: CPT | Mod: MH

## 2021-11-01 NOTE — ED ADULT NURSE NOTE - NS ED NURSE REPORT GIVEN TO FT
HARRISON Khan Nasalis-Muscle-Based Myocutaneous Island Pedicle Flap Text: Using a #15 blade, an incision was made around the donor flap to the level of the nasalis muscle. Wide lateral undermining was then performed in both the subcutaneous plane above the nasalis muscle, and in a submuscular plane just above periosteum. This allowed the formation of a free nasalis muscle axial pedicle (based on the angular artery) which was still attached to the actual cutaneous flap, increasing its mobility and vascular viability. Hemostasis was obtained with pinpoint electrocoagulation. The flap was mobilized into position and the pivotal anchor points positioned and stabilized with buried interrupted sutures. Subcutaneous and dermal tissues were closed in a multilayered fashion with sutures. Tissue redundancies were excised, and the epidermal edges were apposed without significant tension and sutured with sutures.

## 2021-11-15 ENCOUNTER — OUTPATIENT (OUTPATIENT)
Dept: OUTPATIENT SERVICES | Facility: HOSPITAL | Age: 61
LOS: 1 days | End: 2021-11-15
Payer: MEDICARE

## 2021-11-15 ENCOUNTER — RX RENEWAL (OUTPATIENT)
Age: 61
End: 2021-11-15

## 2021-11-15 ENCOUNTER — APPOINTMENT (OUTPATIENT)
Dept: MRI IMAGING | Facility: HOSPITAL | Age: 61
End: 2021-11-15
Payer: MEDICARE

## 2021-11-15 ENCOUNTER — RESULT REVIEW (OUTPATIENT)
Age: 61
End: 2021-11-15

## 2021-11-15 DIAGNOSIS — Z98.890 OTHER SPECIFIED POSTPROCEDURAL STATES: Chronic | ICD-10-CM

## 2021-11-15 DIAGNOSIS — Z90.49 ACQUIRED ABSENCE OF OTHER SPECIFIED PARTS OF DIGESTIVE TRACT: Chronic | ICD-10-CM

## 2021-11-15 DIAGNOSIS — Z41.9 ENCOUNTER FOR PROCEDURE FOR PURPOSES OTHER THAN REMEDYING HEALTH STATE, UNSPECIFIED: Chronic | ICD-10-CM

## 2021-11-15 PROCEDURE — 70553 MRI BRAIN STEM W/O & W/DYE: CPT | Mod: MH

## 2021-11-15 PROCEDURE — 70553 MRI BRAIN STEM W/O & W/DYE: CPT | Mod: 26,MH

## 2021-11-15 PROCEDURE — A9585: CPT

## 2021-11-22 ENCOUNTER — APPOINTMENT (OUTPATIENT)
Dept: NEUROSURGERY | Facility: CLINIC | Age: 61
End: 2021-11-22
Payer: MEDICARE

## 2021-11-22 VITALS
SYSTOLIC BLOOD PRESSURE: 93 MMHG | BODY MASS INDEX: 34.99 KG/M2 | WEIGHT: 210 LBS | HEIGHT: 65 IN | HEART RATE: 85 BPM | TEMPERATURE: 97.5 F | DIASTOLIC BLOOD PRESSURE: 66 MMHG | OXYGEN SATURATION: 98 %

## 2021-11-22 PROCEDURE — 99214 OFFICE O/P EST MOD 30 MIN: CPT

## 2021-11-22 NOTE — HISTORY OF PRESENT ILLNESS
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 She previously underwent left parietal craniotomy for tumor resection and adjuvant radiation approximately 3 years ago In November 2017 at another institution. She also had GKRS on 1/8/18 to a right parietal brain metastasis with Dr. Rico. MRI brain on admission demonstrated a recurrent 3 cm left parietal enhancing brain mass in the area of prior tumor resection. She underwent surgical resection of left parietal mass on 5/13/2020 with Dr. Rico. Pathology indicated radiation necrosis. \par \par Postoperatively, her right hand weakness improved. She does have right visual field cut. She was referred to Dr. Andrea Gutierrez for neuro opthalmology consult.\par \par In July 2020, Ms. Mendoza was hospitalized at [facility] due to sudden onset difficulty speaking as well as RIGHT hand weakness. She was admitted and given steroids and seizure medication. She was weaned off the dexamethasone over the next several weeks. She reported improvement in right hand weakness.\par \par She remains on keppra 750 mg bid.\par \par MRI brain with and without contrast, done on 2/3/21, was stable. \par \par On 5/7/21, she reported RIGHT hand weakness.  While in the office and during visit, she developed acute expressive aphasia and total RIGHT hemiparesis including RIGHT leg. Prior to this, she stated she had not taken keppra X 2 days. She was taken to the ED and had generalized tonic clonic seizure. She was discharged with 1000 mg keppra bid.\par \par She had 4 cycles of IV avastin and had repeat MRI brain done on 8/10/21, demonstrated decrease in left parietal enhancement and previously seen cortical foci of enhancement in right parietal lobe was not visualized.\par \par She returns today to review MRI brain with and without contrast from 11/15/21.\par Denies new neurological symptoms. Remains on keppra and oxcarbazepine for seizures. Denies seizures.\par \par \par \par \par \par \par \par \par \par

## 2021-11-22 NOTE — PHYSICAL EXAM
[General Appearance - Alert] : alert [General Appearance - In No Acute Distress] : in no acute distress [Oriented To Time, Place, And Person] : oriented to person, place, and time [Cranial Nerves Optic (II)] : visual acuity intact bilaterally,  pupils equal round and reactive to light [Cranial Nerves Oculomotor (III)] : extraocular motion intact [Cranial Nerves Trigeminal (V)] : facial sensation intact symmetrically [Cranial Nerves Facial (VII)] : face symmetrical [Cranial Nerves Vestibulocochlear (VIII)] : hearing was intact bilaterally [Cranial Nerves Glossopharyngeal (IX)] : tongue and palate midline [Cranial Nerves Accessory (XI - Cranial And Spinal)] : head turning and shoulder shrug symmetric [Cranial Nerves Hypoglossal (XII)] : there was no tongue deviation with protrusion [Motor Tone] : muscle tone was normal in all four extremities [Motor Strength] : muscle strength was normal in all four extremities [Sclera] : the sclera and conjunctiva were normal [Outer Ear] : the ears and nose were normal in appearance [Neck Appearance] : the appearance of the neck was normal [] : no respiratory distress [Abnormal Walk] : normal gait [Skin Color & Pigmentation] : normal skin color and pigmentation [FreeTextEntry6] : \par RLE 3/5

## 2021-11-22 NOTE — ASSESSMENT
[FreeTextEntry1] : MRI brain with and without contrast done on 11/15/21 reviewed by Dr. Rico with patient, stable and no new areas of enhancement.\par Recommend repeat MRI brain in 3 months (February 2022). After MRI complete, return to the office to review imaging with Dr. Rico.\par \par Patient verbalizes agreement and understanding with plan of care.\par \par I, Dr. Rico, personally performed the evaluation and management (E/M) services for this established patient who presents today with (a) new problem(s)/exacerbation of (an) existing condition(s).  That E/M includes conducting the examination, assessing all new/exacerbated conditions, and establishing a new plan of care.  Today, my ACP, Balbina Acuna, was here to observe my evaluation and management services for this new problem/exacerbated condition to be followed going forward.\par \par

## 2021-11-22 NOTE — DATA REVIEWED
[de-identified] : \par  MR Head w/wo IV Cont             Final\par \par No Documents Attached\par \par \par \par \par   EXAM:  MR BRAIN WAW IC\par \par PROCEDURE DATE:  11/15/2021\par \par \par \par INTERPRETATION:  PROCEDURE: MRI brain with and without contrast\par \par INDICATION: Brain metastases; status post GKRS\par \par TECHNIQUE: Sagittal T1, axial T1, T2, FLAIR, diffusion, GRE and coronal FLAIR images of the brain were obtained. Following the intravenous administration of contrast 7.5 ml of Gadavist, axial T1 and sagittal T1- MPRAGE images of the brain were obtained and reconstructed in the axial and coronal plane.\par \par COMPARISON: MRI's brain 8/10/2021, 6/3/2021, 4/26/2021\par \par FINDINGS: The MRI examination of the brain demonstrates the patient to be status post left parietal craniotomy. There is a resection cavity within the left parietal lobe. There are linear areas of hyperintense signal along the superior margin of the resection cavity which may represent hemorrhagic products. There remains irregular enhancement along the margins of the resection cavity which abuts the ependyma of the dilated left lateral ventricle. The irregular focus of enhancement measures approximately 1.5 cm with x 2.0 cm AP x 2.8 cm height (series 702 image 21 and series 701 image 70). This appears similar to the recent prior MRI. There remains surrounding masslike FLAIR/T2 signal abnormality extending into the paramedian left parietal subcortical white matter. The signal abnormality also extends into the splenium of the corpus callosum and within the contralateral right parietal periventricular white matter. These findings also remain stable\par \par The ventricles are otherwise stable in size. There is no midline shift or extra-axial collection. There is a partially empty sella. The FLAIR images also a few scattered punctate foci of increased signal within the periventricular and subcortical white matter as well as within the dionicio which may represent the sequela of small vessel ischemic disease in this patient. The diffusion-weighted images demonstrate no acute ischemia. There is normal vascular flow-voids.\par \par There is mucosal thickening within scattered ethmoid air cells bilaterally. The rest of the visualized paranasal sinuses are free of mucosal disease. The mastoid air cells are well-aerated.\par \par IMPRESSION: Patient status post left parietal craniotomy with stable irregular enhancement along the margins of the resection cavity. No new areas of enhancement.\par \par --- End of Report ---\par \par \par \par \par Thank you for the opportunity to participate in the care of this patient.\par \par \par ALEXUS PINEDA MD; Attending Radiologist\par This document has been electronically signed. Nov 19 2021 11:27AM\par \par  \par \par  Ordered by: MAGUI FLAHERTY       Collected/Examined: 15Nov2021 11:35AM       \par Verification Required       Stage: Final       \par  Performed at: [facility]       Resulted: 19Nov2021 11:10AM       Last Updated: 19Nov2021 11:30AM       Accession: [id number]

## 2022-01-05 ENCOUNTER — APPOINTMENT (OUTPATIENT)
Dept: NEUROLOGY | Facility: CLINIC | Age: 62
End: 2022-01-05
Payer: MEDICARE

## 2022-01-05 PROCEDURE — 99213 OFFICE O/P EST LOW 20 MIN: CPT | Mod: 95

## 2022-01-17 ENCOUNTER — EMERGENCY (EMERGENCY)
Facility: HOSPITAL | Age: 62
LOS: 1 days | Discharge: HOME | End: 2022-01-17
Attending: EMERGENCY MEDICINE | Admitting: EMERGENCY MEDICINE
Payer: MEDICARE

## 2022-01-17 VITALS
HEIGHT: 65 IN | DIASTOLIC BLOOD PRESSURE: 75 MMHG | WEIGHT: 214.95 LBS | HEART RATE: 85 BPM | RESPIRATION RATE: 22 BRPM | SYSTOLIC BLOOD PRESSURE: 109 MMHG | OXYGEN SATURATION: 96 % | TEMPERATURE: 98 F

## 2022-01-17 VITALS
HEART RATE: 81 BPM | DIASTOLIC BLOOD PRESSURE: 75 MMHG | SYSTOLIC BLOOD PRESSURE: 113 MMHG | RESPIRATION RATE: 18 BRPM | TEMPERATURE: 97 F | OXYGEN SATURATION: 96 %

## 2022-01-17 DIAGNOSIS — J44.9 CHRONIC OBSTRUCTIVE PULMONARY DISEASE, UNSPECIFIED: ICD-10-CM

## 2022-01-17 DIAGNOSIS — Z88.0 ALLERGY STATUS TO PENICILLIN: ICD-10-CM

## 2022-01-17 DIAGNOSIS — R05.9 COUGH, UNSPECIFIED: ICD-10-CM

## 2022-01-17 DIAGNOSIS — Z90.2 ACQUIRED ABSENCE OF LUNG [PART OF]: ICD-10-CM

## 2022-01-17 DIAGNOSIS — B97.89 OTHER VIRAL AGENTS AS THE CAUSE OF DISEASES CLASSIFIED ELSEWHERE: ICD-10-CM

## 2022-01-17 DIAGNOSIS — J06.9 ACUTE UPPER RESPIRATORY INFECTION, UNSPECIFIED: ICD-10-CM

## 2022-01-17 DIAGNOSIS — Z98.890 OTHER SPECIFIED POSTPROCEDURAL STATES: Chronic | ICD-10-CM

## 2022-01-17 DIAGNOSIS — I45.10 UNSPECIFIED RIGHT BUNDLE-BRANCH BLOCK: ICD-10-CM

## 2022-01-17 DIAGNOSIS — Z41.9 ENCOUNTER FOR PROCEDURE FOR PURPOSES OTHER THAN REMEDYING HEALTH STATE, UNSPECIFIED: Chronic | ICD-10-CM

## 2022-01-17 DIAGNOSIS — Z90.49 ACQUIRED ABSENCE OF OTHER SPECIFIED PARTS OF DIGESTIVE TRACT: Chronic | ICD-10-CM

## 2022-01-17 DIAGNOSIS — Z85.841 PERSONAL HISTORY OF MALIGNANT NEOPLASM OF BRAIN: ICD-10-CM

## 2022-01-17 DIAGNOSIS — Z20.822 CONTACT WITH AND (SUSPECTED) EXPOSURE TO COVID-19: ICD-10-CM

## 2022-01-17 DIAGNOSIS — Z85.118 PERSONAL HISTORY OF OTHER MALIGNANT NEOPLASM OF BRONCHUS AND LUNG: ICD-10-CM

## 2022-01-17 LAB
ALBUMIN SERPL ELPH-MCNC: 4.4 G/DL — SIGNIFICANT CHANGE UP (ref 3.3–5)
ALP SERPL-CCNC: 97 U/L — SIGNIFICANT CHANGE UP (ref 40–120)
ALT FLD-CCNC: 18 U/L — SIGNIFICANT CHANGE UP (ref 10–45)
ANION GAP SERPL CALC-SCNC: 12 MMOL/L — SIGNIFICANT CHANGE UP (ref 5–17)
AST SERPL-CCNC: 20 U/L — SIGNIFICANT CHANGE UP (ref 10–40)
BILIRUB SERPL-MCNC: 0.3 MG/DL — SIGNIFICANT CHANGE UP (ref 0.2–1.2)
BUN SERPL-MCNC: 10 MG/DL — SIGNIFICANT CHANGE UP (ref 7–23)
CALCIUM SERPL-MCNC: 9.6 MG/DL — SIGNIFICANT CHANGE UP (ref 8.4–10.5)
CHLORIDE SERPL-SCNC: 91 MMOL/L — LOW (ref 96–108)
CK MB CFR SERPL CALC: 2.5 NG/ML — SIGNIFICANT CHANGE UP (ref 0–6.7)
CK SERPL-CCNC: 249 U/L — HIGH (ref 25–170)
CO2 SERPL-SCNC: 25 MMOL/L — SIGNIFICANT CHANGE UP (ref 22–31)
CREAT SERPL-MCNC: 0.61 MG/DL — SIGNIFICANT CHANGE UP (ref 0.5–1.3)
GLUCOSE SERPL-MCNC: 104 MG/DL — HIGH (ref 70–99)
HCT VFR BLD CALC: 40.5 % — SIGNIFICANT CHANGE UP (ref 34.5–45)
HGB BLD-MCNC: 14.1 G/DL — SIGNIFICANT CHANGE UP (ref 11.5–15.5)
MCHC RBC-ENTMCNC: 29.8 PG — SIGNIFICANT CHANGE UP (ref 27–34)
MCHC RBC-ENTMCNC: 34.8 GM/DL — SIGNIFICANT CHANGE UP (ref 32–36)
MCV RBC AUTO: 85.6 FL — SIGNIFICANT CHANGE UP (ref 80–100)
NRBC # BLD: 0 /100 WBCS — SIGNIFICANT CHANGE UP (ref 0–0)
PLATELET # BLD AUTO: 307 K/UL — SIGNIFICANT CHANGE UP (ref 150–400)
POTASSIUM SERPL-MCNC: 4.2 MMOL/L — SIGNIFICANT CHANGE UP (ref 3.5–5.3)
POTASSIUM SERPL-SCNC: 4.2 MMOL/L — SIGNIFICANT CHANGE UP (ref 3.5–5.3)
PROT SERPL-MCNC: 7.8 G/DL — SIGNIFICANT CHANGE UP (ref 6–8.3)
RBC # BLD: 4.73 M/UL — SIGNIFICANT CHANGE UP (ref 3.8–5.2)
RBC # FLD: 13 % — SIGNIFICANT CHANGE UP (ref 10.3–14.5)
SARS-COV-2 RNA SPEC QL NAA+PROBE: SIGNIFICANT CHANGE UP
SODIUM SERPL-SCNC: 128 MMOL/L — LOW (ref 135–145)
TROPONIN T SERPL-MCNC: <0.01 NG/ML — SIGNIFICANT CHANGE UP (ref 0–0.01)
WBC # BLD: 6.84 K/UL — SIGNIFICANT CHANGE UP (ref 3.8–10.5)
WBC # FLD AUTO: 6.84 K/UL — SIGNIFICANT CHANGE UP (ref 3.8–10.5)

## 2022-01-17 PROCEDURE — 82550 ASSAY OF CK (CPK): CPT

## 2022-01-17 PROCEDURE — 94640 AIRWAY INHALATION TREATMENT: CPT

## 2022-01-17 PROCEDURE — 36415 COLL VENOUS BLD VENIPUNCTURE: CPT

## 2022-01-17 PROCEDURE — U0003: CPT

## 2022-01-17 PROCEDURE — 71045 X-RAY EXAM CHEST 1 VIEW: CPT | Mod: 26

## 2022-01-17 PROCEDURE — 82553 CREATINE MB FRACTION: CPT

## 2022-01-17 PROCEDURE — 85027 COMPLETE CBC AUTOMATED: CPT

## 2022-01-17 PROCEDURE — 84484 ASSAY OF TROPONIN QUANT: CPT

## 2022-01-17 PROCEDURE — 99284 EMERGENCY DEPT VISIT MOD MDM: CPT | Mod: 25

## 2022-01-17 PROCEDURE — 71045 X-RAY EXAM CHEST 1 VIEW: CPT

## 2022-01-17 PROCEDURE — 99285 EMERGENCY DEPT VISIT HI MDM: CPT | Mod: FS,CS

## 2022-01-17 PROCEDURE — 80053 COMPREHEN METABOLIC PANEL: CPT

## 2022-01-17 PROCEDURE — 93010 ELECTROCARDIOGRAM REPORT: CPT

## 2022-01-17 PROCEDURE — 93005 ELECTROCARDIOGRAM TRACING: CPT

## 2022-01-17 PROCEDURE — U0005: CPT

## 2022-01-17 RX ORDER — IPRATROPIUM/ALBUTEROL SULFATE 18-103MCG
3 AEROSOL WITH ADAPTER (GRAM) INHALATION ONCE
Refills: 0 | Status: COMPLETED | OUTPATIENT
Start: 2022-01-17 | End: 2022-01-17

## 2022-01-17 RX ORDER — FLUTICASONE PROPIONATE 50 MCG
2 SPRAY, SUSPENSION NASAL ONCE
Refills: 0 | Status: COMPLETED | OUTPATIENT
Start: 2022-01-17 | End: 2022-01-17

## 2022-01-17 RX ORDER — IBUPROFEN 200 MG
600 TABLET ORAL ONCE
Refills: 0 | Status: COMPLETED | OUTPATIENT
Start: 2022-01-17 | End: 2022-01-17

## 2022-01-17 RX ORDER — AZITHROMYCIN 500 MG/1
1 TABLET, FILM COATED ORAL
Qty: 6 | Refills: 0
Start: 2022-01-17 | End: 2022-01-21

## 2022-01-17 RX ORDER — BROMPHENIRAMINE MALEATE, PSEUDOEPHEDRINE HYDROCHLORIDE, AND DEXTROMETHORPHAN HYDROBROMIDE 2; 10; 30 MG/5ML; MG/5ML; MG/5ML
5 SOLUTION ORAL
Qty: 210 | Refills: 0
Start: 2022-01-17 | End: 2022-01-23

## 2022-01-17 RX ADMIN — Medication 20 MILLIGRAM(S): at 12:50

## 2022-01-17 RX ADMIN — Medication 3 MILLILITER(S): at 11:17

## 2022-01-17 RX ADMIN — Medication 2 SPRAY(S): at 11:35

## 2022-01-17 RX ADMIN — Medication 3 MILLILITER(S): at 11:12

## 2022-01-17 RX ADMIN — Medication 600 MILLIGRAM(S): at 11:12

## 2022-01-17 RX ADMIN — Medication 3 MILLILITER(S): at 12:59

## 2022-01-17 NOTE — ED ADULT NURSE NOTE - NSIMPLEMENTINTERV_GEN_ALL_ED
Implemented All Universal Safety Interventions:  [place] to call system. Call bell, personal items and telephone within reach. Instruct patient to call for assistance. Room bathroom lighting operational. Non-slip footwear when patient is off stretcher. Physically safe environment: no spills, clutter or unnecessary equipment. Stretcher in lowest position, wheels locked, appropriate side rails in place.

## 2022-01-17 NOTE — ED PROVIDER NOTE - CARE PROVIDER_API CALL
Vignesh Wallace)  Critical Care Medicine; Pulmonary Disease  [address] [place] [address], [address]  [place] [address]  Phone: [phone]  Fax: [phone]  Follow Up Time:

## 2022-01-17 NOTE — ED ADULT NURSE NOTE - NSICDXPASTMEDICALHX_GEN_ALL_CORE_FT
PAST MEDICAL HISTORY:  Adenocarcinoma of lung     Brain metastasis brain tumor s/p resection    COPD (chronic obstructive pulmonary disease)     MRSA (methicillin resistant Staphylococcus aureus) history of

## 2022-01-17 NOTE — ED PROVIDER NOTE - PATIENT PORTAL LINK FT
You can access the FollowMyHealth Patient Portal offered by [facility] by registering at the following website: [email or web address]. By joining [facility]’s FollowMyHealth portal, you will also be able to view your health information using other applications (apps) compatible with our system.

## 2022-01-17 NOTE — ED ADULT NURSE NOTE - NS ED NURSE RECORD ANOTHER HT AND WT
Chief Complaint   Patient presents with   • Follow-up     medication check       Medications: medications verified, no change  Refills needed today?  NO  Denies known Latex allergy or symptoms of Latex sensitivity.  Patient would like communication of their results via:    Cell Phone:   Telephone Information:   Mobile [phone]     Okay to leave a message containing results? Yes  Tobacco history: verified    Health Maintenance Summary     Topic Due On Due Status Completed On    IMMUNIZATION - DTaP/Tdap/Td Sep 28, 2027 Not Due Sep 28, 2017    Immunization-Influenza  Completed Sep 28, 2017    Depression Screening Feb 9, 2019 Not Due Feb 9, 2018          Patient is up to date, no discussion needed .              [facility] status addressed. Patient Active.             Yes

## 2022-01-17 NOTE — ED PROVIDER NOTE - HEME/LYMPH NEGATIVE STATEMENT, MLM
Yes - the patient is able to be screened no anemia, no easy bruising, no jaundice, no swollen lymph nodes.

## 2022-01-17 NOTE — ED ADULT TRIAGE NOTE - CHIEF COMPLAINT QUOTE
Patient c/o cough x 5 days, reports coughing up blood first noted today.  Hx brain CA and lung CA, recommended to the ED by her doctor.

## 2022-01-17 NOTE — ED PROVIDER NOTE - NS ED ROS FT
General: no fever, chills, confusion  Cardiac: no chest pain, chest tightness, palpitations  Lungs: reports coughing, no sob, difficulty breathing  Abdomen: no abdominal pain, nausea, vomiting, diarrhea, constipation, nml BM  : no dysuria, urinary frequency/urgency    All other systems negative except as per HPI

## 2022-01-17 NOTE — ED PROVIDER NOTE - NSFOLLOWUPINSTRUCTIONS_ED_ALL_ED_FT
Please take medication as directed and follow up with Dr. Wallace. YOU WILL BE CONTACTED REGARDING YOUR COVID RESULTS. Return to the Emergency Department if you have any new or worsening symptoms, or if you have any concerns.    Upper Respiratory Infection, Adult      An upper respiratory infection (URI) is a common viral infection of the nose, throat, and upper air passages that lead to the lungs. The most common type of URI is the common cold. URIs usually get better on their own, without medical treatment.      What are the causes?    A URI is caused by a virus. You may catch a virus by:  •Breathing in droplets from an infected person's cough or sneeze.      •Touching something that has been exposed to the virus (contaminated) and then touching your mouth, nose, or eyes.        What increases the risk?    You are more likely to get a URI if:  •You are very young or very old.      •It is bryanna or winter.      •You have close contact with others, such as at a , school, or health care facility.      •You smoke.      •You have long-term (chronic) heart or lung disease.      •You have a weakened disease-fighting (immune) system.      •You have nasal allergies or asthma.      •You are experiencing a lot of stress.      •You work in an area that has poor air circulation.      •You have poor nutrition.        What are the signs or symptoms?    A URI usually involves some of the following symptoms:  •Runny or stuffy (congested) nose.      •Sneezing.      •Cough.      •Sore throat.      •Headache.      •Fatigue.      •Fever.      •Loss of appetite.      •Pain in your forehead, behind your eyes, and over your cheekbones (sinus pain).      •Muscle aches.      •Redness or irritation of the eyes.      •Pressure in the ears or face.        How is this diagnosed?    This condition may be diagnosed based on your medical history and symptoms, and a physical exam. Your health care provider may use a cotton swab to take a mucus sample from your nose (nasal swab). This sample can be tested to determine what virus is causing the illness.      How is this treated?    URIs usually get better on their own within 7–10 days. You can take steps at home to relieve your symptoms. Medicines cannot cure URIs, but your health care provider may recommend certain medicines to help relieve symptoms, such as:  •Over-the-counter cold medicines.      •Cough suppressants. Coughing is a type of defense against infection that helps to clear the respiratory system, so take these medicines only as recommended by your health care provider.      •Fever-reducing medicines.        Follow these instructions at home:    Activity     •Rest as needed.      •If you have a fever, stay home from work or school until your fever is gone or until your health care provider says you are no longer contagious. Your health care provider may have you wear a face mask to prevent your infection from spreading.      Relieving symptoms     •Gargle with a salt-water mixture 3–4 times a day or as needed. To make a salt-water mixture, completely dissolve ½–1 tsp of salt in 1 cup of warm water.      •Use a cool-mist humidifier to add moisture to the air. This can help you breathe more easily.        Eating and drinking      •Drink enough fluid to keep your urine pale yellow.      •Eat soups and other clear broths.        General instructions      •Take over-the-counter and prescription medicines only as told by your health care provider. These include cold medicines, fever reducers, and cough suppressants.      • Do not use any products that contain nicotine or tobacco, such as cigarettes and e-cigarettes. If you need help quitting, ask your health care provider.       •Stay away from secondhand smoke.      •Stay up to date on all immunizations, including the yearly (annual) flu vaccine.      •Keep all follow-up visits as told by your health care provider. This is important.        How to prevent the spread of infection to others    •URIs can be passed from person to person (are contagious). To prevent the infection from spreading:  •Wash your hands often with soap and water. If soap and water are not available, use hand .      •Avoid touching your mouth, face, eyes, or nose.      •Cough or sneeze into a tissue or your sleeve or elbow instead of into your hand or into the air.          Contact a health care provider if:    •You are getting worse instead of better.      •You have a fever or chills.      •Your mucus is brown or red.      •You have yellow or brown discharge coming from your nose.      •You have pain in your face, especially when you bend forward.      •You have swollen neck glands.      •You have pain while swallowing.      •You have white areas in the back of your throat.        Get help right away if:    •You have shortness of breath that gets worse.    •You have severe or persistent:  •Headache.      •Ear pain.      •Sinus pain.      •Chest pain.      •You have chronic lung disease along with any of the following:  •Wheezing.      •Prolonged cough.      •Coughing up blood.      •A change in your usual mucus.        •You have a stiff neck.    •You have changes in your:  •Vision.      •Hearing.      •Thinking.      •Mood.          Summary    •An upper respiratory infection (URI) is a common infection of the nose, throat, and upper air passages that lead to the lungs.      •A URI is caused by a virus.      •URIs usually get better on their own within 7–10 days.      •Medicines cannot cure URIs, but your health care provider may recommend certain medicines to help relieve symptoms.      This information is not intended to replace advice given to you by your health care provider. Make sure you discuss any questions you have with your health care provider.      Document Revised: 08/26/2021 Document Reviewed: 08/26/2021    [employer] Patient Education © 2021 [employer]

## 2022-01-17 NOTE — ED PROVIDER NOTE - CLINICAL SUMMARY MEDICAL DECISION MAKING FREE TEXT BOX
60 y/o female with a PMHx of Adenocarcinoma of lung (hx of resection) with Brain metastasis (brain tumor s/p resection), COPD is here in the ED c/o persistent productive cough since last Wednesday (x6 days) associated with b/l upper chest discomfort when coughing. 60 y/o female with a PMHx of Adenocarcinoma of lung (hx of resection) with Brain metastasis (brain tumor s/p resection), COPD is here in the ED c/o persistent productive cough since last Wednesday (x6 days) associated with b/l upper chest discomfort when coughing. Patient likely has a viral Upper respiratory infection; consider viral/covid. No evidence of pneumonia, sepsis or meningitis; CXR negative. VS and labs wnml. The patient is non toxic appearing. Supportive care including increased fluids and over the counter therapy was advised and discussed. Discussed with Dr. Wallace, plan for steroid dose pack and zpack and if covid +, plan for outpatient referral for MAD. I have discussed the discharge plan with the patient. The patient agrees with the plan, as discussed.  The patient understands Emergency Department diagnosis is a preliminary diagnosis often based on limited information and that the patient must adhere to the follow-up plan as discussed.  The patient understands that if the symptoms worsen or if prescribed medications do not have the desired/planned effect that the patient may return to the Emergency Department at any time for further evaluation and treatment.

## 2022-01-17 NOTE — ED PROVIDER NOTE - PHYSICAL EXAMINATION
CONSTITUTIONAL: Well-developed; well-nourished; in no acute distress.  SKIN: Warm and dry, no acute rash.  HEAD: Normocephalic; atraumatic.  EYES: PERRL, EOM intact; conjunctiva and sclera clear.  ENT: No nasal discharge; airway clear.  NECK: Supple; non tender.  CARD: S1, S2 normal; no murmurs, gallops, or rubs. Regular rate and rhythm.  RESP: Diffused wheezing b/l without rales or rhonchi.  ABD: Normal bowel sounds; soft; non-distended; non-tender; no hepatosplenomegaly.  EXT: Normal ROM. No clubbing, cyanosis or edema.  LYMPH: No acute cervical adenopathy.  NEURO: Alert, oriented. Grossly unremarkable.  PSYCH: Cooperative, appropriate.

## 2022-01-17 NOTE — ED ADULT NURSE NOTE - OBJECTIVE STATEMENT
Pt AOX4. Pt c/o cough since wednesday and coughing up bright red blood this morning 1 episode. Pt reports hx of lung CA, last chemo was 4 years ago. Pt denies fevers, chills, n/v, SOB, weakness, numbness, tingling. Pt endorses mid sternal chest pain that started this morning. Pt coughed up white sputum during nursing assessment.  Pt speaking in full complete sentences. Respirations even and unlabored.

## 2022-01-17 NOTE — ED PROVIDER NOTE - ATTENDING CONTRIBUTION TO CARE
62 y/o female with a PMHx of Adenocarcinoma of lung (hx of resection) with Brain metastasis (brain tumor s/p resection), COPD is here in the ED c/o persistent cough since last Wednesday (x6 days). She describes a productive cough in which she has noticed some pink sputum a few days ago, however now clear in color. Associated with b/l upper chest discomfort only when she coughs. Despite otc management, her coughing has persisted which prompted her visit to the ER. She denies the following: sick contacts, travel, fever, chills, sob, dizziness, back pain recent illness, LE swelling, abdominal/pelvic pain, rash, urinary symptoms. Pt reports current covid vaccination status with booster. 60 yo female h/o lung ca w brain mets s/p resection, chemo, copd c/o 6 d cough occ w blood streaks but most recently clear, malaise, congestion, anorexia, pleuritic R rib pain w coughing only, w/o associated sob.  No relief w otc meds (mucinex) and home inhalers. Well appearing, nad, + nasal congestion nc/at, lung bilat exp wheezing, heart reg, abd soft, nt, ext no gross deformity, no gross neuro deficits  Pt c/o cough/uri sx - suspect viral uri, low concern for concern for lung ca recurrence, pe.  Plan labs, cxr, nebs/steroids, discuss w her pulm.  Likely dc home.

## 2022-01-17 NOTE — ED PROVIDER NOTE - OBJECTIVE STATEMENT
60 y/o female with a PMHx of Adenocarcinoma of lung (hx of resection) with Brain metastasis (brain tumor s/p resection), COPD is here in the ED c/o persistent cough since last Wednesday (x6 days). She describes a productive cough in which she has noticed some pink sputum a few days ago, however now clear in color. Associated with b/l upper chest discomfort only when she coughs. Despite otc management, her coughing has persisted which prompted her visit to the ER. She denies the following: sick contacts, travel, fever, chills, sob, dizziness, back pain recent illness, LE swelling, abdominal/pelvic pain, rash, urinary symptoms. Pt reports current covid vaccination status with booster.

## 2022-02-07 NOTE — PROGRESS NOTE ADULT - REASON FOR ADMISSION
Addended by: LATRELL GUTIERREZ on: 2/7/2022 03:01 PM     Modules accepted: Orders    
seizure management

## 2022-02-14 ENCOUNTER — APPOINTMENT (OUTPATIENT)
Dept: NEUROSURGERY | Facility: CLINIC | Age: 62
End: 2022-02-14

## 2022-02-22 ENCOUNTER — RESULT REVIEW (OUTPATIENT)
Age: 62
End: 2022-02-22

## 2022-02-22 ENCOUNTER — APPOINTMENT (OUTPATIENT)
Dept: MRI IMAGING | Facility: HOSPITAL | Age: 62
End: 2022-02-22

## 2022-02-22 ENCOUNTER — OUTPATIENT (OUTPATIENT)
Dept: OUTPATIENT SERVICES | Facility: HOSPITAL | Age: 62
LOS: 1 days | End: 2022-02-22
Payer: MEDICARE

## 2022-02-22 DIAGNOSIS — Z41.9 ENCOUNTER FOR PROCEDURE FOR PURPOSES OTHER THAN REMEDYING HEALTH STATE, UNSPECIFIED: Chronic | ICD-10-CM

## 2022-02-22 DIAGNOSIS — Z98.890 OTHER SPECIFIED POSTPROCEDURAL STATES: Chronic | ICD-10-CM

## 2022-02-22 DIAGNOSIS — Z90.49 ACQUIRED ABSENCE OF OTHER SPECIFIED PARTS OF DIGESTIVE TRACT: Chronic | ICD-10-CM

## 2022-02-22 PROCEDURE — G1004: CPT

## 2022-02-22 PROCEDURE — 70553 MRI BRAIN STEM W/O & W/DYE: CPT | Mod: 26,MG

## 2022-02-22 PROCEDURE — 70553 MRI BRAIN STEM W/O & W/DYE: CPT | Mod: MG

## 2022-02-22 PROCEDURE — A9585: CPT

## 2022-02-25 ENCOUNTER — NON-APPOINTMENT (OUTPATIENT)
Age: 62
End: 2022-02-25

## 2022-02-25 ENCOUNTER — APPOINTMENT (OUTPATIENT)
Dept: NEUROSURGERY | Facility: CLINIC | Age: 62
End: 2022-02-25
Payer: MEDICARE

## 2022-02-25 VITALS
DIASTOLIC BLOOD PRESSURE: 79 MMHG | RESPIRATION RATE: 16 BRPM | OXYGEN SATURATION: 97 % | SYSTOLIC BLOOD PRESSURE: 119 MMHG | HEIGHT: 65 IN | BODY MASS INDEX: 34.99 KG/M2 | HEART RATE: 89 BPM | TEMPERATURE: 96 F | WEIGHT: 210 LBS

## 2022-02-25 DIAGNOSIS — C34.90 MALIGNANT NEOPLASM OF UNSPECIFIED PART OF UNSPECIFIED BRONCHUS OR LUNG: ICD-10-CM

## 2022-02-25 DIAGNOSIS — C79.31 MALIGNANT NEOPLASM OF UNSPECIFIED PART OF UNSPECIFIED BRONCHUS OR LUNG: ICD-10-CM

## 2022-02-25 PROCEDURE — 99214 OFFICE O/P EST MOD 30 MIN: CPT

## 2022-02-25 NOTE — PHYSICAL EXAM
[General Appearance - Alert] : alert [General Appearance - In No Acute Distress] : in no acute distress [Oriented To Time, Place, And Person] : oriented to person, place, and time [Impaired Insight] : insight and judgment were intact [Cranial Nerves Optic (II)] : visual acuity intact bilaterally,  pupils equal round and reactive to light [Cranial Nerves Oculomotor (III)] : extraocular motion intact [Cranial Nerves Trigeminal (V)] : facial sensation intact symmetrically [Cranial Nerves Facial (VII)] : face symmetrical [Cranial Nerves Vestibulocochlear (VIII)] : hearing was intact bilaterally [Cranial Nerves Glossopharyngeal (IX)] : tongue and palate midline [Cranial Nerves Accessory (XI - Cranial And Spinal)] : head turning and shoulder shrug symmetric [Cranial Nerves Hypoglossal (XII)] : there was no tongue deviation with protrusion [Motor Tone] : muscle tone was normal in all four extremities [Motor Strength] : muscle strength was normal in all four extremities [Neck Appearance] : the appearance of the neck was normal [] : no respiratory distress [Respiration, Rhythm And Depth] : normal respiratory rhythm and effort [Abnormal Walk] : normal gait

## 2022-02-26 NOTE — HISTORY OF PRESENT ILLNESS
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 She previously underwent left parietal craniotomy for tumor resection and adjuvant radiation approximately 3 years ago In November 2017 at another institution. She also had GKRS on 1/8/18 to a right parietal brain metastasis with Dr. Rico. MRI brain on admission demonstrated a recurrent 3 cm left parietal enhancing brain mass in the area of prior tumor resection. She underwent surgical resection of left parietal mass on 5/13/2020 with Dr. Rico. Pathology indicated radiation necrosis. \par \par Postoperatively, her right hand weakness improved. She does have right visual field cut. She was referred to Dr. Andrea Gutierrez for neuro opthalmology consult.\par \par In July 2020, Ms. Mendoza was hospitalized at [facility] due to sudden onset difficulty speaking as well as RIGHT hand weakness. She was admitted and given steroids and seizure medication. She was weaned off the dexamethasone over the next several weeks. She reported improvement in right hand weakness.\par \par She remains on keppra 750 mg bid.\par \par MRI brain with and without contrast, done on 2/3/21, was stable. \par \par On 5/7/21, she reported RIGHT hand weakness. While in the office and during visit, she developed acute expressive aphasia and total RIGHT hemiparesis including RIGHT leg. Prior to this, she stated she had not taken keppra X 2 days. She was taken to the ED and had generalized tonic clonic seizure. She was discharged with 1000 mg keppra bid.\par \par She had 4 cycles of IV avastin and had repeat MRI brain done on 8/10/21, demonstrated decrease in left parietal enhancement and previously seen cortical foci of enhancement in right parietal lobe was not visualized.\par \par MRI brain with and without contrast from 11/15/21 was stable with no new areas of enhancement.\par Denied new neurological symptoms. Remains on keppra and oxcarbazepine for seizures. Denies seizures.\par \par Today, patient presents to review 3 month f/u MRI brain. She is doing well. Denies new neurological symptoms. Denies headaches, seizures, focal weakness, vision change. She remains on Keppra 750 mg BID and Oxcarbazepine.

## 2022-02-26 NOTE — ASSESSMENT
[FreeTextEntry1] : MRI brain w/wo contrast from 2/22/2022 reviewed by Dr. Rico with the patient that is stable when compared to the previous imaging.\par \par Repeat MRI brain w/wo contrast in 3 months (May 2022) and RTO to review it.\par \par \par I, Dr. Rico, personally performed the evaluation and management (E/M) services for this established patient who presents today with (a) new problem(s)/exacerbation of (an) existing condition(s). That E/M includes conducting the examination, assessing all new/exacerbated conditions, and establishing a new plan of care. Today, my ACP, Lydia Fuentes, was here to observe my evaluation and management services for this new problem/exacerbated condition to be followed going forward.\par \par \par

## 2022-02-26 NOTE — DATA REVIEWED
Last OV 6/27/18  Last refilled 10/30/17  #30  11 refills per Dr Lopez Bars    Mail order requesting refill, last refilled by Dr Arnold Cruz 9/18/17
[de-identified] : [facility]\par    [facility] Department of Radiology\par   Radiology Report\par \par \par Patient Name: BURGESS GAY   Report Date: 24-Feb-2022 15:09.00 \par Patient ID: [id number] ([id number]), [id number] (EPI)  Accession No.: [id number] \par Patient Birth Date: 1960  Report Status: F \par Referring Physician: [id number] REYNOLD ROLLINS   Reason For Study: S/P GKRS FOR BRAIN METS  \par \par \par \par \par ACC: [id number] EXAM: MR BRAIN WAW IC\par \par PROCEDURE DATE: 02/22/2022\par \par \par \par INTERPRETATION: PROCEDURE: MRI Brain with and without contrast\par \par INDICATION: Status post GKRS for brain metastasis, follow-up\par \par TECHNIQUE: Sagittal and axial T1, axial and coronal T2 FLAIR, axial T2, gradient echo and diffusion imaging of the brain is obtained. Following the intravenous administration of 8 cc Gadavist contrast material, axial T1 spin-echo imaging is obtained followed by sagittal T1 volumetric imaging with MPR provided.\par \par COMPARISON: 11/15/2021, 08/10/2021\par \par FINDINGS:\par \par There is no interval change in amorphous cortical and subcortical enhancement in the left parietal and occipital lobes at site of treated metastasis. There is no development of any rounded or nodular enhancement to imply recurrence of tumor, with T2-FLAIR imaging showing encephalomalacia and gliosis with ex vacuo dilatation of the left atrium. No hydrocephalus. No additional site of pathologic intracranial enhancement is identified. There is unchanged slight dural thickening and enhancement deep to prior craniotomy site at the left parietal region without extra-axial mass. Diffusion-weighted imaging is currently negative for recent infarct, and gradient echo imaging shows a stable amount of signal void at the treated tumor site mentioned above. T2-FLAIR images are unchanged with periventricular white matter signal abnormality most likely sequela of microangiopathic change. There are preserved large vessel flow voids. No gross abnormality is noted of the skull base or orbits. Paranasal sinuses and mastoids are free of acute disease.\par \par \par IMPRESSION:\par \par Stable exam, with amorphous enhancement at treated left posterior cerebrum favored to be radiation necrosis or other posttreatment residua. No development of nodular enhancement to indicate recurrence. No new metastatic focus.\par \par --- End of Report ---\par \par \par \par \par \par SCOTTIE CHÁVEZ MD; Attending Radiologist\par This document has been electronically signed. Feb 24 2022 3:09PM \par

## 2022-02-26 NOTE — ADDENDUM
[FreeTextEntry1] : Patient doing well. Continues on keppra. MRI brain reviewed and stable. Plan for followup MRI brain in 3 months.\par \par Kapil Rico M.D.

## 2022-02-26 NOTE — REASON FOR VISIT
[Follow-Up: _____] : a [unfilled] follow-up visit [FreeTextEntry1] : to review 3 mo MRI brain for brain mets, s/p LEFT parietal craniotomy for resection of brain mass on 5/13/2020. \par  \par

## 2022-03-16 NOTE — ED ADULT NURSE NOTE - EXTENSIONS OF SELF_ADULT
None Initiate Treatment: Tretinoin 0.025% cream qhs to face and back, starting every other night Detail Level: Zone Plan: Briefly discussed Accutane as an option in the future due to pt’s genetics Otc Regimen: Salicylic acid wash to face and back

## 2022-04-25 ENCOUNTER — RX RENEWAL (OUTPATIENT)
Age: 62
End: 2022-04-25

## 2022-04-28 ENCOUNTER — RESULT REVIEW (OUTPATIENT)
Age: 62
End: 2022-04-28

## 2022-04-28 ENCOUNTER — APPOINTMENT (OUTPATIENT)
Dept: MRI IMAGING | Facility: HOSPITAL | Age: 62
End: 2022-04-28

## 2022-04-28 ENCOUNTER — OUTPATIENT (OUTPATIENT)
Dept: OUTPATIENT SERVICES | Facility: HOSPITAL | Age: 62
LOS: 1 days | End: 2022-04-28
Payer: MEDICARE

## 2022-04-28 DIAGNOSIS — Z98.890 OTHER SPECIFIED POSTPROCEDURAL STATES: Chronic | ICD-10-CM

## 2022-04-28 DIAGNOSIS — Z90.49 ACQUIRED ABSENCE OF OTHER SPECIFIED PARTS OF DIGESTIVE TRACT: Chronic | ICD-10-CM

## 2022-04-28 DIAGNOSIS — Z41.9 ENCOUNTER FOR PROCEDURE FOR PURPOSES OTHER THAN REMEDYING HEALTH STATE, UNSPECIFIED: Chronic | ICD-10-CM

## 2022-04-28 PROCEDURE — G1004: CPT

## 2022-04-28 PROCEDURE — A9585: CPT

## 2022-04-28 PROCEDURE — 70553 MRI BRAIN STEM W/O & W/DYE: CPT | Mod: 26,MG

## 2022-04-28 PROCEDURE — 70553 MRI BRAIN STEM W/O & W/DYE: CPT | Mod: MG

## 2022-05-06 ENCOUNTER — NON-APPOINTMENT (OUTPATIENT)
Age: 62
End: 2022-05-06

## 2022-05-06 ENCOUNTER — APPOINTMENT (OUTPATIENT)
Dept: NEUROSURGERY | Facility: CLINIC | Age: 62
End: 2022-05-06
Payer: MEDICARE

## 2022-05-06 VITALS
HEART RATE: 81 BPM | DIASTOLIC BLOOD PRESSURE: 69 MMHG | WEIGHT: 210 LBS | OXYGEN SATURATION: 98 % | BODY MASS INDEX: 34.99 KG/M2 | HEIGHT: 65 IN | SYSTOLIC BLOOD PRESSURE: 102 MMHG | TEMPERATURE: 97 F

## 2022-05-06 PROCEDURE — 99214 OFFICE O/P EST MOD 30 MIN: CPT

## 2022-05-08 NOTE — DATA REVIEWED
[de-identified] : [facility]\par    [facility] Department of Radiology\par   Radiology Report\par \par \par Patient Name: BURGESS GAY   Report Date: 02-May-2022 10:18.00 \par Patient ID: [id number] ([id number]), [id number] (EPI)  Accession No.: [id number] \par Patient Birth Date: 1960  Report Status: F \par Referring Physician: [id number] REYNOLD ROLLINS   Reason For Study: 3 MTH INTERVAL F/U . HX OF GKRS FOR MAIN METS  \par \par \par \par \par ACC: [id number] EXAM: MR BRAIN WAW IC\par \par PROCEDURE DATE: 04/28/2022\par \par \par \par INTERPRETATION: PROCEDURE: MRI Brain with and without contrast\par \par INDICATION: Brain metastasis status post gamma knife therapy. 3 month follow-up.\par \par TECHNIQUE: Sagittal T1 volumetric series with MPR, axial T2-FLAIR, T2-FFE and diffusion imaging of the brain is obtained. Following the intravenous administration of 10 cc Gadavist contrast material, axial T2 spin-echo imaging is obtained followed by sagittal T1 volumetric imaging with MPR provided.\par \par COMPARISON: 2/22/2022, and 11/15/2021\par \par FINDINGS:\par \par There is continued stability of a left parietal lobe postoperative cavity and radiation field with ill-defined enhancement corresponding to periventricular encephalomalacia most consistent with radiation necrosis. There is ex vacuo dilatation of the atrium of the left lateral ventricle without mass effect. There is no new or increasing enhancement or nodularity as to raise suspicion for tumor. Similarly, the T2-FLAIR appearance of the treated left parietal lobe is similar with white matter gliosis and no mass effect. Transsynaptic signal extends across the splenium of the corpus callosum and into the right periatrial region as well, stable.\par \par Postcontrast imaging shows no new site of pathologic intracranial enhancement to indicate metastasis. Diffusion imaging is negative for recent infarct. Susceptibility images show no blood product deposition aside from that at the left posterior medial parietal lobe which is stable. Large vessel flow voids are preserved on T2 imaging. No fluid collection or mass effect. Stable ventricular size.\par \par IMPRESSION:\par \par Stable exam.\par No change in left parietal lobe radiation necrosis related enhancement and FLAIR signal. No progressive or nodular enhancement as to suspect recurrent metastasis.\par \par --- End of Report ---\par \par \par \par \par \par SCOTTIE CHÁVEZ MD; Attending Radiologist\par This document has been electronically signed. May 2 2022 10:18AM \par

## 2022-05-08 NOTE — HISTORY OF PRESENT ILLNESS
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 She previously underwent left parietal craniotomy for tumor resection and adjuvant radiation approximately 3 years ago In November 2017 at another institution. She also had GKRS on 1/8/18 to a right parietal brain metastasis with Dr. Rico. MRI brain on admission demonstrated a recurrent 3 cm left parietal enhancing brain mass in the area of prior tumor resection. She underwent surgical resection of left parietal mass on 5/13/2020 with Dr. Rico. Pathology indicated radiation necrosis. \par \par Postoperatively, her right hand weakness improved. She does have right visual field cut. She was referred to Dr. Andrea Gutierrez for neuro opthalmology consult.\par \par In July 2020, Ms. Mendoza was hospitalized at [facility] due to sudden onset difficulty speaking as well as RIGHT hand weakness. She was admitted and given steroids and seizure medication. She was weaned off the dexamethasone over the next several weeks. She reported improvement in right hand weakness.\par \par She remains on keppra 750 mg bid.\par \par MRI brain with and without contrast, done on 2/3/21, was stable. \par \par On 5/7/21, she reported RIGHT hand weakness. While in the office and during visit, she developed acute expressive aphasia and total RIGHT hemiparesis including RIGHT leg. Prior to this, she stated she had not taken keppra X 2 days. She was taken to the ED and had generalized tonic clonic seizure. She was discharged with 1000 mg keppra bid.\par \par She had 4 cycles of IV avastin and had repeat MRI brain done on 8/10/21, demonstrated decrease in left parietal enhancement and previously seen cortical foci of enhancement in right parietal lobe was not visualized.\par \par MRI brain with and without contrast from 11/15/21 was stable with no new areas of enhancement.\par Denied new neurological symptoms. Remains on keppra and oxcarbazepine for seizures. Denies seizures.\par \par MRI brain from 2/22/22 was stable. She is doing well. Denied new neurological symptoms. Denied headaches, seizures, focal weakness, vision change (has baseline vision field cut). She remains on Keppra 750 mg BID and Oxcarbazepine. A three month repeat MRI brain (May 2022) was recommended.\par \par Today, patient presents to review MRI brain.\par She is doing well. well. Denies new neurological symptoms. Denies headaches, seizures, focal weakness, newvision change. She remains on Keppra 750 mg BID and Oxcarbazepine. \par  \par

## 2022-05-08 NOTE — PHYSICAL EXAM
[General Appearance - Alert] : alert [General Appearance - In No Acute Distress] : in no acute distress [Oriented To Time, Place, And Person] : oriented to person, place, and time [Impaired Insight] : insight and judgment were intact [Cranial Nerves Optic (II)] : visual acuity intact bilaterally,  pupils equal round and reactive to light [Cranial Nerves Oculomotor (III)] : extraocular motion intact [Cranial Nerves Trigeminal (V)] : facial sensation intact symmetrically [Cranial Nerves Facial (VII)] : face symmetrical [Cranial Nerves Vestibulocochlear (VIII)] : hearing was intact bilaterally [Cranial Nerves Glossopharyngeal (IX)] : tongue and palate midline [Cranial Nerves Accessory (XI - Cranial And Spinal)] : head turning and shoulder shrug symmetric [Cranial Nerves Hypoglossal (XII)] : there was no tongue deviation with protrusion [Motor Tone] : muscle tone was normal in all four extremities [Motor Strength] : muscle strength was normal in all four extremities [] : no respiratory distress [Respiration, Rhythm And Depth] : normal respiratory rhythm and effort [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

## 2022-05-08 NOTE — ASSESSMENT
[FreeTextEntry1] : MRI brain from 4/28/22 done at [facility] reviewed by Dr. Rico with the patient that is stable. There is no progression or suspected recurrent metastasis.\par \par Recommend repeat MRI brain w/wo x 4 months (late Aug-early Sept 2022) and RTO to review it.\par \par \par I, Dr. Rico, personally performed the evaluation and management (E/M) services for this established patient who presents today with (a) new problem(s)/exacerbation of (an) existing condition(s). That E/M includes conducting the examination, assessing all new/exacerbated conditions, and establishing a new plan of care. Today, my ACP, Lydia Fuentes, was here to observe my evaluation and management services for this new problem/exacerbated condition to be followed going forward.\par \par

## 2022-05-08 NOTE — REASON FOR VISIT
[Follow-Up: _____] : a [unfilled] follow-up visit [FreeTextEntry1] : to review 3 month follow up MRI brain for hx of brain mets (right parietal and left parietal of prior tumor resection and radiation)

## 2022-05-19 ENCOUNTER — OUTPATIENT (OUTPATIENT)
Dept: OUTPATIENT SERVICES | Facility: HOSPITAL | Age: 62
LOS: 1 days | End: 2022-05-19
Payer: MEDICARE

## 2022-05-19 ENCOUNTER — APPOINTMENT (OUTPATIENT)
Dept: CT IMAGING | Facility: HOSPITAL | Age: 62
End: 2022-05-19

## 2022-05-19 DIAGNOSIS — Z90.49 ACQUIRED ABSENCE OF OTHER SPECIFIED PARTS OF DIGESTIVE TRACT: Chronic | ICD-10-CM

## 2022-05-19 DIAGNOSIS — Z98.890 OTHER SPECIFIED POSTPROCEDURAL STATES: Chronic | ICD-10-CM

## 2022-05-19 DIAGNOSIS — Z41.9 ENCOUNTER FOR PROCEDURE FOR PURPOSES OTHER THAN REMEDYING HEALTH STATE, UNSPECIFIED: Chronic | ICD-10-CM

## 2022-05-19 LAB — POCT ISTAT CREATININE: 0.8 MG/DL — SIGNIFICANT CHANGE UP (ref 0.5–1.3)

## 2022-05-19 PROCEDURE — 74177 CT ABD & PELVIS W/CONTRAST: CPT

## 2022-05-19 PROCEDURE — 71260 CT THORAX DX C+: CPT | Mod: 26,MH

## 2022-05-19 PROCEDURE — 74177 CT ABD & PELVIS W/CONTRAST: CPT | Mod: 26,MH

## 2022-05-19 PROCEDURE — 71260 CT THORAX DX C+: CPT

## 2022-05-19 PROCEDURE — 82565 ASSAY OF CREATININE: CPT

## 2022-05-24 ENCOUNTER — APPOINTMENT (OUTPATIENT)
Dept: NEUROLOGY | Facility: CLINIC | Age: 62
End: 2022-05-24
Payer: MEDICARE

## 2022-05-24 VITALS
SYSTOLIC BLOOD PRESSURE: 114 MMHG | HEART RATE: 78 BPM | TEMPERATURE: 97.6 F | OXYGEN SATURATION: 95 % | WEIGHT: 210 LBS | HEIGHT: 65 IN | DIASTOLIC BLOOD PRESSURE: 78 MMHG | BODY MASS INDEX: 34.99 KG/M2

## 2022-05-24 PROCEDURE — 99214 OFFICE O/P EST MOD 30 MIN: CPT

## 2022-05-25 LAB
ALBUMIN SERPL ELPH-MCNC: 4.6 G/DL
ALP BLD-CCNC: 101 U/L
ALT SERPL-CCNC: 17 U/L
ANION GAP SERPL CALC-SCNC: 11 MMOL/L
AST SERPL-CCNC: 18 U/L
BILIRUB SERPL-MCNC: 0.2 MG/DL
BUN SERPL-MCNC: 10 MG/DL
CALCIUM SERPL-MCNC: 9.8 MG/DL
CHLORIDE SERPL-SCNC: 99 MMOL/L
CO2 SERPL-SCNC: 28 MMOL/L
CREAT SERPL-MCNC: 0.82 MG/DL
EGFR: 80 ML/MIN/1.73M2
GLUCOSE SERPL-MCNC: 102 MG/DL
POTASSIUM SERPL-SCNC: 4.5 MMOL/L
PROT SERPL-MCNC: 7.1 G/DL
SODIUM SERPL-SCNC: 137 MMOL/L

## 2022-05-26 ENCOUNTER — APPOINTMENT (OUTPATIENT)
Dept: ORTHOPEDIC SURGERY | Facility: CLINIC | Age: 62
End: 2022-05-26
Payer: MEDICARE

## 2022-05-26 VITALS — BODY MASS INDEX: 35.82 KG/M2 | HEIGHT: 65 IN | WEIGHT: 215 LBS

## 2022-05-26 DIAGNOSIS — M23.91 UNSPECIFIED INTERNAL DERANGEMENT OF RIGHT KNEE: ICD-10-CM

## 2022-05-26 DIAGNOSIS — M25.561 PAIN IN RIGHT KNEE: ICD-10-CM

## 2022-05-26 PROCEDURE — 99204 OFFICE O/P NEW MOD 45 MIN: CPT

## 2022-05-26 PROCEDURE — 73562 X-RAY EXAM OF KNEE 3: CPT | Mod: RT

## 2022-05-26 NOTE — PHYSICAL EXAM
[de-identified] : Right knee\par \par Constitutional: \par The patient is healthy-appearing and in no apparent distress. \par Patient is overweight.\par \par Gait:\par The patient ambulates with knee immobilizer as well as crutch assist. \par \par Cardiovascular System: \par The capillary refill is less than 2 seconds. \par \par Skin: \par There are no skin abnormalities other than medial distal thigh and knee fullness.\par \par Right Knee:\par  \par Bony Palpation: \par There is tenderness of the medial joint line. \par There is no tenderness of the lateral joint line.\par There is tenderness of the medial femoral chondyle.\par There is no tenderness of the lateral femoral chondyle.\par There is no tenderness of the tibial tubercle.\par There is no tenderness of the superior patella.\par There is no tenderness of the inferior patella.\par There is tenderness of the medial patellar facet.\par There is no tenderness of the lateral patellar facet.\par \par Soft Tissue Palpation: \par There is tenderness of the medial retinaculum.\par There is no tenderness of the lateral retinaculum.\par There is no tenderness of the quadriceps tendon.\par There is no tenderness of the patella tendon.\par There is no tenderness of the ITB.\par There is no tenderness of the pes anserine.\par There is tenderness of the MCL.\par There is tenderness of the distal posteromedial thigh c/w hematoma.\par \par Active Range of Motion: \par The range of motion at the knee actively and passively is 0 - 80 limited secondary to pain on flexion. \par \par Special Tests: \par There is a negative Apley.\par There is a negative Steinmanns. \par There is a negative Lachman (limited secondary to guarding)\par There is no varus or valgus laxity (limited secondary to guarding)\par \par Strength: \par There is 5/5 hip flexion and 5/5 knee flexion and extension.  \par \par Psychiatric: \par The patient demonstrates a normal mood and affect and is active and alert [de-identified] : Given patient's reported history and physical examination, x-ray evaluation ( as listed below ) was ordered and performed to aid in diagnosis and treatment of the patient.\par X-ray right knee.  There is no clear fracture or dislocation.  There is mild patellofemoral arthritis

## 2022-05-26 NOTE — ASSESSMENT
[FreeTextEntry1] : Discussed at length with patient exam imaging and history and given the medial swelling and concern for possible ligamentous injury versus hematoma.  Patient has previously tried rest, activity modification, exercises, and medications (ie. anti-inflammatories, such as Ibuprofen or Tylenol) without improvement.  Patient has previously had x-ray evaluation.  Given persistent symptoms, a formal request is made for an MRI.\par

## 2022-05-26 NOTE — HISTORY OF PRESENT ILLNESS
[de-identified] : Location: Right knee\par Duration: 5/24/22 \par Context: was getting on the train and her leg got stuck between the train and platform and twisted\par Quality: burning\par Aggravating factors: weight bearing, ROM\par Associated symptoms: swelling\par Conservative treatment: knee immobilizer, oxycodone\par Prior studies: X-rays 5/24 [facility] - has report, no imaging Bcc Histology Text: There were numerous aggregates of basaloid cells.

## 2022-05-28 ENCOUNTER — EMERGENCY (EMERGENCY)
Facility: HOSPITAL | Age: 62
LOS: 1 days | Discharge: HOME | End: 2022-05-28
Attending: EMERGENCY MEDICINE | Admitting: EMERGENCY MEDICINE
Payer: COMMERCIAL

## 2022-05-28 VITALS
OXYGEN SATURATION: 99 % | SYSTOLIC BLOOD PRESSURE: 124 MMHG | DIASTOLIC BLOOD PRESSURE: 72 MMHG | HEART RATE: 89 BPM | TEMPERATURE: 98 F | RESPIRATION RATE: 18 BRPM

## 2022-05-28 VITALS
RESPIRATION RATE: 18 BRPM | DIASTOLIC BLOOD PRESSURE: 85 MMHG | WEIGHT: 214.95 LBS | HEIGHT: 65 IN | OXYGEN SATURATION: 98 % | HEART RATE: 93 BPM | TEMPERATURE: 98 F | SYSTOLIC BLOOD PRESSURE: 116 MMHG

## 2022-05-28 DIAGNOSIS — Z98.890 OTHER SPECIFIED POSTPROCEDURAL STATES: Chronic | ICD-10-CM

## 2022-05-28 DIAGNOSIS — Z90.49 ACQUIRED ABSENCE OF OTHER SPECIFIED PARTS OF DIGESTIVE TRACT: Chronic | ICD-10-CM

## 2022-05-28 DIAGNOSIS — Z41.9 ENCOUNTER FOR PROCEDURE FOR PURPOSES OTHER THAN REMEDYING HEALTH STATE, UNSPECIFIED: Chronic | ICD-10-CM

## 2022-05-28 LAB
ANION GAP SERPL CALC-SCNC: 11 MMOL/L — SIGNIFICANT CHANGE UP (ref 5–17)
BUN SERPL-MCNC: 14 MG/DL — SIGNIFICANT CHANGE UP (ref 7–23)
CALCIUM SERPL-MCNC: 9.7 MG/DL — SIGNIFICANT CHANGE UP (ref 8.4–10.5)
CHLORIDE SERPL-SCNC: 99 MMOL/L — SIGNIFICANT CHANGE UP (ref 96–108)
CO2 SERPL-SCNC: 27 MMOL/L — SIGNIFICANT CHANGE UP (ref 22–31)
CREAT SERPL-MCNC: 0.69 MG/DL — SIGNIFICANT CHANGE UP (ref 0.5–1.3)
EGFR: 97 ML/MIN/1.73M2 — SIGNIFICANT CHANGE UP
GLUCOSE SERPL-MCNC: 93 MG/DL — SIGNIFICANT CHANGE UP (ref 70–99)
HCT VFR BLD CALC: 38.7 % — SIGNIFICANT CHANGE UP (ref 34.5–45)
HGB BLD-MCNC: 12.9 G/DL — SIGNIFICANT CHANGE UP (ref 11.5–15.5)
MCHC RBC-ENTMCNC: 29.7 PG — SIGNIFICANT CHANGE UP (ref 27–34)
MCHC RBC-ENTMCNC: 33.3 GM/DL — SIGNIFICANT CHANGE UP (ref 32–36)
MCV RBC AUTO: 89 FL — SIGNIFICANT CHANGE UP (ref 80–100)
NRBC # BLD: 0 /100 WBCS — SIGNIFICANT CHANGE UP (ref 0–0)
PLATELET # BLD AUTO: 280 K/UL — SIGNIFICANT CHANGE UP (ref 150–400)
POTASSIUM SERPL-MCNC: 4.2 MMOL/L — SIGNIFICANT CHANGE UP (ref 3.5–5.3)
POTASSIUM SERPL-SCNC: 4.2 MMOL/L — SIGNIFICANT CHANGE UP (ref 3.5–5.3)
RBC # BLD: 4.35 M/UL — SIGNIFICANT CHANGE UP (ref 3.8–5.2)
RBC # FLD: 13 % — SIGNIFICANT CHANGE UP (ref 10.3–14.5)
SODIUM SERPL-SCNC: 137 MMOL/L — SIGNIFICANT CHANGE UP (ref 135–145)
WBC # BLD: 8.69 K/UL — SIGNIFICANT CHANGE UP (ref 3.8–10.5)
WBC # FLD AUTO: 8.69 K/UL — SIGNIFICANT CHANGE UP (ref 3.8–10.5)

## 2022-05-28 PROCEDURE — 99284 EMERGENCY DEPT VISIT MOD MDM: CPT | Mod: 25

## 2022-05-28 PROCEDURE — 96375 TX/PRO/DX INJ NEW DRUG ADDON: CPT

## 2022-05-28 PROCEDURE — 80048 BASIC METABOLIC PNL TOTAL CA: CPT

## 2022-05-28 PROCEDURE — 96374 THER/PROPH/DIAG INJ IV PUSH: CPT

## 2022-05-28 PROCEDURE — 85027 COMPLETE CBC AUTOMATED: CPT

## 2022-05-28 PROCEDURE — 36415 COLL VENOUS BLD VENIPUNCTURE: CPT

## 2022-05-28 PROCEDURE — 99284 EMERGENCY DEPT VISIT MOD MDM: CPT

## 2022-05-28 RX ORDER — IBUPROFEN 200 MG
1 TABLET ORAL
Qty: 30 | Refills: 0
Start: 2022-05-28

## 2022-05-28 RX ORDER — ACETAMINOPHEN 500 MG
975 TABLET ORAL ONCE
Refills: 0 | Status: COMPLETED | OUTPATIENT
Start: 2022-05-28 | End: 2022-05-28

## 2022-05-28 RX ORDER — KETOROLAC TROMETHAMINE 30 MG/ML
15 SYRINGE (ML) INJECTION ONCE
Refills: 0 | Status: DISCONTINUED | OUTPATIENT
Start: 2022-05-28 | End: 2022-05-28

## 2022-05-28 RX ORDER — VANCOMYCIN HCL 1 G
1500 VIAL (EA) INTRAVENOUS ONCE
Refills: 0 | Status: COMPLETED | OUTPATIENT
Start: 2022-05-28 | End: 2022-05-28

## 2022-05-28 RX ORDER — OXYCODONE HYDROCHLORIDE 5 MG/1
30 TABLET ORAL ONCE
Refills: 0 | Status: DISCONTINUED | OUTPATIENT
Start: 2022-05-28 | End: 2022-05-28

## 2022-05-28 RX ADMIN — Medication 975 MILLIGRAM(S): at 17:12

## 2022-05-28 RX ADMIN — Medication 15 MILLIGRAM(S): at 17:11

## 2022-05-28 RX ADMIN — Medication 300 MILLIGRAM(S): at 17:11

## 2022-05-28 NOTE — ED PROVIDER NOTE - OBJECTIVE STATEMENT
60 yo female h/o adenocarcinoma of lung (hx of resection) with Brain metastasis (brain tumor s/p resection), COPD 60 yo female h/o hld, adenocarcinoma of lung (hx of resection) with Brain metastasis (brain tumor s/p resection), COPD c/o RLE pain.  Pt fell and leg fell between train and platform area 5/24.  Pt had neg xray R knee, noted to have extensive ecchymosis, dc'd w tylenol at [facility].  Pt now c/o increased burning pain RLE and some redness of skin w/o associated fever, no sig change in amt of swelling RLE since 5/24.  No numbness/weakness in ext, no other RLE areas w pain.  Pt has oxycodone at home but doesn't like to take strong pain meds; pt took tylenol earlier w some relief.  Pain worse w touch/moving area. 62 yo female h/o hld, adenocarcinoma of lung (hx of resection) with Brain metastasis (brain tumor s/p resection), COPD, chronic back pain c/o RLE pain.  Pt fell and leg fell between train and platform area 5/24.  Pt had neg xray R knee, noted to have extensive ecchymosis, dc'd w tylenol at [facility].  Pt now c/o increased burning pain RLE and some redness of skin w/o associated fever, no sig change in amt of swelling RLE since 5/24.  No numbness/weakness in ext, no other RLE areas w pain.  Pt has oxycodone at home but doesn't like to take strong pain meds; pt took tylenol earlier w some relief.  Pain worse w touch/moving area.

## 2022-05-28 NOTE — ED ADULT NURSE NOTE - NSICDXPASTMEDICALHX_GEN_ALL_CORE_FT
PAST MEDICAL HISTORY:  Adenocarcinoma of lung     Brain metastasis brain tumor s/p resection    COPD (chronic obstructive pulmonary disease)     HLD (hyperlipidemia)     MRSA (methicillin resistant Staphylococcus aureus) history of

## 2022-05-28 NOTE — ED PROVIDER NOTE - PATIENT PORTAL LINK FT
You can access the FollowMyHealth Patient Portal offered by [facility] by registering at the following website: [email or web address]. By joining [employer]’s FollowMyHealth portal, you will also be able to view your health information using other applications (apps) compatible with our system.

## 2022-05-28 NOTE — ED ADULT TRIAGE NOTE - AS HEIGHT TYPE
COVID-19 PCR test completed. Patient handout For Patients Who Have Been Tested for Covid-19 (Coronavirus) was given to the patient, which includes test result notification process.     stated

## 2022-05-28 NOTE — ED ADULT TRIAGE NOTE - CHIEF COMPLAINT QUOTE
62 y/o female c/o leg pain and swelling [place], pt was evaluated at a Hospital where she was told she didn't have any fractures, pt noted with knee immobilizer and noted bruised right thigh.

## 2022-05-28 NOTE — ED PROVIDER NOTE - PROGRESS NOTE DETAILS
Labs wnl.  Pt c/o pain, ordered for her home oxy (checked on iSTOP - 30 mg, 30 d supply dispensed 5/28.  Pt declined further pain meds in ed.  Evangelista starks.

## 2022-05-28 NOTE — ED PROVIDER NOTE - NSICDXPASTMEDICALHX_GEN_ALL_CORE_FT
PAST MEDICAL HISTORY:  Adenocarcinoma of lung     Brain metastasis brain tumor s/p resection    COPD (chronic obstructive pulmonary disease)     MRSA (methicillin resistant Staphylococcus aureus) history of     PAST MEDICAL HISTORY:  Adenocarcinoma of lung     Brain metastasis brain tumor s/p resection    COPD (chronic obstructive pulmonary disease)     HLD (hyperlipidemia)     MRSA (methicillin resistant Staphylococcus aureus) history of     PAST MEDICAL HISTORY:  Adenocarcinoma of lung     Brain metastasis brain tumor s/p resection    Chronic back pain     COPD (chronic obstructive pulmonary disease)     HLD (hyperlipidemia)     MRSA (methicillin resistant Staphylococcus aureus) history of

## 2022-05-28 NOTE — ED PROVIDER NOTE - PHYSICAL EXAMINATION
VITAL SIGNS: I have reviewed nursing notes and confirm.  CONSTITUTIONAL: Well-developed; well-nourished; in painful distress.  childlike affect at times, crying   SKIN:  warm and dry, no acute rash.   HEAD:  normocephalic, atraumatic.  EYES: PERRL, EOM intact; conjunctiva and sclera clear.  ENT: No nasal discharge; airway clear.   NECK: Supple; non tender.  CARD: S1, S2 normal; no murmurs, gallops, or rubs. Regular rate and rhythm.   RESP:  Clear to auscultation b/l, no wheezes, rales or rhonchi.  ABD: Normal bowel sounds; soft; non-distended; non-tender; no guarding/ rebound.  MSK: Normal ROM except at R knee - decreased 2/2 pain.  RLE - extensive ecchymosis/hematoma mid thigh to mid calf medial leg, area near knee w erythema, warmth, + ttp over ecchymotic and erythematous areas, no blisters/crepitus, compartments soft, no bony ttp, dp 2+, LLE nl exam  NEURO: Alert, oriented, grossly unremarkable, motor 5/5, no gross sens deficits   PSYCH: Cooperative, mood and affect appropriate.

## 2022-05-28 NOTE — ED ADULT NURSE NOTE - NSIMPLEMENTINTERV_GEN_ALL_ED
Implemented All Universal Safety Interventions:  [place] to call system. Call bell, personal items and telephone within reach. Instruct patient to call for assistance. Room bathroom lighting operational. Non-slip footwear when patient is off stretcher. Physically safe environment: no spills, clutter or unnecessary equipment. Stretcher in lowest position, wheels locked, appropriate side rails in place.

## 2022-05-28 NOTE — ED PROVIDER NOTE - NSFOLLOWUPINSTRUCTIONS_ED_ALL_ED_FT
Cellulitis    Take doxycycline twice a day for 7 days.  Return tomorrow for a wound check.  Return sooner for increased pain, redness/swelling/drainage from wound, fever, numbness in leg, pale leg, cold leg, any other concerns.     Take ibuprofen 1 tab every 6 hours with food as needed for pain.  Add tylenol for additional pain relief as needed - use as directed.  Add your oxycodone for severe pain as directed.     Cellulitis    Cellulitis is a skin infection caused by bacteria. This condition occurs most often in the arms and lower legs but can occur anywhere over the body. Symptoms include redness, swelling, warm skin, tenderness, and chills/fever. If you were prescribed an antibiotic medicine, take it as told by your health care provider. Do not stop taking the antibiotic even if you start to feel better.    SEEK IMMEDIATE MEDICAL CARE IF YOU HAVE ANY OF THE FOLLOWING SYMPTOMS: worsening fever, red streaks coming from affected area, vomiting or diarrhea, or dizziness/lightheadedness.

## 2022-05-28 NOTE — ED PROVIDER NOTE - CLINICAL SUMMARY MEDICAL DECISION MAKING FREE TEXT BOX
Pt c/o recent crush injury 5/24 now w increased pain w redness/warmth overlying ecchymotic area concerning for cellulitis.  Pt w/o sx or findings to suggest compartment syndrome except pain - no pallor, parasthesias, + pulses, and + soft compartments.  Pt w neg xray at [facility] - no need to repeat.  Plan pain meds, abx; reassess.  Likely dc w abx; + h/o mrsa - will cover mrsa/mssa. To return for wound check tomorrow.

## 2022-05-28 NOTE — ED ADULT NURSE NOTE - CHIEF COMPLAINT QUOTE
60 y/o female c/o leg pain and swelling [place], pt was evaluated at a Hospital where she was told she didn't have any fractures, pt noted with knee immobilizer and noted bruised right thigh.

## 2022-05-29 ENCOUNTER — EMERGENCY (EMERGENCY)
Facility: HOSPITAL | Age: 62
LOS: 1 days | Discharge: HOME | End: 2022-05-29
Admitting: EMERGENCY MEDICINE
Payer: COMMERCIAL

## 2022-05-29 VITALS
DIASTOLIC BLOOD PRESSURE: 74 MMHG | SYSTOLIC BLOOD PRESSURE: 114 MMHG | OXYGEN SATURATION: 95 % | RESPIRATION RATE: 18 BRPM | HEART RATE: 69 BPM | TEMPERATURE: 98 F

## 2022-05-29 VITALS
HEART RATE: 68 BPM | WEIGHT: 214.95 LBS | OXYGEN SATURATION: 96 % | SYSTOLIC BLOOD PRESSURE: 137 MMHG | HEIGHT: 65 IN | RESPIRATION RATE: 18 BRPM | TEMPERATURE: 98 F | DIASTOLIC BLOOD PRESSURE: 90 MMHG

## 2022-05-29 DIAGNOSIS — Z41.9 ENCOUNTER FOR PROCEDURE FOR PURPOSES OTHER THAN REMEDYING HEALTH STATE, UNSPECIFIED: Chronic | ICD-10-CM

## 2022-05-29 DIAGNOSIS — Z98.890 OTHER SPECIFIED POSTPROCEDURAL STATES: Chronic | ICD-10-CM

## 2022-05-29 DIAGNOSIS — Z90.49 ACQUIRED ABSENCE OF OTHER SPECIFIED PARTS OF DIGESTIVE TRACT: Chronic | ICD-10-CM

## 2022-05-29 PROCEDURE — 96372 THER/PROPH/DIAG INJ SC/IM: CPT

## 2022-05-29 PROCEDURE — 99284 EMERGENCY DEPT VISIT MOD MDM: CPT

## 2022-05-29 PROCEDURE — 99283 EMERGENCY DEPT VISIT LOW MDM: CPT

## 2022-05-29 RX ORDER — IBUPROFEN 200 MG
1 TABLET ORAL
Qty: 12 | Refills: 0
Start: 2022-05-29 | End: 2022-06-03

## 2022-05-29 RX ORDER — ACETAMINOPHEN 500 MG
2 TABLET ORAL
Qty: 24 | Refills: 0
Start: 2022-05-29 | End: 2022-06-01

## 2022-05-29 RX ORDER — IBUPROFEN 200 MG
1 TABLET ORAL
Qty: 30 | Refills: 0
Start: 2022-05-29

## 2022-05-29 RX ORDER — ACETAMINOPHEN 500 MG
650 TABLET ORAL ONCE
Refills: 0 | Status: COMPLETED | OUTPATIENT
Start: 2022-05-29 | End: 2022-05-29

## 2022-05-29 RX ORDER — KETOROLAC TROMETHAMINE 30 MG/ML
30 SYRINGE (ML) INJECTION ONCE
Refills: 0 | Status: DISCONTINUED | OUTPATIENT
Start: 2022-05-29 | End: 2022-05-29

## 2022-05-29 RX ADMIN — Medication 100 MILLIGRAM(S): at 12:03

## 2022-05-29 RX ADMIN — Medication 650 MILLIGRAM(S): at 12:03

## 2022-05-29 RX ADMIN — Medication 30 MILLIGRAM(S): at 12:03

## 2022-05-29 RX ADMIN — Medication 30 MILLIGRAM(S): at 12:33

## 2022-05-29 NOTE — ED ADULT NURSE NOTE - OBJECTIVE STATEMENT
60 y/o female c/o right leg pain and swelling. Pt states " I was here yesterday but the  pharmacy was closed and couldn't  my medications, the pain in so bad and they told me to come back if it got worst" + swelling and  bruising noted. pt able to ambulate with assistance.

## 2022-05-29 NOTE — ED ADULT NURSE NOTE - NSICDXPASTMEDICALHX_GEN_ALL_CORE_FT
PAST MEDICAL HISTORY:  Adenocarcinoma of lung     Brain metastasis brain tumor s/p resection    Chronic back pain     COPD (chronic obstructive pulmonary disease)     HLD (hyperlipidemia)     MRSA (methicillin resistant Staphylococcus aureus) history of

## 2022-05-29 NOTE — ED PROVIDER NOTE - CLINICAL SUMMARY MEDICAL DECISION MAKING FREE TEXT BOX
62yo F with large hematoma s/p crush injury, pulses wnl, compartments soft, no suspicion of compartment syndrome at this time. Will treat patient's pain. Will send abx to different pharmacy as was planned by visit yesterday. Will instruct pt to return in 3 days here or for wound check with pcp. Rest and elevation instructions provided. Advised to return faster to ER for worsening pain, numbness, hardness of leg or other alarming symptoms. 60yo F with large hematoma s/p crush injury, pulses wnl, compartments soft, no suspicion of compartment syndrome at this time. Will treat patient's pain. Will send abx to different pharmacy as was planned by visit yesterday. Will instruct pt to return in 3 days here or for wound check with pcp. Rest and elevation instructions provided. Advised to return faster to ER for worsening pain, numbness, hardness of leg or other alarming symptoms.    Pt has a steady gait, discharged home in stable condition, states she has a pmd to follow up with as well.    The patient understands the Emergency Department diagnosis is a preliminary diagnosis often based on limited information and that the patient must adhere to the follow-up plan as discussed.  At the time of discharge from the Emergency Department, the patient is alert with fluent appropriate speech and ambulatory without difficulty.  A medical screening examination was performed and no emergency medical condition was identified.

## 2022-05-29 NOTE — ED ADULT TRIAGE NOTE - CHIEF COMPLAINT QUOTE
"  I had a leg injury and I was told to come to get a check on my injury today" "  I had a right leg injury and I was told to come to get a check on my injury today"

## 2022-05-29 NOTE — ED PROVIDER NOTE - PATIENT PORTAL LINK FT
You can access the FollowMyHealth Patient Portal offered by [facility] by registering at the following website: [email or web address]. By joining [employer]’s FollowMyHealth portal, you will also be able to view your health information using other applications (apps) compatible with our system.

## 2022-05-29 NOTE — ED PROVIDER NOTE - OBJECTIVE STATEMENT
62 yo female h/o hld, adenocarcinoma of lung (hx of resection) with Brain metastasis (brain tumor s/p resection), COPD, chronic back pain c/o RLE pain.  On 5/24 patient states she got her R leg stuck on [employer] tracks. Patient has negative x-ray at [facility] and seen in ED at [facility] yesterday, diagnosed with hematoma, crush injury, and cellulitis, no signs of compartment syndrome. Instructed to come in for wound check today. Pt given one dose of IV abx yesterday and given rx for doxycycline, however doxy was sent to [facility] and thought it was sent to her pharmacy, so did not start yet. Denies fever, chills. Has full sensation of leg. Has not been taking pain medications at home. Endorses some pain to area. 60 yo female h/o hld, adenocarcinoma of lung (hx of resection) with Brain metastasis (brain tumor s/p resection), COPD, chronic back pain c/o RLE pain.  On 5/24 patient states she got her R leg stuck on [employer] tracks. Patient has negative x-ray at [facility] and seen in ED at [facility] yesterday, diagnosed with hematoma, crush injury, and cellulitis, no signs of compartment syndrome at the time . Instructed to come in for wound check today. Pt given one dose of IV abx yesterday and given rx for doxycycline, however doxy was sent to [facility] and she  thought it was sent to her pharmacy, so did not start taking it  yet. Denies fever, chills. Has full sensation of leg. Has not been taking pain medications at home. Endorses some pain to area.

## 2022-05-29 NOTE — ED PROVIDER NOTE - MUSCULOSKELETAL, MLM
FROM RLE, pulses intact, compartments soft. Medial to R knee area of tenderness noted, no warmth. Ecchymosis noted along R thigh and R calf. No calf tenderness.

## 2022-05-29 NOTE — ED PROVIDER NOTE - NSFOLLOWUPINSTRUCTIONS_ED_ALL_ED_FT
Please rest , elevate your leg.     You may apply ice 20 min three times a day    Take anitbiotics as instructed last time    You may take ibuprofen 600 mg every six to eight hours with food for pain. You may also take tylenol 650 mg every six hours for pain, do not exceed 3000 mg daily of tylenol aka acetomenophen. It is safe to take these medications together.      Please follow up with your doctor within three days     If you notice worsening pain , hardness of the leg, or any other alarming symptoms please return to the ed                   Hematoma      A hematoma is a collection of blood. A hematoma can happen:  •Under the skin.      •In an organ.      •In a body space.      •In a joint space.      •In other tissues.      The blood can thicken (clot) to form a lump that you can see and feel. The lump is often hard and may become sore and tender. The lump can be very small or very big. Most hematomas get better in a few days to weeks. However, some hematomas may be serious and need medical care.      What are the causes?    This condition is caused by:  •An injury.      •Blood that leaks under the skin.      •Problems from surgeries.      •Medical conditions that cause bleeding or bruising.        What increases the risk?    You are more likely to develop this condition if:  •You are an older adult.      •You use medicines that thin your blood.        What are the signs or symptoms?     Symptoms depend on where the hematoma is in your body.•If the hematoma is under the skin, there is:  •A firm lump on the body.       •Pain and tenderness in the area.       •Bruising. The skin above the lump may be blue, dark blue, purple-red, or yellowish.      •If the hematoma is deep in the tissues or body spaces, there may be:  •Blood in the stomach. This may cause pain in the belly (abdomen), weakness, passing out (fainting), and shortness of breath.      •Blood in the head. This may cause a headache, weakness, trouble speaking or understanding speech, or passing out.          How is this diagnosed?    This condition is diagnosed based on:  •Your medical history.       •A physical exam.       •Imaging tests, such as ultrasound or CT scan.      •Blood tests.        How is this treated?    Treatment depends on the cause, size, and location of the hematoma. Treatment may include:  •Doing nothing. Many hematomas go away on their own without treatment.      •Surgery or close monitoring. This may be needed for large hematomas or hematomas that affect the body's organs.      •Medicines. These may be given if a medical condition caused the hematoma.        Follow these instructions at home:      Managing pain, stiffness, and swelling    •If told, put ice on the area.  •Put ice in a plastic bag.      •Place a towel between your skin and the bag.      •Leave the ice on for 20 minutes, 2–3 times a day for the first two days.      •If told, put heat on the affected area after putting ice on the area for two days. Use the heat source that your doctor tells you to use. This could be a moist heat pack or a heating pad. To do this:  •Place a towel between your skin and the heat source.      •Leave the heat on for 20–30 minutes.      •Remove the heat if your skin turns bright red. This is very important if you are unable to feel pain, heat, or cold. You may have a greater risk of getting burned.        •Raise (elevate) the affected area above the level of your heart while you are sitting or lying down.      •Wrap the affected area with an elastic bandage, if told by your doctor. Do not wrap the bandage too tightly.      •If your hematoma is on a leg or foot and is painful, your doctor may give you crutches. Use them as told by your doctor.      General instructions     •Take over-the-counter and prescription medicines only as told by your doctor.      •Keep all follow-up visits as told by your doctor. This is important.        Contact a doctor if:    •You have a fever.      •The swelling or bruising gets worse.      •You start to get more hematomas.        Get help right away if:    •Your pain gets worse.      •Your pain is not getting better with medicine.      •Your skin over the hematoma breaks or starts to bleed.    •Your hematoma is in your chest or belly and you:  •Pass out.      •Feel weak.      •Become short of breath.      •You have a hematoma on your scalp that is caused by a fall or injury, and you:  •Have a headache that gets worse.      •Have trouble speaking or understanding speech.      •Become less alert or you pass out.          Summary    •A hematoma is a collection of blood in any part of your body.      •Most hematomas get better on their own in a few days to weeks. Some may need medical care.      •Follow instructions from your doctor about how to care for your hematoma.      •Contact a doctor if the swelling or bruising gets worse, or if you are short of breath.      This information is not intended to replace advice given to you by your health care provider. Make sure you discuss any questions you have with your health care provider.

## 2022-05-30 DIAGNOSIS — M79.661 PAIN IN RIGHT LOWER LEG: ICD-10-CM
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
## 2022-05-30 DIAGNOSIS — E78.5 HYPERLIPIDEMIA, UNSPECIFIED: ICD-10-CM

## 2022-05-30 DIAGNOSIS — Z90.49 ACQUIRED ABSENCE OF OTHER SPECIFIED PARTS OF DIGESTIVE TRACT: ICD-10-CM

## 2022-05-30 DIAGNOSIS — Z88.0 ALLERGY STATUS TO PENICILLIN: ICD-10-CM
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
## 2022-05-30 DIAGNOSIS — J44.9 CHRONIC OBSTRUCTIVE PULMONARY DISEASE, UNSPECIFIED: ICD-10-CM

## 2022-05-30 DIAGNOSIS — L03.115 CELLULITIS OF RIGHT LOWER LIMB: ICD-10-CM

## 2022-05-31 PROBLEM — E78.5 HYPERLIPIDEMIA, UNSPECIFIED: Chronic | Status: ACTIVE | Noted: 2022-05-28

## 2022-05-31 PROBLEM — M54.9 DORSALGIA, UNSPECIFIED: Chronic | Status: ACTIVE | Noted: 2022-05-28

## 2022-06-01 DIAGNOSIS — S70.11XA CONTUSION OF RIGHT THIGH, INITIAL ENCOUNTER: ICD-10-CM

## 2022-06-01 DIAGNOSIS — Z88.0 ALLERGY STATUS TO PENICILLIN: ICD-10-CM

## 2022-06-01 DIAGNOSIS — Z85.118 PERSONAL HISTORY OF OTHER MALIGNANT NEOPLASM OF BRONCHUS AND LUNG: ICD-10-CM
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
## 2022-06-01 DIAGNOSIS — G89.29 OTHER CHRONIC PAIN: ICD-10-CM

## 2022-06-01 DIAGNOSIS — M79.661 PAIN IN RIGHT LOWER LEG: ICD-10-CM

## 2022-06-01 DIAGNOSIS — Z85.841 PERSONAL HISTORY OF MALIGNANT NEOPLASM OF BRAIN: ICD-10-CM
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
## 2022-06-01 DIAGNOSIS — Z86.14 PERSONAL HISTORY OF METHICILLIN RESISTANT STAPHYLOCOCCUS AUREUS INFECTION: ICD-10-CM

## 2022-06-01 DIAGNOSIS — S80.11XA CONTUSION OF RIGHT LOWER LEG, INITIAL ENCOUNTER: ICD-10-CM

## 2022-06-01 DIAGNOSIS — Z90.49 ACQUIRED ABSENCE OF OTHER SPECIFIED PARTS OF DIGESTIVE TRACT: ICD-10-CM

## 2022-06-01 DIAGNOSIS — Z90.2 ACQUIRED ABSENCE OF LUNG [PART OF]: ICD-10-CM

## 2022-06-01 DIAGNOSIS — J44.9 CHRONIC OBSTRUCTIVE PULMONARY DISEASE, UNSPECIFIED: ICD-10-CM

## 2022-06-01 DIAGNOSIS — E78.5 HYPERLIPIDEMIA, UNSPECIFIED: ICD-10-CM

## 2022-06-02 ENCOUNTER — OUTPATIENT (OUTPATIENT)
Dept: OUTPATIENT SERVICES | Facility: HOSPITAL | Age: 62
LOS: 1 days | End: 2022-06-02
Payer: COMMERCIAL

## 2022-06-02 ENCOUNTER — APPOINTMENT (OUTPATIENT)
Dept: MRI IMAGING | Facility: HOSPITAL | Age: 62
End: 2022-06-02

## 2022-06-02 DIAGNOSIS — Z41.9 ENCOUNTER FOR PROCEDURE FOR PURPOSES OTHER THAN REMEDYING HEALTH STATE, UNSPECIFIED: Chronic | ICD-10-CM

## 2022-06-02 DIAGNOSIS — Z98.890 OTHER SPECIFIED POSTPROCEDURAL STATES: Chronic | ICD-10-CM

## 2022-06-02 DIAGNOSIS — Z90.49 ACQUIRED ABSENCE OF OTHER SPECIFIED PARTS OF DIGESTIVE TRACT: Chronic | ICD-10-CM

## 2022-06-02 LAB
LEVETIRACETAM SERPL-MCNC: 36.6 UG/ML
OXCARBAZEPINE SERPL-MCNC: 19 UG/ML

## 2022-06-02 PROCEDURE — 73721 MRI JNT OF LWR EXTRE W/O DYE: CPT | Mod: 26,RT,ME

## 2022-06-02 PROCEDURE — G1004: CPT

## 2022-06-02 PROCEDURE — 73721 MRI JNT OF LWR EXTRE W/O DYE: CPT | Mod: ME

## 2022-06-08 ENCOUNTER — APPOINTMENT (OUTPATIENT)
Dept: ORTHOPEDIC SURGERY | Facility: CLINIC | Age: 62
End: 2022-06-08
Payer: MEDICARE

## 2022-06-08 DIAGNOSIS — S87.01XA: ICD-10-CM

## 2022-06-08 PROCEDURE — 99214 OFFICE O/P EST MOD 30 MIN: CPT

## 2022-06-08 RX ORDER — OXYCODONE AND ACETAMINOPHEN 5; 325 MG/1; MG/1
5-325 TABLET ORAL
Qty: 30 | Refills: 0 | Status: ACTIVE | COMMUNITY
Start: 2022-06-08 | End: 1900-01-01

## 2022-06-08 NOTE — ASSESSMENT
[FreeTextEntry1] : Discussed at length with patient exam imaging and treatment options and at this time I would recommend to discontinue any immobilization and begin range of motion of the knee with physical therapy.  Encouraged manual massage to the hematoma edema area to help decrease the swelling.  Discussed with the patient crush injuries may take several months if not longer to improve

## 2022-06-08 NOTE — PHYSICAL EXAM
[de-identified] : Right knee\par \par Constitutional: \par The patient is healthy-appearing and in no apparent distress. \par Patient is overweight.\par \par Gait:\par The patient ambulates with knee immobilizer as well as crutch assist. \par \par Cardiovascular System: \par The capillary refill is less than 2 seconds. \par \par Skin: \par There are no skin abnormalities other than medial distal thigh and knee fullness.\par \par Right Knee:\par  \par Bony Palpation: \par There is tenderness of the medial joint line. \par There is no tenderness of the lateral joint line.\par There is tenderness of the medial femoral chondyle.\par There is no tenderness of the lateral femoral chondyle.\par There is no tenderness of the tibial tubercle.\par There is no tenderness of the superior patella.\par There is no tenderness of the inferior patella.\par There is tenderness of the medial patellar facet.\par There is no tenderness of the lateral patellar facet.\par \par Soft Tissue Palpation: \par There is tenderness of the medial retinaculum.\par There is no tenderness of the lateral retinaculum.\par There is no tenderness of the quadriceps tendon.\par There is no tenderness of the patella tendon.\par There is no tenderness of the ITB.\par There is no tenderness of the pes anserine.\par There is tenderness of the MCL.\par There is tenderness of the distal posteromedial thigh c/w hematoma.\par \par Active Range of Motion: \par The range of motion at the knee actively and passively is 0 - 100 limited secondary to pain on flexion. \par \par Strength: \par There is 5/5 hip flexion and 5/5 knee flexion and extension.  \par \par Psychiatric: \par The patient demonstrates a normal mood and affect and is active and alert [de-identified] : MRI as above

## 2022-06-08 NOTE — HISTORY OF PRESENT ILLNESS
[de-identified] : Patient is an established patient seen 2 weeks ago with her right knee crush injury.  She had an MRI evaluation which revealed mild arthritis as well as the you'll posterior hematoma with diffuse edema.  She states overall she has been improving but still has discomfort throughout her knee

## 2022-07-06 ENCOUNTER — APPOINTMENT (OUTPATIENT)
Dept: NEUROLOGY | Facility: CLINIC | Age: 62
End: 2022-07-06

## 2022-07-08 ENCOUNTER — APPOINTMENT (OUTPATIENT)
Dept: NEUROLOGY | Facility: CLINIC | Age: 62
End: 2022-07-08

## 2022-07-13 NOTE — ED ADULT NURSE NOTE - NS_ED_NURSE_TEACHING_TOPIC_ED_A_ED
Physical Therapy  Visit Type: initial evaluation  Co-treat with: Occupational therapist (Nahum)  Precautions:  Medical precautions:  fall risk;. 55 year old male with unknown PMHx of who presents to the ED with left hemiparesis and dysarthria.    Emergent R distal M1 MCA thrombectomy w/ TICI 2C revascularization (7/12/22)    -160  Lines:       Lines in chart and on patient reviewed, precautions maintained throughout session.  Safety Measures: bed alarm and restraints    SUBJECTIVE  Patient agreed to participate in therapy this date.  \"I'm ready to get out of here.\"  Patient / Family Goal: return home    Pain     At onset of session (out of 10): 0     OBJECTIVE   Pulse Ox: 97  Blood Pressure: 121/93Heart Rate: 87    Oriented to person, place and time     Affect/Behavior: calm and impulsive  Functional Communication/Cognition       Form of communication:  Verbal    Commands: follows one step commands consistently.    Safety judgement: decreased awareness of need for safety and decreased awareness of need for assistance.    Awareness of deficits: not aware of deficits.  Range of Motion (measured in degrees unless otherwise noted, active unless indicated)  WFL: RLE, LLE  Strength (out of 5 unless otherwise indicated)   Hip:  Hip Flexion: Left: 4 Right: 4  Knee:   - Extension:      • Left: 4      • Right: 4  Ankle:    - Dorsiflexion:      • Left: 5      • Right: 5  Balance (trials measured in sec unless otherwise indicted)    Standing - Firm Surface - Eyes Open: Static: contact guard/touching/steadying assist Dynamic: minimal assist  Perception     Inattention/Neglect: cues to attend left side of body  Neurological Comments / Details: LLE light touch sensation impaired. RLE light touch sensation intact. Pt w/ poor awareness of LUE. When therapists ask pt to touch his L arm he repeatedly reaches for L leg. Pt unaware of positioning of LUE during mobility.     Bed Mobility:        Supine to sit: stand by assist     Sit to supine: stand by assist  Transfers:    Assistive devices: gait belt    Sit to stand: contact guard/touching/steadying assist    Stand to sit: contact guard/touching/steadying assist  Training completed:      Pt able to side-step along EOB w/ min A and verbal cues. Gait not assessed this date as pt's ext cath came off requiring clean up. Assisted pt back to bed.       Interventions     Training provided: bed mobility training, safety training, transfer training and positioning    Skilled input: Tactile instruction/cues and verbal instruction/cues  Verbal Consent: Writer verbally educated and received verbal consent for hand placement, positioning of patient, and techniques to be performed today from patient for clothing adjustments for techniques and therapist position for techniques as described above and how they are pertinent to the patient's plan of care.       ASSESSMENT   Impairments: strength, activity tolerance, balance deficits, decreased insight into deficits, safety awareness, visual perceptual and sensation  Functional Limitations: all functional mobility  Recommended Consults: PT: Physical Medicine and Rehabilitation Physician  Discharge Recommendations   Recommendation for Discharge: PT IL: Patient is appropriate for intensive daily Physical Therapy with the oversight of a Physical Medicine and Rehabilitation physician            PT/OT ADL Equipment for Discharge: TBD          Patient at End of Session:   Location: in bed  Safety measures: alarm system in place/re-engaged, call light within reach, lines intact and restraints in place  Handoff to: nurse    • Skilled therapy is required to address these limitations in attempt to maximize the patient's independence.     • Predicted patient presentation: Moderate (evolving) - Patient comorbidities and complexities, as defined above, may have varying impact on steady progress for prescribed plan of care.    Education Provided:   Learning  Assessment:  - Primary learner: patient  - Are they ready to learn: no  - Barriers to learning: cognitive  Education provided during session:  - Receiving Education: patient  - Educated pt on role of therapy, poc, and therapy recommendations.   - Results of above outlined education: Verbalizes understanding    PLAN   Interventions: balance, bed mobility, functional transfer training, gait training, HEP train/position, safety education, strengthening, compensatory technique education, body mechanics, neuromuscular re-education and stairs retraining  Agreement to plan and goals: patient agrees with goals and treatment plan        GOALS  Long Term Goals: (to be met by time of discharge from hospital)  Supine to sit: Patient will complete supine to sit modified independent.  Sit to stand: Patient will complete sit to stand transfer with least restrictive device, supervision.   Stand to sit: Patient will complete stand to sit transfer with supervision.     Documented in the chart in the following areas: Prior Level of Function. Assessment.      Therapy procedure time and total treatment time can be found documented on the Time Entry flowsheet   Orthopedic

## 2022-08-10 ENCOUNTER — TRANSCRIPTION ENCOUNTER (OUTPATIENT)
Age: 62
End: 2022-08-10

## 2022-08-11 ENCOUNTER — APPOINTMENT (OUTPATIENT)
Age: 62
End: 2022-08-11

## 2022-08-11 ENCOUNTER — EMERGENCY (EMERGENCY)
Facility: HOSPITAL | Age: 62
LOS: 1 days | Discharge: HOME | End: 2022-08-11
Admitting: EMERGENCY MEDICINE
Payer: SELF-PAY

## 2022-08-11 VITALS
RESPIRATION RATE: 16 BRPM | TEMPERATURE: 98 F | HEART RATE: 78 BPM | DIASTOLIC BLOOD PRESSURE: 85 MMHG | SYSTOLIC BLOOD PRESSURE: 109 MMHG | OXYGEN SATURATION: 97 %

## 2022-08-11 VITALS
DIASTOLIC BLOOD PRESSURE: 78 MMHG | TEMPERATURE: 98 F | HEART RATE: 78 BPM | OXYGEN SATURATION: 96 % | RESPIRATION RATE: 16 BRPM | SYSTOLIC BLOOD PRESSURE: 110 MMHG

## 2022-08-11 DIAGNOSIS — Z41.9 ENCOUNTER FOR PROCEDURE FOR PURPOSES OTHER THAN REMEDYING HEALTH STATE, UNSPECIFIED: Chronic | ICD-10-CM

## 2022-08-11 DIAGNOSIS — Z98.890 OTHER SPECIFIED POSTPROCEDURAL STATES: Chronic | ICD-10-CM

## 2022-08-11 DIAGNOSIS — Z90.49 ACQUIRED ABSENCE OF OTHER SPECIFIED PARTS OF DIGESTIVE TRACT: Chronic | ICD-10-CM

## 2022-08-11 PROCEDURE — L9998: CPT

## 2022-08-11 RX ORDER — BEBTELOVIMAB 87.5 MG/ML
175 INJECTION, SOLUTION INTRAVENOUS ONCE
Refills: 0 | Status: COMPLETED | OUTPATIENT
Start: 2022-08-11 | End: 2022-08-11

## 2022-08-11 RX ADMIN — BEBTELOVIMAB 175 MILLIGRAM(S): 87.5 INJECTION, SOLUTION INTRAVENOUS at 14:58

## 2022-08-11 NOTE — CHART NOTE - NSCHARTNOTEFT_GEN_A_CORE
Pt is a 60yo, w/ pmhx of Asthma, presenting to [facility] ED for mAB infusion for COVID-19. Pt states their symptoms started on Sunday 8/7, with fatigue, dry coughing, headaches. Pt took Tylenol for headaches with no relief.     Pt tested positive for COVID-19 on: 8/10/22 (Wednesday).    Denies: SOB, chest pain, vomiting, diarrhea, constipation, dysuria, dizziness or falls    Risk factors reviewed: CKD, Age > or = to 65, Diabetes, has immunosuppressive disease or on immunosuppressive therapy, > or = to 55 years of age w/ CVD, HTN, or COPD or other chronic respiratory disease.     Covid-19 Vaccination status: Moderna x 3 (last dose 11/2021)    Contraindications: Pt has had no allergic reactions in the past to mAB therapy and is not on any oxygen therapy    Pmhx: Pt had brain cancer and lung cancer, Asthma, decreased peripheral vision   Pshx: Gamma knife surgery   Pregnancy/Breastfeeding status: no/no  Home Medications: Albuterol/Advair   Allergies: PCN (anaphylaxis)    Vital Signs Last 24 Hrs  T(C): 36.6 (11 Aug 2022 14:58), Max: 36.6 (11 Aug 2022 14:58)  T(F): 97.8 (11 Aug 2022 14:58), Max: 97.8 (11 Aug 2022 14:58)  HR: 78 (11 Aug 2022 14:58) (78 - 78)  BP: 109/85 (11 Aug 2022 14:58) (109/85 - 109/85)  BP(mean): --  RR: 16 (11 Aug 2022 14:58) (16 - 16)  SpO2: 97% (11 Aug 2022 14:58) (97% - 97%)    Parameters below as of 11 Aug 2022 14:58  Patient On (Oxygen Delivery Method): room air    PE:  Appearance: A&Ox3, NAD  HEENT: Normal oral mucosa Lymphatic: No lymphadenopathy  Cardiovascular: Normal S1 S2, no acute cardiac distress noted.  Respiratory: Lungs clear to auscultation, no use of accessory muscles  Gastrointestinal:  Soft, Non-tender, + BS  Skin: Warm and dry, no rashes, masses, or lesions   Neurologic: Non-focal  Extremities: Normal range of motion    Assessment and Plan:  Patient is Covid-19 Positive and qualifies on Bebtelovimab infusion based on pmhx provided and current state of illness.    I have reviewed the Bebtelovimab infusion Emergency Use Authorization (EUA) and I have provided the patient or patient's caregiver with the following information:    1. FDA has authorized emergency use Bebtelovimab, which is not an FDA-approved biological product.  2. The patient or patient's caregiver has the option to accept or refuse administration of Bebtelovimab   3. The significant known and potential risks and benefits of  Bebtelovimab and the extent to which such risks and benefits are unknown.  4. Information on available alternative treatments and risks and benefits of those alternatives.    PLAN: - Infusion procedure explained to patient - Consent for monoclonal antibody infusion obtained - Risk & benefits discussed/all questions answered -  Bebtelovimab IV push once - Observe patient for one hour post infusion and discharge home, take vitals signs after 15 minutes and as needed per patient reactions     Consent form thoroughly reviewed and signed.  All questions answered.  Pt is okay with the plan.  Pt tolerated MAB infusion well.  No acute distress, noted.  Pt advised to follow-up with the PCP soon.  ER precautions, if symptoms worsen.

## 2022-08-12 ENCOUNTER — APPOINTMENT (OUTPATIENT)
Dept: DISASTER EMERGENCY | Facility: HOSPITAL | Age: 62
End: 2022-08-12

## 2022-08-13 DIAGNOSIS — C34.90 MALIGNANT NEOPLASM OF UNSPECIFIED PART OF UNSPECIFIED BRONCHUS OR LUNG: ICD-10-CM

## 2022-08-13 DIAGNOSIS — U07.1 COVID-19: ICD-10-CM

## 2022-08-13 DIAGNOSIS — J45.909 UNSPECIFIED ASTHMA, UNCOMPLICATED: ICD-10-CM

## 2022-08-13 DIAGNOSIS — Z85.841 PERSONAL HISTORY OF MALIGNANT NEOPLASM OF BRAIN: ICD-10-CM

## 2022-08-29 ENCOUNTER — OUTPATIENT (OUTPATIENT)
Dept: OUTPATIENT SERVICES | Facility: HOSPITAL | Age: 62
LOS: 1 days | End: 2022-08-29
Payer: MEDICARE

## 2022-08-29 ENCOUNTER — APPOINTMENT (OUTPATIENT)
Dept: MRI IMAGING | Facility: HOSPITAL | Age: 62
End: 2022-08-29

## 2022-08-29 ENCOUNTER — RESULT REVIEW (OUTPATIENT)
Age: 62
End: 2022-08-29

## 2022-08-29 DIAGNOSIS — Z90.49 ACQUIRED ABSENCE OF OTHER SPECIFIED PARTS OF DIGESTIVE TRACT: Chronic | ICD-10-CM

## 2022-08-29 DIAGNOSIS — Z98.890 OTHER SPECIFIED POSTPROCEDURAL STATES: Chronic | ICD-10-CM

## 2022-08-29 DIAGNOSIS — Z41.9 ENCOUNTER FOR PROCEDURE FOR PURPOSES OTHER THAN REMEDYING HEALTH STATE, UNSPECIFIED: Chronic | ICD-10-CM

## 2022-08-29 PROCEDURE — 70553 MRI BRAIN STEM W/O & W/DYE: CPT | Mod: 26,MH

## 2022-08-29 PROCEDURE — A9585: CPT

## 2022-08-29 PROCEDURE — 70553 MRI BRAIN STEM W/O & W/DYE: CPT | Mod: MH

## 2022-09-09 ENCOUNTER — NON-APPOINTMENT (OUTPATIENT)
Age: 62
End: 2022-09-09

## 2022-09-09 ENCOUNTER — APPOINTMENT (OUTPATIENT)
Dept: NEUROSURGERY | Facility: CLINIC | Age: 62
End: 2022-09-09

## 2022-09-09 VITALS
WEIGHT: 215 LBS | SYSTOLIC BLOOD PRESSURE: 119 MMHG | BODY MASS INDEX: 35.82 KG/M2 | HEART RATE: 72 BPM | TEMPERATURE: 97.3 F | DIASTOLIC BLOOD PRESSURE: 76 MMHG | HEIGHT: 65 IN | OXYGEN SATURATION: 96 %

## 2022-09-09 PROCEDURE — 99214 OFFICE O/P EST MOD 30 MIN: CPT

## 2022-09-11 NOTE — HISTORY OF PRESENT ILLNESS
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 She previously underwent left parietal craniotomy for tumor resection and adjuvant radiation approximately 3 years ago In November 2017 at another institution. She also had GKRS on 1/8/18 to a right parietal brain metastasis with Dr. Rico. MRI brain on admission demonstrated a recurrent 3 cm left parietal enhancing brain mass in the area of prior tumor resection. She underwent surgical resection of left parietal mass on 5/13/2020 with Dr. Rico. Pathology indicated radiation necrosis. \par \par Postoperatively, her right hand weakness improved. She does have right visual field cut. She was referred to Dr. Andrea Gutierrez for neuro opthalmology consult.\par \par In July 2020, Ms. Mnedoza was hospitalized at [facility] due to sudden onset difficulty speaking as well as RIGHT hand weakness. She was admitted and given steroids and seizure medication. She was weaned off the dexamethasone over the next several weeks. She reported improvement in right hand weakness.\par \par She remains on keppra 750 mg bid.\par \par MRI brain with and without contrast, done on 2/3/21, was stable. \par \par On 5/7/21, she reported RIGHT hand weakness. While in the office and during visit, she developed acute expressive aphasia and total RIGHT hemiparesis including RIGHT leg. Prior to this, she stated she had not taken keppra X 2 days. She was taken to the ED and had generalized tonic clonic seizure. She was discharged with 1000 mg keppra bid.\par \par She had 4 cycles of IV avastin and had repeat MRI brain done on 8/10/21, demonstrated decrease in left parietal enhancement and previously seen cortical foci of enhancement in right parietal lobe was not visualized.\par \par MRI brain with and without contrast from 11/15/21 was stable with no new areas of enhancement.\par Denied new neurological symptoms. Remains on keppra and oxcarbazepine for seizures. Denies seizures.\par \par MRI brain with and without contrast from 4/28/22, stable. \par \par She returns today to review MRI brain with and without contrast done on 8/29/22, which is stable.\par She is doing well. well. Denies new neurological symptoms. Denies headaches, seizures, focal weakness, new vision change. She remains on Keppra 750 mg BID and Oxcarbazepine. \par  \par

## 2022-09-11 NOTE — DATA REVIEWED
[de-identified] : \par  MR Head w/wo IV Cont             Final\par \par No Documents Attached\par \par \par \par \par   ACC: [id number]     EXAM:  MR BRAIN WAW IC\par \par PROCEDURE DATE:  08/29/2022\par \par \par \par INTERPRETATION:  PROCEDURE: MRI Brain with and without contrast\par \par INDICATION: Follow-up intracranial metastasis\par \par TECHNIQUE: Sagittal T1 volumetric series with MPR, axial T2, T2-FLAIR, T2-FFE and diffusion imaging of the brain is obtained. Following the intravenous administration of 7 mL Gadavist contrast material, sagittal T1 volumetric imaging with MPR provided.\par \par COMPARISON: MRI brain 4/20/2022, 2/22/2022 and 8/10/2021\par \par FINDINGS:\par \par Since prior MRI brain there is no significant interval change in amorphous enhancement in the left parietal lobe extending into the periventricular region with mild surrounding vasogenic edema and gliosis. There is several foci of enhancement within the paramedian right occipital lobe and inferior right parietal lobe (for example series 701 image 102, series 702 image 20 and 21). These lesions are not significant change from 4/20/2022 but are new from 2/22/2022. There is minimal associated FLAIR signal abnormality.\par \par There are no new enhancing lesions.\par \par There is no acute hydrocephalus.. The diffusion-weighted images demonstrate no recent infarction.\par \par There is susceptibility related signal loss deep to the left parietal craniotomy site secondary to postsurgical changes. The vascular flow voids are present.\par \par The sella and suprasellar regions are unremarkable. There is mucosal thickening the paranasal sinuses.. The mastoid air cells are well-aerated.\par \par IMPRESSION:\par \par Since prior MRI brain 4/28/2022, no significant interval change in a mortise enhancement (lobe extending to the periventricular white matter which may represent posttreatment changes. Several foci of enhancement within the paramedian right occipital and inferior parietal lobe which in retrospect are similar to 4/28/2022 but new from 2/22/2022. This may represent posttreatment changes and continued follow-up is recommended.\par \par --- End of Report ---\par \par \par \par \par \par KIRSTIE POWERS MD; Attending Radiologist\par This document has been electronically signed. Sep  4 2022  2:27PM\par \par  \par \par  Ordered by: MAGUI FLAHERTY       Collected/Examined: [id number] 11:46AM       \par Verification Required       Stage: Final       \par  Performed at: [facility]       Resulted: 04Sep2022 02:15PM       Last Updated: 04Sep2022 02:30PM       Accession: [id number]

## 2022-09-11 NOTE — REASON FOR VISIT
[FreeTextEntry1] : to review 3 month follow up MRI brain for hx of brain mets (right parietal and left parietal of prior tumor resection and radiation)

## 2022-09-11 NOTE — ASSESSMENT
[FreeTextEntry1] : MRI brain with and without contrast done 8/29/22 reviewed by Dr. Rico with patient, demonstrates no change.\par Recommend repeat MRI brain in 4 months (December 2022). Return to the office after MRI complete to review.\par \par Patient verbalizes agreement and understanding with plan of care.\par \par I, Dr. Rico, personally performed the evaluation and management (E/M) services for this established patient who presents today with (a) new problem(s)/exacerbation of (an) existing condition(s).  That E/M includes conducting the examination, assessing all new/exacerbated conditions, and establishing a new plan of care.  Today, my ACP, Balbina Acuna, was here to observe my evaluation and management services for this new problem/exacerbated condition to be followed going forward.\par \par

## 2022-09-14 ENCOUNTER — RX RENEWAL (OUTPATIENT)
Age: 62
End: 2022-09-14

## 2022-10-07 ENCOUNTER — APPOINTMENT (OUTPATIENT)
Dept: NEUROLOGY | Facility: CLINIC | Age: 62
End: 2022-10-07

## 2022-10-07 PROCEDURE — 99213 OFFICE O/P EST LOW 20 MIN: CPT | Mod: 95

## 2022-11-09 ENCOUNTER — INPATIENT (INPATIENT)
Facility: HOSPITAL | Age: 62
LOS: 0 days | Discharge: HOME | DRG: 54 | End: 2022-11-10
Attending: NEUROLOGICAL SURGERY | Admitting: NEUROLOGICAL SURGERY
Payer: COMMERCIAL

## 2022-11-09 ENCOUNTER — HOSPITAL ENCOUNTER (EMERGENCY)
Dept: HOSPITAL 74 - JER | Age: 62
Discharge: ANOTHER HOSPITAL | End: 2022-11-09
Payer: COMMERCIAL

## 2022-11-09 VITALS — DIASTOLIC BLOOD PRESSURE: 81 MMHG | SYSTOLIC BLOOD PRESSURE: 125 MMHG | TEMPERATURE: 98.2 F

## 2022-11-09 VITALS — BODY MASS INDEX: 37.8 KG/M2

## 2022-11-09 VITALS
OXYGEN SATURATION: 97 % | SYSTOLIC BLOOD PRESSURE: 113 MMHG | TEMPERATURE: 99 F | HEART RATE: 78 BPM | HEIGHT: 66 IN | RESPIRATION RATE: 16 BRPM | DIASTOLIC BLOOD PRESSURE: 77 MMHG | WEIGHT: 179.9 LBS

## 2022-11-09 VITALS — RESPIRATION RATE: 18 BRPM | HEART RATE: 75 BPM

## 2022-11-09 DIAGNOSIS — Z41.9 ENCOUNTER FOR PROCEDURE FOR PURPOSES OTHER THAN REMEDYING HEALTH STATE, UNSPECIFIED: Chronic | ICD-10-CM

## 2022-11-09 DIAGNOSIS — R51.9: Primary | ICD-10-CM

## 2022-11-09 DIAGNOSIS — Z98.890 OTHER SPECIFIED POSTPROCEDURAL STATES: Chronic | ICD-10-CM

## 2022-11-09 DIAGNOSIS — Z90.49 ACQUIRED ABSENCE OF OTHER SPECIFIED PARTS OF DIGESTIVE TRACT: Chronic | ICD-10-CM

## 2022-11-09 LAB
ALBUMIN SERPL ELPH-MCNC: 4.7 G/DL — SIGNIFICANT CHANGE UP (ref 3.3–5)
ALBUMIN SERPL-MCNC: 3.6 G/DL (ref 3.4–5)
ALP SERPL-CCNC: 104 U/L — SIGNIFICANT CHANGE UP (ref 40–120)
ALP SERPL-CCNC: 98 U/L (ref 45–117)
ALT FLD-CCNC: 15 U/L — SIGNIFICANT CHANGE UP (ref 10–45)
ALT SERPL-CCNC: 25 U/L (ref 13–61)
ANION GAP SERPL CALC-SCNC: 6 MMOL/L (ref 8–16)
ANION GAP SERPL CALC-SCNC: 9 MMOL/L — SIGNIFICANT CHANGE UP (ref 5–17)
AST SERPL-CCNC: 17 U/L — SIGNIFICANT CHANGE UP (ref 10–40)
AST SERPL-CCNC: 19 U/L (ref 15–37)
BASOPHILS # BLD AUTO: 0.02 K/UL — SIGNIFICANT CHANGE UP (ref 0–0.2)
BASOPHILS # BLD: 0.3 % (ref 0–2)
BASOPHILS NFR BLD AUTO: 0.2 % — SIGNIFICANT CHANGE UP (ref 0–2)
BILIRUB SERPL-MCNC: 0.2 MG/DL — SIGNIFICANT CHANGE UP (ref 0.2–1.2)
BILIRUB SERPL-MCNC: 0.3 MG/DL (ref 0.2–1)
BUN SERPL-MCNC: 11 MG/DL (ref 7–18)
BUN SERPL-MCNC: 8 MG/DL — SIGNIFICANT CHANGE UP (ref 7–23)
CALCIUM SERPL-MCNC: 9.4 MG/DL (ref 8.5–10.1)
CALCIUM SERPL-MCNC: 9.8 MG/DL — SIGNIFICANT CHANGE UP (ref 8.4–10.5)
CHLORIDE SERPL-SCNC: 101 MMOL/L (ref 98–107)
CHLORIDE SERPL-SCNC: 97 MMOL/L — SIGNIFICANT CHANGE UP (ref 96–108)
CO2 SERPL-SCNC: 27 MMOL/L — SIGNIFICANT CHANGE UP (ref 22–31)
CO2 SERPL-SCNC: 29 MMOL/L (ref 21–32)
CREAT SERPL-MCNC: 0.65 MG/DL — SIGNIFICANT CHANGE UP (ref 0.5–1.3)
CREAT SERPL-MCNC: 0.7 MG/DL (ref 0.55–1.3)
DEPRECATED RDW RBC AUTO: 14.3 % (ref 11.6–15.6)
EGFR: 98 ML/MIN/1.73M2 — SIGNIFICANT CHANGE UP
EOSINOPHIL # BLD AUTO: 0 K/UL — SIGNIFICANT CHANGE UP (ref 0–0.5)
EOSINOPHIL # BLD: 0.8 % (ref 0–4.5)
EOSINOPHIL NFR BLD AUTO: 0 % — SIGNIFICANT CHANGE UP (ref 0–6)
GLUCOSE SERPL-MCNC: 106 MG/DL (ref 74–106)
GLUCOSE SERPL-MCNC: 118 MG/DL — HIGH (ref 70–99)
HCT VFR BLD CALC: 41.1 % (ref 32.4–45.2)
HCT VFR BLD CALC: 43.5 % — SIGNIFICANT CHANGE UP (ref 34.5–45)
HGB BLD-MCNC: 14 GM/DL (ref 10.7–15.3)
HGB BLD-MCNC: 14.6 G/DL — SIGNIFICANT CHANGE UP (ref 11.5–15.5)
IMM GRANULOCYTES NFR BLD AUTO: 0.4 % — SIGNIFICANT CHANGE UP (ref 0–0.9)
LYMPHOCYTES # BLD AUTO: 0.82 K/UL — LOW (ref 1–3.3)
LYMPHOCYTES # BLD AUTO: 7.9 % — LOW (ref 13–44)
LYMPHOCYTES # BLD: 16.5 % (ref 8–40)
MCH RBC QN AUTO: 29.3 PG (ref 25.7–33.7)
MCHC RBC AUTO-ENTMCNC: 34 G/DL (ref 32–36)
MCHC RBC-ENTMCNC: 29.2 PG — SIGNIFICANT CHANGE UP (ref 27–34)
MCHC RBC-ENTMCNC: 33.6 GM/DL — SIGNIFICANT CHANGE UP (ref 32–36)
MCV RBC AUTO: 87 FL — SIGNIFICANT CHANGE UP (ref 80–100)
MCV RBC: 86.1 FL (ref 80–96)
MONOCYTES # BLD AUTO: 0.1 K/UL — SIGNIFICANT CHANGE UP (ref 0–0.9)
MONOCYTES # BLD AUTO: 6.4 % (ref 3.8–10.2)
MONOCYTES NFR BLD AUTO: 1 % — LOW (ref 2–14)
NEUTROPHILS # BLD AUTO: 9.34 K/UL — HIGH (ref 1.8–7.4)
NEUTROPHILS # BLD: 76 % (ref 42.8–82.8)
NEUTROPHILS NFR BLD AUTO: 90.5 % — HIGH (ref 43–77)
NRBC # BLD: 0 /100 WBCS — SIGNIFICANT CHANGE UP (ref 0–0)
PLATELET # BLD AUTO: 325 K/UL — SIGNIFICANT CHANGE UP (ref 150–400)
PLATELET # BLD AUTO: 327 10^3/UL (ref 134–434)
PMV BLD: 6.7 FL (ref 7.5–11.1)
POTASSIUM SERPL-MCNC: 4.1 MMOL/L — SIGNIFICANT CHANGE UP (ref 3.5–5.3)
POTASSIUM SERPL-SCNC: 4.1 MMOL/L — SIGNIFICANT CHANGE UP (ref 3.5–5.3)
PROT SERPL-MCNC: 7.5 G/DL (ref 6.4–8.2)
PROT SERPL-MCNC: 8.3 G/DL — SIGNIFICANT CHANGE UP (ref 6–8.3)
RBC # BLD AUTO: 4.77 M/MM3 (ref 3.6–5.2)
RBC # BLD: 5 M/UL — SIGNIFICANT CHANGE UP (ref 3.8–5.2)
RBC # FLD: 13.4 % — SIGNIFICANT CHANGE UP (ref 10.3–14.5)
SARS-COV-2 RNA SPEC QL NAA+PROBE: NEGATIVE — SIGNIFICANT CHANGE UP
SODIUM SERPL-SCNC: 133 MMOL/L — LOW (ref 135–145)
SODIUM SERPL-SCNC: 136 MMOL/L (ref 136–145)
WBC # BLD AUTO: 7.6 K/MM3 (ref 4–10)
WBC # BLD: 10.32 K/UL — SIGNIFICANT CHANGE UP (ref 3.8–10.5)
WBC # FLD AUTO: 10.32 K/UL — SIGNIFICANT CHANGE UP (ref 3.8–10.5)

## 2022-11-09 PROCEDURE — 3E033GC INTRODUCTION OF OTHER THERAPEUTIC SUBSTANCE INTO PERIPHERAL VEIN, PERCUTANEOUS APPROACH: ICD-10-PCS | Performed by: EMERGENCY MEDICINE

## 2022-11-09 PROCEDURE — 70553 MRI BRAIN STEM W/O & W/DYE: CPT | Mod: 26

## 2022-11-09 PROCEDURE — 3E0333Z INTRODUCTION OF ANTI-INFLAMMATORY INTO PERIPHERAL VEIN, PERCUTANEOUS APPROACH: ICD-10-PCS | Performed by: EMERGENCY MEDICINE

## 2022-11-09 PROCEDURE — 99285 EMERGENCY DEPT VISIT HI MDM: CPT

## 2022-11-09 RX ORDER — SENNA PLUS 8.6 MG/1
2 TABLET ORAL AT BEDTIME
Refills: 0 | Status: DISCONTINUED | OUTPATIENT
Start: 2022-11-09 | End: 2022-11-10

## 2022-11-09 RX ORDER — ONDANSETRON 8 MG/1
4 TABLET, FILM COATED ORAL EVERY 6 HOURS
Refills: 0 | Status: DISCONTINUED | OUTPATIENT
Start: 2022-11-09 | End: 2022-11-10

## 2022-11-09 RX ORDER — KETOROLAC TROMETHAMINE 30 MG/ML
15 SYRINGE (ML) INJECTION ONCE
Refills: 0 | Status: DISCONTINUED | OUTPATIENT
Start: 2022-11-09 | End: 2022-11-09

## 2022-11-09 RX ORDER — ALBUTEROL 90 UG/1
2 AEROSOL, METERED ORAL EVERY 6 HOURS
Refills: 0 | Status: DISCONTINUED | OUTPATIENT
Start: 2022-11-09 | End: 2022-11-10

## 2022-11-09 RX ORDER — LEVETIRACETAM 250 MG/1
1500 TABLET, FILM COATED ORAL EVERY 12 HOURS
Refills: 0 | Status: DISCONTINUED | OUTPATIENT
Start: 2022-11-09 | End: 2022-11-10

## 2022-11-09 RX ORDER — OXCARBAZEPINE 300 MG/1
150 TABLET, FILM COATED ORAL
Refills: 0 | Status: DISCONTINUED | OUTPATIENT
Start: 2022-11-10 | End: 2022-11-10

## 2022-11-09 RX ORDER — OXYCODONE HYDROCHLORIDE 5 MG/1
5 TABLET ORAL EVERY 4 HOURS
Refills: 0 | Status: DISCONTINUED | OUTPATIENT
Start: 2022-11-09 | End: 2022-11-10

## 2022-11-09 RX ORDER — SODIUM CHLORIDE 9 MG/ML
3 INJECTION INTRAMUSCULAR; INTRAVENOUS; SUBCUTANEOUS EVERY 8 HOURS
Refills: 0 | Status: DISCONTINUED | OUTPATIENT
Start: 2022-11-09 | End: 2022-11-10

## 2022-11-09 RX ORDER — ACETAMINOPHEN 500 MG
650 TABLET ORAL EVERY 6 HOURS
Refills: 0 | Status: DISCONTINUED | OUTPATIENT
Start: 2022-11-09 | End: 2022-11-10

## 2022-11-09 RX ORDER — IPRATROPIUM/ALBUTEROL SULFATE 18-103MCG
3 AEROSOL WITH ADAPTER (GRAM) INHALATION EVERY 6 HOURS
Refills: 0 | Status: DISCONTINUED | OUTPATIENT
Start: 2022-11-09 | End: 2022-11-10

## 2022-11-09 RX ORDER — OXCARBAZEPINE 300 MG/1
450 TABLET, FILM COATED ORAL ONCE
Refills: 0 | Status: COMPLETED | OUTPATIENT
Start: 2022-11-09 | End: 2022-11-09

## 2022-11-09 RX ORDER — OXYCODONE HYDROCHLORIDE 5 MG/1
10 TABLET ORAL EVERY 4 HOURS
Refills: 0 | Status: DISCONTINUED | OUTPATIENT
Start: 2022-11-09 | End: 2022-11-10

## 2022-11-09 RX ORDER — SODIUM CHLORIDE 9 MG/ML
2 INJECTION INTRAMUSCULAR; INTRAVENOUS; SUBCUTANEOUS EVERY 8 HOURS
Refills: 0 | Status: DISCONTINUED | OUTPATIENT
Start: 2022-11-09 | End: 2022-11-10

## 2022-11-09 RX ORDER — SIMVASTATIN 20 MG/1
40 TABLET, FILM COATED ORAL AT BEDTIME
Refills: 0 | Status: DISCONTINUED | OUTPATIENT
Start: 2022-11-09 | End: 2022-11-10

## 2022-11-09 RX ADMIN — SODIUM CHLORIDE 3 MILLILITER(S): 9 INJECTION INTRAMUSCULAR; INTRAVENOUS; SUBCUTANEOUS at 21:22

## 2022-11-09 RX ADMIN — SIMVASTATIN 40 MILLIGRAM(S): 20 TABLET, FILM COATED ORAL at 22:19

## 2022-11-09 RX ADMIN — OXCARBAZEPINE 450 MILLIGRAM(S): 300 TABLET, FILM COATED ORAL at 20:39

## 2022-11-09 RX ADMIN — Medication 15 MILLIGRAM(S): at 20:39

## 2022-11-09 RX ADMIN — Medication 15 MILLIGRAM(S): at 21:10

## 2022-11-09 RX ADMIN — LEVETIRACETAM 1500 MILLIGRAM(S): 250 TABLET, FILM COATED ORAL at 22:19

## 2022-11-09 NOTE — ED PROVIDER NOTE - OBJECTIVE STATEMENT
Pt w/ PMHx COPD, lung adenocarcinoma (s/p RLL lobectomy 2016 Dr. 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 Barclay), transferred from [facility] for NRS evaluation s/p pt presented there earlier today for evaluation of HA a/w photophobia and nausea x3 days. Patient seen and evaluated at OSH for headaches, given 1500mg Keppra, 10mg decadron, and oxycodone 10mg with little relief. CTH at 2:30pm showed stable L parietal region of vasogenic edema when compared to previous CTH and MRI completed in August 2022, no evidence of hemorrhage or infarct. Pt reports she has intermittent headaches at home that are usually controlled with Tylenol but her recent headaches have not been improving with OTC pain meds. Pt reports h/o chronic low back pain for which she occasionally takes oxycodone 10mg PRN, states she has not taken any recently. Pt denies recent falls/head trauma, recent seizure, vision changes, dizziness, LOC/fainting, gait instability, new weakness, numbness/tingling, vomiting, fever/chills, recent illness or known sick contact.

## 2022-11-09 NOTE — H&P ADULT - NSHPPHYSICALEXAM_GEN_ALL_CORE
General: pt in mild distress due to headache, A&O x3, on RA  HEENT: CN II-XII grossly intact, PERRL 3mm, EOMI b/l, face symmetric, tongue midline, neck FROM  Cardiovascular: RRR, normal S1 and S2   Respiratory: lungs CTAB, no wheezing, rhonchi, or crackles   GI: normoactive BS to auscultation, abd soft, NTND   Neuro: no aphasia, speech clear, no dysmetria, no pronator drift  b/l UE and LLE strength 5/5, RLE strength 4/5 limited to pain (baseline s/p prior injury)   sensation intact to light touch throughout   Extremities: distal pulses 2+ x4

## 2022-11-09 NOTE — H&P ADULT - ASSESSMENT
63 yo F w/ PMHx COPD, lung adenocarcinoma (s/p RLL lobectomy 2016 Dr. 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 Barclay), presents to [facility] ED today after seen in [facility] ED for evaluation of HA a/w photophobia and nausea x3 days. CTH at 2:30pm showed stable L parietal region of vasogenic edema when compared to previous CTH and MRI completed in August 2022, no evidence of hemorrhage or infarct. Transferred to [facility] for headache work-up and pain control.     PLAN:   NEURO:  - neuro and vitals q4hrs   - pain control: tylenol, oxycodone PRN   - pend MRI brain w/ and w/o IV contrast   - cont home AEDs: Oxcarbazepine 150mg BID, Keppra 1500mg BID   - consult Neurology in am, pt of Dr. Barclay's     CARDIO:  - SBP goal normotensive   - no current issues     PULM:  - O2 sat goal > 92%   - nebs prn for COPD     GI:  - regular diet   - bowel regimen PRN     RENAL:  - normonatremia goal   - started on NaCl tabs for Na 133 on admission, likely d/t oxcarbazepine, will monitor in am, will adjust with epillepsy if indicated   - urine studies ordered for hyponetremia   - voiding     ENDO:  - no active issues     ID:  - afebrile, no leukocytosis     HEME/ONC:   - h/o lung CA, cont nebs as needed   - no DVT chemoppx at this time, SCDs b/l LE     DISPO: tele status, full code, dispo pend MRI and headache work-up     Assessment and plan discussed with Dr. Rico

## 2022-11-09 NOTE — CONSULT NOTE ADULT - SUBJECTIVE AND OBJECTIVE BOX
CHIEF COMPLAINT/ REASON FOR CONSULTATION:      HPI:    PAST MEDICAL HISTORY   Pulmonary nodules    Asthma    COPD (chronic obstructive pulmonary disease)    Adenocarcinoma of lung    MRSA (methicillin resistant Staphylococcus aureus)    Dyspnea    Brain metastasis    HLD (hyperlipidemia)    Chronic back pain      PAST SURGICAL HISTORY   No significant past surgical history    History of appendectomy    Surgery, elective    H/O brain surgery    History of lung surgery      penicillin (Anaphylaxis)  penicillin (Angioedema)      MEDICATIONS:  Antibiotics:    Neuro:    Anticoagulation:    Other:      SOCIAL HISTORY:   Occupation:   Marital Status:     FAMILY HISTORY:  No pertinent family history in first degree relatives    Family history of essential hypertension (Mother, Sibling)    Family history of diabetes mellitus (Mother, Sibling)    Family history of uterine cancer (Sibling)    Family history of cancer in mother (Mother)    No family history of cardiovascular disease (Father)        REVIEW OF SYSTEMS:  Check here if all are normal other than Neurological []  General:  Eyes:  ENT:  Cardiac:  Respiratory:  GI:  Musculoskeletal:   Skin:  Neurologic:   Psychiatric:     PHYSICAL EXAMINATION:   T(C): 37 (11-09-22 @ 18:54), Max: 37 (11-09-22 @ 18:54)  HR: 78 (11-09-22 @ 18:54) (78 - 78)  BP: 113/77 (11-09-22 @ 18:54) (113/77 - 113/77)  RR: 16 (11-09-22 @ 18:54) (16 - 16)  SpO2: 97% (11-09-22 @ 18:54) (97% - 97%)  Wt(kg): --Height (cm): 167.6 (11-09 @ 18:54)  Weight (kg): 81.6 (11-09 @ 18:54)    General Examination:     Neurologic Examination:             Higher functions                 Normal []               Abnormal:      Cranial Nerves (ii-xii)           Normal []              Abnormal:     Motor Exam                       Normal []              Abnormal:                               Sensory Exam                   Normal []              Abnormal:    Reflexes                            Normal []              Abnormal:     Coordination                      Normal []              Abnormal:    Other:     LABS:                        14.6   10.32 )-----------( 325      ( 09 Nov 2022 19:10 )             43.5     11-09    133<L>  |  97  |  8   ----------------------------<  118<H>  4.1   |  27  |  0.65    Ca    9.8      09 Nov 2022 19:10    TPro  8.3  /  Alb  4.7  /  TBili  0.2  /  DBili  x   /  AST  17  /  ALT  15  /  AlkPhos  104  11-09          RADIOLOGY & ADDITIONAL STUDIES:

## 2022-11-09 NOTE — H&P ADULT - NSHPLABSRESULTS_GEN_ALL_CORE
< from: MR Head w/wo IV Cont (08.29.22 @ 11:46) >    IMPRESSION:    Since prior MRI brain 4/28/2022, no significant interval change in a   mortise enhancement (lobe extending to the periventricular white matter   which may represent posttreatment changes. Severalfoci of enhancement   within the paramedian right occipital and inferior parietal lobe which in   retrospect are similar to 4/28/2022 but new from 2/22/2022. This may   represent posttreatment changes and continued follow-up is recommended.

## 2022-11-09 NOTE — H&P ADULT - NSICDXPASTMEDICALHX_GEN_ALL_CORE_FT
PAST MEDICAL HISTORY:  Adenocarcinoma of lung     Brain metastasis brain tumor s/p resection 5/2020    Chronic back pain     COPD (chronic obstructive pulmonary disease)     HLD (hyperlipidemia)     MRSA (methicillin resistant Staphylococcus aureus) history of

## 2022-11-09 NOTE — ED ADULT TRIAGE NOTE - PATIENT ON (OXYGEN DELIVERY METHOD)
Patient is presenting to the Emergency Department with a request to have COVID-19 testing.  Denies symptoms, including cough, shortness of breath, fever, chills, bodyaches or sore throat.
room air

## 2022-11-09 NOTE — ED ADULT TRIAGE NOTE - CHIEF COMPLAINT QUOTE
Pt RIA from [facility] for neurosurgical evaluation. Pt w/ hx of lung ca w/ mets to brain w/ surgery in 2018 w/ Dr. Rico. Pt c/o generalized headache x 3 days associated w/ nausea. Denies focal weakness or numbness, vision changes, neck pain, CP, SOB, back pain, vomiting.

## 2022-11-09 NOTE — CONSULT NOTE ADULT - ASSESSMENT
PLAN: Incomplete***   - CTH from OSH reviewed with Dr. D'Amico, stable L parietal vasogenic edema as compared to prior study, recent MRI brain 8/2022 overall stable compared to prior studies   - recommend MRI brain w/ and w/o IV contrast   - headache management,   - continue home AEDs (Onfi and Keppra)         Dr. Rico   [address] [place] [address]

## 2022-11-09 NOTE — ED ADULT NURSE NOTE - OBJECTIVE STATEMENT
62 y.o F a&ox4 BIBA from [facility] c.o headache. x 3 days of frontal lobe headache, nausea. denies vomiting, dizziness, numbness, tingling, weakness. had CTA today was told to come in for NRSG eval. PMH of lung ca w/ mets to brain in 2017 by MD dan. speaking in clear, full coherent sentences, airway patent. breathing unlabored.

## 2022-11-09 NOTE — H&P ADULT - NSICDXPASTSURGICALHX_GEN_ALL_CORE_FT
PAST SURGICAL HISTORY:  H/O brain surgery 5/2020    History of appendectomy     History of lung surgery right wedge resection    Surgery, elective lung biopsy

## 2022-11-09 NOTE — ED PROVIDER NOTE - PHYSICAL EXAMINATION
Constitutional: Well appearing, awake, alert, oriented to person, place, time/situation and in no apparent distress.  ENMT: Airway patent. Normal MM  Eyes: Clear bilaterally, PERRL, EOM. No nystagmus  Cardiac: Normal rate, regular rhythm.  Heart sounds S1, S2.  No murmurs, rubs or gallops.  Respiratory: Breaths sounds equal and clear b/l. No increased WOB, tachypnea, hypoxia, or accessory mm use. Pt speaks in full sentences.   Gastrointestinal: Abd soft, NT, ND, NABS. No guarding, rebound, or rigidity. No pulsatile abdominal masses.   Musculoskeletal: Range of motion is not limited. Neck supple. No meningeal signs  Neuro: Alert & Oriented x 3. CN II-XII intact. No facial droop. Clear speech. MORSE w/ 5/5 strength x 4 ext. Normal sensation. No pronator drift. No dysdidokinesia nor dysmetria. Normal heel-to-shin.   Skin: Skin normal color for race, warm, dry and intact. No evidence of rash.  Psych: Alert and oriented to person, place, time/situation. normal mood and affect. no apparent risk to self or others.

## 2022-11-09 NOTE — ED ADULT NURSE NOTE - CHIEF COMPLAINT QUOTE
Pt RIA from [facility] for neurosurgical evaluation. Pt w/ hx of lung ca w/ mets to brain w/ surgery in 2018 w/ Dr. Rico. Pt c/o generalized headache x 3 days associated w/ nausea. Denies focal weakness or numbness, vision changes, neck pain, CP, SOB, back pain, vomiting.

## 2022-11-09 NOTE — ED ADULT TRIAGE NOTE - PRO INTERPRETER NEED 2
Problem: Altered Thought Process (Adult/Pediatric)  Goal: *STG: Remains safe in hospital  Outcome: Progressing Towards Goal  Pt rested quietly in bed with eyes closed. No signs/symptoms of distress. Will monitor for changes. Q 15 minute checks continue. 2 hour chart audit done.  Slept  8   hours since [address]

## 2022-11-09 NOTE — H&P ADULT - HISTORY OF PRESENT ILLNESS
61 yo F w/ PMHx COPD, lung adenocarcinoma (s/p RLL lobectomy 2016 Dr. 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 Barclay), presents to [facility] ED today after seen in [facility] ED for evaluation of HA a/w photophobia and nausea x3 days. Patient seen and evaluated at OSH for headaches, given 1500mg Keppra, 10mg decadron, and oxycodone 10mg with little relief. CTH at 2:30pm showed stable L parietal region of vasogenic edema when compared to previous CTH and MRI completed in August 2022, no evidence of hemorrhage or infarct. Pt reports she has intermittent headaches at home that are usually controlled with tylenol but her recent headaches have not been improving with OTC pain meds. Pt reports h/o chronic low back pain for which she occasionally takes oxycodone 10mg PRN, states she has not taken any recently. Pt denies recent falls/head trauma, recent seizure, vision changes, dizziness, LOC/fainting, gait instability, new weakness, numbness/tingling, vomiting, fever/chills, recent illness or known sick contact.

## 2022-11-09 NOTE — ED ADULT NURSE NOTE - NSICDXPASTSURGICALHX_GEN_ALL_CORE_FT
friend
PAST SURGICAL HISTORY:  H/O brain surgery     History of appendectomy     History of lung surgery right wedge resection    Surgery, elective lung biopsy

## 2022-11-09 NOTE — H&P ADULT - NSHPADDITIONALINFOADULT_GEN_ALL_CORE
Patient with history of metastatic brain tumor, radiation necrosis, seizure presents with intractable headaches. MRI showing chronic left parietal cerebral edema and gliosis, no tavo tumor recurrence appreciated. Headaches improved after given steroids and pain meds. Stable for outpatient followup.    Kapil Rico M.D.

## 2022-11-09 NOTE — H&P ADULT - NSHPSOCIALHISTORY_GEN_ALL_CORE
Patient denies ETOH or recreational drug use. Pt former smoker, quit approximately 6yrs ago. Ambulates independently at home.

## 2022-11-09 NOTE — H&P ADULT - NSHPREVIEWOFSYSTEMS_GEN_ALL_CORE
REVIEW OF SYSTEMS:    CONSTITUTIONAL: No weakness, fevers or chills  EYES/ENT: No visual changes;  No vertigo or throat pain. +photophobia.   NECK: No pain or stiffness  RESPIRATORY: No cough, wheezing, hemoptysis; No shortness of breath  CARDIOVASCULAR: No chest pain or palpitations  GASTROINTESTINAL: No abdominal or epigastric pain. + nausea, no vomiting, or hematemesis; No diarrhea or constipation. No melena or hematochezia.  GENITOURINARY: No dysuria, frequency or hematuria  NEUROLOGICAL: No numbness or weakness. +headache.   SKIN: No itching, burning, rashes, or lesions   All other review of systems is negative unless indicated above.

## 2022-11-09 NOTE — ED PROVIDER NOTE - CLINICAL SUMMARY MEDICAL DECISION MAKING FREE TEXT BOX
Pt transferred to [facility] ED for NRS evaluation from [facility] w/ intractable HA in the setting of hx of lung CA w/ brain mets, resected by Dr Rico. Neuro intact. Analgesia, NRS consult. Anticipate MRI. Dispo pending NRS recommendations

## 2022-11-09 NOTE — ED ADULT NURSE NOTE - ALCOHOL PRE SCREEN (AUDIT - C)
[EDS: ESS=____] : daytime somnolence: ESS=[unfilled] [Fatigue] : fatigue [Snoring] : snoring [Witnessed Apneas] : witnessed apnea [A.M. Dry Mouth] : a.m. dry mouth [Obesity] : obesity [Difficulty Maintaining Sleep] : difficulty maintaining sleep [Hypersomnolence] : sleeping much more than usual [Negative] : Psychiatric [Depression] : no depression [Anxious] : not anxious [Difficulty Initiating Sleep] : no difficulty falling asleep [Lower Extremity Discomfort] : no lower extremity discomfort [Irresistible urge to move legs] : no irresistible urge to move legs because of lower extremity discomfort [Late day/ Evening symptoms] : no late day/evening symptoms [Sleep Disturbances due to LE symptoms] : ~T no sleep disturbances due to lower extremity symptoms [Unusual Sleep Behavior] : no unusual sleep behavior [Cataplexy] :  no cataplexy [Hypnogogic Hallucinations] : no hypnogogic hallucinations [Hypnopompic Hallucinations] : no hypnopompic hallucinations [Sleep Paralysis] : no sleep paralysis Statement Selected

## 2022-11-10 ENCOUNTER — TRANSCRIPTION ENCOUNTER (OUTPATIENT)
Age: 62
End: 2022-11-10

## 2022-11-10 VITALS
RESPIRATION RATE: 16 BRPM | DIASTOLIC BLOOD PRESSURE: 67 MMHG | OXYGEN SATURATION: 98 % | SYSTOLIC BLOOD PRESSURE: 111 MMHG | HEART RATE: 77 BPM

## 2022-11-10 LAB
ANION GAP SERPL CALC-SCNC: 10 MMOL/L — SIGNIFICANT CHANGE UP (ref 5–17)
BUN SERPL-MCNC: 15 MG/DL — SIGNIFICANT CHANGE UP (ref 7–23)
CALCIUM SERPL-MCNC: 9.5 MG/DL — SIGNIFICANT CHANGE UP (ref 8.4–10.5)
CHLORIDE SERPL-SCNC: 98 MMOL/L — SIGNIFICANT CHANGE UP (ref 96–108)
CO2 SERPL-SCNC: 23 MMOL/L — SIGNIFICANT CHANGE UP (ref 22–31)
CREAT SERPL-MCNC: 0.75 MG/DL — SIGNIFICANT CHANGE UP (ref 0.5–1.3)
EGFR: 89 ML/MIN/1.73M2 — SIGNIFICANT CHANGE UP
GLUCOSE SERPL-MCNC: 120 MG/DL — HIGH (ref 70–99)
HCT VFR BLD CALC: 41.9 % — SIGNIFICANT CHANGE UP (ref 34.5–45)
HGB BLD-MCNC: 13.7 G/DL — SIGNIFICANT CHANGE UP (ref 11.5–15.5)
MAGNESIUM SERPL-MCNC: 2 MG/DL — SIGNIFICANT CHANGE UP (ref 1.6–2.6)
MCHC RBC-ENTMCNC: 28.5 PG — SIGNIFICANT CHANGE UP (ref 27–34)
MCHC RBC-ENTMCNC: 32.7 GM/DL — SIGNIFICANT CHANGE UP (ref 32–36)
MCV RBC AUTO: 87.1 FL — SIGNIFICANT CHANGE UP (ref 80–100)
NRBC # BLD: 0 /100 WBCS — SIGNIFICANT CHANGE UP (ref 0–0)
PHOSPHATE SERPL-MCNC: 2.6 MG/DL — SIGNIFICANT CHANGE UP (ref 2.5–4.5)
PLATELET # BLD AUTO: 362 K/UL — SIGNIFICANT CHANGE UP (ref 150–400)
POTASSIUM SERPL-MCNC: 4.2 MMOL/L — SIGNIFICANT CHANGE UP (ref 3.5–5.3)
POTASSIUM SERPL-SCNC: 4.2 MMOL/L — SIGNIFICANT CHANGE UP (ref 3.5–5.3)
RBC # BLD: 4.81 M/UL — SIGNIFICANT CHANGE UP (ref 3.8–5.2)
RBC # FLD: 13.6 % — SIGNIFICANT CHANGE UP (ref 10.3–14.5)
SODIUM SERPL-SCNC: 131 MMOL/L — LOW (ref 135–145)
TSH SERPL-MCNC: 0.36 UIU/ML — SIGNIFICANT CHANGE UP (ref 0.27–4.2)
WBC # BLD: 10.36 K/UL — SIGNIFICANT CHANGE UP (ref 3.8–10.5)
WBC # FLD AUTO: 10.36 K/UL — SIGNIFICANT CHANGE UP (ref 3.8–10.5)

## 2022-11-10 PROCEDURE — 80053 COMPREHEN METABOLIC PANEL: CPT

## 2022-11-10 PROCEDURE — 85027 COMPLETE CBC AUTOMATED: CPT

## 2022-11-10 PROCEDURE — 85025 COMPLETE CBC W/AUTO DIFF WBC: CPT

## 2022-11-10 PROCEDURE — 71045 X-RAY EXAM CHEST 1 VIEW: CPT

## 2022-11-10 PROCEDURE — 84100 ASSAY OF PHOSPHORUS: CPT

## 2022-11-10 PROCEDURE — 84443 ASSAY THYROID STIM HORMONE: CPT

## 2022-11-10 PROCEDURE — 83935 ASSAY OF URINE OSMOLALITY: CPT

## 2022-11-10 PROCEDURE — 80048 BASIC METABOLIC PNL TOTAL CA: CPT

## 2022-11-10 PROCEDURE — 99232 SBSQ HOSP IP/OBS MODERATE 35: CPT

## 2022-11-10 PROCEDURE — 82570 ASSAY OF URINE CREATININE: CPT

## 2022-11-10 PROCEDURE — 70553 MRI BRAIN STEM W/O & W/DYE: CPT | Mod: MA

## 2022-11-10 PROCEDURE — 87635 SARS-COV-2 COVID-19 AMP PRB: CPT

## 2022-11-10 PROCEDURE — 96374 THER/PROPH/DIAG INJ IV PUSH: CPT

## 2022-11-10 PROCEDURE — 99285 EMERGENCY DEPT VISIT HI MDM: CPT

## 2022-11-10 PROCEDURE — 83735 ASSAY OF MAGNESIUM: CPT

## 2022-11-10 PROCEDURE — 71045 X-RAY EXAM CHEST 1 VIEW: CPT | Mod: 26

## 2022-11-10 PROCEDURE — A9585: CPT

## 2022-11-10 PROCEDURE — 36415 COLL VENOUS BLD VENIPUNCTURE: CPT

## 2022-11-10 PROCEDURE — 84300 ASSAY OF URINE SODIUM: CPT

## 2022-11-10 PROCEDURE — 84540 ASSAY OF URINE/UREA-N: CPT

## 2022-11-10 PROCEDURE — 93005 ELECTROCARDIOGRAM TRACING: CPT

## 2022-11-10 RX ORDER — OXCARBAZEPINE 300 MG/1
450 TABLET, FILM COATED ORAL
Refills: 0 | Status: DISCONTINUED | OUTPATIENT
Start: 2022-11-10 | End: 2022-11-10

## 2022-11-10 RX ORDER — OXCARBAZEPINE 300 MG/1
300 TABLET, FILM COATED ORAL ONCE
Refills: 0 | Status: COMPLETED | OUTPATIENT
Start: 2022-11-10 | End: 2022-11-10

## 2022-11-10 RX ORDER — OXCARBAZEPINE 300 MG/1
3 TABLET, FILM COATED ORAL
Qty: 0 | Refills: 0 | DISCHARGE

## 2022-11-10 RX ORDER — SIMVASTATIN 20 MG/1
40 TABLET, FILM COATED ORAL
Qty: 0 | Refills: 0 | DISCHARGE

## 2022-11-10 RX ORDER — SENNA PLUS 8.6 MG/1
2 TABLET ORAL
Qty: 14 | Refills: 0
Start: 2022-11-10 | End: 2022-11-16

## 2022-11-10 RX ORDER — OXYCODONE HYDROCHLORIDE 5 MG/1
1 TABLET ORAL
Qty: 28 | Refills: 0
Start: 2022-11-10 | End: 2022-11-16

## 2022-11-10 RX ORDER — SODIUM CHLORIDE 9 MG/ML
3 INJECTION INTRAMUSCULAR; INTRAVENOUS; SUBCUTANEOUS
Qty: 168 | Refills: 0
Start: 2022-11-10 | End: 2022-11-23

## 2022-11-10 RX ORDER — SIMVASTATIN 20 MG/1
0 TABLET, FILM COATED ORAL
Qty: 0 | Refills: 0 | DISCHARGE

## 2022-11-10 RX ADMIN — SODIUM CHLORIDE 3 MILLILITER(S): 9 INJECTION INTRAMUSCULAR; INTRAVENOUS; SUBCUTANEOUS at 13:28

## 2022-11-10 RX ADMIN — OXCARBAZEPINE 300 MILLIGRAM(S): 300 TABLET, FILM COATED ORAL at 10:51

## 2022-11-10 RX ADMIN — SODIUM CHLORIDE 2 GRAM(S): 9 INJECTION INTRAMUSCULAR; INTRAVENOUS; SUBCUTANEOUS at 00:37

## 2022-11-10 RX ADMIN — SODIUM CHLORIDE 3 MILLILITER(S): 9 INJECTION INTRAMUSCULAR; INTRAVENOUS; SUBCUTANEOUS at 05:29

## 2022-11-10 RX ADMIN — SODIUM CHLORIDE 2 GRAM(S): 9 INJECTION INTRAMUSCULAR; INTRAVENOUS; SUBCUTANEOUS at 07:21

## 2022-11-10 RX ADMIN — OXYCODONE HYDROCHLORIDE 10 MILLIGRAM(S): 5 TABLET ORAL at 01:15

## 2022-11-10 RX ADMIN — OXYCODONE HYDROCHLORIDE 10 MILLIGRAM(S): 5 TABLET ORAL at 12:00

## 2022-11-10 RX ADMIN — LEVETIRACETAM 1500 MILLIGRAM(S): 250 TABLET, FILM COATED ORAL at 10:45

## 2022-11-10 RX ADMIN — OXYCODONE HYDROCHLORIDE 5 MILLIGRAM(S): 5 TABLET ORAL at 08:27

## 2022-11-10 RX ADMIN — OXYCODONE HYDROCHLORIDE 10 MILLIGRAM(S): 5 TABLET ORAL at 00:38

## 2022-11-10 RX ADMIN — OXYCODONE HYDROCHLORIDE 5 MILLIGRAM(S): 5 TABLET ORAL at 07:21

## 2022-11-10 RX ADMIN — OXYCODONE HYDROCHLORIDE 10 MILLIGRAM(S): 5 TABLET ORAL at 10:58

## 2022-11-10 RX ADMIN — OXCARBAZEPINE 150 MILLIGRAM(S): 300 TABLET, FILM COATED ORAL at 05:26

## 2022-11-10 NOTE — PROGRESS NOTE ADULT - SUBJECTIVE AND OBJECTIVE BOX
HPI:  61 yo F w/ PMHx COPD, lung adenocarcinoma (s/p RLL lobectomy 2016 Dr. 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 Barclay), presents to [facility] ED today after seen in [facility] ED for evaluation of HA a/w photophobia and nausea x3 days. Patient seen and evaluated at OSH for headaches, given 1500mg Keppra, 10mg decadron, and oxycodone 10mg with little relief. CTH at 2:30pm showed stable L parietal region of vasogenic edema when compared to previous CTH and MRI completed in August 2022, no evidence of hemorrhage or infarct. Pt reports she has intermittent headaches at home that are usually controlled with tylenol but her recent headaches have not been improving with OTC pain meds. Pt reports h/o chronic low back pain for which she occasionally takes oxycodone 10mg PRN, states she has not taken any recently. Pt denies recent falls/head trauma, recent seizure, vision changes, dizziness, LOC/fainting, gait instability, new weakness, numbness/tingling, vomiting, fever/chills, recent illness or known sick contact.    (09 Nov 2022 22:00)    HOSPITAL COURSE:  11/9: Admitted to [facility] for MRI brain and headache management. Pend MRI final read.   11/10: LAN overnight, MRI prelim read negative for tumor recurrence, stable changes, pend official read. Cont home AEDs, tylenol and oxycodone PRN for headaches, adjust as needed. Neuro stable. Trend Na in am for hyponatremia w/u.     OVERNIGHT EVENTS:  Vital Signs Last 24 Hrs  T(C): 36.6 (09 Nov 2022 22:20), Max: 37 (09 Nov 2022 18:54)  T(F): 97.8 (09 Nov 2022 22:20), Max: 98.6 (09 Nov 2022 18:54)  HR: 80 (09 Nov 2022 22:20) (78 - 80)  BP: 114/81 (09 Nov 2022 22:20) (113/77 - 114/81)  BP(mean): --  RR: 17 (09 Nov 2022 22:20) (16 - 17)  SpO2: 97% (09 Nov 2022 22:20) (97% - 97%)    Parameters below as of 09 Nov 2022 22:20  Patient On (Oxygen Delivery Method): room air        I&O's Summary      PHYSICAL EXAM:  General: pt in mild distress due to headache, A&O x3, on RA  HEENT: CN II-XII grossly intact, PERRL 3mm, EOMI b/l, face symmetric, tongue midline, neck FROM  Cardiovascular: RRR, normal S1 and S2   Respiratory: lungs CTAB, no wheezing, rhonchi, or crackles   GI: normoactive BS to auscultation, abd soft, NTND   Neuro: no aphasia, speech clear, no dysmetria, no pronator drift  b/l UE and LLE strength 5/5, RLE strength 4/5 limited to pain (baseline s/p prior injury)   sensation intact to light touch throughout   Extremities: distal pulses 2+ x4    TUBES/LINES:  [] Jeff  [] Lumbar Drain  [] Wound Drains  [] Others    DIET:  [] NPO  [X] Mechanical  [] Tube feeds    LABS:                        14.6   10.32 )-----------( 325      ( 09 Nov 2022 19:10 )             43.5     11-09    133<L>  |  97  |  8   ----------------------------<  118<H>  4.1   |  27  |  0.65    Ca    9.8      09 Nov 2022 19:10    TPro  8.3  /  Alb  4.7  /  TBili  0.2  /  DBili  x   /  AST  17  /  ALT  15  /  AlkPhos  104  11-09            CAPILLARY BLOOD GLUCOSE          Drug Levels: [] N/A    CSF Analysis: [] N/A      Allergies    penicillin (Anaphylaxis)  penicillin (Angioedema)    Intolerances      MEDICATIONS:  Antibiotics:    Neuro:  acetaminophen     Tablet .. 650 milliGRAM(s) Oral every 6 hours PRN  levETIRAcetam 1500 milliGRAM(s) Oral every 12 hours  ondansetron Injectable 4 milliGRAM(s) IV Push every 6 hours PRN  OXcarbazepine 150 milliGRAM(s) Oral two times a day  oxyCODONE    IR 5 milliGRAM(s) Oral every 4 hours PRN  oxyCODONE    IR 10 milliGRAM(s) Oral every 4 hours PRN    Anticoagulation:    OTHER:  albuterol    90 MICROgram(s) HFA Inhaler 2 Puff(s) Inhalation every 6 hours PRN  albuterol/ipratropium for Nebulization 3 milliLiter(s) Nebulizer every 6 hours PRN  senna 2 Tablet(s) Oral at bedtime PRN  simvastatin 40 milliGRAM(s) Oral at bedtime    IVF:  sodium chloride 2 Gram(s) Oral every 8 hours  sodium chloride 0.9% lock flush 3 milliLiter(s) IV Push every 8 hours    CULTURES:    RADIOLOGY & ADDITIONAL TESTS:  < from: MR Head w/wo IV Cont (11.09.22 @ 23:26) >  IMPRESSION:  Stable surgical bed with gliosis in the left parietal lobe.  No evidence   of  recurrence or residual tumor.      ASSESSMENT:  NEURO:  - neuro and vitals q4hrs   - pain control: tylenol, oxycodone PRN   - pend MRI brain w/ and w/o IV contrast   - cont home AEDs: Oxcarbazepine 150mg BID, Keppra 1500mg BID   - consult Neurology in am, pt of Dr. Barclay's     CARDIO:  - SBP goal normotensive   - no current issues     PULM:  - O2 sat goal > 92%   - nebs prn for COPD     GI:  - regular diet   - bowel regimen PRN     RENAL:  - normonatremia goal   - started on NaCl tabs for Na 133 on admission, likely d/t oxcarbazepine, will monitor in am, will adjust with epillepsy if indicated   - urine studies ordered for hyponetremia   - voiding     ENDO:  - no active issues     ID:  - afebrile, no leukocytosis     HEME/ONC:   - h/o lung CA, cont nebs as needed   - no DVT chemoppx at this time, SCDs b/l LE     DISPO: tele status, full code, dispo pend MRI and headache work-up     Assessment and plan discussed with Dr. Rico         HEADACHE    No pertinent family history in first degree relatives    Family history of essential hypertension (Mother, Sibling)    Family history of diabetes mellitus (Mother, Sibling)    Family history of uterine cancer (Sibling)    Family history of cancer in mother (Mother)    No family history of cardiovascular disease (Father)    Handoff    MEWS Score    Pulmonary nodules    Asthma    COPD (chronic obstructive pulmonary disease)    Adenocarcinoma of lung    MRSA (methicillin resistant Staphylococcus aureus)    Dyspnea    Brain metastasis    HLD (hyperlipidemia)    Chronic back pain    Headache    No significant past surgical history    History of appendectomy    Surgery, elective    H/O brain surgery    History of lung surgery    HEADACHE    88    Lung cancer metastatic to brain    [email or web address]        PLAN:  NEURO:    CARDIOVASCULAR:    PULMONARY:    RENAL:    GI:    HEME:    ID:    ENDO:    DVT PROPHYLAXIS:  [] Venodynes                                [] Heparin/Lovenox    DISPOSITION:

## 2022-11-10 NOTE — DISCHARGE NOTE NURSING/CASE MANAGEMENT/SOCIAL WORK - PATIENT PORTAL LINK FT
You can access the FollowMyHealth Patient Portal offered by [facility] by registering at the following website: [email or web address]. By joining [employer]’s FollowMyHealth portal, you will also be able to view your health information using other applications (apps) compatible with our system.

## 2022-11-10 NOTE — DISCHARGE NOTE PROVIDER - NSDCFUADDINST_GEN_ALL_CORE_FT
Neurosurgery follow up appointment date/time:  - Please call the office of Dr. Rico to confirm your follow-up appointment after leaving the hospital; [phone].     Activity:  - you may resume your regular activities as tolerated upon discharge home   - no driving within 24 hours of anesthesia or while taking prescription pain medications   - keep hydrated, drink plenty of water     Inpatient consults:  - final recommendations  - you will need follow up with....    Please also follow up with your primary care doctor.     Pain Expectations:  - pain after surgery is expected  - please take pain meds as prescribed     Medications:  - changes to home meds?  - new meds?  - pain meds?  - when can antiplatelets or antocoagulants be restarted  - were adverse affects of meds discussed with patients?   - pain medications can cause constipation, you should eat a high fiber diet and may take a stool softener while on pain meds   - Avoid taking Advil (ibuprofen), Motrin (naproxen), or Aspirin for pain as they can cause bleeding     WITHIN 24 HOURS OF DISCHARGE, PLEASE CONTACT NEURO PA WITH ANY QUESTIONS OR CONCERNS: [phone]   OTHERWISE, PLEASE CALL THE OFFICE WITH ANY QUESTIONS OR CONCERNS: [phone] Neurosurgery follow up appointment date/time:  - You have a follow up appointment on 11/21 at 12:15PM     Activity:  - you may resume your regular activities as tolerated upon discharge home   - no driving within 24 hours of anesthesia or while taking prescription pain medications   - keep hydrated, drink plenty of water     Inpatient consults:  - final recommendations  - you will need follow up with Dr. Barclay from neurology as well as your primary care doctor for a sodium check.     Please also follow up with your primary care doctor.     Pain Expectations:  - pain after surgery is expected  - please take pain meds as prescribed   - tylenol 1-2 tabs every 6 hours   - oxycodone 1 tablet for moderate pain and 2 tablets for severe pain every 6 hours as needed    Medications:  - Continue home medications, simvastatin, keppra, oxcarbazepine, albuterol.  - You were started on salt tabs because your sodium was low likely secondary to SIADH. You will take 3 sodium chloride tabs every 6 hours.  - Please take senna 2 tablets if you are taking oxycodone to prevent constipation.   - adverse affects of meds discussed with patients  - pain medications can cause constipation, you should eat a high fiber diet and may take a stool softener while on pain meds   - Avoid taking Advil (ibuprofen), Motrin (naproxen), or Aspirin for pain as they can cause bleeding     WITHIN 24 HOURS OF DISCHARGE, PLEASE CONTACT NEURO PA WITH ANY QUESTIONS OR CONCERNS: [phone]   OTHERWISE, PLEASE CALL THE OFFICE WITH ANY QUESTIONS OR CONCERNS: [phone]

## 2022-11-10 NOTE — DISCHARGE NOTE PROVIDER - NSDCCPCAREPLAN_GEN_ALL_CORE_FT
PRINCIPAL DISCHARGE DIAGNOSIS  Diagnosis: Headache  Assessment and Plan of Treatment:       SECONDARY DISCHARGE DIAGNOSES  Diagnosis: Lung cancer metastatic to brain  Assessment and Plan of Treatment:

## 2022-11-10 NOTE — DISCHARGE NOTE PROVIDER - CARE PROVIDERS DIRECT ADDRESSES
[email or web address]

## 2022-11-10 NOTE — DISCHARGE NOTE PROVIDER - HOSPITAL COURSE
HPI:  61 yo F w/ PMHx COPD, lung adenocarcinoma (s/p RLL lobectomy 2016 Dr. 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 Barclay), presents to [facility] ED today after seen in [facility] ED for evaluation of HA a/w photophobia and nausea x3 days. Patient seen and evaluated at OSH for headaches, given 1500mg Keppra, 10mg decadron, and oxycodone 10mg with little relief. CTH at 2:30pm showed stable L parietal region of vasogenic edema when compared to previous CTH and MRI completed in August 2022, no evidence of hemorrhage or infarct. Pt reports she has intermittent headaches at home that are usually controlled with tylenol but her recent headaches have not been improving with OTC pain meds. Pt reports h/o chronic low back pain for which she occasionally takes oxycodone 10mg PRN, states she has not taken any recently. Pt denies recent falls/head trauma, recent seizure, vision changes, dizziness, LOC/fainting, gait instability, new weakness, numbness/tingling, vomiting, fever/chills, recent illness or known sick contact.     Patient is medically and neurologically stable for discharge     Neuro exam:     MRI brain findings:         - Obtained follow up appointment from NP  - Reviewed final recommendations of inpatient consults  - Neurologically stable for discharge  - Vitals stable for discharge   - Afebrile for discharge  - WBC is stable  - Sodium level is normal  - Pain is adequately controlled  - Pt has PICC/walker/brace/collar      HPI: 61 yo F w/ PMHx COPD, lung adenocarcinoma (s/p RLL lobectomy 2016 Dr. 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 Barclay), presents to [facility] ED today after seen in [facility] ED for evaluation of HA a/w photophobia and nausea x3 days. Patient seen and evaluated at OSH for headaches, given 1500mg Keppra, 10mg decadron, and oxycodone 10mg with little relief. CTH at 2:30pm showed stable L parietal region of vasogenic edema when compared to previous CTH and MRI completed in August 2022, no evidence of hemorrhage or infarct. Pt reports she has intermittent headaches at home that are usually controlled with tylenol but her recent headaches have not been improving with OTC pain meds. Pt reports h/o chronic low back pain for which she occasionally takes oxycodone 10mg PRN, states she has not taken any recently. Pt denies recent falls/head trauma, recent seizure, vision changes, dizziness, LOC/fainting, gait instability, new weakness, numbness/tingling, vomiting, fever/chills, recent illness or known sick contact.     Hospital Course:  11/9: Admitted to [facility] for MRI brain and headache management. Pend MRI final read.   11/10: LAN overnight, MRI prelim read negative for tumor recurrence, stable changes, pend official read. Cont home AEDs, tylenol and oxycodone PRN for headaches, adjust as needed. Neuro stable. Sodium lytes consistent with SIADH, increase salt intake and fluid restriction while at home. MRI brain 11/10 demonstrating stable surgical bed with gliosis no recurrence/residual tumor.     Patient evaluated by PT/OT who recommended: not indicated   Patient is going home    Hospital course c/b: headaches now improved.    Exam on day of discharge:  General: NAD, A&O x3, on RA  HEENT: CN II-XII grossly intact, PERRL 3mm, EOMI b/l, face symmetric, tongue midline, neck FROM  Cardiovascular: RRR, normal S1 and S2   Respiratory: lungs CTAB, no wheezing, rhonchi, or crackles   GI: normoactive BS to auscultation, abd soft, NTND   Neuro: no aphasia, speech clear, no dysmetria, no pronator drift  b/l UE and LLE strength 5/5, RLE strength 4/5 limited to pain (baseline s/p prior injury)   sensation intact to light touch throughout   Extremities: warm and well perfused     Checklist:   - Obtained follow up appointment from NP - yes   - Reviewed final recommendations of inpatient consults - f/u with [facility]   - Neurologically stable for discharge - yes   - Vitals stable for discharge - yes   - Afebrile for discharge - yes   - WBC is stable - yes   - Sodium level is normal -131   - Pain is adequately controlled - yes   - Pt has PICC/walker/brace/collar - not indicated

## 2022-11-10 NOTE — DISCHARGE NOTE NURSING/CASE MANAGEMENT/SOCIAL WORK - NSDCFUADDAPPT_GEN_ALL_CORE_FT
You have an appointment scheduled for 11/21 at 12:15PM with Dr. Rico. You can call to confirm your follow-up appointment after leaving the hospital; [phone].     Please call the office of Dr. Barclay to follow-up after leaving the hospital: [phone]    Please follow-up with your primary care doctor in 1 week for a sodium check as your sodium was low this admission and you were started on salt tabs.

## 2022-11-10 NOTE — DISCHARGE NOTE PROVIDER - NSDCFUADDAPPT_GEN_ALL_CORE_FT
Please call the office of Dr. Rico to confirm your follow-up appointment after leaving the hospital; [phone].     Please call the office of Dr. Barclay to follow-up after leaving the hospital: [phone]    Please follow-up with your primary care doctor  You have an appointment scheduled for 11/21 at 12:15PM with Dr. Rico. You can call to confirm your follow-up appointment after leaving the hospital; [phone].     Please call the office of Dr. Barclay to follow-up after leaving the hospital: [phone]    Please follow-up with your primary care doctor in 1 week for a sodium check as your sodium was low this admission and you were started on salt tabs.

## 2022-11-10 NOTE — DISCHARGE NOTE PROVIDER - PROVIDER TOKENS
PROVIDER:[TOKEN:[[id number]]],PROVIDER:[TOKEN:[[id number]]] PROVIDER:[TOKEN:[[id number]],SCHEDULEDAPPT:[11/21/2022],SCHEDULEDAPPTTIME:[12:15 PM]],PROVIDER:[TOKEN:[[id number]]]

## 2022-11-10 NOTE — DISCHARGE NOTE PROVIDER - NSDCMRMEDTOKEN_GEN_ALL_CORE_FT
Advair Diskus 500 mcg-50 mcg inhalation powder: 1 puff(s) inhaled 2 times a day  albuterol 90 mcg/inh inhalation aerosol: 2 puff(s) inhaled 4 times a day  ibuprofen 600 mg oral tablet: 1 tab(s) orally every 6 hours, As Needed -for moderate pain  Take with food.  Keppra 500 mg oral tablet: 3 tab(s) orally 2 times a day   OXcarbazepine 150 mg oral tablet: 3 tab(s) orally  simvastatin:   Spiriva 18 mcg inhalation capsule: 1 cap(s) inhaled every 24 hours  Tylenol 325 mg oral tablet: 2 tab(s) orally 3 times a day indication: pain control    Advair Diskus 500 mcg-50 mcg inhalation powder: 1 puff(s) inhaled 2 times a day  albuterol 90 mcg/inh inhalation aerosol: 2 puff(s) inhaled 4 times a day  Keppra 500 mg oral tablet: 3 tab(s) orally 2 times a day   Oxaydo 5 mg oral tablet: 1 tab(s) orally every 6 hours, As Needed -Moderate Pain (4 - 6) or 2 tabs every 6 hours for severe pain (7-10) MDD:8 tabs daily  OXcarbazepine 150 mg oral tablet: 3 tab(s) orally 2 times a day  senna leaf extract oral tablet: 2 tab(s) orally once a day (at bedtime), As needed, Constipation  simvastatin: 40 milligram(s) orally once a day  sodium chloride 1 g oral tablet: 3 tab(s) orally every 6 hours   Spiriva 18 mcg inhalation capsule: 1 cap(s) inhaled every 24 hours  Tylenol 325 mg oral tablet: 2 tab(s) orally 3 times a day indication: pain control

## 2022-11-10 NOTE — DISCHARGE NOTE PROVIDER - CARE PROVIDER_API CALL
Kapil Rico)  Neurosurgery  [address], [address] [place] [address]  Phone: [phone]  Fax: [phone]  Follow Up Time:     Gwen Barclay)  EEGEpilepsy; Neurology  [address], [address] [place] [address]  Phone: [phone]  Fax: [phone]  Follow Up Time:    Kapil Rico)  Neurosurgery  [address], [address] [place] [address]  Phone: [phone]  Fax: [phone]  Scheduled Appointment: 11/21/2022 12:15 PM    Gwen Barclay)  EEGEpilepsy; Neurology  [address], [address] [place] [address]  Phone: [phone]  Fax: [phone]  Follow Up Time:

## 2022-11-10 NOTE — DISCHARGE NOTE PROVIDER - NSDCFUSCHEDAPPT_GEN_ALL_CORE_FT
[facility]  MRI  [address]  Scheduled Appointment: 12/01/2022    Kapil Rico  [facility]  NEUROSURG [address]  Scheduled Appointment: 12/05/2022     Kapil Rico  [facility]  NEUROSURG [address]  Scheduled Appointment: 11/21/2022    [facility]  MRI  [address]  Scheduled Appointment: 12/01/2022

## 2022-11-21 ENCOUNTER — APPOINTMENT (OUTPATIENT)
Dept: NEUROSURGERY | Facility: CLINIC | Age: 62
End: 2022-11-21

## 2022-11-22 DIAGNOSIS — C34.90 MALIGNANT NEOPLASM OF UNSPECIFIED PART OF UNSPECIFIED BRONCHUS OR LUNG: ICD-10-CM

## 2022-11-22 DIAGNOSIS — H53.143 VISUAL DISCOMFORT, BILATERAL: ICD-10-CM

## 2022-11-22 DIAGNOSIS — G93.6 CEREBRAL EDEMA: ICD-10-CM

## 2022-11-22 DIAGNOSIS — Z90.49 ACQUIRED ABSENCE OF OTHER SPECIFIED PARTS OF DIGESTIVE TRACT: ICD-10-CM

## 2022-11-22 DIAGNOSIS — G93.89 OTHER SPECIFIED DISORDERS OF BRAIN: ICD-10-CM

## 2022-11-22 DIAGNOSIS — J44.9 CHRONIC OBSTRUCTIVE PULMONARY DISEASE, UNSPECIFIED: ICD-10-CM

## 2022-11-22 DIAGNOSIS — G89.29 OTHER CHRONIC PAIN: ICD-10-CM

## 2022-11-22 DIAGNOSIS — E78.5 HYPERLIPIDEMIA, UNSPECIFIED: ICD-10-CM

## 2022-11-22 DIAGNOSIS — M54.9 DORSALGIA, UNSPECIFIED: ICD-10-CM

## 2022-11-22 DIAGNOSIS — G95.89 OTHER SPECIFIED DISEASES OF SPINAL CORD: ICD-10-CM

## 2022-11-22 DIAGNOSIS — R51.9 HEADACHE, UNSPECIFIED: ICD-10-CM

## 2022-11-22 DIAGNOSIS — Z90.2 ACQUIRED ABSENCE OF LUNG [PART OF]: ICD-10-CM

## 2022-11-22 DIAGNOSIS — C79.31 SECONDARY MALIGNANT NEOPLASM OF BRAIN: ICD-10-CM

## 2022-11-28 ENCOUNTER — NON-APPOINTMENT (OUTPATIENT)
Age: 62
End: 2022-11-28

## 2022-11-28 ENCOUNTER — APPOINTMENT (OUTPATIENT)
Dept: NEUROSURGERY | Facility: CLINIC | Age: 62
End: 2022-11-28

## 2022-11-28 VITALS
HEIGHT: 65 IN | WEIGHT: 215 LBS | BODY MASS INDEX: 35.82 KG/M2 | HEART RATE: 99 BPM | RESPIRATION RATE: 17 BRPM | OXYGEN SATURATION: 95 % | TEMPERATURE: 97.4 F | SYSTOLIC BLOOD PRESSURE: 106 MMHG | DIASTOLIC BLOOD PRESSURE: 84 MMHG

## 2022-11-28 PROCEDURE — 99214 OFFICE O/P EST MOD 30 MIN: CPT

## 2022-11-29 NOTE — DATA REVIEWED
[de-identified] : \par ACC: [id number] EXAM: MR BRAIN WAW IC\par \par PROCEDURE DATE: 11/09/2022\par \par \par \par INTERPRETATION: I, Alexus Bernard MD, have reviewed the images and the report and agree with the findings, with the following modification:\par \par Contrast volume: 7.5 ml\par \par There are at least 3 small nodular of enhancement within the paramedian right occipital and inferior parietal lobe (series 16 images 95-96) which are similar to the 8/29/2022 study. This may represent additional posttreatment changes versus metastases and continued follow-up is recommended. No new areas of enhancement.\par \par [facility] RADIOLOGIST PRELIMINARY REPORT\par \par Initial report created on 11/10/2022 12:07:09 AM EST\par PROCEDURE INFORMATION:\par Exam: MR Head Without and With Contrast\par Exam date and time: 11/9/2022 10:32 PM\par Age: 62 years old\par Clinical indication: HA , HX brain mets\par \par TECHNIQUE:\par Imaging protocol: Magnetic resonance imaging of the head without and with\par contrast.\par Contrast material: IV: GADAVIST; Contrast volume: 75 ml; Contrast route: IV;\par \par COMPARISON: HEAD CT WITHOUT CONTRAST 11/9/2022 2:28 PM and brain MRIs dated 08/29/2022 and 04/28/2022\par \par FINDINGS:\par Brain: Again seen is the surgical bed in the left parietal lobe which is\par unchanged compared to the prior exams. 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 No evidence of new\par intracranial hemorrhage, mass effect, midline shift or extra-axial fluid\par collections. Midline structures are normal. Gray-white matter differentiation\par is normal.\par Cerebral ventricles: Normal. No ventriculomegaly.\par Bones/joints: There has been a left parietal craniotomy.\par Paranasal sinuses: Normal as visualized. No acute sinusitis.\par Mastoid air cells: Normal as visualized. No mastoid effusion.\par Orbital cavities: Unremarkable.\par Soft tissues: Unremarkable.\par \par IMPRESSION:\par Stable surgical bed with gliosis in the left parietal lobe. No evidence of\par recurrence or residual tumor.\par \par --- End of Report ---\par \par \par \par \par \par ALEXUS BERNARD MD; Attending Radiologist

## 2022-11-29 NOTE — ASSESSMENT
[FreeTextEntry1] : MRI brain with and without contrast done on 11/9/22 reviewed by chris García.\par Recommend repeat MRI  brain with and without contrast in 4 months (March 2023). After MRI complete, return to the office to review imaging.\par She can discontinue salt tabs.\par Continue AEDs as per neurology recommendations.\par \par Patient verbalizes agreement and understanding with plan of care.\par \par I, Dr. Rico, personally performed the evaluation and management (E/M) services for this established patient who presents today with (a) new problem(s)/exacerbation of (an) existing condition(s).  That E/M includes conducting the examination, assessing all new/exacerbated conditions, and establishing a new plan of care.  Today, my ACP, Balbina Acuna, was here to observe my evaluation and management services for this new problem/exacerbated condition to be followed going forward.\par \par

## 2022-11-29 NOTE — HISTORY OF PRESENT ILLNESS
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 She previously underwent left parietal craniotomy for tumor resection and adjuvant radiation approximately 3 years ago In November 2017 at another institution. She also had GKRS on 1/8/18 to a right parietal brain metastasis with Dr. Rico. MRI brain on admission demonstrated a recurrent 3 cm left parietal enhancing brain mass in the area of prior tumor resection. She underwent surgical resection of left parietal mass on 5/13/2020 with Dr. Rico. Pathology indicated radiation necrosis. \par \par Postoperatively, her right hand weakness improved. She does have right visual field cut. She was referred to Dr. Andrea Gutierrez for neuro opthalmology consult.\par \par In July 2020, Ms. Mendoza was hospitalized at [facility] due to sudden onset difficulty speaking as well as RIGHT hand weakness. She was admitted and given steroids and seizure medication. She was weaned off the dexamethasone over the next several weeks. She reported improvement in right hand weakness.\par \par She remains on keppra 750 mg bid.\par \par MRI brain with and without contrast, done on 2/3/21, was stable. \par \par On 5/7/21, she reported RIGHT hand weakness. While in the office and during visit, she developed acute expressive aphasia and total RIGHT hemiparesis including RIGHT leg. Prior to this, she stated she had not taken keppra X 2 days. She was taken to the ED and had generalized tonic clonic seizure. She was discharged with 1000 mg keppra bid.\par \par She had 4 cycles of IV avastin and had repeat MRI brain done on 8/10/21, demonstrated decrease in left parietal enhancement and previously seen cortical foci of enhancement in right parietal lobe was not visualized.\par \par At prior visit, MRI brain with and without contrast done on 8/29/22, which is stable.\par Denies new neurological symptoms. Denies headaches, seizures, focal weakness, new vision change. She remains on Keppra 750 mg BID and Oxcarbazepine. \par \par She presented to the ER last week due to severe headache. MRI brain done 11/9/22 was stable and she was discharged and instructed to follow up in the office. She was found to have sodium 131 and was started on salt tabs. Returns today for follow up. \par \par She is doing well. Reports resolution of headaches. Denies focal neurological symptoms. She remains on AEDs. \par  \par

## 2022-11-29 NOTE — PHYSICAL EXAM
[General Appearance - Alert] : alert [General Appearance - In No Acute Distress] : in no acute distress [Oriented To Time, Place, And Person] : oriented to person, place, and time [Impaired Insight] : insight and judgment were intact [Cranial Nerves Optic (II)] : visual acuity intact bilaterally,  pupils equal round and reactive to light [Cranial Nerves Oculomotor (III)] : extraocular motion intact [Cranial Nerves Trigeminal (V)] : facial sensation intact symmetrically [Cranial Nerves Facial (VII)] : face symmetrical [Cranial Nerves Vestibulocochlear (VIII)] : hearing was intact bilaterally [Cranial Nerves Glossopharyngeal (IX)] : tongue and palate midline [Cranial Nerves Accessory (XI - Cranial And Spinal)] : head turning and shoulder shrug symmetric [Cranial Nerves Hypoglossal (XII)] : there was no tongue deviation with protrusion [Motor Tone] : muscle tone was normal in all four extremities [Motor Strength] : muscle strength was normal in all four extremities [] : no respiratory distress [Respiration, Rhythm And Depth] : normal respiratory rhythm and effort [Abnormal Walk] : normal gait [Skin Color & Pigmentation] : normal skin color and pigmentation [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

## 2023-01-10 ENCOUNTER — NON-APPOINTMENT (OUTPATIENT)
Age: 63
End: 2023-01-10

## 2023-01-10 ENCOUNTER — EMERGENCY (EMERGENCY)
Facility: HOSPITAL | Age: 63
LOS: 1 days | Discharge: HOME | End: 2023-01-10
Attending: STUDENT IN AN ORGANIZED HEALTH CARE EDUCATION/TRAINING PROGRAM | Admitting: EMERGENCY MEDICINE
Payer: MEDICARE

## 2023-01-10 VITALS
TEMPERATURE: 98 F | HEART RATE: 75 BPM | OXYGEN SATURATION: 97 % | DIASTOLIC BLOOD PRESSURE: 73 MMHG | RESPIRATION RATE: 18 BRPM | SYSTOLIC BLOOD PRESSURE: 123 MMHG

## 2023-01-10 VITALS
RESPIRATION RATE: 16 BRPM | DIASTOLIC BLOOD PRESSURE: 84 MMHG | TEMPERATURE: 98 F | OXYGEN SATURATION: 99 % | HEART RATE: 81 BPM | SYSTOLIC BLOOD PRESSURE: 133 MMHG

## 2023-01-10 DIAGNOSIS — R51.9 HEADACHE, UNSPECIFIED: ICD-10-CM

## 2023-01-10 DIAGNOSIS — T42.1X6A UNDERDOSING OF IMINOSTILBENES, INITIAL ENCOUNTER: ICD-10-CM

## 2023-01-10 DIAGNOSIS — Z92.3 PERSONAL HISTORY OF IRRADIATION: ICD-10-CM

## 2023-01-10 DIAGNOSIS — Z91.14 PATIENT'S OTHER NONCOMPLIANCE WITH MEDICATION REGIMEN: ICD-10-CM

## 2023-01-10 DIAGNOSIS — G89.29 OTHER CHRONIC PAIN: ICD-10-CM

## 2023-01-10 DIAGNOSIS — Z90.49 ACQUIRED ABSENCE OF OTHER SPECIFIED PARTS OF DIGESTIVE TRACT: Chronic | ICD-10-CM

## 2023-01-10 DIAGNOSIS — Z41.9 ENCOUNTER FOR PROCEDURE FOR PURPOSES OTHER THAN REMEDYING HEALTH STATE, UNSPECIFIED: Chronic | ICD-10-CM
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
## 2023-01-10 DIAGNOSIS — Z20.822 CONTACT WITH AND (SUSPECTED) EXPOSURE TO COVID-19: ICD-10-CM

## 2023-01-10 DIAGNOSIS — Z86.14 PERSONAL HISTORY OF METHICILLIN RESISTANT STAPHYLOCOCCUS AUREUS INFECTION: ICD-10-CM

## 2023-01-10 DIAGNOSIS — Z85.118 PERSONAL HISTORY OF OTHER MALIGNANT NEOPLASM OF BRONCHUS AND LUNG: ICD-10-CM

## 2023-01-10 DIAGNOSIS — H53.461 HOMONYMOUS BILATERAL FIELD DEFECTS, RIGHT SIDE: ICD-10-CM

## 2023-01-10 DIAGNOSIS — Z90.2 ACQUIRED ABSENCE OF LUNG [PART OF]: ICD-10-CM

## 2023-01-10 DIAGNOSIS — Z98.890 OTHER SPECIFIED POSTPROCEDURAL STATES: Chronic | ICD-10-CM

## 2023-01-10 DIAGNOSIS — Z85.841 PERSONAL HISTORY OF MALIGNANT NEOPLASM OF BRAIN: ICD-10-CM

## 2023-01-10 DIAGNOSIS — M54.9 DORSALGIA, UNSPECIFIED: ICD-10-CM

## 2023-01-10 DIAGNOSIS — G93.89 OTHER SPECIFIED DISORDERS OF BRAIN: ICD-10-CM

## 2023-01-10 DIAGNOSIS — R56.9 UNSPECIFIED CONVULSIONS: ICD-10-CM

## 2023-01-10 DIAGNOSIS — E78.5 HYPERLIPIDEMIA, UNSPECIFIED: ICD-10-CM

## 2023-01-10 DIAGNOSIS — Z90.49 ACQUIRED ABSENCE OF OTHER SPECIFIED PARTS OF DIGESTIVE TRACT: ICD-10-CM

## 2023-01-10 DIAGNOSIS — Z88.0 ALLERGY STATUS TO PENICILLIN: ICD-10-CM

## 2023-01-10 DIAGNOSIS — R41.0 DISORIENTATION, UNSPECIFIED: ICD-10-CM
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
## 2023-01-10 DIAGNOSIS — J44.9 CHRONIC OBSTRUCTIVE PULMONARY DISEASE, UNSPECIFIED: ICD-10-CM

## 2023-01-10 LAB
ALBUMIN SERPL ELPH-MCNC: 4 G/DL — SIGNIFICANT CHANGE UP (ref 3.3–5)
ALBUMIN SERPL ELPH-MCNC: 4.3 G/DL — SIGNIFICANT CHANGE UP (ref 3.3–5)
ALP SERPL-CCNC: 93 U/L — SIGNIFICANT CHANGE UP (ref 40–120)
ALP SERPL-CCNC: 96 U/L — SIGNIFICANT CHANGE UP (ref 40–120)
ALT FLD-CCNC: 17 U/L — SIGNIFICANT CHANGE UP (ref 10–45)
ALT FLD-CCNC: 17 U/L — SIGNIFICANT CHANGE UP (ref 10–45)
ANION GAP SERPL CALC-SCNC: 3 MMOL/L — LOW (ref 5–17)
ANION GAP SERPL CALC-SCNC: 9 MMOL/L — SIGNIFICANT CHANGE UP (ref 5–17)
APTT BLD: 26.9 SEC — LOW (ref 27.5–35.5)
AST SERPL-CCNC: 17 U/L — SIGNIFICANT CHANGE UP (ref 10–40)
AST SERPL-CCNC: 21 U/L — SIGNIFICANT CHANGE UP (ref 10–40)
BASOPHILS # BLD AUTO: 0.02 K/UL — SIGNIFICANT CHANGE UP (ref 0–0.2)
BASOPHILS NFR BLD AUTO: 0.2 % — SIGNIFICANT CHANGE UP (ref 0–2)
BILIRUB SERPL-MCNC: 0.2 MG/DL — SIGNIFICANT CHANGE UP (ref 0.2–1.2)
BILIRUB SERPL-MCNC: 0.2 MG/DL — SIGNIFICANT CHANGE UP (ref 0.2–1.2)
BUN SERPL-MCNC: 10 MG/DL — SIGNIFICANT CHANGE UP (ref 7–23)
BUN SERPL-MCNC: 9 MG/DL — SIGNIFICANT CHANGE UP (ref 7–23)
CALCIUM SERPL-MCNC: 9 MG/DL — SIGNIFICANT CHANGE UP (ref 8.4–10.5)
CALCIUM SERPL-MCNC: 9.2 MG/DL — SIGNIFICANT CHANGE UP (ref 8.4–10.5)
CHLORIDE SERPL-SCNC: 104 MMOL/L — SIGNIFICANT CHANGE UP (ref 96–108)
CHLORIDE SERPL-SCNC: 110 MMOL/L — HIGH (ref 96–108)
CK MB CFR SERPL CALC: 1.8 NG/ML — SIGNIFICANT CHANGE UP (ref 0–6.7)
CK SERPL-CCNC: 247 U/L — HIGH (ref 25–170)
CO2 SERPL-SCNC: 27 MMOL/L — SIGNIFICANT CHANGE UP (ref 22–31)
CO2 SERPL-SCNC: 27 MMOL/L — SIGNIFICANT CHANGE UP (ref 22–31)
CREAT SERPL-MCNC: 0.75 MG/DL — SIGNIFICANT CHANGE UP (ref 0.5–1.3)
CREAT SERPL-MCNC: 0.82 MG/DL — SIGNIFICANT CHANGE UP (ref 0.5–1.3)
EGFR: 80 ML/MIN/1.73M2 — SIGNIFICANT CHANGE UP
EGFR: 89 ML/MIN/1.73M2 — SIGNIFICANT CHANGE UP
EOSINOPHIL # BLD AUTO: 0.08 K/UL — SIGNIFICANT CHANGE UP (ref 0–0.5)
EOSINOPHIL NFR BLD AUTO: 0.9 % — SIGNIFICANT CHANGE UP (ref 0–6)
GLUCOSE SERPL-MCNC: 122 MG/DL — HIGH (ref 70–99)
GLUCOSE SERPL-MCNC: 130 MG/DL — HIGH (ref 70–99)
HCT VFR BLD CALC: 40.2 % — SIGNIFICANT CHANGE UP (ref 34.5–45)
HCT VFR BLD CALC: 41 % — SIGNIFICANT CHANGE UP (ref 34.5–45)
HGB BLD-MCNC: 13.4 G/DL — SIGNIFICANT CHANGE UP (ref 11.5–15.5)
HGB BLD-MCNC: 13.4 G/DL — SIGNIFICANT CHANGE UP (ref 11.5–15.5)
IMM GRANULOCYTES NFR BLD AUTO: 0.1 % — SIGNIFICANT CHANGE UP (ref 0–0.9)
INR BLD: 1.07 — SIGNIFICANT CHANGE UP (ref 0.88–1.16)
LYMPHOCYTES # BLD AUTO: 1.85 K/UL — SIGNIFICANT CHANGE UP (ref 1–3.3)
LYMPHOCYTES # BLD AUTO: 21.7 % — SIGNIFICANT CHANGE UP (ref 13–44)
MCHC RBC-ENTMCNC: 29.5 PG — SIGNIFICANT CHANGE UP (ref 27–34)
MCHC RBC-ENTMCNC: 29.6 PG — SIGNIFICANT CHANGE UP (ref 27–34)
MCHC RBC-ENTMCNC: 32.7 GM/DL — SIGNIFICANT CHANGE UP (ref 32–36)
MCHC RBC-ENTMCNC: 33.3 GM/DL — SIGNIFICANT CHANGE UP (ref 32–36)
MCV RBC AUTO: 88.9 FL — SIGNIFICANT CHANGE UP (ref 80–100)
MCV RBC AUTO: 90.3 FL — SIGNIFICANT CHANGE UP (ref 80–100)
MONOCYTES # BLD AUTO: 0.51 K/UL — SIGNIFICANT CHANGE UP (ref 0–0.9)
MONOCYTES NFR BLD AUTO: 6 % — SIGNIFICANT CHANGE UP (ref 2–14)
NEUTROPHILS # BLD AUTO: 6.05 K/UL — SIGNIFICANT CHANGE UP (ref 1.8–7.4)
NEUTROPHILS NFR BLD AUTO: 71.1 % — SIGNIFICANT CHANGE UP (ref 43–77)
NRBC # BLD: 0 /100 WBCS — SIGNIFICANT CHANGE UP (ref 0–0)
NRBC # BLD: 0 /100 WBCS — SIGNIFICANT CHANGE UP (ref 0–0)
PLATELET # BLD AUTO: 268 K/UL — SIGNIFICANT CHANGE UP (ref 150–400)
PLATELET # BLD AUTO: 277 K/UL — SIGNIFICANT CHANGE UP (ref 150–400)
POTASSIUM SERPL-MCNC: 3.6 MMOL/L — SIGNIFICANT CHANGE UP (ref 3.5–5.3)
POTASSIUM SERPL-MCNC: 4 MMOL/L — SIGNIFICANT CHANGE UP (ref 3.5–5.3)
POTASSIUM SERPL-SCNC: 3.6 MMOL/L — SIGNIFICANT CHANGE UP (ref 3.5–5.3)
POTASSIUM SERPL-SCNC: 4 MMOL/L — SIGNIFICANT CHANGE UP (ref 3.5–5.3)
PROT SERPL-MCNC: 7.3 G/DL — SIGNIFICANT CHANGE UP (ref 6–8.3)
PROT SERPL-MCNC: 7.4 G/DL — SIGNIFICANT CHANGE UP (ref 6–8.3)
PROTHROM AB SERPL-ACNC: 12.8 SEC — SIGNIFICANT CHANGE UP (ref 10.5–13.4)
RBC # BLD: 4.52 M/UL — SIGNIFICANT CHANGE UP (ref 3.8–5.2)
RBC # BLD: 4.54 M/UL — SIGNIFICANT CHANGE UP (ref 3.8–5.2)
RBC # FLD: 13.6 % — SIGNIFICANT CHANGE UP (ref 10.3–14.5)
RBC # FLD: 13.6 % — SIGNIFICANT CHANGE UP (ref 10.3–14.5)
SARS-COV-2 RNA SPEC QL NAA+PROBE: NEGATIVE — SIGNIFICANT CHANGE UP
SODIUM SERPL-SCNC: 140 MMOL/L — SIGNIFICANT CHANGE UP (ref 135–145)
SODIUM SERPL-SCNC: 140 MMOL/L — SIGNIFICANT CHANGE UP (ref 135–145)
TROPONIN T SERPL-MCNC: <0.01 NG/ML — SIGNIFICANT CHANGE UP (ref 0–0.01)
WBC # BLD: 8.24 K/UL — SIGNIFICANT CHANGE UP (ref 3.8–10.5)
WBC # BLD: 8.52 K/UL — SIGNIFICANT CHANGE UP (ref 3.8–10.5)
WBC # FLD AUTO: 8.24 K/UL — SIGNIFICANT CHANGE UP (ref 3.8–10.5)
WBC # FLD AUTO: 8.52 K/UL — SIGNIFICANT CHANGE UP (ref 3.8–10.5)

## 2023-01-10 PROCEDURE — 36415 COLL VENOUS BLD VENIPUNCTURE: CPT

## 2023-01-10 PROCEDURE — 70496 CT ANGIOGRAPHY HEAD: CPT | Mod: MA

## 2023-01-10 PROCEDURE — 99283 EMERGENCY DEPT VISIT LOW MDM: CPT

## 2023-01-10 PROCEDURE — 96374 THER/PROPH/DIAG INJ IV PUSH: CPT | Mod: XU

## 2023-01-10 PROCEDURE — 70551 MRI BRAIN STEM W/O DYE: CPT | Mod: MA

## 2023-01-10 PROCEDURE — 85027 COMPLETE CBC AUTOMATED: CPT

## 2023-01-10 PROCEDURE — 96375 TX/PRO/DX INJ NEW DRUG ADDON: CPT | Mod: XU

## 2023-01-10 PROCEDURE — 99291 CRITICAL CARE FIRST HOUR: CPT

## 2023-01-10 PROCEDURE — 70496 CT ANGIOGRAPHY HEAD: CPT | Mod: 26,MA

## 2023-01-10 PROCEDURE — 84484 ASSAY OF TROPONIN QUANT: CPT

## 2023-01-10 PROCEDURE — 82553 CREATINE MB FRACTION: CPT

## 2023-01-10 PROCEDURE — 71045 X-RAY EXAM CHEST 1 VIEW: CPT | Mod: 26

## 2023-01-10 PROCEDURE — 85025 COMPLETE CBC W/AUTO DIFF WBC: CPT

## 2023-01-10 PROCEDURE — 70450 CT HEAD/BRAIN W/O DYE: CPT | Mod: MA

## 2023-01-10 PROCEDURE — 70450 CT HEAD/BRAIN W/O DYE: CPT | Mod: 26,MA,59

## 2023-01-10 PROCEDURE — 87635 SARS-COV-2 COVID-19 AMP PRB: CPT

## 2023-01-10 PROCEDURE — 99291 CRITICAL CARE FIRST HOUR: CPT | Mod: 25

## 2023-01-10 PROCEDURE — 0042T: CPT

## 2023-01-10 PROCEDURE — 70498 CT ANGIOGRAPHY NECK: CPT | Mod: 26,MA

## 2023-01-10 PROCEDURE — 71045 X-RAY EXAM CHEST 1 VIEW: CPT

## 2023-01-10 PROCEDURE — 85730 THROMBOPLASTIN TIME PARTIAL: CPT

## 2023-01-10 PROCEDURE — 70498 CT ANGIOGRAPHY NECK: CPT | Mod: MA

## 2023-01-10 PROCEDURE — 80053 COMPREHEN METABOLIC PANEL: CPT

## 2023-01-10 PROCEDURE — 82962 GLUCOSE BLOOD TEST: CPT

## 2023-01-10 PROCEDURE — 93005 ELECTROCARDIOGRAM TRACING: CPT

## 2023-01-10 PROCEDURE — 70551 MRI BRAIN STEM W/O DYE: CPT | Mod: 26,MA

## 2023-01-10 PROCEDURE — 85610 PROTHROMBIN TIME: CPT

## 2023-01-10 PROCEDURE — 82550 ASSAY OF CK (CPK): CPT

## 2023-01-10 RX ORDER — ACETAMINOPHEN 500 MG
650 TABLET ORAL ONCE
Refills: 0 | Status: COMPLETED | OUTPATIENT
Start: 2023-01-10 | End: 2023-01-10

## 2023-01-10 RX ORDER — KETOROLAC TROMETHAMINE 30 MG/ML
30 SYRINGE (ML) INJECTION ONCE
Refills: 0 | Status: DISCONTINUED | OUTPATIENT
Start: 2023-01-10 | End: 2023-01-10

## 2023-01-10 RX ORDER — OXCARBAZEPINE 300 MG/1
450 TABLET, FILM COATED ORAL ONCE
Refills: 0 | Status: COMPLETED | OUTPATIENT
Start: 2023-01-10 | End: 2023-01-10

## 2023-01-10 RX ORDER — SODIUM CHLORIDE 9 MG/ML
1000 INJECTION INTRAMUSCULAR; INTRAVENOUS; SUBCUTANEOUS ONCE
Refills: 0 | Status: COMPLETED | OUTPATIENT
Start: 2023-01-10 | End: 2023-01-10

## 2023-01-10 RX ORDER — LEVETIRACETAM 250 MG/1
1500 TABLET, FILM COATED ORAL ONCE
Refills: 0 | Status: COMPLETED | OUTPATIENT
Start: 2023-01-10 | End: 2023-01-10

## 2023-01-10 RX ADMIN — Medication 650 MILLIGRAM(S): at 04:31

## 2023-01-10 RX ADMIN — Medication 30 MILLIGRAM(S): at 04:31

## 2023-01-10 RX ADMIN — LEVETIRACETAM 400 MILLIGRAM(S): 250 TABLET, FILM COATED ORAL at 04:05

## 2023-01-10 RX ADMIN — OXCARBAZEPINE 450 MILLIGRAM(S): 300 TABLET, FILM COATED ORAL at 06:14

## 2023-01-10 RX ADMIN — SODIUM CHLORIDE 1000 MILLILITER(S): 9 INJECTION INTRAMUSCULAR; INTRAVENOUS; SUBCUTANEOUS at 04:31

## 2023-01-10 NOTE — ED PROVIDER NOTE - IV ALTEPLASE ADMIN OUTSIDE HIDDEN
Visit with patient in ER. Calm.     Jennifer Gilbert, staff   C: [phone] /  [email or web address] show

## 2023-01-10 NOTE — ED PROVIDER NOTE - PROGRESS NOTE DETAILS
patient now more alert, following commands, states that she forgot to take her oxcarbazepine and keppra tonight. doses given/ordered in ED. ct w/ No acute intracranial hemorrhage or transcortical infarct.  Interval increase in encephalomalacia/gliosis in the region of the left   parieto-occipital resection cavity. No mass effect. no vascular pathology, unlikely cva per discussion with stroke team, interval changes above d/w nsgy, pending their evaluation d/w nsgy, reccomending outpatient follow up. patient with potential left visual field deficit perneurology exam, patietn jorge historian, unsure if she has had trouble seeing before, neuro will reasess PA Hellerman 2:47pm-  Assumed care of pt from pm team pending neuro eval  Pt seen by neuro, who rec'd MRI  MRI brain: No acute ischemia.  On re-eval, pt back to baseline.  States she forgot to take her anti-epileptics and had a seizure  Spoke with pt's son and friend to make sure safe DC.  SW also evaluated to assess for safe DC, pt has family members who can pick pt up and feels safe at home  Pt wants to go home  Cleared by stroke team  Will DC, with f/u outpatient with neuro.  SW expedited appt to 1/30

## 2023-01-10 NOTE — CONSULT NOTE ADULT - SUBJECTIVE AND OBJECTIVE BOX
**STROKE CODE CONSULT NOTE**    Last known well time: 2230 1/9    HPI: 62y Female with PMHx of COPD, lung adenocarcinoma s/p RLL lobectomy 2016, brain mets s/p radiation in 2017, s/p L parietal crani for resection 2020 (follows Dr. Rico), seizure (on Keppra and Oxcarbazepine, follows with Dr. Braclay) presents after possible seizure and AMS. On arrival pt is confused, perseverating on her name and repeatedly states "something isn't right, I messed up my meds, I can't see out of my right eye." Difficult to obtain hx of patient 2/2 confusion. Stroke code called, NIHSS 6, CT imaging negative for acute intracranial pathology. Evidence of urinary incontinence noted on clothing. Pt with slight improvement after CT scanner, pt with tangential speech, perseverating less. Pt out of window for tenecteplase. Pt c/o headache in ED, per chart pt often c/o headache after resection.     Collateral obtained from bushra who states he was with her at 2230, states she was going to take her seizure medications but left prior to her taking them. Pt called him later statin that she wasn't feeling well, bushra went to check on her and reported that she was not moving her left arm and was confused. Collateral also obtain from patient's son, states she had a similar episode and was found to have a seizure, reports at that time pt had brain swelling.       T(C): 36.4 (01-10-23 @ 04:26), Max: 36.7 (01-10-23 @ 00:51)  HR: 72 (01-10-23 @ 04:40) (72 - 81)  BP: 117/73 (01-10-23 @ 04:40) (117/73 - 141/76)  RR: 17 (01-10-23 @ 04:40) (16 - 18)  SpO2: 97% (01-10-23 @ 04:40) (96% - 99%)    PAST MEDICAL & SURGICAL HISTORY:  COPD (chronic obstructive pulmonary disease)      Adenocarcinoma of lung      MRSA (methicillin resistant Staphylococcus aureus)  history of      Brain metastasis  brain tumor s/p resection 5/2020      HLD (hyperlipidemia)      Chronic back pain      History of appendectomy      Surgery, elective  lung biopsy      H/O brain surgery  5/2020      History of lung surgery  right wedge resection          FAMILY HISTORY:  Family history of essential hypertension (Mother, Sibling)    Family history of diabetes mellitus (Mother, Sibling)    Family history of uterine cancer (Sibling)  sister    Family history of cancer in mother (Mother)    No family history of cardiovascular disease (Father)        SOCIAL HISTORY:   Unable to obtain 2/2 AMS     ROS:   Unable to obtain 2/2 AMS    MEDICATIONS  (STANDING):    MEDICATIONS  (PRN):    Allergies    penicillin (Anaphylaxis)  penicillin (Angioedema)    Intolerances      Vital Signs Last 24 Hrs  T(C): 36.4 (10 Mario Alberto 2023 04:26), Max: 36.7 (10 Mario Alberto 2023 00:51)  T(F): 97.6 (10 Mario Alberto 2023 04:26), Max: 98 (10 Mario Alberto 2023 00:51)  HR: 72 (10 Mario Alberto 2023 04:40) (72 - 81)  BP: 117/73 (10 Mario Alberto 2023 04:40) (117/73 - 141/76)  BP(mean): --  RR: 17 (10 Mario Alberto 2023 04:40) (16 - 18)  SpO2: 97% (10 Mario Alberto 2023 04:40) (96% - 99%)    Parameters below as of 10 Mario Alberto 2023 04:10  Patient On (Oxygen Delivery Method): room air        Physical exam:  Constitutional: No acute distress, perseverating    Neurologic:  -Mental status: Awake, alert, oriented to person, does not answer other orientation questions appropriately.  Perseverating speech initially, after CT scanner was more tangential in nature, no dysarthria noted. Follows simple commands.  -Cranial nerves:   II: Right complete hemianopia on BTT   III, IV, VI: Extraocular movements are intact without nystagmus. Pupils equally round and reactive to light  V:  Difficult to assess 2/2 confusion   VII: Face is symmetric with normal eye closure and smile  VIII: Hearing is grossly intact   XII: Tongue protrudes midline  Motor: Normal bulk and tone. No pronator drift. Strength bilateral upper extremity 5/5, bilateral lower extremities 5/5.  Sensation: Difficult to assess 2/2 confusion, grimaces equally to noxious x 4. No neglect or extinction on double simultaneous testing.  Coordination: Pt unable to follow instructions  Reflexes: Downgoing toes bilaterally   Gait: deferred    NIHSS: 6 ASPECT Score: 9    Fingerstick Blood Glucose: CAPILLARY BLOOD GLUCOSE  135 (10 Mario Alberto 2023 04:22)      POCT Blood Glucose.: 135 mg/dL (10 Mario Alberto 2023 03:20)    LABS:                        13.4   8.24  )-----------( 268      ( 10 Mario Alberto 2023 03:06 )             40.2     01-10    140  |  110<H>  |  10  ----------------------------<  122<H>  3.6   |  27  |  0.82    Ca    9.2      10 Mario Alberto 2023 03:06    TPro  7.4  /  Alb  4.3  /  TBili  0.2  /  DBili  x   /  AST  17  /  ALT  17  /  AlkPhos  93  01-10      RADIOLOGY & ADDITIONAL STUDIES:  < from: CT Brain Stroke Protocol (01.10.23 @ 03:45) >  IMPRESSION: No acute intracranial hemorrhage or transcortical infarct.  Interval increase in encephalomalacia/gliosis in the region of the left   parieto-occipital resection cavity. No mass effect.    < from: CT Perfusion w/ Maps w/ IV Cont (01.10.23 @ 03:45) >  IMPRESSION:  Negative CT perfusion of the brain.    < from: CT Angio Head w/ IV Cont (01.10.23 @ 03:46) >  IMPRESSION:    No hemodynamically significant large vessel stenosis or occlusion in the   head and neck.    -----------------------------------------------------------------------------------------------------------------  IV-tPA (Y/N):   N                          Bolus time:    Alteplase Dose Verification w/ RN:  Reason IV-tPA not given: out of window     **STROKE CODE CONSULT NOTE**    Last known well time: 2230 1/9    HPI: 62y Female with PMHx of COPD, lung adenocarcinoma s/p RLL lobectomy 2016, brain mets s/p radiation in 2017, s/p L parietal crani for resection 2020 (follows Dr. Rico), seizure (on Keppra and Oxcarbazepine, follows with Dr. Barclay) presents after possible seizure and AMS. On arrival pt is confused, perseverating on her name and repeatedly states "something isn't right, I messed up my meds, I can't see out of my right eye." Difficult to obtain hx of patient 2/2 confusion. Stroke code called, NIHSS 6, CT imaging negative for acute intracranial pathology. Evidence of urinary incontinence noted on clothing. Pt with slight improvement after CT scanner, pt with tangential speech, perseverating less. Pt out of window for tenecteplase. Pt c/o headache in ED, per chart pt often c/o headache after resection.     Collateral obtained from bushra who states he was with her at 2230, states she was going to take her seizure medications but left prior to her taking them. Pt called him later statin that she wasn't feeling well, bushra went to check on her and reported that she was not moving her left arm and was confused. Collateral also obtain from patient's son, states she had a similar episode and was found to have a seizure, reports at that time pt had brain swelling.     When patient was re-examined in the afternoon, she reports not taking her anti-epileptics for a few days due to ?watching netflix show.       T(C): 36.4 (01-10-23 @ 04:26), Max: 36.7 (01-10-23 @ 00:51)  HR: 72 (01-10-23 @ 04:40) (72 - 81)  BP: 117/73 (01-10-23 @ 04:40) (117/73 - 141/76)  RR: 17 (01-10-23 @ 04:40) (16 - 18)  SpO2: 97% (01-10-23 @ 04:40) (96% - 99%)    PAST MEDICAL & SURGICAL HISTORY:  COPD (chronic obstructive pulmonary disease)      Adenocarcinoma of lung      MRSA (methicillin resistant Staphylococcus aureus)  history of      Brain metastasis  brain tumor s/p resection 5/2020      HLD (hyperlipidemia)      Chronic back pain      History of appendectomy      Surgery, elective  lung biopsy      H/O brain surgery  5/2020      History of lung surgery  right wedge resection          FAMILY HISTORY:  Family history of essential hypertension (Mother, Sibling)    Family history of diabetes mellitus (Mother, Sibling)    Family history of uterine cancer (Sibling)  sister    Family history of cancer in mother (Mother)    No family history of cardiovascular disease (Father)        SOCIAL HISTORY:   Unable to obtain 2/2 AMS     ROS:   Unable to obtain 2/2 AMS    MEDICATIONS  (STANDING):    MEDICATIONS  (PRN):    Allergies    penicillin (Anaphylaxis)  penicillin (Angioedema)    Intolerances      Vital Signs Last 24 Hrs  T(C): 36.4 (10 Mario Alberto 2023 04:26), Max: 36.7 (10 Mario Alberto 2023 00:51)  T(F): 97.6 (10 Mario Alberto 2023 04:26), Max: 98 (10 Mario Alberto 2023 00:51)  HR: 72 (10 Mario Alberto 2023 04:40) (72 - 81)  BP: 117/73 (10 Mario Alberto 2023 04:40) (117/73 - 141/76)  BP(mean): --  RR: 17 (10 Mario Alberto 2023 04:40) (16 - 18)  SpO2: 97% (10 Marioa Lberto 2023 04:40) (96% - 99%)    Parameters below as of 10 Mario Alberto 2023 04:10  Patient On (Oxygen Delivery Method): room air        Neurologic:  -Mental status: Awake, alert, oriented to person, does not answer other orientation questions appropriately.  Perseverating speech initially, after CT scanner was more tangential in nature, no dysarthria noted. Follows simple commands.  -Cranial nerves:   II: Right complete hemianopia on BTT   III, IV, VI: Extraocular movements are intact without nystagmus. Pupils equally round and reactive to light  V:  Difficult to assess 2/2 confusion   VII: Face is symmetric with normal eye closure and smile  VIII: Hearing is grossly intact   XII: Tongue protrudes midline  Motor: Normal bulk and tone. No pronator drift. Strength bilateral upper extremity 5/5, bilateral lower extremities 5/5.  Sensation: Difficult to assess 2/2 confusion, grimaces equally to noxious x 4. No neglect or extinction on double simultaneous testing.  Coordination: Pt unable to follow instructions  Reflexes: Downgoing toes bilaterally   Gait: deferred    NIHSS: 6 ASPECT Score: 9    Patient was re-examined in the afternoon with Dr. Tillman. Mental status much improved. Following all commands. Has right hemaniopia in right eye, states this is chronic for her due to prior resection. No other focal deficits.     Fingerstick Blood Glucose: CAPILLARY BLOOD GLUCOSE  135 (10 Mario Alberto 2023 04:22)      POCT Blood Glucose.: 135 mg/dL (10 Mario Alberto 2023 03:20)    LABS:                        13.4   8.24  )-----------( 268      ( 10 Mario Alberto 2023 03:06 )             40.2     01-10    140  |  110<H>  |  10  ----------------------------<  122<H>  3.6   |  27  |  0.82    Ca    9.2      10 Mario Alberto 2023 03:06    TPro  7.4  /  Alb  4.3  /  TBili  0.2  /  DBili  x   /  AST  17  /  ALT  17  /  AlkPhos  93  01-10      RADIOLOGY & ADDITIONAL STUDIES:  < from: CT Brain Stroke Protocol (01.10.23 @ 03:45) >  IMPRESSION: No acute intracranial hemorrhage or transcortical infarct.  Interval increase in encephalomalacia/gliosis in the region of the left   parieto-occipital resection cavity. No mass effect.    < from: CT Perfusion w/ Maps w/ IV Cont (01.10.23 @ 03:45) >  IMPRESSION:  Negative CT perfusion of the brain.    < from: CT Angio Head w/ IV Cont (01.10.23 @ 03:46) >  IMPRESSION:    No hemodynamically significant large vessel stenosis or occlusion in the   head and neck.    < from: MR Head No Cont (01.10.23 @ 09:54) >  IMPRESSION: No acute ischemia.    < end of copied text >        -----------------------------------------------------------------------------------------------------------------  IV-tPA (Y/N):   N                          Bolus time:    Alteplase Dose Verification w/ RN:  Reason IV-tPA not given: out of window

## 2023-01-10 NOTE — ED ADULT TRIAGE NOTE - CHIEF COMPLAINT QUOTE
per EMS , family called 911 for seizures tonight; on arrival pt is confuse, no signs of fall or injury

## 2023-01-10 NOTE — ED PROVIDER NOTE - OBJECTIVE STATEMENT
61 yo F w/ PMHx COPD, lung adenocarcinoma (s/p RLL lobectomy 2016 Dr. 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 Barclay) presenting after possible seizure? and AMS. patient confused, not providing reliable history, stating that "something isnt right" and "I messed up my meds". per collateral obtained from bushra vasquez he was with her at 10:30PM earlier tonight, she then went to take her seizure medications and he went home. she called him later in the night saying she wasn't feeling well, when he returned to her home she was not moving her left arm and was confused. per conversation with patients son oscar 3 months ago she had a similr episode and had a seizure, he was told at that time she had brain swelling. otherwise has been in usual state of health lately.

## 2023-01-10 NOTE — ED PROVIDER NOTE - CRITICAL CARE ATTENDING CONTRIBUTION TO CARE
I have spent the above time ordering and reviewing of laboratory, radiology, and cardiac tests, discussing the patient with consultants, having discussions with the patient and family, and monitoring for potential decompensation

## 2023-01-10 NOTE — CONSULT NOTE ADULT - SUBJECTIVE AND OBJECTIVE BOX
HISTORY OF PRESENT ILLNESS:   62y Female with PMHx of COPD, lung adenocarcinoma s/p RLL lobectomy 2016, brain mets s/p radiation in 2017, s/p L parietal crani for resection 2020 (follows Dr. Rico), seizure (on Keppra and Oxcarbazepine, follows with Dr. Barclay) presents after possible seizure and AMS. On arrival pt is confused, perseverating on her name and repeatedly states "something isn't right, I messed up my meds, I can't see out of my right eye." Difficult to obtain hx of patient 2/2 confusion. Stroke code called, NIHSS 6, CT imaging negative for acute intracranial pathology. Evidence of urinary incontinence noted on clothing. Pt with slight improvement after CT scanner, pt with tangential speech, perseverating less. Pt out of window for tenecteplase. Pt c/o headache in ED, per chart pt often c/o headache after resection.     Collateral obtained from bushra who states he was with her at 2230, states she was going to take her seizure medications but left prior to her taking them. Pt called him later statin that she wasn't feeling well, bushra went to check on her and reported that she was not moving her left arm and was confused. Collateral also obtain from patient's son, states she had a similar episode and was found to have a seizure, reports at that time pt had brain swelling.     Neurosurgery consulted for h/o prior met resection in the setting of seizure episode.     PAST MEDICAL & SURGICAL HISTORY:  COPD (chronic obstructive pulmonary disease)    Adenocarcinoma of lung    MRSA (methicillin resistant Staphylococcus aureus)  history of    Brain metastasis  brain tumor s/p resection 5/2020    HLD (hyperlipidemia)    Chronic back pain    History of appendectomy    Surgery, elective  lung biopsy    H/O brain surgery  5/2020    History of lung surgery  right wedge resection    FAMILY HISTORY:  Family history of essential hypertension (Mother, Sibling)    Family history of diabetes mellitus (Mother, Sibling)    Family history of uterine cancer (Sibling)   sister    Family history of cancer in mother (Mother)     No family history of cardiovascular disease (Father)     SOCIAL HISTORY: Patient states she lives at home alone and drank 1 beer last night     Allergies    penicillin (Anaphylaxis)  penicillin (Angioedema)    Intolerances    REVIEW OF SYSTEMS    General: no recent illnesses, no recent wt gain/loss    Skin/Breast:  intact  	  Ophthalmologic:  negative   	  ENMT:	negative    Respiratory and Thorax: no coughing, wheezing, recent URI  	  Cardiovascular: no chest pain, ELMORE    Gastrointestinal:	soft, non tender    Genitourinary: no frequency, dysuria    Musculoskeletal:	negative    Neurological:	see HPI    Psychiatric:	negative    Hematology/Lymphatics:	negative    Endocrine:  	negative    Allergic/Immunologic:  Negative    MEDICATIONS:  Antibiotics:    Neuro:  OXcarbazepine 450 milliGRAM(s) Oral once    Anticoagulation:    OTHER:    IVF:    Vital Signs Last 24 Hrs  T(C): 36.8 (10 Mario Alberto 2023 05:57), Max: 36.8 (10 Mario Alberto 2023 05:57)  T(F): 98.2 (10 Mario Alberto 2023 05:57), Max: 98.2 (10 Mario Alberto 2023 05:57)  HR: 75 (10 Mario Alberto 2023 05:57) (72 - 81)  BP: 123/73 (10 Mario Alberto 2023 05:57) (117/73 - 141/76)  BP(mean): --  RR: 18 (10 Mario Alberto 2023 05:57) (16 - 18)  SpO2: 97% (10 Mario Alberto 2023 05:57) (95% - 99%)    Parameters below as of 10 Mario Alberto 2023 05:00  Patient On (Oxygen Delivery Method): room air    PHYSICAL EXAM:  Constitutional: 63 y/o female awake, alert in no acute distress.  Eyes: Sclera anicteric, conjunctiva noninjected.   ENMT: Oropharyngeal mucosa moist, pink.   Respiratory: Clear to auscultation bilaterally.   Cardiovascular: Regular rate and rhythm.   Gastrointestinal:  Soft, nontender, nondistended.   Vascular: Extremities warm, no ulcers, no discoloration of skin.   Neurological: AA&O x 1, expressive aphasia, perseveration. CN II-XII grossly intact. MORSE x 4, 5/5 throughout UE/LE. Sensation intact to light touch throughout. No pronator drift, no dysmetria.  Skin: Warm, dry, no erythema.    LABS:                        13.4   8.52  )-----------( 277      ( 10 Mario Alberto 2023 05:09 )             41.0     01-10    140  |  104  |  9   ----------------------------<  130<H>  4.0   |  27  |  0.75    Ca    9.0      10 Mario Alberto 2023 05:09    TPro  7.3  /  Alb  4.0  /  TBili  0.2  /  DBili  x   /  AST  21  /  ALT  17  /  AlkPhos  96  01-10    PT/INR - ( 10 Mario Alberto 2023 05:09 )   PT: 12.8 sec;   INR: 1.07          PTT - ( 10 Mario Alberto 2023 05:09 )  PTT:26.9 sec    CULTURES:    RADIOLOGY & ADDITIONAL STUDIES:  < from: CT Brain Stroke Protocol (01.10.23 @ 03:45) >  FINDINGS:  INTRA-AXIAL: No intracranial mass effect, acute hemorrhage, midline shift   or acute transcortical infarct is seen. Interval increase in   encephalomalacia/gliosis in the region of the resection cavity of the   left parieto-occipital lobe.  EXTRA-AXIAL: No extra-axial fluid collection is present.  VENTRICLES AND SULCI: Parenchymal volume is commensurate with patient age.  VISUALIZED SINUSES: Mucosal polyp versus retention cyst in the left   maxillary sinus.  VISUALIZED MASTOIDS: Clear.  CALVARIUM: Status post left parietal craniotomy.    IMPRESSION: No acute intracranial hemorrhage or transcortical infarct.  Interval increase in encephalomalacia/gliosis in the region of the left   parieto-occipital resection cavity. No mass effect.    < end of copied text >    Assessment:  63 y/o female with h/o COPD, lung adenocarcinoma s/p RLL lobectomy 2016, brain mets s/p radiation in 2017, s/p L parietal crani for resection 2020 (follows Dr. Rico), seizure (on Keppra and Oxcarbazepine, follows with Dr. Barclay) presents after possible seizure and AMS. Neurosurgery consulted for h/o prior resection ISO seizure.     Plan  - No neurosurgical intervention/workup at this time   - Continue AEDs as per stoke/neurology   - Can follow up with Dr. Rico outpatient [phone]     D/W Dr. Rico

## 2023-01-10 NOTE — ED PROVIDER NOTE - NSFOLLOWUPINSTRUCTIONS_ED_ALL_ED_FT
Thank you for visiting [facility] Emergency Department.      We saw you today for a seizure.  Please continue taking your anti-seizure medications as prescribed.    Please keep your appointment with neurology, Dr. Rico, on 1/30/23.    Please know that no emergency visit is complete without follow-up with your primary care provider in 1 week.  Please bring copies of all discharge papers and results and show to your doctor.      Please continue taking all previous medications as instructed unless we discussed otherwise.     I appreciated your patience and hope you feel better soon.     Return to ER immediately if you develop fevers, chills, chest pain, shortness of breath, worsening and/or any concerning symptoms.

## 2023-01-10 NOTE — ED PROVIDER NOTE - CLINICAL SUMMARY MEDICAL DECISION MAKING FREE TEXT BOX
stroke code activated on ed arrival, thog more likely cause of presentation is seizure with post ictal confusion. will eval for metabolic/hematologic abnormality. no fevers. per d/w family no recent infectious ysmptoms.

## 2023-01-10 NOTE — CONSULT NOTE ADULT - ASSESSMENT
62y Female with PMHx of COPD, lung adenocarcinoma s/p RLL lobectomy 2016, brain mets s/p radiation in 2017, s/p L parietal crani for resection 2020 (follows Dr. Rico), seizure (on Keppra and Oxcarbazepine, follows with Dr. Barclay) presents after possible seizure and AMS. LKW 2230 by bushra. On arrival pt is confused, perseverating on her name and repeatedly states "something isn't right, I messed up my meds, I can't see out of my right eye." Difficult to obtain hx of patient 2/2 confusion. Stroke code called, NIHSS 6, CT imaging negative for acute intracranial pathology. Evidence of urinary incontinence noted on clothing. Pt with slight improvement after CT scanner, pt with tangential speech, perseverating less. Pt out of window for tenecteplase. Pt c/o headache in ED, per chart pt often c/o headache after resection.     Collateral obtained from bushra who states he was with her at 2230, states she was going to take her seizure medications but left prior to her taking them. Pt called him later stating that she wasn't feeling well, bushra went to check on her and reported that she was not moving her left arm and was confused. Collateral also obtain from patient's son, states she had a similar episode and was found to have a seizure, reports at that time pt had brain swelling.     History is more concerning for seizure and pt now presenting post-ictal. Per chart right hemianopsia is a new finding.     Plan:  - seizure mgmt per ED  - observation in ED, if confusion improves after administration of AED likely post-ictal, however will need to assess visual fields more in depth once pt is back to baseline  - plan to reassess.     Discussed case with Neurology Attending Dr. Black   62y Female with PMHx of COPD, lung adenocarcinoma s/p RLL lobectomy 2016, brain mets s/p radiation in 2017, s/p L parietal crani for resection 2020 (follows Dr. Rico), seizure (on Keppra and Oxcarbazepine, follows with Dr. Barclay) presents after possible seizure and AMS. LKW 2230 by bushra. On arrival pt is confused, perseverating on her name and repeatedly states "something isn't right, I messed up my meds, I can't see out of my right eye." Difficult to obtain hx of patient 2/2 confusion. Stroke code called, NIHSS 6, CT imaging negative for acute intracranial pathology. Evidence of urinary incontinence noted on clothing. Pt with slight improvement after CT scanner, pt with tangential speech, perseverating less. Pt out of window for tenecteplase. Pt c/o headache in ED, per chart pt often c/o headache after resection.     Collateral obtained from bushra who states he was with her at 2230, states she was going to take her seizure medications but left prior to her taking them. Pt called him later stating that she wasn't feeling well, bushra went to check on her and reported that she was not moving her left arm and was confused. Collateral also obtain from patient's son, states she had a similar episode and was found to have a seizure, reports at that time pt had brain swelling.     History is more concerning for seizure and pt now presenting post-ictal. Per chart right hemianopsia is a new finding.     When patient was re-examined in the afternoon, she reports not taking her anti-epileptics for a few days due to ?watching netflix show. Patient was re-examined in the afternoon with Dr. Tillman. Mental status much improved. Following all commands. Has right hemaniopia in right eye, states this is chronic for her due to prior resection. No other focal deficits. Likely patient had another seizure d/t non-compliance of meds. MRI short stroke neg for acute ischemia. No further stroke w/u indicated.      Discussed case with Neurology Attending Dr. Black and Dr. Tillman

## 2023-01-10 NOTE — ED PROVIDER NOTE - PATIENT PORTAL LINK FT
You can access the FollowMyHealth Patient Portal offered by [facility] by registering at the following website: [email or web address]. By joining [facility]’s FollowMyHealth portal, you will also be able to view your health information using other applications (apps) compatible with our system.

## 2023-01-10 NOTE — ED ADULT NURSE NOTE - OBJECTIVE STATEMENT
62 y.o. F pt. presents with c/o possible seizure and AMS. pt. has hx of lung and brain Ca and seizures, on initial assessment, pt. is confused, unable to provide hx, as per pt's bf, pt. was last seen well at 10:30 pm. pt. said that she forgot to take her medication.

## 2023-01-16 NOTE — ED ADULT NURSE NOTE - NSICDXPASTSURGICALHX_GEN_ALL_CORE_FT
Pt exits department prior to discharge, declines assessment or intervention. Ambulates independently.   
PAST SURGICAL HISTORY:  H/O brain surgery     History of appendectomy     History of lung surgery right wedge resection    Surgery, elective lung biopsy

## 2023-01-17 ENCOUNTER — APPOINTMENT (OUTPATIENT)
Dept: NEUROLOGY | Facility: CLINIC | Age: 63
End: 2023-01-17
Payer: MEDICARE

## 2023-01-17 PROCEDURE — 95816 EEG AWAKE AND DROWSY: CPT

## 2023-01-18 PROCEDURE — 95708 EEG WO VID EA 12-26HR UNMNTR: CPT

## 2023-01-19 ENCOUNTER — APPOINTMENT (OUTPATIENT)
Dept: NEUROLOGY | Facility: CLINIC | Age: 63
End: 2023-01-19

## 2023-01-19 PROCEDURE — 95721 EEG PHY/QHP>36<60 HR W/O VID: CPT

## 2023-01-19 PROCEDURE — 95700 EEG CONT REC W/VID EEG TECH: CPT

## 2023-01-19 PROCEDURE — 95708 EEG WO VID EA 12-26HR UNMNTR: CPT

## 2023-01-30 ENCOUNTER — APPOINTMENT (OUTPATIENT)
Dept: NEUROSURGERY | Facility: CLINIC | Age: 63
End: 2023-01-30
Payer: MEDICARE

## 2023-01-30 VITALS
HEIGHT: 65 IN | WEIGHT: 215 LBS | TEMPERATURE: 97 F | OXYGEN SATURATION: 97 % | BODY MASS INDEX: 35.82 KG/M2 | HEART RATE: 70 BPM | DIASTOLIC BLOOD PRESSURE: 77 MMHG | SYSTOLIC BLOOD PRESSURE: 111 MMHG | RESPIRATION RATE: 18 BRPM

## 2023-01-30 DIAGNOSIS — Z82.49 FAMILY HISTORY OF ISCHEMIC HEART DISEASE AND OTHER DISEASES OF THE CIRCULATORY SYSTEM: ICD-10-CM

## 2023-01-30 DIAGNOSIS — Z80.0 FAMILY HISTORY OF MALIGNANT NEOPLASM OF DIGESTIVE ORGANS: ICD-10-CM

## 2023-01-30 DIAGNOSIS — Z85.118 PERSONAL HISTORY OF OTHER MALIGNANT NEOPLASM OF BRONCHUS AND LUNG: ICD-10-CM

## 2023-01-30 DIAGNOSIS — Z83.3 FAMILY HISTORY OF DIABETES MELLITUS: ICD-10-CM

## 2023-01-30 DIAGNOSIS — Z98.890 OTHER SPECIFIED POSTPROCEDURAL STATES: ICD-10-CM

## 2023-01-30 DIAGNOSIS — G40.89 OTHER SEIZURES: ICD-10-CM

## 2023-01-30 DIAGNOSIS — Z87.891 PERSONAL HISTORY OF NICOTINE DEPENDENCE: ICD-10-CM

## 2023-01-30 DIAGNOSIS — R91.8 OTHER NONSPECIFIC ABNORMAL FINDING OF LUNG FIELD: ICD-10-CM

## 2023-01-30 PROCEDURE — 99214 OFFICE O/P EST MOD 30 MIN: CPT

## 2023-03-01 PROBLEM — Z82.49 FAMILY HISTORY OF ACUTE MYOCARDIAL INFARCTION: Status: ACTIVE | Noted: 2017-06-08

## 2023-03-01 PROBLEM — Z98.890 S/P CRANIOTOMY: Status: RESOLVED | Noted: 2020-05-29 | Resolved: 2023-03-01

## 2023-03-01 PROBLEM — G40.89 OTHER SEIZURES: Status: RESOLVED | Noted: 2017-11-01 | Resolved: 2023-03-01

## 2023-03-01 PROBLEM — Z85.118 HISTORY OF ADENOCARCINOMA OF LUNG: Status: RESOLVED | Noted: 2017-10-26 | Resolved: 2023-03-01

## 2023-03-01 NOTE — HISTORY OF PRESENT ILLNESS
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 She previously underwent left parietal craniotomy for tumor resection and adjuvant radiation approximately 3 years ago In November 2017 at another institution. She also had GKRS on 1/8/18 to a right parietal brain metastasis with Dr. Rico. MRI brain on admission demonstrated a recurrent 3 cm left parietal enhancing brain mass in the area of prior tumor resection. She underwent surgical resection of left parietal mass on 5/13/2020 with Dr. Rico. Pathology indicated radiation necrosis. \par \par Postoperatively, her right hand weakness improved. She does have right visual field cut. She was referred to Dr. Andrea Gutierrez for neuro opthalmology consult.\par \par In July 2020, Ms. Mendoza was hospitalized at [facility] due to sudden onset difficulty speaking as well as RIGHT hand weakness. She was admitted and given steroids and seizure medication. She was weaned off the dexamethasone over the next several weeks. She reported improvement in right hand weakness.\par \par She remains on keppra 750 mg bid.\par \par MRI brain with and without contrast, done on 2/3/21, was stable. \par \par On 5/7/21, she reported RIGHT hand weakness. While in the office and during visit, she developed acute expressive aphasia and total RIGHT hemiparesis including RIGHT leg. Prior to this, she stated she had not taken keppra X 2 days. She was taken to the ED and had generalized tonic clonic seizure. She was discharged with 1000 mg keppra bid.\par \par She had 4 cycles of IV avastin and had repeat MRI brain done on 8/10/21, demonstrated decrease in left parietal enhancement and previously seen cortical foci of enhancement in right parietal lobe was not visualized.\par \par At prior visit, MRI brain with and without contrast done on 8/29/22, which is stable.\par Denies new neurological symptoms. Denies headaches, seizures, focal weakness, new vision change. She remains on Keppra 750 mg BID and Oxcarbazepine. \par \par MRI brain done 11/9/22 was stable. Denies focal neurological symptoms. She remains on AEDs. \par \par 1/30/23 visit\par Returned to the ED on 1/10/23 due to seizure. Describes seizure as difficulty speaking and feeling like she was going to pass out. She missed several doses of AEDs. MRI brain was done on 1/10/23 which was stable.\par She comes to the office today to evaluate progress. \par She is doing well. She states she is taking AEDs daily. Denies recent seizures since discharge.\par  \par

## 2023-03-03 ENCOUNTER — RESULT REVIEW (OUTPATIENT)
Age: 63
End: 2023-03-03

## 2023-03-03 ENCOUNTER — APPOINTMENT (OUTPATIENT)
Dept: MRI IMAGING | Facility: HOSPITAL | Age: 63
End: 2023-03-03

## 2023-03-03 ENCOUNTER — OUTPATIENT (OUTPATIENT)
Dept: OUTPATIENT SERVICES | Facility: HOSPITAL | Age: 63
LOS: 1 days | End: 2023-03-03
Payer: MEDICARE

## 2023-03-03 DIAGNOSIS — Z98.890 OTHER SPECIFIED POSTPROCEDURAL STATES: Chronic | ICD-10-CM

## 2023-03-03 DIAGNOSIS — Z41.9 ENCOUNTER FOR PROCEDURE FOR PURPOSES OTHER THAN REMEDYING HEALTH STATE, UNSPECIFIED: Chronic | ICD-10-CM

## 2023-03-03 DIAGNOSIS — Z90.49 ACQUIRED ABSENCE OF OTHER SPECIFIED PARTS OF DIGESTIVE TRACT: Chronic | ICD-10-CM

## 2023-03-03 PROCEDURE — 70553 MRI BRAIN STEM W/O & W/DYE: CPT | Mod: 26,MH

## 2023-03-03 PROCEDURE — 70553 MRI BRAIN STEM W/O & W/DYE: CPT | Mod: MH

## 2023-03-03 PROCEDURE — A9585: CPT

## 2023-03-06 ENCOUNTER — APPOINTMENT (OUTPATIENT)
Dept: NEUROSURGERY | Facility: CLINIC | Age: 63
End: 2023-03-06

## 2023-03-15 NOTE — PATIENT PROFILE ADULT. - FUNCTIONAL SCREEN CURRENT LEVEL: AMBULATION, MLM
03/15/23 0800   Vitals   Temp 97.6 °F (36.4 °C)   Temp Source Temporal   Heart Rate 71   Heart Rate Source Monitor   Resp 15   /89   MAP (Calculated) 104   MAP (mmHg) 102   BP Location Left upper arm   BP Upper/Lower Upper   BP Method Automatic   Patient Position Sitting   Level of Consciousness 0   MEWS Score 1   Pain Assessment   Pain Assessment None - Denies Pain   Oxygen Therapy   SpO2 99 %   Pulse Oximetry Type Intermittent   Pulse Oximeter Device Mode Intermittent   Pulse Oximeter Device Location Finger   O2 Device None (Room air)     PT A/O x4, follows commands and responds appropriately. No s/s of distress, VSS. See flowsheet and eMar for additional documentation. (0) independent

## 2023-03-17 ENCOUNTER — NON-APPOINTMENT (OUTPATIENT)
Age: 63
End: 2023-03-17

## 2023-03-17 ENCOUNTER — APPOINTMENT (OUTPATIENT)
Dept: NEUROSURGERY | Facility: CLINIC | Age: 63
End: 2023-03-17
Payer: MEDICARE

## 2023-03-17 VITALS
RESPIRATION RATE: 17 BRPM | DIASTOLIC BLOOD PRESSURE: 71 MMHG | TEMPERATURE: 95 F | BODY MASS INDEX: 36.82 KG/M2 | HEIGHT: 65 IN | SYSTOLIC BLOOD PRESSURE: 100 MMHG | HEART RATE: 74 BPM | WEIGHT: 221 LBS | OXYGEN SATURATION: 97 %

## 2023-03-17 PROCEDURE — 99214 OFFICE O/P EST MOD 30 MIN: CPT

## 2023-03-20 NOTE — ASSESSMENT
[FreeTextEntry1] : \par All available images reviewed with the patient by Dr Rico with time allowed for questions and answers given.\par No new areas of concern noted.\par \par F/U in JULY 2023 with MRI brain w w/o contrast\par Forms completed for [facility] services and email to patient.

## 2023-03-20 NOTE — REASON FOR VISIT
[FreeTextEntry1] : to review 4 month MRI brain for hx of brain mets (right parietal and left parietal of prior tumor resection and radiation). \par

## 2023-03-20 NOTE — PHYSICAL EXAM
[General Appearance - Alert] : alert [General Appearance - Well Nourished] : well nourished [General Appearance - Well-Appearing] : healthy appearing [Oriented To Time, Place, And Person] : oriented to person, place, and time [Person] : oriented to person [Place] : oriented to place [Time] : oriented to time [Cranial Nerves Optic (II)] : visual acuity intact bilaterally,  pupils equal round and reactive to light [Cranial Nerves Oculomotor (III)] : extraocular motion intact [Cranial Nerves Trigeminal (V)] : facial sensation intact symmetrically [Cranial Nerves Facial (VII)] : face symmetrical [Cranial Nerves Glossopharyngeal (IX)] : tongue and palate midline [Cranial Nerves Vestibulocochlear (VIII)] : hearing was intact bilaterally [Cranial Nerves Accessory (XI - Cranial And Spinal)] : head turning and shoulder shrug symmetric [Cranial Nerves Hypoglossal (XII)] : there was no tongue deviation with protrusion [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

## 2023-03-20 NOTE — DATA REVIEWED
[de-identified] : [facility]\par    [facility] Department of Radiology\par   Radiology Report\par \par \par Patient Name: BURGESS GAY   Report Date: 07-Mar-2023 11:40.00 \par Patient ID: [id number] (LH00), [id number] (EPI)  Accession No.: [id number] \par Patient Birth Date: 1960  Report Status: F \par Referring Physician: [id number] JOSE MIGUEL HELM   Reason For Study: EVAL STABILITY OF BRAIN METASTASES  \par \par \par \par \par \par ACC: [id number] EXAM: MR BRAIN WAW IC ORDERED BY: VOLODYMYR GILLESPIE\par \par PROCEDURE DATE: 03/03/2023\par \par \par \par INTERPRETATION: INDICATIONS: Brain metastases, evaluate stability.\par \par TECHNIQUE: Multi-planar multi-sequential MR imaging of the brain was performed without intravenous contrast. Following the administration of 8 cc of Gadavist, additional post-contrasting imaging was obtained.\par \par COMPARISON EXAMINATION: Multiple prior MRI brains including 1/10/2023 abbreviated noncontrast scan, and 11/9/2022 and 4/28/2022 contrast exams.\par \par FINDINGS:\par \par The patient is again status post left parieto-occipital craniotomy without fluid collection. There is a stable appearing resection cavity in the left parieto-occipital lobes. There is unchanged enhancement surrounding the resection cavity with ex vacuo dilatation of the left atrium and no mass effect.\par \par A few punctate foci of enhancement in the paramedian right occipital and parietal lobes, as seen on series 17, images 105-115, are not significantly changed, perhaps slightly larger with one nodule measuring 3 mm on image 110, previously 2 mm. Clustered foci which enhance nicely demonstrated on series 16, image 94, at least 5 punctate foci. T2 FLAIR signal at this site is slightly increased as well on series 6, image 18 and also on coronal T2 imaging series 10, image 24. 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 No new areas of enhancement are identified allowing for motion limited and noisy appearance of the postcontrast scan, specifically no evidence of recurrent metastatic disease. There are stable foci of susceptibility artifact within the resection cavity and surrounding T2/FLAIR hyperintensity extending into the left periventricular white matter as well as within the left occipital white matter, again consistent with post treatment change and postop gliosis, again spanning a thin callosal splenium.\par \par There is stable ex vacuo dilatation of the left lateral ventricle atria. The ventricles are otherwise stable in size and configuration without evidence of hydrocephalus.\par \par There are are a few unchanged punctate foci of T2/FLAIR hyperintensity throughout the subcortical and periventricular white matter, most consistent with sequelae of small vessel ischemic disease. There is no abnormal restricted diffusion to suggest recent ischemia.\par \par No extra-axial fluid collection is present. The visualized paranasal sinuses and mastoid air cells are well aerated. Large vessel flow voids are preserved.\par \par \par IMPRESSION:\par \par No significant change in enhancement nearby left parieto-occipital resection, and punctate right parieto-occipital medial enhancement and FLAIR signal is minimally progressed. Findings again most compatible with RT change and continued follow-up advised.\par \par No new intracranial enhancement given motion limitation to suggest recurrent metastatic disease.\par \par --- End of Report ---\par \par \par \par \par DANNY BENJAMIN MD; Resident Radiologist\par This document has been electronically signed.\par SCOTTIE CHÁVEZ MD; Attending Radiologist\par This document has been electronically signed. Mar 7 2023 11:40AM \par

## 2023-03-20 NOTE — HISTORY OF PRESENT ILLNESS
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 She previously underwent left parietal craniotomy for tumor resection and adjuvant radiation approximately 3 years ago In November 2017 at another institution. She also had GKRS on 1/8/18 to a right parietal brain metastasis with Dr. Rico. MRI brain on admission demonstrated a recurrent 3 cm left parietal enhancing brain mass in the area of prior tumor resection. She underwent surgical resection of left parietal mass on 5/13/2020 with Dr. Rico. Pathology indicated radiation necrosis. \par \par Postoperatively, her right hand weakness improved. She does have right visual field cut. She was referred to Dr. Andrea Gutierrez for neuro opthalmology consult.\par \par In July 2020, Ms. Mendoza was hospitalized at [facility] due to sudden onset difficulty speaking as well as RIGHT hand weakness. She was admitted and given steroids and seizure medication. She was weaned off the dexamethasone over the next several weeks. She reported improvement in right hand weakness.\par \par She remains on keppra 750 mg bid.\par \par MRI brain with and without contrast, done on 2/3/21, was stable. \par \par On 5/7/21, she reported RIGHT hand weakness. While in the office and during visit, she developed acute expressive aphasia and total RIGHT hemiparesis including RIGHT leg. Prior to this, she stated she had not taken keppra X 2 days. She was taken to the ED and had generalized tonic clonic seizure. She was discharged with 1000 mg keppra bid.\par \par She had 4 cycles of IV avastin and had repeat MRI brain done on 8/10/21, demonstrated decrease in left parietal enhancement and previously seen cortical foci of enhancement in right parietal lobe was not visualized.\par \par At prior visit, MRI brain with and without contrast done on 8/29/22, which is stable.\par Denies new neurological symptoms. Denies headaches, seizures, focal weakness, new vision change. She remains on Keppra 750 mg BID and Oxcarbazepine. \par \par MRI brain done 11/9/22 was stable. Denies focal neurological symptoms. She remains on AEDs. \par \par Returned to the ED on 1/10/23 due to seizure. Describes seizure as difficulty speaking and feeling like she was going to pass out. She missed several doses of AEDs. MRI brain was done on 1/10/23 which was stable.\par Recommended to repeat MRI brain w/wo contrast in 4 months (March 2023).\par \par Today, she presents to review MRI brain.\par Denies any seizures since her recent visit to the ER she now sets her alarm to to take her medication\par \par She offers no new concerns at today's visit\par

## 2023-03-30 ENCOUNTER — OUTPATIENT (OUTPATIENT)
Dept: OUTPATIENT SERVICES | Facility: HOSPITAL | Age: 63
LOS: 1 days | End: 2023-03-30
Payer: MEDICARE

## 2023-03-30 DIAGNOSIS — Z98.890 OTHER SPECIFIED POSTPROCEDURAL STATES: Chronic | ICD-10-CM

## 2023-03-30 DIAGNOSIS — Z90.49 ACQUIRED ABSENCE OF OTHER SPECIFIED PARTS OF DIGESTIVE TRACT: Chronic | ICD-10-CM

## 2023-03-30 DIAGNOSIS — Z41.9 ENCOUNTER FOR PROCEDURE FOR PURPOSES OTHER THAN REMEDYING HEALTH STATE, UNSPECIFIED: Chronic | ICD-10-CM

## 2023-03-30 PROCEDURE — 77063 BREAST TOMOSYNTHESIS BI: CPT

## 2023-03-30 PROCEDURE — 77063 BREAST TOMOSYNTHESIS BI: CPT | Mod: 26

## 2023-03-30 PROCEDURE — 77067 SCR MAMMO BI INCL CAD: CPT

## 2023-03-30 PROCEDURE — 77067 SCR MAMMO BI INCL CAD: CPT | Mod: 26

## 2023-05-15 NOTE — H&P ADULT - NSICDXPASTMEDICALHX_GEN_ALL_CORE_FT
Phone: Calos Menon      Fax: [phone]                            Outpatient Physical Therapy                                                                            Daily Note    Date: 5/15/2023  Patient Name: Jesus Conner        MRN: [id number]   ACCT#:  [de-identified]  : 1953  (68 y.o.)    Referring Provider (secondary): Dr. Shayan Savage         Diagnosis: Chronic Cervical Radiculopathy  Treatment Diagnosis: Neck pain/L UE numbness    Onset Date: 23  PT Insurance Information: [facility]  Total # of Visits Approved: 12 Per Physician Order  Total # of Visits to Date: 3  No Show: 0  Canceled Appointment: 0    Pre-Treatment Pain:  0/10     Assessment  Assessment: Pt denies pain today. Added peach tband to wall walks. Pt eloy well. Completed ther ex per doc flow. Added R first rib mob today. Completed manual per doc flow. Plan  Continue with current plan of care    Exercises/Modalities/Manual:  See DocFlow Sheet    Education: Scapular retraction form on tband rows and extensions          Goals  (Total # of Visits to Date: 3)   Short Term Goals  Time Frame for Short Term Goals: 6 visits  Short Term Goal 1: Pt to report independence and compliance with HEP for posture/scapular stability. Short Term Goal 2: Pt to have no HA x4 consecutive days to improve ADL eloy. Long Term Goals  Time Frame for Long Term Goals : 12 visits (POC Exp 23)  Long Term Goal 1: Pt to improve NDI from 15/50 to less than 10/50 to improve ADL eloy  Long Term Goal 2: Pt to improve cervical rotation to 55deg B/L to improve driving eloy.   Long Term Goal 3: Pt to report no lateral hand numbness x 4 consecutive days to improve ADLs    Post Treatment Pain:  0/10    Time In: 1030    Time Out : 1115  Timed Code Treatment Minutes: 45 Minutes  Total Treatment Time: 859 [facility], PT     Date: 5/15/2023 PAST MEDICAL HISTORY:  Adenocarcinoma of lung     Brain metastasis brain tumor s/p resection    COPD (chronic obstructive pulmonary disease)     MRSA (methicillin resistant Staphylococcus aureus) history of

## 2023-05-19 NOTE — ED ADULT TRIAGE NOTE - RESPIRATORY RATE (BREATHS/MIN)
Incidental finding on CT scan: Nodular thickening medial and lateral limbs of the right adrenal gland. Recommend f/u w/ pcp for further work up and imaging.    Resources for Comprehensive Pain Clinics, call for appointment:   [facility]  Pain Medicine Center     Pain Medicine Center  Phone: [phone]     Phone:  [phone]                                                           Fax:  [phone]    [facility]                 Pain Medicine Center  Phone: [phone]     Phone:  [phone]                    Fax:  [phone]    [facility]    Pain Practice                 Pain Medicine Center  Phone:  [phone]     Phone:  [phone]  Fax:  [phone]     Fax:  [phone]   24

## 2023-05-23 ENCOUNTER — APPOINTMENT (OUTPATIENT)
Dept: CT IMAGING | Facility: HOSPITAL | Age: 63
End: 2023-05-23

## 2023-06-06 ENCOUNTER — RX RENEWAL (OUTPATIENT)
Age: 63
End: 2023-06-06

## 2023-07-12 ENCOUNTER — OUTPATIENT (OUTPATIENT)
Dept: OUTPATIENT SERVICES | Facility: HOSPITAL | Age: 63
LOS: 1 days | End: 2023-07-12
Payer: MEDICARE

## 2023-07-12 ENCOUNTER — APPOINTMENT (OUTPATIENT)
Dept: MRI IMAGING | Facility: HOSPITAL | Age: 63
End: 2023-07-12
Payer: MEDICARE

## 2023-07-12 DIAGNOSIS — Z98.890 OTHER SPECIFIED POSTPROCEDURAL STATES: Chronic | ICD-10-CM

## 2023-07-12 DIAGNOSIS — Z90.49 ACQUIRED ABSENCE OF OTHER SPECIFIED PARTS OF DIGESTIVE TRACT: Chronic | ICD-10-CM

## 2023-07-12 DIAGNOSIS — Z41.9 ENCOUNTER FOR PROCEDURE FOR PURPOSES OTHER THAN REMEDYING HEALTH STATE, UNSPECIFIED: Chronic | ICD-10-CM

## 2023-07-12 PROCEDURE — 70553 MRI BRAIN STEM W/O & W/DYE: CPT | Mod: 26,MH

## 2023-07-12 PROCEDURE — A9585: CPT

## 2023-07-12 PROCEDURE — 70553 MRI BRAIN STEM W/O & W/DYE: CPT

## 2023-07-13 PROBLEM — R41.89 COGNITIVE CHANGES: Status: ACTIVE | Noted: 2018-05-23

## 2023-07-14 ENCOUNTER — APPOINTMENT (OUTPATIENT)
Dept: NEUROSURGERY | Facility: CLINIC | Age: 63
End: 2023-07-14
Payer: MEDICARE

## 2023-07-14 VITALS
SYSTOLIC BLOOD PRESSURE: 118 MMHG | TEMPERATURE: 98.7 F | DIASTOLIC BLOOD PRESSURE: 76 MMHG | HEART RATE: 86 BPM | OXYGEN SATURATION: 97 %

## 2023-07-14 DIAGNOSIS — R41.89 OTHER SYMPTOMS AND SIGNS INVOLVING COGNITIVE FUNCTIONS AND AWARENESS: ICD-10-CM

## 2023-07-14 PROCEDURE — 99214 OFFICE O/P EST MOD 30 MIN: CPT

## 2023-07-15 NOTE — ADDENDUM
[FreeTextEntry1] : Right parietal parafalcine nodules appear approximately stable but will repeat MRI with perfusion in 8 weeks to be sure. CLinically stable. No seizures, continues on keppra. Followup after MRI

## 2023-07-15 NOTE — REASON FOR VISIT
[Follow-Up: _____] : a [unfilled] follow-up visit [FreeTextEntry1] : 62 year old woman with a history of metastatic lung adenocarcinoma to brain who presented with worsening right hand weakness, confusion, imbalance and expressive aphasia. She previously underwent left parietal craniotomy for tumor resection and adjuvant radiation approximately 3 years ago In November 2017 at another institution. She also had GKRS on 1/8/18 to a right parietal brain metastasis with Dr. Rico. MRI brain on admission demonstrated a recurrent 3 cm left parietal enhancing brain mass in the area of prior tumor resection. She underwent surgical resection of left parietal mass on 5/13/2020 with Dr. Rico. Pathology indicated radiation necrosis. \par \par Postoperatively, her right hand weakness improved. She does have right visual field cut. She was referred to Dr. Andrea Gutierrez for neuro opthalmology consult.\par \par In July 2020, Ms. Mendoza was hospitalized at [facility] due to sudden onset difficulty speaking as well as RIGHT hand weakness. She was admitted and given steroids and seizure medication. She was weaned off the dexamethasone over the next several weeks. She reported improvement in right hand weakness.\par \par She remains on keppra 750 mg bid.\par \par MRI brain with and without contrast, done on 2/3/21, was stable. \par \par On 5/7/21, she reported RIGHT hand weakness. While in the office and during visit, she developed acute expressive aphasia and total RIGHT hemiparesis including RIGHT leg. Prior to this, she stated she had not taken keppra X 2 days. She was taken to the ED and had generalized tonic clonic seizure. She was discharged with 1000 mg keppra bid.\par \par She had 4 cycles of IV avastin and had repeat MRI brain done on 8/10/21, demonstrated decrease in left parietal enhancement and previously seen cortical foci of enhancement in right parietal lobe was not visualized.\par \par At prior visit, MRI brain with and without contrast done on 8/29/22, which is stable.\par Denies new neurological symptoms. Denies headaches, seizures, focal weakness, new vision change. She remains on Keppra 750 mg BID and Oxcarbazepine. \par \par MRI brain done 11/9/22 was stable. Denies focal neurological symptoms. She remains on AEDs. \par \par Returned to the ED on 1/10/23 due to seizure. Describes seizure as difficulty speaking and feeling like she was going to pass out. She missed several doses of AEDs. MRI brain was done on 1/10/23 which was stable.\par Recommended to repeat MRI brain w/wo contrast in 4 months (March 2023).\par \par 3/17/23 presented to review MRI brain.\par Denies any seizures since her recent visit to the ER she now sets her alarm to to take her medication\par \par 7/14/23 presented for imaging review MRI brain w w/o contrast. she denies Sz and reports taking her medication setting her alarm to remember. Her right arm and leg are slightly weaker than left. Upon imaging review there is a possibility of local recurrence. This was discussed with the patient\par

## 2023-07-15 NOTE — ASSESSMENT
[FreeTextEntry1] : I, Dr. Rico, personally performed the evaluation and management (E/M) services for this established patient who presents today with (a) new problem(s)/exacerbation of (an) existing condition(s). That E/M includes conducting the examination, assessing all new/exacerbated conditions, and establishing a new plan of care. Today, my ACP, Hannah Higgins, was here to observe my evaluation and management services for this new problem/exacerbated condition to be followed going forward.\par \par Plan:\par - MRI w/wo contrast with perfusion in 8 weeks\par -RTC right after imaging for review\par

## 2023-09-15 ENCOUNTER — APPOINTMENT (OUTPATIENT)
Dept: NEUROSURGERY | Facility: CLINIC | Age: 63
End: 2023-09-15
Payer: MEDICARE

## 2023-09-15 ENCOUNTER — APPOINTMENT (OUTPATIENT)
Dept: MRI IMAGING | Facility: HOSPITAL | Age: 63
End: 2023-09-15
Payer: MEDICARE

## 2023-09-15 ENCOUNTER — OUTPATIENT (OUTPATIENT)
Dept: OUTPATIENT SERVICES | Facility: HOSPITAL | Age: 63
LOS: 1 days | End: 2023-09-15
Payer: MEDICARE

## 2023-09-15 VITALS
WEIGHT: 215 LBS | TEMPERATURE: 97.2 F | OXYGEN SATURATION: 73 % | HEART RATE: 73 BPM | RESPIRATION RATE: 18 BRPM | SYSTOLIC BLOOD PRESSURE: 112 MMHG | HEIGHT: 65 IN | DIASTOLIC BLOOD PRESSURE: 78 MMHG | BODY MASS INDEX: 35.82 KG/M2

## 2023-09-15 DIAGNOSIS — Z98.890 OTHER SPECIFIED POSTPROCEDURAL STATES: Chronic | ICD-10-CM

## 2023-09-15 DIAGNOSIS — Z90.49 ACQUIRED ABSENCE OF OTHER SPECIFIED PARTS OF DIGESTIVE TRACT: Chronic | ICD-10-CM

## 2023-09-15 DIAGNOSIS — Z41.9 ENCOUNTER FOR PROCEDURE FOR PURPOSES OTHER THAN REMEDYING HEALTH STATE, UNSPECIFIED: Chronic | ICD-10-CM

## 2023-09-15 PROCEDURE — 99214 OFFICE O/P EST MOD 30 MIN: CPT

## 2023-09-15 PROCEDURE — 70553 MRI BRAIN STEM W/O & W/DYE: CPT | Mod: 26,MH

## 2023-09-15 PROCEDURE — 70553 MRI BRAIN STEM W/O & W/DYE: CPT

## 2023-09-15 PROCEDURE — A9585: CPT

## 2023-09-20 NOTE — HISTORY OF PRESENT ILLNESS
Called patient to let her know today's appt with Dr. Kenny at 11:40 am is canceled due to  Not being in office. Asked patient if we could reschedule but patient declined and said she will call back to schedule.   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 She previously underwent left parietal craniotomy for tumor resection and adjuvant radiation approximately 3 years ago In November 2017 at another institution. She also had GKRS on 1/8/18 to a right parietal brain metastasis with Dr. Rico. MRI brain on admission demonstrated a recurrent 3 cm left parietal enhancing brain mass in the area of prior tumor resection. She underwent surgical resection of left parietal mass on 5/13/2020 with Dr. Rico. Pathology indicated radiation necrosis. \par \par Postoperatively, her right hand weakness improved. She does have right visual field cut. She was referred to Dr. Andrea Gutierrez for neuro opthalmology consult.\par \par In July 2020, Ms. Mendoza was hospitalized at [facility] due to sudden onset difficulty speaking as well as RIGHT hand weakness. She was admitted and given steroids and seizure medication. She was weaned off the dexamethasone over the next several weeks. She reported improvement in right hand weakness.\par \par She remains on keppra 750 mg bid.\par \par MRI brain with and without contrast, done on 2/3/21, was stable. \par \par On 5/7/21, she reported RIGHT hand weakness. While in the office and during visit, she developed acute expressive aphasia and total RIGHT hemiparesis including RIGHT leg. Prior to this, she stated she had not taken keppra X 2 days. She was taken to the ED and had generalized tonic clonic seizure. She was discharged with 1000 mg keppra bid.\par \par She had 4 cycles of IV avastin and had repeat MRI brain done on 8/10/21, demonstrated decrease in left parietal enhancement and previously seen cortical foci of enhancement in right parietal lobe was not visualized.\par \par At prior visit, MRI brain with and without contrast done on 8/29/22, which is stable.\par Denies new neurological symptoms. Denies headaches, seizures, focal weakness, new vision change. She remains on Keppra 750 mg BID and Oxcarbazepine. \par \par MRI brain done 11/9/22 was stable. Denies focal neurological symptoms. She remains on AEDs. \par \par 1/30/23 visit\par Returned to the ED on 1/10/23 due to seizure. Describes seizure as difficulty speaking and feeling like she was going to pass out. She missed several doses of AEDs. MRI brain was done on 1/10/23 which was stable.\par She comes to the office today to evaluate progress. \par She is doing well. She states she is taking AEDs daily. Denies recent seizures since discharge.\par  \par

## 2023-10-10 ENCOUNTER — APPOINTMENT (OUTPATIENT)
Dept: NEUROLOGY | Facility: CLINIC | Age: 63
End: 2023-10-10
Payer: MEDICARE

## 2023-10-10 VITALS
TEMPERATURE: 97.7 F | OXYGEN SATURATION: 96 % | BODY MASS INDEX: 37.15 KG/M2 | HEIGHT: 65 IN | DIASTOLIC BLOOD PRESSURE: 80 MMHG | WEIGHT: 223 LBS | HEART RATE: 76 BPM | SYSTOLIC BLOOD PRESSURE: 118 MMHG

## 2023-10-10 DIAGNOSIS — R51.9 HEADACHE, UNSPECIFIED: ICD-10-CM

## 2023-10-10 PROCEDURE — 99214 OFFICE O/P EST MOD 30 MIN: CPT

## 2023-10-31 NOTE — PROGRESS NOTE ADULT - PROBLEM SELECTOR PROBLEM 10
Transition of care performed with sharing of clinical summary Initiate Treatment: Fluocinonide Detail Level: Detailed

## 2023-12-11 ENCOUNTER — APPOINTMENT (OUTPATIENT)
Dept: MRI IMAGING | Facility: HOSPITAL | Age: 63
End: 2023-12-11

## 2023-12-11 ENCOUNTER — OUTPATIENT (OUTPATIENT)
Dept: OUTPATIENT SERVICES | Facility: HOSPITAL | Age: 63
LOS: 1 days | End: 2023-12-11
Payer: MEDICARE

## 2023-12-11 ENCOUNTER — APPOINTMENT (OUTPATIENT)
Dept: CT IMAGING | Facility: HOSPITAL | Age: 63
End: 2023-12-11

## 2023-12-11 DIAGNOSIS — Z90.49 ACQUIRED ABSENCE OF OTHER SPECIFIED PARTS OF DIGESTIVE TRACT: Chronic | ICD-10-CM

## 2023-12-11 DIAGNOSIS — Z41.9 ENCOUNTER FOR PROCEDURE FOR PURPOSES OTHER THAN REMEDYING HEALTH STATE, UNSPECIFIED: Chronic | ICD-10-CM

## 2023-12-11 DIAGNOSIS — Z98.890 OTHER SPECIFIED POSTPROCEDURAL STATES: Chronic | ICD-10-CM

## 2023-12-11 PROCEDURE — 82565 ASSAY OF CREATININE: CPT

## 2023-12-11 PROCEDURE — 71260 CT THORAX DX C+: CPT | Mod: MH

## 2023-12-11 PROCEDURE — 71260 CT THORAX DX C+: CPT | Mod: 26,MH

## 2023-12-11 PROCEDURE — 70553 MRI BRAIN STEM W/O & W/DYE: CPT | Mod: 26,MH

## 2023-12-11 PROCEDURE — 74177 CT ABD & PELVIS W/CONTRAST: CPT | Mod: 26,MH

## 2023-12-11 PROCEDURE — A9585: CPT

## 2023-12-11 PROCEDURE — 74177 CT ABD & PELVIS W/CONTRAST: CPT | Mod: MH

## 2023-12-11 PROCEDURE — 70553 MRI BRAIN STEM W/O & W/DYE: CPT

## 2023-12-14 ENCOUNTER — NON-APPOINTMENT (OUTPATIENT)
Age: 63
End: 2023-12-14

## 2023-12-20 LAB
POCT ISTAT CREATININE: 0.7 MG/DL — SIGNIFICANT CHANGE UP (ref 0.5–1.3)
POCT ISTAT CREATININE: 0.7 MG/DL — SIGNIFICANT CHANGE UP (ref 0.5–1.3)

## 2024-01-08 ENCOUNTER — APPOINTMENT (OUTPATIENT)
Dept: NEUROSURGERY | Facility: CLINIC | Age: 64
End: 2024-01-08
Payer: MEDICARE

## 2024-01-08 VITALS
HEART RATE: 76 BPM | WEIGHT: 223 LBS | OXYGEN SATURATION: 97 % | HEIGHT: 65 IN | BODY MASS INDEX: 37.15 KG/M2 | RESPIRATION RATE: 18 BRPM | SYSTOLIC BLOOD PRESSURE: 114 MMHG | DIASTOLIC BLOOD PRESSURE: 79 MMHG

## 2024-01-08 DIAGNOSIS — C79.31 SECONDARY MALIGNANT NEOPLASM OF BRAIN: ICD-10-CM

## 2024-01-08 PROCEDURE — 99214 OFFICE O/P EST MOD 30 MIN: CPT

## 2024-01-08 NOTE — REASON FOR VISIT
[Follow-Up: _____] : a [unfilled] follow-up visit [FreeTextEntry1] : 63 year old woman with a history of metastatic lung adenocarcinoma to brain who presented with worsening right hand weakness, confusion, imbalance and expressive aphasia. She previously underwent left parietal craniotomy for tumor resection and adjuvant radiation approximately 3 years ago In November 2017 at another institution. She also had GKRS on 1/8/18 to a right parietal brain metastasis with Dr. Rico. MRI brain on admission demonstrated a recurrent 3 cm left parietal enhancing brain mass in the area of prior tumor resection. She underwent surgical resection of left parietal mass on 5/13/2020 with Dr. Rico. Pathology indicated radiation necrosis.   Postoperatively, her right hand weakness improved. She does have right visual field cut. She was referred to Dr. Andrea Gutierrez for neuro opthalmology consult.  In July 2020, Ms. Mendoza was hospitalized at [facility] due to sudden onset difficulty speaking as well as RIGHT hand weakness. She was admitted and given steroids and seizure medication. She was weaned off the dexamethasone over the next several weeks. She reported improvement in right hand weakness.  She remains on keppra 750 mg bid.  MRI brain with and without contrast, done on 2/3/21, was stable.   On 5/7/21, she reported RIGHT hand weakness. While in the office and during visit, she developed acute expressive aphasia and total RIGHT hemiparesis including RIGHT leg. Prior to this, she stated she had not taken keppra X 2 days. She was taken to the ED and had generalized tonic clonic seizure. She was discharged with 1000 mg keppra bid.  She had 4 cycles of IV avastin and had repeat MRI brain done on 8/10/21, demonstrated decrease in left parietal enhancement and previously seen cortical foci of enhancement in right parietal lobe was not visualized.  At prior visit, MRI brain with and without contrast done on 8/29/22, which is stable. Denies new neurological symptoms. Denies headaches, seizures, focal weakness, new vision change. She remains on Keppra 750 mg BID and Oxcarbazepine.   MRI brain done 11/9/22 was stable. Denies focal neurological symptoms. She remains on AEDs.   Returned to the ED on 1/10/23 due to seizure. Describes seizure as difficulty speaking and feeling like she was going to pass out. She missed several doses of AEDs. MRI brain was done on 1/10/23 which was stable. Recommended to repeat MRI brain w/wo contrast in 4 months (March 2023).  3/17/23 presented to review MRI brain. Denies any seizures since her recent visit to the ER she now sets her alarm to to take her medication  7/14/23 presented for imaging review MRI brain w w/o contrast. she denies Sz and reports taking her medication setting her alarm to remember. Her right arm and leg are slightly weaker than left. Upon imaging review there is a possibility of local recurrence. This was discussed with the patient and a follow up MRI Brain with contrast and WITH perfusion was recommended in 8 weeks  9/15/23 Returns today to review MRI brain with and without contrast WITH perfusion, done earlier in the day which was stable. Denies seizures. Denies new neurological symptoms.  1/4/24: Returns today to review MRI brain with and without contrast from 12/14/23, which is stable. She states she is doing well. Denies seizures. Remains on keppra 750 mg and oxcarbazepine. Denies right sided weakness.

## 2024-01-08 NOTE — ASSESSMENT
[FreeTextEntry1] : MRI brain with and without contrast done on 12/11/23 reviewed by Dr. Rico with patient, stable Recommend MRI brain with and without contrast in 3 months (March/April 2024) After MRI complete, RTO to review imaging with Dr. Rico  Patient and patient's family verbalize agreement and understanding with plan of care.  I, Dr. Rico, personally performed the evaluation and management (E/M) services for this established patient who presents today with (a) new problem(s)/exacerbation of (an) existing condition(s).  That E/M includes conducting the examination, assessing all new/exacerbated conditions, and establishing a new plan of care.  Today, my ACP, Balbina Acuna, was here to observe my evaluation and management services for this new problem/exacerbated condition to be followed going forward.

## 2024-01-08 NOTE — DATA REVIEWED
[de-identified] : ACC: [id number]     EXAM:  MR BRAIN WAW IC   ORDERED BY: VOLODYMYR GILLESPIE  PROCEDURE DATE:  12/11/2023    INTERPRETATION:  PROCEDURE: MRI Brain with and without contrast  INDICATION: Metastatic adenocarcinoma with intracranial metastases, follow-up scan for stability.  TECHNIQUE: Sagittal and axial T1, axial T2 FLAIR, axial T2, gradient echo and diffusion imaging of the brain is obtained. Following the intravenous administration of 7.5 cc Gadavist contrast material, axial T1 spin-echo imaging is obtained followed by sagittal T1 volumetric imaging with MPR provided.  COMPARISON: MRI brain dated 9/15/2023, 7/12/2023, 8/29/2022  FINDINGS: Status post left parieto craniotomy with persistent nodular enhancement surrounding the left parietal-occipital surgical cavity, measuring up to 2.9 cm in the craniocaudad dimension (14-16) in the left parietal lobe, unchanged. There is up to 7 mm in the superior medial left occipital lobe (14-75) and a punctate focus of enhancement (image #83 series 14) in the left occipital cortex, unchanged. Adjacent to this is additional nodular enhancement measuring up to 1.1 cm in the medial right inferior parietal lobe (14-88) and with a punctate focus of enhancement in the right occipital cortex (image #95 series 13), unchanged. There is surrounding T2 FLAIR hyperintensity, similar in appearance to prior exam. There is stable ex vacuo dilatation of the left lateral ventricle atria. There is hemosiderin along the left surgical cavity and a small focus within the right medial parietal occipital lobe, unchanged.  There is a left pontine chronic lacunar infarct. The FLAIR images demonstrate scattered punctate foci of increased signal within the periventricular and subcortical white matter which is nonspecific and most likely represents the sequela of small vessel ischemic disease in this patient.The diffusion-weighted images demonstrate no recent infarction. The ventricles, cisternal spaces, and cortical sulci are otherwise normal in size and configuration. There is no mass effect, midline shift or extra-axial collection. The gradient echo images show no parenchymal blood product deposition area the remaining brain parenchyma. There are preserved large vascular flow-voids.  The postcontrast images demonstrate no evidence of new abnormal intracranial enhancement within the remaining brain parenchyma.  The visualized paranasal sinuses are free of mucosal disease. The mastoid air cells are well-aerated.  IMPRESSION:  Stable appearance of nodular enhancement surrounding the left posterior parieto-occipital surgical resection cavity and nodular changes within the right parietal occipital lobes with surrounding T2 FLAIR hyperintensity, unchanged. Findings are most compatible with radiation therapy change and are similar in appearance to exams dating back to 8/29/2022.  --- End of Report ---

## 2024-02-26 ENCOUNTER — APPOINTMENT (OUTPATIENT)
Dept: OTOLARYNGOLOGY | Facility: CLINIC | Age: 64
End: 2024-02-26
Payer: MEDICARE

## 2024-02-26 VITALS
DIASTOLIC BLOOD PRESSURE: 83 MMHG | HEIGHT: 65 IN | WEIGHT: 220 LBS | SYSTOLIC BLOOD PRESSURE: 118 MMHG | BODY MASS INDEX: 36.65 KG/M2 | HEART RATE: 70 BPM | OXYGEN SATURATION: 96 % | TEMPERATURE: 98.6 F

## 2024-02-26 PROCEDURE — 99203 OFFICE O/P NEW LOW 30 MIN: CPT

## 2024-02-26 NOTE — HISTORY OF PRESENT ILLNESS
[de-identified] : - 2/26/2024 63-year-old female who presents with concern for tinnitus.  This is been a longstanding issue.  This is bilateral.  She notes a high-pitched nonpulsatile tinnitus.  No significant changes in hearing, vertiginous symptoms, otorrhea or otalgia otherwise.  No ENT issues otherwise.

## 2024-02-26 NOTE — ASSESSMENT
[FreeTextEntry1] : - 2/26/2024 63-year-old female with significant past medical history including brain tumor followed by neurosurgery.  Today she complains of longstanding bilateral nonpulsatile tinnitus.  Exam otherwise normal.  Recommending audiogram and tympanogram and follow-up after to review those results.  - Audiogram and tympanogram - Follow-up after the above, sooner should symptoms worsen or fail to improve

## 2024-03-05 ENCOUNTER — APPOINTMENT (OUTPATIENT)
Dept: OTOLARYNGOLOGY | Facility: CLINIC | Age: 64
End: 2024-03-05
Payer: MEDICARE

## 2024-03-05 PROCEDURE — 92550 TYMPANOMETRY & REFLEX THRESH: CPT | Mod: 52

## 2024-03-05 PROCEDURE — 92557 COMPREHENSIVE HEARING TEST: CPT

## 2024-03-13 ENCOUNTER — EMERGENCY (EMERGENCY)
Facility: HOSPITAL | Age: 64
LOS: 1 days | Discharge: HOME | End: 2024-03-13
Attending: STUDENT IN AN ORGANIZED HEALTH CARE EDUCATION/TRAINING PROGRAM | Admitting: STUDENT IN AN ORGANIZED HEALTH CARE EDUCATION/TRAINING PROGRAM
Payer: MEDICARE

## 2024-03-13 VITALS
TEMPERATURE: 98 F | HEIGHT: 65 IN | HEART RATE: 70 BPM | WEIGHT: 225.09 LBS | SYSTOLIC BLOOD PRESSURE: 130 MMHG | RESPIRATION RATE: 20 BRPM | OXYGEN SATURATION: 96 % | DIASTOLIC BLOOD PRESSURE: 86 MMHG

## 2024-03-13 VITALS
DIASTOLIC BLOOD PRESSURE: 76 MMHG | RESPIRATION RATE: 20 BRPM | SYSTOLIC BLOOD PRESSURE: 123 MMHG | OXYGEN SATURATION: 95 % | TEMPERATURE: 98 F | HEART RATE: 70 BPM

## 2024-03-13 DIAGNOSIS — R11.0 NAUSEA: ICD-10-CM

## 2024-03-13 DIAGNOSIS — I45.10 UNSPECIFIED RIGHT BUNDLE-BRANCH BLOCK: ICD-10-CM

## 2024-03-13 DIAGNOSIS — R56.9 UNSPECIFIED CONVULSIONS: ICD-10-CM

## 2024-03-13 DIAGNOSIS — Z90.49 ACQUIRED ABSENCE OF OTHER SPECIFIED PARTS OF DIGESTIVE TRACT: Chronic | ICD-10-CM

## 2024-03-13 DIAGNOSIS — Z41.9 ENCOUNTER FOR PROCEDURE FOR PURPOSES OTHER THAN REMEDYING HEALTH STATE, UNSPECIFIED: Chronic | ICD-10-CM

## 2024-03-13 DIAGNOSIS — R07.89 OTHER CHEST PAIN: ICD-10-CM

## 2024-03-13 DIAGNOSIS — J44.9 CHRONIC OBSTRUCTIVE PULMONARY DISEASE, UNSPECIFIED: ICD-10-CM

## 2024-03-13 DIAGNOSIS — Z98.890 OTHER SPECIFIED POSTPROCEDURAL STATES: Chronic | ICD-10-CM

## 2024-03-13 DIAGNOSIS — Z88.0 ALLERGY STATUS TO PENICILLIN: ICD-10-CM

## 2024-03-13 DIAGNOSIS — R51.9 HEADACHE, UNSPECIFIED: ICD-10-CM

## 2024-03-13 LAB
ALBUMIN SERPL ELPH-MCNC: 4.2 G/DL — SIGNIFICANT CHANGE UP (ref 3.3–5)
ALP SERPL-CCNC: 103 U/L — SIGNIFICANT CHANGE UP (ref 40–120)
ALT FLD-CCNC: 12 U/L — SIGNIFICANT CHANGE UP (ref 10–45)
ANION GAP SERPL CALC-SCNC: 10 MMOL/L — SIGNIFICANT CHANGE UP (ref 5–17)
APTT BLD: 33.2 SEC — SIGNIFICANT CHANGE UP (ref 24.5–35.6)
AST SERPL-CCNC: 15 U/L — SIGNIFICANT CHANGE UP (ref 10–40)
BASOPHILS # BLD AUTO: 0.04 K/UL — SIGNIFICANT CHANGE UP (ref 0–0.2)
BASOPHILS NFR BLD AUTO: 0.5 % — SIGNIFICANT CHANGE UP (ref 0–2)
BILIRUB DIRECT SERPL-MCNC: 0.2 MG/DL — SIGNIFICANT CHANGE UP (ref 0–0.3)
BILIRUB INDIRECT FLD-MCNC: 0 MG/DL — LOW (ref 0.2–1)
BILIRUB SERPL-MCNC: 0.2 MG/DL — SIGNIFICANT CHANGE UP (ref 0.2–1.2)
BUN SERPL-MCNC: 13 MG/DL — SIGNIFICANT CHANGE UP (ref 7–23)
CALCIUM SERPL-MCNC: 9.9 MG/DL — SIGNIFICANT CHANGE UP (ref 8.4–10.5)
CHLORIDE SERPL-SCNC: 104 MMOL/L — SIGNIFICANT CHANGE UP (ref 96–108)
CO2 SERPL-SCNC: 28 MMOL/L — SIGNIFICANT CHANGE UP (ref 22–31)
CREAT SERPL-MCNC: 0.83 MG/DL — SIGNIFICANT CHANGE UP (ref 0.5–1.3)
EGFR: 78 ML/MIN/1.73M2 — SIGNIFICANT CHANGE UP
EOSINOPHIL # BLD AUTO: 0.11 K/UL — SIGNIFICANT CHANGE UP (ref 0–0.5)
EOSINOPHIL NFR BLD AUTO: 1.4 % — SIGNIFICANT CHANGE UP (ref 0–6)
GLUCOSE SERPL-MCNC: 102 MG/DL — HIGH (ref 70–99)
HCT VFR BLD CALC: 42.2 % — SIGNIFICANT CHANGE UP (ref 34.5–45)
HGB BLD-MCNC: 13.8 G/DL — SIGNIFICANT CHANGE UP (ref 11.5–15.5)
IMM GRANULOCYTES NFR BLD AUTO: 0.1 % — SIGNIFICANT CHANGE UP (ref 0–0.9)
INR BLD: 1.04 — SIGNIFICANT CHANGE UP (ref 0.85–1.18)
LYMPHOCYTES # BLD AUTO: 2 K/UL — SIGNIFICANT CHANGE UP (ref 1–3.3)
LYMPHOCYTES # BLD AUTO: 24.9 % — SIGNIFICANT CHANGE UP (ref 13–44)
MCHC RBC-ENTMCNC: 29.1 PG — SIGNIFICANT CHANGE UP (ref 27–34)
MCHC RBC-ENTMCNC: 32.7 GM/DL — SIGNIFICANT CHANGE UP (ref 32–36)
MCV RBC AUTO: 89 FL — SIGNIFICANT CHANGE UP (ref 80–100)
MONOCYTES # BLD AUTO: 0.59 K/UL — SIGNIFICANT CHANGE UP (ref 0–0.9)
MONOCYTES NFR BLD AUTO: 7.3 % — SIGNIFICANT CHANGE UP (ref 2–14)
NEUTROPHILS # BLD AUTO: 5.28 K/UL — SIGNIFICANT CHANGE UP (ref 1.8–7.4)
NEUTROPHILS NFR BLD AUTO: 65.8 % — SIGNIFICANT CHANGE UP (ref 43–77)
NRBC # BLD: 0 /100 WBCS — SIGNIFICANT CHANGE UP (ref 0–0)
PLATELET # BLD AUTO: 265 K/UL — SIGNIFICANT CHANGE UP (ref 150–400)
POTASSIUM SERPL-MCNC: 4.4 MMOL/L — SIGNIFICANT CHANGE UP (ref 3.5–5.3)
POTASSIUM SERPL-SCNC: 4.4 MMOL/L — SIGNIFICANT CHANGE UP (ref 3.5–5.3)
PROT SERPL-MCNC: 7.3 G/DL — SIGNIFICANT CHANGE UP (ref 6–8.3)
PROTHROM AB SERPL-ACNC: 11.8 SEC — SIGNIFICANT CHANGE UP (ref 9.5–13)
RBC # BLD: 4.74 M/UL — SIGNIFICANT CHANGE UP (ref 3.8–5.2)
RBC # FLD: 13.4 % — SIGNIFICANT CHANGE UP (ref 10.3–14.5)
SODIUM SERPL-SCNC: 142 MMOL/L — SIGNIFICANT CHANGE UP (ref 135–145)
TROPONIN T, HIGH SENSITIVITY RESULT: <6 NG/L — SIGNIFICANT CHANGE UP (ref 0–51)
WBC # BLD: 8.03 K/UL — SIGNIFICANT CHANGE UP (ref 3.8–10.5)
WBC # FLD AUTO: 8.03 K/UL — SIGNIFICANT CHANGE UP (ref 3.8–10.5)

## 2024-03-13 PROCEDURE — 99284 EMERGENCY DEPT VISIT MOD MDM: CPT | Mod: 25

## 2024-03-13 PROCEDURE — 80048 BASIC METABOLIC PNL TOTAL CA: CPT

## 2024-03-13 PROCEDURE — 70450 CT HEAD/BRAIN W/O DYE: CPT | Mod: 26,MC,59

## 2024-03-13 PROCEDURE — 70496 CT ANGIOGRAPHY HEAD: CPT | Mod: MC

## 2024-03-13 PROCEDURE — 70498 CT ANGIOGRAPHY NECK: CPT | Mod: MC

## 2024-03-13 PROCEDURE — 70496 CT ANGIOGRAPHY HEAD: CPT | Mod: 26,MC

## 2024-03-13 PROCEDURE — 70450 CT HEAD/BRAIN W/O DYE: CPT | Mod: MC

## 2024-03-13 PROCEDURE — A9585: CPT

## 2024-03-13 PROCEDURE — 70553 MRI BRAIN STEM W/O & W/DYE: CPT | Mod: MC

## 2024-03-13 PROCEDURE — 84484 ASSAY OF TROPONIN QUANT: CPT

## 2024-03-13 PROCEDURE — 96375 TX/PRO/DX INJ NEW DRUG ADDON: CPT | Mod: XU

## 2024-03-13 PROCEDURE — 36415 COLL VENOUS BLD VENIPUNCTURE: CPT

## 2024-03-13 PROCEDURE — 85730 THROMBOPLASTIN TIME PARTIAL: CPT

## 2024-03-13 PROCEDURE — 80076 HEPATIC FUNCTION PANEL: CPT

## 2024-03-13 PROCEDURE — 99285 EMERGENCY DEPT VISIT HI MDM: CPT

## 2024-03-13 PROCEDURE — 96374 THER/PROPH/DIAG INJ IV PUSH: CPT | Mod: XU

## 2024-03-13 PROCEDURE — 70553 MRI BRAIN STEM W/O & W/DYE: CPT | Mod: 26,MC

## 2024-03-13 PROCEDURE — 70498 CT ANGIOGRAPHY NECK: CPT | Mod: 26,MC

## 2024-03-13 PROCEDURE — 85025 COMPLETE CBC W/AUTO DIFF WBC: CPT

## 2024-03-13 PROCEDURE — 85610 PROTHROMBIN TIME: CPT

## 2024-03-13 RX ORDER — METOCLOPRAMIDE HCL 10 MG
10 TABLET ORAL ONCE
Refills: 0 | Status: COMPLETED | OUTPATIENT
Start: 2024-03-13 | End: 2024-03-13

## 2024-03-13 RX ORDER — MORPHINE SULFATE 50 MG/1
4 CAPSULE, EXTENDED RELEASE ORAL ONCE
Refills: 0 | Status: DISCONTINUED | OUTPATIENT
Start: 2024-03-13 | End: 2024-03-13

## 2024-03-13 RX ORDER — ACETAMINOPHEN 500 MG
1000 TABLET ORAL ONCE
Refills: 0 | Status: COMPLETED | OUTPATIENT
Start: 2024-03-13 | End: 2024-03-13

## 2024-03-13 RX ORDER — SODIUM CHLORIDE 9 MG/ML
1000 INJECTION INTRAMUSCULAR; INTRAVENOUS; SUBCUTANEOUS ONCE
Refills: 0 | Status: COMPLETED | OUTPATIENT
Start: 2024-03-13 | End: 2024-03-13

## 2024-03-13 RX ADMIN — Medication 400 MILLIGRAM(S): at 13:00

## 2024-03-13 RX ADMIN — MORPHINE SULFATE 4 MILLIGRAM(S): 50 CAPSULE, EXTENDED RELEASE ORAL at 14:46

## 2024-03-13 RX ADMIN — SODIUM CHLORIDE 1000 MILLILITER(S): 9 INJECTION INTRAMUSCULAR; INTRAVENOUS; SUBCUTANEOUS at 13:00

## 2024-03-13 RX ADMIN — Medication 10 MILLIGRAM(S): at 13:00

## 2024-03-13 NOTE — ED PROVIDER NOTE - PROVIDER TOKENS
PROVIDER:[TOKEN:[[id number]],FOLLOWUP:[4-6 Days]],PROVIDER:[TOKEN:[[id number]],FOLLOWUP:[4-6 Days]]

## 2024-03-13 NOTE — ED PROVIDER NOTE - CARE PROVIDERS DIRECT ADDRESSES
[email or web address]

## 2024-03-13 NOTE — ED ADULT TRIAGE NOTE - OTHER COMPLAINTS
pt BIBEMS from MD office for HA for the past week with chest tightness and pain onset of today. pt denies SOB, dizziness, vision changes and denies any other medical complaints. Pt states hx of lung CA w/ mets to the brain, COPD, asthma. Pt noted to be able to maintain airway, in no acute distress, non dipahoretic, able to follow simple commands and able to talk in clear full sentences. pt BIBEMS from MD office c/o HA for the past 1xweek with chest tightness/pain w/ onset of today. pt denies SOB, dizziness, vision changes and denies any other medical complaints. Pt states hx of lung CA w/ mets to the brain, lobectomy (2016), COPD, asthma. Pt noted to be able to maintain airway, in no acute distress, non diaphoretic, able to follow simple commands and able to talk in clear full sentences. pt BIBEMS from MD office c/o HA for the past 1xweek with chest tightness/pain w/ onset of today. pt denies SOB, dizziness, vision changes and denies any other medical complaints. Pt states hx of lung CA w/ mets to the brain, lobectomy (2016), COPD, asthma. Pt noted to be wellappearing, able to maintain airway, in no acute distress, non diaphoretic, able to follow simple commands and able to talk in clear full sentences.

## 2024-03-13 NOTE — ED ADULT NURSE NOTE - CHPI ED NUR SYMPTOMS NEG
no blurred vision/no change in level of consciousness/no dizziness/no fever/no loss of consciousness/no numbness/no vomiting/no weakness

## 2024-03-13 NOTE — ED PROVIDER NOTE - NSFOLLOWUPINSTRUCTIONS_ED_ALL_ED_FT
You were seen in the Emergency Department for a headache, improved with IV medications. You had a CT scan, CTA scan, MRI of your brain with results attached. Please take tylenol or motrin as needed. Please stay well hydrated. Please follow up with your neurosurgeon and neurologist. Return for worsening symptoms, headache, blurry vision, nausea, vomiting, numbness, weakness, tingling.    I hope you feel better soon!    Sincerely,  Jp Andrea MD

## 2024-03-13 NOTE — ED PROVIDER NOTE - PHYSICAL EXAMINATION
general: Well appearing, in no acute distress  HEENT: Normocephalic, atraumatic, extraocular movements intact  CV: Regular rate  Pulm: No respiratory distress, no tachypnea  Abd: Flat, no gross distension  Ext: warm and well perfused  Skin: No gross rashes or lesions  Neuro: Alert and oriented, moving all extremities, CN II-XII intact, sensation intact bilaterally, motor intact

## 2024-03-13 NOTE — ED PROVIDER NOTE - CARE PROVIDER_API CALL
Kapil Rico.  Neurosurgery  [address] [place] [address]  Phone: [phone]  Fax: [phone]  Follow Up Time: 4-6 Days    Gwen Barclay  Neurology  [address]  [place] [address]  Phone: [phone]  Fax: [phone]  Follow Up Time: 4-6 Days

## 2024-03-13 NOTE — ED PROVIDER NOTE - CLINICAL SUMMARY MEDICAL DECISION MAKING FREE TEXT BOX
62 yo with headache, neuro intact but given risk factors, will obtain CT head imaging, also cta for ro aneursym, labs, pain control, reassess.

## 2024-03-13 NOTE — ED PROVIDER NOTE - PATIENT PORTAL LINK FT
You can access the FollowMyHealth Patient Portal offered by [facility] by registering at the following website: [email or web address]. By joining [employer]’s FollowMyHealth portal, you will also be able to view your health information using other applications (apps) compatible with our system.

## 2024-03-13 NOTE — ED PROVIDER NOTE - OBJECTIVE STATEMENT
63 y Female with PMHx of COPD, lung adenocarcinoma s/p RLL lobectomy 2016, brain mets s/p radiation in 2017, s/p L parietal crani for resection 2020 (follows Dr. Rico), seizure (on Keppra and Oxcarbazepine, follows with Dr. Barclay) presenting with 1 day of sharp, bifrontal headache with associated nausea. Also had some midsternal chest discomfort that has since resolved. No numbness, weakness, tingling. No fever, chills, no neck stiffness, no trauma. ROS as above.

## 2024-03-13 NOTE — ED ADULT NURSE NOTE - OTHER COMPLAINTS
pt BIBEMS from MD office c/o HA for the past 1xweek with chest tightness/pain w/ onset of today. pt denies SOB, dizziness, vision changes and denies any other medical complaints. Pt states hx of lung CA w/ mets to the brain, lobectomy (2016), COPD, asthma. Pt noted to be wellappearing, able to maintain airway, in no acute distress, non diaphoretic, able to follow simple commands and able to talk in clear full sentences.

## 2024-03-13 NOTE — ED ADULT NURSE NOTE - OBJECTIVE STATEMENT
Pt is a 62y/o F presenting to the ED w/ c/o of "severe" headache/light sensitivity, midsternal CP, diff breathing, generalized L-sided pain, subj confusion, nausea xmultiple days. Pt w/ PMHx COPD, lung CA (past lobectomy), brain CA (past crani) (not currently being tx for either), seizures (compliant w/ meds). Pt denies SOB, fever/chills, vomiting/diarrhea, numbness/tingling, weakness, fatigue, decreased eating/drinking, dizziness/lightheadedness, recent falls @ home. Pt A/Ox3, speaking in clear/complete sentences. Respirations easy/even and unlabored on RA, upper/anterior breath sounds clear to auscultation. Pt ambulates independently w/ steady gait. Pt placed in gown, on continuous cardiac monitor and pulse ox. IV placed, labs drawn. EKG completed.

## 2024-03-14 ENCOUNTER — NON-APPOINTMENT (OUTPATIENT)
Age: 64
End: 2024-03-14

## 2024-03-15 ENCOUNTER — NON-APPOINTMENT (OUTPATIENT)
Age: 64
End: 2024-03-15

## 2024-03-15 ENCOUNTER — APPOINTMENT (OUTPATIENT)
Dept: HEART AND VASCULAR | Facility: CLINIC | Age: 64
End: 2024-03-15
Payer: MEDICARE

## 2024-03-15 VITALS
TEMPERATURE: 97.5 F | OXYGEN SATURATION: 96 % | WEIGHT: 231 LBS | SYSTOLIC BLOOD PRESSURE: 114 MMHG | DIASTOLIC BLOOD PRESSURE: 84 MMHG | RESPIRATION RATE: 14 BRPM | HEART RATE: 81 BPM | BODY MASS INDEX: 38.49 KG/M2 | HEIGHT: 65 IN

## 2024-03-15 DIAGNOSIS — R06.09 OTHER FORMS OF DYSPNEA: ICD-10-CM

## 2024-03-15 DIAGNOSIS — I45.10 UNSPECIFIED RIGHT BUNDLE-BRANCH BLOCK: ICD-10-CM

## 2024-03-15 PROCEDURE — G2211 COMPLEX E/M VISIT ADD ON: CPT

## 2024-03-15 PROCEDURE — 93000 ELECTROCARDIOGRAM COMPLETE: CPT

## 2024-03-15 PROCEDURE — 99203 OFFICE O/P NEW LOW 30 MIN: CPT

## 2024-03-15 NOTE — PHYSICAL EXAM
[Clear Lung Fields] : clear lung fields [Normal S1, S2] : normal S1, S2 [No Edema] : no edema [Moves all extremities] : moves all extremities [Normal] : alert and oriented, normal memory

## 2024-03-15 NOTE — HISTORY OF PRESENT ILLNESS
[FreeTextEntry1] : 63 year female who comes self-referred for her heart. She has been treated for metastatic lung cancer with Lung surgery 2016, craniotomy 2017. She had Chemo in 2016 and RT 2017. We reviewed that she had a normal coronary CTA in 2016.  She denies having any chest pain or syncope

## 2024-03-15 NOTE — ASSESSMENT
[FreeTextEntry1] : An EKG was performed to evaluate for arrhythmia and ischemia.  ELMORE and new RBBB compared to 2018-- I asked her to return for Echo with Strain and undergo coronary CTA   I encouraged continued risk factor reduction and gradual increase in aerobic activity as tolerated  31   minutes were spent discussing cardiac risk excluding procedure time

## 2024-03-25 ENCOUNTER — APPOINTMENT (OUTPATIENT)
Dept: OTOLARYNGOLOGY | Facility: CLINIC | Age: 64
End: 2024-03-25
Payer: MEDICARE

## 2024-03-25 VITALS
BODY MASS INDEX: 37.49 KG/M2 | WEIGHT: 225 LBS | HEIGHT: 65 IN | SYSTOLIC BLOOD PRESSURE: 114 MMHG | OXYGEN SATURATION: 96 % | DIASTOLIC BLOOD PRESSURE: 82 MMHG | TEMPERATURE: 98.3 F | HEART RATE: 73 BPM

## 2024-03-25 DIAGNOSIS — H93.13 TINNITUS, BILATERAL: ICD-10-CM

## 2024-03-25 PROCEDURE — 99214 OFFICE O/P EST MOD 30 MIN: CPT

## 2024-03-25 NOTE — ASSESSMENT
[FreeTextEntry1] : - 2/26/2024 63-year-old female with significant past medical history including brain tumor followed by neurosurgery.  Today she complains of longstanding bilateral nonpulsatile tinnitus.  Exam otherwise normal.  Recommending audiogram and tympanogram and follow-up after to review those results.  3/25/24: Patient completed audiogram and tympanogram and there did show that there was an asymmetric hearing loss worse on the right side.  I reviewed this at length with the patient.  I suspect that this is likely related to her radiation change as these symptoms and asymmetry of likely been present for quite some time.  She has had recent imaging which was been largely negative for retrocochlear process other than the radiation type changes.  I do not believe that there is further workup necessary however I am recommending second opinion with neurotology, Dr. Amor Busby.  Referral information given.  In the same office as Dr. Kimble for which she can be evaluated for her thyroid goiter, incidentally found on recent imaging.  At this point the patient to follow-up with me as needed.  - reina w neuro-otology - fu with Dr. Kimble, endocrine surgery - fu with me as needed

## 2024-03-25 NOTE — DATA REVIEWED
[de-identified] : 03/05/2024. Audio: Mild sloping to profound SNHL AD and hearing WNL to moderate SNHL AS Tymp: Type A tymps AU and Complete Audio Evaluation. [de-identified] : INTERPRETATION: EXAMINATION: MR BRAIN WITHOUT AND WITH IV CONTRAST  CLINICAL INDICATION: ro mets TECHNIQUE: Multiplanar MRI images of the head were obtained before and after IV injection of gadolinium, 7 cc administered. COMPARISON: Head CT CTA 3/13/2024. Head MRI 12/11/2023.   FINDINGS:  A left parietal vertex craniotomy is again noted, with a resection cavity in the left parietal lobe with chronic blood products. There is stable gyriform enhancement in the medial anterior margin of the resection cavity, extending to the genu the corpus callosum. There is stable gliosis surrounding the resection cavity. No new enhancing nodules are identified.  Mild-to-moderate generalized cerebral volume loss, with distention of the sulci and concomitant ex-vacuo ventricular dilatation. Mild nonspecific T2/FLAIR hyperintensity in the periventricular and subcortical white matter.  No acute intracranial hemorrhage. No midline shift or herniation. No acute ischemia. Intracranial flow voids are patent. The cerebellar tonsils are normally positioned. The dural venous sinuses are patent, although this examination was not optimized to evaluate the vasculature.  Limited views of the sinuses and mastoids show mild mucosal thickening without air-fluid levels, likely chronic.  Limited views of the orbits and visualized soft tissues of the neck, face, scalp, skull base, and calvarium are otherwise unremarkable.    IMPRESSION:  1. No significant change, without evidence of disease progression.  -----------------------------------------------------------------------------------------------  ACC: [id number] EXAM: CT ANGIO BRAIN (W)AW IC ORDERED BY: VETO TROY  ACC: [id number] EXAM: CT ANGIO NECK (W)AW IC ORDERED BY: VETO TROY  ACC: [id number] EXAM: CT BRAIN ORDERED BY: VETO TROY  *** ADDENDUM # 1 ***  Thyroid gland is diffusely enlarged and heterogeneous suggesting multinodular goiter. Further sonographic assessment and follow-up recommended.  --- End of Report ---  *** END OF ADDENDUM # 1 ***   PROCEDURE DATE: 03/13/2024    INTERPRETATION: INDICATION: Headaches. History of prior stroke and seizures. History of prior left parietal craniotomy. Comparison made to prior CT 1/10/2023  TECHNIQUE: A thin section CT study of the head and neck was conducted during rapid infusion of intravenous contrast (power injected). In addition to the axial data, reformatted images were generated using a 3D workstation. Rapid AI was used to screen for hemorrhage. 100 cc Omnipaque 350 was administered.  FINDINGS:  AORTIC ARCH no dissection or aneurysmal dilatation is noted. Major vessel origins are patent. The subclavian arteries are well visualized and appear to be patent bilaterally. COMMON CAROTID ARTERIES: Bilaterally patent with tortuosity RIGHT BIFURCATION: Widely patent LEFT BIFURCATION: Widely patent INTERNAL CAROTID ARTERIES: Bilaterally patent with severe tortuosity on the right and moderate tortuosity on the left. VERTEBRALS bilaterally patent with left-sided dominance. No ostial lesion noted. MISCELLANEOUS: Degenerative changes noted in the spine.   BRAIN gliosis and encephalomalacia is noted in the left posterior frontal and left parietal region corresponding to an area of prior surgery. There are scattered areas of dystrophic calcification. Otherwise there is generalized volume loss. No acute infarct, hemorrhage, or mass lesion suggested..No hydrocephalus or collections are noted.The left parietal craniotomy/cranioplasty is unremarkable in appearance. A small ossified meningioma is noted in the right temporal fossa..  Cavernous and supraclinoid carotid arteries are bilaterally widely patent with tortuosity.   Both anterior cerebral arteries are patent. Both A1 segments are well formed.  Both middle cerebral arteries are patent. No M1 lesion or distal branch occlusion suggested.  Posterior cerebral arteries are patent. Right P1 is small with a well-developed right P-comm.  Vertebrobasilar system is patent with tortuosity. Left vertebral is dominant supply of the basilar.  The venous sinuses are patent.   IMPRESSION:   1. Gliosis and encephalomalacia left the posterior frontal parietal and occipital region possibly related to an old stroke in conjunction with postoperative changes. No CT evidence of an acute infarct, hemorrhage, or mass. Chronic ischemic changes also noted in the right parietal white matter. Scattered areas of focal calcification noted in the left parietal region. 2. Widely patent head and neck vasculature with tortuosity, as outlined in detail above. 3. Scattered mucosal thickening in predominantly clear sinuses without fluid levels. Mastoid cells are clear.  Follow-up MR imaging of the brain recommended for further assessment and delineation..  --- End of Report ---  ***Please see the addendum at the top of this report. It may contain additional important information or changes.****  ----------------------------------------------------------------------

## 2024-03-25 NOTE — HISTORY OF PRESENT ILLNESS
[de-identified] : - 2/26/2024 63-year-old female who presents with concern for tinnitus.  This is been a longstanding issue.  This is bilateral.  She notes a high-pitched nonpulsatile tinnitus.  No significant changes in hearing, vertiginous symptoms, otorrhea or otalgia otherwise.  No ENT issues otherwise. - [FreeTextEntry1] : 3/25/24 Completed audio and tymp and presents to review.  No significant changes in symptoms..  That being said she was recently in the emergency room and did have a MRI head as well as CT head and CTA head and neck

## 2024-03-26 ENCOUNTER — APPOINTMENT (OUTPATIENT)
Dept: MRI IMAGING | Facility: HOSPITAL | Age: 64
End: 2024-03-26

## 2024-03-28 ENCOUNTER — APPOINTMENT (OUTPATIENT)
Dept: NEUROLOGY | Facility: CLINIC | Age: 64
End: 2024-03-28
Payer: MEDICARE

## 2024-03-28 VITALS
TEMPERATURE: 98 F | HEART RATE: 79 BPM | WEIGHT: 227 LBS | DIASTOLIC BLOOD PRESSURE: 79 MMHG | SYSTOLIC BLOOD PRESSURE: 111 MMHG | BODY MASS INDEX: 37.82 KG/M2 | HEIGHT: 65 IN | OXYGEN SATURATION: 95 %

## 2024-03-28 DIAGNOSIS — R41.3 OTHER AMNESIA: ICD-10-CM

## 2024-03-28 DIAGNOSIS — R56.9 UNSPECIFIED CONVULSIONS: ICD-10-CM

## 2024-03-28 DIAGNOSIS — G40.109 LOCALIZATION-RELATED (FOCAL) (PARTIAL) SYMPTOMATIC EPILEPSY AND EPILEPTIC SYNDROMES WITH SIMPLE PARTIAL SEIZURES, NOT INTRACTABLE, W/OUT STATUS EPILEPTICUS: ICD-10-CM

## 2024-03-28 PROCEDURE — 99214 OFFICE O/P EST MOD 30 MIN: CPT

## 2024-03-28 PROCEDURE — G2211 COMPLEX E/M VISIT ADD ON: CPT

## 2024-03-28 RX ORDER — OXCARBAZEPINE 150 MG/1
150 TABLET, FILM COATED ORAL TWICE DAILY
Qty: 180 | Refills: 2 | Status: ACTIVE | COMMUNITY
Start: 2021-06-01 | End: 1900-01-01

## 2024-03-28 RX ORDER — LEVETIRACETAM 750 MG/1
750 TABLET, FILM COATED ORAL TWICE DAILY
Qty: 120 | Refills: 6 | Status: ACTIVE | COMMUNITY
Start: 2021-05-24 | End: 1900-01-01

## 2024-03-28 RX ORDER — NAPROXEN 500 MG/1
500 TABLET, DELAYED RELEASE ORAL
Qty: 60 | Refills: 3 | Status: ACTIVE | COMMUNITY
Start: 2024-03-28 | End: 1900-01-01

## 2024-03-28 RX ORDER — NAPROXEN 500 MG/1
500 TABLET ORAL
Qty: 60 | Refills: 2 | Status: DISCONTINUED | COMMUNITY
Start: 2021-05-24 | End: 2024-03-28

## 2024-03-28 NOTE — HISTORY OF PRESENT ILLNESS
[FreeTextEntry1] : Interim Hx: 3/28/2024  No seizures reported. Doing well. Current AEDs- LEV 1500mg BID, OXC 450mg BID    No major side effects.   Had an intense headache last week and was advised to go to ER 3/13/2024 MRI brain- No significant change, without evidence of disease progression. ___________________ Interim Hx: 10/10/2023  No seizures reported. Doing well. Feels memory stable No headaches Current AEDs- LEV 1500mg BID, OXC 450mg BID    No major side effects.   AEEG 1/17-1/19/2023 occasional P3 maximal spikes  Not driving  MRI brain completed 9/15/2023 Plan for repeat in Dec 2023  Scheduled to see Dr Peterson (Onc) in November __________________________ Interim Hx: 10/7/2022 (televisit)  No seizures reported. Doing well. Feels memory stable No headaches Current AEDs- LEV 1500mg BID, OXC 450mg BID    No major side effects.   Not driving  Nsx notes reviewed. Last MRI stable, Next MRI brain Dec 2022  AEEG had been scheduled for July but she injured right leg (got stuck between the train and platform) so canceled test. Doing PT now for leg ___________________ Interim hx: 5/24/2022  No seizures reported. Doing well. Feels memory has been better. Less frequent headaches Current AEDs- LEV 1500mg BID, OXC 450mg BID    No major side effects.   Not driving  Nsx notes reviewed. Last MRI stable, Next MRI brain August- September 2022 __________________________ Interim Hx: 1/5/2022 (televisit)  No seizures reported. Doing well. Feels memory has been better. Less frequent headaches Current AEDs- LEV 1500mg BID, OXC 450mg BID    No major side effects.   Not driving  Nsx notes reviewed. Next MRI brain 2/2022 ____________________ Interim Hx: 6/28/2021  After ambulatory EEG disconnected 5/26 patient states when she got home she developed right sided weakness. Hospitalized at [facility]. After hospitalization I added OXC and is now on 300mg BID. Ambulatory EEG completed 5/24-5/26/2021 did show multiple ictal events over left FT region   Current AEDs- LEV 1500mg BID, OXC 300mg BID    No major side effects. Does continue to report fatigue.  No definite seizure activity. Does report staring off sometimes but no confusion  Headaches also have been better- has not been taking Naprosyn  Given seizures and increase edema in the left parietal lobe decision made to start at IV Avastin - second treatment scheduled for Thursday. Repeat MRI brain planned for July  Not driving ___________________ Interim Hx: 5/24/2021  In May 2020 she was found to have a new left parietal brain met. She was admitted to [facility], s/p left parietal craniotomy for resection. Discharged on LEV 1 g BID and Decadron. on post-op follow up LEV tapered down to 500mg BID  July 2020 she was hospitalized at [facility] due to sudden onset difficulty speaking as well as right arm weakness. She was admitted and given steroids and LEV increased to 750mg BID. Briefly increased more to 1 g BID but reduced back to 750mg BID due to fatigue.  Recently hospitalized again for seizure 5/7/2021. She had presented for NSx eval and she developed acute expressive aphasia and total right hemiparesis. She did admit to missing 2 doses of LEV. Left parietal (P3) onset focal seizures were reported. Most of the events were subclinical, however she was confused while talking through the phone.  Patient clinically had improved and wanted to go home so was discharged on LEV 1500mg BID.  Patient reports since discharge she has been compliance with LEV 1500mg BID. No convulsive seizures reported. She has had few episodes where she feels she has spaced out or ahd trouble speaking. Currently connected to ambulatory EEG for further eval.  Also complaining of headaches    Not driving ____________________ Interim Hx: 2/12/2020  Patient reports overall doing well.  No seizures. Memory continues to be poor but no significant change. Sometimes feels episodically confused, especially under stressful situations. Not driving  Sleep study completed 10/16/2019 mild sleep apnea  10/16/2019 Labs- TSH, B12/folate wnl, Vit D 27.9  MRI brain w/wout contrast- stable exam. No new metastatic lesion seen. Next MRI brain ordered or March 2020. ___________________ Interim Hx: 10/16/2019 Last seen in May 2018.  No seizures  Headaches are less frequent (avg 1-2/month) C/o ringing in right ear Patient states that her memory is still poor. Does not report drastic progression since last visit. Forgets list of things needed at grocery store and appointments. Easily distractible. Does not get lost.  Neuropsych testing completed in April 2018.  Cognitive mediation/therapy recommended.   Since last visit patient continues to follow with CTSX and RadOnc for cancer surveillance.  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 Perilesional flair signal is table, without mass effect. Continued surveillance is advised. No new metastatic lesion to the brain.   Scheduled for Endoscopy and Colonoscopy to assess for mets. _______________ Interim Hx: 5/23/18 Neurocognitive testing completed- feedback scheduled for later today. Neuro-optho Dr Gutierrez performed visual field testing Still has headaches at site of previous surgery (3x/week). Patient has only been taking Neurontin as needed, not daily. F/u with RadOnc- Dr. Barrios for 6/23/18. Will complete repeat MRI brain before that. _____________ Previous history: 56 yo F presents for follow up History of left parietal mets (from lung adenocarcinoma) s/p craniotomy and radiation therapy (Oct/Nov 2017).  On follow up surveillance MRI brain 1/3 found to a have a new 5 mm right parietal enhancing mass s/p stereotactic radiosurgery on 1/8/2018 by Dr. Rico AT [facility].  Patient reports that overall she is feeling better. Her confusion has significantly improved. She does complaint of head pain mainly over the left. Has difficulty sleeping because of sensitivity.   48HR EEG completed -12/5-12/7 Breach rhythm noted, no clear sign of epileptiform discharges. Keppra was discontinue By Neurosurgeon Dr Hardy Floyd (performed initial craniotomy) in December because she felt it was making her  tired and she had no clinical seizures in the past. Feeling less tired since Keppra stopped.   Also expressed that she has started driving locally recently. She avoids highways when possible and driving at night.

## 2024-03-28 NOTE — ASSESSMENT
[FreeTextEntry1] : 64 yo F  history of adenocarcinoma of the lung, brains mets s/p left parietal resection and adjunctive radiation presents for follow up for seizures and memory complaints. Memory issues are likely multifactorial and related to complicated neurologic history (brain mets, surgery, radiation necrosis) Neuopsych testing completed 4/2018 which showed that general intellectual functioning was somewhat reduced relative to baseline estimates. Language skills were an area of relative strength, whereas memory abilities were relatively weaker to some degree. Last generalized seizure on ~5/26/2021, OXC added as adjunctive therapy  AEEG 1/17-1/19/2023 occasional P3 maximal spikes   plan:  Continue LEV 1500mg BID and OXC 450mg BID, blood work today 48HR AEEG  Naprosyn PRN for headaches Repeat imaging as per NSx and Onc  seizure precautions  F/u 6 months

## 2024-03-28 NOTE — PHYSICAL EXAM
[General Appearance - Alert] : alert [General Appearance - In No Acute Distress] : in no acute distress [Oriented To Time, Place, And Person] : oriented to person, place, and time [Person] : oriented to person [Place] : oriented to place [Time] : oriented to time [Fluency] : fluency intact [Cranial Nerves Oculomotor (III)] : extraocular motion intact [Cranial Nerves Facial (VII)] : face symmetrical [Cranial Nerves Vestibulocochlear (VIII)] : hearing was intact bilaterally

## 2024-03-29 ENCOUNTER — APPOINTMENT (OUTPATIENT)
Dept: NEUROSURGERY | Facility: CLINIC | Age: 64
End: 2024-03-29
Payer: MEDICARE

## 2024-03-29 VITALS
SYSTOLIC BLOOD PRESSURE: 118 MMHG | RESPIRATION RATE: 18 BRPM | DIASTOLIC BLOOD PRESSURE: 83 MMHG | BODY MASS INDEX: 37.82 KG/M2 | HEIGHT: 65 IN | OXYGEN SATURATION: 98 % | WEIGHT: 227 LBS | HEART RATE: 93 BPM

## 2024-03-29 LAB
ALBUMIN SERPL ELPH-MCNC: 4.3 G/DL
ALP BLD-CCNC: 99 U/L
ALT SERPL-CCNC: 14 U/L
ANION GAP SERPL CALC-SCNC: 13 MMOL/L
AST SERPL-CCNC: 14 U/L
BASOPHILS # BLD AUTO: 0.03 K/UL
BASOPHILS NFR BLD AUTO: 0.4 %
BILIRUB SERPL-MCNC: 0.2 MG/DL
BUN SERPL-MCNC: 16 MG/DL
CALCIUM SERPL-MCNC: 9.5 MG/DL
CHLORIDE SERPL-SCNC: 101 MMOL/L
CO2 SERPL-SCNC: 24 MMOL/L
CREAT SERPL-MCNC: 0.82 MG/DL
EGFR: 79 ML/MIN/1.73M2
EOSINOPHIL # BLD AUTO: 0.13 K/UL
EOSINOPHIL NFR BLD AUTO: 1.8 %
GLUCOSE SERPL-MCNC: 106 MG/DL
HCT VFR BLD CALC: 43.7 %
HGB BLD-MCNC: 14 G/DL
IMM GRANULOCYTES NFR BLD AUTO: 0.1 %
LYMPHOCYTES # BLD AUTO: 1.68 K/UL
LYMPHOCYTES NFR BLD AUTO: 23.6 %
MAN DIFF?: NORMAL
MCHC RBC-ENTMCNC: 29 PG
MCHC RBC-ENTMCNC: 32 GM/DL
MCV RBC AUTO: 90.7 FL
MONOCYTES # BLD AUTO: 0.51 K/UL
MONOCYTES NFR BLD AUTO: 7.2 %
NEUTROPHILS # BLD AUTO: 4.75 K/UL
NEUTROPHILS NFR BLD AUTO: 66.9 %
PLATELET # BLD AUTO: 282 K/UL
POTASSIUM SERPL-SCNC: 4.4 MMOL/L
PROT SERPL-MCNC: 7.4 G/DL
RBC # BLD: 4.82 M/UL
RBC # FLD: 13.8 %
SODIUM SERPL-SCNC: 138 MMOL/L
WBC # FLD AUTO: 7.11 K/UL

## 2024-03-29 PROCEDURE — 99214 OFFICE O/P EST MOD 30 MIN: CPT

## 2024-03-29 NOTE — ASSESSMENT
[FreeTextEntry1] : MRI brain with and without contrast done on3/26/24 reviewed by Dr. Rico with patient, stable Recommend MRI brain with and without contrast in 4 months (July 2024) After MRI complete, RTO to review imaging with Dr. Rico  Patient and patient's family verbalize agreement and understanding with plan of care.  I, Dr. Rico, personally performed the evaluation and management (E/M) services for this established patient who presents today with (a) new problem(s)/exacerbation of (an) existing condition(s).  That E/M includes conducting the examination, assessing all new/exacerbated conditions, and establishing a new plan of care.  Today, my ACP,Trinity Swan was here to observe my evaluation and management services for this new problem/exacerbated condition to be followed going forward.

## 2024-03-29 NOTE — PHYSICAL EXAM
[General Appearance - Alert] : alert [General Appearance - In No Acute Distress] : in no acute distress [Person] : oriented to person [Place] : oriented to place [Time] : oriented to time [Motor Tone] : muscle tone was normal in all four extremities [Abnormal Walk] : normal gait [Balance] : balance was intact

## 2024-03-29 NOTE — REASON FOR VISIT
[Follow-Up: _____] : a [unfilled] follow-up visit [FreeTextEntry1] : 63 year old woman with a history of metastatic lung adenocarcinoma to brain who presented with worsening right hand weakness, confusion, imbalance and expressive aphasia. She previously underwent left parietal craniotomy for tumor resection and adjuvant radiation approximately 3 years ago In November 2017 at another institution. She also had GKRS on 1/8/18 to a right parietal brain metastasis with Dr. Rico. MRI brain on admission demonstrated a recurrent 3 cm left parietal enhancing brain mass in the area of prior tumor resection. She underwent surgical resection of left parietal mass on 5/13/2020 with Dr. Rico. Pathology indicated radiation necrosis.   Postoperatively, her right hand weakness improved. She does have right visual field cut. She was referred to Dr. Andrea Gutierrez for neuro opthalmology consult.  In July 2020, Ms. Mendoza was hospitalized at [facility] due to sudden onset difficulty speaking as well as RIGHT hand weakness. She was admitted and given steroids and seizure medication. She was weaned off the dexamethasone over the next several weeks. She reported improvement in right hand weakness.  She remains on keppra 750 mg bid.  On 5/7/21, she reported RIGHT hand weakness. While in the office and during visit, she developed acute expressive aphasia and total RIGHT hemiparesis including RIGHT leg. Prior to this, she stated she had not taken keppra X 2 days. She was taken to the ED and had generalized tonic clonic seizure. She was discharged with 1000 mg keppra bid.  She had 4 cycles of IV avastin for radiation necrosis and had repeat MRI brain done on 8/10/21, demonstrated decrease in left parietal enhancement and previously seen cortical foci of enhancement in right parietal lobe was not visualized.  Denies new neurological symptoms. Denies headaches, seizures, focal weakness, new vision change. She remains on Keppra 750 mg BID and Oxcarbazepine.   MRI brain done 11/9/22 was stable. Denies focal neurological symptoms. She remains on AEDs.   Returned to the ED on 1/10/23 due to seizure. Describes seizure as difficulty speaking and feeling like she was going to pass out. She missed several doses of AEDs. MRI brain was done on 1/10/23 which was stable. Recommended to repeat MRI brain w/wo contrast in 4 months (March 2023).  3/17/23 presented to review MRI brain. Denies any seizures since her recent visit to the ER she now sets her alarm to to take her medication  7/14/23 presented for imaging review MRI brain w w/o contrast. she denies Sz and reports taking her medication setting her alarm to remember. Her right arm and leg are slightly weaker than left. Upon imaging review there is a possibility of local recurrence. This was discussed with the patient and a follow up MRI Brain with contrast and WITH perfusion was recommended in 8 weeks  9/15/23 Returns today to review MRI brain with and without contrast WITH perfusion, done earlier in the day which was stable. Denies seizures. Denies new neurological symptoms.  3/13/24 Presented to our ER at direction of her oncologist for severe headache.  3/29/24 Returns today to review MRI brain with and without contrast from 3/13/24 She states she is doing well. Denies seizures. Remains on keppra 750 mg and oxcarbazepine. Denies right sided weakness.

## 2024-04-01 LAB — LEVETIRACETAM SERPL-MCNC: 68 UG/ML

## 2024-04-03 LAB — OXCARBAZEPINE SERPL-MCNC: 23 UG/ML

## 2024-04-05 ENCOUNTER — APPOINTMENT (OUTPATIENT)
Dept: OPHTHALMOLOGY | Facility: CLINIC | Age: 64
End: 2024-04-05
Payer: SELF-PAY

## 2024-04-05 ENCOUNTER — NON-APPOINTMENT (OUTPATIENT)
Age: 64
End: 2024-04-05

## 2024-04-05 ENCOUNTER — APPOINTMENT (OUTPATIENT)
Dept: OPHTHALMOLOGY | Facility: CLINIC | Age: 64
End: 2024-04-05
Payer: MEDICARE

## 2024-04-05 PROCEDURE — 92014 COMPRE OPH EXAM EST PT 1/>: CPT

## 2024-04-05 PROCEDURE — 92015 DETERMINE REFRACTIVE STATE: CPT

## 2024-04-05 PROCEDURE — 92083 EXTENDED VISUAL FIELD XM: CPT

## 2024-04-08 ENCOUNTER — APPOINTMENT (OUTPATIENT)
Dept: OTOLARYNGOLOGY | Facility: CLINIC | Age: 64
End: 2024-04-08
Payer: MEDICARE

## 2024-04-08 VITALS
DIASTOLIC BLOOD PRESSURE: 73 MMHG | TEMPERATURE: 98 F | SYSTOLIC BLOOD PRESSURE: 109 MMHG | BODY MASS INDEX: 37.82 KG/M2 | WEIGHT: 227 LBS | OXYGEN SATURATION: 97 % | HEART RATE: 71 BPM | HEIGHT: 65 IN

## 2024-04-08 DIAGNOSIS — H93.11 TINNITUS, RIGHT EAR: ICD-10-CM

## 2024-04-08 DIAGNOSIS — H90.A21 SENSORINEURAL HEARING LOSS, UNILATERAL, RIGHT EAR, WITH RESTRICTED HEARING ON THE CONTRALATERAL SIDE: ICD-10-CM

## 2024-04-08 PROCEDURE — 99213 OFFICE O/P EST LOW 20 MIN: CPT

## 2024-04-09 ENCOUNTER — APPOINTMENT (OUTPATIENT)
Dept: OTOLARYNGOLOGY | Facility: CLINIC | Age: 64
End: 2024-04-09
Payer: MEDICARE

## 2024-04-09 VITALS
OXYGEN SATURATION: 100 % | RESPIRATION RATE: 18 BRPM | HEIGHT: 65 IN | DIASTOLIC BLOOD PRESSURE: 82 MMHG | TEMPERATURE: 97.9 F | HEART RATE: 76 BPM | SYSTOLIC BLOOD PRESSURE: 124 MMHG

## 2024-04-09 DIAGNOSIS — R22.1 LOCALIZED SWELLING, MASS AND LUMP, NECK: ICD-10-CM

## 2024-04-09 DIAGNOSIS — E55.9 VITAMIN D DEFICIENCY, UNSPECIFIED: ICD-10-CM

## 2024-04-09 DIAGNOSIS — D44.0 NEOPLASM OF UNCERTAIN BEHAVIOR OF THYROID GLAND: ICD-10-CM

## 2024-04-09 DIAGNOSIS — G47.30 SLEEP APNEA, UNSPECIFIED: ICD-10-CM

## 2024-04-09 DIAGNOSIS — K21.9 ACUTE LARYNGITIS: ICD-10-CM

## 2024-04-09 DIAGNOSIS — E04.2 NONTOXIC MULTINODULAR GOITER: ICD-10-CM

## 2024-04-09 DIAGNOSIS — J04.0 ACUTE LARYNGITIS: ICD-10-CM

## 2024-04-09 PROCEDURE — 99215 OFFICE O/P EST HI 40 MIN: CPT | Mod: 25

## 2024-04-09 PROCEDURE — 31575 DIAGNOSTIC LARYNGOSCOPY: CPT

## 2024-04-09 RX ORDER — TIOTROPIUM BROMIDE INHALATION SPRAY 3.12 UG/1
2.5 SPRAY, METERED RESPIRATORY (INHALATION)
Qty: 12 | Refills: 0 | Status: ACTIVE | COMMUNITY
Start: 2024-01-16

## 2024-04-09 RX ORDER — ATORVASTATIN CALCIUM 40 MG/1
40 TABLET, FILM COATED ORAL
Qty: 30 | Refills: 0 | Status: ACTIVE | COMMUNITY
Start: 2024-02-01

## 2024-04-09 RX ORDER — DICLOFENAC SODIUM 20 MG/G
2 SOLUTION TOPICAL
Qty: 336 | Refills: 0 | Status: ACTIVE | COMMUNITY
Start: 2024-01-04

## 2024-04-09 RX ORDER — NITROFURANTOIN (MONOHYDRATE/MACROCRYSTALS) 25; 75 MG/1; MG/1
100 CAPSULE ORAL
Qty: 14 | Refills: 0 | Status: ACTIVE | COMMUNITY
Start: 2024-02-10

## 2024-04-09 RX ORDER — SIMVASTATIN 40 MG/1
40 TABLET, FILM COATED ORAL
Qty: 30 | Refills: 0 | Status: ACTIVE | COMMUNITY
Start: 2024-04-03

## 2024-04-09 NOTE — DATA REVIEWED
[de-identified] : see HPI  [de-identified] : see HPI  [de-identified] : cytology report reviewed.

## 2024-04-09 NOTE — END OF VISIT
[TextEntry] : I have spent 60 min of time for the encounter exclusive of any procedures performed during the visit.

## 2024-04-09 NOTE — PHYSICAL EXAM
[TextEntry] : Focused Head and Neck Examination:  General Appearance: The patient has a normal appearance (head and face), able to communicate with normal speech and is a well-groomed, well-developed, well-nourished, obese female in no apparent distress.  Breathing was silent and not labored. The patient was alert had normal affect and oriented to person, place, and time. The patient had normal facial and neck skin with normal facial symmetry, strength and motion, no visible/ palpable facial masses or sinus tenderness.  Otologic Exam: The pinnae and bilateral external ear canals were without lesions and clear.  The tympanic membranes were normal bilaterally without perforation and demonstrated normal mobility. There was no evidence of middle ear effusion or infection. Hearing is grossly normal to finger rub.   External Nasal Exam: The external nose was normal in appearance without lesions or deformity.    Intranasal Exam: There were no mucosal lesions, discolorations, nasal polyps, or masses. The nasal septum has a 1 cm healed old perforation with spme crusting (former cocaine use). The nasal septum was deviated slightly to the right anteriorly. The inferior turbinates were normal. The middle turbinates were normal bilaterally.  The middle meatus was clear, and the secretions were normal.  Nasopharynx: There were no visible masses, mucosal ulcerations, or other lesions.  Secretions were normal.   Oral cavity: The patient's lips and vermillion border were normal without lesions, ulcerations or discolorations. Dentition was good, the gums were normal, the oral mucosa was normal and there were no lesions, discolorations, ulcerations, erythema, or leukoplakia.  The floor of mouth was without lesions or palpable calculi. The oral tongue has normal mobility, without palpable or visible lesions, ulcerations, nodularity or tenderness.  All surfaces including lateral, ventral and dorsal mucosa examined and palpated. Expressed salivary flow was normal.  Stensens ducts are without palpable calculi.    Oropharynx: The tongue base was without palpable lesions, the soft palate was normal without lesions, the hard palate was normal without lesions, ulcerations, nodularity or discolorations.  The palatine tonsils were present and normal, and the lingual tonsils were hyperplastic.  Pharynx: The lateral and posterior pharyngeal walls were intact without mucosal lesions, discolorations, ulcerations, or masses.    Larynx and Hypopharynx: Flexible fiber optic laryngoscopy was performed with topical anesthesia using 4% lidocaine and 0.5 % Oxymetazoline on cotton pledgets. More details of the exam are outlined in my procedure note but this examination revealed normal, symmetric vocal fold mobility and normal mucosa without vocal fold lesions, discolorations, or ulcerations.  There was marked posterior pachydermia the epiglottis and false vocal folds were normal without mucosal lesions. The piriform sinuses opened well and there were no lesions or pooling of saliva. The trachea was clear without narrowing in the immediate subglottic area and in midline position without lesions.    Neck Exam: There was no palpable lymphadenopathy or bruits in the central or lateral peter drainage basins levels I-VI.  There was no asymmetry, pulsatile masses or nodules. The parotid and submandibular glands were not enlarged or tender and without palpable nodules or mass.  The temporomandibular joints were normal bilaterally without deviation/subluxation/click/tenderness or crepitus to palpation.    Thyroid Examination: The thyroid gland was enlarged nontender and with bilateral palpable nodules. The trachea is not deviated.  Neurological Exam: Cranial nerves II through XII were intact.  There was no visible tremor. Gross motor and sensory functions are normal.  A Chvostek sign was present.  Ocular Exam: Pupils were equal and responsive to light.  Extraocular movements and gaze were normal.  Bilateral cataracts noted.  The sclera and conjunctiva were normal and anicteric.  Nystagmus was absent. No sign of erythema, ulcerations or lesions.    Respiratory: Respiratory effort is normal with symmetric chest expansion and no retractions or use of accessory muscles.    Cardiovascular: Extremities are normal with strong pulses, normal temperature, and no edema.

## 2024-04-09 NOTE — PROCEDURE
[Image(s) Captured] : image(s) captured and filed [Unable to Cooperate with Mirror] : patient unable to cooperate with mirror [Gag Reflex] : gag reflex preventing mirror examination [Hoarseness] : hoarseness not clearly evaluated by indirect laryngoscopy [Topical Lidocaine] : topical lidocaine [Oxymetazoline HCl] : oxymetazoline HCl [Flexible Endoscope] : examined with the flexible endoscope [Serial Number: ___] : Serial Number: [unfilled] [de-identified] : Verbal informed consent was obtained from the patient.  Findings: The nasal septum is minimally deviated to the right with an old 1 cm septal perforation (sec to drug use). There are no masses or polyps, and the nasal mucosa and secretions are normal. The choanae and posterior nasopharynx are normal without masses or drainage. The Eustachian tube orifices appear patent. The pharynx, including the posterior and lateral pharyngeal walls, the vallecula and base of tongue are normal without ulcerations, lesions or masses. The hypopharynx including the pyriform sinuses open well without pooling of secretions, mucosal lesions or masses. The supraglottic larynx including the epiglottis, petiole, arytenoids, glossoepiglottic, aryepiglottic and pharyngoepiglottic folds are normal without mucosal lesions, ulcerations or masses. The glottis reveals normal false vocal folds. The true vocal folds are glistening white, tense and of equal length, without paralysis, having symmetric mobility on adduction and abduction. There are no mucosal lesions, nodules, cysts, erythroplasia or leukoplakia. The posterior cricoid area has healthy pink mucosa in the interarytenoid area and esophageal inlet. There is marked thickening/pachydermia of the interarytenoid mucosa suggestive of posterior laryngitis from laryngopharyngeal acid reflux disease. The trachea is clear without narrowing in the immediate subglottic region, without deviation or lesions. ------------------------------------------------------------------------------------------- [de-identified] : thyroid neoplasm

## 2024-04-09 NOTE — REASON FOR VISIT
[FreeTextEntry2] : a surgical consultation concerning a NTMNG and recent weight gain.   [FreeTextEntry1] : Referred by Dr. Olga Lidia MD

## 2024-04-09 NOTE — HISTORY OF PRESENT ILLNESS
[de-identified] : Ashlie is a 63-year-old female, former [employer] worker who was seen by Dr. Dunbar for asymmetric hearing loss.  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 In 2020 she had another brain resection for a small recurrence.  She remains on Keppra 750 mg and oxcarbazepine for seizure control and Advair for COPD. She has a history of a NTMNG  and in 2019 on ultrasound revealed a borderline enlarged right thyroid lobe with a mid-pole 3.2 x 1.6 x 1.8 cm spongiform nodule, a right lower lobe 1.1 x 1.2 x 1.3 cm solid nodule without suspicious features, a left superior 2.6 x 1.5 x 2.2 cm solid heterogeneous nodule without suspicious features and a 1.2 x 0.7 x 1.3 cm left lower lobe thyroid nodule the latter 2 biopsied in 2020 and benign [place] category II. Ashlie denies recent shortness of breath, dysphagia, anterior neck pain, neck pressure or mass. She has had a hoarse voice for a week thought to be due to seasonal allergies.  She has a history of mild obstructive sleep apnea but is not being treated at the moment.  There is no family history of thyroid cancer. She denies any known radiation exposures in her youth. She has not had a thyroid function study since 2019 in the system and complains of weight gain (20-25 lbs). She denies fever, body aches, cough, cyanosis, chest burning, anosmia or recent known COVID exposures.  All family members at home are well.

## 2024-04-09 NOTE — CONSULT LETTER
[Dear  ___] : Dear  [unfilled], [Consult Letter:] : I had the pleasure of evaluating your patient, [unfilled]. [Please see my note below.] : Please see my note below. [Consult Closing:] : Thank you very much for allowing me to participate in the care of this patient.  If you have any questions, please do not hesitate to contact me. [Sincerely,] : Sincerely, [FreeTextEntry3] :  Mitul Kimble M.D., FACS, [facility] Director [facility] at [facility] Certified in Thyroid/Parathyroid/Neck Ultrasound, EMRE/ NELA , Department of Otolaryngology [facility] [employer] [facility] at [facility]

## 2024-04-12 LAB
25(OH)D3 SERPL-MCNC: 41 NG/ML
ALBUMIN SERPL ELPH-MCNC: 4.3 G/DL
ALP BLD-CCNC: 104 U/L
ALT SERPL-CCNC: 15 U/L
ANION GAP SERPL CALC-SCNC: 12 MMOL/L
AST SERPL-CCNC: 14 U/L
BILIRUB SERPL-MCNC: 0.2 MG/DL
BUN SERPL-MCNC: 10 MG/DL
CALCIUM SERPL-MCNC: 9.5 MG/DL
CHLORIDE SERPL-SCNC: 98 MMOL/L
CO2 SERPL-SCNC: 26 MMOL/L
CREAT SERPL-MCNC: 0.77 MG/DL
EGFR: 86 ML/MIN/1.73M2
GLUCOSE SERPL-MCNC: 87 MG/DL
POTASSIUM SERPL-SCNC: 4.4 MMOL/L
PROT SERPL-MCNC: 7.5 G/DL
SODIUM SERPL-SCNC: 136 MMOL/L
T4 FREE SERPL-MCNC: 1.1 NG/DL
TSH SERPL-ACNC: 1.1 UIU/ML

## 2024-04-22 ENCOUNTER — OUTPATIENT (OUTPATIENT)
Dept: OUTPATIENT SERVICES | Facility: HOSPITAL | Age: 64
LOS: 1 days | End: 2024-04-22
Payer: MEDICARE

## 2024-04-22 ENCOUNTER — APPOINTMENT (OUTPATIENT)
Dept: CT IMAGING | Facility: HOSPITAL | Age: 64
End: 2024-04-22

## 2024-04-22 ENCOUNTER — APPOINTMENT (OUTPATIENT)
Dept: ULTRASOUND IMAGING | Facility: HOSPITAL | Age: 64
End: 2024-04-22

## 2024-04-22 ENCOUNTER — RESULT REVIEW (OUTPATIENT)
Age: 64
End: 2024-04-22

## 2024-04-22 DIAGNOSIS — Z98.890 OTHER SPECIFIED POSTPROCEDURAL STATES: Chronic | ICD-10-CM

## 2024-04-22 DIAGNOSIS — Z90.49 ACQUIRED ABSENCE OF OTHER SPECIFIED PARTS OF DIGESTIVE TRACT: Chronic | ICD-10-CM

## 2024-04-22 DIAGNOSIS — Z41.9 ENCOUNTER FOR PROCEDURE FOR PURPOSES OTHER THAN REMEDYING HEALTH STATE, UNSPECIFIED: Chronic | ICD-10-CM

## 2024-04-22 PROCEDURE — 75574 CT ANGIO HRT W/3D IMAGE: CPT | Mod: 26,MH

## 2024-04-22 PROCEDURE — 82565 ASSAY OF CREATININE: CPT

## 2024-04-22 PROCEDURE — 76536 US EXAM OF HEAD AND NECK: CPT

## 2024-04-22 PROCEDURE — 76536 US EXAM OF HEAD AND NECK: CPT | Mod: 26

## 2024-04-22 PROCEDURE — 75574 CT ANGIO HRT W/3D IMAGE: CPT

## 2024-04-25 ENCOUNTER — APPOINTMENT (OUTPATIENT)
Dept: NEUROLOGY | Facility: CLINIC | Age: 64
End: 2024-04-25

## 2024-04-26 ENCOUNTER — APPOINTMENT (OUTPATIENT)
Dept: PHARMACY | Facility: CLINIC | Age: 64
End: 2024-04-26
Payer: MEDICARE

## 2024-04-26 PROCEDURE — V5010 ASSESSMENT FOR HEARING AID: CPT | Mod: NC

## 2024-05-03 LAB — POCT ISTAT CREATININE: 0.8 MG/DL — SIGNIFICANT CHANGE UP (ref 0.5–1.3)

## 2024-05-14 ENCOUNTER — APPOINTMENT (OUTPATIENT)
Dept: NEUROLOGY | Facility: CLINIC | Age: 64
End: 2024-05-14
Payer: MEDICARE

## 2024-05-14 ENCOUNTER — NON-APPOINTMENT (OUTPATIENT)
Age: 64
End: 2024-05-14

## 2024-05-14 PROCEDURE — 95816 EEG AWAKE AND DROWSY: CPT

## 2024-05-15 PROCEDURE — 95708 EEG WO VID EA 12-26HR UNMNTR: CPT

## 2024-05-16 ENCOUNTER — APPOINTMENT (OUTPATIENT)
Dept: NEUROLOGY | Facility: CLINIC | Age: 64
End: 2024-05-16

## 2024-05-16 PROCEDURE — 95700 EEG CONT REC W/VID EEG TECH: CPT

## 2024-05-16 PROCEDURE — 95721 EEG PHY/QHP>36<60 HR W/O VID: CPT

## 2024-05-16 PROCEDURE — 95708 EEG WO VID EA 12-26HR UNMNTR: CPT

## 2024-05-17 NOTE — ED PROVIDER NOTE - PATIENT PORTAL LINK FT
You can access the FollowMyHealth Patient Portal offered by [facility] by registering at the following website: [email or web address]. By joining [employer]’s FollowMyHealth portal, you will also be able to view your health information using other applications (apps) compatible with our system.
General Sunscreen Counseling: I recommended a broad spectrum sunscreen with a SPF of 30 or higher. Sun protective clothing can be used in lieu of sunscreen but must be worn the entire time you are exposed to the sun's rays.
Detail Level: Detailed

## 2024-05-21 ENCOUNTER — APPOINTMENT (OUTPATIENT)
Dept: PHARMACY | Facility: CLINIC | Age: 64
End: 2024-05-21

## 2024-05-28 ENCOUNTER — APPOINTMENT (OUTPATIENT)
Dept: ENDOCRINOLOGY | Facility: CLINIC | Age: 64
End: 2024-05-28
Payer: MEDICARE

## 2024-05-28 VITALS
OXYGEN SATURATION: 97 % | DIASTOLIC BLOOD PRESSURE: 77 MMHG | TEMPERATURE: 98.1 F | HEART RATE: 71 BPM | SYSTOLIC BLOOD PRESSURE: 110 MMHG

## 2024-05-28 DIAGNOSIS — E04.2 NONTOXIC MULTINODULAR GOITER: ICD-10-CM

## 2024-05-28 DIAGNOSIS — E04.1 NONTOXIC SINGLE THYROID NODULE: ICD-10-CM

## 2024-05-28 PROCEDURE — 99204 OFFICE O/P NEW MOD 45 MIN: CPT

## 2024-05-28 NOTE — HISTORY OF PRESENT ILLNESS
[FreeTextEntry1] : 63-year-old female with history of metastatic lung cancer s/p resection, gamma knife tx, HLD here for evaluation of thyroid nodules.   Dominant right sided nodule was biopsied in 2017 and left sided nodule was biopsied in 2020. She denied dysphagia, anterior neck pressure but reported dysphonia   No FH of thyroid cancer  Cousin with graves' disease    ACC: [id number]     EXAM:  US THYROID AND PARATHYROID   ORDERED BY: RAYMOND CHAMBERS  PROCEDURE DATE:  04/22/2024    INTERPRETATION:  THYROID ULTRASOUND with Doppler dated 4/22/2024 1:26 PM  History: Thyroid nodules  Technique: Ultrasound evaluation of the thyroid was performed in the axial and sagittal projections. Color Doppler evaluation was also performed.  Prior study: Thyroid ultrasound dated 12/12/2019  Findings:  RIGHT LOBE: Dimensions: 5.8 x 2.8 x 2.9 cm (sagittal x AP x transverse) Echotexture: Homogenous Vascularity: Normal The right lobe contains the following nodules:  Nodule 1: Location: Interpolar Dimensions: 3.2 x 2.1 x 2.4 cm (sagittal x AP x transverse), previously 3.2 x 1.6 x 1.8 cm. Composition: Solid For solid nodules or for the solid portion of a partially cystic nodule, does the solid portion have any of the following suspicious features? -  No: Hypoechoic -  No: Microcalcifications -  No: Irregular margin (infiltrative or microlobulated) -  No: Taller than wide (AP dimension > transverse) -  No: Extrathyroidal extension -  No: Interrupted rim calcification with soft tissue extrusion  Nodule 2: Location: Inferior pole Dimensions: 0.9 x 1.1 x 0.9 cm (sagittal x AP x transverse), previously 1.1 x 1.2 x 1.3 cm. Composition: Solid For solid nodules or for the solid portion of a partially cystic nodule, does the solid portion have any of the following suspicious features? -  No: Hypoechoic -  No: Microcalcifications -  No: Irregular margin (infiltrative or microlobulated) -  No: Taller than wide (AP dimension > transverse) -  No: Extrathyroidal extension -  No: Interrupted rim calcification with soft tissue extrusion     LEFT LOBE: Dimensions: 5.5 x 2.4 x 3.0 cm (sagittal x AP x transverse) Echotexture: Homogenous Vascularity: Normal The left lobe contains the following nodules:  Nodule 1: Location: Superior pole Dimensions: 3.1 x 1.7 x 2.3 cm (sagittal x AP x transverse), previously 2.6 x 1.5 x 2.8 cm. Composition: Mixed solid/cystic For solid nodules or for the solid portion of a partially cystic nodule, does the solid portion have any of the following suspicious features? -  No: Hypoechoic -  No: Microcalcifications -  No: Irregular margin (infiltrative or microlobulated) -  No: Taller than wide (AP dimension > transverse) -  No: Extrathyroidal extension -  No: Interrupted rim calcification with soft tissue extrusion  Nodule 2: Location: Inferior pole Dimensions: 1.1 x 0.8 x 0.8 cm (sagittal x AP x transverse), previously 1.2 x 0.7 x 1.3 cm. Composition: Solid For solid nodules or for the solid portion of a partially cystic nodule, does the solid portion have any of the following suspicious features? -  No: Hypoechoic -  No: Microcalcifications -  No: Irregular margin (infiltrative or microlobulated) -  No: Taller than wide (AP dimension > transverse) -  No: Extrathyroidal extension -  No: Interrupted rim calcification with soft tissue extrusion   ISTHMUS: Dimensions: 0.5 cm AP The isthmus lobe contains the following nodule:  Nodule 1: Location: Middle Dimensions: 0.4 x 0.2 x 0.5 cm (sagittal x AP x transverse) Composition: Solid For solid nodules or for the solid portion of a partially cystic nodule, does the solid portion have any of the following suspicious features? -  No: Hypoechoic -  No: Microcalcifications -  No: Irregular margin (infiltrative or microlobulated) -  No: Taller than wide (AP dimension > transverse) -  No: Extrathyroidal extension -  No: Interrupted rim calcification with soft tissue extrusion  CERVICAL LYMPH NODE EVALUATION: -  No abnormal lymph nodes are identified in the neck.  PARATHYROID EXAMINATION: -  Parathyroid glands not visualized.   IMPRESSION: Multinodular thyroid. The following nodules meet criteria for fine needle aspiration: 3.2 cm right interpolar nodule. 3.1 cm left superior pole nodule.

## 2024-05-28 NOTE — ASSESSMENT
[FreeTextEntry1] : 63-year-old female with history of metastatic lung cancer s/p resection, gamma knife tx, HLD here for evaluation of thyroid nodules.  -Right sided nodule increased in size substantially compared to 2019 US ( FNA benign in 2017) -Previous size was 3.2 x 1.6 x 1.8 cm and now has increased to 3.2 x 2.1 x 2.4 cm  -Will schedule for FNA of the right sided dominant nodule   Right: Location: Inferior pole Dimensions: 0.9 x 1.1 x 0.9 cm  Left: Location: Superior pole Dimensions: 3.1 x 1.7 x 2.3 cm  Location: Inferior pole Dimensions: 1.1 x 0.8 x 0.8 cm  Isthmus: Dimensions: 0.4 x 0.2 x 0.5 cm    -Schedule for FNA

## 2024-05-28 NOTE — PHYSICAL EXAM
[Alert] : alert [Well Nourished] : well nourished [No Acute Distress] : no acute distress [Normal Sclera/Conjunctiva] : normal sclera/conjunctiva [PERRL] : pupils equal, round and reactive to light [Normal Outer Ear/Nose] : the ears and nose were normal in appearance [Normal TMs] : both tympanic membranes were normal [No Respiratory Distress] : no respiratory distress [Clear to Auscultation] : lungs were clear to auscultation bilaterally [Normal S1, S2] : normal S1 and S2 [Normal Rate] : heart rate was normal [Regular Rhythm] : with a regular rhythm [Normal Bowel Sounds] : normal bowel sounds [Not Tender] : non-tender [Soft] : abdomen soft [Normal Gait] : normal gait [No Clubbing, Cyanosis] : no clubbing  or cyanosis of the fingernails [No Joint Swelling] : no joint swelling seen [No Rash] : no rash [No Skin Lesions] : no skin lesions [Normal Reflexes] : deep tendon reflexes were 2+ and symmetric [Oriented x3] : oriented to person, place, and time [Normal Insight/Judgement] : insight and judgment were intact

## 2024-05-28 NOTE — REVIEW OF SYSTEMS
[Fatigue] : no fatigue [Decreased Appetite] : appetite not decreased [Recent Weight Gain (___ Lbs)] : no recent weight gain [Recent Weight Loss (___ Lbs)] : no recent weight loss [Visual Field Defect] : no visual field defect [Dry Eyes] : no dryness [Dysphagia] : dysphagia [Neck Pain] : no neck pain [Dysphonia] : dysphonia [Nasal Congestion] : no nasal congestion [Chest Pain] : no chest pain [Slow Heart Rate] : heart rate is not slow [Palpitations] : no palpitations [Fast Heart Rate] : heart rate is not fast [Shortness Of Breath] : no shortness of breath [Nausea] : no nausea [Constipation] : no constipation [Vomiting] : no vomiting [Diarrhea] : no diarrhea [Polyuria] : no polyuria [Irregular Menses] : regular menses [Joint Pain] : no joint pain [Muscle Weakness] : no muscle weakness [Acanthosis] : no acanthosis  [Acne] : no acne [Headaches] : no headaches [Tremors] : no tremors [Depression] : no depression [Polydipsia] : no polydipsia [Cold Intolerance] : no cold intolerance [Easy Bleeding] : no ~M tendency for easy bleeding [Easy Bruising] : no tendency for easy bruising

## 2024-05-31 ENCOUNTER — APPOINTMENT (OUTPATIENT)
Dept: PHARMACY | Facility: CLINIC | Age: 64
End: 2024-05-31
Payer: COMMERCIAL

## 2024-05-31 PROCEDURE — V5261J: CUSTOM

## 2024-06-03 ENCOUNTER — NON-APPOINTMENT (OUTPATIENT)
Age: 64
End: 2024-06-03

## 2024-06-10 ENCOUNTER — APPOINTMENT (OUTPATIENT)
Dept: PHARMACY | Facility: CLINIC | Age: 64
End: 2024-06-10
Payer: SELF-PAY

## 2024-06-10 PROCEDURE — V5299A: CUSTOM

## 2024-06-16 ENCOUNTER — EMERGENCY (EMERGENCY)
Facility: HOSPITAL | Age: 64
LOS: 1 days | Discharge: HOME | End: 2024-06-16
Attending: EMERGENCY MEDICINE | Admitting: EMERGENCY MEDICINE
Payer: MEDICARE

## 2024-06-16 VITALS
WEIGHT: 225.09 LBS | SYSTOLIC BLOOD PRESSURE: 120 MMHG | HEART RATE: 88 BPM | DIASTOLIC BLOOD PRESSURE: 84 MMHG | OXYGEN SATURATION: 97 % | RESPIRATION RATE: 18 BRPM | HEIGHT: 65 IN | TEMPERATURE: 99 F

## 2024-06-16 VITALS
TEMPERATURE: 98 F | SYSTOLIC BLOOD PRESSURE: 112 MMHG | RESPIRATION RATE: 18 BRPM | OXYGEN SATURATION: 95 % | DIASTOLIC BLOOD PRESSURE: 77 MMHG | HEART RATE: 81 BPM

## 2024-06-16 DIAGNOSIS — E78.5 HYPERLIPIDEMIA, UNSPECIFIED: ICD-10-CM

## 2024-06-16 DIAGNOSIS — Z98.890 OTHER SPECIFIED POSTPROCEDURAL STATES: Chronic | ICD-10-CM

## 2024-06-16 DIAGNOSIS — J44.9 CHRONIC OBSTRUCTIVE PULMONARY DISEASE, UNSPECIFIED: ICD-10-CM

## 2024-06-16 DIAGNOSIS — U07.1 COVID-19: ICD-10-CM

## 2024-06-16 DIAGNOSIS — Z88.0 ALLERGY STATUS TO PENICILLIN: ICD-10-CM

## 2024-06-16 DIAGNOSIS — Z90.49 ACQUIRED ABSENCE OF OTHER SPECIFIED PARTS OF DIGESTIVE TRACT: Chronic | ICD-10-CM

## 2024-06-16 DIAGNOSIS — Z41.9 ENCOUNTER FOR PROCEDURE FOR PURPOSES OTHER THAN REMEDYING HEALTH STATE, UNSPECIFIED: Chronic | ICD-10-CM

## 2024-06-16 DIAGNOSIS — R51.9 HEADACHE, UNSPECIFIED: ICD-10-CM

## 2024-06-16 DIAGNOSIS — I45.10 UNSPECIFIED RIGHT BUNDLE-BRANCH BLOCK: ICD-10-CM

## 2024-06-16 DIAGNOSIS — E87.1 HYPO-OSMOLALITY AND HYPONATREMIA: ICD-10-CM

## 2024-06-16 DIAGNOSIS — Z87.891 PERSONAL HISTORY OF NICOTINE DEPENDENCE: ICD-10-CM

## 2024-06-16 LAB
ANION GAP SERPL CALC-SCNC: 9 MMOL/L — SIGNIFICANT CHANGE UP (ref 5–17)
BUN SERPL-MCNC: 9 MG/DL — SIGNIFICANT CHANGE UP (ref 7–23)
CALCIUM SERPL-MCNC: 9.5 MG/DL — SIGNIFICANT CHANGE UP (ref 8.4–10.5)
CHLORIDE SERPL-SCNC: 97 MMOL/L — SIGNIFICANT CHANGE UP (ref 96–108)
CO2 SERPL-SCNC: 25 MMOL/L — SIGNIFICANT CHANGE UP (ref 22–31)
CREAT SERPL-MCNC: 0.73 MG/DL — SIGNIFICANT CHANGE UP (ref 0.5–1.3)
EGFR: 91 ML/MIN/1.73M2 — SIGNIFICANT CHANGE UP
FLUAV AG NPH QL: SIGNIFICANT CHANGE UP
FLUBV AG NPH QL: SIGNIFICANT CHANGE UP
GLUCOSE SERPL-MCNC: 88 MG/DL — SIGNIFICANT CHANGE UP (ref 70–99)
HCT VFR BLD CALC: 41.7 % — SIGNIFICANT CHANGE UP (ref 34.5–45)
HGB BLD-MCNC: 13.9 G/DL — SIGNIFICANT CHANGE UP (ref 11.5–15.5)
MCHC RBC-ENTMCNC: 28.8 PG — SIGNIFICANT CHANGE UP (ref 27–34)
MCHC RBC-ENTMCNC: 33.3 GM/DL — SIGNIFICANT CHANGE UP (ref 32–36)
MCV RBC AUTO: 86.5 FL — SIGNIFICANT CHANGE UP (ref 80–100)
NRBC # BLD: 0 /100 WBCS — SIGNIFICANT CHANGE UP (ref 0–0)
PLATELET # BLD AUTO: 251 K/UL — SIGNIFICANT CHANGE UP (ref 150–400)
POTASSIUM SERPL-MCNC: 3.9 MMOL/L — SIGNIFICANT CHANGE UP (ref 3.5–5.3)
POTASSIUM SERPL-SCNC: 3.9 MMOL/L — SIGNIFICANT CHANGE UP (ref 3.5–5.3)
RBC # BLD: 4.82 M/UL — SIGNIFICANT CHANGE UP (ref 3.8–5.2)
RBC # FLD: 13.7 % — SIGNIFICANT CHANGE UP (ref 10.3–14.5)
RSV RNA NPH QL NAA+NON-PROBE: SIGNIFICANT CHANGE UP
SARS-COV-2 RNA SPEC QL NAA+PROBE: DETECTED
SODIUM SERPL-SCNC: 131 MMOL/L — LOW (ref 135–145)
WBC # BLD: 5.23 K/UL — SIGNIFICANT CHANGE UP (ref 3.8–10.5)
WBC # FLD AUTO: 5.23 K/UL — SIGNIFICANT CHANGE UP (ref 3.8–10.5)

## 2024-06-16 PROCEDURE — 36415 COLL VENOUS BLD VENIPUNCTURE: CPT

## 2024-06-16 PROCEDURE — 99284 EMERGENCY DEPT VISIT MOD MDM: CPT | Mod: 25

## 2024-06-16 PROCEDURE — 96374 THER/PROPH/DIAG INJ IV PUSH: CPT

## 2024-06-16 PROCEDURE — 87637 SARSCOV2&INF A&B&RSV AMP PRB: CPT

## 2024-06-16 PROCEDURE — 96375 TX/PRO/DX INJ NEW DRUG ADDON: CPT

## 2024-06-16 PROCEDURE — 71046 X-RAY EXAM CHEST 2 VIEWS: CPT

## 2024-06-16 PROCEDURE — 99284 EMERGENCY DEPT VISIT MOD MDM: CPT

## 2024-06-16 PROCEDURE — 80048 BASIC METABOLIC PNL TOTAL CA: CPT

## 2024-06-16 PROCEDURE — 94640 AIRWAY INHALATION TREATMENT: CPT

## 2024-06-16 PROCEDURE — 85027 COMPLETE CBC AUTOMATED: CPT

## 2024-06-16 PROCEDURE — 71046 X-RAY EXAM CHEST 2 VIEWS: CPT | Mod: 26

## 2024-06-16 RX ORDER — METOCLOPRAMIDE HCL 10 MG
10 TABLET ORAL ONCE
Refills: 0 | Status: COMPLETED | OUTPATIENT
Start: 2024-06-16 | End: 2024-06-16

## 2024-06-16 RX ORDER — IBUPROFEN 200 MG
1 TABLET ORAL
Qty: 30 | Refills: 0
Start: 2024-06-16

## 2024-06-16 RX ORDER — IPRATROPIUM/ALBUTEROL SULFATE 18-103MCG
3 AEROSOL WITH ADAPTER (GRAM) INHALATION ONCE
Refills: 0 | Status: COMPLETED | OUTPATIENT
Start: 2024-06-16 | End: 2024-06-16

## 2024-06-16 RX ORDER — SODIUM CHLORIDE 9 MG/ML
1000 INJECTION INTRAMUSCULAR; INTRAVENOUS; SUBCUTANEOUS ONCE
Refills: 0 | Status: COMPLETED | OUTPATIENT
Start: 2024-06-16 | End: 2024-06-16

## 2024-06-16 RX ORDER — NIRMATRELVIR AND RITONAVIR 150-100 MG
1 KIT ORAL
Qty: 1 | Refills: 0
Start: 2024-06-16 | End: 2024-06-20

## 2024-06-16 RX ORDER — ACETAMINOPHEN 500 MG
1000 TABLET ORAL ONCE
Refills: 0 | Status: COMPLETED | OUTPATIENT
Start: 2024-06-16 | End: 2024-06-16

## 2024-06-16 RX ORDER — FLUTICASONE PROPIONATE 50 MCG
1 SPRAY, SUSPENSION NASAL ONCE
Refills: 0 | Status: COMPLETED | OUTPATIENT
Start: 2024-06-16 | End: 2024-06-16

## 2024-06-16 RX ORDER — ONDANSETRON 8 MG/1
1 TABLET, FILM COATED ORAL
Qty: 20 | Refills: 0
Start: 2024-06-16

## 2024-06-16 RX ADMIN — Medication 3 MILLILITER(S): at 15:02

## 2024-06-16 RX ADMIN — Medication 10 MILLIGRAM(S): at 15:01

## 2024-06-16 RX ADMIN — Medication 400 MILLIGRAM(S): at 15:03

## 2024-06-16 RX ADMIN — SODIUM CHLORIDE 1000 MILLILITER(S): 9 INJECTION INTRAMUSCULAR; INTRAVENOUS; SUBCUTANEOUS at 15:00

## 2024-06-16 RX ADMIN — Medication 1 SPRAY(S): at 15:16

## 2024-06-16 RX ADMIN — Medication 200 MILLIGRAM(S): at 15:00

## 2024-06-16 NOTE — ED PROVIDER NOTE - CARE PLAN
Principal Discharge DX:	Upper respiratory infection   1 Principal Discharge DX:	2019 novel coronavirus disease (COVID-19)

## 2024-06-16 NOTE — ED PROVIDER NOTE - NSFOLLOWUPINSTRUCTIONS_ED_ALL_ED_FT
Upper respiratory infection    Use flonase (fluticasone) -  1 sprays each nostril twice a day for nasal congestion as needed.     Use your inhalers as directed.      Take Zofran (ondasetron) - 1 tab on tongue every 6 hours as needed for nausea.     Take Tessalon Perles (Benzonatate) 1 capsule every 6-8 hours as needed for cough.    Take ibuprofen 1 tab every 6 hours with food as needed for pain.  Add tylenol for additional pain relief as needed - use as directed.     We are concerned you may have COVID or other respiratory viral infection.  Rest.  Drink plenty of fluids.  Try over the counter medications based on your symptoms for relief; use as directed: sudafed or similar and/or nasal sprays for congestion, robitussin or similar for cough, tylenol for fever, body aches, pain.     Return for increased pain, increased difficulty breathing, change in cough/sputum, increased fever, intractable vomiting, any other concerns.    Follow up with your pmd.      ** You must self-isolate until you are cough and fever free for 24 hours IF your COVID test is POSITIVE.**  Cough/sneeze into your elbow area.  Wash your hands after touching your face and avoid touching your face if able; if you are sharing spaces, clean surfaces after you touch them. COVID    Take paxlovid - 1 dose (3 tabs total) twice a day for 5 days.  Please DO NOT take your oxyado or advair while on the paxlovid.  Please DO NOT take your simvastatin for a total of 9 days - the days you are taking the paxlovid and an additional 4 days; you may resume it on day 10.      Use flonase (fluticasone) -  1 sprays each nostril twice a day for nasal congestion as needed.     You may still use your albuterol inhaler as directed.      Take prednisone once a day for 5 days.     Take Zofran (ondasetron) - 1 tab on tongue every 6 hours as needed for nausea.     Take Tessalon Perles (Benzonatate) 1 capsule every 6-8 hours as needed for cough.    Take ibuprofen 1 tab every 6 hours with food as needed for pain.  Add tylenol for additional pain relief as needed - use as directed.     You have COVID.  Rest.  Drink plenty of fluids.  Try over the counter medications based on your symptoms for relief; use as directed: sudafed or similar and/or nasal sprays for congestion, robitussin or similar for cough, tylenol for fever, body aches, pain.     Return for increased pain, increased difficulty breathing, change in cough/sputum, increased fever, intractable vomiting, any other concerns.    Follow up with your pmd.      ** You must self-isolate until you are cough and fever free for 24 hours.**  Cough/sneeze into your elbow area.  Wash your hands after touching your face and avoid touching your face if able; if you are sharing spaces, clean surfaces after you touch them.

## 2024-06-16 NOTE — ED ADULT TRIAGE NOTE - ISOLATION TYPE:
----- Message from Yanique Alexander sent at 4/25/2019  3:07 PM CDT -----  Type:  Patient Returning Call    Who Called:  self  Who Left Message for Patient:  n/a  Does the patient know what this is regarding?:     Best Call Back Number:  [phone] (home)     Additional Information:      None

## 2024-06-16 NOTE — ED ADULT NURSE NOTE - OBJECTIVE STATEMENT
Pt reports chest pain with SOB x 2 days. Worse with physical exertion. Denies dizziness, weakness, or syncope. Denies N/V/D. Reports diffuse chest congestion and cough for the same duration of time. Alert, oriented, and ambulatory at time of assessment. No trauma noted. No fevers reported.

## 2024-06-16 NOTE — ED PROVIDER NOTE - PROGRESS NOTE DETAILS
Pt feeling better.  Pt refused head ct.  Na slightly low - pt given ivf; instructed to have pmd recheck.  Flu/covid pending.  CXR neg on my read.  Lung cta w good air movement.  Will dc to fu w pmd. Pt w covid.  Will prescribe paxlovid; discussed covid precautions, quarantine and need to hold simvastatin and oxaydo while on paxlovid.

## 2024-06-16 NOTE — ED PROVIDER NOTE - CLINICAL SUMMARY MEDICAL DECISION MAKING FREE TEXT BOX
Patient c/o ha, congestion, cough w pleuritic cp and mild sob, suspect viral Upper respiratory infection; no evidence to suggest sepsis or meningitis;   The patient is non toxic appearing. Pt's cp reproducible and pleuritic, ekg wnl, nl coronary art cta 4/24 - no concern for acs.  ? pna vs uri.  Pt also c/o severe ha - chart review indicates chronic ha's; no neuro deficits.  Low concern for ich, cva, suspect acute on chronic likely exacerbated by sinus congestion.  Pt also notes sob.  Lungs cta but suspect mild copd exacerbation.  Plan labs, cxr, ct head, pain and sx based meds, neb; reassess lung exam, consider steroids.  Reassess. See progress notes for further mdm related documentation.

## 2024-06-16 NOTE — ED PROVIDER NOTE - PATIENT PORTAL LINK FT
You can access the FollowMyHealth Patient Portal offered by [facility] by registering at the following website: [email or web address]. By joining [employer]’s FollowMyHealth portal, you will also be able to view your health information using other applications (apps) compatible with our system.

## 2024-06-16 NOTE — ED PROVIDER NOTE - PHYSICAL EXAMINATION
VITAL SIGNS: I have reviewed nursing notes and confirm.  CONSTITUTIONAL:  in no acute distress.   SKIN:  warm and dry, no acute rash.   HEAD:  normocephalic, atraumatic.  EYES: PERRL, EOM intact; conjunctiva and sclera clear.  ENT: No nasal discharge; airway clear.   NECK: Supple; non tender.  CARD: S1, S2 normal; no murmurs, gallops, or rubs. Regular rate and rhythm.   RESP:  Clear to auscultation b/l, no wheezes, rales or rhonchi.  ABD: Normal bowel sounds; soft; non-distended; non-tender; no guarding/ rebound.  MSK: Normal ROM. No clubbing, cyanosis or edema. no ttp bilat le, + sternal ttp  NEURO: awake, alert, oriented x 3, CN II-XII grossly intact except does not fully R gaze on eval of EOM, motor 5/5, no gross sens deficits, speech clear   PSYCH: Cooperative, mood and affect appropriate.

## 2024-06-16 NOTE — ED PROVIDER NOTE - QRS
Subjective   Patient is 13-month-old [id number]     26

## 2024-06-16 NOTE — ED PROVIDER NOTE - OBJECTIVE STATEMENT
62 yo F h/o metastatic lung ca s/p wedge resection w brain mets s/p resection and xrt, sz, copd, hld c/o     CTA coronary artery 4/24:  IMPRESSION: Cardiac: 1.  The calcium score is minimal at 2 Agatston units, which is at the 53 percentile, adjusted for age, gender and race.  2.  Minimal stenosis of proximal LAD, D1, and proximal LCx. 3.  Mid-LAD, D2, mid-LCx, mid RCA, RPDA, and RPL are probably normal;.  4.  Remaining coronary segments appear normal. Non-cardiac: 1. Since 12/11/2023, no significant change in postoperative appearance of the chest.  2. Severe emphysema. 3. Main pulmonary artery again dilated, which may be a sign of pulmonary hypertension. 62 yo F h/o metastatic lung ca s/p wedge resection w brain mets s/p resection and xrt, sz, copd, hld c/o feeling unwell x 3 d w progressive ha, sinus congestion, cough w white sputum and pleuritic cp w cough only, malaise, sneezing, mild sob, nausea w/o vomiting.  No abd pain, palpitations, v/d, dysuria.   No sick contacts, travel.  No change in vision/speech/gait, numbness or weakness in ext.  No relief w home meds and reports she ran out of oxycodone 2 d ago which she takes for chronic headaches.      Prior recent imaging:  CTA coronary artery 4/24:  IMPRESSION: Cardiac: 1.  The calcium score is minimal at 2 Agatston units, which is at the 53 percentile, adjusted for age, gender and race.  2.  Minimal stenosis of proximal LAD, D1, and proximal LCx. 3.  Mid-LAD, D2, mid-LCx, mid RCA, RPDA, and RPL are probably normal;.  4.  Remaining coronary segments appear normal. Non-cardiac: 1. Since 12/11/2023, no significant change in postoperative appearance of the chest.  2. Severe emphysema. 3. Main pulmonary artery again dilated, which may be a sign of pulmonary hypertension.    MRI brain 3/24 - no acute change from prior w no progression of disease  CTA brain 3/24 - patent vessels

## 2024-06-16 NOTE — ED ADULT TRIAGE NOTE - CHIEF COMPLAINT QUOTE
Pt to ED c/o chest pain, sob, and headache x 2 days. Chest pain radiates down left side. EKg in progress.

## 2024-06-16 NOTE — ED ADULT TRIAGE NOTE - AS TEMP SITE
[FreeTextEntry1] : 76 year old male with a history of prostate cancer diagnosed in 2002 and had a prostatectomy. Patient said his PSA has been undetectable but no recent PSA.He had an IPP in 2003 and had it replaced again approx 10 years later.  Had impending erosion and I took to OR for removal and replacement of penile prosthesis.\par \par presents today to learn how to adjust, and inflate and deflate his device\par \par  Voiding with a good flow. Denies gross hematuria, dysuria, flank pain, fever, or chills. Non smoker. \par  oral

## 2024-06-19 NOTE — DISCHARGE NOTE ADULT - PROVIDER RX CONTACT NUMBER
AM Neurosurgery Progress Note    Patient: Pamella Loco Date: 2024   : 1942 Attending: Subhash Centeno MD   Admit Date: 6/15/2024 Room/Bed: [place] [address]   82 year old female        Subjective:  Observed resting in bed, confused.  Follows commands x 4.  She Continues with HOB flat.  Plan for CT head scan this am.          Vital Last Value 24 Hour Range   Temperature 98.4 °F (36.9 °C) (24 0000) Temp  Min: 97.4 °F (36.3 °C)  Max: 99 °F (37.2 °C)   Pulse 75 (24 0300) Pulse  Min: 56  Max: 94   Respiratory 18 (24 0300) Resp  Min: 12  Max: 26   Non-Invasive  Blood Pressure 131/57 (24 0300) BP  Min: 90/79  Max: 137/55   Arterial   Blood Pressure   No data recorded   Pulse Oximetry 95 % (24 0300) SpO2  Min: 87 %  Max: 100 %   CVP   No data recorded   PCWP   No data recorded   Tube Feeding Formula   NA   Tube Feeding Rate   No data recorded   NIH Scale 5 NA   Glascow Coma Scale 14 NA     Vent Settings Last Value   Mode     Rate     Tidal Volume     Pressure Support     PEEP/CPAC/EPAP     FiO2       Vital Today Admitted   Weight 52.4 kg (115 lb 8.3 oz) (24 0500) Weight: 52.1 kg (114 lb 13.8 oz) (06/15/24 190)   Height N/A Height: 5' (152.4 cm) (06/15/24 2100)   BMI N/A BMI (Calculated): 21.96 (06/15/24 2100)       Peripheral IV 06/15/24 Right Antecubital 18 (Active)   Site Assessment WDL 24   Dressing Type Transparent 24   Dressing Status WDL 24   Dressing Activity Other (Comment) 06/15/24 2100   Dressing Changed On   06/15/24 06/15/24 2100   Lumen Cap Applied/Changed On 06/15/24 06/15/24 2100   Line Status No blood return;Line flushed;Infusing 24       Wound FInger, 4th Right Anterior Incision (Active)       Wound Cheek Right Abrasion (Active)   Present at Time of This Admission Yes 24 1600   Dressing Assessment Open to Air 24 0400   Wound Exudate None 24 0400   Cleansing Agent Other (comment) 24 1600   Wound  Bed/Tissue Type Pink;Red 06/18/24 0400   Periwound Condition Normal 06/18/24 0400   Wound Edge Scabbed 06/18/24 0400   Wound Dressing None 06/18/24 0400   Wound Last Measured 06/15/24 06/16/24 0400   Wound Length (cm) 2 cm 06/15/24 2100   Wound Width (cm) 2 cm 06/15/24 2100   Wound Depth (cm) 0 cm 06/15/24 2100   Wound Surface Area (cm^2) 4 cm^2 06/15/24 2100   Wound Volume (cm^3) 0 cm^3 06/15/24 2100       Wound Head Posterior Incision (Active)   Dressing Assessment Clean;Dry;Intact 06/18/24 0400   Dressing Activity Applied 06/16/24 1451   Wound Exudate None 06/18/24 0400   Periwound Condition Normal 06/18/24 0400   Wound Edge Sutured;Approximated 06/18/24 0400   Wound Dressing Gauze 06/18/24 0400       Injury/Trauma Right Finger (Active)       Intake/Output:    I/O this shift:  In: 237.7 [I.V.:237.7]  Out: 335 [Urine:335]    I/O last 3 completed shifts:  In: 1956.5 [I.V.:1956.5]  Out: 3390 [Urine:3390]      Intake/Output Summary (Last 24 hours) at 6/19/2024 0338  Last data filed at 6/19/2024 0300  Gross per 24 hour   Intake 1768.23 ml   Output 2825 ml   Net -1056.77 ml       Medications/Infusions:  Scheduled:   Current Facility-Administered Medications   Medication Dose Route Frequency Provider Last Rate Last Admin    buPROPion XL (WELLBUTRIN XL) tablet 150 mg  150 mg Oral Daily Subhash Centeno MD   150 mg at 06/18/24 0825    levETIRAcetam (KepPRA INJECT) injection 500 mg  500 mg Intravenous 2 times per day Mindy Pitt PA-C   500 mg at 06/18/24 1941    Potassium Standard Replacement Protocol (Levels 3.5 and lower)   Does not apply See Admin Instructions Mindy Pitt PA-C        Potassium Replacement (Levels 3.6 - 4)   Does not apply See Admin Instructions Mindy Pitt PA-C        Magnesium Standard Replacement Protocol   Does not apply See Admin Instructions Mindy Pitt PA-C        Phosphorus Standard Replacement Protocol   Does not apply See Admin Instructions Mindy Pitt PA-C           Continuous  Infusions:   Current Facility-Administered Medications   Medication Dose Route Frequency Provider Last Rate Last Admin    sodium chloride 0.9% infusion   Intravenous Continuous Mindy Pitt PA-C 80 mL/hr at 06/19/24 0100 Rate Verify at 06/19/24 0100       Physical Exam:   General: Awale and alert oriented to name   HENT: Left periorbital abrasion, PERRL, EOMI  Lungs: No acute distress  Skin: No rashes noted  Neuro: GCS 14 (4,4,6) Confused at baseline.  Alert/oriented to self only   Speech clear but only answers simple questions, isable to say her name,thinks she is ini \"[place]\"   Eyes open   Follows commands in all extremities  BUE and BLE Strength at least 4/5              No facial droop              Unable to assess pronator drift      Bilateral incision open to air, no drainage or fluctuance noted         Impression/Diagnoses:  82 year old female who presented to the ED, stroke alert, accompanied by family for symptoms of weakness and falls over the past week  Bilateral holohemipsheric SDH with mass effect      PMH: dementia, anxiety/depression     POD #3 s/p b/l alex holes for SDH evacuation ([place] 6/16/24)     Plans/Recommendations:  Neuro exam as above   Plan for repeat CT head scan today to eval tomy subdural collections   Keep HOB flat until 6/19 at 5am   Neuro checks q 2 hours   Diet as tolerated  Keppra for seizure prophylaxis x 7 days  SBP < 140  Pain control   No SC/NSAIDs/AP x 14 days   SCDs, hold chemical DVT prophylaxis at this time  Follow up in two weeks with repeat CTH; Follow up with neuroendovascular team at [facility] in 2 weeks for consultation for potential bilateral MMA embolization   Appreciate trauma recs/management  Dispo: CCU     Will discuss with NS team      [facility] neurosurgery with questions/concerns    Therese Storm [facility]  Neurosurgery     Addendum:  This patient was seen and examined on rounds today  Imaging studies to be reviewed with Dr. Matthews; stable CT  Head.    Patient seen lying flat this am with daughter at bedside. Overnight patient was agitated in soft restraints and per nursing staff was combative. Patient remains agitated, however removed soft restraints while family is at bedside. She is confused. She denies headache, nausea, vomiting, vision changes. She keep repeating, \"the doctors only want me here to take my money.\"     Exam confirmed- A&O x 2-3 (self, hospital, year), GCS 14 (4,4,6) Confused at baseline, At least 4+/5 Strength x 4 (short bursts of force) possibly right plantarflexion 5-/5, Sensation intact x 4. Questionable right pronator drift, however not fully cooperative with exam   Bilateral incision MAGDALENA with staples C/D/I  Neurochecks- q4h.       PT/OT - Progress activity to baseline      HOB as tolerated  Diet as tolerated  Keppra for seizure prophylaxis x 7 days  SBP < 140  Pain control   No SC/NSAIDs/AP x 14 days   SCDs, hold chemical DVT prophylaxis at this time  Okay for step down  Follow up in two weeks with repeat CTH; Follow up with neuroendovascular team at [facility] in 2 weeks for consultation for potential bilateral MMA embolization   Appreciate trauma recs/management    [facility] Neurosurgery with questions     Rukhsana Lee PA-C  6/19/2024       [phone]

## 2024-06-24 NOTE — H&P ADULT - NSHPPOADEEPVENOUSTHROMB_GEN_A_CORE
Received message from PCP that Mikayla would like to schedule follow-up visit with me. I called Mikayla, no answer so I left  with scheduling number: [phone].    Thanks!    Kerry Patterson RD, LD, [facility]     no

## 2024-07-02 ENCOUNTER — APPOINTMENT (OUTPATIENT)
Dept: MRI IMAGING | Facility: HOSPITAL | Age: 64
End: 2024-07-02

## 2024-07-02 ENCOUNTER — OUTPATIENT (OUTPATIENT)
Dept: OUTPATIENT SERVICES | Facility: HOSPITAL | Age: 64
LOS: 1 days | End: 2024-07-02

## 2024-07-02 DIAGNOSIS — Z98.890 OTHER SPECIFIED POSTPROCEDURAL STATES: Chronic | ICD-10-CM

## 2024-07-02 DIAGNOSIS — Z90.49 ACQUIRED ABSENCE OF OTHER SPECIFIED PARTS OF DIGESTIVE TRACT: Chronic | ICD-10-CM

## 2024-07-02 DIAGNOSIS — Z41.9 ENCOUNTER FOR PROCEDURE FOR PURPOSES OTHER THAN REMEDYING HEALTH STATE, UNSPECIFIED: Chronic | ICD-10-CM

## 2024-07-02 PROCEDURE — 70553 MRI BRAIN STEM W/O & W/DYE: CPT | Mod: 26,MH

## 2024-07-02 PROCEDURE — A9585: CPT

## 2024-07-02 PROCEDURE — 70553 MRI BRAIN STEM W/O & W/DYE: CPT

## 2024-07-05 ENCOUNTER — NON-APPOINTMENT (OUTPATIENT)
Age: 64
End: 2024-07-05

## 2024-07-08 ENCOUNTER — LABORATORY RESULT (OUTPATIENT)
Age: 64
End: 2024-07-08

## 2024-07-08 ENCOUNTER — APPOINTMENT (OUTPATIENT)
Dept: ENDOCRINOLOGY | Facility: CLINIC | Age: 64
End: 2024-07-08
Payer: MEDICARE

## 2024-07-08 VITALS
BODY MASS INDEX: 37.11 KG/M2 | SYSTOLIC BLOOD PRESSURE: 109 MMHG | WEIGHT: 223 LBS | DIASTOLIC BLOOD PRESSURE: 79 MMHG | HEART RATE: 93 BPM

## 2024-07-08 PROCEDURE — 10005 FNA BX W/US GDN 1ST LES: CPT

## 2024-07-17 PROBLEM — G40.89 OTHER SEIZURES: Status: RESOLVED | Noted: 2017-11-01 | Resolved: 2024-07-17

## 2024-07-19 ENCOUNTER — APPOINTMENT (OUTPATIENT)
Dept: NEUROSURGERY | Facility: CLINIC | Age: 64
End: 2024-07-19
Payer: MEDICARE

## 2024-07-19 VITALS
HEIGHT: 65 IN | WEIGHT: 223 LBS | SYSTOLIC BLOOD PRESSURE: 120 MMHG | RESPIRATION RATE: 18 BRPM | OXYGEN SATURATION: 94 % | BODY MASS INDEX: 37.15 KG/M2 | DIASTOLIC BLOOD PRESSURE: 77 MMHG | HEART RATE: 75 BPM

## 2024-07-19 DIAGNOSIS — Z87.891 PERSONAL HISTORY OF NICOTINE DEPENDENCE: ICD-10-CM

## 2024-07-19 DIAGNOSIS — C79.31 SECONDARY MALIGNANT NEOPLASM OF BRAIN: ICD-10-CM

## 2024-07-19 DIAGNOSIS — C34.91 MALIGNANT NEOPLASM OF UNSPECIFIED PART OF RIGHT BRONCHUS OR LUNG: ICD-10-CM
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
## 2024-07-19 DIAGNOSIS — C34.90 MALIGNANT NEOPLASM OF UNSPECIFIED PART OF UNSPECIFIED BRONCHUS OR LUNG: ICD-10-CM

## 2024-07-19 DIAGNOSIS — C79.31 MALIGNANT NEOPLASM OF UNSPECIFIED PART OF UNSPECIFIED BRONCHUS OR LUNG: ICD-10-CM

## 2024-07-19 DIAGNOSIS — Z98.890 OTHER SPECIFIED POSTPROCEDURAL STATES: ICD-10-CM

## 2024-07-19 DIAGNOSIS — I67.89 OTHER CEREBROVASCULAR DISEASE: ICD-10-CM

## 2024-07-19 DIAGNOSIS — G40.89 OTHER SEIZURES: ICD-10-CM

## 2024-07-19 PROCEDURE — 99215 OFFICE O/P EST HI 40 MIN: CPT

## 2024-08-08 ENCOUNTER — APPOINTMENT (OUTPATIENT)
Dept: ENDOCRINOLOGY | Facility: CLINIC | Age: 64
End: 2024-08-08

## 2024-08-26 NOTE — ED PROVIDER NOTE - NPI NUMBER (FOR SYSADMIN USE ONLY) :
[facility] HOSPITALIST PROGRESS NOTE     Patient Identification:  Name:  Kennedy Prescott  Age:  74 y.o.  Sex:  male  :  1950  MRN:  [id number]  Visit Number:  [id number]  ROOM: [address]     Primary Care Provider:  Jag Guillaume MD    Length of stay in inpatient status:  7    Subjective     Chief Compliant:    Chief Complaint   Patient presents with    Shortness of Breath       History of Presenting Illness:    Patient seen and examined this morning, no family present at bedside. He remains intubated and sedated, unable to provide history. Nursing staff reports that he will open eyes when moved around, but does not follow commands.    Objective     Current Hospital Meds:acetaminophen, 650 mg, Oral, Once per day on   acetaminophen, 650 mg, Oral, TID  ammonium lactate, 1 Application, Topical, Nightly  aspirin, 81 mg, Oral, Daily  atorvastatin, 40 mg, Oral, Nightly  chlorhexidine, 15 mL, Mouth/Throat, Q12H  clopidogrel, 75 mg, Oral, Daily  collagenase, 1 Application, Topical, Q24H  doxycycline, 100 mg, Intravenous, Q12H  gabapentin, 200 mg, Oral, Nightly  insulin lispro, 2-7 Units, Subcutaneous, 4x Daily AC & at Bedtime  levothyroxine, 75 mcg, Oral, QAM  metoprolol tartrate, 12.5 mg, Oral, Q12H  multivitamin with minerals, 1 tablet, Oral, Daily  mupirocin, 1 Application, Topical, Q12H  nicotine, 1 patch, Transdermal, Q24H  pantoprazole, 40 mg, Intravenous, BID AC  piperacillin-tazobactam, 3.375 g, Intravenous, Q12H  senna-docusate sodium, 2 tablet, Oral, BID  sertraline, 50 mg, Oral, Nightly  sevelamer, 800 mg, Oral, TID With Meals  sodium chloride, 10 mL, Intravenous, Q12H  sodium chloride, 10 mL, Intravenous, Q12H  sodium chloride, 10 mL, Intravenous, Q12H  sodium chloride, 10 mL, Intravenous, Q12H  sucralfate, 1 g, Oral, 4x Daily AC & at Bedtime  Vancomycin Pharmacy Intermittent/Pulse Dosing, , Does not apply, Daily    dexmedetomidine, 0.2-1.5 mcg/kg/hr (Dosing  Weight), Last Rate: 1 mcg/kg/hr (08/26/24 1311)  fentanyl 10 mcg/mL,  mcg/hr, Last Rate: 200 mcg/hr (08/26/24 1313)  phenylephrine, 0.5-3 mcg/kg/min, Last Rate: 0.5 mcg/kg/min (08/26/24 1202)  propofol, 5-50 mcg/kg/min (Dosing Weight), Last Rate: Stopped (08/24/24 0815)        Current Antimicrobial Therapy:  Anti-Infectives (From admission, onward)      Ordered     Dose/Rate Route Frequency Start Stop    08/26/24 1028  vancomycin IVPB 1500 mg in 0.9% NaCl (Premix) 500 mL        Ordering Provider: Naima Marcum DO    1,500 mg  333.3 mL/hr over 90 Minutes Intravenous Once 08/26/24 1300 08/26/24 1433    08/25/24 1041  Vancomycin Pharmacy Intermittent/Pulse Dosing        Ordering Provider: Dar Cochran MD     Does not apply Daily 08/26/24 0900 08/30/24 0859    08/25/24 0851  piperacillin-tazobactam (ZOSYN) IVPB 3.375 g IVPB in 100 mL NS (VTB)        Ordering Provider: Zahra Silva, BetyD    3.375 g  over 4 Hours Intravenous Every 12 Hours 08/25/24 1800 08/28/24 0559    08/25/24 1041  vancomycin IVPB 2000 mg in 0.9% Sodium Chloride 500 mL        Ordering Provider: Dar Cochran MD    2,000 mg  250 mL/hr over 120 Minutes Intravenous Once 08/25/24 1200 08/25/24 1748    08/23/24 1609  piperacillin-tazobactam (ZOSYN) IVPB 3.375 g IVPB in 100 mL NS (VTB)        Ordering Provider: Dar Cochran MD    3.375 g  over 30 Minutes Intravenous Once 08/23/24 1700 08/23/24 1703    08/20/24 0704  doxycycline (VIBRAMYCIN) 100 mg in sodium chloride 0.9 % 100 mL IVPB-VTB        Ordering Provider: Dar Cochran MD    100 mg Intravenous Every 12 Hours 08/20/24 0800 08/28/24 1608          Current Diuretic Therapy:  Diuretics (From admission, onward)      None          ----------------------------------------------------------------------------------------------------------------------  Vital Signs:  Temp:  [97.2 °F (36.2 °C)-100.4 °F (38 °C)] 97.9 °F (36.6 °C)  Heart Rate:  [] 112  Resp:  [16-27] 16  BP:  ()/(29-96) 98/55  FiO2 (%):  [30 %] 30 %  SpO2:  [81 %-100 %] 100 %  on   ;   Device (Oxygen Therapy): ventilator  Body mass index is 31.21 kg/m².    Wt Readings from Last 3 Encounters:   08/25/24 101 kg (223 lb 12.3 oz)   08/18/24 95.3 kg (210 lb 1.6 oz)   08/14/24 95.3 kg (210 lb 1.6 oz)     Intake & Output (last 3 days)         08/23 0701 08/24 0700 08/24 0701 08/25 0700 08/25 0701 08/26 0700 08/26 0701 08/27 0700    P.O. 120       I.V. (mL/kg) 991.4 (9.4) 634.1 (6) 688.5 (6.6)     NG/GT   30     IV Piggyback 300 700 200 600    Total Intake(mL/kg) 1411.4 (13.4) 1334.1 (12.7) 918.5 (8.7) 600 (5.7)    Urine (mL/kg/hr) 75 (0) 0 (0) 15 (0)     Emesis/NG output 100  0     Stool 0 0 0     Dialysis 2000   1800    Total Output 2175 0 15 1800    Net -763.7 +1334.1 +903.5 -1200            Stool Unmeasured Occurrence 1 x 0 x            NPO Diet NPO Type: Strict NPO  ----------------------------------------------------------------------------------------------------------------------  Physical exam:   Constitutional:  Well-developed and well-nourished.  No acute distress.  Intubated, sedated.    HENT:  Head:  Normocephalic and atraumatic.   Cardiovascular:  borderline tachycardia, irregularly irregular rhythm   Pulmonary/Chest:  No respiratory distress, opening pops and diminished air movement bilaterally, no wheezing, crackles, or rhonchi noted  Abdominal:  Soft. No distension, bowel sounds are present  Musculoskeletal:  No deformity.    Neurological: sedated    Skin:  Skin is warm and dry.   Peripheral vascular:  No clubbing, no cyanosis, no edema.  Edited by: Naima Marcum DO at 8/26/2024 1718  ----------------------------------------------------------------------------------------------------------------------  Results from last 7 days   Lab Units 08/26/24  0024 08/25/24  0759 08/24/24  1524 08/23/24  2306 08/23/24  1541   LACTATE mmol/L  --   --  2.0  --   --    WBC 10*3/mm3 13.05* 15.40* 11.51*   < > 16.97*  "  HEMOGLOBIN g/dL 8.0* 9.4* 8.0*   < > 7.8*   HEMATOCRIT % 26.2* 30.2* 26.1*   < > 25.2*   MCV fL 94.6 93.8 94.6   < > 94.4   MCHC g/dL 30.5* 31.1* 30.7*   < > 31.0*   PLATELETS 10*3/mm3 172 267 182   < > 193   INR  1.40*  --   --   --  1.31*    < > = values in this interval not displayed.     Results from last 7 days   Lab Units 08/25/24  0819   PH, ARTERIAL pH units 7.438   PO2 ART mm Hg 73.1*   PCO2, ARTERIAL mm Hg 37.9   HCO3 ART mmol/L 25.5     Results from last 7 days   Lab Units 08/26/24  0024 08/25/24  0759 08/24/24  1524 08/23/24  2306 08/23/24  1541   SODIUM mmol/L 139 139 129*   < > 138   POTASSIUM mmol/L 3.8 4.3 3.8   < > 3.4*   MAGNESIUM mg/dL  --  2.0  --   --  2.0   CHLORIDE mmol/L 97* 94* 88*   < > 94*   CO2 mmol/L 23.7 23.4 23.0   < > 28.5   BUN mg/dL 35* 31* 25*   < > 20   CREATININE mg/dL 4.96* 4.47* 3.83*   < > 2.65*   CALCIUM mg/dL 8.3* 8.8 8.4*   < > 8.2*   GLUCOSE mg/dL 143* 156* 146*   < > 148*   ALBUMIN g/dL 2.4* 3.0* 2.7*   < > 3.0*   BILIRUBIN mg/dL 0.4 0.6 0.5   < > 0.5   ALK PHOS U/L 86 107 104   < > 109   AST (SGOT) U/L 126* 168* 25   < > 24   ALT (SGPT) U/L 90* 85* 9   < > 15    < > = values in this interval not displayed.   Estimated Creatinine Clearance: 15.4 mL/min (A) (by C-G formula based on SCr of 4.96 mg/dL (H)).  No results found for: \"AMMONIA\"  Results from last 7 days   Lab Units 08/23/24  1541 08/22/24  2323 08/19/24  1744   HSTROP T ng/L 188* 178* 150*             Glucose   Date/Time Value Ref Range Status   08/26/2024 1630 130 70 - 130 mg/dL Final   08/26/2024 1106 99 70 - 130 mg/dL Final   08/26/2024 0657 154 (H) 70 - 130 mg/dL Final   08/25/2024 2047 153 (H) 70 - 130 mg/dL Final   08/25/2024 1707 177 (H) 70 - 130 mg/dL Final   08/25/2024 1333 188 (H) 70 - 130 mg/dL Final   08/25/2024 0642 169 (H) 70 - 130 mg/dL Final   08/24/2024 2103 148 (H) 70 - 130 mg/dL Final     Lab Results   Component Value Date    TSH 5.160 (H) 08/19/2024    FREET4 0.9 07/29/2024     No results " "found for: \"PREGTESTUR\", \"PREGSERUM\", \"HCG\", \"HCGQUANT\"  Pain Management Panel          Latest Ref Rng & Units 7/10/2024   Pain Management Panel   Amphetamine, Urine Qual Negative Negative    Barbiturates Screen, Urine Negative Negative    Benzodiazepine Screen, Urine Negative Negative    Buprenorphine, Screen, Urine Negative Negative    Cocaine Screen, Urine Negative Negative    Fentanyl, Urine Negative Negative    Methadone Screen , Urine Negative Negative    Methamphetamine, Ur Negative Negative       Details                 Brief Urine Lab Results  (Last result in the past 365 days)        Color   Clarity   Blood   Leuk Est   Nitrite   Protein   CREAT   Urine HCG        08/18/24 1348 Brown  Comment: Dipstick results may be inaccurate due to color interference.       Turbid   Large (3+)   Large (3+)   Positive   100 mg/dL (2+)                 Blood Culture   Date Value Ref Range Status   08/25/2024 No growth at 24 hours  Preliminary   08/25/2024 No growth at 24 hours  Preliminary     No results found for: \"URINECX\"  No results found for: \"WOUNDCX\"  No results found for: \"STOOLCX\"  No results found for: \"RESPCX\"  No results found for: \"AFBCX\"  Results from last 7 days   Lab Units 08/24/24  1524   LACTATE mmol/L 2.0       I have personally looked at the labs and they are summarized above.  ----------------------------------------------------------------------------------------------------------------------  Detailed radiology reports for the last 24 hours:  Imaging Results (Last 24 Hours)       Procedure Component Value Units Date/Time    XR Chest 1 View [[id number]] Collected: 08/26/24 1523     Updated: 08/26/24 1530    Narrative:      XR CHEST 1 VW-     CLINICAL INDICATION: ETT advanced; K92.2-Gastrointestinal hemorrhage,  unspecified; R19.7-Diarrhea, unspecified; N18.6-End stage renal disease;  Z99.2-Dependence on renal dialysis; D64.9-Anemia, unspecified;  K29.71-Gastritis, unspecified, with bleeding      "   COMPARISON: Same day earlier     TECHNIQUE: Single frontal view of the chest.     FINDINGS:     LUNGS: Lungs are adequately aerated.      HEART AND MEDIASTINUM: Heart and mediastinal contours are unremarkable        SKELETON: Bony and soft tissue structures are unremarkable.     Endotracheal tube overlies tracheal air column approximately 2.4 cm  below the lower margin of the sternoclavicular joint on the left       Impression:      Endotracheal tube slightly advanced.           This report was finalized on 8/26/2024 3:28 PM by Dr. Theron Estrada MD.       XR Chest 1 View [[id number]] Collected: 08/26/24 1522     Updated: 08/26/24 1525    Narrative:      XR CHEST 1 VW-     CLINICAL INDICATION: et tube position; K92.2-Gastrointestinal  hemorrhage, unspecified; R19.7-Diarrhea, unspecified; N18.6-End stage  renal disease; Z99.2-Dependence on renal dialysis; D64.9-Anemia,  unspecified; K29.71-Gastritis, unspecified, with bleeding        COMPARISON: 8/25/2024     TECHNIQUE: Single frontal view of the chest.     FINDINGS:     LUNGS: Small bilateral effusions  Mild vascular congestion  Overall little change     HEART AND MEDIASTINUM: Heart and mediastinal contours are unremarkable        SKELETON: Bony and soft tissue structures are unremarkable.             Impression:         1. Lungs unchanged  2. Endotracheal tube overlying the tracheal air column just below the  sternoclavicular joints           This report was finalized on 8/26/2024 3:23 PM by Dr. Theron Estrada MD.       XR Chest 1 View [[id number]] Collected: 08/26/24 1003     Updated: 08/26/24 1006    Narrative:      CLINICAL HISTORY: Respiratory failure.     COMPARISON: 8/25/2024.     TECHNIQUE:  Single AP view of the chest       Impression:      FINDINGS AND IMPRESSION:    1.  Endotracheal tube tip is 6.5 cm above the gloria.  2.  Nasogastric tube tip is not seen but is below the gastroesophageal  junction.  3.  Right internal jugular vein central venous catheter  tip is in the  right atrium.  Withdrawing 3 cm for cavoatrial placement if desired.  4.  Status post median sternotomy and CABG.  5.  Smaller left pleural effusion when compared to yesterday.  The right  pleural effusion is unchanged.  6.  Improved aeration of the lung bases.  7.  No pneumothorax.  8.  No acute osseous or upper abdominal abnormality.     This report was finalized on 8/26/2024 10:04 AM by Angelika Forte MD.             Assessment & Plan      #Cardiac arrest   #Acute hypoxic respiratory failure   #CAD w/ PCI to LAD on 5/28  #Hypotension related to sedation   #Vtach requiring defibrillation   - Patient had PEA/bradycardia arrest then wide complex arrhythmia directly after EGD on 8/23. Resuscitated per ACLS protocol.   - Previously had an incomplete LBBB on EKG, after this episode it is now a complete LBBB. Troponin similar to previous values. Evaluated by Cardiology. No need for emergent left heart catheterization.  - Plavix was held due to clinically significant GI bleeding, but has now been restarted.   - Pulmonology following, appreciate assistance. On low vent support but not following commands.   - monitor on telemetry. Vent weaning trials per Pulmonology pending improvement in mental status.     #Acute blood loss anemia   #pAfib chronically anticoagulated with apixiban, anticoagulation currently on hold  - s/p EGD on 8/20 with intervention on duodenal ulcer. Continued to have anemia and signs of bleeding so he was taken back for repeat EGD on 8/23 which did not reveal any active bleeding. Cardiac arrest occurred after this as discussed above.   - CBC in a.m.  - Hemoglobin relatively stable around 8-9 the past two days  - Continue PPI BID  - Hold full dose anticoagulation but DAPT has been restarted in the setting of recent PCI with stent placement.     #paroxysmal atrial fibrillation with RVR  #Intermittent hypotension   - rate was improved after a dose of metoprolol yesterday morning, but heart  rate is slightly elevated today after not receiving metoprolol doses last night or this morning due to hypotension. Hold beta blocker for bradycardia or hypotension. Suspect hypotension has been due to medication effect and also BP is expected to be lower during dialysis. Continue purvi-synephrine and wean as tolerated.     #ESRD on intermittent HD  #Hyperkalemia, resolved   - continue per Nephrology, appreciate assistance  - BMP in a.m.     #UTI  - significant pyuria was noted on 8/18  - urine culture showed no growth. Completed a course of empiric ceftriaxone.     #Recent foot ulceration w/ associated cellulitis  - Admitted to  7/29-8/2 and vascular surgery recommended non-operative management. No current clinical evidence of osteomyelitis. Continue vancomycin for recurrent cellulitis and follow up repeat cultures if able to be obtained.     #Hypothyroidism  - continue home levothyroxine 75mcg daily    #GERD  - PPI    #Hypertension  - now with hypotension intermittently, hold antihypertensive agents except as needed for heart rate control  Edited by: Naima Marcum DO at 8/26/2024 1718    Active VTE Prophylaxis  Mechanical:        Start        08/19/24 2227  Maintain Sequential Compression Device  Continuous                            Dispo: pending clinical progress    Naima Marcum DO  [facility]  08/26/24  17:18 EDT    [[id number]]

## 2024-09-07 VITALS
TEMPERATURE: 99 F | RESPIRATION RATE: 22 BRPM | HEART RATE: 100 BPM | OXYGEN SATURATION: 93 % | WEIGHT: 225.09 LBS | SYSTOLIC BLOOD PRESSURE: 147 MMHG | DIASTOLIC BLOOD PRESSURE: 94 MMHG | HEIGHT: 65 IN

## 2024-09-07 LAB
BASOPHILS # BLD AUTO: 0.03 K/UL — SIGNIFICANT CHANGE UP (ref 0–0.2)
BASOPHILS NFR BLD AUTO: 0.3 % — SIGNIFICANT CHANGE UP (ref 0–2)
EOSINOPHIL # BLD AUTO: 0.3 K/UL — SIGNIFICANT CHANGE UP (ref 0–0.5)
EOSINOPHIL NFR BLD AUTO: 2.7 % — SIGNIFICANT CHANGE UP (ref 0–6)
HCT VFR BLD CALC: 42.2 % — SIGNIFICANT CHANGE UP (ref 34.5–45)
HGB BLD-MCNC: 13.9 G/DL — SIGNIFICANT CHANGE UP (ref 11.5–15.5)
IMM GRANULOCYTES NFR BLD AUTO: 0.3 % — SIGNIFICANT CHANGE UP (ref 0–0.9)
LYMPHOCYTES # BLD AUTO: 19.5 % — SIGNIFICANT CHANGE UP (ref 13–44)
LYMPHOCYTES # BLD AUTO: 2.17 K/UL — SIGNIFICANT CHANGE UP (ref 1–3.3)
MCHC RBC-ENTMCNC: 28 PG — SIGNIFICANT CHANGE UP (ref 27–34)
MCHC RBC-ENTMCNC: 32.9 GM/DL — SIGNIFICANT CHANGE UP (ref 32–36)
MCV RBC AUTO: 85.1 FL — SIGNIFICANT CHANGE UP (ref 80–100)
MONOCYTES # BLD AUTO: 0.88 K/UL — SIGNIFICANT CHANGE UP (ref 0–0.9)
MONOCYTES NFR BLD AUTO: 7.9 % — SIGNIFICANT CHANGE UP (ref 2–14)
NEUTROPHILS # BLD AUTO: 7.74 K/UL — HIGH (ref 1.8–7.4)
NEUTROPHILS NFR BLD AUTO: 69.3 % — SIGNIFICANT CHANGE UP (ref 43–77)
NRBC # BLD: 0 /100 WBCS — SIGNIFICANT CHANGE UP (ref 0–0)
PLATELET # BLD AUTO: 265 K/UL — SIGNIFICANT CHANGE UP (ref 150–400)
RBC # BLD: 4.96 M/UL — SIGNIFICANT CHANGE UP (ref 3.8–5.2)
RBC # FLD: 13.3 % — SIGNIFICANT CHANGE UP (ref 10.3–14.5)
WBC # BLD: 11.15 K/UL — HIGH (ref 3.8–10.5)
WBC # FLD AUTO: 11.15 K/UL — HIGH (ref 3.8–10.5)

## 2024-09-07 PROCEDURE — 99285 EMERGENCY DEPT VISIT HI MDM: CPT

## 2024-09-07 RX ORDER — IPRATROPIUM BROMIDE AND ALBUTEROL SULFATE .5; 3 MG/3ML; MG/3ML
3 SOLUTION RESPIRATORY (INHALATION) ONCE
Refills: 0 | Status: COMPLETED | OUTPATIENT
Start: 2024-09-07 | End: 2024-09-07

## 2024-09-07 RX ORDER — METOCLOPRAMIDE HCL 5 MG
10 TABLET ORAL ONCE
Refills: 0 | Status: COMPLETED | OUTPATIENT
Start: 2024-09-07 | End: 2024-09-07

## 2024-09-07 RX ORDER — DIPHENHYDRAMINE HCL 50 MG
12.5 CAPSULE ORAL ONCE
Refills: 0 | Status: COMPLETED | OUTPATIENT
Start: 2024-09-07 | End: 2024-09-07

## 2024-09-07 RX ORDER — ACETAMINOPHEN 325 MG/1
1000 TABLET ORAL ONCE
Refills: 0 | Status: COMPLETED | OUTPATIENT
Start: 2024-09-07 | End: 2024-09-07

## 2024-09-07 RX ORDER — DEXAMETHASONE 0.75 MG
6 TABLET ORAL ONCE
Refills: 0 | Status: COMPLETED | OUTPATIENT
Start: 2024-09-07 | End: 2024-09-07

## 2024-09-07 RX ORDER — BUDESONIDE 3 MG/1
0.5 CAPSULE ORAL ONCE
Refills: 0 | Status: COMPLETED | OUTPATIENT
Start: 2024-09-07 | End: 2024-09-07

## 2024-09-07 RX ADMIN — Medication 10 MILLIGRAM(S): at 23:58

## 2024-09-07 RX ADMIN — IPRATROPIUM BROMIDE AND ALBUTEROL SULFATE 3 MILLILITER(S): .5; 3 SOLUTION RESPIRATORY (INHALATION) at 23:58

## 2024-09-07 RX ADMIN — Medication 6 MILLIGRAM(S): at 23:58

## 2024-09-07 RX ADMIN — Medication 12.5 MILLIGRAM(S): at 23:57

## 2024-09-07 RX ADMIN — ACETAMINOPHEN 400 MILLIGRAM(S): 325 TABLET ORAL at 23:58

## 2024-09-08 ENCOUNTER — INPATIENT (INPATIENT)
Facility: HOSPITAL | Age: 64
LOS: 0 days | Discharge: HOME | DRG: 192 | End: 2024-09-09
Attending: HOSPITALIST | Admitting: STUDENT IN AN ORGANIZED HEALTH CARE EDUCATION/TRAINING PROGRAM
Payer: MEDICARE

## 2024-09-08 DIAGNOSIS — Z98.890 OTHER SPECIFIED POSTPROCEDURAL STATES: Chronic | ICD-10-CM

## 2024-09-08 DIAGNOSIS — Z41.9 ENCOUNTER FOR PROCEDURE FOR PURPOSES OTHER THAN REMEDYING HEALTH STATE, UNSPECIFIED: Chronic | ICD-10-CM

## 2024-09-08 DIAGNOSIS — J44.1 CHRONIC OBSTRUCTIVE PULMONARY DISEASE WITH (ACUTE) EXACERBATION: ICD-10-CM

## 2024-09-08 DIAGNOSIS — Z29.9 ENCOUNTER FOR PROPHYLACTIC MEASURES, UNSPECIFIED: ICD-10-CM

## 2024-09-08 DIAGNOSIS — E78.5 HYPERLIPIDEMIA, UNSPECIFIED: ICD-10-CM

## 2024-09-08 DIAGNOSIS — Z90.49 ACQUIRED ABSENCE OF OTHER SPECIFIED PARTS OF DIGESTIVE TRACT: Chronic | ICD-10-CM

## 2024-09-08 DIAGNOSIS — G43.909 MIGRAINE, UNSPECIFIED, NOT INTRACTABLE, WITHOUT STATUS MIGRAINOSUS: ICD-10-CM

## 2024-09-08 DIAGNOSIS — G40.909 EPILEPSY, UNSPECIFIED, NOT INTRACTABLE, WITHOUT STATUS EPILEPTICUS: ICD-10-CM

## 2024-09-08 LAB
ADD ON TEST-SPECIMEN IN LAB: SIGNIFICANT CHANGE UP
ALBUMIN SERPL ELPH-MCNC: 4 G/DL — SIGNIFICANT CHANGE UP (ref 3.3–5)
ALP SERPL-CCNC: 100 U/L — SIGNIFICANT CHANGE UP (ref 40–120)
ALT FLD-CCNC: 11 U/L — SIGNIFICANT CHANGE UP (ref 10–45)
ANION GAP SERPL CALC-SCNC: 12 MMOL/L — SIGNIFICANT CHANGE UP (ref 5–17)
APPEARANCE UR: CLEAR — SIGNIFICANT CHANGE UP
AST SERPL-CCNC: 16 U/L — SIGNIFICANT CHANGE UP (ref 10–40)
BACTERIA # UR AUTO: ABNORMAL /HPF
BILIRUB SERPL-MCNC: 0.3 MG/DL — SIGNIFICANT CHANGE UP (ref 0.2–1.2)
BILIRUB UR-MCNC: NEGATIVE — SIGNIFICANT CHANGE UP
BLD GP AB SCN SERPL QL: NEGATIVE — SIGNIFICANT CHANGE UP
BUN SERPL-MCNC: 9 MG/DL — SIGNIFICANT CHANGE UP (ref 7–23)
CALCIUM SERPL-MCNC: 9.2 MG/DL — SIGNIFICANT CHANGE UP (ref 8.4–10.5)
CHLORIDE SERPL-SCNC: 97 MMOL/L — SIGNIFICANT CHANGE UP (ref 96–108)
CO2 SERPL-SCNC: 23 MMOL/L — SIGNIFICANT CHANGE UP (ref 22–31)
COLOR SPEC: YELLOW — SIGNIFICANT CHANGE UP
CREAT SERPL-MCNC: 0.64 MG/DL — SIGNIFICANT CHANGE UP (ref 0.5–1.3)
DIFF PNL FLD: NEGATIVE — SIGNIFICANT CHANGE UP
EGFR: 98 ML/MIN/1.73M2 — SIGNIFICANT CHANGE UP
FLUAV AG NPH QL: SIGNIFICANT CHANGE UP
FLUBV AG NPH QL: SIGNIFICANT CHANGE UP
GLUCOSE SERPL-MCNC: 112 MG/DL — HIGH (ref 70–99)
GLUCOSE UR QL: NEGATIVE MG/DL — SIGNIFICANT CHANGE UP
KETONES UR-MCNC: NEGATIVE MG/DL — SIGNIFICANT CHANGE UP
LEUKOCYTE ESTERASE UR-ACNC: ABNORMAL
MAGNESIUM SERPL-MCNC: 1.8 MG/DL — SIGNIFICANT CHANGE UP (ref 1.6–2.6)
NITRITE UR-MCNC: NEGATIVE — SIGNIFICANT CHANGE UP
NT-PROBNP SERPL-SCNC: 68 PG/ML — SIGNIFICANT CHANGE UP (ref 0–300)
PH UR: 6 — SIGNIFICANT CHANGE UP (ref 5–8)
POTASSIUM SERPL-MCNC: 4 MMOL/L — SIGNIFICANT CHANGE UP (ref 3.5–5.3)
POTASSIUM SERPL-SCNC: 4 MMOL/L — SIGNIFICANT CHANGE UP (ref 3.5–5.3)
PROT SERPL-MCNC: 7.9 G/DL — SIGNIFICANT CHANGE UP (ref 6–8.3)
PROT UR-MCNC: NEGATIVE MG/DL — SIGNIFICANT CHANGE UP
RAPID RVP RESULT: SIGNIFICANT CHANGE UP
RBC CASTS # UR COMP ASSIST: 4 /HPF — SIGNIFICANT CHANGE UP (ref 0–4)
RH IG SCN BLD-IMP: POSITIVE — SIGNIFICANT CHANGE UP
RSV RNA NPH QL NAA+NON-PROBE: SIGNIFICANT CHANGE UP
SARS-COV-2 RNA SPEC QL NAA+PROBE: SIGNIFICANT CHANGE UP
SARS-COV-2 RNA SPEC QL NAA+PROBE: SIGNIFICANT CHANGE UP
SODIUM SERPL-SCNC: 132 MMOL/L — LOW (ref 135–145)
SP GR SPEC: 1.01 — SIGNIFICANT CHANGE UP (ref 1–1.03)
SQUAMOUS # UR AUTO: 6 /HPF — HIGH (ref 0–5)
TROPONIN T, HIGH SENSITIVITY RESULT: 7 NG/L — SIGNIFICANT CHANGE UP (ref 0–51)
UROBILINOGEN FLD QL: 0.2 MG/DL — SIGNIFICANT CHANGE UP (ref 0.2–1)
WBC UR QL: 49 /HPF — HIGH (ref 0–5)

## 2024-09-08 PROCEDURE — 99223 1ST HOSP IP/OBS HIGH 75: CPT | Mod: GC,AI

## 2024-09-08 PROCEDURE — 70450 CT HEAD/BRAIN W/O DYE: CPT | Mod: 26,MC

## 2024-09-08 PROCEDURE — 99282 EMERGENCY DEPT VISIT SF MDM: CPT

## 2024-09-08 PROCEDURE — 71045 X-RAY EXAM CHEST 1 VIEW: CPT | Mod: 26

## 2024-09-08 RX ORDER — ENOXAPARIN SODIUM 100 MG/ML
40 INJECTION SUBCUTANEOUS EVERY 24 HOURS
Refills: 0 | Status: DISCONTINUED | OUTPATIENT
Start: 2024-09-08 | End: 2024-09-08

## 2024-09-08 RX ORDER — FLUTICASONE PROPIONATE AND SALMETEROL 250; 50 UG/1; UG/1
1 POWDER RESPIRATORY (INHALATION)
Qty: 1 | Refills: 0
Start: 2024-09-08 | End: 2024-10-07

## 2024-09-08 RX ORDER — PREDNISONE 10 MG
40 TABLET, DOSE PACK ORAL DAILY
Refills: 0 | Status: DISCONTINUED | OUTPATIENT
Start: 2024-09-08 | End: 2024-09-09

## 2024-09-08 RX ORDER — LEVETIRACETAM 1000 MG/1
3 TABLET ORAL
Qty: 180 | Refills: 0
Start: 2024-09-08 | End: 2024-10-07

## 2024-09-08 RX ORDER — SIMVASTATIN 10 MG
1 TABLET ORAL
Qty: 30 | Refills: 0
Start: 2024-09-08 | End: 2024-10-07

## 2024-09-08 RX ORDER — SODIUM CHLORIDE 9 MG/ML
500 INJECTION INTRAMUSCULAR; INTRAVENOUS; SUBCUTANEOUS ONCE
Refills: 0 | Status: COMPLETED | OUTPATIENT
Start: 2024-09-08 | End: 2024-09-08

## 2024-09-08 RX ORDER — KETOROLAC TROMETHAMINE 30 MG/ML
30 INJECTION, SOLUTION INTRAMUSCULAR ONCE
Refills: 0 | Status: DISCONTINUED | OUTPATIENT
Start: 2024-09-08 | End: 2024-09-08

## 2024-09-08 RX ORDER — ACETAMINOPHEN 325 MG/1
650 TABLET ORAL EVERY 6 HOURS
Refills: 0 | Status: DISCONTINUED | OUTPATIENT
Start: 2024-09-08 | End: 2024-09-09

## 2024-09-08 RX ORDER — OXCARBAZEPINE 300 MG/1
450 TABLET, FILM COATED ORAL
Refills: 0 | Status: DISCONTINUED | OUTPATIENT
Start: 2024-09-08 | End: 2024-09-09

## 2024-09-08 RX ORDER — PREDNISONE 10 MG
2 TABLET, DOSE PACK ORAL
Qty: 8 | Refills: 0
Start: 2024-09-08 | End: 2024-09-11

## 2024-09-08 RX ORDER — TIOTROPIUM BROMIDE INHALATION SPRAY 3.12 UG/1
2 SPRAY, METERED RESPIRATORY (INHALATION) DAILY
Refills: 0 | Status: DISCONTINUED | OUTPATIENT
Start: 2024-09-08 | End: 2024-09-09

## 2024-09-08 RX ORDER — OXCARBAZEPINE 300 MG/1
3 TABLET, FILM COATED ORAL
Qty: 180 | Refills: 0
Start: 2024-09-08 | End: 2024-10-07

## 2024-09-08 RX ORDER — ONDANSETRON 2 MG/ML
4 INJECTION, SOLUTION INTRAMUSCULAR; INTRAVENOUS EVERY 8 HOURS
Refills: 0 | Status: DISCONTINUED | OUTPATIENT
Start: 2024-09-08 | End: 2024-09-09

## 2024-09-08 RX ORDER — LEVETIRACETAM 1000 MG/1
1500 TABLET ORAL
Refills: 0 | Status: DISCONTINUED | OUTPATIENT
Start: 2024-09-08 | End: 2024-09-09

## 2024-09-08 RX ORDER — TIOTROPIUM BROMIDE INHALATION SPRAY 3.12 UG/1
2 SPRAY, METERED RESPIRATORY (INHALATION)
Qty: 1 | Refills: 0
Start: 2024-09-08 | End: 2024-10-07

## 2024-09-08 RX ORDER — MAGNESIUM, ALUMINUM HYDROXIDE 200-225/5
30 SUSPENSION, ORAL (FINAL DOSE FORM) ORAL EVERY 4 HOURS
Refills: 0 | Status: DISCONTINUED | OUTPATIENT
Start: 2024-09-08 | End: 2024-09-09

## 2024-09-08 RX ORDER — ENOXAPARIN SODIUM 100 MG/ML
40 INJECTION SUBCUTANEOUS EVERY 12 HOURS
Refills: 0 | Status: DISCONTINUED | OUTPATIENT
Start: 2024-09-08 | End: 2024-09-09

## 2024-09-08 RX ORDER — IPRATROPIUM BROMIDE AND ALBUTEROL SULFATE .5; 3 MG/3ML; MG/3ML
3 SOLUTION RESPIRATORY (INHALATION) EVERY 6 HOURS
Refills: 0 | Status: DISCONTINUED | OUTPATIENT
Start: 2024-09-08 | End: 2024-09-09

## 2024-09-08 RX ORDER — FLUTICASONE PROPIONATE AND SALMETEROL 250; 50 UG/1; UG/1
1 POWDER RESPIRATORY (INHALATION)
Refills: 0 | Status: DISCONTINUED | OUTPATIENT
Start: 2024-09-08 | End: 2024-09-09

## 2024-09-08 RX ADMIN — Medication 40 MILLIGRAM(S): at 22:27

## 2024-09-08 RX ADMIN — OXCARBAZEPINE 450 MILLIGRAM(S): 300 TABLET, FILM COATED ORAL at 07:20

## 2024-09-08 RX ADMIN — ACETAMINOPHEN 650 MILLIGRAM(S): 325 TABLET ORAL at 06:45

## 2024-09-08 RX ADMIN — ACETAMINOPHEN 1000 MILLIGRAM(S): 325 TABLET ORAL at 02:08

## 2024-09-08 RX ADMIN — IPRATROPIUM BROMIDE AND ALBUTEROL SULFATE 3 MILLILITER(S): .5; 3 SOLUTION RESPIRATORY (INHALATION) at 07:21

## 2024-09-08 RX ADMIN — ACETAMINOPHEN 650 MILLIGRAM(S): 325 TABLET ORAL at 05:45

## 2024-09-08 RX ADMIN — ENOXAPARIN SODIUM 40 MILLIGRAM(S): 100 INJECTION SUBCUTANEOUS at 19:19

## 2024-09-08 RX ADMIN — LEVETIRACETAM 1500 MILLIGRAM(S): 1000 TABLET ORAL at 20:03

## 2024-09-08 RX ADMIN — Medication 40 MILLIGRAM(S): at 07:21

## 2024-09-08 RX ADMIN — IPRATROPIUM BROMIDE AND ALBUTEROL SULFATE 3 MILLILITER(S): .5; 3 SOLUTION RESPIRATORY (INHALATION) at 14:55

## 2024-09-08 RX ADMIN — KETOROLAC TROMETHAMINE 30 MILLIGRAM(S): 30 INJECTION, SOLUTION INTRAMUSCULAR at 08:22

## 2024-09-08 RX ADMIN — KETOROLAC TROMETHAMINE 30 MILLIGRAM(S): 30 INJECTION, SOLUTION INTRAMUSCULAR at 07:21

## 2024-09-08 RX ADMIN — TIOTROPIUM BROMIDE INHALATION SPRAY 2 PUFF(S): 3.12 SPRAY, METERED RESPIRATORY (INHALATION) at 15:34

## 2024-09-08 RX ADMIN — FLUTICASONE PROPIONATE AND SALMETEROL 1 DOSE(S): 250; 50 POWDER RESPIRATORY (INHALATION) at 07:20

## 2024-09-08 RX ADMIN — LEVETIRACETAM 1500 MILLIGRAM(S): 1000 TABLET ORAL at 07:20

## 2024-09-08 RX ADMIN — OXCARBAZEPINE 450 MILLIGRAM(S): 300 TABLET, FILM COATED ORAL at 19:19

## 2024-09-08 RX ADMIN — SODIUM CHLORIDE 500 MILLILITER(S): 9 INJECTION INTRAMUSCULAR; INTRAVENOUS; SUBCUTANEOUS at 00:43

## 2024-09-08 RX ADMIN — BUDESONIDE 0.5 MILLIGRAM(S): 3 CAPSULE ORAL at 02:08

## 2024-09-08 RX ADMIN — IPRATROPIUM BROMIDE AND ALBUTEROL SULFATE 3 MILLILITER(S): .5; 3 SOLUTION RESPIRATORY (INHALATION) at 19:22

## 2024-09-08 NOTE — H&P ADULT - PROBLEM SELECTOR PLAN 3
multi-year history of seizures, follows w dr. irby, well-controlled on established home regimen  home medication(s): levetiracetam, oxcarbazepin    - continue home medication(s)

## 2024-09-08 NOTE — CONSULT NOTE ADULT - SUBJECTIVE AND OBJECTIVE BOX
HISTORY OF PRESENT ILLNESS:   64 yo F PMH COPD, lung adenocarcinoma (s/p RLL lobectomy 2016 Dr. Gil), brain mets (s/p GKRS 2018 w/ Dr Rico, s/p L parietal crani 2020 w/ Dr Rico), s/p chemo/rad, seizures, presents w/ 2 days of worsening L frontal headache. Pt reports she did not try taking any meds at home for relief. Headache associated w/ photophobia. Denies nausea, weakness, numbness. Also reports a productive cough that she has had for a few days, denies fevers or chest pain.     PAST MEDICAL & SURGICAL HISTORY:  COPD (chronic obstructive pulmonary disease)      Adenocarcinoma of lung      MRSA (methicillin resistant Staphylococcus aureus)  history of      Brain metastasis  brain tumor s/p resection 5/2020      HLD (hyperlipidemia)      Chronic back pain      History of appendectomy      Surgery, elective  lung biopsy      H/O brain surgery  5/2020      History of lung surgery  right wedge resection        FAMILY HISTORY:  Family history of essential hypertension (Mother, Sibling)    Family history of diabetes mellitus (Mother, Sibling)    Family history of uterine cancer (Sibling)  sister    Family history of cancer in mother (Mother)    No family history of cardiovascular disease (Father)        SOCIAL HISTORY:  Tobacco Use: former smoker  EtOH use: denies  Substance: denies    Allergies    penicillin (Anaphylaxis)  penicillin (Angioedema)    Intolerances        REVIEW OF SYSTEMS:  General:	no recent illnesses, no recent wt gain/loss, no chills  Skin/Breast:  no rash, lumps, new moles, erythema, tenderness  Ophthalmologic:  no change in vision, diplopia, pain, redness, tearing, dry eyes	  ENMT:	no hearing loss, tinnitus, ear pain, vertigo, nasal congestion, epistaxis, sore throat  Respiratory and Thorax: +productive cough, SOB, wheezing, recent URI  Cardiovascular: no chest pain, ELMORE, leg swelling, irregular rhythm   Gastrointestinal:	no abd pain, nausea, vomiting, diarrhea, constipation, bloody stool, heartburn  Genitourinary: no frequency, dysuria, hematuria  Musculoskeletal:	no joint pain, no joint swelling, no tenderness  Neurological:	 see HPI  Psychiatric:	no confusion, no anxiousness, no depression   Hematology/Lymphatics:	no bruising, easy bleeding, LAD  Endocrine:  	no excess urination/thirst, heat/cold intolerance  Allergic/Immunologic:  no urticaria, sneezing, recurrent infections      MEDICATIONS:  Antibiotics:    Neuro:    Anticoagulation:    OTHER:  buDESOnide    Inhalation Suspension 0.5 milliGRAM(s) Inhalation Once    IVF:      Vital Signs Last 24 Hrs  T(C): 37.2 (07 Sep 2024 23:20), Max: 37.2 (07 Sep 2024 23:20)  T(F): 98.9 (07 Sep 2024 23:20), Max: 98.9 (07 Sep 2024 23:20)  HR: 100 (07 Sep 2024 23:20) (100 - 100)  BP: 147/94 (07 Sep 2024 23:20) (147/94 - 147/94)  BP(mean): --  RR: 22 (07 Sep 2024 23:20) (22 - 22)  SpO2: 93% (07 Sep 2024 23:20) (93% - 93%)    Parameters below as of 07 Sep 2024 23:20  Patient On (Oxygen Delivery Method): room air        PHYSICAL EXAM:  GEN: laying in bed, appears well, NAD  NEURO: AOx3. FC, OE spont, speech intact, face symmetric. CNII-XII intact. PERRL, EOMI. No pronator drift. MAEx4. 5/5 strength throughout. SILT.  CV: RRR +S1/S2  PULM: CTAB  GI: NT/ND, +BS  EXT: ext warm, dry, nontender    LABS:                        13.9   11.15 )-----------( 265      ( 07 Sep 2024 23:40 )             42.2     09-07    132<L>  |  97  |  9   ----------------------------<  112<H>  4.0   |  23  |  0.64    Ca    9.2      07 Sep 2024 23:40  Mg     1.8     09-07    TPro  7.9  /  Alb  4.0  /  TBili  0.3  /  DBili  x   /  AST  16  /  ALT  11  /  AlkPhos  100  09-07      Urinalysis Basic - ( 07 Sep 2024 23:40 )    Color: x / Appearance: x / SG: x / pH: x  Gluc: 112 mg/dL / Ketone: x  / Bili: x / Urobili: x   Blood: x / Protein: x / Nitrite: x   Leuk Esterase: x / RBC: x / WBC x   Sq Epi: x / Non Sq Epi: x / Bacteria: x      CULTURES:      RADIOLOGY & ADDITIONAL STUDIES:  CTH 9/8:  No acute intracranial abnormality. Stable exam since 3/13/2024.    Assessment:  64 yo F PMH COPD, lung adenocarcinoma (s/p RLL lobectomy 2016 Dr. Gil), brain mets (s/p GKRS 2018 w/ Dr Rico, s/p L parietal crani 2020 w/ Dr Rico), s/p chemo/rad, seizures, presents w/ 2 days of worsening L frontal headache      Plan:  - No neurosurgical intervention required at this time  - Recommend outpatient f/u with Dr. Rico ([phone])  - Recommend neurology consult for headache management per Dr Rico    D/w Dr Rico

## 2024-09-08 NOTE — DISCHARGE NOTE PROVIDER - NSDCMRMEDTOKEN_GEN_ALL_CORE_FT
Advair Diskus 500 mcg-50 mcg inhalation powder: 1 puff(s) inhaled 2 times a day  albuterol 90 mcg/inh inhalation aerosol: 2 puff(s) inhaled 4 times a day  Keppra 500 mg oral tablet: 3 tab(s) orally 2 times a day  OXcarbazepine 150 mg oral tablet: 3 tab(s) orally 2 times a day  predniSONE 20 mg oral tablet: 2 tab(s) orally once a day  simvastatin 80 mg oral tablet: 1 tab(s) orally  tiotropium 2.5 mcg/inh inhalation aerosol: 2 puff(s) inhaled once a day   Advair Diskus 500 mcg-50 mcg inhalation powder: 1 puff(s) inhaled 2 times a day  albuterol 90 mcg/inh inhalation aerosol: 2 puff(s) inhaled 4 times a day  Keppra 500 mg oral tablet: 3 tab(s) orally 2 times a day  OXcarbazepine 150 mg oral tablet: 3 tab(s) orally 2 times a day  predniSONE 20 mg oral tablet: 2 tab(s) orally once a day  simvastatin 80 mg oral tablet: 1 tab(s) orally once a day  tiotropium 2.5 mcg/inh inhalation aerosol: 2 puff(s) inhaled once a day   Advair Diskus 500 mcg-50 mcg inhalation powder: 1 puff(s) inhaled 2 times a day  albuterol 90 mcg/inh inhalation aerosol: 2 puff(s) inhaled 4 times a day  Keppra 500 mg oral tablet: 3 tab(s) orally 2 times a day  OXcarbazepine 150 mg oral tablet: 3 tab(s) orally 2 times a day  predniSONE 20 mg oral tablet: 2 tab(s) orally once a day  predniSONE 20 mg oral tablet: 2 tab(s) orally every 24 hours  simvastatin 80 mg oral tablet: 1 tab(s) orally once a day  tiotropium 2.5 mcg/inh inhalation aerosol: 2 puff(s) inhaled once a day

## 2024-09-08 NOTE — ED PROVIDER NOTE - PROGRESS NOTE DETAILS
headache and shortness of breath significantly improved CT head grossly without intracranial hemorrhage there are some right-sided occipital postoperative changes pending neurosurgical evaluation and CT read CT head unchanged from prior signed off by neurosurgery patient's headache is significantly improved now down to 2 out of 10 still feels mild phonophobia is afebrile lungs are improved still with intermittent wheezes does not feel comfortable going home still feels mild shortness of breath will admit for mild COPD exacerbation and persistent headache plan for additional DuoNebs as needed respiratory monitoring

## 2024-09-08 NOTE — ED PROVIDER NOTE - OBJECTIVE STATEMENT
62 yo female w/ PMHx COPD, lung adenocarcinoma (s/p RLL lobectomy 2016 Dr. 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 Barclay), presents to [facility] ED today after 2 days of gradually worsening left-sided headache associated with photophobia phonophobia nausea without fever chills.  No vomiting no sick contacts no neck stiffness or pain.  Has also had worsening shortness of breath with cough which started today associated with chest tightness   Described as retrosternal soreness.  No urinary complaints no vomiting no abdominal pain no sick contacts.  Last chemo radiation 4 years ago for metastatic lung CA

## 2024-09-08 NOTE — H&P ADULT - PROBLEM SELECTOR PLAN 1
1d history cough & sob, rvp negative, soft wheezing on exam, not requiring supplemental o2  sp duonebs, budesonide, solumedrol in ed  home medication(s): albuterol prn, wixela (advair) 500/50 (fluticasone-salmeterol), breo ellipta 200/25 (fluticasone-vilanterol)    - continue duonebs q6h  - continue home medications (therapeutic interchange) 1d history cough & sob, rvp negative, soft wheezing on exam, not requiring supplemental o2  sp duonebs, budesonide, solumedrol in ed  home medication(s): albuterol prn, wixela (advair) 500/50 (fluticasone-salmeterol), breo ellipta 200/25 (fluticasone-vilanterol)    - continue duonebs q6h  - continue 5d steroid course w po prednisone 9/8-9/11  - continue home medications (therapeutic interchange)

## 2024-09-08 NOTE — H&P ADULT - NSHPPHYSICALEXAM_GEN_ALL_CORE
General: NAD  HEENT: PERRL, EOM, anicteric sclera, MMM  Cardiovascular: +S1/S2; RRR; no M/R/G  Respiratory: decreased breath sounds & diffuse, soft bilateral wheeze, intermittent; occasional cough  Gastrointestinal: soft, NT/ND; +BS x4  Extremities: WWP; 2+ peripheral pulses bilaterally; no LE edema  Skin: normal color and turgor; no rash  Neurologic: AOx3, no focal deficits

## 2024-09-08 NOTE — H&P ADULT - PROBLEM SELECTOR PLAN 5
F: sp 0.5L in ed  E: replete as needed  N: regular diet  VTE ppx: lovenox 40  GI ppx: none  Dispo: [facility]  Code status: full

## 2024-09-08 NOTE — H&P ADULT - ATTENDING COMMENTS
Patient was seen and examined at bedside on 9/8/2024 at 12 pm. Patient reports that she feels great, all presenting symptoms have resolved. Denies CP, SOB, cough. ROS is otherwise negative. Vitals, labwork and pertinent imaging reviewed. Exam - NAD, AAO x 4, PERRLA, EOMI, MMM, supple neck, chest - CTA b/l, CV - rrr, s1s2, no m/r/g, abd - soft, NTND, + BS, ext - wwp, psych - normal affect, skin - no rash    Plan:  -C/w Prednisone  -Peak Flow  -Ambulatory room air sat  -Patient is medically ready for d/c
[address] Detailed

## 2024-09-08 NOTE — H&P ADULT - PROBLEM/PLAN-5
Discharge instructions reviewed with patient:  -Advised to return to care in 6 weeks for routine postpartum visit, or earlier as clinically indicated and instructed  -Pelvic rest until her postpartum visit or she is evaluated by physician. Advised condoms for contraception and STI prevention if she does have intercourse prior to seeing a physician.  -Strict return to care precautions reviewed -fever greater than or equal to 38 °C, severe pain unresponsive to pain medication, heavy vaginal bleeding greater than 1 pad per hour, breast lump/pain, unusual nipple discharge, chest pain, shortness of breath, severe headache, changes in vision, thoughts of suicide, thoughts of harm to baby  After your delivery - signs and symptoms to watch for:  Contact your health care provider if you should experience any of these signs and symptoms  Fever - Oral temperature greater than 100.4 degrees Fahrenheit  Foul-smelling vaginal discharge  Headache unrelieved by \"pain meds\"  Visual changes - floaters that don't go away  Swelling that increases rather than decreases  Difficulty urinating  Breasts reddened, hard, hot to the touch  Nipple discharge which is foul-smelling or contains pus  Increased pain at the site of the laceration  Difficulty breathing with or without chest pain  Leg cramping, pain or soreness that begins in the calf  Leg swelling with feeling of warmth in the affected leg  Notice any color change in the leg, such as red or purple.  Sudden, continuing increased vaginal bleeding with or without clots  Unrelieved feelings of:  Inability to cope  Sadness  Anxiety  Lack of interest in baby  Insomnia  Crying       What to do at home:  See patient education handouts for full information  Resume activity gradually   Don't lift anything heavier than baby and carrier until okayed by your Physician or Midwife  No sex until okayed by your Physician or Midwife  Take care of yourself by sleeping/resting as much as possible  Eat  regular nutritious meals  Let someone else care for you, your baby, and housework as much as possible   Take medications as directed by your midwife or physician  Wear compression stockings if prescribed   To avoid/relieve constipation take stool softeners if advised   Drink lots of water/fruit juices  Increase fiber in your diet  Breast care: Wear support bra ; use lanolin ointment/cream as needed         Follow up appointment as recommended    Helpful Phone Numbers  Breastfeeding Consultations/Lactation Warm Line  [phone]   Depression Hotline  [phone]   Available 24 hours a day, 7 days a week  After your delivery - signs and symptoms to watch for:  Contact your health care provider if you should experience any of these signs and symptoms  Fever - Oral temperature greater than 100.4 degrees Fahrenheit  Foul-smelling vaginal discharge  Headache unrelieved by \"pain meds\"  Visual changes - floaters that don't go away  Swelling that increases rather than decreases  Difficulty urinating  Breasts reddened, hard, hot to the touch  Nipple discharge which is foul-smelling or contains pus  Increased pain at the site of the laceration  Difficulty breathing with or without chest pain  Leg cramping, pain or soreness that begins in the calf  Leg swelling with feeling of warmth in the affected leg  Notice any color change in the leg, such as red or purple.  Sudden, continuing increased vaginal bleeding with or without clots  Unrelieved feelings of:  Inability to cope  Sadness  Anxiety  Lack of interest in baby  Insomnia  Crying       What to do at home:  See patient education handouts for full information  Resume activity gradually   Don't lift anything heavier than baby and carrier until okayed by your Physician or Midwife  No sex until okayed by your Physician or Midwife  Take care of yourself by sleeping/resting as much as possible  Eat regular nutritious meals  Let someone else care for you, your baby,  and housework as much as possible   Take medications as directed by your midwife or physician  Wear compression stockings if prescribed   To avoid/relieve constipation take stool softeners if advised   Drink lots of water/fruit juices  Increase fiber in your diet  Breast care: Wear support bra ; use lanolin ointment/cream as needed         Follow up appointment as recommended    Helpful Phone Numbers  Breastfeeding Consultations/Lactation Warm Line  [phone]   Depression Hotline  [phone]   Available 24 hours a day, 7 days a week   DISPLAY PLAN FREE TEXT

## 2024-09-08 NOTE — DISCHARGE NOTE PROVIDER - ATTENDING DISCHARGE PHYSICAL EXAMINATION:
Patient was seen and examined at bedside on 9/8/2024. Patient reports that she feels great, all presenting symptoms have resolved. Denies CP, SOB, cough. ROS is otherwise negative. Vitals, labwork and pertinent imaging reviewed. Exam - NAD, AAO x 4, PERRLA, EOMI, MMM, supple neck, chest - CTA b/l, CV - rrr, s1s2, no m/r/g, abd - soft, NTND, + BS, ext - wwp, psych - normal affect, skin - no rash    Plan:  -C/w Prednisone  -Peak Flow  -Ambulatory room air sat  -Patient is medically ready for d/c    Spoke to patient on 9/12 - reports she feels improved. Saw Dr. Wallace (Pulmonary) - reports she was started on antibiotics. Return precautions returned. No questions or concerns

## 2024-09-08 NOTE — H&P ADULT - ASSESSMENT
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 She describes her headache as constant & associated with nausea (no vomiting) and photophobia. She explains that she has not had a copd exacerbation in many years, states current sx are not associated w increased sputum production, denies fever, chills, myalgias, chest pain, abd pain, and changes in bowel or bladder habits since the onset of both her headache & shortness of breath. Denies known sick contacts.

## 2024-09-08 NOTE — ED PROVIDER NOTE - CLINICAL SUMMARY MEDICAL DECISION MAKING FREE TEXT BOX
63-year-old female with COPD exacerbation and headache will evaluate for worsening metastatic intracranial disease hemorrhage treat for COPD exacerbation rule out pneumonia/flu COVID RSV   EKG   plan for CBC CMP troponin proBNP DuoNeb budesonide dexamethasone Reglan Benadryl a fair amount of CT head reassess   neurosurgical consultation

## 2024-09-08 NOTE — DISCHARGE NOTE NURSING/CASE MANAGEMENT/SOCIAL WORK - NSDCPEFALRISK_GEN_ALL_CORE
For information on Fall & Injury Prevention, visit: [email or web address] OR  [email or web address] OR  https://www.cdc.gov/steadi/patient.html

## 2024-09-08 NOTE — ED PROVIDER NOTE - PHYSICAL EXAMINATION
VITAL SIGNS: I have reviewed nursing notes and confirm.  CONSTITUTIONAL: Well appearing, in no acute distress.   SKIN:  warm and dry, no acute rash.   HEAD:  normocephalic, atraumatic.  EYES: EOM intact; conjunctiva and sclera clear. PERRLA EOMI  ENT: No nasal discharge; airway clear.   NECK: Supple.No nuchal rigidity negative Kernig presents gait  CARD: S1, S2, Regular rate and rhythm.   RESP:   decreased breath sounds bilaterally worse in right lower lobe with intermittent wheezes not hypoxic speaking in full sentences coarse cough.  ABD: Normal bowel sounds; soft; non-distended; non-tender; no guarding/ rebound.  EXT: Normal ROM. No peripheral edema. Pulses intact and equal b/l.  NEURO: Alert, oriented, grossly unremarkable photophobic/phonophobic holding head  PSYCH: Cooperative, mood and affect appropriate.

## 2024-09-08 NOTE — DISCHARGE NOTE NURSING/CASE MANAGEMENT/SOCIAL WORK - PATIENT PORTAL LINK FT
You can access the FollowMyHealth Patient Portal offered by [facility] by registering at the following website: [email or web address]. By joining [employer]’s FollowMyHealth portal, you will also be able to view your health information using other applications (apps) compatible with our system.

## 2024-09-08 NOTE — DISCHARGE NOTE PROVIDER - HOSPITAL COURSE
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 She describes her headache as constant & associated with nausea (no vomiting) and photophobia. She explains that she has not had a copd exacerbation in many years, states current sx are not associated w increased sputum production, denies fever, chills, myalgias, chest pain, abd pain, and changes in bowel or bladder habits since the onset of both her headache & shortness of breath. Denies known sick contacts. Patient states she feels a lot better today and is ready to go home. Headache resolved. Cough and sob improved. Ambulatory O2 sat btw 94-96% peak flow 275. Continue prednisone 40mg PO daily for 4 more days.     Problem List/Main Diagnoses (system-based):   #COPD exacerbation. 1d history cough & sob, rvp negative, soft wheezing on exam, not requiring supplemental o2. s/p duonebs, budesonide, solumedrol in ed  home medication(s): albuterol prn, wixela (advair) 500/50 (fluticasone-salmeterol), breo ellipta 200/25 (fluticasone-vilanterol). Treated with duonebs q6h inpatient.  Started on 5d steroid course w po prednisone 9/8-9/11. Continued home medications (therapeutic interchange). Outpatient pulm follow up    # Headache, migraine.  RESOLVED likely migraine headaches (given throbbing nature, associated photophobia & nausea)history of similar headaches, recommended for outpatient follow up w neurology (dr. dan) for headache managementcth without acute findings, given history of neurosurgery for radiation-induced necrosis, evaluated by by neurosurgery ed- no indication for surgery, recommend, outpatient follow up w neurology.    #Seizure disorder.   multi-year history of seizures, follows w dr. irby, well-controlled on established home regimen  home medication(s): levetiracetam, oxcarbazepin  continue home medication(s).    #HLD (hyperlipidemia). continued home medication, simvastatin 80mg qhs    New medications: prednisone 40mg PO  9/8-9/11.  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 She describes her headache as constant & associated with nausea (no vomiting) and photophobia. She explains that she has not had a copd exacerbation in many years, states current sx are not associated w increased sputum production, denies fever, chills, myalgias, chest pain, abd pain, and changes in bowel or bladder habits since the onset of both her headache & shortness of breath. Denies known sick contacts. Patient states she feels a lot better today and is ready to go home. Headache resolved. Cough and sob improved. RVP neg. Ambulatory O2 sat btw 94-96% peak flow 275. Continue prednisone 40mg PO daily for 4 more days.    Problem List/Main Diagnoses (system-based):   #COPD exacerbation. 1d history cough & sob, rvp negative, soft wheezing on exam, not requiring supplemental o2. s/p duonebs, budesonide, solumedrol in ed  home medication(s): albuterol prn, wixela (advair) 500/50 (fluticasone-salmeterol), breo ellipta 200/25 (fluticasone-vilanterol). Treated with duonebs q6h inpatient.  Started on 5d steroid course w po prednisone 9/8-9/11. Continued home medications (therapeutic interchange). Outpatient pulm follow up    # Headache, migraine.  RESOLVED likely migraine headaches (given throbbing nature, associated photophobia & nausea)history of similar headaches, recommended for outpatient follow up w neurology (dr. dan) for headache managementcth without acute findings, given history of neurosurgery for radiation-induced necrosis, evaluated by by neurosurgery ed- no indication for surgery, recommend, outpatient follow up w neurology.    #Seizure disorder.   multi-year history of seizures, follows w dr. irby, well-controlled on established home regimen  home medication(s): levetiracetam, oxcarbazepin  continue home medication(s).    #HLD (hyperlipidemia). continued home medication, simvastatin 80mg qhs    New medications: prednisone 40mg PO  9/8-9/11.  Labs to be followed outpatient: none  Exam to be followed outpatient:    General: NAD  HEENT: PERRL, EOM, anicteric sclera, MMM  Cardiovascular: +S1/S2; RRR; no M/R/G  Respiratory: decreased breath sounds & diffuse, soft bilateral wheeze, intermittent; occasional cough  Gastrointestinal: soft, NT/ND; +BS x4  Extremities: WWP; 2+ peripheral pulses bilaterally; no LE edema  Skin: normal color and turgor; no rash  Neurologic: AOx3, no focal deficits

## 2024-09-08 NOTE — ED ADULT NURSE NOTE - OBJECTIVE STATEMENT
[facility]  PRE OP RISK ASSESSMENT    REASON FOR CONSULT: Pre-op risk assessment for surgical procedure laser vaporization of the vulva planned for 1/16/24 with general anesthesia.    REQUESTING PHYSICIAN: Dr. Fonseca    CHIEF COMPLAINT:   Chief Complaint   Patient presents with    Pre-Op Exam     Surgery scheduled for 1/16/24 for laser vaporization of vulva, rescheduled from 10/10/23. EKG done 12/28/23     HISTORY OF PRESENT ILLNESS:   The patient is a 87 year old  female here for a preop evaluation. She has vulvar dysplasia and is scheduled for the above procedure. She has a significant PMH of afib on AC, diastolic dysfunction, mild aortic insuff, HTN, HLD, prediabetes, bronchiectasis, gallbladder polyp, cervical spondylosis, uterovaginal prolapse. She is independent with ADLs. She denies chest pain or sob at rest or with exertion.       Past Medical History:    Past Medical History:   Diagnosis Date    Acute sinusitis 11/18/2008    Arrhythmia     A-fib attacks 2003 and 2004    Asymptomatic microscopic hematuria 2019    Since at least 2019 - seeing urologist 2022    Atrial fibrillation (HCC)     Benign paroxysmal vertigo 06/28/2010    Breast lump 08/2007    lower right breast    Bronchiectasis (HCC) 06/23/2015    Cervical spondylosis 08/29/2011    Cervical strain 08/29/2011    Cervicalgia 08/29/2011    Chronic cough     Diastolic dysfunction 08/2011    Early cataract     Exudative age-related macular degeneration, left eye, with active choroidal neovascularization (HCC)     Hand fracture 02/08/2011    third metacarpal phalangeal joint    Hard of hearing     Hearing impairment     NO HEARING AIDS    Heart disease     Heart murmur     on exam 2/1/22. Patient states she has a leaky valve    Herpes zoster 01/2021    High blood pressure     High cholesterol     History of bone density study 10/2006    Hx of motion sickness     IGT (impaired glucose tolerance) 03/25/2014    Infection 02/20/2011    right hand     Insomnia     Lipid screening 04/05/2012    Liver lesion 05/04/2021    Probably focal steatosis Unchanged 7 mos later, c/w hemangioma    Microscopic hematuria 01/2022    Unremarkable cystoscopy 1/25/22 Dr. Fields. Renal US normal. CT urogram neg. Suspect due to urethral caruncle.     Ocular migraine     Open-angle glaucoma of right eye     Osteoarthritis     Other and unspecified hyperlipidemia     PAF (paroxysmal atrial fibrillation) (HCC) 06/28/2001    PONV (postoperative nausea and vomiting)     Prediabetes     DOESN'T CHECK BLOOD SUGARS AT HOME    Rectal bleed 01/23/2007    Stress incontinence     Unspecified essential hypertension     Vertigo 07/2010    episodic    Visual impairment     GLASSES    Vulvar cysts 02/01/2022        Past Surgical History:    Past Surgical History:   Procedure Laterality Date    APPENDECTOMY  16 y/o    COLONOSCOPY  2007    diverticulosis    COLONOSCOPY  05/30/2013    Procedure: COLONOSCOPY;  Surgeon: Júnior Lawrence MD;  Location:  ENDOSCOPY    CORRECT BUNION,SIMPLE      CYSTOURETHROSCOPY  01/25/2022    Dr. Fields [facility] - unremarkable cystoscopy in office. H/o asymptomatic microscopic hematuria with h/o normal renal US. CT urogram ordered.    FOOT/TOES SURGERY PROC UNLISTED  1990's    left    TONSILLECTOMY      TOTAL KNEE REPLACEMENT  2002/03    left       Current Medications:    Current Outpatient Medications   Medication Sig Dispense Refill    traMADol 50 MG Oral Tab Take 1 tablet (50 mg total) by mouth every 8 (eight) hours as needed.      warfarin 5 MG Oral Tab TAKE ONE-HALF TO ONE TABLET DAILY AS DIRECTED BY ANTICOAGULATION CLINIC 80 tablet 3    Losartan Potassium 25 MG Oral Tab Take 1 tablet (25 mg total) by mouth daily. 90 tablet 3    atorvastatin 10 MG Oral Tab Take 1 tablet (10 mg total) by mouth nightly. 90 tablet 3    digoxin (DIGOX) 0.125 MG Oral Tab TAKE 1 TABLET(125 MCG) BY MOUTH EVERY DAY 90 tablet 3    METOPROLOL TARTRATE 50 MG Oral Tab TAKE ONE TABLET BY MOUTH  TWICE DAILY. 180 tablet 3    prednisoLONE acetate 1 % Ophthalmic Suspension Place 1 drop into both eyes 2 (two) times daily.  1    Vitamin D3 2000 units Oral Cap Take 1 capsule (2,000 Units total) by mouth daily.      ketorolac tromethamine 0.5 % Ophthalmic Solution Place 1 drop into both eyes 2 (two) times daily. 6AM AND 6PM.      Meclizine HCl 12.5 MG Oral Tab Take 1 tablet (12.5 mg total) by mouth 3 (three) times daily as needed.      Dorzolamide HCl-Timolol Mal 22.3-6.8 MG/ML Ophthalmic Solution Place 1 drop into the right eye 2 (two) times daily.  2    estradiol 0.1 MG/GM Vaginal Cream Place 0.5 grams (pea-sized amount) per vagina nightly. (Patient not taking: Reported on 1/4/2024) 42.5 g 1         Allergies:    Allergies as of 01/04/2024 - Review Complete 01/04/2024   Allergen Reaction Noted    Amoxicillin SWELLING 01/29/2019       SOCIAL HISTORY:   Social History     Socioeconomic History    Marital status:      Spouse name: Not on file    Number of children: Not on file    Years of education: Not on file    Highest education level: Not on file   Occupational History    Not on file   Tobacco Use    Smoking status: Never    Smokeless tobacco: Never   Vaping Use    Vaping Use: Never used   Substance and Sexual Activity    Alcohol use: Yes     Alcohol/week: 0.0 - 0.8 standard drinks of alcohol     Comment: socially    Drug use: No    Sexual activity: Not Currently   Other Topics Concern     Service Not Asked    Blood Transfusions Not Asked    Caffeine Concern Yes    Occupational Exposure Not Asked    Hobby Hazards Not Asked    Sleep Concern Not Asked    Stress Concern Not Asked    Weight Concern Not Asked    Special Diet Not Asked    Back Care Not Asked    Exercise Yes     Comment: some    Bike Helmet Not Asked    Seat Belt Not Asked    Self-Exams Not Asked   Social History Narrative    Not on file     Social Determinants of Health     Financial Resource Strain: Not on file   Food Insecurity: Not  on file   Transportation Needs: Not on file   Physical Activity: Not on file   Stress: Not on file   Social Connections: Not on file   Housing Stability: Not on file        FAMILY HISTORY:   Family History   Problem Relation Age of Onset    Other (bladder cancer) Father 80    Colon Cancer Mother 87    Breast Cancer Other         niece and herman    Other (DM) Other     Heart Disease Brother     Other (Tobacco use) Sister     Other (Atrial fibrillation) Sister     Other (MI,) Brother 52        REVIEW OF SYSTEMS:  PRE-OP ROS  NSAIDS/PLATELET INH: no NSAIDs. On warfarin therapy   DIABETIC MEDICATIONS: None  ISAMAR; None  Hx of VTE: None  BLEEDING DISORDER: None  LOOSE DENTITION OR DENTAL APPLIANCES: upper and lower dentures  URI, COUGH, CP, FEVER: chronic sinusitis   CERVICAL SPINE RESTRICTION: No pain, has cervical spondylosis.     PHYSICAL EXAM:  /60 (BP Location: Left arm, Patient Position: Sitting, Cuff Size: adult)   Pulse 72   Temp 98.6 °F (37 °C) (Temporal)   Resp 14   Ht 4' 10\" (1.473 m)   Wt 127 lb (57.6 kg)   LMP  (LMP Unknown)   SpO2 100%   BMI 26.54 kg/m²   Body mass index is 26.54 kg/m².   GENERAL: well developed, well nourished,in no apparent distress  HEENT: atraumatic, normocephalic  LUNGS: clear to auscultation; no rhonchi, rales, or wheezing  CARDIO: irregular rhythm with murmur  GI: no tenderness  MUSCULOSKELETAL: abnormal gait- using cane  EXTREMITIES: no edema  NEURO: Oriented times three     LABS:  Lab Results   Component Value Date    WBC 5.6 01/04/2024    RBC 4.24 01/04/2024    HGB 12.2 01/04/2024    HCT 38.7 01/04/2024    MCV 91.3 01/04/2024    MCH 28.8 01/04/2024    MCHC 31.5 01/04/2024    RDW 13.3 01/04/2024    .0 01/04/2024      Lab Results   Component Value Date    GLU 96 01/04/2024    BUN 13 01/04/2024    BUNCREA NOT APPLICABLE 07/06/2022    CREATSERUM 0.86 01/04/2024    ANIONGAP 4 01/04/2024    GFR 72 12/08/2016    GFRNAA 69 07/06/2022    GFRAA 80 07/06/2022    CA 9.4  01/04/2024    OSMOCALC 288 01/04/2024    ALKPHO 90 09/26/2023    AST 18 09/26/2023    ALT 23 09/26/2023    BILT 0.6 09/26/2023    TP 7.4 09/26/2023    ALB 3.8 09/26/2023    GLOBULIN 3.6 09/26/2023    AGRATIO 1.8 07/06/2022     01/04/2024    K 4.2 01/04/2024     01/04/2024    CO2 29.0 01/04/2024      Lab Results   Component Value Date    CHOLEST 137 09/26/2023    TRIG 131 09/26/2023    HDL 56 09/26/2023    LDL 58 09/26/2023    VLDL 19 09/26/2023    TCHDLRATIO 2.6 07/06/2022    NONHDLC 81 09/26/2023      Lab Results   Component Value Date    TSH 1.770 09/26/2023      Lab Results   Component Value Date     07/20/2021    A1C 5.9 (H) 07/06/2022      ASSESSMENT AND PLAN:    1. Preop exam for internal medicine  2. Vulvar dysplasia  -  the patient is scheduled for laser vaporization of the vulva planned for 1/16/24 with general anesthesia.    According to the ACC/AHA guidelines, the patient does not have a history of acute coronary syndrome. She does however have a hx of AFIB on anticoagulation, diastolic dysfunction, aortic valve insuff following with cardiology.  She therefore can proceed with the surgery with acceptable cardiac risk if cleared by cardiology.       3. Paroxysmal atrial fibrillation (HCC)  4. Long term (current) use of anticoagulants  - anticoagulation to be held per cardiology.     5. Diastolic dysfunction  - euvolemic at this time. CTM.    6. Mild aortic insufficiency  - no symptoms of sob or edema. CTM.    7. Essential hypertension  - stable. Continue current management.    8. Mixed hyperlipidemia  - stable. Continue current management.     9. Pre-diabetes  - low sugar diet and exercise     10. Bronchiectasis without complication (HCC)  - stable. No sob or persistent coughing.   - CTM    11. Gallbladder polyp  - repeat US ordered.    12. Cervical spondylosis  - no neck pain    13. Uterovaginal prolapse  - continue to see urogyne as needed    14. Screening for cholesterol level  -  Lipid Panel; Future    15. Screening for thyroid disorder  - TSH W Reflex To Free T4; Future    16. Physical exam  - CBC With Differential With Platelet; Future  - Comp Metabolic Panel (14); Future          This consult was sent back to the referring physician    FRANCISCO Lincoln     Total face to face time was 40 mins, more than 50% of the time was spent in counseling and/or coordination of care related to preop evaluation.      Pt is a 63 year old female came in complaining of chest discomfort, severe headache and nausea. Known hx of Ca    Upon ed arrival, pt is awake, alert and ambulatory.   (+) wheezing, coughing and vomiting

## 2024-09-08 NOTE — PATIENT PROFILE ADULT - HOW PATIENT ADDRESSED, PROFILE
Subjective:      Patient ID: Delinda Crigler is a 34 y.o. female. She is here for a split in her abdominal wall after having the baby. Also needs to have her thyroid checked   Also having bad pms eats all sugar in sight before her period so having difficulty losing weight wants to consider a treatment  to steady her hormones. N personal or fh of clotting disorders. Tolerated BCP in the distant past  HPI    Review of Systems   Constitutional:  Negative for fatigue. Gastrointestinal:         ? Split in upper and lower stomach muscles   Psychiatric/Behavioral:          Sugar carb craving and irritable prior to periods     Objective:   Physical Exam  Vitals and nursing note reviewed. Constitutional:       Appearance: She is obese. HENT:      Head: Normocephalic. Cardiovascular:      Rate and Rhythm: Normal rate and regular rhythm. Pulses: Normal pulses. Heart sounds: Normal heart sounds. Pulmonary:      Effort: Pulmonary effort is normal.      Breath sounds: Normal breath sounds. Abdominal:      General: There is no distension. Tenderness: There is no abdominal tenderness. Comments: Small superficial split in abdominal wall just below umbilicus no tender no buluge only palpable with her moving to sit up from lying position   Musculoskeletal:      Cervical back: Normal range of motion. Lymphadenopathy:      Cervical: No cervical adenopathy. Assessment:      /80 (Site: Left Upper Arm, Position: Sitting, Cuff Size: Large Adult)   Pulse 66   Temp 97.6 °F (36.4 °C) (Temporal)   Ht 5' 8\" (1.727 m)   Wt 246 lb (111.6 kg)   LMP 08/10/2022   BMI 37.40 kg/m²        Plan:      1. Acquired hypothyroidism  -     TSH; Future  2. PMS (premenstrual syndrome)  -     levonorgestrel-ethinyl estradiol (AVIANE;ALESSE;LESSINA) 0.1-20 MG-MCG per tablet;  Take 1 tablet by mouth daily, Disp-1 packet, R-3Normal    Checking TSH and will adjust if needed Reassured ok to exercise  started on Alesse for pms is to let us know if helps if not will try Prozac Urged to exercise instead of eat with cravings.       GABBY Yates - NP ms. aguilar Celia

## 2024-09-08 NOTE — DISCHARGE NOTE PROVIDER - NSDCFUSCHEDAPPT_GEN_ALL_CORE_FT
Andrea Gutierrez  [facility] Physician [facility]  OPHTHALM [address]  Scheduled Appointment: 10/04/2024    [facility]  OTOLARYNG [address]  Scheduled Appointment: 10/14/2024    Amor Duggan  [facility]  OTOLARYNG [address]  Scheduled Appointment: 10/14/2024    Gwen Barclay  [facility]  NEUROLOGY [address]  Scheduled Appointment: 10/21/2024

## 2024-09-08 NOTE — H&P ADULT - PROBLEM SELECTOR PLAN 2
likely migraine headaches (given throbbing nature, associated photophobia & nausea)  history of similar headaches, recommended for outpatient follow up w neurology (dr. dan) for headache management  cth without acute findings, given history of neurosurgery for radiation-induced necrosis, evaluated by by neurosurgery ed- no indication for surgery, recommend outpatient follow up    - tylenol prn for pain  - outpatient follow up w neurology

## 2024-09-08 NOTE — H&P ADULT - HISTORY OF PRESENT ILLNESS
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 She describes her headache as constant & associated with nausea (no vomiting) and photophobia. She explains that she has not had a copd exacerbation in many years, states current sx are not associated w increased sputum production, denies fever, chills, myalgias, chest pain, abd pain, and changes in bowel or bladder habits since the onset of both her headache & shortness of breath. Denies known sick contacts.    ED course -  presenting vitals: 98.9F, 147/94, , RR 22, spo2 93% on room air  laboratory studies notable for leukocytosis to 11.13, hyponatremia to 132, rvp negative  CTH w no acute findings, no changes when compared to imaging from 6mo ago  CXR pending read, however without obvious consolidation or effusion  interventions: 0.5L NS, ofirmev x1, duonebs x1, budesonide x1, decadron 6mg ivp, benadryl 12.5 ivp, reglan 10mg ivp  neurosurgery consulted, deemed pt not indicated for neurosurgical intervention, recommend outpatient follow up w dr. dan (neurology)

## 2024-09-08 NOTE — DISCHARGE NOTE PROVIDER - NSDCCPCAREPLAN_GEN_ALL_CORE_FT
PRINCIPAL DISCHARGE DIAGNOSIS  Diagnosis: COPD exacerbation  Assessment and Plan of Treatment: COPD is a lung disease that makes it hard to breathe. In people with COPD, the airways (the branching tubes that carry air within the lungs) become narrow and damaged. This makes people feel out of breath and tired. COPD can be a serious illness. It cannot be cured and it usually gets worse over time. But there are treatments that can help. The most common cause of COPD is smoking. Smoke can damage the lungs forever and cause COPD. Common symptoms include shortness of breath, wheezing, and worsening cough and mucus production. COPD can put you at an increased risk for lung infections, lung cancer and heart problems. If you smoke, the most important thing you can do for your COPD is to stop smoking. There are a lot of medicines to treat COPD. Most people use inhalers that help open up their airways or decrease swelling in the airways. Often people need more than one inhaler at a time. You might need to take a steroid medicine in a pill for a flare of COPD. If the disease gets worse, you might need to use oxygen. Pulmonary rehabilitation may be recommended and can teach you how to improve your symptoms through exercises and different ways to breathe.  If your shortness of breath worsens or your experience an increase in or change of your sputum production you should seek evaluation by your primary care doctor, or, if severe, an emergency room.  Please continue taking prednisone 40mg one tablet once a day for 4 more days.         SECONDARY DISCHARGE DIAGNOSES  Diagnosis: Headache, migraine  Assessment and Plan of Treatment: Migraines are painful, throbbing headaches that often start on one side of the head. They may cause nausea and vomiting and make you sensitive to light, sound, or smell.  Without treatment, migraines can last from 4 hours to a few days. Medicines can help prevent migraines or stop them after they have started. Your doctor can help you find which ones work best for you.  Follow-up care is a key part of your treatment and safety. Be sure to make and go to all appointments, and call your doctor or nurse advice line ([phone] in most provinces and territories) if you are having problems. It's also a good idea to know your test results and keep a list of the medicines you take.  How can you care for yourself at home?  Do not drive if you have taken a prescription pain medicine.  Rest in a quiet, dark room until your headache is gone. Close your eyes, and try to relax or go to sleep. Don't watch TV or read.  Put a cold, moist cloth or cold pack on the painful area for 10 to 20 minutes at a time. Put a thin cloth between the cold pack and your skin.  Use a warm, moist towel or a heating pad set on low to relax tight shoulder and neck muscles.  Have someone gently massage your neck and shoulders.  Take your medicines exactly as prescribed. Call your doctor or nurse advice line if you think you are having a problem with your medicine. You will get more details on the specific medicines your doctor prescribes.  Don't take medicine for headache pain too often. Talk to your doctor if you are taking medicine more than 2 days a week to stop a headache. Taking too much pain medicine can lead to more headaches. These are called medicine-overuse headaches.  Please continue follow up with your neurologist Dr. Barclay within 2 weeks of discharge     PRINCIPAL DISCHARGE DIAGNOSIS  Diagnosis: COPD exacerbation  Assessment and Plan of Treatment: COPD is a lung disease that makes it hard to breathe. In people with COPD, the airways (the branching tubes that carry air within the lungs) become narrow and damaged. This makes people feel out of breath and tired. COPD can be a serious illness. It cannot be cured and it usually gets worse over time. But there are treatments that can help. The most common cause of COPD is smoking. Smoke can damage the lungs forever and cause COPD. Common symptoms include shortness of breath, wheezing, and worsening cough and mucus production. COPD can put you at an increased risk for lung infections, lung cancer and heart problems. If you smoke, the most important thing you can do for your COPD is to stop smoking. There are a lot of medicines to treat COPD. Most people use inhalers that help open up their airways or decrease swelling in the airways. Often people need more than one inhaler at a time. You might need to take a steroid medicine in a pill for a flare of COPD. If the disease gets worse, you might need to use oxygen. Pulmonary rehabilitation may be recommended and can teach you how to improve your symptoms through exercises and different ways to breathe.  If your shortness of breath worsens or your experience an increase in or change of your sputum production you should seek evaluation by your primary care doctor, or, if severe, an emergency room.  Please continue taking prednisone 40mg one tablet once a day for 4 more days.         SECONDARY DISCHARGE DIAGNOSES  Diagnosis: Headache, migraine  Assessment and Plan of Treatment:     Diagnosis: Headache, migraine  Assessment and Plan of Treatment: Migraines are painful, throbbing headaches that often start on one side of the head. They may cause nausea and vomiting and make you sensitive to light, sound, or smell.  Without treatment, migraines can last from 4 hours to a few days. Medicines can help prevent migraines or stop them after they have started. Your doctor can help you find which ones work best for you.  Follow-up care is a key part of your treatment and safety. Be sure to make and go to all appointments, and call your doctor or nurse advice line ([phone] in most provinces and territories) if you are having problems. It's also a good idea to know your test results and keep a list of the medicines you take.  How can you care for yourself at home?  Do not drive if you have taken a prescription pain medicine.  Rest in a quiet, dark room until your headache is gone. Close your eyes, and try to relax or go to sleep. Don't watch TV or read.  Put a cold, moist cloth or cold pack on the painful area for 10 to 20 minutes at a time. Put a thin cloth between the cold pack and your skin.  Use a warm, moist towel or a heating pad set on low to relax tight shoulder and neck muscles.  Have someone gently massage your neck and shoulders.  Take your medicines exactly as prescribed. Call your doctor or nurse advice line if you think you are having a problem with your medicine. You will get more details on the specific medicines your doctor prescribes.  Don't take medicine for headache pain too often. Talk to your doctor if you are taking medicine more than 2 days a week to stop a headache. Taking too much pain medicine can lead to more headaches. These are called medicine-overuse headaches.  Please continue follow up with your neurologist Dr. Barclay within 2 weeks of discharge

## 2024-09-09 VITALS
SYSTOLIC BLOOD PRESSURE: 107 MMHG | TEMPERATURE: 97 F | HEART RATE: 78 BPM | DIASTOLIC BLOOD PRESSURE: 74 MMHG | RESPIRATION RATE: 17 BRPM | OXYGEN SATURATION: 95 %

## 2024-09-09 LAB
CULTURE RESULTS: SIGNIFICANT CHANGE UP
SPECIMEN SOURCE: SIGNIFICANT CHANGE UP

## 2024-09-09 PROCEDURE — 99239 HOSP IP/OBS DSCHRG MGMT >30: CPT

## 2024-09-09 RX ORDER — PREDNISONE 10 MG
2 TABLET, DOSE PACK ORAL
Qty: 6 | Refills: 0
Start: 2024-09-09 | End: 2024-09-11

## 2024-09-09 RX ORDER — SIMVASTATIN 10 MG
1 TABLET ORAL
Qty: 30 | Refills: 0
Start: 2024-09-09 | End: 2024-10-08

## 2024-09-09 RX ORDER — BENZONATATE 100 MG
100 CAPSULE ORAL EVERY 8 HOURS
Refills: 0 | Status: DISCONTINUED | OUTPATIENT
Start: 2024-09-09 | End: 2024-09-09

## 2024-09-09 RX ORDER — PREDNISONE 10 MG
2 TABLET, DOSE PACK ORAL
Qty: 3 | Refills: 0
Start: 2024-09-09 | End: 2024-09-11

## 2024-09-09 RX ADMIN — IPRATROPIUM BROMIDE AND ALBUTEROL SULFATE 3 MILLILITER(S): .5; 3 SOLUTION RESPIRATORY (INHALATION) at 06:26

## 2024-09-09 RX ADMIN — Medication 40 MILLIGRAM(S): at 06:26

## 2024-09-09 RX ADMIN — OXCARBAZEPINE 450 MILLIGRAM(S): 300 TABLET, FILM COATED ORAL at 06:56

## 2024-09-09 RX ADMIN — FLUTICASONE PROPIONATE AND SALMETEROL 1 DOSE(S): 250; 50 POWDER RESPIRATORY (INHALATION) at 06:29

## 2024-09-09 RX ADMIN — IPRATROPIUM BROMIDE AND ALBUTEROL SULFATE 3 MILLILITER(S): .5; 3 SOLUTION RESPIRATORY (INHALATION) at 01:00

## 2024-09-09 RX ADMIN — ENOXAPARIN SODIUM 40 MILLIGRAM(S): 100 INJECTION SUBCUTANEOUS at 06:28

## 2024-09-09 RX ADMIN — Medication 100 MILLIGRAM(S): at 10:46

## 2024-09-10 ENCOUNTER — TRANSCRIPTION ENCOUNTER (OUTPATIENT)
Age: 64
End: 2024-09-10

## 2024-09-12 ENCOUNTER — TRANSCRIPTION ENCOUNTER (OUTPATIENT)
Age: 64
End: 2024-09-12

## 2024-09-13 ENCOUNTER — TRANSCRIPTION ENCOUNTER (OUTPATIENT)
Age: 64
End: 2024-09-13

## 2024-09-13 DIAGNOSIS — Z90.2 ACQUIRED ABSENCE OF LUNG [PART OF]: ICD-10-CM

## 2024-09-13 DIAGNOSIS — G40.909 EPILEPSY, UNSPECIFIED, NOT INTRACTABLE, WITHOUT STATUS EPILEPTICUS: ICD-10-CM

## 2024-09-13 DIAGNOSIS — Z88.0 ALLERGY STATUS TO PENICILLIN: ICD-10-CM

## 2024-09-13 DIAGNOSIS — Z79.51 LONG TERM (CURRENT) USE OF INHALED STEROIDS: ICD-10-CM

## 2024-09-13 DIAGNOSIS — Z20.822 CONTACT WITH AND (SUSPECTED) EXPOSURE TO COVID-19: ICD-10-CM

## 2024-09-13 DIAGNOSIS — E78.5 HYPERLIPIDEMIA, UNSPECIFIED: ICD-10-CM

## 2024-09-13 DIAGNOSIS — Z85.841 PERSONAL HISTORY OF MALIGNANT NEOPLASM OF BRAIN: ICD-10-CM

## 2024-09-13 DIAGNOSIS — J44.1 CHRONIC OBSTRUCTIVE PULMONARY DISEASE WITH (ACUTE) EXACERBATION: ICD-10-CM

## 2024-09-13 DIAGNOSIS — Z85.118 PERSONAL HISTORY OF OTHER MALIGNANT NEOPLASM OF BRONCHUS AND LUNG: ICD-10-CM

## 2024-09-13 DIAGNOSIS — Z92.21 PERSONAL HISTORY OF ANTINEOPLASTIC CHEMOTHERAPY: ICD-10-CM

## 2024-09-13 DIAGNOSIS — Z92.3 PERSONAL HISTORY OF IRRADIATION: ICD-10-CM

## 2024-09-13 DIAGNOSIS — G43.909 MIGRAINE, UNSPECIFIED, NOT INTRACTABLE, WITHOUT STATUS MIGRAINOSUS: ICD-10-CM

## 2024-09-16 ENCOUNTER — APPOINTMENT (OUTPATIENT)
Dept: CARE COORDINATION | Facility: HOME HEALTH | Age: 64
End: 2024-09-16
Payer: MEDICARE

## 2024-09-16 DIAGNOSIS — Z87.898 PERSONAL HISTORY OF OTHER SPECIFIED CONDITIONS: ICD-10-CM

## 2024-09-16 DIAGNOSIS — Z09 ENCOUNTER FOR FOLLOW-UP EXAMINATION AFTER COMPLETED TREATMENT FOR CONDITIONS OTHER THAN MALIGNANT NEOPLASM: ICD-10-CM

## 2024-09-16 DIAGNOSIS — Z51.89 ENCOUNTER FOR OTHER SPECIFIED AFTERCARE: ICD-10-CM

## 2024-09-16 DIAGNOSIS — Z85.118 ENCOUNTER FOR FOLLOW-UP EXAMINATION AFTER COMPLETED TREATMENT FOR MALIGNANT NEOPLASM: ICD-10-CM

## 2024-09-16 DIAGNOSIS — R41.89 OTHER SYMPTOMS AND SIGNS INVOLVING COGNITIVE FUNCTIONS AND AWARENESS: ICD-10-CM

## 2024-09-16 DIAGNOSIS — H43.393 OTHER VITREOUS OPACITIES, BILATERAL: ICD-10-CM

## 2024-09-16 DIAGNOSIS — C34.91 MALIGNANT NEOPLASM OF UNSPECIFIED PART OF RIGHT BRONCHUS OR LUNG: ICD-10-CM

## 2024-09-16 DIAGNOSIS — G89.18 OTHER ACUTE POSTPROCEDURAL PAIN: ICD-10-CM

## 2024-09-16 DIAGNOSIS — J44.9 CHRONIC OBSTRUCTIVE PULMONARY DISEASE, UNSPECIFIED: ICD-10-CM

## 2024-09-16 DIAGNOSIS — Z87.09 PERSONAL HISTORY OF OTHER DISEASES OF THE RESPIRATORY SYSTEM: ICD-10-CM

## 2024-09-16 DIAGNOSIS — M25.519 PAIN IN UNSPECIFIED SHOULDER: ICD-10-CM

## 2024-09-16 DIAGNOSIS — Z08 ENCOUNTER FOR FOLLOW-UP EXAMINATION AFTER COMPLETED TREATMENT FOR MALIGNANT NEOPLASM: ICD-10-CM

## 2024-09-16 DIAGNOSIS — G40.89 OTHER SEIZURES: ICD-10-CM

## 2024-09-16 DIAGNOSIS — M23.91 UNSPECIFIED INTERNAL DERANGEMENT OF RIGHT KNEE: ICD-10-CM

## 2024-09-16 DIAGNOSIS — Z01.810 ENCOUNTER FOR PREPROCEDURAL CARDIOVASCULAR EXAMINATION: ICD-10-CM

## 2024-09-16 DIAGNOSIS — Z98.890 OTHER SPECIFIED POSTPROCEDURAL STATES: ICD-10-CM

## 2024-09-16 DIAGNOSIS — H53.461 HOMONYMOUS BILATERAL FIELD DEFECTS, RIGHT SIDE: ICD-10-CM

## 2024-09-16 DIAGNOSIS — T66.XXXA RADIATION SICKNESS, UNSPECIFIED, INITIAL ENCOUNTER: ICD-10-CM

## 2024-09-16 DIAGNOSIS — Z85.89 PERSONAL HISTORY OF MALIGNANT NEOPLASM OF OTHER ORGANS AND SYSTEMS: ICD-10-CM

## 2024-09-16 PROCEDURE — 99349 HOME/RES VST EST MOD MDM 40: CPT

## 2024-09-16 RX ORDER — BENZONATATE 200 MG/1
200 CAPSULE ORAL
Qty: 30 | Refills: 0 | Status: ACTIVE | COMMUNITY
Start: 2024-09-16 | End: 1900-01-01

## 2024-09-16 NOTE — PHYSICAL EXAM
[No Acute Distress] : no acute distress [Well Nourished] : well nourished [Well Developed] : well developed [Well-Appearing] : well-appearing [Normal Sclera/Conjunctiva] : normal sclera/conjunctiva [No JVD] : no jugular venous distention [No Respiratory Distress] : no respiratory distress  [No Edema] : there was no peripheral edema [Non-distended] : non-distended [No Rash] : no rash [Normal Affect] : the affect was normal [Normal Insight/Judgement] : insight and judgment were intact [de-identified] : limited due to tele visit

## 2024-09-16 NOTE — HISTORY OF PRESENT ILLNESS
[Home] : at home, [unfilled] , at the time of the visit. [Other Location: e.g. Home (Enter Location, City,State)___] : at [unfilled] [Verbal consent obtained from patient] : the patient, [unfilled] [FreeTextEntry1] : Follow-up for discharge from [facility] for COPD. 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 Patient was admitted to [facility] for a diagnosis of COPD.    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 She describes her headache as constant & associated with nausea (no vomiting) and photophobia. She explains that she has not had a copd exacerbation in many years, states current sx are not associated w increased sputum production, denies fever, chills, myalgias, chest pain, abd pain, and changes in bowel or bladder habits since the onset of both her headache & shortness of breath. Denies known sick contacts. Patient states she feels a lot better today and is ready to go home. Headache resolved. Cough and sob improved. RVP neg. Ambulatory O2 sat btw 94-96% peak flow 275. Continue prednisone 40mg PO daily for 4 more days".    Patient observed via tele visit and is alert and oriented x 3, in no acute distress. Patient states they are feeling "much better" today. Patient states they are eating and drinking well. Patient reports compliance with medications and states she is on the last day of antibiotics. Reports she has a HHA to assist her at home. Reports she is still with persistent cough, worse at night. Has f/u with her providers next month October 2024. Denies any fever, chills, abdominal pain, palpitations, nausea, vomiting, diarrhea, lightheadedness, dizziness, shortness of breath, or chest pain.

## 2024-09-16 NOTE — REVIEW OF SYSTEMS
[Fever] : no fever [Chills] : no chills [Fatigue] : no fatigue [Hot Flashes] : no hot flashes [Night Sweats] : no night sweats [Chest Pain] : no chest pain [Palpitations] : no palpitations [Leg Claudication] : no leg claudication [Lower Ext Edema] : no lower extremity edema [Orthopnea] : no orthopnea [Shortness Of Breath] : no shortness of breath [Wheezing] : no wheezing [Cough] : cough [Dyspnea on Exertion] : no dyspnea on exertion [Abdominal Pain] : no abdominal pain [Nausea] : no nausea [Constipation] : no constipation [Diarrhea] : diarrhea [Vomiting] : no vomiting [Dysuria] : no dysuria [Hematuria] : no hematuria [Joint Pain] : no joint pain [Joint Stiffness] : no joint stiffness [Muscle Pain] : no muscle pain [Itching] : no itching [Skin Rash] : no skin rash [Headache] : no headache [Dizziness] : no dizziness [Fainting] : no fainting [Memory Loss] : no memory loss [Suicidal] : not suicidal [Insomnia] : no insomnia [Anxiety] : no anxiety [Easy Bleeding] : no easy bleeding [Easy Bruising] : no easy bruising [Swollen Glands] : no swollen glands [Negative] : ENT

## 2024-09-16 NOTE — ASSESSMENT
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 Patient was admitted to [facility] for a diagnosis of COPD.    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 She describes her headache as constant & associated with nausea (no vomiting) and photophobia. She explains that she has not had a copd exacerbation in many years, states current sx are not associated w increased sputum production, denies fever, chills, myalgias, chest pain, abd pain, and changes in bowel or bladder habits since the onset of both her headache & shortness of breath. Denies known sick contacts. Patient states she feels a lot better today and is ready to go home. Headache resolved. Cough and sob improved. RVP neg. Ambulatory O2 sat btw 94-96% peak flow 275. Continue prednisone 40mg PO daily for 4 more days".    Patient observed via tele visit and is alert and oriented x 3, in no acute distress. Patient states they are feeling "much better" today. Patient states they are eating and drinking well. Patient reports compliance with medications and states she is on the last day of antibiotics. Reports she has a HHA to assist her at home. Reports she is still with persistent cough, worse at night. Has f/u with her providers next month October 2024. Denies any fever, chills, abdominal pain, palpitations, nausea, vomiting, diarrhea, lightheadedness, dizziness, shortness of breath, or chest pain.

## 2024-09-20 ENCOUNTER — TRANSCRIPTION ENCOUNTER (OUTPATIENT)
Age: 64
End: 2024-09-20

## 2024-09-29 PROCEDURE — 99285 EMERGENCY DEPT VISIT HI MDM: CPT

## 2024-09-29 PROCEDURE — 36415 COLL VENOUS BLD VENIPUNCTURE: CPT

## 2024-09-29 PROCEDURE — 81001 URINALYSIS AUTO W/SCOPE: CPT

## 2024-09-29 PROCEDURE — 86900 BLOOD TYPING SEROLOGIC ABO: CPT

## 2024-09-29 PROCEDURE — 71045 X-RAY EXAM CHEST 1 VIEW: CPT

## 2024-09-29 PROCEDURE — 80053 COMPREHEN METABOLIC PANEL: CPT

## 2024-09-29 PROCEDURE — 85025 COMPLETE CBC W/AUTO DIFF WBC: CPT

## 2024-09-29 PROCEDURE — 87637 SARSCOV2&INF A&B&RSV AMP PRB: CPT

## 2024-09-29 PROCEDURE — 86901 BLOOD TYPING SEROLOGIC RH(D): CPT

## 2024-09-29 PROCEDURE — 83880 ASSAY OF NATRIURETIC PEPTIDE: CPT

## 2024-09-29 PROCEDURE — 87086 URINE CULTURE/COLONY COUNT: CPT

## 2024-09-29 PROCEDURE — 94640 AIRWAY INHALATION TREATMENT: CPT

## 2024-09-29 PROCEDURE — 96375 TX/PRO/DX INJ NEW DRUG ADDON: CPT

## 2024-09-29 PROCEDURE — 70450 CT HEAD/BRAIN W/O DYE: CPT | Mod: MC

## 2024-09-29 PROCEDURE — 96374 THER/PROPH/DIAG INJ IV PUSH: CPT

## 2024-09-29 PROCEDURE — 0225U NFCT DS DNA&RNA 21 SARSCOV2: CPT

## 2024-09-29 PROCEDURE — 84484 ASSAY OF TROPONIN QUANT: CPT

## 2024-09-29 PROCEDURE — 86850 RBC ANTIBODY SCREEN: CPT

## 2024-09-29 PROCEDURE — 83735 ASSAY OF MAGNESIUM: CPT

## 2024-10-02 ENCOUNTER — EMERGENCY (EMERGENCY)
Facility: HOSPITAL | Age: 64
LOS: 1 days | Discharge: HOME | End: 2024-10-02
Attending: STUDENT IN AN ORGANIZED HEALTH CARE EDUCATION/TRAINING PROGRAM | Admitting: STUDENT IN AN ORGANIZED HEALTH CARE EDUCATION/TRAINING PROGRAM
Payer: MEDICARE

## 2024-10-02 VITALS
OXYGEN SATURATION: 97 % | HEART RATE: 100 BPM | RESPIRATION RATE: 17 BRPM | SYSTOLIC BLOOD PRESSURE: 120 MMHG | DIASTOLIC BLOOD PRESSURE: 81 MMHG | TEMPERATURE: 98 F

## 2024-10-02 VITALS
HEIGHT: 65 IN | HEART RATE: 101 BPM | TEMPERATURE: 98 F | RESPIRATION RATE: 18 BRPM | SYSTOLIC BLOOD PRESSURE: 136 MMHG | DIASTOLIC BLOOD PRESSURE: 67 MMHG | OXYGEN SATURATION: 97 % | WEIGHT: 214.95 LBS

## 2024-10-02 DIAGNOSIS — R00.0 TACHYCARDIA, UNSPECIFIED: ICD-10-CM

## 2024-10-02 DIAGNOSIS — G40.909 EPILEPSY, UNSPECIFIED, NOT INTRACTABLE, WITHOUT STATUS EPILEPTICUS: ICD-10-CM

## 2024-10-02 DIAGNOSIS — R06.02 SHORTNESS OF BREATH: ICD-10-CM

## 2024-10-02 DIAGNOSIS — Z88.0 ALLERGY STATUS TO PENICILLIN: ICD-10-CM

## 2024-10-02 DIAGNOSIS — Z41.9 ENCOUNTER FOR PROCEDURE FOR PURPOSES OTHER THAN REMEDYING HEALTH STATE, UNSPECIFIED: Chronic | ICD-10-CM

## 2024-10-02 DIAGNOSIS — Z85.118 PERSONAL HISTORY OF OTHER MALIGNANT NEOPLASM OF BRONCHUS AND LUNG: ICD-10-CM

## 2024-10-02 DIAGNOSIS — J44.9 CHRONIC OBSTRUCTIVE PULMONARY DISEASE, UNSPECIFIED: ICD-10-CM

## 2024-10-02 DIAGNOSIS — Z98.890 OTHER SPECIFIED POSTPROCEDURAL STATES: Chronic | ICD-10-CM

## 2024-10-02 DIAGNOSIS — R06.2 WHEEZING: ICD-10-CM

## 2024-10-02 DIAGNOSIS — R07.89 OTHER CHEST PAIN: ICD-10-CM

## 2024-10-02 DIAGNOSIS — R05.3 CHRONIC COUGH: ICD-10-CM

## 2024-10-02 DIAGNOSIS — Z90.2 ACQUIRED ABSENCE OF LUNG [PART OF]: ICD-10-CM

## 2024-10-02 DIAGNOSIS — Z90.49 ACQUIRED ABSENCE OF OTHER SPECIFIED PARTS OF DIGESTIVE TRACT: Chronic | ICD-10-CM

## 2024-10-02 LAB
ANION GAP SERPL CALC-SCNC: 9 MMOL/L — SIGNIFICANT CHANGE UP (ref 5–17)
BASOPHILS # BLD AUTO: 0.01 K/UL — SIGNIFICANT CHANGE UP (ref 0–0.2)
BASOPHILS NFR BLD AUTO: 0.1 % — SIGNIFICANT CHANGE UP (ref 0–2)
BUN SERPL-MCNC: 9 MG/DL — SIGNIFICANT CHANGE UP (ref 7–23)
CALCIUM SERPL-MCNC: 9.4 MG/DL — SIGNIFICANT CHANGE UP (ref 8.4–10.5)
CHLORIDE SERPL-SCNC: 100 MMOL/L — SIGNIFICANT CHANGE UP (ref 96–108)
CO2 SERPL-SCNC: 28 MMOL/L — SIGNIFICANT CHANGE UP (ref 22–31)
CREAT SERPL-MCNC: 0.81 MG/DL — SIGNIFICANT CHANGE UP (ref 0.5–1.3)
EGFR: 81 ML/MIN/1.73M2 — SIGNIFICANT CHANGE UP
EGFR: 81 ML/MIN/1.73M2 — SIGNIFICANT CHANGE UP
EOSINOPHIL # BLD AUTO: 0.1 K/UL — SIGNIFICANT CHANGE UP (ref 0–0.5)
EOSINOPHIL NFR BLD AUTO: 1.2 % — SIGNIFICANT CHANGE UP (ref 0–6)
GLUCOSE SERPL-MCNC: 100 MG/DL — HIGH (ref 70–99)
HCT VFR BLD CALC: 39.5 % — SIGNIFICANT CHANGE UP (ref 34.5–45)
HGB BLD-MCNC: 12.7 G/DL — SIGNIFICANT CHANGE UP (ref 11.5–15.5)
IMM GRANULOCYTES NFR BLD AUTO: 0.2 % — SIGNIFICANT CHANGE UP (ref 0–0.9)
LYMPHOCYTES # BLD AUTO: 1.9 K/UL — SIGNIFICANT CHANGE UP (ref 1–3.3)
LYMPHOCYTES # BLD AUTO: 22.2 % — SIGNIFICANT CHANGE UP (ref 13–44)
MCHC RBC-ENTMCNC: 28 PG — SIGNIFICANT CHANGE UP (ref 27–34)
MCHC RBC-ENTMCNC: 32.2 GM/DL — SIGNIFICANT CHANGE UP (ref 32–36)
MCV RBC AUTO: 87.2 FL — SIGNIFICANT CHANGE UP (ref 80–100)
MONOCYTES # BLD AUTO: 0.63 K/UL — SIGNIFICANT CHANGE UP (ref 0–0.9)
MONOCYTES NFR BLD AUTO: 7.4 % — SIGNIFICANT CHANGE UP (ref 2–14)
NEUTROPHILS # BLD AUTO: 5.91 K/UL — SIGNIFICANT CHANGE UP (ref 1.8–7.4)
NEUTROPHILS NFR BLD AUTO: 68.9 % — SIGNIFICANT CHANGE UP (ref 43–77)
NRBC # BLD: 0 /100 WBCS — SIGNIFICANT CHANGE UP (ref 0–0)
NRBC BLD-RTO: 0 /100 WBCS — SIGNIFICANT CHANGE UP (ref 0–0)
PLATELET # BLD AUTO: 274 K/UL — SIGNIFICANT CHANGE UP (ref 150–400)
POTASSIUM SERPL-MCNC: 3.6 MMOL/L — SIGNIFICANT CHANGE UP (ref 3.5–5.3)
POTASSIUM SERPL-SCNC: 3.6 MMOL/L — SIGNIFICANT CHANGE UP (ref 3.5–5.3)
RBC # BLD: 4.53 M/UL — SIGNIFICANT CHANGE UP (ref 3.8–5.2)
RBC # FLD: 13.8 % — SIGNIFICANT CHANGE UP (ref 10.3–14.5)
SODIUM SERPL-SCNC: 137 MMOL/L — SIGNIFICANT CHANGE UP (ref 135–145)
TROPONIN T, HIGH SENSITIVITY RESULT: 8 NG/L — SIGNIFICANT CHANGE UP (ref 0–51)
TROPONIN T, HIGH SENSITIVITY RESULT: 9 NG/L — SIGNIFICANT CHANGE UP (ref 0–51)
WBC # BLD: 8.57 K/UL — SIGNIFICANT CHANGE UP (ref 3.8–10.5)
WBC # FLD AUTO: 8.57 K/UL — SIGNIFICANT CHANGE UP (ref 3.8–10.5)

## 2024-10-02 PROCEDURE — 71045 X-RAY EXAM CHEST 1 VIEW: CPT | Mod: 26

## 2024-10-02 PROCEDURE — 99285 EMERGENCY DEPT VISIT HI MDM: CPT

## 2024-10-02 RX ORDER — METHYLPREDNISOLONE ACETATE 80 MG/ML
125 INJECTION, SUSPENSION INTRA-ARTICULAR; INTRALESIONAL; INTRAMUSCULAR; SOFT TISSUE ONCE
Refills: 0 | Status: COMPLETED | OUTPATIENT
Start: 2024-10-02 | End: 2024-10-02

## 2024-10-02 RX ORDER — DEXAMETHASONE 0.5 MG/1
2 TABLET ORAL
Qty: 2 | Refills: 0
Start: 2024-10-02 | End: 2024-10-02

## 2024-10-02 RX ORDER — IPRATROPIUM BROMIDE AND ALBUTEROL SULFATE .5; 2.5 MG/3ML; MG/3ML
3 SOLUTION RESPIRATORY (INHALATION)
Refills: 0 | Status: COMPLETED | OUTPATIENT
Start: 2024-10-02 | End: 2024-10-02

## 2024-10-02 RX ADMIN — IPRATROPIUM BROMIDE AND ALBUTEROL SULFATE 3 MILLILITER(S): .5; 2.5 SOLUTION RESPIRATORY (INHALATION) at 17:53

## 2024-10-02 RX ADMIN — IPRATROPIUM BROMIDE AND ALBUTEROL SULFATE 3 MILLILITER(S): .5; 2.5 SOLUTION RESPIRATORY (INHALATION) at 17:55

## 2024-10-02 RX ADMIN — METHYLPREDNISOLONE ACETATE 125 MILLIGRAM(S): 80 INJECTION, SUSPENSION INTRA-ARTICULAR; INTRALESIONAL; INTRAMUSCULAR; SOFT TISSUE at 17:52

## 2024-10-02 RX ADMIN — IPRATROPIUM BROMIDE AND ALBUTEROL SULFATE 3 MILLILITER(S): .5; 2.5 SOLUTION RESPIRATORY (INHALATION) at 17:54

## 2024-10-04 ENCOUNTER — APPOINTMENT (OUTPATIENT)
Dept: OPHTHALMOLOGY | Facility: CLINIC | Age: 64
End: 2024-10-04
Payer: MEDICARE

## 2024-10-04 ENCOUNTER — NON-APPOINTMENT (OUTPATIENT)
Age: 64
End: 2024-10-04

## 2024-10-04 PROCEDURE — 92014 COMPRE OPH EXAM EST PT 1/>: CPT

## 2024-10-04 PROCEDURE — 92134 CPTRZ OPH DX IMG PST SGM RTA: CPT

## 2024-10-04 PROCEDURE — 92083 EXTENDED VISUAL FIELD XM: CPT

## 2024-10-07 ENCOUNTER — TRANSCRIPTION ENCOUNTER (OUTPATIENT)
Age: 64
End: 2024-10-07

## 2024-10-14 ENCOUNTER — APPOINTMENT (OUTPATIENT)
Dept: OTOLARYNGOLOGY | Facility: CLINIC | Age: 64
End: 2024-10-14
Payer: MEDICARE

## 2024-10-14 ENCOUNTER — NON-APPOINTMENT (OUTPATIENT)
Age: 64
End: 2024-10-14

## 2024-10-14 VITALS
TEMPERATURE: 98 F | HEART RATE: 78 BPM | DIASTOLIC BLOOD PRESSURE: 77 MMHG | OXYGEN SATURATION: 95 % | SYSTOLIC BLOOD PRESSURE: 114 MMHG | HEIGHT: 65 IN | WEIGHT: 219 LBS | BODY MASS INDEX: 36.49 KG/M2

## 2024-10-14 DIAGNOSIS — H90.A21 SENSORINEURAL HEARING LOSS, UNILATERAL, RIGHT EAR, WITH RESTRICTED HEARING ON THE CONTRALATERAL SIDE: ICD-10-CM

## 2024-10-14 PROCEDURE — 92557 COMPREHENSIVE HEARING TEST: CPT

## 2024-10-14 PROCEDURE — 99213 OFFICE O/P EST LOW 20 MIN: CPT

## 2024-10-14 PROCEDURE — 92550 TYMPANOMETRY & REFLEX THRESH: CPT | Mod: 52

## 2024-10-21 ENCOUNTER — NON-APPOINTMENT (OUTPATIENT)
Age: 64
End: 2024-10-21

## 2024-10-21 ENCOUNTER — APPOINTMENT (OUTPATIENT)
Dept: NEUROLOGY | Facility: CLINIC | Age: 64
End: 2024-10-21
Payer: MEDICARE

## 2024-10-21 VITALS
SYSTOLIC BLOOD PRESSURE: 123 MMHG | BODY MASS INDEX: 36.82 KG/M2 | TEMPERATURE: 96.7 F | HEIGHT: 65 IN | WEIGHT: 221 LBS | HEART RATE: 65 BPM | OXYGEN SATURATION: 96 % | DIASTOLIC BLOOD PRESSURE: 77 MMHG

## 2024-10-21 DIAGNOSIS — G40.109 LOCALIZATION-RELATED (FOCAL) (PARTIAL) SYMPTOMATIC EPILEPSY AND EPILEPTIC SYNDROMES WITH SIMPLE PARTIAL SEIZURES, NOT INTRACTABLE, W/OUT STATUS EPILEPTICUS: ICD-10-CM

## 2024-10-21 PROCEDURE — G2211 COMPLEX E/M VISIT ADD ON: CPT

## 2024-10-21 PROCEDURE — 99214 OFFICE O/P EST MOD 30 MIN: CPT

## 2024-10-21 RX ORDER — LEVETIRACETAM 500 MG/1
500 TABLET, FILM COATED ORAL
Qty: 90 | Refills: 0 | Status: ACTIVE | COMMUNITY
Start: 2024-10-21 | End: 1900-01-01

## 2024-10-28 ENCOUNTER — APPOINTMENT (OUTPATIENT)
Dept: PHARMACY | Facility: CLINIC | Age: 64
End: 2024-10-28
Payer: SELF-PAY

## 2024-10-28 PROCEDURE — V5010 ASSESSMENT FOR HEARING AID: CPT | Mod: NC

## 2024-10-31 NOTE — PATIENT PROFILE ADULT. - FUNCTIONAL SCREEN CURRENT LEVEL: DRESSING, MLM
(0) independent
General Sunscreen Counseling: I recommended a broad spectrum sunscreen with a SPF of 30 or higher.  I explained that SPF 30 sunscreens block approximately 97 percent of the sun's harmful rays.  Sunscreens should be applied at least 15 minutes prior to expected sun exposure and then every 2 hours after that as long as sun exposure continues. If swimming or exercising sunscreen should be reapplied every 45 minutes to an hour after getting wet or sweating.  One ounce, or the equivalent of a shot glass full of sunscreen, is adequate to protect the skin not covered by a bathing suit. I also recommended a lip balm with a sunscreen as well. Sun protective clothing can be used in lieu of sunscreen but must be worn the entire time you are exposed to the sun's rays.
Detail Level: Detailed

## 2024-11-18 NOTE — DISCHARGE NOTE PROVIDER - NSDCCPCAREPLAN_GEN_ALL_CORE_FT
PRINCIPAL DISCHARGE DIAGNOSIS  Diagnosis: Seizure  Assessment and Plan of Treatment: You were admitted for a seizure. This was determined to be due to having missed dosages of your Keppra. For discharge, your keppra has been increased to 1500mg, twice per day. Thsi prescription has been sent to [facility], located on the first floor of the hospital by the main entrance.   -----------  Please follow up with Dr. Carey within one month by calling the number provided to schedule an appointment.       3

## 2024-11-20 PROCEDURE — 84484 ASSAY OF TROPONIN QUANT: CPT

## 2024-11-20 PROCEDURE — 85025 COMPLETE CBC W/AUTO DIFF WBC: CPT

## 2024-11-20 PROCEDURE — 71045 X-RAY EXAM CHEST 1 VIEW: CPT

## 2024-11-20 PROCEDURE — 99284 EMERGENCY DEPT VISIT MOD MDM: CPT | Mod: 25

## 2024-11-20 PROCEDURE — 94640 AIRWAY INHALATION TREATMENT: CPT

## 2024-11-20 PROCEDURE — 80048 BASIC METABOLIC PNL TOTAL CA: CPT

## 2024-11-20 PROCEDURE — 36415 COLL VENOUS BLD VENIPUNCTURE: CPT

## 2024-11-20 PROCEDURE — 96374 THER/PROPH/DIAG INJ IV PUSH: CPT

## 2024-11-22 ENCOUNTER — OUTPATIENT (OUTPATIENT)
Dept: OUTPATIENT SERVICES | Facility: HOSPITAL | Age: 64
LOS: 1 days | End: 2024-11-22
Payer: MEDICARE

## 2024-11-22 ENCOUNTER — APPOINTMENT (OUTPATIENT)
Dept: MRI IMAGING | Facility: HOSPITAL | Age: 64
End: 2024-11-22

## 2024-11-22 DIAGNOSIS — Z90.49 ACQUIRED ABSENCE OF OTHER SPECIFIED PARTS OF DIGESTIVE TRACT: Chronic | ICD-10-CM

## 2024-11-22 DIAGNOSIS — Z98.890 OTHER SPECIFIED POSTPROCEDURAL STATES: Chronic | ICD-10-CM

## 2024-11-22 DIAGNOSIS — Z41.9 ENCOUNTER FOR PROCEDURE FOR PURPOSES OTHER THAN REMEDYING HEALTH STATE, UNSPECIFIED: Chronic | ICD-10-CM

## 2024-11-22 PROCEDURE — 70553 MRI BRAIN STEM W/O & W/DYE: CPT

## 2024-11-22 PROCEDURE — 70553 MRI BRAIN STEM W/O & W/DYE: CPT | Mod: 26,MH

## 2024-11-22 PROCEDURE — A9585: CPT
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
## 2025-01-28 NOTE — ED ADULT NURSE NOTE - NS ED NURSE LEVEL OF CONSCIOUSNESS ORIENTATION
Detail Level: Zone
Other (Free Text): Use hyluronidase to dissolve filler for patient under both eyes.
Price (Use Numbers Only, No Special Characters Or $): 300
Oriented - self; Oriented - place; Oriented - time

## 2025-02-14 ENCOUNTER — APPOINTMENT (OUTPATIENT)
Dept: NEUROSURGERY | Facility: CLINIC | Age: 65
End: 2025-02-14
Payer: MEDICARE

## 2025-02-14 DIAGNOSIS — C79.31 SECONDARY MALIGNANT NEOPLASM OF BRAIN: ICD-10-CM

## 2025-02-14 DIAGNOSIS — Z98.890 OTHER SPECIFIED POSTPROCEDURAL STATES: ICD-10-CM

## 2025-02-14 PROCEDURE — 99215 OFFICE O/P EST HI 40 MIN: CPT

## 2025-03-03 NOTE — ED PROVIDER NOTE - NS ED MD DISPO ADMIT [facility]
Medication(s) Requested: gabapentin  PDMP reviewed : 3/3/2025  Alerts: negative  Last dispensed:   Gabapentin  [id number]  600MG / Tablet  Rx# [id number] Other Qty: 180  Days: 90  Refills: 0 Prescribed: 12/6/2024  Dispensed: 12/6/2024  Sold: 12/8/2024 DINORAH TAYLOR  [id number]  [facility] [id number]   UDS on file: No  Contract on file:  No  Last office visit: 12/6/24  Next office visit: 12/10/25    
MEDICINE

## 2025-03-28 NOTE — PHYSICAL THERAPY INITIAL EVALUATION ADULT - GENERAL OBSERVATIONS, REHAB EVAL
Pt. was received supine, + NC=3L, SpO2=99% on NC, 97% on RA, + IV, NAD.
Treatment Goal Explanation (Does Not Render In The Note): Stable for the purposes of categorizing medical decision making is defined by the specific treatment goals for an individual patient. A patient that is not at their treatment goal is not stable, even if the condition has not changed and there is no short- term threat to life or function.

## 2025-04-11 ENCOUNTER — APPOINTMENT (OUTPATIENT)
Dept: OPHTHALMOLOGY | Facility: CLINIC | Age: 65
End: 2025-04-11
Payer: MEDICARE

## 2025-04-11 ENCOUNTER — NON-APPOINTMENT (OUTPATIENT)
Age: 65
End: 2025-04-11

## 2025-04-11 PROCEDURE — 92083 EXTENDED VISUAL FIELD XM: CPT

## 2025-04-11 PROCEDURE — 92014 COMPRE OPH EXAM EST PT 1/>: CPT

## 2025-04-11 PROCEDURE — 92134 CPTRZ OPH DX IMG PST SGM RTA: CPT

## 2025-04-14 ENCOUNTER — NON-APPOINTMENT (OUTPATIENT)
Age: 65
End: 2025-04-14

## 2025-04-14 ENCOUNTER — APPOINTMENT (OUTPATIENT)
Dept: NEUROLOGY | Facility: CLINIC | Age: 65
End: 2025-04-14
Payer: MEDICARE

## 2025-04-14 VITALS
TEMPERATURE: 98 F | HEIGHT: 65 IN | BODY MASS INDEX: 37.49 KG/M2 | DIASTOLIC BLOOD PRESSURE: 85 MMHG | HEART RATE: 78 BPM | WEIGHT: 225 LBS | OXYGEN SATURATION: 95 % | SYSTOLIC BLOOD PRESSURE: 123 MMHG

## 2025-04-14 DIAGNOSIS — G40.109 LOCALIZATION-RELATED (FOCAL) (PARTIAL) SYMPTOMATIC EPILEPSY AND EPILEPTIC SYNDROMES WITH SIMPLE PARTIAL SEIZURES, NOT INTRACTABLE, W/OUT STATUS EPILEPTICUS: ICD-10-CM

## 2025-04-14 PROCEDURE — 99214 OFFICE O/P EST MOD 30 MIN: CPT

## 2025-04-14 PROCEDURE — G2211 COMPLEX E/M VISIT ADD ON: CPT

## 2025-04-14 RX ORDER — DIAZEPAM 5 MG/100UL
5 SPRAY NASAL
Qty: 2 | Refills: 0 | Status: ACTIVE | COMMUNITY
Start: 2025-04-14 | End: 1900-01-01

## 2025-04-28 ENCOUNTER — NON-APPOINTMENT (OUTPATIENT)
Age: 65
End: 2025-04-28

## 2025-04-28 NOTE — ED PROVIDER NOTE - TEMPLATE, MLM
Ihsan Torrez at [facility] regarding authorization number [id number].  The following codes, J900 and 79847, are authorized for 4/30/25 date of service.    Respiratory

## 2025-05-29 ENCOUNTER — APPOINTMENT (OUTPATIENT)
Dept: CT IMAGING | Facility: HOSPITAL | Age: 65
End: 2025-05-29

## 2025-05-29 ENCOUNTER — APPOINTMENT (OUTPATIENT)
Dept: MRI IMAGING | Facility: HOSPITAL | Age: 65
End: 2025-05-29

## 2025-05-29 ENCOUNTER — OUTPATIENT (OUTPATIENT)
Dept: OUTPATIENT SERVICES | Facility: HOSPITAL | Age: 65
LOS: 1 days | End: 2025-05-29
Payer: MEDICARE

## 2025-05-29 DIAGNOSIS — Z90.49 ACQUIRED ABSENCE OF OTHER SPECIFIED PARTS OF DIGESTIVE TRACT: Chronic | ICD-10-CM

## 2025-05-29 DIAGNOSIS — Z98.890 OTHER SPECIFIED POSTPROCEDURAL STATES: Chronic | ICD-10-CM

## 2025-05-29 DIAGNOSIS — Z41.9 ENCOUNTER FOR PROCEDURE FOR PURPOSES OTHER THAN REMEDYING HEALTH STATE, UNSPECIFIED: Chronic | ICD-10-CM

## 2025-05-29 PROCEDURE — 71260 CT THORAX DX C+: CPT | Mod: MH

## 2025-05-29 PROCEDURE — 82565 ASSAY OF CREATININE: CPT

## 2025-05-29 PROCEDURE — 70553 MRI BRAIN STEM W/O & W/DYE: CPT

## 2025-05-29 PROCEDURE — 71260 CT THORAX DX C+: CPT | Mod: 26

## 2025-05-29 PROCEDURE — 74177 CT ABD & PELVIS W/CONTRAST: CPT | Mod: MH

## 2025-05-29 PROCEDURE — 70553 MRI BRAIN STEM W/O & W/DYE: CPT | Mod: 26

## 2025-05-29 PROCEDURE — 74177 CT ABD & PELVIS W/CONTRAST: CPT | Mod: 26

## 2025-05-29 PROCEDURE — A9585: CPT

## 2025-06-04 DIAGNOSIS — R91.8 OTHER NONSPECIFIC ABNORMAL FINDING OF LUNG FIELD: ICD-10-CM

## 2025-06-04 DIAGNOSIS — C80.1 MALIGNANT (PRIMARY) NEOPLASM, UNSPECIFIED: ICD-10-CM

## 2025-06-04 DIAGNOSIS — C79.31 SECONDARY MALIGNANT NEOPLASM OF BRAIN: ICD-10-CM

## 2025-06-04 DIAGNOSIS — C34.90 MALIGNANT NEOPLASM OF UNSPECIFIED PART OF UNSPECIFIED BRONCHUS OR LUNG: ICD-10-CM

## 2025-06-04 DIAGNOSIS — E04.2 NONTOXIC MULTINODULAR GOITER: ICD-10-CM

## 2025-06-04 DIAGNOSIS — K57.50 DIVERTICULOSIS OF BOTH SMALL AND LARGE INTESTINE WITHOUT PERFORATION OR ABSCESS WITHOUT BLEEDING: ICD-10-CM

## 2025-06-04 DIAGNOSIS — G08 INTRACRANIAL AND INTRASPINAL PHLEBITIS AND THROMBOPHLEBITIS: ICD-10-CM

## 2025-06-04 DIAGNOSIS — J43.2 CENTRILOBULAR EMPHYSEMA: ICD-10-CM

## 2025-06-04 DIAGNOSIS — R93.0 ABNORMAL FINDINGS ON DIAGNOSTIC IMAGING OF SKULL AND HEAD, NOT ELSEWHERE CLASSIFIED: ICD-10-CM

## 2025-06-04 DIAGNOSIS — J34.89 OTHER SPECIFIED DISORDERS OF NOSE AND NASAL SINUSES: ICD-10-CM

## 2025-06-04 DIAGNOSIS — Z98.890 OTHER SPECIFIED POSTPROCEDURAL STATES: ICD-10-CM

## 2025-06-13 ENCOUNTER — APPOINTMENT (OUTPATIENT)
Dept: NEUROSURGERY | Facility: CLINIC | Age: 65
End: 2025-06-13
Payer: MEDICARE

## 2025-06-13 VITALS
OXYGEN SATURATION: 95 % | HEIGHT: 65 IN | SYSTOLIC BLOOD PRESSURE: 113 MMHG | DIASTOLIC BLOOD PRESSURE: 78 MMHG | WEIGHT: 225 LBS | BODY MASS INDEX: 37.49 KG/M2 | RESPIRATION RATE: 18 BRPM | HEART RATE: 78 BPM

## 2025-06-13 PROCEDURE — 99215 OFFICE O/P EST HI 40 MIN: CPT

## 2025-07-07 ENCOUNTER — NON-APPOINTMENT (OUTPATIENT)
Age: 65
End: 2025-07-07

## 2025-07-21 ENCOUNTER — RX RENEWAL (OUTPATIENT)
Age: 65
End: 2025-07-21

## 2025-07-22 ENCOUNTER — RX RENEWAL (OUTPATIENT)
Age: 65
End: 2025-07-22

## 2025-07-28 ENCOUNTER — APPOINTMENT (OUTPATIENT)
Dept: INTERNAL MEDICINE | Facility: CLINIC | Age: 65
End: 2025-07-28
Payer: MEDICARE

## 2025-07-28 VITALS
TEMPERATURE: 97.8 F | HEART RATE: 79 BPM | SYSTOLIC BLOOD PRESSURE: 120 MMHG | HEIGHT: 65 IN | DIASTOLIC BLOOD PRESSURE: 84 MMHG | OXYGEN SATURATION: 96 %

## 2025-07-28 DIAGNOSIS — E78.5 HYPERLIPIDEMIA, UNSPECIFIED: ICD-10-CM

## 2025-07-28 DIAGNOSIS — E66.9 OBESITY, UNSPECIFIED: ICD-10-CM

## 2025-07-28 DIAGNOSIS — Z00.00 ENCOUNTER FOR GENERAL ADULT MEDICAL EXAMINATION W/OUT ABNORMAL FINDINGS: ICD-10-CM

## 2025-07-28 DIAGNOSIS — G40.89 OTHER SEIZURES: ICD-10-CM

## 2025-07-28 DIAGNOSIS — J44.9 CHRONIC OBSTRUCTIVE PULMONARY DISEASE, UNSPECIFIED: ICD-10-CM

## 2025-07-28 DIAGNOSIS — C34.91 MALIGNANT NEOPLASM OF UNSPECIFIED PART OF RIGHT BRONCHUS OR LUNG: ICD-10-CM

## 2025-07-28 PROCEDURE — 99204 OFFICE O/P NEW MOD 45 MIN: CPT

## 2025-07-28 PROCEDURE — G2211 COMPLEX E/M VISIT ADD ON: CPT

## 2025-07-28 RX ORDER — VITAMIN K2 90 MCG
125 MCG CAPSULE ORAL
Qty: 90 | Refills: 1 | Status: ACTIVE | COMMUNITY
Start: 2025-07-28 | End: 1900-01-01

## 2025-07-28 RX ORDER — FLUTICASONE FUROATE AND VILANTEROL TRIFENATATE 100; 25 UG/1; UG/1
100-25 POWDER RESPIRATORY (INHALATION)
Qty: 1 | Refills: 5 | Status: ACTIVE | COMMUNITY
Start: 2025-07-28

## 2025-07-31 ENCOUNTER — APPOINTMENT (OUTPATIENT)
Dept: NEUROLOGY | Facility: CLINIC | Age: 65
End: 2025-07-31
Payer: MEDICARE

## 2025-07-31 VITALS
WEIGHT: 225 LBS | TEMPERATURE: 97.2 F | BODY MASS INDEX: 37.49 KG/M2 | HEIGHT: 65 IN | HEART RATE: 84 BPM | DIASTOLIC BLOOD PRESSURE: 85 MMHG | OXYGEN SATURATION: 98 % | SYSTOLIC BLOOD PRESSURE: 134 MMHG

## 2025-07-31 DIAGNOSIS — G40.109 LOCALIZATION-RELATED (FOCAL) (PARTIAL) SYMPTOMATIC EPILEPSY AND EPILEPTIC SYNDROMES WITH SIMPLE PARTIAL SEIZURES, NOT INTRACTABLE, W/OUT STATUS EPILEPTICUS: ICD-10-CM

## 2025-07-31 PROCEDURE — 99213 OFFICE O/P EST LOW 20 MIN: CPT

## 2025-07-31 PROCEDURE — G2211 COMPLEX E/M VISIT ADD ON: CPT

## 2025-08-06 NOTE — ED ADULT TRIAGE NOTE - NS ED TRIAGE AVPU SCALE
8/5/2025    11:30 AM   Rapid3 Question Responses and Scores   MDHAQ Score 1   Psychologic Score 1.1   Pain Score 6   When you awakened in the morning OVER THE LAST WEEK, did you feel stiff? Yes   If Yes, please indicate the number of hours until you are as limber as you will be for the day 2   Fatigue Score 3.5   Global Health Score 4.5   RAPID3 Score 4.61    Answers submitted by the patient for this visit:  Rheumatology Questionnaire (Submitted on 8/5/2025)  fever: No  eye redness: No  mouth sores: No  headaches: No  shortness of breath: No  chest pain: No  trouble swallowing: No  diarrhea: No  constipation: No  unexpected weight change: No  genital sore: No  During the last 3 days, have you had a skin rash?: No  Bruises or bleeds easily: No  cough: No       Alert-The patient is alert, awake and responds to voice. The patient is oriented to time, place, and person. The triage nurse is able to obtain subjective information.

## 2025-08-18 ENCOUNTER — NON-APPOINTMENT (OUTPATIENT)
Age: 65
End: 2025-08-18

## 2025-08-18 LAB
25(OH)D3 SERPL-MCNC: 37.2 NG/ML
ALBUMIN SERPL ELPH-MCNC: 4.3 G/DL
ALP BLD-CCNC: 121 U/L
ALT SERPL-CCNC: 25 U/L
ANION GAP SERPL CALC-SCNC: 14 MMOL/L
AST SERPL-CCNC: 26 U/L
BASOPHILS # BLD AUTO: 0.02 K/UL
BASOPHILS NFR BLD AUTO: 0.3 %
BILIRUB SERPL-MCNC: 0.2 MG/DL
BUN SERPL-MCNC: 15 MG/DL
CALCIUM SERPL-MCNC: 10 MG/DL
CHLORIDE SERPL-SCNC: 103 MMOL/L
CHOLEST SERPL-MCNC: 195 MG/DL
CO2 SERPL-SCNC: 24 MMOL/L
CREAT SERPL-MCNC: 0.71 MG/DL
EGFRCR SERPLBLD CKD-EPI 2021: 94 ML/MIN/1.73M2
EOSINOPHIL # BLD AUTO: 0.18 K/UL
EOSINOPHIL NFR BLD AUTO: 2.6 %
ESTIMATED AVERAGE GLUCOSE: 143 MG/DL
GLUCOSE SERPL-MCNC: 107 MG/DL
HBA1C MFR BLD HPLC: 6.6 %
HCT VFR BLD CALC: 45.5 %
HCV AB SER QL: NONREACTIVE
HCV S/CO RATIO: 0.08 S/CO
HDLC SERPL-MCNC: 71 MG/DL
HGB BLD-MCNC: 14 G/DL
IMM GRANULOCYTES NFR BLD AUTO: 0.3 %
LDLC SERPL-MCNC: 113 MG/DL
LYMPHOCYTES # BLD AUTO: 2.09 K/UL
LYMPHOCYTES NFR BLD AUTO: 30.2 %
MAN DIFF?: NORMAL
MCHC RBC-ENTMCNC: 28.9 PG
MCHC RBC-ENTMCNC: 30.8 G/DL
MCV RBC AUTO: 94 FL
MONOCYTES # BLD AUTO: 0.55 K/UL
MONOCYTES NFR BLD AUTO: 8 %
NEUTROPHILS # BLD AUTO: 4.05 K/UL
NEUTROPHILS NFR BLD AUTO: 58.6 %
NONHDLC SERPL-MCNC: 124 MG/DL
PLATELET # BLD AUTO: 258 K/UL
POTASSIUM SERPL-SCNC: 4.2 MMOL/L
PROT SERPL-MCNC: 7.5 G/DL
RBC # BLD: 4.84 M/UL
RBC # FLD: 14.1 %
SODIUM SERPL-SCNC: 141 MMOL/L
TRIGL SERPL-MCNC: 59 MG/DL
TSH SERPL-ACNC: 1.64 UIU/ML
WBC # FLD AUTO: 6.91 K/UL

## 2025-09-09 ENCOUNTER — LABORATORY RESULT (OUTPATIENT)
Age: 65
End: 2025-09-09

## 2025-09-09 ENCOUNTER — APPOINTMENT (OUTPATIENT)
Dept: OBGYN | Facility: CLINIC | Age: 65
End: 2025-09-09
Payer: MEDICARE

## 2025-09-09 VITALS — SYSTOLIC BLOOD PRESSURE: 126 MMHG | DIASTOLIC BLOOD PRESSURE: 85 MMHG | HEART RATE: 64 BPM | OXYGEN SATURATION: 98 %

## 2025-09-09 DIAGNOSIS — Z00.00 ENCOUNTER FOR GENERAL ADULT MEDICAL EXAMINATION W/OUT ABNORMAL FINDINGS: ICD-10-CM

## 2025-09-09 DIAGNOSIS — Z01.419 ENCOUNTER FOR GYNECOLOGICAL EXAMINATION (GENERAL) (ROUTINE) W/OUT ABNORMAL FINDINGS: ICD-10-CM

## 2025-09-09 DIAGNOSIS — Z11.3 ENCOUNTER FOR SCREENING FOR INFECTIONS WITH A PREDOMINANTLY SEXUAL MODE OF TRANSMISSION: ICD-10-CM

## 2025-09-09 DIAGNOSIS — N95.0 POSTMENOPAUSAL BLEEDING: ICD-10-CM

## 2025-09-09 PROCEDURE — G0101: CPT

## 2025-09-10 LAB — T PALLIDUM AB SER QL IA: NEGATIVE

## 2025-09-11 LAB
C TRACH RRNA SPEC QL NAA+PROBE: NOT DETECTED
HBV SURFACE AG SER QL: NONREACTIVE
HCV AB SER QL: NONREACTIVE
HCV S/CO RATIO: 0.07 S/CO
HIV1+2 AB SPEC QL IA.RAPID: NONREACTIVE
N GONORRHOEA RRNA SPEC QL NAA+PROBE: NOT DETECTED
SOURCE TP AMPLIFICATION: NORMAL
T VAGINALIS RRNA SPEC QL NAA+PROBE: NOT DETECTED

## 2025-09-15 ENCOUNTER — APPOINTMENT (OUTPATIENT)
Dept: INTERNAL MEDICINE | Facility: CLINIC | Age: 65
End: 2025-09-15
Payer: MEDICARE

## 2025-09-15 DIAGNOSIS — E11.9 TYPE 2 DIABETES MELLITUS W/OUT COMPLICATIONS: ICD-10-CM

## 2025-09-15 DIAGNOSIS — E78.5 HYPERLIPIDEMIA, UNSPECIFIED: ICD-10-CM

## 2025-09-15 PROCEDURE — G2211 COMPLEX E/M VISIT ADD ON: CPT | Mod: 2W

## 2025-09-15 PROCEDURE — 99214 OFFICE O/P EST MOD 30 MIN: CPT | Mod: 2W

## 2025-09-15 RX ORDER — SEMAGLUTIDE 0.68 MG/ML
2 INJECTION, SOLUTION SUBCUTANEOUS
Qty: 1 | Refills: 1 | Status: ACTIVE | COMMUNITY
Start: 2025-09-15 | End: 1900-01-01

## 2025-09-15 RX ORDER — ROSUVASTATIN CALCIUM 20 MG/1
20 TABLET, FILM COATED ORAL
Qty: 90 | Refills: 3 | Status: ACTIVE | COMMUNITY
Start: 2025-09-15 | End: 1900-01-01

## 2025-09-16 LAB
CYTOLOGY CVX/VAG DOC THIN PREP: NORMAL
HPV HIGH+LOW RISK DNA PNL CVX: DETECTED